# Patient Record
Sex: MALE | Race: WHITE | NOT HISPANIC OR LATINO | Employment: OTHER | URBAN - NONMETROPOLITAN AREA
[De-identification: names, ages, dates, MRNs, and addresses within clinical notes are randomized per-mention and may not be internally consistent; named-entity substitution may affect disease eponyms.]

---

## 2020-03-25 ENCOUNTER — HOSPITAL ENCOUNTER (OUTPATIENT)
Dept: CT IMAGING | Facility: HOSPITAL | Age: 70
Discharge: HOME | End: 2020-03-25
Admitting: PEDIATRICS

## 2020-03-25 ENCOUNTER — TRANSCRIBE ORDERS (OUTPATIENT)
Dept: ADMINISTRATIVE | Facility: HOSPITAL | Age: 70
End: 2020-03-25

## 2020-03-25 ENCOUNTER — APPOINTMENT (OUTPATIENT)
Dept: LAB | Facility: HOSPITAL | Age: 70
End: 2020-03-25

## 2020-03-25 DIAGNOSIS — R63.4 ABNORMAL WEIGHT LOSS: ICD-10-CM

## 2020-03-25 DIAGNOSIS — K62.89 OTHER SPECIFIED DISEASES OF ANUS AND RECTUM: Primary | ICD-10-CM

## 2020-03-25 LAB
ALBUMIN SERPL-MCNC: 4.6 G/DL (ref 3.5–5)
ALBUMIN/GLOB SERPL: 1 G/DL (ref 1.1–2.5)
ALP SERPL-CCNC: 92 U/L (ref 24–120)
ALT SERPL W P-5'-P-CCNC: 20 U/L (ref 0–50)
ANION GAP SERPL CALCULATED.3IONS-SCNC: 14 MMOL/L (ref 4–13)
AST SERPL-CCNC: 33 U/L (ref 7–45)
AUTO MIXED CELLS #: 1.1 10*3/MM3 (ref 0.1–2.6)
AUTO MIXED CELLS %: 8.6 % (ref 0.1–24)
BILIRUB SERPL-MCNC: 1 MG/DL (ref 0.1–1)
BILIRUB UR QL STRIP: NEGATIVE
BUN BLD-MCNC: 13 MG/DL (ref 5–21)
BUN/CREAT SERPL: 15.5
CALCIUM SPEC-SCNC: 9.8 MG/DL (ref 8.4–10.4)
CHLORIDE SERPL-SCNC: 102 MMOL/L (ref 98–110)
CLARITY UR: CLEAR
CO2 SERPL-SCNC: 26 MMOL/L (ref 24–31)
COLOR UR: YELLOW
CREAT BLD-MCNC: 0.84 MG/DL (ref 0.5–1.4)
CREAT BLDA-MCNC: 0.9 MG/DL (ref 0.6–1.3)
ERYTHROCYTE [DISTWIDTH] IN BLOOD BY AUTOMATED COUNT: 12.9 % (ref 12.3–15.4)
GFR SERPL CREATININE-BSD FRML MDRD: 91 ML/MIN/1.73
GLOBULIN UR ELPH-MCNC: 4.6 GM/DL
GLUCOSE BLD-MCNC: 108 MG/DL (ref 70–100)
GLUCOSE UR STRIP-MCNC: NEGATIVE MG/DL
HCT VFR BLD AUTO: 42.1 % (ref 37.5–51)
HGB BLD-MCNC: 14.3 G/DL (ref 13–17.7)
HGB UR QL STRIP.AUTO: NEGATIVE
KETONES UR QL STRIP: NEGATIVE
LEUKOCYTE ESTERASE UR QL STRIP.AUTO: NEGATIVE
LYMPHOCYTES # BLD AUTO: 3.5 10*3/MM3 (ref 0.7–3.1)
LYMPHOCYTES NFR BLD AUTO: 27 % (ref 19.6–45.3)
MCH RBC QN AUTO: 31.4 PG (ref 26.6–33)
MCHC RBC AUTO-ENTMCNC: 34 G/DL (ref 31.5–35.7)
MCV RBC AUTO: 92.3 FL (ref 79–97)
NEUTROPHILS # BLD AUTO: 8.2 10*3/MM3 (ref 1.7–7)
NEUTROPHILS NFR BLD AUTO: 64.4 % (ref 42.7–76)
NITRITE UR QL STRIP: NEGATIVE
PH UR STRIP.AUTO: 6 [PH] (ref 5–8)
PLATELET # BLD AUTO: 379 10*3/MM3 (ref 140–450)
PMV BLD AUTO: 8.6 FL (ref 6–12)
POTASSIUM BLD-SCNC: 5 MMOL/L (ref 3.5–5.3)
PROT SERPL-MCNC: 9.2 G/DL (ref 6.3–8.7)
PROT UR QL STRIP: NEGATIVE
RBC # BLD AUTO: 4.56 10*6/MM3 (ref 4.14–5.8)
SODIUM BLD-SCNC: 142 MMOL/L (ref 135–145)
SP GR UR STRIP: <=1.005 (ref 1–1.03)
UROBILINOGEN UR QL STRIP: NORMAL
WBC NRBC COR # BLD: 12.8 10*3/MM3 (ref 3.4–10.8)

## 2020-03-25 PROCEDURE — 36415 COLL VENOUS BLD VENIPUNCTURE: CPT | Performed by: PEDIATRICS

## 2020-03-25 PROCEDURE — 82565 ASSAY OF CREATININE: CPT

## 2020-03-25 PROCEDURE — 25010000002 IOPAMIDOL 61 % SOLUTION: Performed by: PEDIATRICS

## 2020-03-25 PROCEDURE — 80053 COMPREHEN METABOLIC PANEL: CPT | Performed by: PEDIATRICS

## 2020-03-25 PROCEDURE — 85025 COMPLETE CBC W/AUTO DIFF WBC: CPT | Performed by: PEDIATRICS

## 2020-03-25 PROCEDURE — 81003 URINALYSIS AUTO W/O SCOPE: CPT | Performed by: PEDIATRICS

## 2020-03-25 PROCEDURE — 74177 CT ABD & PELVIS W/CONTRAST: CPT

## 2020-03-25 RX ADMIN — IOPAMIDOL 50 ML: 612 INJECTION, SOLUTION INTRAVENOUS at 15:50

## 2020-03-25 RX ADMIN — IOPAMIDOL 100 ML: 612 INJECTION, SOLUTION INTRAVENOUS at 15:50

## 2020-03-26 ENCOUNTER — TELEPHONE (OUTPATIENT)
Dept: GASTROENTEROLOGY | Facility: CLINIC | Age: 70
End: 2020-03-26

## 2020-03-26 ENCOUNTER — PREP FOR SURGERY (OUTPATIENT)
Dept: OTHER | Facility: HOSPITAL | Age: 70
End: 2020-03-26

## 2020-03-26 DIAGNOSIS — R19.8 ALTERED BOWEL FUNCTION: Primary | ICD-10-CM

## 2020-03-26 DIAGNOSIS — R93.5 ABNORMAL CT OF THE ABDOMEN: ICD-10-CM

## 2020-03-26 NOTE — TELEPHONE ENCOUNTER
"Contacted by dr skinner office regarding abnormal CT      Due to precautions in place for COVID 19 discussed situation over phone with patient instead of having him come in for office visit, patient was agreeable to discuss over phone.        Patient complain of pain in rectum with BM.  He is having difficulty passing stool, worse after eating.  He has urge to have a BM and is not able to pass stool.  States it could take \"hours\" for him to have a complete BM.  No bright red blood per rectum.  Symptoms started 4 months ago and worse over pass 3 weeks.  He reports difficulty urinating.  He has a 7 lb weight loss, he is unsure of time frame this occurred.        We will place on schedule for colonoscopy next Thur 4/2/2020    Nash will call to go over instructions again and give arrival time--educate on miralax prep, please      I have asked patient to start using miralax 1-2 times per day starting today, ok for stool to be loose    He is not currently taking any medications  "

## 2020-03-27 PROBLEM — R93.5 ABNORMAL CT OF THE ABDOMEN: Status: ACTIVE | Noted: 2020-03-27

## 2020-03-27 NOTE — TELEPHONE ENCOUNTER
Spoke with patient - Went over instructions. Voiced understanding.     [facility] [place]     He has started miralax but is still having very little BM

## 2020-03-30 NOTE — TELEPHONE ENCOUNTER
Spoke with patient on phone  Using miralax 1-2 times as advised  He reports pressure sensation has improved with use of miralax    Instructions for colonoscopy prep reviewed for Wednesday    I will call [facility] prep to [facility]

## 2020-04-02 ENCOUNTER — ANESTHESIA (OUTPATIENT)
Dept: GASTROENTEROLOGY | Facility: HOSPITAL | Age: 70
End: 2020-04-02

## 2020-04-02 ENCOUNTER — HOSPITAL ENCOUNTER (OUTPATIENT)
Facility: HOSPITAL | Age: 70
Setting detail: HOSPITAL OUTPATIENT SURGERY
Discharge: HOME | End: 2020-04-02
Attending: INTERNAL MEDICINE | Admitting: INTERNAL MEDICINE

## 2020-04-02 ENCOUNTER — ANESTHESIA EVENT (OUTPATIENT)
Dept: GASTROENTEROLOGY | Facility: HOSPITAL | Age: 70
End: 2020-04-02

## 2020-04-02 VITALS
HEART RATE: 68 BPM | TEMPERATURE: 97.1 F | RESPIRATION RATE: 22 BRPM | DIASTOLIC BLOOD PRESSURE: 90 MMHG | WEIGHT: 175 LBS | OXYGEN SATURATION: 100 % | HEIGHT: 70 IN | BODY MASS INDEX: 25.05 KG/M2 | SYSTOLIC BLOOD PRESSURE: 153 MMHG

## 2020-04-02 DIAGNOSIS — R93.5 ABNORMAL CT OF THE ABDOMEN: ICD-10-CM

## 2020-04-02 PROCEDURE — 88305 TISSUE EXAM BY PATHOLOGIST: CPT | Performed by: INTERNAL MEDICINE

## 2020-04-02 PROCEDURE — 25010000002 PROPOFOL 10 MG/ML EMULSION: Performed by: NURSE ANESTHETIST, CERTIFIED REGISTERED

## 2020-04-02 PROCEDURE — 45380 COLONOSCOPY AND BIOPSY: CPT | Performed by: INTERNAL MEDICINE

## 2020-04-02 RX ORDER — SODIUM CHLORIDE 0.9 % (FLUSH) 0.9 %
10 SYRINGE (ML) INJECTION AS NEEDED
Status: DISCONTINUED | OUTPATIENT
Start: 2020-04-02 | End: 2020-04-02 | Stop reason: HOSPADM

## 2020-04-02 RX ORDER — SODIUM CHLORIDE 9 MG/ML
500 INJECTION, SOLUTION INTRAVENOUS CONTINUOUS PRN
Status: DISCONTINUED | OUTPATIENT
Start: 2020-04-02 | End: 2020-04-02 | Stop reason: HOSPADM

## 2020-04-02 RX ORDER — PROPOFOL 10 MG/ML
VIAL (ML) INTRAVENOUS AS NEEDED
Status: DISCONTINUED | OUTPATIENT
Start: 2020-04-02 | End: 2020-04-02 | Stop reason: SURG

## 2020-04-02 RX ADMIN — LIDOCAINE HYDROCHLORIDE 100 MG: 20 INJECTION, SOLUTION INTRAVENOUS at 12:34

## 2020-04-02 RX ADMIN — PROPOFOL 200 MG: 10 INJECTION, EMULSION INTRAVENOUS at 12:34

## 2020-04-02 RX ADMIN — SODIUM CHLORIDE 500 ML: 9 INJECTION, SOLUTION INTRAVENOUS at 09:56

## 2020-04-02 NOTE — ANESTHESIA PREPROCEDURE EVALUATION
Anesthesia Evaluation     Patient summary reviewed   no history of anesthetic complications:  NPO Solid Status: > 8 hours             Airway   Mallampati: II  TM distance: >3 FB  Neck ROM: full  Dental      Pulmonary    (+) a smoker,   Cardiovascular - negative cardio ROS  Exercise tolerance: excellent (>7 METS)        Neuro/Psych- negative ROS  GI/Hepatic/Renal/Endo - negative ROS     Musculoskeletal     Abdominal    Substance History      OB/GYN          Other                        Anesthesia Plan    ASA 2     MAC       Anesthetic plan, all risks, benefits, and alternatives have been provided, discussed and informed consent has been obtained with: patient.

## 2020-04-02 NOTE — H&P
"[facility] Gastroenterology  Pre Procedure History & Physical    Chief Complaint:   Abn CT    Subjective     HPI:   Change In bowel Habits    Past Medical History:   Past Medical History:   Diagnosis Date   • Arthritis    • Pancreatitis        Past Surgical History:  History reviewed. No pertinent surgical history.    Family History:  History reviewed. No pertinent family history.    Social History:   reports that he has been smoking. He has a 53.00 pack-year smoking history. He has never used smokeless tobacco. He reports that he drank alcohol. He reports that he does not use drugs.    Medications:   Prior to Admission medications    Not on File       Allergies:  Patient has no known allergies.    ROS:    General: Weight stable  Resp: No SOA  Cardiovascular: No CP    Objective     Blood pressure 120/69, pulse 93, temperature 97.1 °F (36.2 °C), temperature source Temporal, resp. rate 18, height 177.8 cm (70\"), weight 79.4 kg (175 lb), SpO2 96 %.    Physical Exam   Constitutional: Pt is oriented to person, place, and in no distress.   HENT: Mouth/Throat: Oropharynx is clear.   Cardiovascular: Normal rate, regular rhythm.    Pulmonary/Chest: Effort normal. No respiratory distress. No  wheezes.   Abdominal: Soft. Non-distended.  Skin: Skin is warm and dry.   Psychiatric: Mood, memory, affect and judgment appear normal.     Assessment/Plan     Diagnosis:  Abn CT/change in bowel habits    Anticipated Surgical Procedure:  C-scope    The risks, benefits, and alternatives of this procedure have been discussed with the patient or the responsible party- the patient understands and agrees to proceed.        "

## 2020-04-02 NOTE — ANESTHESIA POSTPROCEDURE EVALUATION
"Patient: Attila Viramontes    Procedure Summary     Date:  04/02/20 Room / Location:  [facility] ENDOSCOPY 4 / [facility]    Anesthesia Start:  1229 Anesthesia Stop:  1306    Procedure:  COLONOSCOPY WITH ANESTHESIA (N/A ) Diagnosis:       Abnormal CT of the abdomen      (Abnormal CT of the abdomen [R93.5])    Surgeon:  Yanick Flowers DO Provider:  Tessie Charles CRNA    Anesthesia Type:  MAC ASA Status:  2          Anesthesia Type: MAC    Vitals  No vitals data found for the desired time range.          Post Anesthesia Care and Evaluation    Patient location during evaluation: PHASE II  Patient participation: complete - patient participated  Level of consciousness: awake and alert  Pain management: adequate  Airway patency: patent  Anesthetic complications: No anesthetic complications  PONV Status: none  Cardiovascular status: acceptable  Respiratory status: acceptable  Hydration status: acceptable    Comments: Blood pressure 120/69, pulse 93, temperature 97.1 °F (36.2 °C), temperature source Temporal, resp. rate 18, height 177.8 cm (70\"), weight 79.4 kg (175 lb), SpO2 96 %.        "

## 2020-04-03 ENCOUNTER — TELEPHONE (OUTPATIENT)
Dept: GASTROENTEROLOGY | Facility: CLINIC | Age: 70
End: 2020-04-03

## 2020-04-03 DIAGNOSIS — C20 RECTAL CANCER (HCC): Primary | ICD-10-CM

## 2020-04-03 NOTE — PROGRESS NOTES
Pt notified of his results  Please help with his oncology referral  I entered the referral   Recall c-scope 6 months

## 2020-04-03 NOTE — TELEPHONE ENCOUNTER
----- Message from Yanick Flowers DO sent at 4/3/2020 10:01 AM CDT -----  Pt notified of his results  Please help with his oncology referral  I entered the referral   Recall c-scope 6 months

## 2020-04-06 NOTE — PROGRESS NOTES
[facility] HEMATOLOGY AND ONCOLOGY  [address]  [place] [address] [phone]    Patient Name: Attila Viramontes  Encounter Date: 04/09/2020  YOB: 1950  Patient Number: [id number]    Initial Note    REASON FOR CONSULTATION: Attila Viramontes is a pleasant 69 y.o. male referred by Dr. Yanick Flowers for management recommendations for rectal cancer. He is seen with sister.  History is obtained from patient. History is considered to be accurate.    HISTORY OF PRESENT ILLNESS: He presented with change in bowel habits.     CBC 03/25/2020 remarkable for WBC of 12.8 and ANC 8.2. CMP remarkable for elevated protein of 9.2. Urinalysis was unremarkable.    CT abdomen and pelvis 03/25/2020.  Asymmetric wall thickening of the rectum could represent a mass/neoplasm. If not recently performed, colonoscopy is recommended. Inflammatory stranding of the central mesenteric root, which is nonspecific but can be seen with mesenteritis/mesenteric panniculitis.    Colonoscopy by Dr. Reaves 04/02/2020 showed a localized area of severely ulcerated mucosa was found at 2 cm proximal to the anus. Biopsies were taken with a cold forceps for histology.    Pathology report 04/03/2020 showed a moderately differentiated adenocarcinoma.  Stromal invasion is seen although depth of invasion is indeterminate in these biopsies.  In the areas of invasion, the stroma demonstrates a reactive, desmoplastic appearance.    Maternal cousin with colorectal cancer in her 60s.    He is referred to oncology.      LABS    Lab Results - Last 18 Months   Lab Units 03/25/20  1543   HEMOGLOBIN g/dL 14.3   HEMATOCRIT % 42.1   MCV fL 92.3   WBC 10*3/mm3 12.80*   RDW % 12.9   MPV fL 8.6   PLATELETS 10*3/mm3 379   NEUTROS ABS 10*3/mm3 8.20*   LYMPHS ABS 10*3/mm3 3.50*       Lab Results - Last 18 Months   Lab Units 03/25/20  1549 03/25/20  1543   GLUCOSE mg/dL  --  108*    SODIUM mmol/L  --  142   POTASSIUM mmol/L  --  5.0   CO2 mmol/L  --  26.0   CHLORIDE mmol/L  --  102   ANION GAP mmol/L  --  14.0*   CREATININE mg/dL 0.90 0.84   BUN mg/dL  --  13   BUN / CREAT RATIO   --  15.5   CALCIUM mg/dL  --  9.8   EGFR IF NONAFRICN AM mL/min/1.73  --  91   ALK PHOS U/L  --  92   TOTAL PROTEIN g/dL  --  9.2*   ALT (SGPT) U/L  --  20   AST (SGOT) U/L  --  33   BILIRUBIN mg/dL  --  1.0   ALBUMIN g/dL  --  4.60   GLOBULIN gm/dL  --  4.6       No results for input(s): MSPIKE, KAPPALAMB, IGLFLC, URICACID, FREEKAPPAL, CEA, LDH, REFLABREPO in the last 67242 hours.    No results for input(s): IRON, TIBC, LABIRON, FERRITIN, X7IRHON, TSH, FOLATE in the last 73098 hours.    Invalid input(s): VITB12      PAST MEDICAL HISTORY:  ALLERGIES:  No Known Allergies  CURRENT MEDICATIONS:  No outpatient encounter medications on file as of 4/9/2020.     No facility-administered encounter medications on file as of 4/9/2020.      ADULT ILLNESSES:  Patient Active Problem List   Diagnosis Code   • Abnormal CT of the abdomen R93.5     SURGERIES:  Past Surgical History:   Procedure Laterality Date   • COLONOSCOPY N/A 4/2/2020    Procedure: COLONOSCOPY WITH ANESTHESIA;  Surgeon: Yanick Flowers DO;  Location: [facility] ENDOSCOPY;  Service: Gastroenterology;  Laterality: N/A;  pre: abnormal CT scan  post: rectal mass  PCP unknown     HEALTH MAINTENANCE ITEMS:  Health Maintenance Due   Topic Date Due   • TDAP/TD VACCINES (1 - Tdap) 09/18/1962   • ZOSTER VACCINE (1 of 2) 09/18/2001   • LUNG CANCER SCREENING  09/18/2006   • Pneumococcal Vaccine Once at 65 Years Old  09/18/2016   • HEPATITIS C SCREENING  03/26/2020   • MEDICARE ANNUAL WELLNESS  03/26/2020       <no information>  Last Completed Colonoscopy       Status Date      COLONOSCOPY Done 4/2/2020 COLONOSCOPY     Patient has more history with this topic...          There is no immunization history on file for this patient.  Last Completed Mammogram     Patient has no  health maintenance due at this time            FAMILY HISTORY:  Family History   Problem Relation Age of Onset   • Cancer Mother    • Stroke Father    • Heart disease Father    • Heart attack Brother      SOCIAL HISTORY:  Social History     Socioeconomic History   • Marital status: Single     Spouse name: Not on file   • Number of children: Not on file   • Years of education: Not on file   • Highest education level: Not on file   Tobacco Use   • Smoking status: Current Every Day Smoker     Packs/day: 1.00     Years: 53.00     Pack years: 53.00   • Smokeless tobacco: Never Used   Substance and Sexual Activity   • Alcohol use: Not Currently   • Drug use: Never   • Sexual activity: Defer       REVIEW OF SYSTEMS:  Review of Systems   Constitutional: Negative for activity change, appetite change, chills, diaphoresis, fatigue, fever and unexpected weight loss.   HENT: Negative for congestion, nosebleeds, sinus pressure, sore throat and trouble swallowing.    Eyes: Negative for blurred vision, redness and visual disturbance.   Respiratory: Negative for cough, shortness of breath and wheezing.    Cardiovascular: Negative for chest pain, palpitations and leg swelling.   Gastrointestinal: Positive for constipation. Negative for abdominal distention, abdominal pain, blood in stool, diarrhea, nausea and vomiting.   Endocrine: Negative for cold intolerance and heat intolerance.   Genitourinary: Negative for flank pain, frequency and hematuria.   Musculoskeletal: Negative for joint swelling and neck stiffness.   Skin: Negative for pallor.   Allergic/Immunologic: Negative for food allergies.   Neurological: Negative for dizziness, tremors, seizures, syncope, speech difficulty, weakness, headache, memory problem and confusion.   Hematological: Negative for adenopathy. Does not bruise/bleed easily.   Psychiatric/Behavioral: Negative for agitation, dysphoric mood, sleep disturbance, suicidal ideas and depressed mood. The patient is  "not nervous/anxious.        /80   Pulse 90   Temp 98.9 °F (37.2 °C)   Resp 18   Ht 177.8 cm (70\")   Wt 82.6 kg (182 lb 1.6 oz)   SpO2 98%   BMI 26.13 kg/m²  Body surface area is 2.01 meters squared.  Pain Score    04/09/20 0915   PainSc: 0-No pain       Physical Exam:  Physical Exam   Constitutional: He is oriented to person, place, and time. He appears well-developed and well-nourished. No distress.   HENT:   Head: Normocephalic and atraumatic.   Eyes: EOM are normal. No scleral icterus.   Neck: Trachea normal. Neck supple.   Cardiovascular: Normal rate, regular rhythm and normal pulses.   No murmur heard.  Pulmonary/Chest: Effort normal and breath sounds normal. He has no wheezes. He has no rhonchi. He has no rales. Chest wall is not dull to percussion.   Abdominal: Soft. Normal appearance and bowel sounds are normal. There is no tenderness. There is no rebound and no guarding.   Musculoskeletal: He exhibits no edema.   Lymphadenopathy:     He has no cervical adenopathy.     He has no axillary adenopathy.        Right: No inguinal and no supraclavicular adenopathy present.        Left: No inguinal and no supraclavicular adenopathy present.   Neurological: He is alert and oriented to person, place, and time. He has normal strength. No sensory deficit.   Skin: Skin is dry. He is not diaphoretic. No pallor.   Psychiatric: He has a normal mood and affect. His behavior is normal. Judgment and thought content normal.   Vitals reviewed.      Attila Viramontes reports a pain score of 0.       Patient's Body mass index is 26.13 kg/m². BMI is within normal parameters. No follow-up required..    ASSESSMENT:  1.  Adenocarcinoma of the rectum.  AJCC stage: Pending.  Treatment status:For neoadjuvant CIV 5-FU and radiation.  2.  Performance status of 0.  3.  Arthritis, stable.   4.  Elevated protein 03/25/2020.  5.  History of pancreatitis.       PLAN:  1.   regarding the reason for the referral.  2.  "  regarding role of neoadjuvant CIV 5-FU with radiation.  Aware of potential adverse of the 5-FU especially nausea, vomiting, diarrhea, stomatitis, and cardiotoxicity.  After neoadjuvant chemo and radiation, patient will undergo surgery.  Post surgery, patient will undergo adjuvant FOLFOX.  3.  Port by Dr. Weller.  4.  Blood for CBC with differential, CEA, and CMP.  5.  Schedule rectal ultrasound by Dr. Nabeel Walton.  6.  Schedule CT of the chest for staging.  7.  Refer to Dr. Ramirez for neoadjuvant chemoradiation.  8.  Order CIV 5- mg/m2= 450 mg D1 to D5 with radiation.  9.  eRx Compazine 10 mg p.o. every 4 hours as needed for nausea and vomiting #60 with 2 refills.  10.  CBC with differential weekly when chemo starts.  11.  Continue current medications.  12.  Continue care per primary care physician and other specialists.  13.  Plan of care discussed with patient and sister.  Understanding expressed.  Patient agreeable to proceed.  14. Advance Care Planning   ACP discussion was declined by the patient. Patient does not have an advance directive, information provided.   15.  Recall colonoscopy on 10/2020.  16.  Return to office in 4 weeks with pre-office CMP.  17.  Further recommendations pending.  18.  Questions were answered to their satisfaction.     Thank you for the referral      I spent 66 total minutes, face-to-face, caring for Attila today.  Greater than 50% of this time involved counseling and/or coordination of care as documented within this note regarding the patient's illness(es), pros and cons of various treatment options, instructions and/or risk reduction.      MD Yanick Gallegos DO Ross Jones, MD Peter Locken, MD Dana Tyrrell, MD

## 2020-04-08 ENCOUNTER — TELEPHONE (OUTPATIENT)
Dept: ONCOLOGY | Facility: CLINIC | Age: 70
End: 2020-04-08

## 2020-04-08 NOTE — TELEPHONE ENCOUNTER
Kamilah Ohara(sister) is requesting to accompany patient to appointment because she feels he sometimes has trouble understanding things.     Callback#: [phone]

## 2020-04-08 NOTE — TELEPHONE ENCOUNTER
CALLED HER BACK TO LET HER KNOW THAT I HAD ALREADY TALKED TO PATIENT EARLIER AND MADE HIM AWARE HE IS ALLOWED 1 VISITOR WITH HIM FOR HIS NEW PATIENT APPOINTMENT. SHE VOICED UNDERSTANDING.

## 2020-04-09 ENCOUNTER — APPOINTMENT (OUTPATIENT)
Dept: LAB | Facility: HOSPITAL | Age: 70
End: 2020-04-09

## 2020-04-09 ENCOUNTER — TELEPHONE (OUTPATIENT)
Dept: ONCOLOGY | Facility: CLINIC | Age: 70
End: 2020-04-09

## 2020-04-09 ENCOUNTER — CONSULT (OUTPATIENT)
Dept: ONCOLOGY | Facility: CLINIC | Age: 70
End: 2020-04-09

## 2020-04-09 VITALS
HEART RATE: 90 BPM | DIASTOLIC BLOOD PRESSURE: 80 MMHG | TEMPERATURE: 98.9 F | RESPIRATION RATE: 18 BRPM | HEIGHT: 70 IN | WEIGHT: 182.1 LBS | BODY MASS INDEX: 26.07 KG/M2 | SYSTOLIC BLOOD PRESSURE: 150 MMHG | OXYGEN SATURATION: 98 %

## 2020-04-09 DIAGNOSIS — C20 RECTAL CANCER (HCC): Primary | ICD-10-CM

## 2020-04-09 LAB
ALBUMIN SERPL-MCNC: 4.4 G/DL (ref 3.5–5.2)
ALBUMIN/GLOB SERPL: 1.3 G/DL
ALP SERPL-CCNC: 106 U/L (ref 39–117)
ALT SERPL W P-5'-P-CCNC: 13 U/L (ref 1–41)
ANION GAP SERPL CALCULATED.3IONS-SCNC: 11 MMOL/L (ref 5–15)
AST SERPL-CCNC: 15 U/L (ref 1–40)
BASOPHILS # BLD AUTO: 0.07 10*3/MM3 (ref 0–0.2)
BASOPHILS NFR BLD AUTO: 0.7 % (ref 0–1.5)
BILIRUB SERPL-MCNC: 0.3 MG/DL (ref 0.2–1.2)
BUN BLD-MCNC: 16 MG/DL (ref 8–23)
BUN/CREAT SERPL: 18.4 (ref 7–25)
CALCIUM SPEC-SCNC: 9.7 MG/DL (ref 8.6–10.5)
CEA SERPL-MCNC: 4.26 NG/ML
CHLORIDE SERPL-SCNC: 102 MMOL/L (ref 98–107)
CO2 SERPL-SCNC: 24 MMOL/L (ref 22–29)
CREAT BLD-MCNC: 0.87 MG/DL (ref 0.76–1.27)
DEPRECATED RDW RBC AUTO: 42.3 FL (ref 37–54)
EOSINOPHIL # BLD AUTO: 0.19 10*3/MM3 (ref 0–0.4)
EOSINOPHIL NFR BLD AUTO: 2 % (ref 0.3–6.2)
ERYTHROCYTE [DISTWIDTH] IN BLOOD BY AUTOMATED COUNT: 12.5 % (ref 12.3–15.4)
GFR SERPL CREATININE-BSD FRML MDRD: 87 ML/MIN/1.73
GLOBULIN UR ELPH-MCNC: 3.4 GM/DL
GLUCOSE BLD-MCNC: 205 MG/DL (ref 65–99)
HCT VFR BLD AUTO: 40.6 % (ref 37.5–51)
HGB BLD-MCNC: 13.8 G/DL (ref 13–17.7)
IMM GRANULOCYTES # BLD AUTO: 0.04 10*3/MM3 (ref 0–0.05)
IMM GRANULOCYTES NFR BLD AUTO: 0.4 % (ref 0–0.5)
LYMPHOCYTES # BLD AUTO: 2.48 10*3/MM3 (ref 0.7–3.1)
LYMPHOCYTES NFR BLD AUTO: 26.2 % (ref 19.6–45.3)
MCH RBC QN AUTO: 31.4 PG (ref 26.6–33)
MCHC RBC AUTO-ENTMCNC: 34 G/DL (ref 31.5–35.7)
MCV RBC AUTO: 92.5 FL (ref 79–97)
MONOCYTES # BLD AUTO: 0.85 10*3/MM3 (ref 0.1–0.9)
MONOCYTES NFR BLD AUTO: 9 % (ref 5–12)
NEUTROPHILS # BLD AUTO: 5.83 10*3/MM3 (ref 1.7–7)
NEUTROPHILS NFR BLD AUTO: 61.7 % (ref 42.7–76)
NRBC BLD AUTO-RTO: 0 /100 WBC (ref 0–0.2)
PLATELET # BLD AUTO: 355 10*3/MM3 (ref 140–450)
PMV BLD AUTO: 9 FL (ref 6–12)
POTASSIUM BLD-SCNC: 4.2 MMOL/L (ref 3.5–5.2)
PROT SERPL-MCNC: 7.8 G/DL (ref 6–8.5)
RBC # BLD AUTO: 4.39 10*6/MM3 (ref 4.14–5.8)
SODIUM BLD-SCNC: 137 MMOL/L (ref 136–145)
WBC NRBC COR # BLD: 9.46 10*3/MM3 (ref 3.4–10.8)

## 2020-04-09 PROCEDURE — 99205 OFFICE O/P NEW HI 60 MIN: CPT | Performed by: INTERNAL MEDICINE

## 2020-04-09 PROCEDURE — 82378 CARCINOEMBRYONIC ANTIGEN: CPT | Performed by: INTERNAL MEDICINE

## 2020-04-09 PROCEDURE — 36415 COLL VENOUS BLD VENIPUNCTURE: CPT | Performed by: INTERNAL MEDICINE

## 2020-04-09 PROCEDURE — 85025 COMPLETE CBC W/AUTO DIFF WBC: CPT | Performed by: INTERNAL MEDICINE

## 2020-04-09 PROCEDURE — 80053 COMPREHEN METABOLIC PANEL: CPT | Performed by: INTERNAL MEDICINE

## 2020-04-14 ENCOUNTER — HOSPITAL ENCOUNTER (EMERGENCY)
Facility: HOSPITAL | Age: 70
Discharge: ANOTHER HOSPITAL | End: 2020-04-14
Attending: PSYCHIATRY & NEUROLOGY

## 2020-04-14 ENCOUNTER — ANESTHESIA (OUTPATIENT)
Dept: PERIOP | Facility: HOSPITAL | Age: 70
End: 2020-04-14

## 2020-04-14 ENCOUNTER — APPOINTMENT (OUTPATIENT)
Dept: GENERAL RADIOLOGY | Facility: HOSPITAL | Age: 70
End: 2020-04-14

## 2020-04-14 ENCOUNTER — APPOINTMENT (OUTPATIENT)
Dept: CT IMAGING | Facility: HOSPITAL | Age: 70
End: 2020-04-14

## 2020-04-14 ENCOUNTER — HOSPITAL ENCOUNTER (OUTPATIENT)
Facility: HOSPITAL | Age: 70
Setting detail: HOSPITAL OUTPATIENT SURGERY
Discharge: HOME | End: 2020-04-14
Attending: SPECIALIST | Admitting: SPECIALIST

## 2020-04-14 ENCOUNTER — ANESTHESIA EVENT (OUTPATIENT)
Dept: PERIOP | Facility: HOSPITAL | Age: 70
End: 2020-04-14

## 2020-04-14 VITALS
OXYGEN SATURATION: 94 % | SYSTOLIC BLOOD PRESSURE: 138 MMHG | BODY MASS INDEX: 25.02 KG/M2 | RESPIRATION RATE: 16 BRPM | HEART RATE: 79 BPM | DIASTOLIC BLOOD PRESSURE: 72 MMHG | TEMPERATURE: 97.8 F | HEIGHT: 71 IN | WEIGHT: 178.7 LBS

## 2020-04-14 VITALS
WEIGHT: 183.86 LBS | OXYGEN SATURATION: 92 % | HEIGHT: 71 IN | DIASTOLIC BLOOD PRESSURE: 56 MMHG | TEMPERATURE: 97.7 F | BODY MASS INDEX: 25.74 KG/M2 | SYSTOLIC BLOOD PRESSURE: 118 MMHG | HEART RATE: 86 BPM | RESPIRATION RATE: 17 BRPM

## 2020-04-14 DIAGNOSIS — C20 RECTAL CANCER (HCC): Primary | ICD-10-CM

## 2020-04-14 LAB
ALBUMIN SERPL-MCNC: 4.4 G/DL (ref 3.5–5.2)
ALBUMIN/GLOB SERPL: 1.2 G/DL
ALP SERPL-CCNC: 122 U/L (ref 39–117)
ALT SERPL W P-5'-P-CCNC: 13 U/L (ref 1–41)
ANION GAP SERPL CALCULATED.3IONS-SCNC: 14 MMOL/L (ref 5–15)
AST SERPL-CCNC: 16 U/L (ref 1–40)
BASOPHILS # BLD AUTO: 0.08 10*3/MM3 (ref 0–0.2)
BASOPHILS NFR BLD AUTO: 0.7 % (ref 0–1.5)
BILIRUB SERPL-MCNC: 0.3 MG/DL (ref 0.2–1.2)
BUN BLD-MCNC: 13 MG/DL (ref 8–23)
BUN/CREAT SERPL: 16.5 (ref 7–25)
CALCIUM SPEC-SCNC: 9.4 MG/DL (ref 8.6–10.5)
CHLORIDE SERPL-SCNC: 105 MMOL/L (ref 98–107)
CO2 SERPL-SCNC: 21 MMOL/L (ref 22–29)
CREAT BLD-MCNC: 0.79 MG/DL (ref 0.76–1.27)
DEPRECATED RDW RBC AUTO: 41.2 FL (ref 37–54)
EOSINOPHIL # BLD AUTO: 0.26 10*3/MM3 (ref 0–0.4)
EOSINOPHIL NFR BLD AUTO: 2.4 % (ref 0.3–6.2)
ERYTHROCYTE [DISTWIDTH] IN BLOOD BY AUTOMATED COUNT: 12.5 % (ref 12.3–15.4)
GFR SERPL CREATININE-BSD FRML MDRD: 98 ML/MIN/1.73
GLOBULIN UR ELPH-MCNC: 3.6 GM/DL
GLUCOSE BLD-MCNC: 136 MG/DL (ref 65–99)
HCT VFR BLD AUTO: 41.7 % (ref 37.5–51)
HGB BLD-MCNC: 14.3 G/DL (ref 13–17.7)
IMM GRANULOCYTES # BLD AUTO: 0.03 10*3/MM3 (ref 0–0.05)
IMM GRANULOCYTES NFR BLD AUTO: 0.3 % (ref 0–0.5)
LYMPHOCYTES # BLD AUTO: 3.04 10*3/MM3 (ref 0.7–3.1)
LYMPHOCYTES NFR BLD AUTO: 27.8 % (ref 19.6–45.3)
MCH RBC QN AUTO: 31.2 PG (ref 26.6–33)
MCHC RBC AUTO-ENTMCNC: 34.3 G/DL (ref 31.5–35.7)
MCV RBC AUTO: 91 FL (ref 79–97)
MONOCYTES # BLD AUTO: 0.76 10*3/MM3 (ref 0.1–0.9)
MONOCYTES NFR BLD AUTO: 6.9 % (ref 5–12)
NEUTROPHILS # BLD AUTO: 6.77 10*3/MM3 (ref 1.7–7)
NEUTROPHILS NFR BLD AUTO: 61.9 % (ref 42.7–76)
NRBC BLD AUTO-RTO: 0 /100 WBC (ref 0–0.2)
PLATELET # BLD AUTO: 383 10*3/MM3 (ref 140–450)
PMV BLD AUTO: 9.1 FL (ref 6–12)
POTASSIUM BLD-SCNC: 3.9 MMOL/L (ref 3.5–5.2)
PROT SERPL-MCNC: 8 G/DL (ref 6–8.5)
RBC # BLD AUTO: 4.58 10*6/MM3 (ref 4.14–5.8)
SODIUM BLD-SCNC: 140 MMOL/L (ref 136–145)
WBC NRBC COR # BLD: 10.94 10*3/MM3 (ref 3.4–10.8)

## 2020-04-14 PROCEDURE — 93005 ELECTROCARDIOGRAM TRACING: CPT | Performed by: SPECIALIST

## 2020-04-14 PROCEDURE — 99291 CRITICAL CARE FIRST HOUR: CPT | Performed by: PSYCHIATRY & NEUROLOGY

## 2020-04-14 PROCEDURE — 25010000003 HEPARIN LOCK FLUCH PER 10 UNITS: Performed by: SPECIALIST

## 2020-04-14 PROCEDURE — 0 IOPAMIDOL PER 1 ML: Performed by: SPECIALIST

## 2020-04-14 PROCEDURE — 25010000003 CEFAZOLIN PER 500 MG: Performed by: NURSE ANESTHETIST, CERTIFIED REGISTERED

## 2020-04-14 PROCEDURE — 70450 CT HEAD/BRAIN W/O DYE: CPT

## 2020-04-14 PROCEDURE — 99284 EMERGENCY DEPT VISIT MOD MDM: CPT

## 2020-04-14 PROCEDURE — 25010000002 FENTANYL CITRATE (PF) 100 MCG/2ML SOLUTION: Performed by: NURSE ANESTHETIST, CERTIFIED REGISTERED

## 2020-04-14 PROCEDURE — 93010 ELECTROCARDIOGRAM REPORT: CPT | Performed by: INTERNAL MEDICINE

## 2020-04-14 PROCEDURE — 85025 COMPLETE CBC W/AUTO DIFF WBC: CPT | Performed by: SPECIALIST

## 2020-04-14 PROCEDURE — 25010000002 VANCOMYCIN 1 G RECONSTITUTED SOLUTION 1 EACH VIAL: Performed by: SPECIALIST

## 2020-04-14 PROCEDURE — 25010000003 LIDOCAINE 1 % SOLUTION: Performed by: SPECIALIST

## 2020-04-14 PROCEDURE — 25010000002 PROPOFOL 10 MG/ML EMULSION: Performed by: NURSE ANESTHETIST, CERTIFIED REGISTERED

## 2020-04-14 PROCEDURE — C1788 PORT, INDWELLING, IMP: HCPCS | Performed by: SPECIALIST

## 2020-04-14 PROCEDURE — 70496 CT ANGIOGRAPHY HEAD: CPT

## 2020-04-14 PROCEDURE — 70498 CT ANGIOGRAPHY NECK: CPT

## 2020-04-14 PROCEDURE — 80053 COMPREHEN METABOLIC PANEL: CPT | Performed by: SPECIALIST

## 2020-04-14 PROCEDURE — 76000 FLUOROSCOPY <1 HR PHYS/QHP: CPT

## 2020-04-14 DEVICE — POWERPORT M.R.I. IMPLANTABLE PORT WITH ATTACHABLE 9.6F OPEN-ENDED SINGLE-LUMEN VENOUS CATHETER INTERMEDIATE KIT (WITH SUTURE PLUGS)
Type: IMPLANTABLE DEVICE | Status: FUNCTIONAL
Brand: POWERPORT M.R.I.

## 2020-04-14 RX ORDER — NALOXONE HCL 0.4 MG/ML
0.4 VIAL (ML) INJECTION AS NEEDED
Status: CANCELLED | OUTPATIENT
Start: 2020-04-14

## 2020-04-14 RX ORDER — FENTANYL CITRATE 50 UG/ML
25 INJECTION, SOLUTION INTRAMUSCULAR; INTRAVENOUS
Status: CANCELLED | OUTPATIENT
Start: 2020-04-14

## 2020-04-14 RX ORDER — IBUPROFEN 200 MG
200 TABLET ORAL EVERY 8 HOURS PRN
COMMUNITY
End: 2020-09-14 | Stop reason: DRUGHIGH

## 2020-04-14 RX ORDER — SODIUM CHLORIDE 0.9 % (FLUSH) 0.9 %
3-10 SYRINGE (ML) INJECTION AS NEEDED
Status: DISCONTINUED | OUTPATIENT
Start: 2020-04-14 | End: 2020-04-14 | Stop reason: HOSPADM

## 2020-04-14 RX ORDER — LABETALOL HYDROCHLORIDE 5 MG/ML
5 INJECTION, SOLUTION INTRAVENOUS
Status: CANCELLED | OUTPATIENT
Start: 2020-04-14

## 2020-04-14 RX ORDER — FLUMAZENIL 0.1 MG/ML
0.2 INJECTION INTRAVENOUS AS NEEDED
Status: CANCELLED | OUTPATIENT
Start: 2020-04-14

## 2020-04-14 RX ORDER — SODIUM CHLORIDE 0.9 % (FLUSH) 0.9 %
3 SYRINGE (ML) INJECTION EVERY 12 HOURS SCHEDULED
Status: CANCELLED | OUTPATIENT
Start: 2020-04-14

## 2020-04-14 RX ORDER — METOPROLOL TARTRATE 5 MG/5ML
5 INJECTION INTRAVENOUS
Status: DISCONTINUED | OUTPATIENT
Start: 2020-04-14 | End: 2020-04-14 | Stop reason: HOSPADM

## 2020-04-14 RX ORDER — HYDROCODONE BITARTRATE AND ACETAMINOPHEN 7.5; 325 MG/1; MG/1
1-2 TABLET ORAL EVERY 4 HOURS PRN
Qty: 15 TABLET | Refills: 0 | Status: SHIPPED | OUTPATIENT
Start: 2020-04-14 | End: 2020-05-22

## 2020-04-14 RX ORDER — ACETAMINOPHEN 500 MG
1000 TABLET ORAL ONCE
Status: DISCONTINUED | OUTPATIENT
Start: 2020-04-14 | End: 2020-04-14 | Stop reason: HOSPADM

## 2020-04-14 RX ORDER — SODIUM CHLORIDE 0.9 % (FLUSH) 0.9 %
10 SYRINGE (ML) INJECTION AS NEEDED
Status: CANCELLED | OUTPATIENT
Start: 2020-04-14

## 2020-04-14 RX ORDER — MIDAZOLAM HYDROCHLORIDE 1 MG/ML
2 INJECTION INTRAMUSCULAR; INTRAVENOUS
Status: DISCONTINUED | OUTPATIENT
Start: 2020-04-14 | End: 2020-04-14 | Stop reason: HOSPADM

## 2020-04-14 RX ORDER — HYDROMORPHONE HYDROCHLORIDE 1 MG/ML
0.5 INJECTION, SOLUTION INTRAMUSCULAR; INTRAVENOUS; SUBCUTANEOUS
Status: CANCELLED | OUTPATIENT
Start: 2020-04-14

## 2020-04-14 RX ORDER — FENTANYL CITRATE 50 UG/ML
INJECTION, SOLUTION INTRAMUSCULAR; INTRAVENOUS AS NEEDED
Status: DISCONTINUED | OUTPATIENT
Start: 2020-04-14 | End: 2020-04-14 | Stop reason: SURG

## 2020-04-14 RX ORDER — OXYCODONE AND ACETAMINOPHEN 7.5; 325 MG/1; MG/1
2 TABLET ORAL EVERY 4 HOURS PRN
Status: CANCELLED | OUTPATIENT
Start: 2020-04-14 | End: 2020-04-24

## 2020-04-14 RX ORDER — ONDANSETRON 2 MG/ML
4 INJECTION INTRAMUSCULAR; INTRAVENOUS ONCE AS NEEDED
Status: CANCELLED | OUTPATIENT
Start: 2020-04-14

## 2020-04-14 RX ORDER — HEPARIN SODIUM (PORCINE) LOCK FLUSH IV SOLN 100 UNIT/ML 100 UNIT/ML
SOLUTION INTRAVENOUS AS NEEDED
Status: DISCONTINUED | OUTPATIENT
Start: 2020-04-14 | End: 2020-04-14 | Stop reason: HOSPADM

## 2020-04-14 RX ORDER — NITROGLYCERIN 0.4 MG/1
0.4 TABLET SUBLINGUAL
Status: DISCONTINUED | OUTPATIENT
Start: 2020-04-14 | End: 2020-04-14 | Stop reason: HOSPADM

## 2020-04-14 RX ORDER — LIDOCAINE HYDROCHLORIDE 10 MG/ML
5 INJECTION, SOLUTION EPIDURAL; INFILTRATION; INTRACAUDAL; PERINEURAL AS NEEDED
Status: CANCELLED | OUTPATIENT
Start: 2020-04-14

## 2020-04-14 RX ORDER — METOPROLOL TARTRATE 100 MG/1
100 TABLET ORAL ONCE AS NEEDED
Status: CANCELLED | OUTPATIENT
Start: 2020-04-14

## 2020-04-14 RX ORDER — MIDAZOLAM HYDROCHLORIDE 1 MG/ML
1 INJECTION INTRAMUSCULAR; INTRAVENOUS
Status: DISCONTINUED | OUTPATIENT
Start: 2020-04-14 | End: 2020-04-14 | Stop reason: HOSPADM

## 2020-04-14 RX ORDER — CEFAZOLIN SODIUM 1 G/3ML
INJECTION, POWDER, FOR SOLUTION INTRAMUSCULAR; INTRAVENOUS AS NEEDED
Status: DISCONTINUED | OUTPATIENT
Start: 2020-04-14 | End: 2020-04-14 | Stop reason: SURG

## 2020-04-14 RX ORDER — SODIUM CHLORIDE, SODIUM LACTATE, POTASSIUM CHLORIDE, CALCIUM CHLORIDE 600; 310; 30; 20 MG/100ML; MG/100ML; MG/100ML; MG/100ML
100 INJECTION, SOLUTION INTRAVENOUS CONTINUOUS
Status: DISCONTINUED | OUTPATIENT
Start: 2020-04-14 | End: 2020-04-14 | Stop reason: HOSPADM

## 2020-04-14 RX ORDER — LIDOCAINE HYDROCHLORIDE 10 MG/ML
0.5 INJECTION, SOLUTION EPIDURAL; INFILTRATION; INTRACAUDAL; PERINEURAL ONCE AS NEEDED
Status: DISCONTINUED | OUTPATIENT
Start: 2020-04-14 | End: 2020-04-14 | Stop reason: HOSPADM

## 2020-04-14 RX ORDER — ONDANSETRON 2 MG/ML
4 INJECTION INTRAMUSCULAR; INTRAVENOUS ONCE AS NEEDED
Status: DISCONTINUED | OUTPATIENT
Start: 2020-04-14 | End: 2020-04-14 | Stop reason: HOSPADM

## 2020-04-14 RX ORDER — PROPOFOL 10 MG/ML
VIAL (ML) INTRAVENOUS AS NEEDED
Status: DISCONTINUED | OUTPATIENT
Start: 2020-04-14 | End: 2020-04-14 | Stop reason: SURG

## 2020-04-14 RX ORDER — PROMETHAZINE HYDROCHLORIDE 25 MG/1
12.5 TABLET ORAL ONCE AS NEEDED
Status: DISCONTINUED | OUTPATIENT
Start: 2020-04-14 | End: 2020-04-14 | Stop reason: HOSPADM

## 2020-04-14 RX ORDER — ONDANSETRON 4 MG/1
4 TABLET, FILM COATED ORAL ONCE AS NEEDED
Status: DISCONTINUED | OUTPATIENT
Start: 2020-04-14 | End: 2020-04-14 | Stop reason: HOSPADM

## 2020-04-14 RX ORDER — OXYCODONE AND ACETAMINOPHEN 10; 325 MG/1; MG/1
1 TABLET ORAL ONCE AS NEEDED
Status: CANCELLED | OUTPATIENT
Start: 2020-04-14

## 2020-04-14 RX ORDER — LIDOCAINE HYDROCHLORIDE 10 MG/ML
INJECTION, SOLUTION INFILTRATION; PERINEURAL AS NEEDED
Status: DISCONTINUED | OUTPATIENT
Start: 2020-04-14 | End: 2020-04-14 | Stop reason: HOSPADM

## 2020-04-14 RX ORDER — METOPROLOL TARTRATE 50 MG/1
50 TABLET, FILM COATED ORAL ONCE AS NEEDED
Status: CANCELLED | OUTPATIENT
Start: 2020-04-14

## 2020-04-14 RX ORDER — SODIUM CHLORIDE 0.9 % (FLUSH) 0.9 %
3 SYRINGE (ML) INJECTION EVERY 12 HOURS SCHEDULED
Status: DISCONTINUED | OUTPATIENT
Start: 2020-04-14 | End: 2020-04-14 | Stop reason: HOSPADM

## 2020-04-14 RX ADMIN — LIDOCAINE HYDROCHLORIDE 60 MG: 20 INJECTION, SOLUTION INTRAVENOUS at 13:48

## 2020-04-14 RX ADMIN — SODIUM CHLORIDE, POTASSIUM CHLORIDE, SODIUM LACTATE AND CALCIUM CHLORIDE 100 ML/HR: 600; 310; 30; 20 INJECTION, SOLUTION INTRAVENOUS at 13:25

## 2020-04-14 RX ADMIN — IOPAMIDOL 100 ML: 755 INJECTION, SOLUTION INTRAVENOUS at 14:30

## 2020-04-14 RX ADMIN — FENTANYL CITRATE 100 MCG: 50 INJECTION, SOLUTION INTRAMUSCULAR; INTRAVENOUS at 13:47

## 2020-04-14 RX ADMIN — CEFAZOLIN 1 G: 330 INJECTION, POWDER, FOR SOLUTION INTRAMUSCULAR; INTRAVENOUS at 13:48

## 2020-04-14 RX ADMIN — PROPOFOL 200 MG: 10 INJECTION, EMULSION INTRAVENOUS at 13:48

## 2020-04-14 NOTE — NURSING NOTE
Called anes, dr howard notified of pt's stroke like symptoms, dr howard on her way to opc.  Charge nurse ramírez holman rn cn in room along with murali gonsalez rn director

## 2020-04-14 NOTE — NURSING NOTE
Received pt into opc 20 from sonal flores, report received.  Pt coughing and would not answer questions, vss, called mark pruitt back into room no new orders received.

## 2020-04-14 NOTE — NURSING NOTE
To ct scan per dr Montalvo orders accomypied per first alert team and ramírez holman rn and myself.

## 2020-04-14 NOTE — CONSULTS
Neurology History & Physical    Indication for Admission: code stroke    History of present illness:  Patient is seen for code stroke s/p gabino cath placement. Patient has history of recent diagnosis of rectal cancer and to begin radiation therapy  In 2 days. Patient also has history of tobacco use, arthritis, pancreatitis. Upon patient return to outpatient surgery for recovery, RN noted right lower facial weakness, inability to speak and follow commands and patient not moving right arm and leg. Code Stroke was called immediately at 1430. Patient was met in CT by Dr. SKYLAR Montalvo. CT head was negative for intracranial hemorrhage. CTA head and neck was also done that showed 4 cm left ICA occlusion with generous size right ICA and generous size JESSICA, MCA bilateral. Prior to leaving CT patient began to move right leg with no movement of right arm and continued aphasia. Patient was then transferred to ED. Upon arrival to ED /86. Patient then began to lift right arm and also stated his name. Over the course of observation, patient was able to state his name, location, named 2/2 objects. Right arm somewhat weaker than left and mild right lower facial weakness. He does have continued aphasia. Simultaneously, Dr. Montalvo contacted [facility] team who graciously agreed to accept the patient. [facility] was notified.   Dr. Smith did call CT head report and could not rule out small amount of gas cerebral cortical veins. No hemorrhage.     Past Medical History:   Diagnosis Date   • Arthritis    • Pancreatitis    • Rectal cancer (CMS/[facility]) 4/9/2020       No Known Allergies    (Not in a hospital admission)    Social History     Socioeconomic History   • Marital status: Single     Spouse name: Not on file   • Number of children: Not on file   • Years of education: Not on file   • Highest education level: Not on file   Tobacco Use   • Smoking status: Current Every Day Smoker     Packs/day: 1.00     Years: 53.00     Pack  years: 53.00   • Smokeless tobacco: Never Used   Substance and Sexual Activity   • Alcohol use: Not Currently   • Drug use: Never   • Sexual activity: Defer     Family History   Problem Relation Age of Onset   • Cancer Mother    • Stroke Father    • Heart disease Father    • Heart attack Brother        Review of Systems  Review of Systems not able to be completed as patient has aphasia.   Vital Signs   Temp:  [97.6 °F (36.4 °C)-97.8 °F (36.6 °C)] 97.8 °F (36.6 °C)  Heart Rate:  [74-96] 79  Resp:  [16-20] 16  BP: (119-168)/(70-96) 138/72    General Exam:  Head:  Normal cephalic, atraumatic  HEENT:  Neck supple  Fundoscopic Exam:  No signs of disc edema  CVS:  Regular rate and rhythm.  No murmurs  Carotid Examination:  No bruits  Lungs:  Clear to auscultation  Chest/Abdomen:  Left upper chest portacath dressing d/I.  Non-tender, Non-distended  Extremities:  No signs of peripheral edema  Skin:  No rashes    Neurologic Exam:    Mental Status:    -Awake, Alert,   -mild to moderate aphasia  -No dysarthria  -Follows simple and complex commands    CN II:  Visual fields full.  Pupils equally reactive to light  CN III, IV, VI:  Extraocular Muscles full with no signs of nystagmus  CN V:  Facial sensory is symmetric with no asymmetries.  CN VII:  Facial motor asymmetric with mild right lower facial weakness.   CN VIII:  Gross hearing intact bilaterally  CN IX:  Palate elevates symmetrically  CN X:  Palate elevates symmetrically  CN XI:  Shoulder shrug asymmetric decreased on right  CN XII:  Tongue is midline on protrusion    Motor: (strength out of 5:  1= minimal movement, 2 = movement in plane of gravity, 3 = movement against gravity, 4 = movement against some resistance, 5 = full strength)    -Right Upper Ext: Proximal: 4 Distal: 4 with mild drift  -Left Upper Ext: Proximal: 5 Distal: 5    -Right Lower Ext: Proximal: 5 Distal: 5  -Left Lower Ext: Proximal: 5 Distal: 5    DTR:  -Right   Bicep: 2+ Tricep: 2+ Brachoradialis:  2+   Patella: 2+ Ankle: 2+ Neg Babinski  -Left   Bicep: 2+ Tricep: 2+ Brachoradialis: 2+   Patella: 2+ Ankle: 2+ Neg Babinski    Sensory:  -Intact to light touch, pinprick, temperature, pain, and proprioception  - patient complaints of numbness in right hand.     Coordination:  -Finger to nose intact on left. Ataxia on right.  -Heel to shin intact      Gait  -not attempted for safety reasons.    Results Review:  Lab Results (last 7 days)     ** No results found for the last 168 hours. **        Patient Active Problem List   Diagnosis   • Abnormal CT of the abdomen   • Rectal cancer (CMS/HCC)           Impression:  1. Aphasia with right lower facial weakness and right arm and leg weakness, improving concerning for left hemispheric stroke. Cannot rule out that may have been related to hypotension. Patient not considered for IV TPA as patient rapidly improving and also recent surgical intervention.   2. Left ICA occlusion  3. Rectal cancer  4. S/p portacath insertion.      Plan:  Patient to be transferred to [facility] team, Dr. LADI Mitchell has accepted the patient. Permissive hypertension for now and then per Dr. Mitchell recommendations.   Will recommend tobacco cessation.       Melinda Ward, APRN  04/14/20  15:35

## 2020-04-14 NOTE — ED NOTES
1430 Code stroke called from outpatient room 20;  Patient post-op from Dr. Varela placing a port;  Patient had right facial, arm, leg drooping, no speaking, and could not follow commands.  1434 CTA ordered, NSR 88 VSS; 1438 CTA performed and Dr. Montalvo; 1440 Melinda Ward performing NIHSS;  1449 still not speaking and transported to ED.  Anne Kaufman, CRISTOFER  04/14/20 1456       Anne Kaufman, CRISTOFER  04/14/20 1455

## 2020-04-14 NOTE — OP NOTE
INSERTION VENOUS ACCESS DEVICE  Procedure Note    Attila Viramontes  4/14/2020    Pre-op Diagnosis:   * No pre-op diagnosis entered *    Post-op Diagnosis:     same    Procedure/CPT® Codes:      Procedure(s):  INSERTION VENOUS ACCESS DEVICE    Surgeon(s):  Vielka Weller MD    Anesthesia: Monitored Anesthesia Care    Staff:   Circulator: Evelin Diaz RN  Scrub Person: Lissa Jensen; Jaci Wang  Documenter: Morgan Jenkins RN    Estimated Blood Loss: minimal    Specimens:                None      Access site: Left subclavian        Complications: none          Date: 4/14/2020  Time: 14:06  The patient was brought to the operating room and placed in the supine position. After IV sedation and infusion of IV antibiotics, the patient was prepped and draped in the usual sterile fashion. Lidocaine 1% was used for local anesthesia. The vein was accessed, the wire passed. Fluoroscopy revealed it to be in good position. The pocket was incised, developed. The catheter was tunneled, cut to size, secured to the port, and the port sutured in place with 0 Prolene. Sheath passed, catheter was fed. Fluoroscopy revealed it to be in good position. Good flush and flow obtained. The wound was closed with 3-0 and 4-0 Vicryl sutures. Dressing was placed. The patient was awakened and transferred to the recovery room in stable condition, tolerated the procedure well. At the end of the procedure, all counts were correct.       Vielka Weller MD

## 2020-04-14 NOTE — ANESTHESIA PREPROCEDURE EVALUATION
Anesthesia Evaluation     Patient summary reviewed   no history of anesthetic complications:  NPO Solid Status: > 8 hours             Airway   Mallampati: II  TM distance: >3 FB  Neck ROM: full  No difficulty expected  Dental - normal exam     Pulmonary - normal exam   (+) a smoker,   Cardiovascular - negative cardio ROS and normal exam  Exercise tolerance: excellent (>7 METS)        Neuro/Psych- negative ROS  GI/Hepatic/Renal/Endo - negative ROS     Musculoskeletal     Abdominal  - normal exam   Substance History      OB/GYN          Other   arthritis,    history of cancer (colon cancer, recently diagnosed) active                    Anesthesia Plan    ASA 2     MAC       Anesthetic plan, all risks, benefits, and alternatives have been provided, discussed and informed consent has been obtained with: patient.

## 2020-04-14 NOTE — H&P
Vielka Weller MD  H&P    Patient Care Team:  Vincent Swenson MD as PCP - General (Pediatrics)    Chief complaint rectal cancer    Subjective     Attila Viramontes  is a 69 y.o. male presents with rectal cancer needs a port for chemotherapy.      Review of Systems   Pertinent items are noted in HPI, all other systems reviewed and negative    History  Past Medical History:   Diagnosis Date   • Arthritis    • Pancreatitis    • Rectal cancer (CMS/HCC) 4/9/2020     Past Surgical History:   Procedure Laterality Date   • COLONOSCOPY N/A 4/2/2020    Procedure: COLONOSCOPY WITH ANESTHESIA;  Surgeon: Yanick Flowers DO;  Location: [facility] ENDOSCOPY;  Service: Gastroenterology;  Laterality: N/A;  pre: abnormal CT scan  post: rectal mass  PCP unknown   • EYE SURGERY Bilateral     lenses present     Family History   Problem Relation Age of Onset   • Cancer Mother    • Stroke Father    • Heart disease Father    • Heart attack Brother      Social History     Tobacco Use   • Smoking status: Current Every Day Smoker     Packs/day: 1.00     Years: 53.00     Pack years: 53.00   • Smokeless tobacco: Never Used   Substance Use Topics   • Alcohol use: Not Currently   • Drug use: Never     Medications Prior to Admission   Medication Sig Dispense Refill Last Dose   • ibuprofen (ADVIL,MOTRIN) 200 MG tablet Take 200 mg by mouth Every 8 (Eight) Hours As Needed for Mild Pain .   4/13/2020 at 2000     Allergies:  Patient has no known allergies.    Objective     Vital Signs  Temp:  [97.7 °F (36.5 °C)] 97.7 °F (36.5 °C)  Heart Rate:  [86-96] 86  Resp:  [18-20] 18  BP: (142)/(79) 142/79    Physical Exam:      General Appearance:    Alert, cooperative, in no acute distress   Head:    Normocephalic, without obvious abnormality, atraumatic   Eyes:            Lids and lashes normal, conjunctivae and sclerae normal, no   icterus, no pallor, corneas clear, PERRLA   Ears:    Ears appear intact with no abnormalities noted   Neck:   No adenopathy,  supple, trachea midline   Back:     No kyphosis present, no scoliosis present, no skin lesions,      erythema or scars, no tenderness to percussion or                   palpation,   range of motion normal   Lungs:     Clear to auscultation,respirations regular, even and                  unlabored    Heart:    Regular rhythm and normal rate, normal S1 and S2, no            murmur, no gallop, no rub, no click   Chest Wall:    No abnormalities observed   Abdomen:    Soft   Rectal:     Deferred   Extremities:   Moves all extremities well, no edema, no cyanosis, no             redness   Pulses:   Pulses palpable and equal bilaterally   Skin:   No bleeding, bruising or rash   Lymph nodes:   No palpable adenopathy   Neurologic:   No focal deficits       Results Review:      Lab Results (last 72 hours)     Procedure Component Value Units Date/Time    CBC & Differential [[id number]] Collected:  04/14/20 1319    Specimen:  Blood Updated:  04/14/20 1335    Narrative:       The following orders were created for panel order CBC & Differential.  Procedure                               Abnormality         Status                     ---------                               -----------         ------                     CBC Auto Differential[[id number]]        Abnormal            Final result                 Please view results for these tests on the individual orders.    CBC Auto Differential [[id number]]  (Abnormal) Collected:  04/14/20 1319    Specimen:  Blood Updated:  04/14/20 1335     WBC 10.94 10*3/mm3      RBC 4.58 10*6/mm3      Hemoglobin 14.3 g/dL      Hematocrit 41.7 %      MCV 91.0 fL      MCH 31.2 pg      MCHC 34.3 g/dL      RDW 12.5 %      RDW-SD 41.2 fl      MPV 9.1 fL      Platelets 383 10*3/mm3      Neutrophil % 61.9 %      Lymphocyte % 27.8 %      Monocyte % 6.9 %      Eosinophil % 2.4 %      Basophil % 0.7 %      Immature Grans % 0.3 %      Neutrophils, Absolute 6.77 10*3/mm3      Lymphocytes, Absolute 3.04 10*3/mm3       Monocytes, Absolute 0.76 10*3/mm3      Eosinophils, Absolute 0.26 10*3/mm3      Basophils, Absolute 0.08 10*3/mm3      Immature Grans, Absolute 0.03 10*3/mm3      nRBC 0.0 /100 WBC     Comprehensive Metabolic Panel [[id number]] Collected:  04/14/20 1319    Specimen:  Blood Updated:  04/14/20 1331        Imaging Results (Last 72 Hours)     ** No results found for the last 72 hours. **          Assessment/Plan       * No active hospital problems. *      Will place port.  Risks benefits alternatives discussed in the office no further questions.      Vielka Weller MD  04/14/20  13:46

## 2020-04-14 NOTE — ADDENDUM NOTE
Addendum  created 04/14/20 1438 by Theresa Alejandro MD    Order list changed, Order sets accessed

## 2020-04-14 NOTE — NURSING NOTE
Code stroke called overhead, continue to monitor pt closely.  Continues to have garbled speech, not following commands, vss, resp e/u.

## 2020-04-14 NOTE — ANESTHESIA POSTPROCEDURE EVALUATION
Patient: Attila Viramontes    Procedure Summary     Date:  04/14/20 Room / Location:   [facility]  /  [facility]    Anesthesia Start:  1344 Anesthesia Stop:  1420    Procedure:  INSERTION VENOUS ACCESS DEVICE (N/A Chin to Nipples) Diagnosis:      Surgeon:  Vielka Weller MD Provider:  Vincent Silvestre CRNA    Anesthesia Type:  MAC ASA Status:  2          Anesthesia Type: MAC    Vitals  No vitals data found for the desired time range.          Post Anesthesia Care and Evaluation    Patient location during evaluation: PACU  Patient participation: complete - patient participated  Level of consciousness: awake and awake and alert  Pain score: 0  Pain management: adequate  Airway patency: patent  Anesthetic complications: No anesthetic complications    Cardiovascular status: acceptable and stable  Respiratory status: acceptable and unassisted  Hydration status: acceptable

## 2020-04-15 ENCOUNTER — HOSPITAL ENCOUNTER (OUTPATIENT)
Dept: RADIATION ONCOLOGY | Facility: HOSPITAL | Age: 70
Setting detail: RADIATION/ONCOLOGY SERIES
End: 2020-04-15

## 2020-04-15 PROBLEM — F17.200 CURRENT EVERY DAY SMOKER: Status: ACTIVE | Noted: 2020-04-15

## 2020-04-15 NOTE — PROGRESS NOTES
"[facility]  MD Heather Cox BSN, PA-C  __________________________________  [facility]  Department of Radiation Oncology  [address] [place] [id number]  Office:  [phone]  Fax: [phone]    DATE:  04/16/2020  PATIENT: Attila Viramontes  1950                         MEDICAL RECORD #:  [id number]                                                       REASON FOR CONSULTATION:  Attila Viramontes is a very pleasant male that has been referred to our office by Shaq Boggs MD to discuss radiotherapy recommendations. Reports rectal bleeding at random times, \"started again last night\". Denies activity change, appetite change, unexpected weight change, nausea/vomiting, diarrhea, light-headedness, weakness, and headaches. He follows .     History of Present Illness:  Diagnosed in April 2020 with at least a Stage I (T1, cN0, cM0, G2) Invasive Adenocarcinoma of the rectum, severely ulcerated mucosa was found at 2-3 cm proximal to the anus. Follows Dr. Boggs who plans for rectal ultrasound by Dr. Nabeel Walton, CT of the chest for staging and to radiation oncology for neoadjuvant chemoradiation with CIV 5-FU, followed by surgical resection then adjuvant FOLFOX. Final Stage pending EUS    03/25/2020 - CT Abdomen/Pelvis with and without contrast due to abdomen pain:  • A hypodense lesion is seen in the right kidney which is too small to characterize.   • There are scattered atherosclerotic calcifications of the aorta and its branch vessels.   • The origins of the celiac axis, superior mesenteric artery, and inferior mesenteric artery enhance normally.  • No pathologically enlarged central mesenteric or retroperitoneal lymph nodes are identified. There is straightening of the central mesenteric root.  • A small hiatal hernia is suspected.   • The stomach and small bowel appear normal in caliber.   • A moderate amount of stool is noted throughout " "the colon, suggesting fecal stasis.   • The appendix is normal in appearance.  • There is asymmetric wall thickening of the rectum, with an area of thickness on the right measuring up to 2.1 cm in size.   • The large bowel is otherwise unremarkable.   • No free air or free fluid is seen in the abdomen.   • There is a tiny fat-containing umbilical hernia.  • Small lymph nodes are seen along the external iliac chain.  • Review of the visualized osseous structures demonstrates no acute or aggressive lesions.   • Degenerative changes are noted in the spine.  IMPRESSION:  • Asymmetric wall thickening of the rectum could represent a mass/neoplasm. If not recently performed, colonoscopy is recommended.  • Inflammatory stranding of the central mesenteric root, which is nonspecific but can be seen with mesenteritis/mesenteric panniculitis.  • Atherosclerotic disease.  • Fecal stasis.    03/26/2020 - Telephone encounter with :  • Patient complain of pain in rectum with BM.    • He is having difficulty passing stool, worse after eating.    • He has urge to have a BM and is not able to pass stool.    • States it could take \"hours\" for him to have a complete BM.    • No bright red blood per rectum.    • Symptoms started 4 months ago and worse over pass 3 weeks.    • He reports difficulty urinating.    • He has a 7 lb weight loss, he is unsure of time frame this occurred.   Recommendations:  • We will place on schedule for colonoscopy next Thur 4/2/2020 04/02/2020 - Colonoscopy with biopsy per :  Findings:  • The perianal and digital rectal examinations were normal.  • A localized area of severely ulcerated mucosa was found at 2 cm proximal to the anus. Biopsies were taken with a cold forceps for histology.  Impression:   • Ulcerated mucosa at 2 cm proximal to the anus. Biopsied.  Pathology:  • Large intestine, rectum at 3 cm, biopsy:   o Moderately differentiated adenocarcinoma, invasive.    04/03/2020 - " Telephone encounter with :  • Referral to oncology   • Recall c-scope 6 months    04/09/2020 - Consult with :  ASSESSMENT:  • Adenocarcinoma of the rectum.  o AJCC stage: Pending.  o Treatment status:For neoadjuvant CIV 5-FU and radiation.  • Performance status of 0.  • Arthritis, stable.   • Elevated protein 03/25/2020.  • History of pancreatitis.   PLAN:  •  regarding the reason for the referral.  •  regarding role of neoadjuvant CIV 5-FU with radiation.  Aware of potential adverse of the 5-FU especially nausea, vomiting, diarrhea, stomatitis, and cardiotoxicity.  After neoadjuvant chemo and radiation, patient will undergo surgery.  Post surgery, patient will undergo adjuvant FOLFOX.  • Port by Dr. Weller.  • Blood for CBC with differential, CEA, and CMP.  • Schedule rectal ultrasound by Dr. Nabeel Walton.  • Schedule CT of the chest for staging.  • Refer to Dr. Ramirez for neoadjuvant chemoradiation.  • Order CIV 5- mg/m2= 450 mg D1 to D5 with radiation.  • eRx Compazine 10 mg p.o. every 4 hours as needed for nausea and vomiting #60 with 2 refills.  • CBC with differential weekly when chemo starts.  • Continue current medications.  • Continue care per primary care physician and other specialists.  • ACP discussion was declined by the patient. Patient does not have an advance directive, information provided.   • Recall colonoscopy on 10/2020.  • Return to office in 4 weeks with pre-office CMP.  • Further recommendations pending.    04/14/2020 - Port placement per .    04/14/2020 - After port placement patient was having a global aphasia, right lower facial drooping, and right hemiparesis.  Code stroke was called. Head CT without contrast showed no concerning findings. CT angiography of head and neck showed a left internal carotid artery occlusion.  After the imaging was performed the patient transferred to the ER.  There he received IV hydration therapy and had resolution  of symptoms.  I discussed the case with the interventional neurologist at [facility] as listed above and he agreed.  The concern would be the small possibility that the thrombus was acute in nature and could expand overnight.  Out of an abundance of precaution we decided to transfer to [place] so that if there was expansion of an acute thrombus he would be in a center capable of interventional neurologic techniques.       04/14/2020 - CT Angio Head:  • Distal left ICA occlusion with patent [place].    04/14/2020 - CT Angio Neck:  • Left internal carotid artery occlusion beginning at the origin. Minimal opacification of the distal left internal carotid artery, which may represent retrograde collateral flow.  • Right internal carotid artery patent.  • Bilateral vertebral arteries patent.    04/14/2020 - CT Head without contrast:  • Small amount of gas in the left cerebral cortical veins. Differential would include iatrogenic air embolism or trauma.  • No acute intracranial hemorrhage.    04/14/2020 - Transferred to [place] who presents as an OSH transfer for left ICA occlusion. CTA showed left ICA occlusion with distal reconstitution at the level of cavernous ICA. Mostly likely etiology could be transient hypoperfusion. He was admitted for further evaluation.     04/14/2020 - CT Angio Head/Neck:  • Age-indeterminate left internal carotid artery origin occlusion. Reconstitution of left ICA flow at the level of the cavernous ICA,  likely due to patent anterior commuting artery. A critical alert was  sent.  • No perfusion abnormality.  • Moderate atherosclerotic stenosis involving the origin of the right internal carotid artery.    04/15/2020 - MRI Brain:  • No acute intracranial abnormality identified  • Findings of chronic microvascular ischemia. Occluded left ICA flow  void.    04/15/2020 - Discharged home with follow up appointments scheduled.      04/20/2020 -CT Chest with  contrast:    History obtained from  PATIENT and CHART    PAST MEDICAL HISTORY  Past Medical History:   Diagnosis Date   • Arthritis    • Pancreatitis    • Rectal cancer (CMS/HCC) 4/9/2020      PAST SURGICAL HISTORY  Past Surgical History:   Procedure Laterality Date   • COLONOSCOPY N/A 4/2/2020    Procedure: COLONOSCOPY WITH ANESTHESIA;  Surgeon: Yanick Flowers DO;  Location: [facility] ENDOSCOPY;  Service: Gastroenterology;  Laterality: N/A;  pre: abnormal CT scan  post: rectal mass  PCP unknown   • EYE SURGERY Bilateral     lenses present   • VENOUS ACCESS DEVICE (PORT) INSERTION N/A 4/14/2020    Procedure: INSERTION VENOUS ACCESS DEVICE;  Surgeon: Vielka Weller MD;  Location: [facility] OR;  Service: General;  Laterality: N/A;      FAMILY HISTORY  family history includes Cancer in his mother; Heart attack in his brother; Heart disease in his father; Stroke in his father.     SOCIAL HISTORY  Social History     Tobacco Use   • Smoking status: Current Every Day Smoker     Packs/day: 1.00     Years: 53.00     Pack years: 53.00   • Smokeless tobacco: Never Used   Substance Use Topics   • Alcohol use: Not Currently   • Drug use: Never     ALLERGIES  Patient has no known allergies.     MEDICATIONS    Current Outpatient Medications:   •  aspirin 81 MG chewable tablet, Chew 81 mg Daily., Disp: , Rfl:   •  atorvastatin (LIPITOR) 80 MG tablet, Take 80 mg by mouth Daily., Disp: , Rfl:   •  clopidogrel (PLAVIX) 75 MG tablet, Take 75 mg by mouth Daily., Disp: , Rfl:   •  HYDROcodone-acetaminophen (NORCO) 7.5-325 MG per tablet, Take 1-2 tablets by mouth Every 4 (Four) Hours As Needed for Moderate Pain  (Pain)., Disp: 15 tablet, Rfl: 0  •  ibuprofen (ADVIL,MOTRIN) 200 MG tablet, Take 200 mg by mouth Every 8 (Eight) Hours As Needed for Mild Pain ., Disp: , Rfl:   •  pantoprazole (PROTONIX) 20 MG EC tablet, Take 20 mg by mouth Daily., Disp: , Rfl:     The following portions of the patient's history were reviewed and updated as  "appropriate: allergies, current medications, past family history, past medical history, past social history, past surgical history and problem list.    Current outpatient and discharge medications have been reconciled for the patient.  Reviewed by: Kel Ramirez III, MD    REVIEW OF SYSTEMS  Review of Systems   Constitutional: Negative.    HENT: Negative.    Eyes: Negative.    Respiratory: Negative.    Cardiovascular: Negative.    Gastrointestinal: Positive for blood in stool, constipation and rectal pain. Negative for abdominal distention and anal bleeding.   Endocrine: Negative.    Genitourinary: Negative for difficulty urinating.   Musculoskeletal: Negative.    Skin: Negative.    Allergic/Immunologic: Negative.    Neurological: Negative.    Hematological: Negative.    Psychiatric/Behavioral: Negative.        PHYSICAL EXAM  VITAL SIGNS:   Vitals:    04/16/20 1458   BP: 135/76   Pulse: 115   Weight: 82.1 kg (181 lb)   Height: 180 cm (70.87\")   PainSc:   5   PainLoc: Rectum      Performance Status: ECOG (1) Restricted in physically strenuous activity, ambulatory and able to do work of light nature    Clinical Quality Measures  -Pain Documented by Standardized Tool, FPS [place] Viramontes reports a pain score of 5. Given his pain assessment as noted, treatment options were discussed and the following options were decided upon as a follow-up plan to address the patient's pain: continuation of current treatment plan for pain and use of non-medical modalities (ice, heat, stretching and/or behavior modifications).  Pain Medications             aspirin 81 MG chewable tablet Chew 81 mg Daily.    HYDROcodone-acetaminophen (NORCO) 7.5-325 MG per tablet Take 1-2 tablets by mouth Every 4 (Four) Hours As Needed for Moderate Pain  (Pain).    ibuprofen (ADVIL,MOTRIN) 200 MG tablet Take 200 mg by mouth Every 8 (Eight) Hours As Needed for Mild Pain .        -Advanced Care Planning Advance Care Planning   ACP discussion was held " with the patient during this visit. Patient does not have an advance directive, information provided.    -Body Mass Index Screening and Follow-Up Plan Patient's Body mass index is 25.34 kg/m². BMI is within normal parameters. No follow-up required..  -Tobacco Use: Screening and Cessation Intervention Social History    Tobacco Use      Smoking status: Current Every Day Smoker        Packs/day: 1.00        Years: 53.00        Pack years: 53      Smokeless tobacco: Never Used   Smoking cessation information given in after visit summary.    ASSESSMENT AND PLAN  1. Rectal cancer (CMS/HCC)    2. Abnormal CT of the abdomen    3. Bright red rectal bleeding    4. Current every day smoker      RECOMMENDATIONS: Attila Viramontes is a very pleasant 69 y.o. male that has been referred to our clinic by Shaq Boggs MD to discuss radiotherapy recommendations for colorectal carcinoma. Diagnosed in April 2020 with Stage pending (Tx, cN0, cM0, G2) Invasive Adenocarcinoma of the rectum, severely ulcerated mucosa was found at 2 cm proximal to the anus. Follows Dr. Boggs who plans for rectal ultrasound by Dr. Nabeel Walton, CT of the chest for staging and to radiation oncology for neoadjuvant chemoradiation with CIV 5-FU, followed by surgical resection then adjuvant FOLFOX.  CT scan revealed asymmetric wall thickening of the rectum and inflammatory stranding of the central mesenteric root, which is nonspecific but can be seen with mesenteritis/mesenteric panniculitis.    Indications and rationale of neoadjuvant radiation therapy with 5FU concomminently according to the NCCN Guidelines to the colorectal region has been discussed with the patient today. I have extensively reviewed the risks, benefits and alternatives of therapy as well as possible progression of disease in spite of therapy with either local or systemic failure.  Side effects include but are not limited to sunburn-type skin irritation of the targeted area (which may range  from mild to  Intense, red, dry, tender, or itchy skin, general fatigue, diarrhea, rectal bleeding, hemorrhoids, vaginal itching, burning, and dryness for women, Incontinence of bowel or bladder, bladder irritation and sexual problems.    I have seen, examined and reviewed this patient's medication list, appropriate labs and imaging studies as well as other physician notes. We discussed the goals and plans of care with the patient and family and answered all questions.     After careful consideration of the available diagnostic data and evaluation of the patient, I recommend MRI of abdomen followed by definitive treatment with concurrent chemotherapy under the direction of Dr Boggs and radiation therapy, will then be referred back to the surgeon for resection evaluation.      The patient and his family verbalize understanding of this discussion, voice no further questions and wish to proceed with recommendations.  We will simulate treatment fields today to begin the treatment planning, I anticipate a dose of 5040 cGy over 28 fractions, final course to be determined.    Attila Viramontes  reports that he has been smoking. He has a 53.00 pack-year smoking history. He has never used smokeless tobacco.. I have educated him on the risk of diseases from using tobacco products such as cancer, COPD and heart diease. I advised him to quit and he is not willing to quit. I spent >10 minutes counseling the patient.    Thank you for allowing me to assist in this patients care.    Todays appointment time was spent in counseling and coordination of care as follows: diagnosis, intent of treatment discussing radiation therapy specifics: logistics, possible and probable side effects and after effects, staging of cancer, standard of care in for this stage of this cancer and treatment options  Kel Ramirez III, MD  04/16/2020

## 2020-04-16 ENCOUNTER — CONSULT (OUTPATIENT)
Dept: RADIATION ONCOLOGY | Facility: HOSPITAL | Age: 70
End: 2020-04-16

## 2020-04-16 VITALS
SYSTOLIC BLOOD PRESSURE: 135 MMHG | WEIGHT: 181 LBS | BODY MASS INDEX: 25.34 KG/M2 | DIASTOLIC BLOOD PRESSURE: 76 MMHG | HEIGHT: 71 IN | HEART RATE: 115 BPM

## 2020-04-16 DIAGNOSIS — F17.200 CURRENT EVERY DAY SMOKER: ICD-10-CM

## 2020-04-16 DIAGNOSIS — K62.5 BRIGHT RED RECTAL BLEEDING: ICD-10-CM

## 2020-04-16 DIAGNOSIS — R93.5 ABNORMAL CT OF THE ABDOMEN: ICD-10-CM

## 2020-04-16 DIAGNOSIS — C20 RECTAL CANCER (HCC): Primary | ICD-10-CM

## 2020-04-16 PROBLEM — I10 HYPERTENSION: Status: ACTIVE | Noted: 2020-04-15

## 2020-04-16 PROBLEM — R26.89 IMPAIRED GAIT AND MOBILITY: Status: ACTIVE | Noted: 2020-04-14

## 2020-04-16 PROCEDURE — 77290 THER RAD SIMULAJ FIELD CPLX: CPT | Performed by: RADIOLOGY

## 2020-04-16 PROCEDURE — 77334 RADIATION TREATMENT AID(S): CPT | Performed by: RADIOLOGY

## 2020-04-16 RX ORDER — CLOPIDOGREL BISULFATE 75 MG/1
75 TABLET ORAL DAILY
COMMUNITY
Start: 2020-04-16 | End: 2020-05-08

## 2020-04-16 RX ORDER — PANTOPRAZOLE SODIUM 20 MG/1
20 TABLET, DELAYED RELEASE ORAL DAILY
COMMUNITY
Start: 2020-04-16 | End: 2021-04-17

## 2020-04-16 RX ORDER — ATORVASTATIN CALCIUM 80 MG/1
80 TABLET, FILM COATED ORAL DAILY
COMMUNITY
Start: 2020-04-15 | End: 2021-03-22

## 2020-04-16 RX ORDER — ASPIRIN 81 MG/1
81 TABLET, CHEWABLE ORAL DAILY
COMMUNITY
Start: 2020-04-16 | End: 2021-04-17

## 2020-04-16 NOTE — PATIENT INSTRUCTIONS
Colorectal Cancer    Colorectal cancer is an abnormal growth of cells and tissue (tumor) in the colon or rectum, which are parts of the large intestine. The cancer can spread (metastasize) to other parts of the body.  What are the causes?  Most cases of colorectal cancer start as abnormal growths called polyps on the inner wall of the colon or rectum. Other times, abnormal changes to genes (genetic mutations) can cause cells to form cancer.  What increases the risk?  You are more likely to develop this condition if:  · You are older than age 50.  · You have multiple polyps in the colon or rectum.  · You have diabetes.  · You are .  · You have a family history of Crawford syndrome.  · You have had cancer before.  · You have certain hereditary conditions, such as:  ? Familial adenomatous polyposis.  ? Turcot syndrome.  ? Peutz-Jeghers syndrome.  · You eat a diet that is high in fat (especially animal fat) and low in fiber, fruits, and vegetables.  · You have an inactive (sedentary) lifestyle.  · You have an inflammatory bowel disease or Crohn's disease.  · You smoke.  · You drink alcohol excessively.  What are the signs or symptoms?  Early colorectal cancer often does not cause symptoms. As the cancer grows, symptoms may include:  · Changes in bowel habits.  · Feeling like the bowel does not empty completely after a bowel movement.  · Stools that are narrower than usual.  · Blood in the stool.  · Diarrhea.  · Constipation.  · Anemia.  · Discomfort, pain, bloating, fullness, or cramps in the abdomen.  · Frequent gas pain.  · Unexplained weight loss.  · Constant fatigue.  · Nausea and vomiting.  How is this diagnosed?  This condition may be diagnosed with:  · A medical history.  · A physical exam.  · Tests. These may include:  ? A digital rectal exam.  ? A stool test called a fecal occult blood test.  ? Blood tests.  ? A test in which a tissue sample is taken from the colon or rectum and examined under a  microscope (biopsy).  ? Imaging tests, such as:  § X-rays.  § A barium enema.  § CT scans.  § MRIs.  § A sigmoidoscopy. This test is done to view the inside of the rectum.  § A colonoscopy. This test is done to view the inside of the colon. During this test, small polyps can be removed or biopsies may be taken.  § An endorectal ultrasound. This test checks how deep a tumor in the rectum has grown and whether the cancer has spread to lymph nodes or other nearby tissues.  Your health care provider may order additional tests to find out whether the cancer has spread to other parts of the body (what stage it is). The stages of cancer include:  · Stage 0. At this stage, the cancer is found only in the innermost lining of the colon or rectum.  · Stage I. At this stage, the cancer has grown into the inner wall of the colon or rectum.  · Stage II. At this stage, the cancer has gone more deeply into the wall of the colon or rectum or through the wall. It may have invaded nearby tissue.  · Stage III. At this stage, the cancer has spread to nearby lymph nodes.  · Stage IV. At this stage, the cancer has spread to other parts of the body, such as the liver or lungs.  How is this treated?  Treatment for this condition depends on the type and stage of the cancer. Treatment may include:  · Surgery. In the early stages of the cancer, surgery may be done to remove polyps or small tumors from the colon. In later stages, surgery may be done to remove part of the colon.  · Chemotherapy. This treatment uses medicines to kill cancer cells.  · Targeted therapy. This treatment targets specific gene mutations or proteins that the cancer expresses in order to kill tumor cells.  · Radiation therapy. This treatment uses radiation to kill cancer cells or shrink tumors.  · Radiofrequency ablation. This treatment uses radio waves to destroy the tumors that may have spread to other areas of the body, such as the liver.  Follow these instructions at  home:  · Take over-the-counter and prescription medicines only as told by your health care provider.  · Try to eat regular, healthy meals. Some of your treatments might affect your appetite. If you are having problems eating or with your appetite, ask to meet with a food and nutrition specialist (dietitian).  · Consider joining a support group. This may help you learn about your diagnosis and cope with the stress of having colorectal cancer.  · If you are admitted to the hospital, inform your cancer care team.  · Keep all follow-up visits as told by your health care provider. This is important.  How is this prevented?  · Colorectal cancer can be prevented with screening tests that find polyps so they can be removed before they develop into cancer.  · All adults should have screening for colorectal cancer starting at age 50 and continuing until age 75. Your health care provider may recommend screening at age 45. People at increased risk should start screening at an earlier age.  Where to find more information  · American Cancer Society: https://www.cancer.org  · National Cancer Gage (NCI): https://www.cancer.gov  Contact a health care provider if:  · Your diarrhea or constipation does not go away.  · You have blood in your stool.  · Your bowel habits change.  · You have increased pain in your abdomen.  · You notice new fatigue or weakness.  · You lose weight.  Get help right away if:  · You have increased bleeding from your rectum.  · You have any uncontrollable or severe abdomen (abdominal) symptoms.  Summary  · Colorectal cancer is an abnormal growth of cells and tissue (tumor) in the colon or rectum.  · Common risk factors for this condition include having a relative with colon cancer, being older in age, having an inflammatory bowel disease, and being .  · This condition may be diagnosed with tests, such as a colonoscopy and biopsy.  · Treatment depends on the type and stage of the cancer.  Commonly, treatment includes surgery to remove the tumor along with chemotherapy or targeted therapy.  · Keep all follow-up visits as told by your health care provider. This is important.  This information is not intended to replace advice given to you by your health care provider. Make sure you discuss any questions you have with your health care provider.  Document Released: 12/18/2006 Document Revised: 02/07/2019 Document Reviewed: 01/19/2018  [employer] Interactive Patient Education © 2020 [employer]    External Beam Radiation Therapy  External beam radiation therapy is a type of radiation treatment. This type of radiation therapy can deliver radiation to a fairly large area. This is the most common type of radiation therapy for cancer. This therapy may be done to:  · Treat cancer by:  ? Destroying cancer cells. Radiation delivered during the treatment damages cancer cells. It may also damage normal cells, but normal cells have the DNA to repair themselves while cancer cells do not.  ? Helping with symptoms of your cancer.  ? Stopping the growth of any remaining cancer cells after surgery.  ? Preventing cancer cells from growing in areas that do not have cancer (prophylactic radiation therapy).  · Treat or shrink a tumor.  · Reduce pain (palliative therapy).  The amount of radiation you will receive and the length of therapy depend on your medical condition. You should not feel the radiation being delivered or any pain during your therapy.  Let your health care provider know about:  · Any allergies you have.  · All medicines you are taking, including vitamins, herbs, eye drops, creams, and over-the-counter medicines.  · Any problems you or family members have had with anesthetic medicines.  · Any blood disorders you have.  · Any surgeries you have had.  · Any medical conditions you have.  · Whether you are pregnant or may be pregnant.  What are the risks?  Generally, this is a safe procedure. However, radiation  therapy can place a person at a higher risk for a second cancer later in life.  Most people experience side effects from the therapy. Side effects depend on the amount of radiation and the part of the body that was exposed to radiation. The most common side effects include:  · Skin changes.  · Hair loss.  · Fatigue.  · Nausea and vomiting.  What happens before the procedure?  · There will be a planning session (simulation). During the session:  ? Your health care provider will plan exactly where the radiation will be delivered (treatment field).  ? You will be positioned for your therapy. The goal is to have a position that can be reproduced for each therapy session.  ? Temporary marks may be drawn on your body. Permanent marks may also be drawn on your body in order for you to be positioned the same way for each therapy session.  ? A tool that holds a body part in place (immobilization device) may be used to keep the area of treatment in the correct position.  · Follow instructions from your health care provider about eating or drinking restrictions.  · Ask your health care provider about changing or stopping your regular medicines. This is especially important if you are taking diabetes medicines or blood thinners.  What happens during the procedure?    · You will either lie on a table or sit in a chair in the position determined for your therapy.  · You may have a heavy shield placed on you to protect tissues and organs that are not being treated.  · The radiation machine (linear accelerator) will move around you to deliver the radiation in exact doses from different angles. The machine will not touch you.  The procedure may vary among health care providers and hospitals.  What happens after the procedure?  · You may return to your normal routine including diet, activities, and medicines as told by your health care provider.  · You may want to have someone take you home from the hospital or  clinic.  Summary  · External beam radiation therapy is a type of radiation treatment for cancer.  · The amount of radiation you will receive and the length of therapy depend on your medical condition.  · Most people experience side effects from the therapy. Side effects depend on the amount of radiation and the part of the body that was exposed to radiation.  This information is not intended to replace advice given to you by your health care provider. Make sure you discuss any questions you have with your health care provider.  Document Released: 05/06/2010 Document Revised: 12/18/2017 Document Reviewed: 12/18/2017  [employer] Interactive Patient Education © 2020 [employer]  How to Quarantine at Home  Information for Patients and Families    These instructions are for people with confirmed or suspected COVID-19 who do not need to be hospitalized and those with confirmed COVID-19 who were hospitalized and discharged to care for themselves at home.    If you were tested through the Health Department  The Health Department will monitor your wellbeing.  If it is determined that you do not need to be hospitalized and can be isolated at home, you will be monitored by staff from your local or state health department.     If you were tested through a [facility]  You will need to monitor yourself and report changes in your symptoms to your doctor.  See the section below called Monitor Your Symptoms.    Follow these steps until a healthcare provider or local or state health department says you can return to your normal activities.    Stay home except to get medical care  • Restrict activities outside your home, except for getting medical care.   • Do not go to work, school, or public areas.   • Avoid using public transportation, ride-sharing, or taxis.    Separate yourself from other people and animals in your home  People  As much as possible, you should stay in a specific room and away from other people in your home.  Also, you should use a separate bathroom, if available.    Animals  You should restrict contact with pets and other animals while you are sick with COVID-19, just like you would around other people. When possible, have another member of your household care for your animals while you are sick. If you are sick with COVID-19, avoid contact with your pet, including petting, snuggling, being kissed or licked, and sharing food. If you must care for your pet or be around animals while you are sick, wash your hands before and after you interact with pets and wear a facemask. See COVID-19 and Animals for more information.    Call ahead before visiting your doctor  If you have a medical appointment, call the healthcare provider and tell them that you have or may have COVID-19. This information will help the healthcare provider’s office take steps to keep other people from getting infected or exposed.    Wear a facemask  You should wear a facemask when you are around other people (e.g., sharing a room or vehicle) or pets and before you enter a healthcare provider’s office.     If you are not able to wear a facemask (for example, because it causes trouble breathing), then people who live with you should not stay in the same room with you, or they should wear a facemask if they enter your room.    Cover your coughs and sneezes  • Cover your mouth and nose with a tissue when you cough or sneeze.   • Throw used tissues in a lined trash can.   • Immediately wash your hands with soap and water for at least 20 seconds or, if soap and water are not available, clean your hands with an alcohol-based hand  that contains at least 60% alcohol.    Clean your hands often  • Wash your hands often with soap and water for at least 20 seconds, especially after blowing your nose, coughing, or sneezing; going to the bathroom; and before eating or preparing food.     • If soap and water are not readily available, use an alcohol-based hand   with at least 60% alcohol, covering all surfaces of your hands and rubbing them together until they feel dry.    • Soap and water are the best option if hands are visibly dirty. Avoid touching your eyes, nose, and mouth with unwashed hands.    Avoid sharing personal household items  • You should not share dishes, drinking glasses, cups, eating utensils, towels, or bedding with other people or pets in your home.   • After using these items, they should be washed thoroughly with soap and water.    Clean all “high-touch” surfaces everyday  • High touch surfaces include counters, tabletops, doorknobs, bathroom fixtures, toilets, phones, keyboards, tablets, and bedside tables.   • Also, clean any surfaces that may have blood, stool, or body fluids on them.   • Use a household cleaning spray or wipe, according to the label instructions. Labels contain instructions for safe and effective use of the cleaning product, including precautions you should take when applying the product, such as wearing gloves and making sure you have good ventilation during use of the product.    Monitor your symptoms  • Seek prompt medical attention if your illness is worsening (e.g., difficulty breathing).   • Before seeking care, call your healthcare provider and tell them that you have, or are being evaluated for, COVID-19.   • Put on a facemask before you enter the facility.     • These steps will help the healthcare provider’s office to keep other people in the office or waiting room from getting infected or exposed.   • Persons who are placed under active monitoring or facilitated self-monitoring should follow instructions provided by their local health department or occupational health professionals, as appropriate.  • If you have a medical emergency and need to call 911, notify the dispatch personnel that you have, or are being evaluated for COVID-19. If possible, put on a facemask before emergency medical services  arrive.    Discontinuing home isolation  Patients with confirmed COVID-19 should remain under home isolation precautions until the risk of secondary transmission to others is thought to be low. The decision to discontinue home isolation precautions should be made on a case-by-case basis, in consultation with healthcare providers and state and local health departments.    The below content are for household members, intimate partners, and caregivers of a patient with symptomatic laboratory-confirmed COVID-19 or a patient under investigation:    Household members, intimate partners, and caregivers may have close contact with a person with symptomatic, laboratory-confirmed COVID-19 or a person under investigation.     Close contacts should monitor their health; they should call their healthcare provider right away if they develop symptoms suggestive of COVID-19 (e.g., fever, cough, shortness of breath)     Close contacts should also follow these recommendations:  • Make sure that you understand and can help the patient follow their healthcare provider’s instructions for medication(s) and care. You should help the patient with basic needs in the home and provide support for getting groceries, prescriptions, and other personal needs.  • Monitor the patient’s symptoms. If the patient is getting sicker, call his or her healthcare provider and tell them that the patient has laboratory-confirmed COVID-19. This will help the healthcare provider’s office take steps to keep other people in the office or waiting room from getting infected. Ask the healthcare provider to call the local or state health department for additional guidance. If the patient has a medical emergency and you need to call 911, notify the dispatch personnel that the patient has, or is being evaluated for COVID-19.  • Household members should stay in another room or be  from the patient as much as possible. Household members should use a separate  bedroom and bathroom, if available.  • Prohibit visitors who do not have an essential need to be in the home.  • Household members should care for any pets in the home. Do not handle pets or other animals while sick.  For more information, see COVID-19 and Animals.  • Make sure that shared spaces in the home have good air flow, such as by an air conditioner or an opened window, weather permitting.  • Perform hand hygiene frequently. Wash your hands often with soap and water for at least 20 seconds or use an alcohol-based hand  that contains 60 to 95% alcohol, covering all surfaces of your hands and rubbing them together until they feel dry. Soap and water should be used preferentially if hands are visibly dirty.  • Avoid touching your eyes, nose, and mouth with unwashed hands.  • The patient should wear a facemask when you are around other people. If the patient is not able to wear a facemask (for example, because it causes trouble breathing), you, as the caregiver, should wear a mask when you are in the same room as the patient.  • Wear a disposable facemask and gloves when you touch or have contact with the patient’s blood, stool, or body fluids, such as saliva, sputum, nasal mucus, vomit, or urine.   o Throw out disposable facemasks and gloves after using them. Do not reuse.  o When removing personal protective equipment, first remove and dispose of gloves. Then, immediately clean your hands with soap and water or alcohol-based hand . Next, remove and dispose of facemask, and immediately clean your hands again with soap and water or alcohol-based hand .  • Avoid sharing household items with the patient. You should not share dishes, drinking glasses, cups, eating utensils, towels, bedding, or other items. After the patient uses these items, you should wash them thoroughly (see below “Wash laundry thoroughly”).  • Clean all “high-touch” surfaces, such as counters, tabletops, doorknobs,  bathroom fixtures, toilets, phones, keyboards, tablets, and bedside tables, every day. Also, clean any surfaces that may have blood, stool, or body fluids on them.   o Use a household cleaning spray or wipe, according to the label instructions. Labels contain instructions for safe and effective use of the cleaning product including precautions you should take when applying the product, such as wearing gloves and making sure you have good ventilation during use of the product.  • Wash laundry thoroughly.   o Immediately remove and wash clothes or bedding that have blood, stool, or body fluids on them.  o Wear disposable gloves while handling soiled items and keep soiled items away from your body. Clean your hands (with soap and water or an alcohol-based hand ) immediately after removing your gloves.  o Read and follow directions on labels of laundry or clothing items and detergent. In general, using a normal laundry detergent according to washing machine instructions and dry thoroughly using the warmest temperatures recommended on the clothing label.  • Place all used disposable gloves, facemasks, and other contaminated items in a lined container before disposing of them with other household waste. Clean your hands (with soap and water or an alcohol-based hand ) immediately after handling these items. Soap and water should be used preferentially if hands are visibly dirty.  • Discuss any additional questions with your state or local health department or healthcare provider.    Adapted from information provided by the Centers for Disease Control and Prevention.  For more information, visit https://www.cdc.gov/coronavirus/2019-ncov/hcp/guidance-prevent-spread.html

## 2020-04-17 NOTE — PROGRESS NOTES
[facility]  HEMATOLOGY & ONCOLOGY    [facility] [place] [facility] HEMATOLOGY AND ONCOLOGY  [address]  [place] [address] [phone]    Patient Name: Attila Viramontes  Encounter Date: 04/20/2020  YOB: 1950  Patient Number: [id number]    Chief Complaint   Patient presents with   • Rectal Cancer     Here for f/u       REASON FOR VISIT: Attila Viramontes is a pleasant 69 y.o. male referred by Dr. Yanick Flowers for management recommendations for rectal cancer. He is seen with sister.  History is obtained from patient. History is considered to be accurate.     HISTORY OF PRESENT ILLNESS: He presented with change in bowel habits.      CBC 03/25/2020 remarkable for WBC of 12.8 and ANC 8.2. CMP remarkable for elevated protein of 9.2. Urinalysis was unremarkable.     CT abdomen and pelvis 03/25/2020.  Asymmetric wall thickening of the rectum could represent a mass/neoplasm. If not recently performed, colonoscopy is recommended. Inflammatory stranding of the central mesenteric root, which is nonspecific but can be seen with mesenteritis/mesenteric panniculitis.     Colonoscopy by Dr. Reaves 04/02/2020 showed a localized area of severely ulcerated mucosa was found at 2 cm proximal to the anus. Biopsies were taken with a cold forceps for histology.     Pathology report 04/03/2020 showed a moderately differentiated adenocarcinoma.  Stromal invasion is seen although depth of invasion is indeterminate in these biopsies.  In the areas of invasion, the stroma demonstrates a reactive, desmoplastic appearance.     Maternal cousin with colorectal cancer in her 60s.     INTERVAL HISTORY  Mr Viramontes is a 68 yo gentleman here today for education on the recommended chemotherapy agent, 5FU that will be given concurrently with radiation therapy. He is here today feeling very well. He has had the port placed and ready to begin. He was  seen by Dr Ramirez on 4/16/2020.      PAST MEDICAL HISTORY:  ALLERGIES:  No Known Allergies    CURRENT MEDICATIONS:  Outpatient Encounter Medications as of 4/20/2020   Medication Sig Dispense Refill   • aspirin 81 MG chewable tablet Chew 81 mg Daily.     • atorvastatin (LIPITOR) 80 MG tablet Take 80 mg by mouth Daily.     • clopidogrel (PLAVIX) 75 MG tablet Take 75 mg by mouth Daily.     • HYDROcodone-acetaminophen (NORCO) 7.5-325 MG per tablet Take 1-2 tablets by mouth Every 4 (Four) Hours As Needed for Moderate Pain  (Pain). 15 tablet 0   • ibuprofen (ADVIL,MOTRIN) 200 MG tablet Take 200 mg by mouth Every 8 (Eight) Hours As Needed for Mild Pain .     • pantoprazole (PROTONIX) 20 MG EC tablet Take 20 mg by mouth Daily.       Facility-Administered Encounter Medications as of 4/20/2020   Medication Dose Route Frequency Provider Last Rate Last Dose   • [COMPLETED] iopamidol (ISOVUE-300) 61 % injection 100 mL  100 mL Intravenous Once in imaging Shaq Boggs MD   100 mL at 04/20/20 1005     ADULT ILLNESSES:  Patient Active Problem List   Diagnosis Code   • Abnormal CT of the abdomen R93.5   • Rectal cancer (CMS/HCC) C20   • Current every day smoker F17.200   • Impaired gait and mobility R26.89   • Hypertension I10   • Bright red rectal bleeding K62.5     SURGERIES:  Past Surgical History:   Procedure Laterality Date   • COLONOSCOPY N/A 4/2/2020    Procedure: COLONOSCOPY WITH ANESTHESIA;  Surgeon: Yanick Flowers DO;  Location: [facility] ENDOSCOPY;  Service: Gastroenterology;  Laterality: N/A;  pre: abnormal CT scan  post: rectal mass  PCP unknown   • EYE SURGERY Bilateral     lenses present   • VENOUS ACCESS DEVICE (PORT) INSERTION N/A 4/14/2020    Procedure: INSERTION VENOUS ACCESS DEVICE;  Surgeon: Vielka Weller MD;  Location: [facility] OR;  Service: General;  Laterality: N/A;     HEALTH MAINTENANCE ITEMS:  Health Maintenance Due   Topic Date Due   • TDAP/TD VACCINES (1 - Tdap) 09/18/1962   • ZOSTER VACCINE (1 of 2)  09/18/2001   • LUNG CANCER SCREENING  09/18/2006   • Pneumococcal Vaccine Once at 65 Years Old  09/18/2016   • HEPATITIS C SCREENING  03/26/2020   • MEDICARE ANNUAL WELLNESS  03/26/2020       <no information>  Last Completed Colonoscopy       Status Date      COLONOSCOPY Done 4/2/2020 COLONOSCOPY     Patient has more history with this topic...          There is no immunization history on file for this patient.  Last Completed Mammogram     Patient has no health maintenance due at this time            FAMILY HISTORY:  Family History   Problem Relation Age of Onset   • Cancer Mother    • Stroke Father    • Heart disease Father    • Heart attack Brother      SOCIAL HISTORY:  Social History     Socioeconomic History   • Marital status: Single     Spouse name: Not on file   • Number of children: Not on file   • Years of education: Not on file   • Highest education level: Not on file   Tobacco Use   • Smoking status: Current Every Day Smoker     Packs/day: 1.00     Years: 53.00     Pack years: 53.00   • Smokeless tobacco: Never Used   Substance and Sexual Activity   • Alcohol use: Not Currently   • Drug use: Never   • Sexual activity: Defer       REVIEW OF SYSTEMS:  Review of Systems   Constitutional: Negative for activity change, appetite change, chills, diaphoresis, fatigue, fever and unexpected weight loss.   HENT: Negative for ear pain, nosebleeds, sinus pressure, sore throat and voice change.    Eyes: Negative for blurred vision, double vision, pain and visual disturbance.   Respiratory: Negative for cough and shortness of breath.    Cardiovascular: Negative for chest pain, palpitations and leg swelling.   Gastrointestinal: Positive for constipation. Negative for abdominal pain, anal bleeding, blood in stool, diarrhea, nausea and vomiting.   Endocrine: Negative for heat intolerance, polydipsia and polyuria.   Genitourinary: Negative for dysuria, frequency, hematuria, urgency and urinary incontinence.  "  Musculoskeletal: Negative for arthralgias and myalgias.   Skin: Negative for rash and skin lesions.   Neurological: Negative for dizziness, tremors, seizures, syncope, speech difficulty, weakness and headache.   Hematological: Negative for adenopathy. Does not bruise/bleed easily.   Psychiatric/Behavioral: Negative for dysphoric mood, sleep disturbance, suicidal ideas and depressed mood.       Ht 180.3 cm (71\")   BMI 25.24 kg/m²  Body surface area is 2.02 meters squared.  There were no vitals filed for this visit.    Physical Exam:  Physical Exam   Constitutional: He is oriented to person, place, and time. He appears well-developed and well-nourished.   HENT:   Head: Atraumatic.   Mouth/Throat: Oropharynx is clear and moist.   Eyes: Pupils are equal, round, and reactive to light. EOM are normal. No scleral icterus.   Neck: Trachea normal. Neck supple. No JVD present.   Cardiovascular: Normal rate, regular rhythm and normal pulses. Exam reveals no gallop and no friction rub.   No murmur heard.  Pulmonary/Chest: Effort normal and breath sounds normal. He has no wheezes. He has no rhonchi. He has no rales. Chest wall is not dull to percussion.   Abdominal: Soft. Normal appearance. There is no tenderness. There is no rebound and no guarding.   Lymphadenopathy:     He has no cervical adenopathy.     He has no axillary adenopathy.        Right: No inguinal and no supraclavicular adenopathy present.        Left: No inguinal and no supraclavicular adenopathy present.   Neurological: He is alert and oriented to person, place, and time. He has normal strength. No sensory deficit.   Psychiatric: He has a normal mood and affect. Judgment normal.       Attila Viramontes reports a pain score of 0 .    Patient's Body mass index is 25.24 kg/m². BMI is within normal parameters. No follow-up required..      LABS    Lab Results - Last 18 Months   Lab Units 04/14/20  1319 04/09/20  0954 03/25/20  1543   HEMOGLOBIN g/dL 14.3 13.8 " 14.3   HEMATOCRIT % 41.7 40.6 42.1   MCV fL 91.0 92.5 92.3   WBC 10*3/mm3 10.94* 9.46 12.80*   RDW % 12.5 12.5 12.9   MPV fL 9.1 9.0 8.6   PLATELETS 10*3/mm3 383 355 379   IMM GRAN % % 0.3 0.4  --    NEUTROS ABS 10*3/mm3 6.77 5.83 8.20*   LYMPHS ABS 10*3/mm3 3.04 2.48 3.50*   MONOS ABS 10*3/mm3 0.76 0.85  --    EOS ABS 10*3/mm3 0.26 0.19  --    BASOS ABS 10*3/mm3 0.08 0.07  --    IMMATURE GRANS (ABS) 10*3/mm3 0.03 0.04  --    NRBC /100 WBC 0.0 0.0  --        Lab Results - Last 18 Months   Lab Units 04/14/20  1319 04/09/20  0954 03/25/20  1549 03/25/20  1543   GLUCOSE mg/dL 136* 205*  --  108*   SODIUM mmol/L 140 137  --  142   POTASSIUM mmol/L 3.9 4.2  --  5.0   CO2 mmol/L 21.0* 24.0  --  26.0   CHLORIDE mmol/L 105 102  --  102   ANION GAP mmol/L 14.0 11.0  --  14.0*   CREATININE mg/dL 0.79 0.87 0.90 0.84   BUN mg/dL 13 16  --  13   BUN / CREAT RATIO  16.5 18.4  --  15.5   CALCIUM mg/dL 9.4 9.7  --  9.8   EGFR IF NONAFRICN AM mL/min/1.73 98 87  --  91   ALK PHOS U/L 122* 106  --  92   TOTAL PROTEIN g/dL 8.0 7.8  --  9.2*   ALT (SGPT) U/L 13 13  --  20   AST (SGOT) U/L 16 15  --  33   BILIRUBIN mg/dL 0.3 0.3  --  1.0   ALBUMIN g/dL 4.40 4.40  --  4.60   GLOBULIN gm/dL 3.6 3.4  --  4.6       Lab Results - Last 18 Months   Lab Units 04/09/20  0954   CEA ng/mL 4.26       ASSESSMENT:  1.  Adenocarcinoma of the rectum.   AJCC stage: Pending.   Treatment status:For neoadjuvant CIV 5-FU and radiation.  2.  Performance status of 0.  3.  Arthritis, stable.   4.  Elevated protein 03/25/2020.  5.  History of pancreatitis.         PLAN:  1. Dr Boggs previously  regarding role of neoadjuvant CIV 5-FU with radiation.  Aware of potential adverse of the 5-FU especially nausea, vomiting, diarrhea, stomatitis, and cardiotoxicity.  After neoadjuvant chemo and radiation, patient will undergo surgery.  Post surgery, patient will undergo adjuvant FOLFOX.  3.  Port by Dr. Weller.  4.  Blood for CBC with differential, CEA, and  CMP.  5.  Dr Boggs has Scheduled rectal ultrasound by Dr. Nabeel Walton.  6.  Dr Boggs has Scheduled CT of the chest for staging.  7.  Refer to Dr. Ramirez for neoadjuvant chemoradiation. ( was seen 4/16/2020)  8.  Dr Boggs has Ordered CIV 5- mg/m2= 450 mg D1 to D5 with radiation.  9.  Explained eRx Compazine 10 mg p.o. every 4 hours as needed for nausea and vomiting #60 with 2 refills.  10.  CBC with differential weekly when chemo starts.  11.  Continue current medications.  12.  Continue care per primary care physician and other specialists.  13.  Dr Boggs previously discussed the plan of care with patient and sister.  Understanding expressed.  Patient agreeable to proceed.  14. Advance Care Planning - ACP discussion was previously done with Dr Boggs.   15.  Recall colonoscopy on 10/2020.  16.  Return to office in 4 weeks with pre-office CMP.  17.  Chemotherapy education was provided today. Handouts from [email or web address] were discussed and given to the patient. The information provided but not limited to is listed below. Patient verbalized understanding and signed consent for therapy.     Important things to remember about the side effects of Fluorouracil:   • Most people do not experience all of the side effects of Fluorouracil listed.   • Fluorouracil side effects are often predictable in terms of their onset duration and severity.   • Fluorouracil side effects will improve after therapy is complete   • Fluorouracil side effects may be quite manageable. There are many options to minimize or prevent the side effects of Fluorouracil.     The following side effects are common (occurring in greater than 30%) for patients taking Fluorouracil:   • Diarrhea   • Nausea and possible occasional vomiting   • Mouth sores   • Poor appetite   • Watery eyes, sensitivity to light (photophobia)   • Taste changes, metallic taste in mouth during infusion   • Discoloration along vein through which the medication is given   • Low blood  counts Your white and red blood cells and platelets may temporarily decrease. This can put you at increased risk for infection, anemia and/or bleeding.       These side effects are less common side effects (occurring in about 10-29%) of patients receiving Fluorouracil:   • Skin reactions : Dry, cracking, peeling skin. Darkening of the skin (hyperpigmentation), darkening of the skin where previous radiation treatment has been given (radiation recall).   • Hair thinning   • Nail changes - discoloration, loss of nails (rare)(see skin reactions).   • Hand -foot syndrome (Palmar-plantar erythrodysesthesia or PPE) -skin rash, swelling, redness, pain and/or peeling of the skin on the palms of hands and soles of feet. Usually mild, starting 5-6 weeks after start of treatment. May require reductions in the dose of the medication.   Serious adverse reactions to Fluorouracil are; chest pain, EKG changes and increases in cardiac enzymes - which may indicate problems with the heart (see Cardiovascular events). These symptoms are very rare but increased for patients with a prior history of heart disease.   Not all side effects are listed above. Some that are rare--occurring in less than 10% of patients-- are not listed here. However, you should always inform your health care provider if you experience any unusual symptoms.     When to contact your doctor or health care provider:  Contact your health care provider immediately, day or night, if you should experience any of the following symptoms:   • Fever of 100.4° F (38° C) or higher, chills (possible signs of infection)   The following symptoms require medical attention, but are not an emergency. Contact your health care provider within 24 hours of noticing any of the following:   • Nausea (interferes with ability to eat and unrelieved with prescribed medication)   • Vomiting (vomiting more than 4-5 times in a 24 hour period)   • Diarrhea (4-6 episodes in a 24-hour period)  despite anti-diarrhea medication and diet alterations.   • Unusual bleeding or bruising   • Black or tarry stools, or blood in your stools   • Blood in the urine   • Extreme fatigue (unable to carry on self-care activities)   • Mouth sores (painful redness, swelling or ulcers)   • Tingling or burning, redness, swelling of the palms of the hands or soles of feet   Always inform your health care provider if you experience any unusual symptoms.     All activities occurring within this visit are in accordance with the plan of care as set forth by Dr. Shaq Boggs.    I spent 43 total minutes, face-to-face, caring for Attila today.  Greater than 50% of this time involved counseling and/or coordination of care as documented within this note regarding the patient's illness(es), pros and cons of various treatment options, instructions and/or risk reduction.    Shannan Self, DELANEY   04/20/2020  10:11 PM

## 2020-04-20 ENCOUNTER — OFFICE VISIT (OUTPATIENT)
Dept: ONCOLOGY | Facility: CLINIC | Age: 70
End: 2020-04-20

## 2020-04-20 ENCOUNTER — HOSPITAL ENCOUNTER (OUTPATIENT)
Dept: CT IMAGING | Facility: HOSPITAL | Age: 70
Discharge: HOME | End: 2020-04-20
Admitting: INTERNAL MEDICINE

## 2020-04-20 VITALS
HEIGHT: 71 IN | HEART RATE: 100 BPM | SYSTOLIC BLOOD PRESSURE: 118 MMHG | BODY MASS INDEX: 25.52 KG/M2 | OXYGEN SATURATION: 98 % | DIASTOLIC BLOOD PRESSURE: 78 MMHG | TEMPERATURE: 98.5 F | RESPIRATION RATE: 16 BRPM | WEIGHT: 182.3 LBS

## 2020-04-20 DIAGNOSIS — C20 RECTAL CANCER (HCC): ICD-10-CM

## 2020-04-20 DIAGNOSIS — I10 ESSENTIAL HYPERTENSION: ICD-10-CM

## 2020-04-20 DIAGNOSIS — C20 RECTAL CANCER (HCC): Primary | ICD-10-CM

## 2020-04-20 LAB — CREAT BLDA-MCNC: 0.9 MG/DL (ref 0.6–1.3)

## 2020-04-20 PROCEDURE — 77295 3-D RADIOTHERAPY PLAN: CPT | Performed by: RADIOLOGY

## 2020-04-20 PROCEDURE — 77300 RADIATION THERAPY DOSE PLAN: CPT | Performed by: RADIOLOGY

## 2020-04-20 PROCEDURE — 77334 RADIATION TREATMENT AID(S): CPT | Performed by: RADIOLOGY

## 2020-04-20 PROCEDURE — 71260 CT THORAX DX C+: CPT

## 2020-04-20 PROCEDURE — 99214 OFFICE O/P EST MOD 30 MIN: CPT | Performed by: NURSE PRACTITIONER

## 2020-04-20 PROCEDURE — 25010000002 IOPAMIDOL 61 % SOLUTION: Performed by: INTERNAL MEDICINE

## 2020-04-20 PROCEDURE — 82565 ASSAY OF CREATININE: CPT

## 2020-04-20 RX ADMIN — IOPAMIDOL 100 ML: 612 INJECTION, SOLUTION INTRAVENOUS at 10:05

## 2020-04-21 ENCOUNTER — HOSPITAL ENCOUNTER (OUTPATIENT)
Age: 70
Setting detail: OUTPATIENT SURGERY
Discharge: HOME | End: 2020-04-21
Attending: INTERNAL MEDICINE | Admitting: INTERNAL MEDICINE
Payer: MEDICARE

## 2020-04-21 ENCOUNTER — ANESTHESIA EVENT (OUTPATIENT)
Dept: ENDOSCOPY | Age: 70
End: 2020-04-21
Payer: MEDICARE

## 2020-04-21 ENCOUNTER — ANESTHESIA (OUTPATIENT)
Dept: ENDOSCOPY | Age: 70
End: 2020-04-21
Payer: MEDICARE

## 2020-04-21 VITALS
BODY MASS INDEX: 25.51 KG/M2 | RESPIRATION RATE: 18 BRPM | HEART RATE: 75 BPM | HEIGHT: 71 IN | OXYGEN SATURATION: 98 % | TEMPERATURE: 97 F | DIASTOLIC BLOOD PRESSURE: 72 MMHG | SYSTOLIC BLOOD PRESSURE: 131 MMHG | WEIGHT: 182.2 LBS

## 2020-04-21 VITALS
OXYGEN SATURATION: 97 % | TEMPERATURE: 97 F | RESPIRATION RATE: 17 BRPM | SYSTOLIC BLOOD PRESSURE: 96 MMHG | DIASTOLIC BLOOD PRESSURE: 53 MMHG

## 2020-04-21 PROCEDURE — 45341 SIGMOIDOSCOPY W/ULTRASOUND: CPT | Performed by: INTERNAL MEDICINE

## 2020-04-21 PROCEDURE — 7100000010 HC PHASE II RECOVERY - FIRST 15 MIN: Performed by: INTERNAL MEDICINE

## 2020-04-21 PROCEDURE — 6360000002 HC RX W HCPCS: Performed by: NURSE ANESTHETIST, CERTIFIED REGISTERED

## 2020-04-21 PROCEDURE — 7100000011 HC PHASE II RECOVERY - ADDTL 15 MIN: Performed by: INTERNAL MEDICINE

## 2020-04-21 PROCEDURE — 3609018500 HC EGD US SCOPE W/ADJACENT STRUCTURES: Performed by: INTERNAL MEDICINE

## 2020-04-21 PROCEDURE — 2709999900 HC NON-CHARGEABLE SUPPLY: Performed by: INTERNAL MEDICINE

## 2020-04-21 PROCEDURE — 2580000003 HC RX 258: Performed by: INTERNAL MEDICINE

## 2020-04-21 PROCEDURE — 3700000000 HC ANESTHESIA ATTENDED CARE: Performed by: INTERNAL MEDICINE

## 2020-04-21 PROCEDURE — 3700000001 HC ADD 15 MINUTES (ANESTHESIA): Performed by: INTERNAL MEDICINE

## 2020-04-21 RX ORDER — LABETALOL 20 MG/4 ML (5 MG/ML) INTRAVENOUS SYRINGE
5 EVERY 10 MIN PRN
Status: DISCONTINUED | OUTPATIENT
Start: 2020-04-21 | End: 2020-04-21 | Stop reason: HOSPADM

## 2020-04-21 RX ORDER — PROMETHAZINE HYDROCHLORIDE 25 MG/ML
6.25 INJECTION, SOLUTION INTRAMUSCULAR; INTRAVENOUS
Status: DISCONTINUED | OUTPATIENT
Start: 2020-04-21 | End: 2020-04-21 | Stop reason: HOSPADM

## 2020-04-21 RX ORDER — MEPERIDINE HYDROCHLORIDE 50 MG/ML
12.5 INJECTION INTRAMUSCULAR; INTRAVENOUS; SUBCUTANEOUS EVERY 5 MIN PRN
Status: DISCONTINUED | OUTPATIENT
Start: 2020-04-21 | End: 2020-04-21 | Stop reason: HOSPADM

## 2020-04-21 RX ORDER — IBUPROFEN 200 MG
200 TABLET ORAL EVERY 8 HOURS PRN
COMMUNITY

## 2020-04-21 RX ORDER — SODIUM CHLORIDE, SODIUM LACTATE, POTASSIUM CHLORIDE, CALCIUM CHLORIDE 600; 310; 30; 20 MG/100ML; MG/100ML; MG/100ML; MG/100ML
INJECTION, SOLUTION INTRAVENOUS CONTINUOUS
Status: DISCONTINUED | OUTPATIENT
Start: 2020-04-21 | End: 2020-04-21 | Stop reason: HOSPADM

## 2020-04-21 RX ORDER — DIPHENHYDRAMINE HYDROCHLORIDE 50 MG/ML
12.5 INJECTION INTRAMUSCULAR; INTRAVENOUS
Status: DISCONTINUED | OUTPATIENT
Start: 2020-04-21 | End: 2020-04-21 | Stop reason: HOSPADM

## 2020-04-21 RX ORDER — FENTANYL CITRATE 50 UG/ML
INJECTION, SOLUTION INTRAMUSCULAR; INTRAVENOUS PRN
Status: DISCONTINUED | OUTPATIENT
Start: 2020-04-21 | End: 2020-04-21 | Stop reason: SDUPTHER

## 2020-04-21 RX ORDER — MORPHINE SULFATE 4 MG/ML
4 INJECTION, SOLUTION INTRAMUSCULAR; INTRAVENOUS EVERY 5 MIN PRN
Status: DISCONTINUED | OUTPATIENT
Start: 2020-04-21 | End: 2020-04-21 | Stop reason: HOSPADM

## 2020-04-21 RX ORDER — PANTOPRAZOLE SODIUM 20 MG/1
20 TABLET, DELAYED RELEASE ORAL DAILY
COMMUNITY
Start: 2020-04-16 | End: 2021-04-17

## 2020-04-21 RX ORDER — CLOPIDOGREL BISULFATE 75 MG/1
75 TABLET ORAL DAILY
COMMUNITY
Start: 2020-04-16 | End: 2020-05-08

## 2020-04-21 RX ORDER — HYDRALAZINE HYDROCHLORIDE 20 MG/ML
5 INJECTION INTRAMUSCULAR; INTRAVENOUS EVERY 10 MIN PRN
Status: DISCONTINUED | OUTPATIENT
Start: 2020-04-21 | End: 2020-04-21 | Stop reason: HOSPADM

## 2020-04-21 RX ORDER — MORPHINE SULFATE 4 MG/ML
2 INJECTION, SOLUTION INTRAMUSCULAR; INTRAVENOUS EVERY 5 MIN PRN
Status: DISCONTINUED | OUTPATIENT
Start: 2020-04-21 | End: 2020-04-21 | Stop reason: HOSPADM

## 2020-04-21 RX ORDER — METOCLOPRAMIDE HYDROCHLORIDE 5 MG/ML
10 INJECTION INTRAMUSCULAR; INTRAVENOUS
Status: DISCONTINUED | OUTPATIENT
Start: 2020-04-21 | End: 2020-04-21 | Stop reason: HOSPADM

## 2020-04-21 RX ORDER — HYDROMORPHONE HYDROCHLORIDE 1 MG/ML
0.25 INJECTION, SOLUTION INTRAMUSCULAR; INTRAVENOUS; SUBCUTANEOUS EVERY 5 MIN PRN
Status: DISCONTINUED | OUTPATIENT
Start: 2020-04-21 | End: 2020-04-21 | Stop reason: HOSPADM

## 2020-04-21 RX ORDER — HYDROMORPHONE HYDROCHLORIDE 1 MG/ML
0.5 INJECTION, SOLUTION INTRAMUSCULAR; INTRAVENOUS; SUBCUTANEOUS EVERY 5 MIN PRN
Status: DISCONTINUED | OUTPATIENT
Start: 2020-04-21 | End: 2020-04-21 | Stop reason: HOSPADM

## 2020-04-21 RX ORDER — ASPIRIN 81 MG/1
81 TABLET, CHEWABLE ORAL DAILY
COMMUNITY
Start: 2020-04-16 | End: 2021-04-17

## 2020-04-21 RX ORDER — PROPOFOL 10 MG/ML
INJECTION, EMULSION INTRAVENOUS CONTINUOUS PRN
Status: DISCONTINUED | OUTPATIENT
Start: 2020-04-21 | End: 2020-04-21 | Stop reason: SDUPTHER

## 2020-04-21 RX ORDER — ATORVASTATIN CALCIUM 80 MG/1
80 TABLET, FILM COATED ORAL NIGHTLY
COMMUNITY
Start: 2020-04-15 | End: 2021-04-16

## 2020-04-21 RX ADMIN — PROPOFOL 100 MCG/KG/MIN: 10 INJECTION, EMULSION INTRAVENOUS at 13:38

## 2020-04-21 RX ADMIN — FENTANYL CITRATE 25 MCG: 50 INJECTION INTRAMUSCULAR; INTRAVENOUS at 13:44

## 2020-04-21 RX ADMIN — SODIUM CHLORIDE, POTASSIUM CHLORIDE, SODIUM LACTATE AND CALCIUM CHLORIDE: 600; 310; 30; 20 INJECTION, SOLUTION INTRAVENOUS at 13:11

## 2020-04-21 ASSESSMENT — PAIN SCALES - GENERAL
PAINLEVEL_OUTOF10: 0
PAINLEVEL_OUTOF10: 0

## 2020-04-21 NOTE — PROGRESS NOTES
Warm tap water enema given. Some resistance initally, but gently got passed and successfully gave enema. Results of dark brown liquid . Pt having some bright red rectal bleeding. He states it started after he tried to give himself Fleet's enema this morning and was very painful at that time.

## 2020-04-21 NOTE — OP NOTE
from the anus up to approximately 20 cm. The mass itself was significantly constricting in its most narrow portion of the very distal rectum. It was a friable lesion and due to the size of the radial EUS scope, I was hesitant to advance the scope even further with indirect visualization up to the typical 25 cm to evaluate the left iliac lymph node chains and vessels. The scope was then slowly withdrawn with suctioning of the air and insufflation of the EUS balloon. Findings are listed below. Findings: There was a large near circumferential hypoechoic mass with irregular borders. This measured near 1 cm in its greatest thickness of invasion. The lesion extended approximately 6 to 7 cm longitudinally along the wall and had a significant invasive and ulcerated appearance. Using the radial EUS scope I was able to identify an area that appeared to be invasion through with complete obliteration of the muscularis propria and well into the surrounding colonic serosal layers and visceral peritoneum. I do not appreciate actual perforation of the peritoneum or any invasion of surrounding tissue. There was an area of large contiguous hypoechoic tissue. I believe this represents the prostate and vas deferens anteriorly. I did not appreciate any abnormal distal perirectal lymphadenopathy. The scope was then withdrawn distally to evaluate the external and internal anal sphincter complex. I am suspicious that the tissue at least involves the external anal sphincters. I was able to identify the internal anal sphincters which appeared intact. Impression    1. Rectal Adenocarcinoma stage [id number] by EUS      Recommendations:  - I do think this EUS exam was technically difficult due to the size and somewhat fixed constricting nature of the lesion. I was unable to reach as far proximally in the rectum as I typically would like.   Given the limitations on this exam, I would recommend a pelvic MRI if

## 2020-04-22 ENCOUNTER — APPOINTMENT (OUTPATIENT)
Dept: MRI IMAGING | Facility: HOSPITAL | Age: 70
End: 2020-04-22

## 2020-04-22 ENCOUNTER — HOSPITAL ENCOUNTER (OUTPATIENT)
Dept: MRI IMAGING | Facility: HOSPITAL | Age: 70
Discharge: HOME | End: 2020-04-22
Admitting: RADIOLOGY

## 2020-04-22 ENCOUNTER — TELEPHONE (OUTPATIENT)
Dept: GASTROENTEROLOGY | Facility: CLINIC | Age: 70
End: 2020-04-22

## 2020-04-22 DIAGNOSIS — F17.200 CURRENT EVERY DAY SMOKER: ICD-10-CM

## 2020-04-22 DIAGNOSIS — R93.5 ABNORMAL CT OF THE ABDOMEN: ICD-10-CM

## 2020-04-22 DIAGNOSIS — K62.5 BRIGHT RED RECTAL BLEEDING: ICD-10-CM

## 2020-04-22 DIAGNOSIS — C20 RECTAL CANCER (HCC): ICD-10-CM

## 2020-04-22 PROCEDURE — 72197 MRI PELVIS W/O & W/DYE: CPT

## 2020-04-22 PROCEDURE — A9577 INJ MULTIHANCE: HCPCS | Performed by: RADIOLOGY

## 2020-04-22 PROCEDURE — 0 GADOBENATE DIMEGLUMINE 529 MG/ML SOLUTION: Performed by: RADIOLOGY

## 2020-04-22 RX ADMIN — GADOBENATE DIMEGLUMINE 16 ML: 529 INJECTION, SOLUTION INTRAVENOUS at 09:32

## 2020-04-22 NOTE — TELEPHONE ENCOUNTER
EUS - dr Nabeel Walton - 4/17/2020 note reviewed  Referral per dr diamond    Impression    Rectal adenocarcinoma    EUS difficult due to constricting nature of lesion, pelvic MRI recommended if available for additional info and stagin of lesion        Dr Diamond has referred to Dr Weller   MRI of pelvis was ordered on 4/16/2020 by Dr Ramirez

## 2020-04-24 DIAGNOSIS — C20 RECTAL CANCER (HCC): Primary | ICD-10-CM

## 2020-04-24 DIAGNOSIS — C20 RECTAL CANCER (HCC): ICD-10-CM

## 2020-04-24 RX ORDER — DIPHENHYDRAMINE HYDROCHLORIDE 50 MG/ML
50 INJECTION INTRAMUSCULAR; INTRAVENOUS AS NEEDED
Status: CANCELLED | OUTPATIENT
Start: 2020-04-27

## 2020-04-24 RX ORDER — EPINEPHRINE 0.3 MG/.3ML
0.3 INJECTION SUBCUTANEOUS ONCE
Status: CANCELLED | OUTPATIENT
Start: 2020-04-27

## 2020-04-24 RX ORDER — FAMOTIDINE 10 MG/ML
20 INJECTION, SOLUTION INTRAVENOUS AS NEEDED
Status: CANCELLED | OUTPATIENT
Start: 2020-04-27

## 2020-04-27 ENCOUNTER — HOSPITAL ENCOUNTER (OUTPATIENT)
Dept: RADIATION ONCOLOGY | Facility: HOSPITAL | Age: 70
Setting detail: RADIATION/ONCOLOGY SERIES
Discharge: HOME | End: 2020-04-27

## 2020-04-27 ENCOUNTER — INFUSION (OUTPATIENT)
Dept: ONCOLOGY | Facility: HOSPITAL | Age: 70
End: 2020-04-27

## 2020-04-27 ENCOUNTER — LAB (OUTPATIENT)
Dept: LAB | Facility: HOSPITAL | Age: 70
End: 2020-04-27

## 2020-04-27 VITALS
DIASTOLIC BLOOD PRESSURE: 77 MMHG | SYSTOLIC BLOOD PRESSURE: 121 MMHG | HEIGHT: 71 IN | BODY MASS INDEX: 25.34 KG/M2 | RESPIRATION RATE: 18 BRPM | HEART RATE: 106 BPM | WEIGHT: 181 LBS | OXYGEN SATURATION: 98 % | TEMPERATURE: 97.4 F

## 2020-04-27 DIAGNOSIS — C20 RECTAL CANCER (HCC): ICD-10-CM

## 2020-04-27 DIAGNOSIS — C20 RECTAL CANCER (HCC): Primary | ICD-10-CM

## 2020-04-27 LAB
ALBUMIN SERPL-MCNC: 4.2 G/DL (ref 3.5–5.2)
ALBUMIN/GLOB SERPL: 1.2 G/DL
ALP SERPL-CCNC: 129 U/L (ref 39–117)
ALT SERPL W P-5'-P-CCNC: 22 U/L (ref 1–41)
ANION GAP SERPL CALCULATED.3IONS-SCNC: 13 MMOL/L (ref 5–15)
AST SERPL-CCNC: 18 U/L (ref 1–40)
BASOPHILS # BLD AUTO: 0.08 10*3/MM3 (ref 0–0.2)
BASOPHILS NFR BLD AUTO: 0.5 % (ref 0–1.5)
BILIRUB SERPL-MCNC: 0.3 MG/DL (ref 0.2–1.2)
BUN BLD-MCNC: 10 MG/DL (ref 8–23)
BUN/CREAT SERPL: 11.6 (ref 7–25)
CALCIUM SPEC-SCNC: 9.4 MG/DL (ref 8.6–10.5)
CHLORIDE SERPL-SCNC: 101 MMOL/L (ref 98–107)
CO2 SERPL-SCNC: 23 MMOL/L (ref 22–29)
CREAT BLD-MCNC: 0.86 MG/DL (ref 0.76–1.27)
DEPRECATED RDW RBC AUTO: 41 FL (ref 37–54)
EOSINOPHIL # BLD AUTO: 0.31 10*3/MM3 (ref 0–0.4)
EOSINOPHIL NFR BLD AUTO: 2 % (ref 0.3–6.2)
ERYTHROCYTE [DISTWIDTH] IN BLOOD BY AUTOMATED COUNT: 12.2 % (ref 12.3–15.4)
GFR SERPL CREATININE-BSD FRML MDRD: 88 ML/MIN/1.73
GLOBULIN UR ELPH-MCNC: 3.5 GM/DL
GLUCOSE BLD-MCNC: 186 MG/DL (ref 65–99)
HCT VFR BLD AUTO: 39.3 % (ref 37.5–51)
HGB BLD-MCNC: 13.3 G/DL (ref 13–17.7)
IMM GRANULOCYTES # BLD AUTO: 0.05 10*3/MM3 (ref 0–0.05)
IMM GRANULOCYTES NFR BLD AUTO: 0.3 % (ref 0–0.5)
LYMPHOCYTES # BLD AUTO: 2.22 10*3/MM3 (ref 0.7–3.1)
LYMPHOCYTES NFR BLD AUTO: 14.6 % (ref 19.6–45.3)
MCH RBC QN AUTO: 30.9 PG (ref 26.6–33)
MCHC RBC AUTO-ENTMCNC: 33.8 G/DL (ref 31.5–35.7)
MCV RBC AUTO: 91.4 FL (ref 79–97)
MONOCYTES # BLD AUTO: 0.97 10*3/MM3 (ref 0.1–0.9)
MONOCYTES NFR BLD AUTO: 6.4 % (ref 5–12)
NEUTROPHILS # BLD AUTO: 11.54 10*3/MM3 (ref 1.7–7)
NEUTROPHILS NFR BLD AUTO: 76.2 % (ref 42.7–76)
NRBC BLD AUTO-RTO: 0 /100 WBC (ref 0–0.2)
PLATELET # BLD AUTO: 389 10*3/MM3 (ref 140–450)
PMV BLD AUTO: 9 FL (ref 6–12)
POTASSIUM BLD-SCNC: 4 MMOL/L (ref 3.5–5.2)
PROT SERPL-MCNC: 7.7 G/DL (ref 6–8.5)
RBC # BLD AUTO: 4.3 10*6/MM3 (ref 4.14–5.8)
SODIUM BLD-SCNC: 137 MMOL/L (ref 136–145)
WBC NRBC COR # BLD: 15.17 10*3/MM3 (ref 3.4–10.8)

## 2020-04-27 PROCEDURE — 36415 COLL VENOUS BLD VENIPUNCTURE: CPT

## 2020-04-27 PROCEDURE — 77280 THER RAD SIMULAJ FIELD SMPL: CPT | Performed by: RADIOLOGY

## 2020-04-27 PROCEDURE — 80053 COMPREHEN METABOLIC PANEL: CPT

## 2020-04-27 PROCEDURE — 25010000002 FLUOROURACIL PER 500 MG: Performed by: INTERNAL MEDICINE

## 2020-04-27 PROCEDURE — G0498 CHEMO EXTEND IV INFUS W/PUMP: HCPCS

## 2020-04-27 PROCEDURE — 77412 RADIATION TX DELIVERY LVL 3: CPT | Performed by: RADIOLOGY

## 2020-04-27 PROCEDURE — 85025 COMPLETE CBC W/AUTO DIFF WBC: CPT

## 2020-04-27 RX ORDER — DIPHENHYDRAMINE HYDROCHLORIDE 50 MG/ML
50 INJECTION INTRAMUSCULAR; INTRAVENOUS AS NEEDED
Status: DISCONTINUED | OUTPATIENT
Start: 2020-04-27 | End: 2020-04-27 | Stop reason: HOSPADM

## 2020-04-27 RX ORDER — EPINEPHRINE 0.3 MG/.3ML
0.3 INJECTION SUBCUTANEOUS ONCE AS NEEDED
Status: DISCONTINUED | OUTPATIENT
Start: 2020-04-27 | End: 2020-04-27 | Stop reason: HOSPADM

## 2020-04-27 RX ORDER — FAMOTIDINE 10 MG/ML
20 INJECTION, SOLUTION INTRAVENOUS AS NEEDED
Status: DISCONTINUED | OUTPATIENT
Start: 2020-04-27 | End: 2020-04-27 | Stop reason: HOSPADM

## 2020-04-27 RX ADMIN — FLUOROURACIL 1800 MG: 50 INJECTION, SOLUTION INTRAVENOUS at 10:59

## 2020-04-27 NOTE — PROGRESS NOTES
0958 5FU dose checked incorrect total dose is M-Sat but in not and in order has for M-F should be 1818 mg total, was put in to go to patient's pharmacy. S/w Madalyn Bradford LPN to have Dr. Boggs correct.Ashleigh [place]

## 2020-04-28 ENCOUNTER — HOSPITAL ENCOUNTER (OUTPATIENT)
Dept: RADIATION ONCOLOGY | Facility: HOSPITAL | Age: 70
Setting detail: RADIATION/ONCOLOGY SERIES
Discharge: HOME | End: 2020-04-28

## 2020-04-28 ENCOUNTER — DOCUMENTATION (OUTPATIENT)
Dept: NEUROLOGY | Facility: CLINIC | Age: 70
End: 2020-04-28

## 2020-04-28 ENCOUNTER — TELEPHONE (OUTPATIENT)
Dept: NEUROLOGY | Facility: CLINIC | Age: 70
End: 2020-04-28

## 2020-04-28 PROCEDURE — 77412 RADIATION TX DELIVERY LVL 3: CPT | Performed by: RADIOLOGY

## 2020-04-28 PROCEDURE — 77417 THER RADIOLOGY PORT IMAGE(S): CPT | Performed by: RADIOLOGY

## 2020-04-28 NOTE — PROGRESS NOTES
I was reviewing patient chart in anticipation of video visit today however, patient canceled as he has elected to see neurology at [place]. At any rate, there was documentation from Dr. Rea office that hey had attempted to contact patient regarding 3.5 second pause on Holter monitor but they could not get hold of patient. I did discuss this with the patient and advised him to contact Dr. Ruiz office and he stated he would do so.

## 2020-04-28 NOTE — TELEPHONE ENCOUNTER
I called patient to register him for his video visit with Melinda Ward on 4/28/20 and patient stated he is seeing a neurologist in [place] and did not wish to keep the appointment.

## 2020-04-29 ENCOUNTER — HOSPITAL ENCOUNTER (OUTPATIENT)
Dept: RADIATION ONCOLOGY | Facility: HOSPITAL | Age: 70
Setting detail: RADIATION/ONCOLOGY SERIES
Discharge: HOME | End: 2020-04-29

## 2020-04-29 PROCEDURE — 77412 RADIATION TX DELIVERY LVL 3: CPT | Performed by: RADIOLOGY

## 2020-04-30 ENCOUNTER — HOSPITAL ENCOUNTER (OUTPATIENT)
Dept: RADIATION ONCOLOGY | Facility: HOSPITAL | Age: 70
Setting detail: RADIATION/ONCOLOGY SERIES
Discharge: HOME | End: 2020-04-30

## 2020-04-30 PROCEDURE — 77336 RADIATION PHYSICS CONSULT: CPT | Performed by: RADIOLOGY

## 2020-04-30 PROCEDURE — 77412 RADIATION TX DELIVERY LVL 3: CPT | Performed by: RADIOLOGY

## 2020-05-01 ENCOUNTER — HOSPITAL ENCOUNTER (OUTPATIENT)
Dept: RADIATION ONCOLOGY | Facility: HOSPITAL | Age: 70
Setting detail: RADIATION/ONCOLOGY SERIES
Discharge: HOME | End: 2020-05-01

## 2020-05-01 ENCOUNTER — INFUSION (OUTPATIENT)
Dept: ONCOLOGY | Facility: HOSPITAL | Age: 70
End: 2020-05-01

## 2020-05-01 ENCOUNTER — HOSPITAL ENCOUNTER (OUTPATIENT)
Dept: RADIATION ONCOLOGY | Facility: HOSPITAL | Age: 70
Setting detail: RADIATION/ONCOLOGY SERIES
End: 2020-05-01

## 2020-05-01 ENCOUNTER — APPOINTMENT (OUTPATIENT)
Dept: RADIATION ONCOLOGY | Facility: HOSPITAL | Age: 70
End: 2020-05-01

## 2020-05-01 VITALS
OXYGEN SATURATION: 97 % | DIASTOLIC BLOOD PRESSURE: 60 MMHG | HEART RATE: 92 BPM | SYSTOLIC BLOOD PRESSURE: 112 MMHG | BODY MASS INDEX: 24.92 KG/M2 | RESPIRATION RATE: 18 BRPM | HEIGHT: 71 IN | TEMPERATURE: 97.6 F | WEIGHT: 178 LBS

## 2020-05-01 DIAGNOSIS — C20 RECTAL CANCER (HCC): Primary | ICD-10-CM

## 2020-05-01 PROCEDURE — 77412 RADIATION TX DELIVERY LVL 3: CPT | Performed by: RADIOLOGY

## 2020-05-01 PROCEDURE — 96523 IRRIG DRUG DELIVERY DEVICE: CPT

## 2020-05-01 PROCEDURE — 25010000003 HEPARIN LOCK FLUSH PER 10 UNITS: Performed by: NURSE PRACTITIONER

## 2020-05-01 RX ORDER — HEPARIN SODIUM (PORCINE) LOCK FLUSH IV SOLN 100 UNIT/ML 100 UNIT/ML
500 SOLUTION INTRAVENOUS AS NEEDED
Status: DISCONTINUED | OUTPATIENT
Start: 2020-05-01 | End: 2020-05-01 | Stop reason: HOSPADM

## 2020-05-01 RX ORDER — SODIUM CHLORIDE 0.9 % (FLUSH) 0.9 %
10 SYRINGE (ML) INJECTION AS NEEDED
Status: CANCELLED | OUTPATIENT
Start: 2020-05-01

## 2020-05-01 RX ORDER — HEPARIN SODIUM (PORCINE) LOCK FLUSH IV SOLN 100 UNIT/ML 100 UNIT/ML
500 SOLUTION INTRAVENOUS AS NEEDED
Status: CANCELLED | OUTPATIENT
Start: 2020-05-01

## 2020-05-01 RX ORDER — SODIUM CHLORIDE 0.9 % (FLUSH) 0.9 %
10 SYRINGE (ML) INJECTION AS NEEDED
Status: DISCONTINUED | OUTPATIENT
Start: 2020-05-01 | End: 2020-05-01 | Stop reason: HOSPADM

## 2020-05-01 RX ADMIN — Medication 500 UNITS: at 13:45

## 2020-05-01 RX ADMIN — SODIUM CHLORIDE, PRESERVATIVE FREE 10 ML: 5 INJECTION INTRAVENOUS at 13:45

## 2020-05-04 ENCOUNTER — LAB (OUTPATIENT)
Dept: LAB | Facility: HOSPITAL | Age: 70
End: 2020-05-04

## 2020-05-04 ENCOUNTER — INFUSION (OUTPATIENT)
Dept: ONCOLOGY | Facility: HOSPITAL | Age: 70
End: 2020-05-04

## 2020-05-04 ENCOUNTER — TRANSCRIBE ORDERS (OUTPATIENT)
Dept: ADMINISTRATIVE | Facility: HOSPITAL | Age: 70
End: 2020-05-04

## 2020-05-04 ENCOUNTER — HOSPITAL ENCOUNTER (OUTPATIENT)
Dept: RADIATION ONCOLOGY | Facility: HOSPITAL | Age: 70
Setting detail: RADIATION/ONCOLOGY SERIES
Discharge: HOME | End: 2020-05-04

## 2020-05-04 VITALS
HEIGHT: 71 IN | TEMPERATURE: 98.3 F | RESPIRATION RATE: 16 BRPM | OXYGEN SATURATION: 98 % | WEIGHT: 178 LBS | BODY MASS INDEX: 24.92 KG/M2 | DIASTOLIC BLOOD PRESSURE: 79 MMHG | HEART RATE: 100 BPM | SYSTOLIC BLOOD PRESSURE: 118 MMHG

## 2020-05-04 DIAGNOSIS — C20 RECTAL CANCER (HCC): ICD-10-CM

## 2020-05-04 DIAGNOSIS — C20 RECTAL CANCER (HCC): Primary | ICD-10-CM

## 2020-05-04 LAB
ALBUMIN SERPL-MCNC: 4.1 G/DL (ref 3.5–5.2)
ALBUMIN/GLOB SERPL: 1.2 G/DL
ALP SERPL-CCNC: 114 U/L (ref 39–117)
ALT SERPL W P-5'-P-CCNC: 18 U/L (ref 1–41)
ANION GAP SERPL CALCULATED.3IONS-SCNC: 11 MMOL/L (ref 5–15)
AST SERPL-CCNC: 19 U/L (ref 1–40)
BASOPHILS # BLD AUTO: 0.06 10*3/MM3 (ref 0–0.2)
BASOPHILS NFR BLD AUTO: 0.9 % (ref 0–1.5)
BILIRUB SERPL-MCNC: 0.3 MG/DL (ref 0.2–1.2)
BUN BLD-MCNC: 12 MG/DL (ref 8–23)
BUN/CREAT SERPL: 13.8 (ref 7–25)
CALCIUM SPEC-SCNC: 9.3 MG/DL (ref 8.6–10.5)
CHLORIDE SERPL-SCNC: 102 MMOL/L (ref 98–107)
CO2 SERPL-SCNC: 25 MMOL/L (ref 22–29)
CREAT BLD-MCNC: 0.87 MG/DL (ref 0.76–1.27)
DEPRECATED RDW RBC AUTO: 40.2 FL (ref 37–54)
EOSINOPHIL # BLD AUTO: 0.31 10*3/MM3 (ref 0–0.4)
EOSINOPHIL NFR BLD AUTO: 4.4 % (ref 0.3–6.2)
ERYTHROCYTE [DISTWIDTH] IN BLOOD BY AUTOMATED COUNT: 12.1 % (ref 12.3–15.4)
GFR SERPL CREATININE-BSD FRML MDRD: 87 ML/MIN/1.73
GLOBULIN UR ELPH-MCNC: 3.5 GM/DL
GLUCOSE BLD-MCNC: 149 MG/DL (ref 65–99)
HCT VFR BLD AUTO: 39.2 % (ref 37.5–51)
HGB BLD-MCNC: 13.2 G/DL (ref 13–17.7)
IMM GRANULOCYTES # BLD AUTO: 0.06 10*3/MM3 (ref 0–0.05)
IMM GRANULOCYTES NFR BLD AUTO: 0.9 % (ref 0–0.5)
LYMPHOCYTES # BLD AUTO: 1.02 10*3/MM3 (ref 0.7–3.1)
LYMPHOCYTES NFR BLD AUTO: 14.6 % (ref 19.6–45.3)
MCH RBC QN AUTO: 30.9 PG (ref 26.6–33)
MCHC RBC AUTO-ENTMCNC: 33.7 G/DL (ref 31.5–35.7)
MCV RBC AUTO: 91.8 FL (ref 79–97)
MONOCYTES # BLD AUTO: 0.58 10*3/MM3 (ref 0.1–0.9)
MONOCYTES NFR BLD AUTO: 8.3 % (ref 5–12)
NEUTROPHILS # BLD AUTO: 4.97 10*3/MM3 (ref 1.7–7)
NEUTROPHILS NFR BLD AUTO: 70.9 % (ref 42.7–76)
NRBC BLD AUTO-RTO: 0 /100 WBC (ref 0–0.2)
PLATELET # BLD AUTO: 389 10*3/MM3 (ref 140–450)
PMV BLD AUTO: 8.7 FL (ref 6–12)
POTASSIUM BLD-SCNC: 4.5 MMOL/L (ref 3.5–5.2)
PROT SERPL-MCNC: 7.6 G/DL (ref 6–8.5)
RBC # BLD AUTO: 4.27 10*6/MM3 (ref 4.14–5.8)
SODIUM BLD-SCNC: 138 MMOL/L (ref 136–145)
WBC NRBC COR # BLD: 7 10*3/MM3 (ref 3.4–10.8)

## 2020-05-04 PROCEDURE — 77412 RADIATION TX DELIVERY LVL 3: CPT | Performed by: RADIOLOGY

## 2020-05-04 PROCEDURE — 80053 COMPREHEN METABOLIC PANEL: CPT

## 2020-05-04 PROCEDURE — 25010000002 FLUOROURACIL PER 500 MG: Performed by: NURSE PRACTITIONER

## 2020-05-04 PROCEDURE — 85025 COMPLETE CBC W/AUTO DIFF WBC: CPT

## 2020-05-04 PROCEDURE — 36415 COLL VENOUS BLD VENIPUNCTURE: CPT

## 2020-05-04 PROCEDURE — G0498 CHEMO EXTEND IV INFUS W/PUMP: HCPCS

## 2020-05-04 RX ORDER — DIPHENHYDRAMINE HYDROCHLORIDE 50 MG/ML
50 INJECTION INTRAMUSCULAR; INTRAVENOUS AS NEEDED
Status: CANCELLED | OUTPATIENT
Start: 2020-05-04

## 2020-05-04 RX ORDER — FAMOTIDINE 10 MG/ML
20 INJECTION, SOLUTION INTRAVENOUS AS NEEDED
Status: DISCONTINUED | OUTPATIENT
Start: 2020-05-04 | End: 2020-05-04 | Stop reason: HOSPADM

## 2020-05-04 RX ORDER — FAMOTIDINE 10 MG/ML
20 INJECTION, SOLUTION INTRAVENOUS AS NEEDED
Status: CANCELLED | OUTPATIENT
Start: 2020-05-04

## 2020-05-04 RX ORDER — EPINEPHRINE 0.3 MG/.3ML
0.3 INJECTION SUBCUTANEOUS AS NEEDED
Status: DISCONTINUED | OUTPATIENT
Start: 2020-05-04 | End: 2020-05-04 | Stop reason: HOSPADM

## 2020-05-04 RX ORDER — EPINEPHRINE 0.3 MG/.3ML
0.3 INJECTION SUBCUTANEOUS ONCE
Status: CANCELLED | OUTPATIENT
Start: 2020-05-04

## 2020-05-04 RX ORDER — DIPHENHYDRAMINE HYDROCHLORIDE 50 MG/ML
50 INJECTION INTRAMUSCULAR; INTRAVENOUS AS NEEDED
Status: DISCONTINUED | OUTPATIENT
Start: 2020-05-04 | End: 2020-05-04 | Stop reason: HOSPADM

## 2020-05-04 RX ADMIN — FLUOROURACIL 1800 MG: 50 INJECTION, SOLUTION INTRAVENOUS at 10:16

## 2020-05-05 ENCOUNTER — HOSPITAL ENCOUNTER (OUTPATIENT)
Dept: RADIATION ONCOLOGY | Facility: HOSPITAL | Age: 70
Setting detail: RADIATION/ONCOLOGY SERIES
Discharge: HOME | End: 2020-05-05

## 2020-05-05 PROCEDURE — 77412 RADIATION TX DELIVERY LVL 3: CPT | Performed by: RADIOLOGY

## 2020-05-05 PROCEDURE — 77417 THER RADIOLOGY PORT IMAGE(S): CPT | Performed by: RADIOLOGY

## 2020-05-06 ENCOUNTER — HOSPITAL ENCOUNTER (OUTPATIENT)
Dept: RADIATION ONCOLOGY | Facility: HOSPITAL | Age: 70
Setting detail: RADIATION/ONCOLOGY SERIES
Discharge: HOME | End: 2020-05-06

## 2020-05-06 PROCEDURE — 77412 RADIATION TX DELIVERY LVL 3: CPT | Performed by: RADIOLOGY

## 2020-05-06 PROCEDURE — 77336 RADIATION PHYSICS CONSULT: CPT | Performed by: RADIOLOGY

## 2020-05-07 ENCOUNTER — HOSPITAL ENCOUNTER (OUTPATIENT)
Dept: RADIATION ONCOLOGY | Facility: HOSPITAL | Age: 70
Setting detail: RADIATION/ONCOLOGY SERIES
Discharge: HOME | End: 2020-05-07

## 2020-05-07 PROCEDURE — 77412 RADIATION TX DELIVERY LVL 3: CPT | Performed by: RADIOLOGY

## 2020-05-08 ENCOUNTER — INFUSION (OUTPATIENT)
Dept: ONCOLOGY | Facility: HOSPITAL | Age: 70
End: 2020-05-08

## 2020-05-08 ENCOUNTER — HOSPITAL ENCOUNTER (OUTPATIENT)
Dept: RADIATION ONCOLOGY | Facility: HOSPITAL | Age: 70
Setting detail: RADIATION/ONCOLOGY SERIES
Discharge: HOME | End: 2020-05-08

## 2020-05-08 VITALS
BODY MASS INDEX: 24.64 KG/M2 | TEMPERATURE: 97.9 F | SYSTOLIC BLOOD PRESSURE: 102 MMHG | RESPIRATION RATE: 18 BRPM | OXYGEN SATURATION: 97 % | HEIGHT: 71 IN | HEART RATE: 93 BPM | WEIGHT: 176 LBS | DIASTOLIC BLOOD PRESSURE: 64 MMHG

## 2020-05-08 DIAGNOSIS — C20 RECTAL CANCER (HCC): Primary | ICD-10-CM

## 2020-05-08 PROCEDURE — 25010000003 HEPARIN LOCK FLUSH PER 10 UNITS: Performed by: NURSE PRACTITIONER

## 2020-05-08 PROCEDURE — 77412 RADIATION TX DELIVERY LVL 3: CPT | Performed by: RADIOLOGY

## 2020-05-08 RX ORDER — HEPARIN SODIUM (PORCINE) LOCK FLUSH IV SOLN 100 UNIT/ML 100 UNIT/ML
500 SOLUTION INTRAVENOUS AS NEEDED
Status: CANCELLED | OUTPATIENT
Start: 2020-05-08

## 2020-05-08 RX ORDER — HEPARIN SODIUM (PORCINE) LOCK FLUSH IV SOLN 100 UNIT/ML 100 UNIT/ML
500 SOLUTION INTRAVENOUS AS NEEDED
Status: DISCONTINUED | OUTPATIENT
Start: 2020-05-08 | End: 2020-05-08 | Stop reason: HOSPADM

## 2020-05-08 RX ORDER — SODIUM CHLORIDE 0.9 % (FLUSH) 0.9 %
10 SYRINGE (ML) INJECTION AS NEEDED
Status: CANCELLED | OUTPATIENT
Start: 2020-05-08

## 2020-05-08 RX ORDER — SODIUM CHLORIDE 0.9 % (FLUSH) 0.9 %
10 SYRINGE (ML) INJECTION AS NEEDED
Status: DISCONTINUED | OUTPATIENT
Start: 2020-05-08 | End: 2020-05-08 | Stop reason: HOSPADM

## 2020-05-08 RX ADMIN — Medication 500 UNITS: at 10:41

## 2020-05-08 RX ADMIN — SODIUM CHLORIDE, PRESERVATIVE FREE 10 ML: 5 INJECTION INTRAVENOUS at 10:41

## 2020-05-08 NOTE — PROGRESS NOTES
Patient's port flushed, site assessed and biopatch was saturated. Asked patient if he had gotten dressing wet he said no but he did take a bath. reiterated to patient again to not get dressing wet because it can get infection. Patient verbalized understanding.

## 2020-05-11 ENCOUNTER — HOSPITAL ENCOUNTER (OUTPATIENT)
Dept: RADIATION ONCOLOGY | Facility: HOSPITAL | Age: 70
Setting detail: RADIATION/ONCOLOGY SERIES
Discharge: HOME | End: 2020-05-11

## 2020-05-11 ENCOUNTER — INFUSION (OUTPATIENT)
Dept: ONCOLOGY | Facility: HOSPITAL | Age: 70
End: 2020-05-11

## 2020-05-11 ENCOUNTER — LAB (OUTPATIENT)
Dept: LAB | Facility: HOSPITAL | Age: 70
End: 2020-05-11

## 2020-05-11 VITALS
SYSTOLIC BLOOD PRESSURE: 104 MMHG | BODY MASS INDEX: 25.34 KG/M2 | TEMPERATURE: 97.7 F | DIASTOLIC BLOOD PRESSURE: 73 MMHG | RESPIRATION RATE: 17 BRPM | HEIGHT: 70 IN | HEART RATE: 99 BPM | WEIGHT: 177 LBS | OXYGEN SATURATION: 99 %

## 2020-05-11 DIAGNOSIS — C20 RECTAL CANCER (HCC): Primary | ICD-10-CM

## 2020-05-11 DIAGNOSIS — C20 RECTAL CANCER (HCC): ICD-10-CM

## 2020-05-11 LAB
ALBUMIN SERPL-MCNC: 3.9 G/DL (ref 3.5–5.2)
ALBUMIN/GLOB SERPL: 1.1 G/DL
ALP SERPL-CCNC: 124 U/L (ref 39–117)
ALT SERPL W P-5'-P-CCNC: 18 U/L (ref 1–41)
ANION GAP SERPL CALCULATED.3IONS-SCNC: 11 MMOL/L (ref 5–15)
AST SERPL-CCNC: 20 U/L (ref 1–40)
BASOPHILS # BLD AUTO: 0.04 10*3/MM3 (ref 0–0.2)
BASOPHILS NFR BLD AUTO: 0.5 % (ref 0–1.5)
BILIRUB SERPL-MCNC: 0.2 MG/DL (ref 0.2–1.2)
BUN BLD-MCNC: 12 MG/DL (ref 8–23)
BUN/CREAT SERPL: 13.8 (ref 7–25)
CALCIUM SPEC-SCNC: 9.2 MG/DL (ref 8.6–10.5)
CHLORIDE SERPL-SCNC: 99 MMOL/L (ref 98–107)
CO2 SERPL-SCNC: 25 MMOL/L (ref 22–29)
CREAT BLD-MCNC: 0.87 MG/DL (ref 0.76–1.27)
DEPRECATED RDW RBC AUTO: 41 FL (ref 37–54)
EOSINOPHIL # BLD AUTO: 0.45 10*3/MM3 (ref 0–0.4)
EOSINOPHIL NFR BLD AUTO: 5.5 % (ref 0.3–6.2)
ERYTHROCYTE [DISTWIDTH] IN BLOOD BY AUTOMATED COUNT: 12.3 % (ref 12.3–15.4)
GFR SERPL CREATININE-BSD FRML MDRD: 87 ML/MIN/1.73
GLOBULIN UR ELPH-MCNC: 3.7 GM/DL
GLUCOSE BLD-MCNC: 171 MG/DL (ref 65–99)
HCT VFR BLD AUTO: 37.7 % (ref 37.5–51)
HGB BLD-MCNC: 12.7 G/DL (ref 13–17.7)
IMM GRANULOCYTES # BLD AUTO: 0.03 10*3/MM3 (ref 0–0.05)
IMM GRANULOCYTES NFR BLD AUTO: 0.4 % (ref 0–0.5)
LYMPHOCYTES # BLD AUTO: 0.81 10*3/MM3 (ref 0.7–3.1)
LYMPHOCYTES NFR BLD AUTO: 10 % (ref 19.6–45.3)
MCH RBC QN AUTO: 31.1 PG (ref 26.6–33)
MCHC RBC AUTO-ENTMCNC: 33.7 G/DL (ref 31.5–35.7)
MCV RBC AUTO: 92.4 FL (ref 79–97)
MONOCYTES # BLD AUTO: 0.69 10*3/MM3 (ref 0.1–0.9)
MONOCYTES NFR BLD AUTO: 8.5 % (ref 5–12)
NEUTROPHILS # BLD AUTO: 6.09 10*3/MM3 (ref 1.7–7)
NEUTROPHILS NFR BLD AUTO: 75.1 % (ref 42.7–76)
NRBC BLD AUTO-RTO: 0 /100 WBC (ref 0–0.2)
PLATELET # BLD AUTO: 296 10*3/MM3 (ref 140–450)
PMV BLD AUTO: 8.6 FL (ref 6–12)
POTASSIUM BLD-SCNC: 4.2 MMOL/L (ref 3.5–5.2)
PROT SERPL-MCNC: 7.6 G/DL (ref 6–8.5)
RBC # BLD AUTO: 4.08 10*6/MM3 (ref 4.14–5.8)
SODIUM BLD-SCNC: 135 MMOL/L (ref 136–145)
WBC NRBC COR # BLD: 8.11 10*3/MM3 (ref 3.4–10.8)

## 2020-05-11 PROCEDURE — 80053 COMPREHEN METABOLIC PANEL: CPT

## 2020-05-11 PROCEDURE — 25010000002 FLUOROURACIL PER 500 MG: Performed by: NURSE PRACTITIONER

## 2020-05-11 PROCEDURE — G0498 CHEMO EXTEND IV INFUS W/PUMP: HCPCS

## 2020-05-11 PROCEDURE — 77412 RADIATION TX DELIVERY LVL 3: CPT | Performed by: RADIOLOGY

## 2020-05-11 PROCEDURE — 85025 COMPLETE CBC W/AUTO DIFF WBC: CPT

## 2020-05-11 PROCEDURE — 36415 COLL VENOUS BLD VENIPUNCTURE: CPT

## 2020-05-11 RX ORDER — DIPHENHYDRAMINE HYDROCHLORIDE 50 MG/ML
50 INJECTION INTRAMUSCULAR; INTRAVENOUS AS NEEDED
Status: CANCELLED | OUTPATIENT
Start: 2020-05-11

## 2020-05-11 RX ORDER — FAMOTIDINE 10 MG/ML
20 INJECTION, SOLUTION INTRAVENOUS AS NEEDED
Status: DISCONTINUED | OUTPATIENT
Start: 2020-05-11 | End: 2020-05-11 | Stop reason: HOSPADM

## 2020-05-11 RX ORDER — FAMOTIDINE 10 MG/ML
20 INJECTION, SOLUTION INTRAVENOUS AS NEEDED
Status: CANCELLED | OUTPATIENT
Start: 2020-05-11

## 2020-05-11 RX ORDER — DIPHENHYDRAMINE HYDROCHLORIDE 50 MG/ML
50 INJECTION INTRAMUSCULAR; INTRAVENOUS AS NEEDED
Status: DISCONTINUED | OUTPATIENT
Start: 2020-05-11 | End: 2020-05-11 | Stop reason: HOSPADM

## 2020-05-11 RX ORDER — EPINEPHRINE 0.3 MG/.3ML
0.3 INJECTION SUBCUTANEOUS ONCE AS NEEDED
Status: DISCONTINUED | OUTPATIENT
Start: 2020-05-11 | End: 2020-05-11 | Stop reason: HOSPADM

## 2020-05-11 RX ORDER — EPINEPHRINE 0.3 MG/.3ML
0.3 INJECTION SUBCUTANEOUS ONCE
Status: CANCELLED | OUTPATIENT
Start: 2020-05-11

## 2020-05-11 RX ADMIN — FLUOROURACIL 1800 MG: 50 INJECTION, SOLUTION INTRAVENOUS at 10:51

## 2020-05-12 ENCOUNTER — HOSPITAL ENCOUNTER (OUTPATIENT)
Dept: RADIATION ONCOLOGY | Facility: HOSPITAL | Age: 70
Setting detail: RADIATION/ONCOLOGY SERIES
Discharge: HOME | End: 2020-05-12

## 2020-05-12 ENCOUNTER — OFFICE VISIT (OUTPATIENT)
Dept: SPEECH THERAPY | Facility: HOSPITAL | Age: 70
End: 2020-05-12

## 2020-05-12 VITALS — HEIGHT: 71 IN | BODY MASS INDEX: 24.64 KG/M2 | WEIGHT: 176 LBS

## 2020-05-12 PROCEDURE — 77412 RADIATION TX DELIVERY LVL 3: CPT | Performed by: RADIOLOGY

## 2020-05-12 PROCEDURE — 77417 THER RADIOLOGY PORT IMAGE(S): CPT | Performed by: RADIOLOGY

## 2020-05-13 ENCOUNTER — HOSPITAL ENCOUNTER (OUTPATIENT)
Dept: RADIATION ONCOLOGY | Facility: HOSPITAL | Age: 70
Setting detail: RADIATION/ONCOLOGY SERIES
Discharge: HOME | End: 2020-05-13

## 2020-05-13 PROCEDURE — 77412 RADIATION TX DELIVERY LVL 3: CPT | Performed by: RADIOLOGY

## 2020-05-13 PROCEDURE — 77336 RADIATION PHYSICS CONSULT: CPT | Performed by: RADIOLOGY

## 2020-05-14 ENCOUNTER — HOSPITAL ENCOUNTER (OUTPATIENT)
Dept: RADIATION ONCOLOGY | Facility: HOSPITAL | Age: 70
Setting detail: RADIATION/ONCOLOGY SERIES
Discharge: HOME | End: 2020-05-14

## 2020-05-14 PROCEDURE — 77412 RADIATION TX DELIVERY LVL 3: CPT | Performed by: RADIOLOGY

## 2020-05-15 ENCOUNTER — HOSPITAL ENCOUNTER (OUTPATIENT)
Dept: RADIATION ONCOLOGY | Facility: HOSPITAL | Age: 70
Setting detail: RADIATION/ONCOLOGY SERIES
Discharge: HOME | End: 2020-05-15

## 2020-05-15 ENCOUNTER — INFUSION (OUTPATIENT)
Dept: ONCOLOGY | Facility: HOSPITAL | Age: 70
End: 2020-05-15

## 2020-05-15 VITALS
OXYGEN SATURATION: 96 % | SYSTOLIC BLOOD PRESSURE: 109 MMHG | RESPIRATION RATE: 17 BRPM | BODY MASS INDEX: 24.92 KG/M2 | HEART RATE: 94 BPM | HEIGHT: 71 IN | WEIGHT: 178 LBS | TEMPERATURE: 97.1 F | DIASTOLIC BLOOD PRESSURE: 61 MMHG

## 2020-05-15 DIAGNOSIS — C20 RECTAL CANCER (HCC): Primary | ICD-10-CM

## 2020-05-15 PROCEDURE — 96523 IRRIG DRUG DELIVERY DEVICE: CPT

## 2020-05-15 PROCEDURE — 25010000003 HEPARIN LOCK FLUSH PER 10 UNITS: Performed by: NURSE PRACTITIONER

## 2020-05-15 PROCEDURE — 77412 RADIATION TX DELIVERY LVL 3: CPT | Performed by: RADIOLOGY

## 2020-05-15 RX ORDER — HEPARIN SODIUM (PORCINE) LOCK FLUSH IV SOLN 100 UNIT/ML 100 UNIT/ML
500 SOLUTION INTRAVENOUS AS NEEDED
Status: CANCELLED | OUTPATIENT
Start: 2020-05-15

## 2020-05-15 RX ORDER — SODIUM CHLORIDE 0.9 % (FLUSH) 0.9 %
10 SYRINGE (ML) INJECTION AS NEEDED
Status: DISCONTINUED | OUTPATIENT
Start: 2020-05-15 | End: 2020-05-15 | Stop reason: HOSPADM

## 2020-05-15 RX ORDER — SODIUM CHLORIDE 0.9 % (FLUSH) 0.9 %
10 SYRINGE (ML) INJECTION AS NEEDED
Status: CANCELLED | OUTPATIENT
Start: 2020-05-15

## 2020-05-15 RX ORDER — HEPARIN SODIUM (PORCINE) LOCK FLUSH IV SOLN 100 UNIT/ML 100 UNIT/ML
500 SOLUTION INTRAVENOUS AS NEEDED
Status: DISCONTINUED | OUTPATIENT
Start: 2020-05-15 | End: 2020-05-15 | Stop reason: HOSPADM

## 2020-05-15 RX ADMIN — Medication 500 UNITS: at 09:06

## 2020-05-15 RX ADMIN — SODIUM CHLORIDE, PRESERVATIVE FREE 10 ML: 5 INJECTION INTRAVENOUS at 09:06

## 2020-05-18 ENCOUNTER — HOSPITAL ENCOUNTER (OUTPATIENT)
Dept: RADIATION ONCOLOGY | Facility: HOSPITAL | Age: 70
Setting detail: RADIATION/ONCOLOGY SERIES
Discharge: HOME | End: 2020-05-18

## 2020-05-18 ENCOUNTER — INFUSION (OUTPATIENT)
Dept: ONCOLOGY | Facility: HOSPITAL | Age: 70
End: 2020-05-18

## 2020-05-18 ENCOUNTER — LAB (OUTPATIENT)
Dept: LAB | Facility: HOSPITAL | Age: 70
End: 2020-05-18

## 2020-05-18 VITALS
DIASTOLIC BLOOD PRESSURE: 55 MMHG | WEIGHT: 179.4 LBS | BODY MASS INDEX: 25.11 KG/M2 | SYSTOLIC BLOOD PRESSURE: 94 MMHG | OXYGEN SATURATION: 96 % | TEMPERATURE: 98.3 F | RESPIRATION RATE: 18 BRPM | HEIGHT: 71 IN | HEART RATE: 95 BPM

## 2020-05-18 DIAGNOSIS — C20 RECTAL CANCER (HCC): ICD-10-CM

## 2020-05-18 DIAGNOSIS — C20 RECTAL CANCER (HCC): Primary | ICD-10-CM

## 2020-05-18 LAB
ALBUMIN SERPL-MCNC: 3.8 G/DL (ref 3.5–5.2)
ALBUMIN/GLOB SERPL: 1.1 G/DL
ALP SERPL-CCNC: 105 U/L (ref 39–117)
ALT SERPL W P-5'-P-CCNC: 16 U/L (ref 1–41)
ANION GAP SERPL CALCULATED.3IONS-SCNC: 10 MMOL/L (ref 5–15)
AST SERPL-CCNC: 14 U/L (ref 1–40)
BASOPHILS # BLD AUTO: 0.05 10*3/MM3 (ref 0–0.2)
BASOPHILS NFR BLD AUTO: 0.5 % (ref 0–1.5)
BILIRUB SERPL-MCNC: 0.2 MG/DL (ref 0.2–1.2)
BUN BLD-MCNC: 16 MG/DL (ref 8–23)
BUN/CREAT SERPL: 19.3 (ref 7–25)
CALCIUM SPEC-SCNC: 9.2 MG/DL (ref 8.6–10.5)
CHLORIDE SERPL-SCNC: 104 MMOL/L (ref 98–107)
CO2 SERPL-SCNC: 26 MMOL/L (ref 22–29)
CREAT BLD-MCNC: 0.83 MG/DL (ref 0.76–1.27)
DEPRECATED RDW RBC AUTO: 41.1 FL (ref 37–54)
EOSINOPHIL # BLD AUTO: 0.4 10*3/MM3 (ref 0–0.4)
EOSINOPHIL NFR BLD AUTO: 4.2 % (ref 0.3–6.2)
ERYTHROCYTE [DISTWIDTH] IN BLOOD BY AUTOMATED COUNT: 12.5 % (ref 12.3–15.4)
GFR SERPL CREATININE-BSD FRML MDRD: 92 ML/MIN/1.73
GLOBULIN UR ELPH-MCNC: 3.4 GM/DL
GLUCOSE BLD-MCNC: 199 MG/DL (ref 65–99)
HCT VFR BLD AUTO: 36 % (ref 37.5–51)
HGB BLD-MCNC: 12.2 G/DL (ref 13–17.7)
IMM GRANULOCYTES # BLD AUTO: 0.04 10*3/MM3 (ref 0–0.05)
IMM GRANULOCYTES NFR BLD AUTO: 0.4 % (ref 0–0.5)
LYMPHOCYTES # BLD AUTO: 0.55 10*3/MM3 (ref 0.7–3.1)
LYMPHOCYTES NFR BLD AUTO: 5.8 % (ref 19.6–45.3)
MCH RBC QN AUTO: 30.8 PG (ref 26.6–33)
MCHC RBC AUTO-ENTMCNC: 33.9 G/DL (ref 31.5–35.7)
MCV RBC AUTO: 90.9 FL (ref 79–97)
MONOCYTES # BLD AUTO: 0.83 10*3/MM3 (ref 0.1–0.9)
MONOCYTES NFR BLD AUTO: 8.7 % (ref 5–12)
NEUTROPHILS # BLD AUTO: 7.67 10*3/MM3 (ref 1.7–7)
NEUTROPHILS NFR BLD AUTO: 80.4 % (ref 42.7–76)
NRBC BLD AUTO-RTO: 0 /100 WBC (ref 0–0.2)
PLATELET # BLD AUTO: 295 10*3/MM3 (ref 140–450)
PMV BLD AUTO: 8.3 FL (ref 6–12)
POTASSIUM BLD-SCNC: 4 MMOL/L (ref 3.5–5.2)
PROT SERPL-MCNC: 7.2 G/DL (ref 6–8.5)
RBC # BLD AUTO: 3.96 10*6/MM3 (ref 4.14–5.8)
SODIUM BLD-SCNC: 140 MMOL/L (ref 136–145)
WBC NRBC COR # BLD: 9.54 10*3/MM3 (ref 3.4–10.8)

## 2020-05-18 PROCEDURE — 85025 COMPLETE CBC W/AUTO DIFF WBC: CPT

## 2020-05-18 PROCEDURE — 80053 COMPREHEN METABOLIC PANEL: CPT

## 2020-05-18 PROCEDURE — 25010000002 FLUOROURACIL PER 500 MG: Performed by: NURSE PRACTITIONER

## 2020-05-18 PROCEDURE — 77412 RADIATION TX DELIVERY LVL 3: CPT | Performed by: RADIOLOGY

## 2020-05-18 PROCEDURE — 36415 COLL VENOUS BLD VENIPUNCTURE: CPT

## 2020-05-18 PROCEDURE — G0498 CHEMO EXTEND IV INFUS W/PUMP: HCPCS

## 2020-05-18 RX ORDER — FAMOTIDINE 10 MG/ML
20 INJECTION, SOLUTION INTRAVENOUS AS NEEDED
Status: DISCONTINUED | OUTPATIENT
Start: 2020-05-18 | End: 2020-05-18 | Stop reason: HOSPADM

## 2020-05-18 RX ORDER — DIPHENHYDRAMINE HYDROCHLORIDE 50 MG/ML
50 INJECTION INTRAMUSCULAR; INTRAVENOUS AS NEEDED
Status: DISCONTINUED | OUTPATIENT
Start: 2020-05-18 | End: 2020-05-18 | Stop reason: HOSPADM

## 2020-05-18 RX ORDER — EPINEPHRINE 0.3 MG/.3ML
0.3 INJECTION SUBCUTANEOUS ONCE AS NEEDED
Status: DISCONTINUED | OUTPATIENT
Start: 2020-05-18 | End: 2020-05-18 | Stop reason: HOSPADM

## 2020-05-18 RX ADMIN — FLUOROURACIL 1800 MG: 50 INJECTION, SOLUTION INTRAVENOUS at 09:49

## 2020-05-19 ENCOUNTER — APPOINTMENT (OUTPATIENT)
Dept: SPEECH THERAPY | Facility: HOSPITAL | Age: 70
End: 2020-05-19

## 2020-05-19 ENCOUNTER — HOSPITAL ENCOUNTER (OUTPATIENT)
Dept: RADIATION ONCOLOGY | Facility: HOSPITAL | Age: 70
Setting detail: RADIATION/ONCOLOGY SERIES
Discharge: HOME | End: 2020-05-19

## 2020-05-19 PROCEDURE — 77417 THER RADIOLOGY PORT IMAGE(S): CPT | Performed by: RADIOLOGY

## 2020-05-19 PROCEDURE — 77412 RADIATION TX DELIVERY LVL 3: CPT | Performed by: RADIOLOGY

## 2020-05-19 PROCEDURE — 77300 RADIATION THERAPY DOSE PLAN: CPT | Performed by: RADIOLOGY

## 2020-05-19 PROCEDURE — 77334 RADIATION TREATMENT AID(S): CPT | Performed by: RADIOLOGY

## 2020-05-20 ENCOUNTER — HOSPITAL ENCOUNTER (OUTPATIENT)
Dept: RADIATION ONCOLOGY | Facility: HOSPITAL | Age: 70
Setting detail: RADIATION/ONCOLOGY SERIES
Discharge: HOME | End: 2020-05-20

## 2020-05-20 PROCEDURE — 77412 RADIATION TX DELIVERY LVL 3: CPT | Performed by: RADIOLOGY

## 2020-05-20 PROCEDURE — 77336 RADIATION PHYSICS CONSULT: CPT | Performed by: RADIOLOGY

## 2020-05-21 ENCOUNTER — HOSPITAL ENCOUNTER (OUTPATIENT)
Dept: RADIATION ONCOLOGY | Facility: HOSPITAL | Age: 70
Setting detail: RADIATION/ONCOLOGY SERIES
Discharge: HOME | End: 2020-05-21

## 2020-05-21 PROCEDURE — 77412 RADIATION TX DELIVERY LVL 3: CPT | Performed by: RADIOLOGY

## 2020-05-22 ENCOUNTER — INFUSION (OUTPATIENT)
Dept: ONCOLOGY | Facility: HOSPITAL | Age: 70
End: 2020-05-22

## 2020-05-22 ENCOUNTER — OFFICE VISIT (OUTPATIENT)
Dept: CARDIOLOGY | Facility: CLINIC | Age: 70
End: 2020-05-22

## 2020-05-22 ENCOUNTER — HOSPITAL ENCOUNTER (OUTPATIENT)
Dept: RADIATION ONCOLOGY | Facility: HOSPITAL | Age: 70
Setting detail: RADIATION/ONCOLOGY SERIES
Discharge: HOME | End: 2020-05-22

## 2020-05-22 VITALS
DIASTOLIC BLOOD PRESSURE: 62 MMHG | OXYGEN SATURATION: 98 % | SYSTOLIC BLOOD PRESSURE: 102 MMHG | RESPIRATION RATE: 18 BRPM | TEMPERATURE: 98.4 F | HEART RATE: 103 BPM

## 2020-05-22 VITALS
SYSTOLIC BLOOD PRESSURE: 94 MMHG | DIASTOLIC BLOOD PRESSURE: 56 MMHG | WEIGHT: 178 LBS | BODY MASS INDEX: 24.92 KG/M2 | HEIGHT: 71 IN | HEART RATE: 102 BPM | OXYGEN SATURATION: 98 %

## 2020-05-22 DIAGNOSIS — Z72.0 TOBACCO ABUSE: ICD-10-CM

## 2020-05-22 DIAGNOSIS — C20 RECTAL CANCER (HCC): Primary | ICD-10-CM

## 2020-05-22 DIAGNOSIS — C20 RECTAL CANCER (HCC): ICD-10-CM

## 2020-05-22 DIAGNOSIS — I44.1 2ND DEGREE AV BLOCK: Primary | ICD-10-CM

## 2020-05-22 PROCEDURE — 25010000003 HEPARIN LOCK FLUSH PER 10 UNITS: Performed by: NURSE PRACTITIONER

## 2020-05-22 PROCEDURE — 99204 OFFICE O/P NEW MOD 45 MIN: CPT | Performed by: INTERNAL MEDICINE

## 2020-05-22 PROCEDURE — 93000 ELECTROCARDIOGRAM COMPLETE: CPT | Performed by: INTERNAL MEDICINE

## 2020-05-22 PROCEDURE — 96523 IRRIG DRUG DELIVERY DEVICE: CPT

## 2020-05-22 PROCEDURE — 77412 RADIATION TX DELIVERY LVL 3: CPT | Performed by: RADIOLOGY

## 2020-05-22 RX ORDER — SODIUM CHLORIDE 0.9 % (FLUSH) 0.9 %
10 SYRINGE (ML) INJECTION AS NEEDED
Status: CANCELLED | OUTPATIENT
Start: 2020-05-22

## 2020-05-22 RX ORDER — SODIUM CHLORIDE 0.9 % (FLUSH) 0.9 %
10 SYRINGE (ML) INJECTION AS NEEDED
Status: DISCONTINUED | OUTPATIENT
Start: 2020-05-22 | End: 2020-05-22 | Stop reason: HOSPADM

## 2020-05-22 RX ORDER — HEPARIN SODIUM (PORCINE) LOCK FLUSH IV SOLN 100 UNIT/ML 100 UNIT/ML
500 SOLUTION INTRAVENOUS AS NEEDED
Status: DISCONTINUED | OUTPATIENT
Start: 2020-05-22 | End: 2020-05-22 | Stop reason: HOSPADM

## 2020-05-22 RX ORDER — HEPARIN SODIUM (PORCINE) LOCK FLUSH IV SOLN 100 UNIT/ML 100 UNIT/ML
500 SOLUTION INTRAVENOUS AS NEEDED
Status: CANCELLED | OUTPATIENT
Start: 2020-05-22

## 2020-05-22 RX ADMIN — SODIUM CHLORIDE, PRESERVATIVE FREE 10 ML: 5 INJECTION INTRAVENOUS at 09:09

## 2020-05-22 RX ADMIN — Medication 500 UNITS: at 09:09

## 2020-05-22 NOTE — PROGRESS NOTES
"    [facility] - CARDIOLOGY  New Patient Initial Outpatient Evaulation    Primary Care Physician: Vincent Swenson MD    Subjective     Chief Complaint: pauses noted on heart monitor    History of Present Illness  69-year-old male who was told to see a cardiologist locally by his neurologist at [facility]. He was diagnosed with rectal cancer in March 2020, and was admitted to [facility] with an acute ischemic left MCA stroke in April 2020.  According to documentation I reviewed, it was felt as though his TIA was secondary to hypotension and cerebral hypoperfusion in the setting of a chronically occluded left internal carotid artery.  This was not felt to be an embolic stroke.  His left atrium was reported as normal size and echocardiogram, and there is no evidence of PFO.  He was placed on a 30-day cardiac event monitor upon discharge.  There is no mention of any arrhythmias being noted during the hospital stay.  This monitor revealed multiple pauses, which prompted his neurologist to call and alerted the patient of the results and advised that he seek local cardiovascular consultation.    Patient tells me he never had any symptoms while wearing the 30-day monitor.  He states that the company \"kept calling me in the middle of the night\" and waking him up from sleep to ask if he was doing okay, the time of these observed pauses.  He denies any daytime symptoms of lightheadedness, dizziness, near-syncope, or syncope.    (Regarding the pauses, duration: 2 months.  Severity: Mild.  Associated symptoms: None.  Aggravating factors: Sleep)    He is currently on chemo and XRT Mon-Fri with planned stop date 6/3/20.  Ultimate plan is for surgical removal after those treatments.    This week, as he completes treatment, he's noticing dyspnea with mild exertion. No associated CP, near-syncope, light-headedness, or syncope.      Review of Systems   Constitution: Positive for malaise/fatigue.   HENT: Negative for nosebleeds.    Eyes: " Negative.    Cardiovascular: Positive for dyspnea on exertion. Negative for chest pain, claudication, irregular heartbeat, leg swelling, near-syncope, orthopnea, palpitations, paroxysmal nocturnal dyspnea and syncope.   Respiratory: Negative for cough, shortness of breath and wheezing.    Hematologic/Lymphatic: Negative for bleeding problem. Does not bruise/bleed easily.   Gastrointestinal: Negative for dysphagia, hematemesis, hematochezia and melena.   Genitourinary: Negative for hematuria and non-menstrual bleeding.   Otherwise complete ROS reviewed and negative except as mentioned in the HPI.     Past Medical History:   Past Medical History:   Diagnosis Date   • Arthritis    • Hyperlipidemia    • Pancreatitis    • Rectal cancer (CMS/HCC) 4/9/2020       Past Surgical History:  Past Surgical History:   Procedure Laterality Date   • COLONOSCOPY N/A 4/2/2020    Procedure: COLONOSCOPY WITH ANESTHESIA;  Surgeon: Yanick Flowers DO;  Location: [facility] ENDOSCOPY;  Service: Gastroenterology;  Laterality: N/A;  pre: abnormal CT scan  post: rectal mass  PCP unknown   • EYE SURGERY Bilateral     lenses present   • VENOUS ACCESS DEVICE (PORT) INSERTION N/A 4/14/2020    Procedure: INSERTION VENOUS ACCESS DEVICE;  Surgeon: Vielka Weller MD;  Location: [facility] OR;  Service: General;  Laterality: N/A;       Family History: family history includes Cancer in his mother; Heart attack in his brother; Heart disease in his father; Stroke in his father.    Social History:  reports that he has been smoking cigarettes. He has a 53.00 pack-year smoking history. He has never used smokeless tobacco. He reports that he drank alcohol. He reports that he does not use drugs.    Medications:  Prior to Admission medications    Medication Sig Start Date End Date Taking? Authorizing Provider   aspirin 81 MG chewable tablet Chew 81 mg Daily. 4/16/20 4/17/21  Provider, MD Sarah   atorvastatin (LIPITOR) 80 MG tablet Take 80 mg by mouth Daily.  "4/15/20 4/16/21  Sarah Clay MD   HYDROcodone-acetaminophen (NORCO) 7.5-325 MG per tablet Take 1-2 tablets by mouth Every 4 (Four) Hours As Needed for Moderate Pain  (Pain). 4/14/20   Vielka Weller MD   ibuprofen (ADVIL,MOTRIN) 200 MG tablet Take 200 mg by mouth Every 8 (Eight) Hours As Needed for Mild Pain .    Sarah Clay MD   pantoprazole (PROTONIX) 20 MG EC tablet Take 20 mg by mouth Daily. 4/16/20 4/17/21  Sarah Clay MD     Allergies:  No Known Allergies    Objective     Vital Signs: BP 94/56 (BP Location: Left arm, Patient Position: Sitting)   Pulse 102   Ht 180.3 cm (71\")   Wt 80.7 kg (178 lb)   SpO2 98%   BMI 24.83 kg/m²     Physical Exam   Constitutional: No distress.   HENT:   Mouth/Throat: Oropharynx is clear. Pharynx is normal.   Neck: Normal range of motion and thyroid normal. Neck supple. No JVD present. No thyromegaly present.   Cardiovascular: Normal rate, regular rhythm, S1 normal, S2 normal, normal heart sounds, intact distal pulses and normal pulses.  No extrasystoles are present. PMI is not displaced. Exam reveals no gallop.   No murmur heard.  Pulmonary/Chest: Effort normal and breath sounds normal.   Abdominal: Soft. Bowel sounds are normal. He exhibits no distension. There is no splenomegaly or hepatomegaly. There is no tenderness.   Musculoskeletal: He exhibits no edema or tenderness.   Neurological: He is alert and oriented to person, place, and time.   Skin: Skin is warm and dry.       Results Reviewed:      ECG 12 Lead  Date/Time: 5/22/2020 1:18 PM  Performed by: Ga Hameed MD  Authorized by: Ga Hameed MD   Comparison: compared with previous ECG from 4/14/2020  Similar to previous ECG  Rhythm: sinus rhythm  BPM: 99  Conduction: incomplete right bundle branch block    Clinical impression: non-specific ECG              Lab Results   Component Value Date    TRIG 105 04/14/2020    HDL 32 (L) 04/14/2020     Lab Results   Component Value Date   " "   HGBA1C 6.6 04/14/2020     OLD RECORDS REVIEWED:  I reviewed 125 pages of records obtained from [place].    This included 30-day event monitoring that was worn from 4/17 until 5/16/2020.  There were 29 events recorded, 6 of which were patient triggered in 23 of which were automatically triggered.  These did show pauses of a maximum 4.2 seconds.  Upon my review, the following strips were reviewed and notable:  -4/22 at 9:42 PM: Evidence of nonconducted P waves followed by junctional escape beat after the third nonconducted P waves.  -4/23 at 3:26 PM: Evidence again of 2 consecutive nonconducted P waves resulting in a pause of approximately 4 seconds.  Multiple other similar strips reviewed, all of which occurred during daytime hours and had anywhere between 2-4 consecutive P waves not followed by any QRS.    Echocardiogram performed 4/14/2020, report reviewed and notable for the following: LVEF 55-65%.  Mild concentric LVH with stage I diastolic dysfunction.  Normal size and function of the right ventricle.  Normal right and left atrial size.  No significant valvular pathology.  Assessment / Plan        Problem List Items Addressed This Visit        Cardiovascular and Mediastinum    2nd degree AV block - Primary: Stable; see below    Relevant Orders    ECG 12 Lead       Digestive    Rectal cancer (CMS/HCC): Currently undergoing treatment       Other    Tobacco abuse: Ongoing, though expresses interest in possibly quitting        Recommendations and plans: Patient's heart monitor does show numerous occurrences of second-degree AV block, with no more than 2 consecutive non-conducting P waves (resulting in ventricular \"pauses\" of 3-4 seconds).  Based upon the patient's report that the company called him every time this was seen and woke him up in the middle the night, though the times listed on the report are reviewed seem to indicate daytime hours, his lack of symptoms and subjective reports of when he was called " would indicate that all of these occurrences were at night while sleeping (or perhaps while taking a nap during the day).  At this point time, he has no symptoms whatsoever attributable to short-lived second-degree block, and I suspect this is due to high vagal tone while resting.    No need for further work-up or testing at this point in time.  No reason to delay any of his cancer treatments because of this.    I would like to see him back in the office in 3 months to reevaluate, and we will likely order a shorter duration heart monitor at that point in time for further surveillance.  I did  him he and his wife extensively on the symptoms that would be expected if he was experiencing this type of heart block during wakeful hours.  They understood to call me if he starts to experience any short-lived but episodic lightheadedness, dizziness, near-syncope, or syncope.      Also, I counseled him extensively on the importance of smoking cessation, both for primary cardiovascular protection but also in terms of its likely implication in his cancer diagnosis.      Ga Hameed MD   05/22/20   17:28

## 2020-05-22 NOTE — PROGRESS NOTES
[facility] HEMATOLOGY AND ONCOLOGY  [address]  [facility] [address] [phone]    Patient Name: Attila Viramontes  Encounter Date: 05/19/2020  YOB: 1950  Patient Number: [id number]      REASON FOR FOLLOW-UP: Attila Viramontes is a pleasant 69 y.o. male who is seen on follow up for rectal cancer.   He is seen week 5 of neoadjuvant continuous infusion 5-FU with radiation. He is seen alone.  History is obtained from patient. History is considered to be accurate.      Oncology/Hematology History    DIAGNOSTIC ABNORMALITIES:  CT abdomen and pelvis 03/25/2020.  Asymmetric wall thickening of the rectum could represent a mass/neoplasm. If not recently performed, colonoscopy is recommended. Inflammatory stranding of the central mesenteric root, which is nonspecific but can be seen with mesenteritis/mesenteric panniculitis.  Colonoscopy by Dr. Reaves 04/02/2020 showed a localized area of severely ulcerated mucosa was found at 2 cm proximal to the anus. Biopsies were taken with a cold forceps for histology.  Pathology report 04/03/2020 showed a moderately differentiated adenocarcinoma.  Stromal invasion is seen although depth of invasion is indeterminate in these biopsies.  In the areas of invasion, the stroma demonstrates a reactive, desmoplastic appearance.   CT chest report 04/20/2020.  Noncalcified pulmonary nodule in the left upper lobe. Metastatic  disease not excluded. Short interval follow-up with CT in 6 months recommended.   Atherosclerosis of the aorta and coronary arteries.  Stranding of the central mesenteric root in the abdomen, partially imaged.  Hepatic steatosis.  MRI 04/23/2020 showed abnormal enhancement and circumferential thickening of the  distal rectum for a length of 5.5 cm, with evidence of invasion of the mesorectal fat and mesial rectal lymph nodes, measure less than 3 mm from the mesorectal  fascia.   There is abnormal pre and post sacral soft tissue enhancement with enhancement of the distal sacrum, concerning for contiguous spread versus local metastatic disease. Addendum report, appears to be invasion of the left seminal vesicle base and left posterior prostate.  These findings are concerning for invasive malignancy.  Previous endorectal ultrasound 04/24/2020.  Fixed constricting lesion, T4NX.      PREVIOUS INTERVENTIONS:  Neoadjuvant CIV 5-FU and radiation 04/27/2020 through present at [facility].        Rectal cancer (CMS/HCC)     Initial Diagnosis     Rectal cancer (CMS/HCC)      3/25/2020 Imaging     CT Abd/Pelvis:     IMPRESSION:  1. Asymmetric wall thickening of the rectum could represent a  mass/neoplasm. If not recently performed, colonoscopy is recommended.     2. Inflammatory stranding of the central mesenteric root, which is  nonspecific but can be seen with mesenteritis/mesenteric panniculitis.     3. Atherosclerotic disease.  4. Fecal stasis.      4/2/2020 Biopsy     Colonoscopy:  Findings: The perianal and digital rectal examinations were normal.  A localized area of severely ulcerated mucosa was found at 2 cm proximal to the anus. Biopsies were  taken with a cold forceps for histology.  Impression: - Ulcerated mucosa at 2 cm proximal to the anus. Biopsied.    Final Diagnosis   Large intestine, rectum at 3 cm, biopsy: Moderately differentiated adenocarcinoma, invasive.     AJCC stage: pTX pNX               4/14/2020 Procedure     Mediport placement      4/14/2020 Imaging     CT Angio Head:  Summary:  1. Distal left ICA occlusion with patent [place].    CT Angio Neck:  IMPRESSION:  1. Left internal carotid artery occlusion beginning at the origin.  Minimal opacification of the distal left internal carotid artery, which  may represent retrograde collateral flow.  2. Right internal carotid artery patent.  3. Bilateral vertebral arteries patent.  4. See separately  dictated CTA head of the same day.     CT Head:  IMPRESSION:  1. Small amount of gas in the left cerebral cortical veins. Differential  would include iatrogenic air embolism or trauma.  2. No acute intracranial hemorrhage.      CT Angio Head/Neck:  Result Impression   1.  Age-indeterminate left internal carotid artery origin occlusion.   Reconstitution of left ICA flow at the level of the cavernous ICA,   likely due to patent anterior commuting artery. A critical alert was   sent.  2.  No perfusion abnormality.  3.  Moderate atherosclerotic stenosis involving the origin of the   right internal carotid artery.           4/15/2020 Imaging     MRI Brain:  No acute intracranial abnormality identified  Findings of chronic microvascular ischemia. Occluded left ICA flow   void.      4/27/2020 -  Chemotherapy     OP COLORECTAL Fluorouracil CIV over 120H + XRT      5/1/2020 -  Chemotherapy     OP CENTRAL VENOUS ACCESS DEVICE ACCESS, CARE, AND MAINTENANCE (CVAD)      5/12/2020 Cancer Staged     Staging form: Colon And Rectum, AJCC 8th Edition  - Clinical stage from 5/12/2020: Stage IIIC (cT4b, cN1b, cM0) - Signed by Shaq Boggs MD on 5/12/2020         PAST MEDICAL HISTORY:  ALLERGIES:  No Known Allergies  CURRENT MEDICATIONS:  Outpatient Encounter Medications as of 5/27/2020   Medication Sig Dispense Refill   • aspirin 81 MG chewable tablet Chew 81 mg Daily.     • atorvastatin (LIPITOR) 80 MG tablet Take 80 mg by mouth Daily.     • ibuprofen (ADVIL,MOTRIN) 200 MG tablet Take 200 mg by mouth Every 8 (Eight) Hours As Needed for Mild Pain .     • pantoprazole (PROTONIX) 20 MG EC tablet Take 20 mg by mouth Daily.     • [DISCONTINUED] HYDROcodone-acetaminophen (NORCO) 7.5-325 MG per tablet Take 1-2 tablets by mouth Every 4 (Four) Hours As Needed for Moderate Pain  (Pain). 15 tablet 0     Facility-Administered Encounter Medications as of 5/27/2020   Medication Dose Route Frequency Provider Last Rate Last Dose   • [DISCONTINUED]  CUSTOM fluorouracil (ADRUCIL) 1,350 mg in sodium chloride 0.9 % 144 mL TOTAL chemo infusion - FOR HOME USE  1,350 mg Intravenous Once Shannan Self, APRN   1,350 mg at 05/26/20 0954   • [DISCONTINUED] diphenhydrAMINE (BENADRYL) injection 50 mg  50 mg Intravenous PRN Shannan Self, APRN       • [DISCONTINUED] EPINEPHrine (EPIPEN) injection 0.3 mg  0.3 mg Intramuscular Once PRN Shannan Self, APRN       • [DISCONTINUED] famotidine (PEPCID) injection 20 mg  20 mg Intravenous PRN Shannan Self, APRN       • [DISCONTINUED] heparin injection 500 Units  500 Units Intravenous PRN Shannan Self, APRN   500 Units at 05/22/20 0909   • [DISCONTINUED] hydrocortisone sodium succinate (Solu-CORTEF) injection 100 mg  100 mg Intravenous PRN Shannan Self, APRN       • [DISCONTINUED] sodium chloride 0.9 % flush 10 mL  10 mL Intravenous PRN Shannan Self, APRN   10 mL at 05/22/20 0909     ADULT ILLNESSES:  Patient Active Problem List   Diagnosis Code   • Abnormal CT of the abdomen R93.5   • Rectal cancer (CMS/HCC) C20   • Current every day smoker F17.200   • Impaired gait and mobility R26.89   • Hypertension I10   • Bright red rectal bleeding K62.5   • 2nd degree AV block I44.1   • Tobacco abuse Z72.0     SURGERIES:  Past Surgical History:   Procedure Laterality Date   • COLONOSCOPY N/A 4/2/2020    Procedure: COLONOSCOPY WITH ANESTHESIA;  Surgeon: Yanick Flowers DO;  Location: [facility] ENDOSCOPY;  Service: Gastroenterology;  Laterality: N/A;  pre: abnormal CT scan  post: rectal mass  PCP unknown   • EYE SURGERY Bilateral     lenses present   • VENOUS ACCESS DEVICE (PORT) INSERTION N/A 4/14/2020    Procedure: INSERTION VENOUS ACCESS DEVICE;  Surgeon: Vielka Weller MD;  Location: [facility] OR;  Service: General;  Laterality: N/A;     HEALTH MAINTENANCE ITEMS:  Health Maintenance Due   Topic Date Due   • TDAP/TD VACCINES (1 - Tdap) 09/18/1962   • ZOSTER VACCINE (1 of 2) 09/18/2001  "  • Pneumococcal Vaccine Once at 65 Years Old  09/18/2016   • HEPATITIS C SCREENING  03/26/2020   • MEDICARE ANNUAL WELLNESS  03/26/2020       <no information>  Last Completed Colonoscopy       Status Date      COLONOSCOPY Done 4/2/2020 COLONOSCOPY     Patient has more history with this topic...          There is no immunization history on file for this patient.  Last Completed Mammogram     Patient has no health maintenance due at this time            FAMILY HISTORY:  Family History   Problem Relation Age of Onset   • Cancer Mother    • Stroke Father    • Heart disease Father    • Heart attack Brother      SOCIAL HISTORY:  Social History     Socioeconomic History   • Marital status: Single     Spouse name: Not on file   • Number of children: Not on file   • Years of education: Not on file   • Highest education level: Not on file   Tobacco Use   • Smoking status: Current Every Day Smoker     Packs/day: 1.00     Years: 53.00     Pack years: 53.00     Types: Cigarettes   • Smokeless tobacco: Never Used   Substance and Sexual Activity   • Alcohol use: Not Currently   • Drug use: Never   • Sexual activity: Defer       REVIEW OF SYSTEMS:    Review of Systems   Constitutional: Positive for fatigue. Negative for chills, diaphoresis and fever.        \"I feel fine today.  I can sit now.  I had diarrhea. I take Imodium.\"   HENT: Negative for congestion, facial swelling and trouble swallowing.    Eyes: Negative for redness and visual disturbance.   Respiratory: Negative for cough, shortness of breath and wheezing.    Cardiovascular: Negative for chest pain and leg swelling.   Gastrointestinal: Positive for diarrhea. Negative for abdominal pain, blood in stool, constipation, nausea and vomiting.   Endocrine: Negative for cold intolerance and heat intolerance.   Genitourinary: Negative for flank pain and hematuria.   Musculoskeletal: Negative for gait problem and neck stiffness.   Skin: Positive for pallor. " "  Allergic/Immunologic: Negative for food allergies.   Neurological: Negative for dizziness, speech difficulty and light-headedness.   Hematological: Negative for adenopathy. Does not bruise/bleed easily.   Psychiatric/Behavioral: Negative for agitation, confusion and hallucinations. The patient is not nervous/anxious.        VITAL SIGNS: /60   Pulse 96   Temp 98.8 °F (37.1 °C)   Resp 18   Ht 177.8 cm (70\")   Wt 80.5 kg (177 lb 6.4 oz)   SpO2 99%   BMI 25.45 kg/m²   Pain Score    05/27/20 0922   PainSc: 0-No pain       PHYSICAL EXAMINATION:     Physical Exam   Constitutional: He is oriented to person, place, and time. He appears well-developed and well-nourished. No distress.   HENT:   Head: Normocephalic.   Eyes: No scleral icterus.   Cardiovascular: Normal rate and regular rhythm.   Pulmonary/Chest: Effort normal and breath sounds normal. He has no wheezes. He has no rales.   Port, left. No erythema.   Abdominal: Soft. Bowel sounds are normal. There is no tenderness.   Musculoskeletal: He exhibits no edema.   Lymphadenopathy:     He has no cervical adenopathy.   Neurological: He is alert and oriented to person, place, and time.   Skin: Skin is warm and dry. He is not diaphoretic. There is pallor.   Psychiatric: He has a normal mood and affect. His behavior is normal. Judgment and thought content normal.       LABS    Lab Results - Last 18 Months   Lab Units 05/26/20  0832 05/18/20  0846 05/11/20  0857 05/04/20  0849 04/27/20  0916 04/14/20  1319   HEMOGLOBIN g/dL 12.2* 12.2* 12.7* 13.2 13.3 14.3   HEMATOCRIT % 35.6* 36.0* 37.7 39.2 39.3 41.7   MCV fL 90.4 90.9 92.4 91.8 91.4 91.0   WBC 10*3/mm3 11.08* 9.54 8.11 7.00 15.17* 10.94*   RDW % 12.6 12.5 12.3 12.1* 12.2* 12.5   MPV fL 8.1 8.3 8.6 8.7 9.0 9.1   PLATELETS 10*3/mm3 408 295 296 389 389 383   IMM GRAN % % 0.9* 0.4 0.4 0.9* 0.3 0.3   NEUTROS ABS 10*3/mm3 9.06* 7.67* 6.09 4.97 11.54* 6.77   LYMPHS ABS 10*3/mm3 0.48* 0.55* 0.81 1.02 2.22 3.04 "   MONOS ABS 10*3/mm3 0.96* 0.83 0.69 0.58 0.97* 0.76   EOS ABS 10*3/mm3 0.43* 0.40 0.45* 0.31 0.31 0.26   BASOS ABS 10*3/mm3 0.05 0.05 0.04 0.06 0.08 0.08   IMMATURE GRANS (ABS) 10*3/mm3 0.10* 0.04 0.03 0.06* 0.05 0.03   NRBC /100 WBC 0.0 0.0 0.0 0.0 0.0 0.0       Lab Results - Last 18 Months   Lab Units 05/26/20  0832 05/18/20  0846 05/11/20  0857 05/04/20  0849 04/27/20  0916 04/20/20  0948 04/14/20  1319   GLUCOSE mg/dL 215* 199* 171* 149* 186*  --  136*   SODIUM mmol/L 135* 140 135* 138 137  --  140   POTASSIUM mmol/L 4.2 4.0 4.2 4.5 4.0  --  3.9   CO2 mmol/L 25.0 26.0 25.0 25.0 23.0  --  21.0*   CHLORIDE mmol/L 97* 104 99 102 101  --  105   ANION GAP mmol/L 13.0 10.0 11.0 11.0 13.0  --  14.0   CREATININE mg/dL 0.86 0.83 0.87 0.87 0.86 0.90 0.79   BUN mg/dL 15 16 12 12 10  --  13   BUN / CREAT RATIO  17.4 19.3 13.8 13.8 11.6  --  16.5   CALCIUM mg/dL 9.1 9.2 9.2 9.3 9.4  --  9.4   EGFR IF NONAFRICN AM mL/min/1.73 88 92 87 87 88  --  98   ALK PHOS U/L 111 105 124* 114 129*  --  122*   TOTAL PROTEIN g/dL 7.6 7.2 7.6 7.6 7.7  --  8.0   ALT (SGPT) U/L 20 16 18 18 22  --  13   AST (SGOT) U/L 17 14 20 19 18  --  16   BILIRUBIN mg/dL 0.3 0.2 0.2 0.3 0.3  --  0.3   ALBUMIN g/dL 3.90 3.80 3.90 4.10 4.20  --  4.40   GLOBULIN gm/dL 3.7 3.4 3.7 3.5 3.5  --  3.6       Lab Results - Last 18 Months   Lab Units 04/09/20  0954   CEA ng/mL 4.26       No results for input(s): IRON, TIBC, LABIRON, FERRITIN, U5RWLFC, TSH, FOLATE in the last 89862 hours.    Invalid input(s): VITB12    Attila Aylin Wander reports a pain score of 0.     Patient's Body mass index is 25.45 kg/m². BMI is within normal parameters. No follow-up required..    ASSESSMENT:  1.  Adenocarcinoma of the rectum. Tumor size 5.5 cm.  AJCC stage:IIIC (cT4b, N1b, M0)  Treatment status:On neoadjuvant CIV 5-FU and radiation.  2.  Performance status of 0.  3. Normocytic anemia from chemo.    4.  Elevated protein 03/25/2020.  5.  6 mm nodule, left upper lobe near the  fissure.  6.  History of pancreatitis.         PLAN:  1.   Re: Chemo tolerance.  2.   Re: Heme status.  WBC 11, hemoglobin 12.2 and platelet 408.  3.   Re: Pre office CMP.  GFR 88 ml/minute.   4.   Re: Previous endorectal ultrasound 04/24/2020.  Fixed constricting lesion, T4NX.  5.   Re: MRI 04/23/2020 showed abnormal enhancement and circumferential thickening of the  distal rectum for a length of 5.5 cm, with evidence of invasion of the mesorectal fat and mesial rectal lymph nodes, measure less than 3 mm from the mesorectal fascia.   There is abnormal pre and post sacral soft tissue enhancement with enhancement of the distal sacrum, concerning for contiguous spread versus local metastatic disease. Addendum report, appears to be invasion of the left seminal vesicle base and left posterior prostate.  These findings are concerning for invasive malignancy.  6.   Re:  CT chest report 04/20/2020.  Noncalcified pulmonary nodule in the left upper lobe. Metastatic  disease not excluded. Short interval follow-up with CT in 6 months recommended.   Atherosclerosis of the aorta and coronary arteries.  Stranding of the central mesenteric root in the abdomen, partially imaged.  Hepatic steatosis.  7.   Re:  Previous CBC with differential, CEA at 4.26, and CMP.  8.  Schedule CIV 5- mg/m2= 450 mg D1 to D5 weekly with radiation to complete 06/05/2020.  Aware of potential adverse of the 5-FU especially nausea, vomiting, diarrhea, stomatitis, and cardiotoxicity.  After neoadjuvant chemo and radiation, patient will undergo surgery.  Post surgery, patient will undergo adjuvant FOLFOX.  9.  eRx Compazine 10 mg p.o. every 4 hours as needed for nausea and vomiting #60 with 2 refills.  10.  CBC with differential weekly when chemo starts.  11.  Continue current medications.  12.  Continue care per primary care physician and other specialists.  13.  Plan of care discussed with patient.   Understanding expressed.  Patient agreeable to proceed.  14.  Advance Care Planning  ACP discussion was declined by the patient. Patient does not have an advance directive, information provided.   15.  Recall colonoscopy on 10/2020.  16.  Refer to [facility].   17.  Questions were answered to their satisfaction.   18. Return to office in 6 weeks with pre-office CT chest, abdomen, pelvis, and CMP.      I spent 35 total minutes, face-to-face, caring for Attila today.  Greater than 50% of this time involved counseling and/or coordination of care as documented within this note regarding the patient's illness(es), pros and cons of various treatment options, instructions and/or risk reduction.        Vincent Swenson MD  (Yanick Flowers, )  (Nabeel Walton MD)  MD Veilka Cox MD

## 2020-05-26 ENCOUNTER — INFUSION (OUTPATIENT)
Dept: ONCOLOGY | Facility: HOSPITAL | Age: 70
End: 2020-05-26

## 2020-05-26 ENCOUNTER — LAB (OUTPATIENT)
Dept: LAB | Facility: HOSPITAL | Age: 70
End: 2020-05-26

## 2020-05-26 VITALS
TEMPERATURE: 98.1 F | DIASTOLIC BLOOD PRESSURE: 66 MMHG | SYSTOLIC BLOOD PRESSURE: 108 MMHG | BODY MASS INDEX: 25.48 KG/M2 | RESPIRATION RATE: 16 BRPM | WEIGHT: 178 LBS | HEART RATE: 101 BPM | HEIGHT: 70 IN | OXYGEN SATURATION: 97 %

## 2020-05-26 DIAGNOSIS — C20 RECTAL CANCER (HCC): Primary | ICD-10-CM

## 2020-05-26 DIAGNOSIS — C20 RECTAL CANCER (HCC): ICD-10-CM

## 2020-05-26 LAB
ALBUMIN SERPL-MCNC: 3.9 G/DL (ref 3.5–5.2)
ALBUMIN/GLOB SERPL: 1.1 G/DL
ALP SERPL-CCNC: 111 U/L (ref 39–117)
ALT SERPL W P-5'-P-CCNC: 20 U/L (ref 1–41)
ANION GAP SERPL CALCULATED.3IONS-SCNC: 13 MMOL/L (ref 5–15)
AST SERPL-CCNC: 17 U/L (ref 1–40)
BASOPHILS # BLD AUTO: 0.05 10*3/MM3 (ref 0–0.2)
BASOPHILS NFR BLD AUTO: 0.5 % (ref 0–1.5)
BILIRUB SERPL-MCNC: 0.3 MG/DL (ref 0.2–1.2)
BUN BLD-MCNC: 15 MG/DL (ref 8–23)
BUN/CREAT SERPL: 17.4 (ref 7–25)
CALCIUM SPEC-SCNC: 9.1 MG/DL (ref 8.6–10.5)
CHLORIDE SERPL-SCNC: 97 MMOL/L (ref 98–107)
CO2 SERPL-SCNC: 25 MMOL/L (ref 22–29)
CREAT BLD-MCNC: 0.86 MG/DL (ref 0.76–1.27)
DEPRECATED RDW RBC AUTO: 40.9 FL (ref 37–54)
EOSINOPHIL # BLD AUTO: 0.43 10*3/MM3 (ref 0–0.4)
EOSINOPHIL NFR BLD AUTO: 3.9 % (ref 0.3–6.2)
ERYTHROCYTE [DISTWIDTH] IN BLOOD BY AUTOMATED COUNT: 12.6 % (ref 12.3–15.4)
GFR SERPL CREATININE-BSD FRML MDRD: 88 ML/MIN/1.73
GLOBULIN UR ELPH-MCNC: 3.7 GM/DL
GLUCOSE BLD-MCNC: 215 MG/DL (ref 65–99)
HCT VFR BLD AUTO: 35.6 % (ref 37.5–51)
HGB BLD-MCNC: 12.2 G/DL (ref 13–17.7)
IMM GRANULOCYTES # BLD AUTO: 0.1 10*3/MM3 (ref 0–0.05)
IMM GRANULOCYTES NFR BLD AUTO: 0.9 % (ref 0–0.5)
LYMPHOCYTES # BLD AUTO: 0.48 10*3/MM3 (ref 0.7–3.1)
LYMPHOCYTES NFR BLD AUTO: 4.3 % (ref 19.6–45.3)
MCH RBC QN AUTO: 31 PG (ref 26.6–33)
MCHC RBC AUTO-ENTMCNC: 34.3 G/DL (ref 31.5–35.7)
MCV RBC AUTO: 90.4 FL (ref 79–97)
MONOCYTES # BLD AUTO: 0.96 10*3/MM3 (ref 0.1–0.9)
MONOCYTES NFR BLD AUTO: 8.7 % (ref 5–12)
NEUTROPHILS # BLD AUTO: 9.06 10*3/MM3 (ref 1.7–7)
NEUTROPHILS NFR BLD AUTO: 81.7 % (ref 42.7–76)
NRBC BLD AUTO-RTO: 0 /100 WBC (ref 0–0.2)
PLATELET # BLD AUTO: 408 10*3/MM3 (ref 140–450)
PMV BLD AUTO: 8.1 FL (ref 6–12)
POTASSIUM BLD-SCNC: 4.2 MMOL/L (ref 3.5–5.2)
PROT SERPL-MCNC: 7.6 G/DL (ref 6–8.5)
RBC # BLD AUTO: 3.94 10*6/MM3 (ref 4.14–5.8)
SODIUM BLD-SCNC: 135 MMOL/L (ref 136–145)
WBC NRBC COR # BLD: 11.08 10*3/MM3 (ref 3.4–10.8)

## 2020-05-26 PROCEDURE — 80053 COMPREHEN METABOLIC PANEL: CPT

## 2020-05-26 PROCEDURE — 36415 COLL VENOUS BLD VENIPUNCTURE: CPT

## 2020-05-26 PROCEDURE — G0498 CHEMO EXTEND IV INFUS W/PUMP: HCPCS

## 2020-05-26 PROCEDURE — 25010000002 FLUOROURACIL PER 500 MG: Performed by: NURSE PRACTITIONER

## 2020-05-26 PROCEDURE — 85025 COMPLETE CBC W/AUTO DIFF WBC: CPT

## 2020-05-26 RX ORDER — EPINEPHRINE 0.3 MG/.3ML
0.3 INJECTION SUBCUTANEOUS ONCE AS NEEDED
Status: DISCONTINUED | OUTPATIENT
Start: 2020-05-26 | End: 2020-05-26 | Stop reason: HOSPADM

## 2020-05-26 RX ORDER — FAMOTIDINE 10 MG/ML
20 INJECTION, SOLUTION INTRAVENOUS AS NEEDED
Status: DISCONTINUED | OUTPATIENT
Start: 2020-05-26 | End: 2020-05-26 | Stop reason: HOSPADM

## 2020-05-26 RX ORDER — FAMOTIDINE 10 MG/ML
20 INJECTION, SOLUTION INTRAVENOUS AS NEEDED
Status: CANCELLED | OUTPATIENT
Start: 2020-05-26

## 2020-05-26 RX ORDER — DIPHENHYDRAMINE HYDROCHLORIDE 50 MG/ML
50 INJECTION INTRAMUSCULAR; INTRAVENOUS AS NEEDED
Status: DISCONTINUED | OUTPATIENT
Start: 2020-05-26 | End: 2020-05-26 | Stop reason: HOSPADM

## 2020-05-26 RX ORDER — EPINEPHRINE 0.3 MG/.3ML
0.3 INJECTION SUBCUTANEOUS ONCE
Status: CANCELLED | OUTPATIENT
Start: 2020-05-26

## 2020-05-26 RX ORDER — DIPHENHYDRAMINE HYDROCHLORIDE 50 MG/ML
50 INJECTION INTRAMUSCULAR; INTRAVENOUS AS NEEDED
Status: CANCELLED | OUTPATIENT
Start: 2020-05-26

## 2020-05-26 RX ADMIN — FLUOROURACIL 1350 MG: 50 INJECTION, SOLUTION INTRAVENOUS at 09:54

## 2020-05-27 ENCOUNTER — HOSPITAL ENCOUNTER (OUTPATIENT)
Dept: RADIATION ONCOLOGY | Facility: HOSPITAL | Age: 70
Setting detail: RADIATION/ONCOLOGY SERIES
Discharge: HOME | End: 2020-05-27

## 2020-05-27 ENCOUNTER — OFFICE VISIT (OUTPATIENT)
Dept: ONCOLOGY | Facility: CLINIC | Age: 70
End: 2020-05-27

## 2020-05-27 ENCOUNTER — TELEPHONE (OUTPATIENT)
Dept: ONCOLOGY | Facility: CLINIC | Age: 70
End: 2020-05-27

## 2020-05-27 VITALS
HEART RATE: 96 BPM | HEIGHT: 70 IN | RESPIRATION RATE: 18 BRPM | BODY MASS INDEX: 25.4 KG/M2 | SYSTOLIC BLOOD PRESSURE: 128 MMHG | TEMPERATURE: 98.8 F | OXYGEN SATURATION: 99 % | DIASTOLIC BLOOD PRESSURE: 60 MMHG | WEIGHT: 177.4 LBS

## 2020-05-27 DIAGNOSIS — C20 RECTAL CANCER (HCC): Primary | ICD-10-CM

## 2020-05-27 PROCEDURE — 77412 RADIATION TX DELIVERY LVL 3: CPT | Performed by: RADIOLOGY

## 2020-05-27 PROCEDURE — 99214 OFFICE O/P EST MOD 30 MIN: CPT | Performed by: INTERNAL MEDICINE

## 2020-05-27 PROCEDURE — 77417 THER RADIOLOGY PORT IMAGE(S): CPT | Performed by: RADIOLOGY

## 2020-05-27 NOTE — TELEPHONE ENCOUNTER
----- Message from Cate Lynch MA sent at 5/27/2020  9:37 AM CDT -----  He has one more 5Fu next week.  Please make sure order is up to date.    Thanks

## 2020-05-28 ENCOUNTER — HOSPITAL ENCOUNTER (OUTPATIENT)
Dept: RADIATION ONCOLOGY | Facility: HOSPITAL | Age: 70
Setting detail: RADIATION/ONCOLOGY SERIES
Discharge: HOME | End: 2020-05-28

## 2020-05-28 PROCEDURE — 77412 RADIATION TX DELIVERY LVL 3: CPT | Performed by: RADIOLOGY

## 2020-05-29 ENCOUNTER — HOSPITAL ENCOUNTER (OUTPATIENT)
Dept: RADIATION ONCOLOGY | Facility: HOSPITAL | Age: 70
Setting detail: RADIATION/ONCOLOGY SERIES
Discharge: HOME | End: 2020-05-29

## 2020-05-29 ENCOUNTER — INFUSION (OUTPATIENT)
Dept: ONCOLOGY | Facility: HOSPITAL | Age: 70
End: 2020-05-29

## 2020-05-29 VITALS
TEMPERATURE: 98.1 F | DIASTOLIC BLOOD PRESSURE: 46 MMHG | RESPIRATION RATE: 18 BRPM | BODY MASS INDEX: 25.77 KG/M2 | WEIGHT: 180 LBS | HEART RATE: 85 BPM | OXYGEN SATURATION: 99 % | HEIGHT: 70 IN | SYSTOLIC BLOOD PRESSURE: 114 MMHG

## 2020-05-29 DIAGNOSIS — C20 RECTAL CANCER (HCC): Primary | ICD-10-CM

## 2020-05-29 PROCEDURE — 25010000003 HEPARIN LOCK FLUSH PER 10 UNITS: Performed by: NURSE PRACTITIONER

## 2020-05-29 PROCEDURE — 77412 RADIATION TX DELIVERY LVL 3: CPT | Performed by: RADIOLOGY

## 2020-05-29 RX ORDER — HEPARIN SODIUM (PORCINE) LOCK FLUSH IV SOLN 100 UNIT/ML 100 UNIT/ML
500 SOLUTION INTRAVENOUS AS NEEDED
Status: CANCELLED | OUTPATIENT
Start: 2020-05-29

## 2020-05-29 RX ORDER — SODIUM CHLORIDE 0.9 % (FLUSH) 0.9 %
10 SYRINGE (ML) INJECTION AS NEEDED
Status: DISCONTINUED | OUTPATIENT
Start: 2020-05-29 | End: 2020-05-29 | Stop reason: HOSPADM

## 2020-05-29 RX ORDER — SODIUM CHLORIDE 0.9 % (FLUSH) 0.9 %
10 SYRINGE (ML) INJECTION AS NEEDED
Status: CANCELLED | OUTPATIENT
Start: 2020-05-29

## 2020-05-29 RX ORDER — HEPARIN SODIUM (PORCINE) LOCK FLUSH IV SOLN 100 UNIT/ML 100 UNIT/ML
500 SOLUTION INTRAVENOUS AS NEEDED
Status: DISCONTINUED | OUTPATIENT
Start: 2020-05-29 | End: 2020-05-29 | Stop reason: HOSPADM

## 2020-05-29 RX ADMIN — SODIUM CHLORIDE, PRESERVATIVE FREE 10 ML: 5 INJECTION INTRAVENOUS at 09:45

## 2020-05-29 RX ADMIN — Medication 500 UNITS: at 09:45

## 2020-05-29 NOTE — PROGRESS NOTES
Patient's biopatch when assessing site and dressing was very saturated and enlarged. Patient also had a rash over port site and skin appeared wet where dressing was. Explained to patient again that his dressing was wet and he said it must have been when he wet his hair and shampooed it. Explained again to patient why it was important to not get dressing wet due to it getting infected. Patient denies any pain or discomfort. Rash appears related to moisture under tegaderm. Reported rash to Dr Boggs.

## 2020-06-01 ENCOUNTER — INFUSION (OUTPATIENT)
Dept: ONCOLOGY | Facility: HOSPITAL | Age: 70
End: 2020-06-01

## 2020-06-01 ENCOUNTER — HOSPITAL ENCOUNTER (OUTPATIENT)
Dept: RADIATION ONCOLOGY | Facility: HOSPITAL | Age: 70
Setting detail: RADIATION/ONCOLOGY SERIES
Discharge: HOME | End: 2020-06-01

## 2020-06-01 ENCOUNTER — HOSPITAL ENCOUNTER (OUTPATIENT)
Dept: RADIATION ONCOLOGY | Facility: HOSPITAL | Age: 70
Setting detail: RADIATION/ONCOLOGY SERIES
End: 2020-06-01

## 2020-06-01 ENCOUNTER — LAB (OUTPATIENT)
Dept: LAB | Facility: HOSPITAL | Age: 70
End: 2020-06-01

## 2020-06-01 VITALS
TEMPERATURE: 97.7 F | BODY MASS INDEX: 25.48 KG/M2 | HEIGHT: 70 IN | DIASTOLIC BLOOD PRESSURE: 65 MMHG | SYSTOLIC BLOOD PRESSURE: 112 MMHG | RESPIRATION RATE: 18 BRPM | WEIGHT: 178 LBS | OXYGEN SATURATION: 100 % | HEART RATE: 102 BPM

## 2020-06-01 DIAGNOSIS — C20 RECTAL CANCER (HCC): Primary | ICD-10-CM

## 2020-06-01 LAB
ALBUMIN SERPL-MCNC: 3.6 G/DL (ref 3.5–5.2)
ALBUMIN/GLOB SERPL: 1 G/DL
ALP SERPL-CCNC: 90 U/L (ref 39–117)
ALT SERPL W P-5'-P-CCNC: 19 U/L (ref 1–41)
ANION GAP SERPL CALCULATED.3IONS-SCNC: 11 MMOL/L (ref 5–15)
AST SERPL-CCNC: 13 U/L (ref 1–40)
BASOPHILS # BLD AUTO: 0.04 10*3/MM3 (ref 0–0.2)
BASOPHILS NFR BLD AUTO: 0.3 % (ref 0–1.5)
BILIRUB SERPL-MCNC: 0.4 MG/DL (ref 0.2–1.2)
BUN BLD-MCNC: 10 MG/DL (ref 8–23)
BUN/CREAT SERPL: 12.3 (ref 7–25)
CALCIUM SPEC-SCNC: 9.1 MG/DL (ref 8.6–10.5)
CHLORIDE SERPL-SCNC: 102 MMOL/L (ref 98–107)
CO2 SERPL-SCNC: 25 MMOL/L (ref 22–29)
CREAT BLD-MCNC: 0.81 MG/DL (ref 0.76–1.27)
DEPRECATED RDW RBC AUTO: 41.6 FL (ref 37–54)
EOSINOPHIL # BLD AUTO: 0.32 10*3/MM3 (ref 0–0.4)
EOSINOPHIL NFR BLD AUTO: 2.6 % (ref 0.3–6.2)
ERYTHROCYTE [DISTWIDTH] IN BLOOD BY AUTOMATED COUNT: 12.6 % (ref 12.3–15.4)
GFR SERPL CREATININE-BSD FRML MDRD: 95 ML/MIN/1.73
GLOBULIN UR ELPH-MCNC: 3.7 GM/DL
GLUCOSE BLD-MCNC: 226 MG/DL (ref 65–99)
HCT VFR BLD AUTO: 35.8 % (ref 37.5–51)
HGB BLD-MCNC: 12.1 G/DL (ref 13–17.7)
HOLD SPECIMEN: NORMAL
IMM GRANULOCYTES # BLD AUTO: 0.09 10*3/MM3 (ref 0–0.05)
IMM GRANULOCYTES NFR BLD AUTO: 0.7 % (ref 0–0.5)
LYMPHOCYTES # BLD AUTO: 0.51 10*3/MM3 (ref 0.7–3.1)
LYMPHOCYTES NFR BLD AUTO: 4.2 % (ref 19.6–45.3)
MCH RBC QN AUTO: 30.8 PG (ref 26.6–33)
MCHC RBC AUTO-ENTMCNC: 33.8 G/DL (ref 31.5–35.7)
MCV RBC AUTO: 91.1 FL (ref 79–97)
MONOCYTES # BLD AUTO: 1.14 10*3/MM3 (ref 0.1–0.9)
MONOCYTES NFR BLD AUTO: 9.4 % (ref 5–12)
NEUTROPHILS # BLD AUTO: 10 10*3/MM3 (ref 1.7–7)
NEUTROPHILS NFR BLD AUTO: 82.8 % (ref 42.7–76)
NRBC BLD AUTO-RTO: 0 /100 WBC (ref 0–0.2)
PLATELET # BLD AUTO: 453 10*3/MM3 (ref 140–450)
PMV BLD AUTO: 8.3 FL (ref 6–12)
POTASSIUM BLD-SCNC: 3.9 MMOL/L (ref 3.5–5.2)
PROT SERPL-MCNC: 7.3 G/DL (ref 6–8.5)
RBC # BLD AUTO: 3.93 10*6/MM3 (ref 4.14–5.8)
SODIUM BLD-SCNC: 138 MMOL/L (ref 136–145)
WBC NRBC COR # BLD: 12.1 10*3/MM3 (ref 3.4–10.8)

## 2020-06-01 PROCEDURE — 80053 COMPREHEN METABOLIC PANEL: CPT | Performed by: INTERNAL MEDICINE

## 2020-06-01 PROCEDURE — 36415 COLL VENOUS BLD VENIPUNCTURE: CPT

## 2020-06-01 PROCEDURE — 25010000002 FLUOROURACIL PER 500 MG: Performed by: NURSE PRACTITIONER

## 2020-06-01 PROCEDURE — 85025 COMPLETE CBC W/AUTO DIFF WBC: CPT

## 2020-06-01 PROCEDURE — G0498 CHEMO EXTEND IV INFUS W/PUMP: HCPCS

## 2020-06-01 PROCEDURE — 77412 RADIATION TX DELIVERY LVL 3: CPT | Performed by: RADIOLOGY

## 2020-06-01 RX ORDER — FAMOTIDINE 10 MG/ML
20 INJECTION, SOLUTION INTRAVENOUS AS NEEDED
Status: DISCONTINUED | OUTPATIENT
Start: 2020-06-01 | End: 2020-06-01 | Stop reason: HOSPADM

## 2020-06-01 RX ORDER — SODIUM CHLORIDE 0.9 % (FLUSH) 0.9 %
10 SYRINGE (ML) INJECTION AS NEEDED
Status: CANCELLED | OUTPATIENT
Start: 2020-06-01

## 2020-06-01 RX ORDER — SODIUM CHLORIDE 0.9 % (FLUSH) 0.9 %
10 SYRINGE (ML) INJECTION AS NEEDED
Status: DISCONTINUED | OUTPATIENT
Start: 2020-06-01 | End: 2020-06-01 | Stop reason: HOSPADM

## 2020-06-01 RX ORDER — EPINEPHRINE 0.3 MG/.3ML
0.3 INJECTION SUBCUTANEOUS AS NEEDED
Status: DISCONTINUED | OUTPATIENT
Start: 2020-06-01 | End: 2020-06-01 | Stop reason: HOSPADM

## 2020-06-01 RX ORDER — HEPARIN SODIUM (PORCINE) LOCK FLUSH IV SOLN 100 UNIT/ML 100 UNIT/ML
500 SOLUTION INTRAVENOUS AS NEEDED
Status: DISCONTINUED | OUTPATIENT
Start: 2020-06-01 | End: 2020-06-01 | Stop reason: HOSPADM

## 2020-06-01 RX ORDER — HEPARIN SODIUM (PORCINE) LOCK FLUSH IV SOLN 100 UNIT/ML 100 UNIT/ML
500 SOLUTION INTRAVENOUS AS NEEDED
Status: CANCELLED | OUTPATIENT
Start: 2020-06-01

## 2020-06-01 RX ORDER — DIPHENHYDRAMINE HYDROCHLORIDE 50 MG/ML
50 INJECTION INTRAMUSCULAR; INTRAVENOUS AS NEEDED
Status: DISCONTINUED | OUTPATIENT
Start: 2020-06-01 | End: 2020-06-01 | Stop reason: HOSPADM

## 2020-06-01 RX ADMIN — FLUOROURACIL 1800 MG: 50 INJECTION, SOLUTION INTRAVENOUS at 10:09

## 2020-06-02 ENCOUNTER — HOSPITAL ENCOUNTER (OUTPATIENT)
Dept: RADIATION ONCOLOGY | Facility: HOSPITAL | Age: 70
Setting detail: RADIATION/ONCOLOGY SERIES
Discharge: HOME | End: 2020-06-02

## 2020-06-02 PROCEDURE — 77412 RADIATION TX DELIVERY LVL 3: CPT | Performed by: RADIOLOGY

## 2020-06-03 ENCOUNTER — HOSPITAL ENCOUNTER (OUTPATIENT)
Dept: RADIATION ONCOLOGY | Facility: HOSPITAL | Age: 70
Setting detail: RADIATION/ONCOLOGY SERIES
Discharge: HOME | End: 2020-06-03

## 2020-06-03 ENCOUNTER — TELEPHONE (OUTPATIENT)
Dept: ONCOLOGY | Facility: CLINIC | Age: 70
End: 2020-06-03

## 2020-06-03 PROCEDURE — 77412 RADIATION TX DELIVERY LVL 3: CPT | Performed by: RADIOLOGY

## 2020-06-03 PROCEDURE — 77280 THER RAD SIMULAJ FIELD SMPL: CPT | Performed by: RADIOLOGY

## 2020-06-03 PROCEDURE — 77336 RADIATION PHYSICS CONSULT: CPT | Performed by: RADIOLOGY

## 2020-06-03 NOTE — TELEPHONE ENCOUNTER
"Received call from patient, Attila Viramontes he called with concerns \"there is something wrong with my port\", he says he is unsure but it might of deflated? He denies there is any thing leaking from the site and the need is still in place, but keeps commenting there is something just not right.    Instructed patient to come to the office and we would look at it, he v/u and will return the our office for eval.    Informed Leslie at [facility] that patient will be returning to the Center and to please have him come up to the office, she v/u.   "

## 2020-06-04 ENCOUNTER — HOSPITAL ENCOUNTER (OUTPATIENT)
Dept: RADIATION ONCOLOGY | Facility: HOSPITAL | Age: 70
Setting detail: RADIATION/ONCOLOGY SERIES
Discharge: HOME | End: 2020-06-04

## 2020-06-04 PROCEDURE — 77417 THER RADIOLOGY PORT IMAGE(S): CPT | Performed by: RADIOLOGY

## 2020-06-04 PROCEDURE — 77412 RADIATION TX DELIVERY LVL 3: CPT | Performed by: RADIOLOGY

## 2020-06-05 ENCOUNTER — INFUSION (OUTPATIENT)
Dept: ONCOLOGY | Facility: HOSPITAL | Age: 70
End: 2020-06-05

## 2020-06-05 ENCOUNTER — HOSPITAL ENCOUNTER (OUTPATIENT)
Dept: RADIATION ONCOLOGY | Facility: HOSPITAL | Age: 70
Setting detail: RADIATION/ONCOLOGY SERIES
Discharge: HOME | End: 2020-06-05

## 2020-06-05 VITALS
TEMPERATURE: 98.2 F | SYSTOLIC BLOOD PRESSURE: 119 MMHG | BODY MASS INDEX: 25.62 KG/M2 | RESPIRATION RATE: 16 BRPM | OXYGEN SATURATION: 99 % | DIASTOLIC BLOOD PRESSURE: 72 MMHG | HEIGHT: 70 IN | WEIGHT: 179 LBS | HEART RATE: 99 BPM

## 2020-06-05 DIAGNOSIS — C20 RECTAL CANCER (HCC): Primary | ICD-10-CM

## 2020-06-05 PROCEDURE — 77412 RADIATION TX DELIVERY LVL 3: CPT | Performed by: RADIOLOGY

## 2020-06-05 PROCEDURE — 25010000003 HEPARIN LOCK FLUSH PER 10 UNITS: Performed by: NURSE PRACTITIONER

## 2020-06-05 RX ORDER — HEPARIN SODIUM (PORCINE) LOCK FLUSH IV SOLN 100 UNIT/ML 100 UNIT/ML
500 SOLUTION INTRAVENOUS AS NEEDED
Status: CANCELLED | OUTPATIENT
Start: 2020-06-05

## 2020-06-05 RX ORDER — HEPARIN SODIUM (PORCINE) LOCK FLUSH IV SOLN 100 UNIT/ML 100 UNIT/ML
500 SOLUTION INTRAVENOUS AS NEEDED
Status: DISCONTINUED | OUTPATIENT
Start: 2020-06-05 | End: 2020-06-05 | Stop reason: HOSPADM

## 2020-06-05 RX ORDER — SODIUM CHLORIDE 0.9 % (FLUSH) 0.9 %
10 SYRINGE (ML) INJECTION AS NEEDED
Status: CANCELLED | OUTPATIENT
Start: 2020-06-05

## 2020-06-05 RX ORDER — SODIUM CHLORIDE 0.9 % (FLUSH) 0.9 %
10 SYRINGE (ML) INJECTION AS NEEDED
Status: DISCONTINUED | OUTPATIENT
Start: 2020-06-05 | End: 2020-06-05 | Stop reason: HOSPADM

## 2020-06-05 RX ADMIN — SODIUM CHLORIDE, PRESERVATIVE FREE 10 ML: 5 INJECTION INTRAVENOUS at 09:16

## 2020-06-05 RX ADMIN — Medication 500 UNITS: at 09:17

## 2020-06-16 ENCOUNTER — TELEPHONE (OUTPATIENT)
Dept: ONCOLOGY | Facility: CLINIC | Age: 70
End: 2020-06-16

## 2020-06-16 NOTE — PROGRESS NOTES
[facility] ONC [facility] GROUP HEMATOLOGY AND ONCOLOGY  [address]  [facility] [address] [phone]    Patient Name: Attila Viramontes  Encounter Date: 06/17/2020  YOB: 1950  Patient Number: [id number]      REASON FOR FOLLOW-UP: Attila Viramontes is a pleasant 69 y.o. male who is seen on follow up for rectal cancer.   He is seen 1.5 weeks post neoadjuvant 5-FU with radiation. He is seen alone.  History is obtained from patient. History is considered to be accurate    Dr. Detsini De Jesus called 06/16/2020. MRI showed involvement of the seminal vesicle and lesion inseparable from the prostate.  Patient not candidate for surgery at this time.  Recommend FOLFOX for 8-10 cycles then reevaluate for surgery.      Oncology/Hematology History    DIAGNOSTIC ABNORMALITIES:  CT abdomen and pelvis 03/25/2020.  Asymmetric wall thickening of the rectum could represent a mass/neoplasm. If not recently performed, colonoscopy is recommended. Inflammatory stranding of the central mesenteric root, which is nonspecific but can be seen with mesenteritis/mesenteric panniculitis.  Colonoscopy by Dr. Reaves 04/02/2020 showed a localized area of severely ulcerated mucosa was found at 2 cm proximal to the anus. Biopsies were taken with a cold forceps for histology.  Pathology report 04/03/2020 showed a moderately differentiated adenocarcinoma.  Stromal invasion is seen although depth of invasion is indeterminate in these biopsies.  In the areas of invasion, the stroma demonstrates a reactive, desmoplastic appearance.   CT chest report 04/20/2020.  Noncalcified pulmonary nodule in the left upper lobe. Metastatic  disease not excluded. Short interval follow-up with CT in 6 months recommended.   Atherosclerosis of the aorta and coronary arteries.  Stranding of the central mesenteric root in the abdomen, partially imaged.  Hepatic steatosis.  MRI 04/23/2020 showed  abnormal enhancement and circumferential thickening of the  distal rectum for a length of 5.5 cm, with evidence of invasion of the mesorectal fat and mesial rectal lymph nodes, measure less than 3 mm from the mesorectal fascia.   There is abnormal pre and post sacral soft tissue enhancement with enhancement of the distal sacrum, concerning for contiguous spread versus local metastatic disease. Addendum report, appears to be invasion of the left seminal vesicle base and left posterior prostate.  These findings are concerning for invasive malignancy.  Previous endorectal ultrasound 04/24/2020.  Fixed constricting lesion, T4NX.      PREVIOUS INTERVENTIONS:  Neoadjuvant CIV 5-FU and radiation 04/27/2020 through 06/05/2020 at [facility].        Rectal cancer (CMS/HCC)     Initial Diagnosis     Rectal cancer (CMS/HCC)      3/25/2020 Imaging     CT Abd/Pelvis:     IMPRESSION:  1. Asymmetric wall thickening of the rectum could represent a  mass/neoplasm. If not recently performed, colonoscopy is recommended.     2. Inflammatory stranding of the central mesenteric root, which is  nonspecific but can be seen with mesenteritis/mesenteric panniculitis.     3. Atherosclerotic disease.  4. Fecal stasis.      4/2/2020 Biopsy     Colonoscopy:  Findings: The perianal and digital rectal examinations were normal.  A localized area of severely ulcerated mucosa was found at 2 cm proximal to the anus. Biopsies were  taken with a cold forceps for histology.  Impression: - Ulcerated mucosa at 2 cm proximal to the anus. Biopsied.    Final Diagnosis   Large intestine, rectum at 3 cm, biopsy: Moderately differentiated adenocarcinoma, invasive.     AJCC stage: pTX pNX               4/14/2020 Procedure     Mediport placement      4/14/2020 Imaging     CT Angio Head:  Summary:  1. Distal left ICA occlusion with patent [place] of [place]rez.    CT Angio Neck:  IMPRESSION:  1. Left internal carotid artery occlusion beginning at the  origin.  Minimal opacification of the distal left internal carotid artery, which  may represent retrograde collateral flow.  2. Right internal carotid artery patent.  3. Bilateral vertebral arteries patent.  4. See separately dictated CTA head of the same day.     CT Head:  IMPRESSION:  1. Small amount of gas in the left cerebral cortical veins. Differential  would include iatrogenic air embolism or trauma.  2. No acute intracranial hemorrhage.      CT Angio Head/Neck:  Result Impression   1.  Age-indeterminate left internal carotid artery origin occlusion.   Reconstitution of left ICA flow at the level of the cavernous ICA,   likely due to patent anterior commuting artery. A critical alert was   sent.  2.  No perfusion abnormality.  3.  Moderate atherosclerotic stenosis involving the origin of the   right internal carotid artery.           4/15/2020 Imaging     MRI Brain:  No acute intracranial abnormality identified  Findings of chronic microvascular ischemia. Occluded left ICA flow   void.      4/27/2020 -  Chemotherapy     OP COLORECTAL Fluorouracil CIV over 120H + XRT      5/1/2020 -  Chemotherapy     OP CENTRAL VENOUS ACCESS DEVICE ACCESS, CARE, AND MAINTENANCE (CVAD)      5/12/2020 Cancer Staged     Staging form: Colon And Rectum, AJCC 8th Edition  - Clinical stage from 5/12/2020: Stage IIIC (cT4b, cN1b, cM0) - Signed by Shaq Boggs MD on 5/12/2020         PAST MEDICAL HISTORY:  ALLERGIES:  No Known Allergies  CURRENT MEDICATIONS:  Outpatient Encounter Medications as of 6/17/2020   Medication Sig Dispense Refill   • aspirin 81 MG chewable tablet Chew 81 mg Daily.     • atorvastatin (LIPITOR) 80 MG tablet Take 80 mg by mouth Daily.     • ibuprofen (ADVIL,MOTRIN) 200 MG tablet Take 200 mg by mouth Every 8 (Eight) Hours As Needed for Mild Pain .     • pantoprazole (PROTONIX) 20 MG EC tablet Take 20 mg by mouth Daily.       No facility-administered encounter medications on file as of 6/17/2020.      ADULT  ILLNESSES:  Patient Active Problem List   Diagnosis Code   • Abnormal CT of the abdomen R93.5   • Rectal cancer (CMS/HCC) C20   • Current every day smoker F17.200   • Impaired gait and mobility R26.89   • Hypertension I10   • Bright red rectal bleeding K62.5   • 2nd degree AV block I44.1   • Tobacco abuse Z72.0     SURGERIES:  Past Surgical History:   Procedure Laterality Date   • COLONOSCOPY N/A 4/2/2020    Procedure: COLONOSCOPY WITH ANESTHESIA;  Surgeon: Yanick Flowers DO;  Location: [facility] ENDOSCOPY;  Service: Gastroenterology;  Laterality: N/A;  pre: abnormal CT scan  post: rectal mass  PCP unknown   • EYE SURGERY Bilateral     lenses present   • VENOUS ACCESS DEVICE (PORT) INSERTION N/A 4/14/2020    Procedure: INSERTION VENOUS ACCESS DEVICE;  Surgeon: Vielka Weller MD;  Location: [facility] OR;  Service: General;  Laterality: N/A;     HEALTH MAINTENANCE ITEMS:  Health Maintenance Due   Topic Date Due   • TDAP/TD VACCINES (1 - Tdap) 09/18/1962   • ZOSTER VACCINE (1 of 2) 09/18/2001   • Pneumococcal Vaccine Once at 65 Years Old  09/18/2016   • HEPATITIS C SCREENING  03/26/2020   • MEDICARE ANNUAL WELLNESS  03/26/2020       <no information>  Last Completed Colonoscopy       Status Date      COLONOSCOPY Done 4/2/2020 COLONOSCOPY     Patient has more history with this topic...          There is no immunization history on file for this patient.  Last Completed Mammogram     Patient has no health maintenance due at this time            FAMILY HISTORY:  Family History   Problem Relation Age of Onset   • Cancer Mother    • Stroke Father    • Heart disease Father    • Heart attack Brother      SOCIAL HISTORY:  Social History     Socioeconomic History   • Marital status: Single     Spouse name: Not on file   • Number of children: Not on file   • Years of education: Not on file   • Highest education level: Not on file   Tobacco Use   • Smoking status: Current Every Day Smoker     Packs/day: 1.00     Years: 53.00      "Pack years: 53.00     Types: Cigarettes   • Smokeless tobacco: Never Used   Substance and Sexual Activity   • Alcohol use: Not Currently   • Drug use: Never   • Sexual activity: Defer       REVIEW OF SYSTEMS:    Review of Systems   Constitutional: Positive for fatigue. Negative for chills, diaphoresis and fever.        \"I feel tired.\"   HENT: Negative for congestion, hearing loss, mouth sores, nosebleeds and trouble swallowing.    Eyes: Negative for redness and visual disturbance.   Respiratory: Negative for shortness of breath and wheezing.         \"Itchiness and redness when they put the adhesive.\"   Cardiovascular: Negative for chest pain and palpitations.   Gastrointestinal: Negative for abdominal pain, blood in stool, constipation, diarrhea, nausea and vomiting.   Endocrine: Negative for cold intolerance and heat intolerance.   Genitourinary: Negative for difficulty urinating, flank pain and hematuria.   Musculoskeletal: Negative for joint swelling and neck stiffness.   Skin: Positive for pallor.   Allergic/Immunologic: Negative for food allergies.   Neurological: Negative for dizziness, seizures, speech difficulty and weakness.   Hematological: Negative for adenopathy. Does not bruise/bleed easily.   Psychiatric/Behavioral: Negative for agitation, confusion and hallucinations. The patient is not nervous/anxious.        VITAL SIGNS: /62   Pulse 106   Temp 98.6 °F (37 °C)   Resp 18   Ht 177.8 cm (70\")   Wt 78.9 kg (174 lb)   SpO2 94%   BMI 24.97 kg/m²   Pain Score    06/17/20 0804   PainSc: 0-No pain       PHYSICAL EXAMINATION:     Physical Exam   Constitutional: He is oriented to person, place, and time. He appears well-developed and well-nourished. No distress.   HENT:   Head: Normocephalic and atraumatic.   Eyes: No scleral icterus.   Neck: Neck supple.   Cardiovascular: Normal rate and regular rhythm.   Pulmonary/Chest: Effort normal and breath sounds normal. He has no wheezes. He has no rales. "   Port, left. No erythema.   Abdominal: Soft. Bowel sounds are normal. He exhibits no distension.   Musculoskeletal: He exhibits no edema.   Neurological: He is alert and oriented to person, place, and time.   Skin: Skin is warm and dry. He is not diaphoretic. There is pallor.   Psychiatric: He has a normal mood and affect. His behavior is normal. Judgment and thought content normal.   Vitals reviewed.      LABS    Lab Results - Last 18 Months   Lab Units 06/01/20  0838 05/26/20  0832 05/18/20  0846 05/11/20  0857 05/04/20  0849 04/27/20  0916   HEMOGLOBIN g/dL 12.1* 12.2* 12.2* 12.7* 13.2 13.3   HEMATOCRIT % 35.8* 35.6* 36.0* 37.7 39.2 39.3   MCV fL 91.1 90.4 90.9 92.4 91.8 91.4   WBC 10*3/mm3 12.10* 11.08* 9.54 8.11 7.00 15.17*   RDW % 12.6 12.6 12.5 12.3 12.1* 12.2*   MPV fL 8.3 8.1 8.3 8.6 8.7 9.0   PLATELETS 10*3/mm3 453* 408 295 296 389 389   IMM GRAN % % 0.7* 0.9* 0.4 0.4 0.9* 0.3   NEUTROS ABS 10*3/mm3 10.00* 9.06* 7.67* 6.09 4.97 11.54*   LYMPHS ABS 10*3/mm3 0.51* 0.48* 0.55* 0.81 1.02 2.22   MONOS ABS 10*3/mm3 1.14* 0.96* 0.83 0.69 0.58 0.97*   EOS ABS 10*3/mm3 0.32 0.43* 0.40 0.45* 0.31 0.31   BASOS ABS 10*3/mm3 0.04 0.05 0.05 0.04 0.06 0.08   IMMATURE GRANS (ABS) 10*3/mm3 0.09* 0.10* 0.04 0.03 0.06* 0.05   NRBC /100 WBC 0.0 0.0 0.0 0.0 0.0 0.0       Lab Results - Last 18 Months   Lab Units 06/01/20  0838 05/26/20  0832 05/18/20  0846 05/11/20  0857 05/04/20  0849 04/27/20  0916   GLUCOSE mg/dL 226* 215* 199* 171* 149* 186*   SODIUM mmol/L 138 135* 140 135* 138 137   POTASSIUM mmol/L 3.9 4.2 4.0 4.2 4.5 4.0   CO2 mmol/L 25.0 25.0 26.0 25.0 25.0 23.0   CHLORIDE mmol/L 102 97* 104 99 102 101   ANION GAP mmol/L 11.0 13.0 10.0 11.0 11.0 13.0   CREATININE mg/dL 0.81 0.86 0.83 0.87 0.87 0.86   BUN mg/dL 10 15 16 12 12 10   BUN / CREAT RATIO  12.3 17.4 19.3 13.8 13.8 11.6   CALCIUM mg/dL 9.1 9.1 9.2 9.2 9.3 9.4   EGFR IF NONAFRICN AM mL/min/1.73 95 88 92 87 87 88   ALK PHOS U/L 90 111 105 124* 114 129*   TOTAL  PROTEIN g/dL 7.3 7.6 7.2 7.6 7.6 7.7   ALT (SGPT) U/L 19 20 16 18 18 22   AST (SGOT) U/L 13 17 14 20 19 18   BILIRUBIN mg/dL 0.4 0.3 0.2 0.2 0.3 0.3   ALBUMIN g/dL 3.60 3.90 3.80 3.90 4.10 4.20   GLOBULIN gm/dL 3.7 3.7 3.4 3.7 3.5 3.5       Lab Results - Last 18 Months   Lab Units 04/09/20  0954   CEA ng/mL 4.26       No results for input(s): IRON, TIBC, LABIRON, FERRITIN, F0LJYOP, TSH, FOLATE in the last 95172 hours.    Invalid input(s): VITB12    [place] Viramont[place] reports a pain score of 0.     Patient's Body mass index is 24.97 kg/m². BMI is within normal parameters. No follow-up required..    ASSESSMENT:  1.  Adenocarcinoma of the rectum. Tumor size 5.5 cm.  AJCC stage:IIIC (cT4b, N1b, M0)  Treatment status:Post neoadjuvant CIV 5-FU and radiation.  2.  Performance status of 1.  3.  Normocytic anemia from chemo.    4.  Elevated protein 03/25/2020.  5.  6 mm nodule, left upper lobe near the fissure.  6.  History of pancreatitis.        PLAN:  1.   Re:  Recommendations of Dr. De Jesus for FOLFOX.  2.   Re:  Aware of potential nausea, vomiting, diarrhea, cold intolerance, myelosuppression, anaphylaxis, alopecia, and neuropathy.  3.  Blood for CBC with differential, CMP, magnesium and CEA today.  4.  Schedule FOLFOX for 8 cycles:   5.  Schedule treatment C1 D1 to 3 to start 06/22/2020 and C2 D1 to D3 start 07/06/2020  To be administered every 2 weeks: Plan for 8 cycles.  Oxaliplatin 85 mg/m2 IVPB over 2 hours (TD = 165 mg).  Leucovorin 400 mg/m2 IVPB over 2 hours (TD= 790 mg).  5- mg/ m2  IVP (TD = 790  mg).  Begin 5-FU 2,400 mg/m2 IVPB over 46 hours.  (TD= 4,730 mg).  6.   Premedicate with:  Aloxi 0.25 mg IVP.  Emend 150 mg IV.  Decadron 12 mg IVPB over 20-30 min.  7.  Weekly CBC with differential, magnesium, and CMP once chemo starts.  8.  Continue current medications.  9.  Continue care per primary care physician and other specialists.  10.  Recall colonoscopy on 10/2020.  11.  Advance Care  Planning  ACP discussion was declined by the patient. Patient does not have an advance directive, information provided.   12.  eRx Compazine 10 mg p.o. every 4 hours as needed for nausea and vomiting #60 with 2 refills if needed.  13.  Return to office in 4 weeks with pre-office CEA.   14.  Patient will be reevaluated by Dr. De Jesus at [facility] after 8 cycles of FOLFOX.      I spent 32  total minutes, face-to-face, caring for Attila today.  Greater than 50% of this time involved counseling and/or coordination of care as documented within this note regarding the patient's illness(es), pros and cons of various treatment options, instructions and/or risk reduction.      Vincent Swenson MD  (Yanick Flowers DO)  (Nabeel Walton MD)  (Kel Ramirez MD)  (Vielka Weller MD)  Destini De Jesus MD

## 2020-06-16 NOTE — TELEPHONE ENCOUNTER
Dr. Destini De Jesus called 06/16/2020. MRI showed involvement of the seminal vesicle and lesion inseparable from the prostate.  Patient not candidate for surgery at this time.  Recommend FOLFOX for 8-10 cycles then reevaluate for surgery.

## 2020-06-17 ENCOUNTER — OFFICE VISIT (OUTPATIENT)
Dept: ONCOLOGY | Facility: CLINIC | Age: 70
End: 2020-06-17

## 2020-06-17 VITALS
RESPIRATION RATE: 18 BRPM | DIASTOLIC BLOOD PRESSURE: 62 MMHG | HEIGHT: 70 IN | HEART RATE: 106 BPM | WEIGHT: 174 LBS | SYSTOLIC BLOOD PRESSURE: 124 MMHG | TEMPERATURE: 98.6 F | BODY MASS INDEX: 24.91 KG/M2 | OXYGEN SATURATION: 94 %

## 2020-06-17 DIAGNOSIS — C20 RECTAL CANCER (HCC): Primary | ICD-10-CM

## 2020-06-17 PROCEDURE — 99214 OFFICE O/P EST MOD 30 MIN: CPT | Performed by: INTERNAL MEDICINE

## 2020-06-17 RX ORDER — FAMOTIDINE 10 MG/ML
20 INJECTION, SOLUTION INTRAVENOUS AS NEEDED
Status: CANCELLED | OUTPATIENT
Start: 2020-06-22

## 2020-06-17 RX ORDER — PALONOSETRON 0.05 MG/ML
0.25 INJECTION, SOLUTION INTRAVENOUS ONCE
Status: CANCELLED | OUTPATIENT
Start: 2020-06-22

## 2020-06-17 RX ORDER — DEXTROSE MONOHYDRATE 50 MG/ML
250 INJECTION, SOLUTION INTRAVENOUS ONCE
Status: CANCELLED | OUTPATIENT
Start: 2020-06-22

## 2020-06-17 RX ORDER — DIPHENHYDRAMINE HYDROCHLORIDE 50 MG/ML
50 INJECTION INTRAMUSCULAR; INTRAVENOUS AS NEEDED
Status: CANCELLED | OUTPATIENT
Start: 2020-06-22

## 2020-06-17 RX ORDER — PROCHLORPERAZINE MALEATE 10 MG
10 TABLET ORAL EVERY 6 HOURS PRN
Qty: 60 TABLET | Refills: 2 | Status: SHIPPED | OUTPATIENT
Start: 2020-06-17 | End: 2020-11-20

## 2020-06-17 RX ORDER — FLUOROURACIL 50 MG/ML
400 INJECTION, SOLUTION INTRAVENOUS ONCE
Status: CANCELLED | OUTPATIENT
Start: 2020-06-22

## 2020-06-18 NOTE — PROGRESS NOTES
[facility]  HEMATOLOGY & ONCOLOGY    [facility] HEMATOLOGY AND ONCOLOGY  [address]  [place] [address] [phone]    Patient Name: Attila Viramontes  Encounter Date: 06/19/2020  YOB: 1950  Patient Number: [id number]    Chief Complaint   Patient presents with   • Rectal Cancer     Here for chemo education       REASON FOR VISIT:Attila Viramontes is a pleasant 69 y.o. male who is seen on follow up for rectal cancer.   He is seen 1.5 weeks post neoadjuvant 5-FU with radiation. He is seen alone.  History is obtained from patient. History is considered to be accurate     Dr. Destini De Jesus called 06/16/2020. MRI showed involvement of the seminal vesicle and lesion inseparable from the prostate.  Patient not candidate for surgery at this time.  Recommend FOLFOX for 8-10 cycles then reevaluate for surgery.     Mr. Viramontes is here today for education on the FOLFOX chemotherapy regimen.  He is feeling well today.  He has no complaints.  His diarrhea has resolved at present.  He completed radiation therapy on the 5th of June.       Oncology/Hematology History    DIAGNOSTIC ABNORMALITIES:  CT abdomen and pelvis 03/25/2020.  Asymmetric wall thickening of the rectum could represent a mass/neoplasm. If not recently performed, colonoscopy is recommended. Inflammatory stranding of the central mesenteric root, which is nonspecific but can be seen with mesenteritis/mesenteric panniculitis.  Colonoscopy by Dr. Reaves 04/02/2020 showed a localized area of severely ulcerated mucosa was found at 2 cm proximal to the anus. Biopsies were taken with a cold forceps for histology.  Pathology report 04/03/2020 showed a moderately differentiated adenocarcinoma.  Stromal invasion is seen although depth of invasion is indeterminate in these biopsies.  In the areas of invasion, the stroma demonstrates a reactive,  desmoplastic appearance.   CT chest report 04/20/2020.  Noncalcified pulmonary nodule in the left upper lobe. Metastatic  disease not excluded. Short interval follow-up with CT in 6 months recommended.   Atherosclerosis of the aorta and coronary arteries.  Stranding of the central mesenteric root in the abdomen, partially imaged.  Hepatic steatosis.  MRI 04/23/2020 showed abnormal enhancement and circumferential thickening of the  distal rectum for a length of 5.5 cm, with evidence of invasion of the mesorectal fat and mesial rectal lymph nodes, measure less than 3 mm from the mesorectal fascia.   There is abnormal pre and post sacral soft tissue enhancement with enhancement of the distal sacrum, concerning for contiguous spread versus local metastatic disease. Addendum report, appears to be invasion of the left seminal vesicle base and left posterior prostate.  These findings are concerning for invasive malignancy.  Previous endorectal ultrasound 04/24/2020.  Fixed constricting lesion, T4NX.      PREVIOUS INTERVENTIONS:  Neoadjuvant CIV 5-FU and radiation 04/27/2020 through 06/05/2020 at [facility].        Rectal cancer (CMS/HCC)     Initial Diagnosis     Rectal cancer (CMS/HCC)      3/25/2020 Imaging     CT Abd/Pelvis:     IMPRESSION:  1. Asymmetric wall thickening of the rectum could represent a  mass/neoplasm. If not recently performed, colonoscopy is recommended.     2. Inflammatory stranding of the central mesenteric root, which is  nonspecific but can be seen with mesenteritis/mesenteric panniculitis.     3. Atherosclerotic disease.  4. Fecal stasis.      4/2/2020 Biopsy     Colonoscopy:  Findings: The perianal and digital rectal examinations were normal.  A localized area of severely ulcerated mucosa was found at 2 cm proximal to the anus. Biopsies were  taken with a cold forceps for histology.  Impression: - Ulcerated mucosa at 2 cm proximal to the anus. Biopsied.    Final Diagnosis   Large  intestine, rectum at 3 cm, biopsy: Moderately differentiated adenocarcinoma, invasive.     AJCC stage: pTX pNX               4/14/2020 Procedure     Mediport placement      4/14/2020 Imaging     CT Angio Head:  Summary:  1. Distal left ICA occlusion with patent [place] of [place].    CT Angio Neck:  IMPRESSION:  1. Left internal carotid artery occlusion beginning at the origin.  Minimal opacification of the distal left internal carotid artery, which  may represent retrograde collateral flow.  2. Right internal carotid artery patent.  3. Bilateral vertebral arteries patent.  4. See separately dictated CTA head of the same day.     CT Head:  IMPRESSION:  1. Small amount of gas in the left cerebral cortical veins. Differential  would include iatrogenic air embolism or trauma.  2. No acute intracranial hemorrhage.      CT Angio Head/Neck:  Result Impression   1.  Age-indeterminate left internal carotid artery origin occlusion.   Reconstitution of left ICA flow at the level of the cavernous ICA,   likely due to patent anterior commuting artery. A critical alert was   sent.  2.  No perfusion abnormality.  3.  Moderate atherosclerotic stenosis involving the origin of the   right internal carotid artery.           4/15/2020 Imaging     MRI Brain:  No acute intracranial abnormality identified  Findings of chronic microvascular ischemia. Occluded left ICA flow   void.      4/27/2020 - 6/1/2020 Chemotherapy     OP COLORECTAL Fluorouracil CIV over 120H + XRT      5/1/2020 -  Chemotherapy     OP CENTRAL VENOUS ACCESS DEVICE ACCESS, CARE, AND MAINTENANCE (CVAD)      5/12/2020 Cancer Staged     Staging form: Colon And Rectum, AJCC 8th Edition  - Clinical stage from 5/12/2020: Stage IIIC (cT4b, cN1b, cM0) - Signed by Shaq Boggs MD on 5/12/2020 6/22/2020 -  Chemotherapy     OP COLON [id number] OXALIplatin / Leucovorin / Fluorouracil           PAST MEDICAL HISTORY:  ALLERGIES:  No Known Allergies    CURRENT  MEDICATIONS:  Outpatient Encounter Medications as of 6/19/2020   Medication Sig Dispense Refill   • aspirin 81 MG chewable tablet Chew 81 mg Daily.     • atorvastatin (LIPITOR) 80 MG tablet Take 80 mg by mouth Daily.     • ibuprofen (ADVIL,MOTRIN) 200 MG tablet Take 200 mg by mouth Every 8 (Eight) Hours As Needed for Mild Pain .     • pantoprazole (PROTONIX) 20 MG EC tablet Take 20 mg by mouth Daily.     • prochlorperazine (COMPAZINE) 10 MG tablet Take 1 tablet by mouth Every 6 (Six) Hours As Needed for Nausea or Vomiting. 60 tablet 2     No facility-administered encounter medications on file as of 6/19/2020.      ADULT ILLNESSES:  Patient Active Problem List   Diagnosis Code   • Abnormal CT of the abdomen R93.5   • Rectal cancer (CMS/HCC) C20   • Current every day smoker F17.200   • Impaired gait and mobility R26.89   • Hypertension I10   • Bright red rectal bleeding K62.5   • 2nd degree AV block I44.1   • Tobacco abuse Z72.0     SURGERIES:  Past Surgical History:   Procedure Laterality Date   • COLONOSCOPY N/A 4/2/2020    Procedure: COLONOSCOPY WITH ANESTHESIA;  Surgeon: Yanick Flowers DO;  Location: [facility] ENDOSCOPY;  Service: Gastroenterology;  Laterality: N/A;  pre: abnormal CT scan  post: rectal mass  PCP unknown   • EYE SURGERY Bilateral     lenses present   • VENOUS ACCESS DEVICE (PORT) INSERTION N/A 4/14/2020    Procedure: INSERTION VENOUS ACCESS DEVICE;  Surgeon: Vielka Weller MD;  Location: [facility] OR;  Service: General;  Laterality: N/A;     HEALTH MAINTENANCE ITEMS:  Health Maintenance Due   Topic Date Due   • TDAP/TD VACCINES (1 - Tdap) 09/18/1962   • ZOSTER VACCINE (1 of 2) 09/18/2001   • Pneumococcal Vaccine Once at 65 Years Old  09/18/2016   • HEPATITIS C SCREENING  03/26/2020   • MEDICARE ANNUAL WELLNESS  03/26/2020       <no information>  Last Completed Colonoscopy       Status Date      COLONOSCOPY Done 4/2/2020 COLONOSCOPY     Patient has more history with this topic...        FAMILY  "HISTORY:  Family History   Problem Relation Age of Onset   • Cancer Mother    • Stroke Father    • Heart disease Father    • Heart attack Brother      SOCIAL HISTORY:  Social History     Socioeconomic History   • Marital status: Single     Spouse name: Not on file   • Number of children: Not on file   • Years of education: Not on file   • Highest education level: Not on file   Tobacco Use   • Smoking status: Current Every Day Smoker     Packs/day: 1.00     Years: 53.00     Pack years: 53.00     Types: Cigarettes   • Smokeless tobacco: Never Used   Substance and Sexual Activity   • Alcohol use: Not Currently   • Drug use: Never   • Sexual activity: Defer       REVIEW OF SYSTEMS:  Review of Systems   Constitutional: Positive for fatigue. Negative for activity change, appetite change, chills, diaphoresis, fever and unexpected weight loss.   HENT: Negative for ear pain, nosebleeds, sinus pressure, sore throat and voice change.    Eyes: Negative for blurred vision, double vision, pain and visual disturbance.   Respiratory: Negative for cough and shortness of breath.    Cardiovascular: Negative for chest pain, palpitations and leg swelling.   Gastrointestinal: Negative for abdominal pain, anal bleeding, blood in stool, constipation, diarrhea (much improved), nausea and vomiting.   Endocrine: Negative for heat intolerance, polydipsia and polyuria.   Genitourinary: Negative for dysuria, frequency, hematuria, urgency and urinary incontinence.   Musculoskeletal: Negative for arthralgias and myalgias.   Skin: Negative for rash and skin lesions.   Neurological: Negative for dizziness, tremors, seizures, syncope, speech difficulty, weakness and headache.   Hematological: Negative for adenopathy. Does not bruise/bleed easily.   Psychiatric/Behavioral: Negative for dysphoric mood, sleep disturbance, suicidal ideas and depressed mood.       /59   Pulse 103   Temp 98.4 °F (36.9 °C)   Resp 18   Ht 177.8 cm (70\")   Wt 78 kg " (172 lb)   SpO2 98%   BMI 24.68 kg/m²  Body surface area is 1.96 meters squared.  Pain Score    06/19/20 0859   PainSc:   4   PainLoc: Rectum       Physical Exam:  Physical Exam   Constitutional: He is oriented to person, place, and time. He appears well-developed and well-nourished.   HENT:   Head: Atraumatic.   Mouth/Throat: Oropharynx is clear and moist.   Eyes: Pupils are equal, round, and reactive to light. EOM are normal. No scleral icterus.   Neck: Trachea normal. Neck supple. No JVD present.   Cardiovascular: Normal rate, regular rhythm and normal pulses. Exam reveals no gallop and no friction rub.   No murmur heard.  Pulmonary/Chest: Effort normal and breath sounds normal. He has no wheezes. He has no rhonchi. He has no rales. Chest wall is not dull to percussion.   Port, left chest   Abdominal: Soft. Normal appearance. There is no tenderness. There is no rebound and no guarding.   Lymphadenopathy:     He has no cervical adenopathy.     He has no axillary adenopathy.        Right: No inguinal and no supraclavicular adenopathy present.        Left: No inguinal and no supraclavicular adenopathy present.   Neurological: He is alert and oriented to person, place, and time. He has normal strength. No sensory deficit.   Psychiatric: He has a normal mood and affect. Judgment normal.       Attila Viramontes reports a pain score of 4.    Patient's Body mass index is 24.68 kg/m². BMI is within normal parameters. No follow-up required..      LABS    Lab Results - Last 18 Months   Lab Units 06/01/20  0838 05/26/20  0832 05/18/20  0846 05/11/20  0857 05/04/20  0849 04/27/20  0916   HEMOGLOBIN g/dL 12.1* 12.2* 12.2* 12.7* 13.2 13.3   HEMATOCRIT % 35.8* 35.6* 36.0* 37.7 39.2 39.3   MCV fL 91.1 90.4 90.9 92.4 91.8 91.4   WBC 10*3/mm3 12.10* 11.08* 9.54 8.11 7.00 15.17*   RDW % 12.6 12.6 12.5 12.3 12.1* 12.2*   MPV fL 8.3 8.1 8.3 8.6 8.7 9.0   PLATELETS 10*3/mm3 453* 408 295 296 389 389   IMM GRAN % % 0.7* 0.9* 0.4 0.4  0.9* 0.3   NEUTROS ABS 10*3/mm3 10.00* 9.06* 7.67* 6.09 4.97 11.54*   LYMPHS ABS 10*3/mm3 0.51* 0.48* 0.55* 0.81 1.02 2.22   MONOS ABS 10*3/mm3 1.14* 0.96* 0.83 0.69 0.58 0.97*   EOS ABS 10*3/mm3 0.32 0.43* 0.40 0.45* 0.31 0.31   BASOS ABS 10*3/mm3 0.04 0.05 0.05 0.04 0.06 0.08   IMMATURE GRANS (ABS) 10*3/mm3 0.09* 0.10* 0.04 0.03 0.06* 0.05   NRBC /100 WBC 0.0 0.0 0.0 0.0 0.0 0.0       Lab Results - Last 18 Months   Lab Units 06/01/20  0838 05/26/20  0832 05/18/20  0846 05/11/20  0857 05/04/20  0849 04/27/20  0916   GLUCOSE mg/dL 226* 215* 199* 171* 149* 186*   SODIUM mmol/L 138 135* 140 135* 138 137   POTASSIUM mmol/L 3.9 4.2 4.0 4.2 4.5 4.0   CO2 mmol/L 25.0 25.0 26.0 25.0 25.0 23.0   CHLORIDE mmol/L 102 97* 104 99 102 101   ANION GAP mmol/L 11.0 13.0 10.0 11.0 11.0 13.0   CREATININE mg/dL 0.81 0.86 0.83 0.87 0.87 0.86   BUN mg/dL 10 15 16 12 12 10   BUN / CREAT RATIO  12.3 17.4 19.3 13.8 13.8 11.6   CALCIUM mg/dL 9.1 9.1 9.2 9.2 9.3 9.4   EGFR IF NONAFRICN AM mL/min/1.73 95 88 92 87 87 88   ALK PHOS U/L 90 111 105 124* 114 129*   TOTAL PROTEIN g/dL 7.3 7.6 7.2 7.6 7.6 7.7   ALT (SGPT) U/L 19 20 16 18 18 22   AST (SGOT) U/L 13 17 14 20 19 18   BILIRUBIN mg/dL 0.4 0.3 0.2 0.2 0.3 0.3   ALBUMIN g/dL 3.60 3.90 3.80 3.90 4.10 4.20   GLOBULIN gm/dL 3.7 3.7 3.4 3.7 3.5 3.5       Lab Results - Last 18 Months   Lab Units 04/09/20  0954   CEA ng/mL 4.26       ASSESSMENT:  1.  Adenocarcinoma of the rectum. Tumor size 5.5 cm.   AJCC stage:IIIC (cT4b, N1b, M0)   Treatment status:Post neoadjuvant CIV 5-FU and radiation completed on 6/5/2020          FOLFOX initiating 6/22/2020  2.  Performance status of 1.  3.  Normocytic anemia from chemo-improved with hgb of 12.1 on 6/1/2020  4.  Elevated protein 03/25/2020.  5.  6 mm nodule, left upper lobe near the fissure.  6.  History of pancreatitis.         PLAN:  1.   Re:  Recommendations of Dr. De Jesus for FOLFOX.  2.   Re:  Aware of potential nausea, vomiting,  diarrhea, cold intolerance, myelosuppression, anaphylaxis, alopecia, and neuropathy.  3.  Dr Boggs has scheduled FOLFOX for 8 cycles:   4.  Dr Boggs has scheduled treatment C1 D1 to 3 to start 06/22/2020 and C2 D1 to D3 start 07/06/2020  To be administered every 2 weeks: Plan for 8 cycles.   Oxaliplatin 85 mg/m2 IVPB over 2 hours (TD = 165 mg).   Leucovorin 400 mg/m2 IVPB over 2 hours (TD= 790 mg).   5- mg/ m2  IVP (TD = 790  mg).   Begin 5-FU 2,400 mg/m2 IVPB over 46 hours.  (TD= 4,730 mg).  5.   Premedicate with:   Aloxi 0.25 mg IVP.   Emend 150 mg IV.   Decadron 12 mg IVPB over 20-30 min.  6.   Dr Boggs has ordered weekly CBC with differential, magnesium, and CMP once chemo starts.  7.   Continue current medications.  8.   Continue care per primary care physician and other specialists.  9. Recall colonoscopy on 10/2020.  10.  Advance Care Planning   ACP discussion was declined by the patient. Patient does not have an advance directive, information provided.   11.  eRx Compazine 10 mg p.o. every 4 hours as needed for nausea and vomiting #60 with 2 refills if needed. ( previously sent in by Dr Boggs)  12.  Return to office in 4 weeks with pre-office CEA.   13.  Patient will be reevaluated by Dr. De Jesus at [facility] after 8 cycles of FOLFOX.    14.  Chemotherapy education was provided today. Handouts from [email or web address] were discussed and provided to the patient.  He verbalized understanding and signed consent for therapy.  The information that was discussed but is not limited to is listed below.     Important things to remember about Chemotherapy side effects:  •Most people do not experience all of the Oxaliplatin side effects listed.   •Chemotherapy side effects are often predictable in terms of their onset and duration.   •Chemotherapy side effects are almost always reversible and will go away after treatmentis complete.   •There are many options to help minimize or prevent Chemotherapy side effects.     •There  is no relationship between the presence or severity of chemotherapy side effects and the effectiveness of chemotherapy.    Oxaliplatin Infusion Related Side Effects:  •The feeling of difficulty swallowing, shortness of breath, jaw spasm, abnormal tongue sensation and feeling of chest pressure.  This has been reported rarely (<5%).  Itgenerally starts within hours of Oxaliplatin infusion and often occurs upon exposureto cold.  Avoiding exposure to cold (see self care tips below) helps to preventthis adverse reaction.  Future Oxaliplatin infusions may be given over a longertime frame to help reduce the incidence.       The following Oxaliplatin side effects are common (occurring in greater than 30%) for patients taking Oxaliplatin:  •Peripheral neuropathy- Numbness and tingling and cramping of the hands or feet often triggered by cold.  Thissymptom will generally lessen or go away between treatments, however as the numberof treatments increase the numbness and tingling will take longer to lessen or goaway. Your health care professional will monitor this symptom with you andadjust your dose accordingly.   •Nausea and vomiting   •Diarrhea   •Mouth sores   •Low blood counts - Your white and red blood cells and platelets may temporarily decrease. This canput you at increased risk for infection, anemia and/or bleeding.   •Fatigue   •Loss of appetite    The following side effects are common (occurring in greater than 30%) for patients taking Fluorouracil:   • Diarrhea   • Nausea and possible occasional vomiting    • Mouth sores   • Poor appetite   • Watery eyes, sensitivity to light(photophobia)   • Taste changes, metallic taste in mouth during infusion    • Low blood counts - Your white and red blood cells and platelets may temporarily decrease. This can put you at increased risk for infection, anemia and/or bleeding.   Serious adverse reactions to Fluorouracil are; chest pain, EKG changes and increases in cardiac enzymes  - which may indicate problems with the heart (see Cardiovascular events). These symptoms are very rare butincreased for patients with a prior history of heart disease.     Not all side effects are listed above. Some that are rare--occurring in less than 10% of patients-- are not listed here. However, you should always informyour health care provider if you experience any unusual symptoms    All activities occurring within this visit are in accordance with the plan of care as set forth by Dr. Shaq Boggs.    I spent 35  total minutes, face-to-face, caring for Attila desouza.  Greater than 50% of this time involved counseling and/or coordination of care as documented within this note regarding the patient's illness(es), pros and cons of various treatment options, instructions and/or risk reduction.    Shannan eSlf, APRN   06/19/2020  12:09 PM       Cc: Vincent Swenson MD  (Yanick Flowers DO)  (Nabeel Walton MD)  (Kel Ramirez MD)  (Vielka Weller MD)  Destini De Jesus MD

## 2020-06-19 ENCOUNTER — OFFICE VISIT (OUTPATIENT)
Dept: ONCOLOGY | Facility: CLINIC | Age: 70
End: 2020-06-19

## 2020-06-19 VITALS
HEIGHT: 70 IN | SYSTOLIC BLOOD PRESSURE: 115 MMHG | WEIGHT: 172 LBS | OXYGEN SATURATION: 98 % | TEMPERATURE: 98.4 F | DIASTOLIC BLOOD PRESSURE: 59 MMHG | RESPIRATION RATE: 18 BRPM | BODY MASS INDEX: 24.62 KG/M2 | HEART RATE: 103 BPM

## 2020-06-19 DIAGNOSIS — I10 ESSENTIAL HYPERTENSION: ICD-10-CM

## 2020-06-19 DIAGNOSIS — D64.9 NORMOCYTIC ANEMIA: ICD-10-CM

## 2020-06-19 DIAGNOSIS — C20 RECTAL CANCER (HCC): Primary | ICD-10-CM

## 2020-06-19 PROCEDURE — 99213 OFFICE O/P EST LOW 20 MIN: CPT | Performed by: NURSE PRACTITIONER

## 2020-06-22 ENCOUNTER — LAB (OUTPATIENT)
Dept: LAB | Facility: HOSPITAL | Age: 70
End: 2020-06-22

## 2020-06-22 ENCOUNTER — TELEPHONE (OUTPATIENT)
Dept: ONCOLOGY | Facility: CLINIC | Age: 70
End: 2020-06-22

## 2020-06-22 ENCOUNTER — INFUSION (OUTPATIENT)
Dept: ONCOLOGY | Facility: HOSPITAL | Age: 70
End: 2020-06-22

## 2020-06-22 VITALS
TEMPERATURE: 97.4 F | OXYGEN SATURATION: 99 % | SYSTOLIC BLOOD PRESSURE: 94 MMHG | WEIGHT: 174.2 LBS | RESPIRATION RATE: 17 BRPM | HEART RATE: 100 BPM | DIASTOLIC BLOOD PRESSURE: 57 MMHG | HEIGHT: 71 IN | BODY MASS INDEX: 24.39 KG/M2

## 2020-06-22 DIAGNOSIS — C20 RECTAL CANCER (HCC): Primary | ICD-10-CM

## 2020-06-22 DIAGNOSIS — C20 RECTAL CANCER (HCC): ICD-10-CM

## 2020-06-22 LAB
ALBUMIN SERPL-MCNC: 3.7 G/DL (ref 3.5–5.2)
ALBUMIN/GLOB SERPL: 0.9 G/DL
ALP SERPL-CCNC: 89 U/L (ref 39–117)
ALT SERPL W P-5'-P-CCNC: 19 U/L (ref 1–41)
ANION GAP SERPL CALCULATED.3IONS-SCNC: 13 MMOL/L (ref 5–15)
AST SERPL-CCNC: 15 U/L (ref 1–40)
BASOPHILS # BLD AUTO: 0.07 10*3/MM3 (ref 0–0.2)
BASOPHILS NFR BLD AUTO: 0.6 % (ref 0–1.5)
BILIRUB SERPL-MCNC: 0.2 MG/DL (ref 0.2–1.2)
BUN BLD-MCNC: 14 MG/DL (ref 8–23)
BUN/CREAT SERPL: 17.1 (ref 7–25)
CALCIUM SPEC-SCNC: 9.2 MG/DL (ref 8.6–10.5)
CHLORIDE SERPL-SCNC: 99 MMOL/L (ref 98–107)
CO2 SERPL-SCNC: 23 MMOL/L (ref 22–29)
CREAT BLD-MCNC: 0.82 MG/DL (ref 0.76–1.27)
DEPRECATED RDW RBC AUTO: 42 FL (ref 37–54)
EOSINOPHIL # BLD AUTO: 0.35 10*3/MM3 (ref 0–0.4)
EOSINOPHIL NFR BLD AUTO: 3.1 % (ref 0.3–6.2)
ERYTHROCYTE [DISTWIDTH] IN BLOOD BY AUTOMATED COUNT: 12.9 % (ref 12.3–15.4)
GFR SERPL CREATININE-BSD FRML MDRD: 93 ML/MIN/1.73
GLOBULIN UR ELPH-MCNC: 4.1 GM/DL
GLUCOSE BLD-MCNC: 262 MG/DL (ref 65–99)
HCT VFR BLD AUTO: 35.3 % (ref 37.5–51)
HGB BLD-MCNC: 11.7 G/DL (ref 13–17.7)
IMM GRANULOCYTES # BLD AUTO: 0.08 10*3/MM3 (ref 0–0.05)
IMM GRANULOCYTES NFR BLD AUTO: 0.7 % (ref 0–0.5)
LYMPHOCYTES # BLD AUTO: 0.63 10*3/MM3 (ref 0.7–3.1)
LYMPHOCYTES NFR BLD AUTO: 5.5 % (ref 19.6–45.3)
MAGNESIUM SERPL-MCNC: 2.3 MG/DL (ref 1.6–2.4)
MCH RBC QN AUTO: 29.5 PG (ref 26.6–33)
MCHC RBC AUTO-ENTMCNC: 33.1 G/DL (ref 31.5–35.7)
MCV RBC AUTO: 89.1 FL (ref 79–97)
MONOCYTES # BLD AUTO: 0.93 10*3/MM3 (ref 0.1–0.9)
MONOCYTES NFR BLD AUTO: 8.2 % (ref 5–12)
NEUTROPHILS # BLD AUTO: 9.34 10*3/MM3 (ref 1.7–7)
NEUTROPHILS NFR BLD AUTO: 81.9 % (ref 42.7–76)
NRBC BLD AUTO-RTO: 0 /100 WBC (ref 0–0.2)
PLATELET # BLD AUTO: 487 10*3/MM3 (ref 140–450)
PMV BLD AUTO: 8.3 FL (ref 6–12)
POTASSIUM BLD-SCNC: 4.2 MMOL/L (ref 3.5–5.2)
PROT SERPL-MCNC: 7.8 G/DL (ref 6–8.5)
RBC # BLD AUTO: 3.96 10*6/MM3 (ref 4.14–5.8)
SODIUM BLD-SCNC: 135 MMOL/L (ref 136–145)
WBC NRBC COR # BLD: 11.4 10*3/MM3 (ref 3.4–10.8)

## 2020-06-22 PROCEDURE — 25010000002 FLUOROURACIL PER 500 MG: Performed by: INTERNAL MEDICINE

## 2020-06-22 PROCEDURE — 25010000002 OXALIPLATIN PER 0.5 MG: Performed by: INTERNAL MEDICINE

## 2020-06-22 PROCEDURE — G0498 CHEMO EXTEND IV INFUS W/PUMP: HCPCS

## 2020-06-22 PROCEDURE — 25010000002 LEUCOVORIN CALCIUM PER 50 MG: Performed by: INTERNAL MEDICINE

## 2020-06-22 PROCEDURE — 96415 CHEMO IV INFUSION ADDL HR: CPT

## 2020-06-22 PROCEDURE — 96411 CHEMO IV PUSH ADDL DRUG: CPT

## 2020-06-22 PROCEDURE — 96413 CHEMO IV INFUSION 1 HR: CPT

## 2020-06-22 PROCEDURE — 96368 THER/DIAG CONCURRENT INF: CPT

## 2020-06-22 PROCEDURE — 80053 COMPREHEN METABOLIC PANEL: CPT

## 2020-06-22 PROCEDURE — 36415 COLL VENOUS BLD VENIPUNCTURE: CPT

## 2020-06-22 PROCEDURE — 96367 TX/PROPH/DG ADDL SEQ IV INF: CPT

## 2020-06-22 PROCEDURE — 83735 ASSAY OF MAGNESIUM: CPT

## 2020-06-22 PROCEDURE — 96375 TX/PRO/DX INJ NEW DRUG ADDON: CPT

## 2020-06-22 PROCEDURE — 25010000002 FOSAPREPITANT PER 1 MG: Performed by: INTERNAL MEDICINE

## 2020-06-22 PROCEDURE — 85025 COMPLETE CBC W/AUTO DIFF WBC: CPT

## 2020-06-22 PROCEDURE — 25010000002 DEXAMETHASONE SODIUM PHOSPHATE 100 MG/10ML SOLUTION: Performed by: INTERNAL MEDICINE

## 2020-06-22 PROCEDURE — 25010000002 PALONOSETRON PER 25 MCG: Performed by: INTERNAL MEDICINE

## 2020-06-22 RX ORDER — DEXTROSE MONOHYDRATE 50 MG/ML
250 INJECTION, SOLUTION INTRAVENOUS ONCE
Status: COMPLETED | OUTPATIENT
Start: 2020-06-22 | End: 2020-06-22

## 2020-06-22 RX ORDER — FLUOROURACIL 50 MG/ML
400 INJECTION, SOLUTION INTRAVENOUS ONCE
Status: COMPLETED | OUTPATIENT
Start: 2020-06-22 | End: 2020-06-22

## 2020-06-22 RX ORDER — DIPHENHYDRAMINE HYDROCHLORIDE 50 MG/ML
50 INJECTION INTRAMUSCULAR; INTRAVENOUS AS NEEDED
Status: DISCONTINUED | OUTPATIENT
Start: 2020-06-22 | End: 2020-06-22 | Stop reason: HOSPADM

## 2020-06-22 RX ORDER — FAMOTIDINE 10 MG/ML
20 INJECTION, SOLUTION INTRAVENOUS AS NEEDED
Status: DISCONTINUED | OUTPATIENT
Start: 2020-06-22 | End: 2020-06-22 | Stop reason: HOSPADM

## 2020-06-22 RX ORDER — PALONOSETRON 0.05 MG/ML
0.25 INJECTION, SOLUTION INTRAVENOUS ONCE
Status: COMPLETED | OUTPATIENT
Start: 2020-06-22 | End: 2020-06-22

## 2020-06-22 RX ADMIN — FLUOROURACIL 4730 MG: 50 INJECTION, SOLUTION INTRAVENOUS at 12:54

## 2020-06-22 RX ADMIN — FLUOROURACIL 790 MG: 50 INJECTION, SOLUTION INTRAVENOUS at 12:45

## 2020-06-22 RX ADMIN — DEXAMETHASONE SODIUM PHOSPHATE 12 MG: 10 INJECTION, SOLUTION INTRAMUSCULAR; INTRAVENOUS at 08:53

## 2020-06-22 RX ADMIN — DEXTROSE MONOHYDRATE 250 ML: 50 INJECTION, SOLUTION INTRAVENOUS at 08:51

## 2020-06-22 RX ADMIN — PALONOSETRON HYDROCHLORIDE 0.25 MG: 0.25 INJECTION, SOLUTION INTRAVENOUS at 08:51

## 2020-06-22 RX ADMIN — SODIUM CHLORIDE 150 MG: 9 INJECTION, SOLUTION INTRAVENOUS at 09:21

## 2020-06-22 RX ADMIN — LEUCOVORIN CALCIUM 790 MG: 350 INJECTION, POWDER, LYOPHILIZED, FOR SOLUTION INTRAMUSCULAR; INTRAVENOUS at 10:12

## 2020-06-22 RX ADMIN — OXALIPLATIN 165 MG: 5 INJECTION, SOLUTION INTRAVENOUS at 10:12

## 2020-06-22 NOTE — TELEPHONE ENCOUNTER
Received call from CRISTOFER Lopez stating that patient was told by Dr Boggs to not get MRI on 06/24.  [facility] will be doing it.  Talked to Dr Boggs and he states that he does want MRI here cancelled and that [facility] will do it on follow-up.  Called Jessica and instructed that we will cancel MRI for 06/24.

## 2020-06-24 ENCOUNTER — INFUSION (OUTPATIENT)
Dept: ONCOLOGY | Facility: HOSPITAL | Age: 70
End: 2020-06-24

## 2020-06-24 ENCOUNTER — APPOINTMENT (OUTPATIENT)
Dept: MRI IMAGING | Facility: HOSPITAL | Age: 70
End: 2020-06-24

## 2020-06-24 VITALS
OXYGEN SATURATION: 100 % | TEMPERATURE: 97.6 F | BODY MASS INDEX: 24.68 KG/M2 | DIASTOLIC BLOOD PRESSURE: 60 MMHG | HEART RATE: 81 BPM | RESPIRATION RATE: 18 BRPM | WEIGHT: 172.4 LBS | SYSTOLIC BLOOD PRESSURE: 100 MMHG | HEIGHT: 70 IN

## 2020-06-24 DIAGNOSIS — C20 RECTAL CANCER (HCC): Primary | ICD-10-CM

## 2020-06-24 PROCEDURE — 25010000003 HEPARIN LOCK FLUSH PER 10 UNITS: Performed by: NURSE PRACTITIONER

## 2020-06-24 RX ORDER — HEPARIN SODIUM (PORCINE) LOCK FLUSH IV SOLN 100 UNIT/ML 100 UNIT/ML
500 SOLUTION INTRAVENOUS AS NEEDED
Status: DISCONTINUED | OUTPATIENT
Start: 2020-06-24 | End: 2020-06-24 | Stop reason: HOSPADM

## 2020-06-24 RX ORDER — HEPARIN SODIUM (PORCINE) LOCK FLUSH IV SOLN 100 UNIT/ML 100 UNIT/ML
500 SOLUTION INTRAVENOUS AS NEEDED
Status: CANCELLED | OUTPATIENT
Start: 2020-06-24

## 2020-06-24 RX ORDER — SODIUM CHLORIDE 0.9 % (FLUSH) 0.9 %
10 SYRINGE (ML) INJECTION AS NEEDED
Status: CANCELLED | OUTPATIENT
Start: 2020-06-24

## 2020-06-24 RX ORDER — SODIUM CHLORIDE 0.9 % (FLUSH) 0.9 %
10 SYRINGE (ML) INJECTION AS NEEDED
Status: DISCONTINUED | OUTPATIENT
Start: 2020-06-24 | End: 2020-06-24 | Stop reason: HOSPADM

## 2020-06-24 RX ADMIN — SODIUM CHLORIDE, PRESERVATIVE FREE 10 ML: 5 INJECTION INTRAVENOUS at 11:13

## 2020-06-24 RX ADMIN — Medication 500 UNITS: at 11:17

## 2020-07-01 ENCOUNTER — LAB (OUTPATIENT)
Dept: LAB | Facility: HOSPITAL | Age: 70
End: 2020-07-01

## 2020-07-01 ENCOUNTER — APPOINTMENT (OUTPATIENT)
Dept: CT IMAGING | Facility: HOSPITAL | Age: 70
End: 2020-07-01

## 2020-07-01 DIAGNOSIS — C20 RECTAL CANCER (HCC): ICD-10-CM

## 2020-07-01 LAB
ALBUMIN SERPL-MCNC: 3.7 G/DL (ref 3.5–5.2)
ALBUMIN/GLOB SERPL: 0.9 G/DL
ALP SERPL-CCNC: 92 U/L (ref 39–117)
ALT SERPL W P-5'-P-CCNC: 20 U/L (ref 1–41)
ANION GAP SERPL CALCULATED.3IONS-SCNC: 14 MMOL/L (ref 5–15)
AST SERPL-CCNC: 20 U/L (ref 1–40)
BASOPHILS # BLD AUTO: 0.05 10*3/MM3 (ref 0–0.2)
BASOPHILS NFR BLD AUTO: 0.5 % (ref 0–1.5)
BILIRUB SERPL-MCNC: 0.3 MG/DL (ref 0.2–1.2)
BUN SERPL-MCNC: 11 MG/DL (ref 8–23)
BUN/CREAT SERPL: 14.7 (ref 7–25)
CALCIUM SPEC-SCNC: 9.2 MG/DL (ref 8.6–10.5)
CHLORIDE SERPL-SCNC: 104 MMOL/L (ref 98–107)
CO2 SERPL-SCNC: 23 MMOL/L (ref 22–29)
CREAT SERPL-MCNC: 0.75 MG/DL (ref 0.76–1.27)
DEPRECATED RDW RBC AUTO: 42 FL (ref 37–54)
EOSINOPHIL # BLD AUTO: 0.66 10*3/MM3 (ref 0–0.4)
EOSINOPHIL NFR BLD AUTO: 6.7 % (ref 0.3–6.2)
ERYTHROCYTE [DISTWIDTH] IN BLOOD BY AUTOMATED COUNT: 13.1 % (ref 12.3–15.4)
GFR SERPL CREATININE-BSD FRML MDRD: 104 ML/MIN/1.73
GLOBULIN UR ELPH-MCNC: 3.9 GM/DL
GLUCOSE SERPL-MCNC: 215 MG/DL (ref 65–99)
HCT VFR BLD AUTO: 33.4 % (ref 37.5–51)
HGB BLD-MCNC: 11.3 G/DL (ref 13–17.7)
IMM GRANULOCYTES # BLD AUTO: 0.08 10*3/MM3 (ref 0–0.05)
IMM GRANULOCYTES NFR BLD AUTO: 0.8 % (ref 0–0.5)
LYMPHOCYTES # BLD AUTO: 0.58 10*3/MM3 (ref 0.7–3.1)
LYMPHOCYTES NFR BLD AUTO: 5.9 % (ref 19.6–45.3)
MAGNESIUM SERPL-MCNC: 2.1 MG/DL (ref 1.6–2.4)
MCH RBC QN AUTO: 29.8 PG (ref 26.6–33)
MCHC RBC AUTO-ENTMCNC: 33.8 G/DL (ref 31.5–35.7)
MCV RBC AUTO: 88.1 FL (ref 79–97)
MONOCYTES # BLD AUTO: 0.65 10*3/MM3 (ref 0.1–0.9)
MONOCYTES NFR BLD AUTO: 6.6 % (ref 5–12)
NEUTROPHILS NFR BLD AUTO: 7.76 10*3/MM3 (ref 1.7–7)
NEUTROPHILS NFR BLD AUTO: 79.5 % (ref 42.7–76)
NRBC BLD AUTO-RTO: 0 /100 WBC (ref 0–0.2)
PLATELET # BLD AUTO: 361 10*3/MM3 (ref 140–450)
PMV BLD AUTO: 8.6 FL (ref 6–12)
POTASSIUM SERPL-SCNC: 3.9 MMOL/L (ref 3.5–5.2)
PROT SERPL-MCNC: 7.6 G/DL (ref 6–8.5)
RBC # BLD AUTO: 3.79 10*6/MM3 (ref 4.14–5.8)
SODIUM SERPL-SCNC: 141 MMOL/L (ref 136–145)
WBC # BLD AUTO: 9.78 10*3/MM3 (ref 3.4–10.8)

## 2020-07-01 PROCEDURE — 83735 ASSAY OF MAGNESIUM: CPT

## 2020-07-01 PROCEDURE — 80053 COMPREHEN METABOLIC PANEL: CPT

## 2020-07-01 PROCEDURE — 85025 COMPLETE CBC W/AUTO DIFF WBC: CPT

## 2020-07-01 PROCEDURE — 36415 COLL VENOUS BLD VENIPUNCTURE: CPT

## 2020-07-02 DIAGNOSIS — C20 RECTAL CANCER (HCC): ICD-10-CM

## 2020-07-02 RX ORDER — PALONOSETRON 0.05 MG/ML
0.25 INJECTION, SOLUTION INTRAVENOUS ONCE
Status: CANCELLED | OUTPATIENT
Start: 2020-07-06

## 2020-07-02 RX ORDER — DEXTROSE MONOHYDRATE 50 MG/ML
250 INJECTION, SOLUTION INTRAVENOUS ONCE
Status: CANCELLED | OUTPATIENT
Start: 2020-07-06

## 2020-07-02 RX ORDER — FAMOTIDINE 10 MG/ML
20 INJECTION, SOLUTION INTRAVENOUS AS NEEDED
Status: CANCELLED | OUTPATIENT
Start: 2020-07-06

## 2020-07-02 RX ORDER — FLUOROURACIL 50 MG/ML
400 INJECTION, SOLUTION INTRAVENOUS ONCE
Status: CANCELLED | OUTPATIENT
Start: 2020-07-06

## 2020-07-02 RX ORDER — DIPHENHYDRAMINE HYDROCHLORIDE 50 MG/ML
50 INJECTION INTRAMUSCULAR; INTRAVENOUS AS NEEDED
Status: CANCELLED | OUTPATIENT
Start: 2020-07-06

## 2020-07-06 ENCOUNTER — LAB (OUTPATIENT)
Dept: LAB | Facility: HOSPITAL | Age: 70
End: 2020-07-06

## 2020-07-06 ENCOUNTER — INFUSION (OUTPATIENT)
Dept: ONCOLOGY | Facility: HOSPITAL | Age: 70
End: 2020-07-06

## 2020-07-06 VITALS
TEMPERATURE: 97.8 F | SYSTOLIC BLOOD PRESSURE: 125 MMHG | WEIGHT: 170 LBS | BODY MASS INDEX: 24.34 KG/M2 | OXYGEN SATURATION: 100 % | DIASTOLIC BLOOD PRESSURE: 59 MMHG | HEART RATE: 82 BPM | HEIGHT: 70 IN | RESPIRATION RATE: 18 BRPM

## 2020-07-06 DIAGNOSIS — C20 RECTAL CANCER (HCC): Primary | ICD-10-CM

## 2020-07-06 DIAGNOSIS — C20 RECTAL CANCER (HCC): ICD-10-CM

## 2020-07-06 LAB
ALBUMIN SERPL-MCNC: 3.7 G/DL (ref 3.5–5.2)
ALBUMIN/GLOB SERPL: 0.9 G/DL
ALP SERPL-CCNC: 104 U/L (ref 39–117)
ALT SERPL W P-5'-P-CCNC: 28 U/L (ref 1–41)
ANION GAP SERPL CALCULATED.3IONS-SCNC: 12 MMOL/L (ref 5–15)
AST SERPL-CCNC: 21 U/L (ref 1–40)
BASOPHILS # BLD AUTO: 0.07 10*3/MM3 (ref 0–0.2)
BASOPHILS NFR BLD AUTO: 0.8 % (ref 0–1.5)
BILIRUB SERPL-MCNC: 0.2 MG/DL (ref 0.2–1.2)
BUN SERPL-MCNC: 12 MG/DL (ref 8–23)
BUN/CREAT SERPL: 16.4 (ref 7–25)
CALCIUM SPEC-SCNC: 9.1 MG/DL (ref 8.6–10.5)
CEA SERPL-MCNC: 3.35 NG/ML
CHLORIDE SERPL-SCNC: 100 MMOL/L (ref 98–107)
CO2 SERPL-SCNC: 25 MMOL/L (ref 22–29)
CREAT SERPL-MCNC: 0.73 MG/DL (ref 0.76–1.27)
DEPRECATED RDW RBC AUTO: 42.7 FL (ref 37–54)
EOSINOPHIL # BLD AUTO: 0.28 10*3/MM3 (ref 0–0.4)
EOSINOPHIL NFR BLD AUTO: 3.3 % (ref 0.3–6.2)
ERYTHROCYTE [DISTWIDTH] IN BLOOD BY AUTOMATED COUNT: 13.3 % (ref 12.3–15.4)
GFR SERPL CREATININE-BSD FRML MDRD: 107 ML/MIN/1.73
GLOBULIN UR ELPH-MCNC: 4.1 GM/DL
GLUCOSE SERPL-MCNC: 253 MG/DL (ref 65–99)
HCT VFR BLD AUTO: 34.7 % (ref 37.5–51)
HGB BLD-MCNC: 11.5 G/DL (ref 13–17.7)
IMM GRANULOCYTES # BLD AUTO: 0.05 10*3/MM3 (ref 0–0.05)
IMM GRANULOCYTES NFR BLD AUTO: 0.6 % (ref 0–0.5)
LYMPHOCYTES # BLD AUTO: 0.47 10*3/MM3 (ref 0.7–3.1)
LYMPHOCYTES NFR BLD AUTO: 5.6 % (ref 19.6–45.3)
MAGNESIUM SERPL-MCNC: 2.1 MG/DL (ref 1.6–2.4)
MCH RBC QN AUTO: 29.1 PG (ref 26.6–33)
MCHC RBC AUTO-ENTMCNC: 33.1 G/DL (ref 31.5–35.7)
MCV RBC AUTO: 87.8 FL (ref 79–97)
MONOCYTES # BLD AUTO: 0.95 10*3/MM3 (ref 0.1–0.9)
MONOCYTES NFR BLD AUTO: 11.4 % (ref 5–12)
NEUTROPHILS NFR BLD AUTO: 6.55 10*3/MM3 (ref 1.7–7)
NEUTROPHILS NFR BLD AUTO: 78.3 % (ref 42.7–76)
NRBC BLD AUTO-RTO: 0 /100 WBC (ref 0–0.2)
PLATELET # BLD AUTO: 419 10*3/MM3 (ref 140–450)
PMV BLD AUTO: 8.4 FL (ref 6–12)
POTASSIUM SERPL-SCNC: 4.2 MMOL/L (ref 3.5–5.2)
PROT SERPL-MCNC: 7.8 G/DL (ref 6–8.5)
RBC # BLD AUTO: 3.95 10*6/MM3 (ref 4.14–5.8)
SODIUM SERPL-SCNC: 137 MMOL/L (ref 136–145)
WBC # BLD AUTO: 8.37 10*3/MM3 (ref 3.4–10.8)

## 2020-07-06 PROCEDURE — 96375 TX/PRO/DX INJ NEW DRUG ADDON: CPT

## 2020-07-06 PROCEDURE — 25010000002 FLUOROURACIL PER 500 MG: Performed by: NURSE PRACTITIONER

## 2020-07-06 PROCEDURE — 82378 CARCINOEMBRYONIC ANTIGEN: CPT

## 2020-07-06 PROCEDURE — 96417 CHEMO IV INFUS EACH ADDL SEQ: CPT

## 2020-07-06 PROCEDURE — 25010000002 OXALIPLATIN PER 0.5 MG: Performed by: NURSE PRACTITIONER

## 2020-07-06 PROCEDURE — 25010000002 LEUCOVORIN CALCIUM PER 50 MG: Performed by: NURSE PRACTITIONER

## 2020-07-06 PROCEDURE — 36415 COLL VENOUS BLD VENIPUNCTURE: CPT

## 2020-07-06 PROCEDURE — 85025 COMPLETE CBC W/AUTO DIFF WBC: CPT

## 2020-07-06 PROCEDURE — 96411 CHEMO IV PUSH ADDL DRUG: CPT

## 2020-07-06 PROCEDURE — 96415 CHEMO IV INFUSION ADDL HR: CPT

## 2020-07-06 PROCEDURE — 83735 ASSAY OF MAGNESIUM: CPT

## 2020-07-06 PROCEDURE — G0498 CHEMO EXTEND IV INFUS W/PUMP: HCPCS

## 2020-07-06 PROCEDURE — 96368 THER/DIAG CONCURRENT INF: CPT

## 2020-07-06 PROCEDURE — 96413 CHEMO IV INFUSION 1 HR: CPT

## 2020-07-06 PROCEDURE — 96367 TX/PROPH/DG ADDL SEQ IV INF: CPT

## 2020-07-06 PROCEDURE — 80053 COMPREHEN METABOLIC PANEL: CPT

## 2020-07-06 PROCEDURE — 96409 CHEMO IV PUSH SNGL DRUG: CPT

## 2020-07-06 PROCEDURE — 25010000002 DEXAMETHASONE SODIUM PHOSPHATE 100 MG/10ML SOLUTION: Performed by: NURSE PRACTITIONER

## 2020-07-06 PROCEDURE — 25010000002 FOSAPREPITANT PER 1 MG: Performed by: NURSE PRACTITIONER

## 2020-07-06 PROCEDURE — 25010000002 PALONOSETRON PER 25 MCG: Performed by: NURSE PRACTITIONER

## 2020-07-06 RX ORDER — DEXTROSE MONOHYDRATE 50 MG/ML
250 INJECTION, SOLUTION INTRAVENOUS ONCE
Status: COMPLETED | OUTPATIENT
Start: 2020-07-06 | End: 2020-07-06

## 2020-07-06 RX ORDER — DIPHENHYDRAMINE HYDROCHLORIDE 50 MG/ML
50 INJECTION INTRAMUSCULAR; INTRAVENOUS AS NEEDED
Status: DISCONTINUED | OUTPATIENT
Start: 2020-07-06 | End: 2020-07-06 | Stop reason: HOSPADM

## 2020-07-06 RX ORDER — PALONOSETRON 0.05 MG/ML
0.25 INJECTION, SOLUTION INTRAVENOUS ONCE
Status: COMPLETED | OUTPATIENT
Start: 2020-07-06 | End: 2020-07-06

## 2020-07-06 RX ORDER — FLUOROURACIL 50 MG/ML
400 INJECTION, SOLUTION INTRAVENOUS ONCE
Status: COMPLETED | OUTPATIENT
Start: 2020-07-06 | End: 2020-07-06

## 2020-07-06 RX ORDER — FAMOTIDINE 10 MG/ML
20 INJECTION, SOLUTION INTRAVENOUS AS NEEDED
Status: DISCONTINUED | OUTPATIENT
Start: 2020-07-06 | End: 2020-07-06 | Stop reason: HOSPADM

## 2020-07-06 RX ADMIN — FLUOROURACIL 4730 MG: 50 INJECTION, SOLUTION INTRAVENOUS at 12:52

## 2020-07-06 RX ADMIN — SODIUM CHLORIDE 150 MG: 9 INJECTION, SOLUTION INTRAVENOUS at 09:53

## 2020-07-06 RX ADMIN — DEXTROSE MONOHYDRATE 250 ML: 50 INJECTION, SOLUTION INTRAVENOUS at 09:33

## 2020-07-06 RX ADMIN — PALONOSETRON HYDROCHLORIDE 0.25 MG: 0.25 INJECTION, SOLUTION INTRAVENOUS at 09:33

## 2020-07-06 RX ADMIN — OXALIPLATIN 165 MG: 5 INJECTION, SOLUTION INTRAVENOUS at 10:24

## 2020-07-06 RX ADMIN — LEUCOVORIN CALCIUM 790 MG: 350 INJECTION, POWDER, LYOPHILIZED, FOR SOLUTION INTRAMUSCULAR; INTRAVENOUS at 10:24

## 2020-07-06 RX ADMIN — DEXAMETHASONE SODIUM PHOSPHATE 12 MG: 10 INJECTION, SOLUTION INTRAMUSCULAR; INTRAVENOUS at 09:35

## 2020-07-06 RX ADMIN — FLUOROURACIL 790 MG: 50 INJECTION, SOLUTION INTRAVENOUS at 12:44

## 2020-07-08 ENCOUNTER — INFUSION (OUTPATIENT)
Dept: ONCOLOGY | Facility: HOSPITAL | Age: 70
End: 2020-07-08

## 2020-07-08 VITALS
HEIGHT: 70 IN | DIASTOLIC BLOOD PRESSURE: 61 MMHG | RESPIRATION RATE: 18 BRPM | SYSTOLIC BLOOD PRESSURE: 100 MMHG | OXYGEN SATURATION: 99 % | BODY MASS INDEX: 24.34 KG/M2 | HEART RATE: 87 BPM | WEIGHT: 170 LBS | TEMPERATURE: 98.2 F

## 2020-07-08 DIAGNOSIS — C20 RECTAL CANCER (HCC): Primary | ICD-10-CM

## 2020-07-08 PROCEDURE — 25010000003 HEPARIN LOCK FLUSH PER 10 UNITS: Performed by: NURSE PRACTITIONER

## 2020-07-08 RX ORDER — SODIUM CHLORIDE 0.9 % (FLUSH) 0.9 %
10 SYRINGE (ML) INJECTION AS NEEDED
Status: DISCONTINUED | OUTPATIENT
Start: 2020-07-08 | End: 2020-07-08 | Stop reason: HOSPADM

## 2020-07-08 RX ORDER — SODIUM CHLORIDE 0.9 % (FLUSH) 0.9 %
10 SYRINGE (ML) INJECTION AS NEEDED
Status: CANCELLED | OUTPATIENT
Start: 2020-07-08

## 2020-07-08 RX ORDER — HEPARIN SODIUM (PORCINE) LOCK FLUSH IV SOLN 100 UNIT/ML 100 UNIT/ML
500 SOLUTION INTRAVENOUS AS NEEDED
Status: CANCELLED | OUTPATIENT
Start: 2020-07-08

## 2020-07-08 RX ORDER — HEPARIN SODIUM (PORCINE) LOCK FLUSH IV SOLN 100 UNIT/ML 100 UNIT/ML
500 SOLUTION INTRAVENOUS AS NEEDED
Status: DISCONTINUED | OUTPATIENT
Start: 2020-07-08 | End: 2020-07-08 | Stop reason: HOSPADM

## 2020-07-08 RX ADMIN — Medication 500 UNITS: at 11:02

## 2020-07-08 RX ADMIN — SODIUM CHLORIDE, PRESERVATIVE FREE 10 ML: 5 INJECTION INTRAVENOUS at 11:02

## 2020-07-08 NOTE — PROGRESS NOTES
[facility] ON[facility] GROUP HEMATOLOGY AND ONCOLOGY  [address]  [place] [address] [phone]    Patient Name: Attila Viramontes  Encounter Date: 07/15/2020  YOB: 1950  Patient Number: [id number]      REASON FOR FOLLOW-UP: Attila Viramontes is a pleasant 69 y.o. male who is seen on follow up for rectal cancer.  He is seen C2D10 of neoadjuvant FOLFOX. He is seen 2 months post neoadjuvant 5-FU with radiation. He is seen alone.  History is obtained from patient. History is considered to be accurate       Oncology/Hematology History    DIAGNOSTIC ABNORMALITIES:  CT abdomen and pelvis 03/25/2020.  Asymmetric wall thickening of the rectum could represent a mass/neoplasm. If not recently performed, colonoscopy is recommended. Inflammatory stranding of the central mesenteric root, which is nonspecific but can be seen with mesenteritis/mesenteric panniculitis.  Colonoscopy by Dr. Reaves 04/02/2020 showed a localized area of severely ulcerated mucosa was found at 2 cm proximal to the anus. Biopsies were taken with a cold forceps for histology.  Pathology report 04/03/2020 showed a moderately differentiated adenocarcinoma.  Stromal invasion is seen although depth of invasion is indeterminate in these biopsies.  In the areas of invasion, the stroma demonstrates a reactive, desmoplastic appearance.   CT chest report 04/20/2020.  Noncalcified pulmonary nodule in the left upper lobe. Metastatic  disease not excluded. Short interval follow-up with CT in 6 months recommended.   Atherosclerosis of the aorta and coronary arteries.  Stranding of the central mesenteric root in the abdomen, partially imaged.  Hepatic steatosis.  MRI 04/23/2020 showed abnormal enhancement and circumferential thickening of the  distal rectum for a length of 5.5 cm, with evidence of invasion of the mesorectal fat and mesial rectal lymph nodes, measure less than 3 mm  from the mesorectal fascia.   There is abnormal pre and post sacral soft tissue enhancement with enhancement of the distal sacrum, concerning for contiguous spread versus local metastatic disease. Addendum report, appears to be invasion of the left seminal vesicle base and left posterior prostate.  These findings are concerning for invasive malignancy.  Previous endorectal ultrasound 04/24/2020.  Fixed constricting lesion, T4NX.        PREVIOUS INTERVENTIONS:  Neoadjuvant CIV 5-FU and radiation 04/27/2020 through 06/05/2020 at [facility].  Not a surgical candidate.  Neoadjuvant FOLFOX 06/22/2020 through present at [facility].        Rectal cancer (CMS/HCC)     Initial Diagnosis     Rectal cancer (CMS/HCC)      3/25/2020 Imaging     CT Abd/Pelvis:     IMPRESSION:  1. Asymmetric wall thickening of the rectum could represent a  mass/neoplasm. If not recently performed, colonoscopy is recommended.     2. Inflammatory stranding of the central mesenteric root, which is  nonspecific but can be seen with mesenteritis/mesenteric panniculitis.     3. Atherosclerotic disease.  4. Fecal stasis.      4/2/2020 Biopsy     Colonoscopy:  Findings: The perianal and digital rectal examinations were normal.  A localized area of severely ulcerated mucosa was found at 2 cm proximal to the anus. Biopsies were  taken with a cold forceps for histology.  Impression: - Ulcerated mucosa at 2 cm proximal to the anus. Biopsied.    Final Diagnosis   Large intestine, rectum at 3 cm, biopsy: Moderately differentiated adenocarcinoma, invasive.     AJCC stage: pTX pNX               4/14/2020 Procedure     Mediport placement      4/14/2020 Imaging     CT Angio Head:  Summary:  1. Distal left ICA occlusion with patent [place] of [place].    CT Angio Neck:  IMPRESSION:  1. Left internal carotid artery occlusion beginning at the origin.  Minimal opacification of the distal left internal carotid artery, which  may represent  retrograde collateral flow.  2. Right internal carotid artery patent.  3. Bilateral vertebral arteries patent.  4. See separately dictated CTA head of the same day.     CT Head:  IMPRESSION:  1. Small amount of gas in the left cerebral cortical veins. Differential  would include iatrogenic air embolism or trauma.  2. No acute intracranial hemorrhage.      CT Angio Head/Neck:  Result Impression   1.  Age-indeterminate left internal carotid artery origin occlusion.   Reconstitution of left ICA flow at the level of the cavernous ICA,   likely due to patent anterior commuting artery. A critical alert was   sent.  2.  No perfusion abnormality.  3.  Moderate atherosclerotic stenosis involving the origin of the   right internal carotid artery.           4/15/2020 Imaging     MRI Brain:  No acute intracranial abnormality identified  Findings of chronic microvascular ischemia. Occluded left ICA flow   void.      4/27/2020 - 6/1/2020 Chemotherapy     OP COLORECTAL Fluorouracil CIV over 120H + XRT      5/1/2020 -  Chemotherapy     OP CENTRAL VENOUS ACCESS DEVICE ACCESS, CARE, AND MAINTENANCE (CVAD)      5/12/2020 Cancer Staged     Staging form: Colon And Rectum, AJCC 8th Edition  - Clinical stage from 5/12/2020: Stage IIIC (cT4b, cN1b, cM0) - Signed by Shaq Boggs MD on 5/12/2020 6/22/2020 -  Chemotherapy     OP COLON mFOLFOX6 OXALIplatin / Leucovorin / Fluorouracil         PAST MEDICAL HISTORY:  ALLERGIES:  No Known Allergies  CURRENT MEDICATIONS:  Outpatient Encounter Medications as of 7/15/2020   Medication Sig Dispense Refill   • aspirin 81 MG chewable tablet Chew 81 mg Daily.     • atorvastatin (LIPITOR) 80 MG tablet Take 80 mg by mouth Daily.     • ibuprofen (ADVIL,MOTRIN) 200 MG tablet Take 200 mg by mouth Every 8 (Eight) Hours As Needed for Mild Pain .     • pantoprazole (PROTONIX) 20 MG EC tablet Take 20 mg by mouth Daily.     • prochlorperazine (COMPAZINE) 10 MG tablet Take 1 tablet by mouth Every 6 (Six)  Hours As Needed for Nausea or Vomiting. 60 tablet 2     No facility-administered encounter medications on file as of 7/15/2020.      ADULT ILLNESSES:  Patient Active Problem List   Diagnosis Code   • Abnormal CT of the abdomen R93.5   • Rectal cancer (CMS/HCC) C20   • Current every day smoker F17.200   • Impaired gait and mobility R26.89   • Hypertension I10   • Bright red rectal bleeding K62.5   • 2nd degree AV block I44.1   • Tobacco abuse Z72.0   • Normocytic anemia D64.9     SURGERIES:  Past Surgical History:   Procedure Laterality Date   • COLONOSCOPY N/A 4/2/2020    Procedure: COLONOSCOPY WITH ANESTHESIA;  Surgeon: Yanick Flowers DO;  Location: [facility] ENDOSCOPY;  Service: Gastroenterology;  Laterality: N/A;  pre: abnormal CT scan  post: rectal mass  PCP unknown   • EYE SURGERY Bilateral     lenses present   • VENOUS ACCESS DEVICE (PORT) INSERTION N/A 4/14/2020    Procedure: INSERTION VENOUS ACCESS DEVICE;  Surgeon: Vielka Weller MD;  Location: [facility] OR;  Service: General;  Laterality: N/A;     HEALTH MAINTENANCE ITEMS:  Health Maintenance Due   Topic Date Due   • TDAP/TD VACCINES (1 - Tdap) 09/18/1962   • ZOSTER VACCINE (1 of 2) 09/18/2001   • Pneumococcal Vaccine Once at 65 Years Old  09/18/2016   • HEPATITIS C SCREENING  03/26/2020   • MEDICARE ANNUAL WELLNESS  03/26/2020       <no information>  Last Completed Colonoscopy       Status Date      COLONOSCOPY Done 4/2/2020 COLONOSCOPY     Patient has more history with this topic...          There is no immunization history on file for this patient.  Last Completed Mammogram     Patient has no health maintenance due at this time            FAMILY HISTORY:  Family History   Problem Relation Age of Onset   • Cancer Mother    • Stroke Father    • Heart disease Father    • Heart attack Brother      SOCIAL HISTORY:  Social History     Socioeconomic History   • Marital status: Single     Spouse name: Not on file   • Number of children: Not on file   • Years of  "education: Not on file   • Highest education level: Not on file   Tobacco Use   • Smoking status: Current Every Day Smoker     Packs/day: 1.00     Years: 53.00     Pack years: 53.00     Types: Cigarettes   • Smokeless tobacco: Never Used   Substance and Sexual Activity   • Alcohol use: Not Currently   • Drug use: Never   • Sexual activity: Defer       REVIEW OF SYSTEMS:    Review of Systems   Constitutional: Positive for fatigue. Negative for chills, diaphoresis and fever.        \"I am weak doc.  But I did not get sick.\"    HENT: Negative for congestion, nosebleeds and trouble swallowing.    Eyes: Negative for redness and visual disturbance.   Respiratory: Negative for cough, shortness of breath and wheezing.    Cardiovascular: Negative for chest pain and palpitations.   Gastrointestinal: Negative for abdominal pain, blood in stool, constipation, diarrhea, nausea and vomiting.   Endocrine: Negative for cold intolerance and heat intolerance.   Genitourinary: Positive for dysuria. Negative for flank pain and hematuria.   Musculoskeletal: Negative for gait problem and neck stiffness.   Skin: Positive for pallor.   Allergic/Immunologic: Negative for food allergies.   Neurological: Negative for dizziness, seizures and speech difficulty.   Hematological: Negative for adenopathy. Does not bruise/bleed easily.   Psychiatric/Behavioral: Negative for agitation, confusion and hallucinations.       VITAL SIGNS: /64   Pulse 110   Temp 98.8 °F (37.1 °C)   Resp 18   Ht 177.8 cm (70\")   Wt 75.1 kg (165 lb 8 oz)   SpO2 97%   BMI 23.75 kg/m²   Pain Score    07/15/20 0816   PainSc: 0-No pain       PHYSICAL EXAMINATION:     Physical Exam   Constitutional: He is oriented to person, place, and time. He appears well-developed and well-nourished. No distress.   HENT:   Head: Normocephalic and atraumatic.   Eyes: No scleral icterus.   Neck: Neck supple.   Cardiovascular: Normal rate and regular rhythm.   Pulmonary/Chest: Effort " normal and breath sounds normal. He has no wheezes. He has no rales.   Port, no erythema.   Abdominal: Soft. Bowel sounds are normal. There is no tenderness.   Musculoskeletal: He exhibits no edema.   Lymphadenopathy:     He has no cervical adenopathy.   Neurological: He is alert and oriented to person, place, and time.   Skin: Skin is warm and dry. He is not diaphoretic. There is pallor.   Psychiatric: He has a normal mood and affect. His behavior is normal. Judgment and thought content normal.   Vitals reviewed.      LABS    Lab Results - Last 18 Months   Lab Units 07/06/20  0827 07/01/20  0843 06/22/20  0808 06/01/20  0838 05/26/20  0832 05/18/20  0846   HEMOGLOBIN g/dL 11.5* 11.3* 11.7* 12.1* 12.2* 12.2*   HEMATOCRIT % 34.7* 33.4* 35.3* 35.8* 35.6* 36.0*   MCV fL 87.8 88.1 89.1 91.1 90.4 90.9   WBC 10*3/mm3 8.37 9.78 11.40* 12.10* 11.08* 9.54   RDW % 13.3 13.1 12.9 12.6 12.6 12.5   MPV fL 8.4 8.6 8.3 8.3 8.1 8.3   PLATELETS 10*3/mm3 419 361 487* 453* 408 295   IMM GRAN % % 0.6* 0.8* 0.7* 0.7* 0.9* 0.4   NEUTROS ABS 10*3/mm3 6.55 7.76* 9.34* 10.00* 9.06* 7.67*   LYMPHS ABS 10*3/mm3 0.47* 0.58* 0.63* 0.51* 0.48* 0.55*   MONOS ABS 10*3/mm3 0.95* 0.65 0.93* 1.14* 0.96* 0.83   EOS ABS 10*3/mm3 0.28 0.66* 0.35 0.32 0.43* 0.40   BASOS ABS 10*3/mm3 0.07 0.05 0.07 0.04 0.05 0.05   IMMATURE GRANS (ABS) 10*3/mm3 0.05 0.08* 0.08* 0.09* 0.10* 0.04   NRBC /100 WBC 0.0 0.0 0.0 0.0 0.0 0.0       Lab Results - Last 18 Months   Lab Units 07/06/20  0827 07/01/20  0843 06/22/20  0808 06/01/20  0838 05/26/20  0832 05/18/20  0846   GLUCOSE mg/dL 253* 215* 262* 226* 215* 199*   SODIUM mmol/L 137 141 135* 138 135* 140   POTASSIUM mmol/L 4.2 3.9 4.2 3.9 4.2 4.0   CO2 mmol/L 25.0 23.0 23.0 25.0 25.0 26.0   CHLORIDE mmol/L 100 104 99 102 97* 104   ANION GAP mmol/L 12.0 14.0 13.0 11.0 13.0 10.0   CREATININE mg/dL 0.73* 0.75* 0.82 0.81 0.86 0.83   BUN mg/dL 12 11 14 10 15 16   BUN / CREAT RATIO  16.4 14.7 17.1 12.3 17.4 19.3   CALCIUM mg/dL  9.1 9.2 9.2 9.1 9.1 9.2   EGFR IF NONAFRICN AM mL/min/1.73 107 104 93 95 88 92   ALK PHOS U/L 104 92 89 90 111 105   TOTAL PROTEIN g/dL 7.8 7.6 7.8 7.3 7.6 7.2   ALT (SGPT) U/L 28 20 19 19 20 16   AST (SGOT) U/L 21 20 15 13 17 14   BILIRUBIN mg/dL 0.2 0.3 0.2 0.4 0.3 0.2   ALBUMIN g/dL 3.70 3.70 3.70 3.60 3.90 3.80   GLOBULIN gm/dL 4.1 3.9 4.1 3.7 3.7 3.4       Lab Results - Last 18 Months   Lab Units 07/06/20  0827 04/09/20  0954   CEA ng/mL 3.35 4.26       No results for input(s): IRON, TIBC, LABIRON, FERRITIN, S0ZFCSM, TSH, FOLATE in the last 87692 hours.    Invalid input(s): VITB12      Attila Viramontes reports a pain score of 0.        Patient's Body mass index is 23.75 kg/m². BMI is within normal parameters. No follow-up required..      ASSESSMENT:  1.  Adenocarcinoma of the rectum. Tumor size 5.5 cm.  AJCC stage:IIIC (cT4b, N1b, M0)  Treatment status:Post neoadjuvant CIV 5-FU and radiation, not a surgical candidate.  On neoadjuvant FOLFOX.  2.  Performance status of 1.  3.  Normocytic anemia from chemo.    4.  Elevated protein 03/25/2020.  5.  6 mm nodule, left upper lobe near the fissure.  6.  History of pancreatitis.   7.  Fatigue from chemo.  8.  Dysuria 07/15/2020.        PLAN:  1.   Re:  Tolerance to FOLFOX.  He reports fatigue.  2.   Re:  Heme status.  Hemoglobin 11.5.  3.   Re:  Pre office CMP.   mL/min  4.   Re:  Pre-office magnesium 2.1 and CEA at 3.35 from 4.26.  5.  Continue FOLFOX for  total of 8 cycles: Observe for potential nausea, vomiting, diarrhea, cold intolerance, myelosuppression, anaphylaxis, alopecia, and neuropathy.  6.  Schedule treatment C3 D1 to 3 to start 07/20/2020 and C4 D1 to D3 start 08/03/2020  To be administered every 2 weeks: Plan for 8 cycles.  Oxaliplatin 85 mg/m2 IVPB over 2 hours (TD = 165 mg).  Leucovorin 400 mg/m2 IVPB over 2 hours (TD= 790 mg).  5- mg/ m2  IVP (TD = 790  mg).  Begin 5-FU 2,400 mg/m2 IVPB over 46 hours.  (TD=  4,730 mg).  7.   Premedicate with:  Aloxi 0.25 mg IVP.  Emend 150 mg IV.  Decadron 12 mg IVPB over 20-30 min.  8.  Weekly CBC with differential, magnesium, and CMP.  9.  Continue current medications.  10.  Continue care per primary care physician and other specialists.  11.  Recall colonoscopy on 10/2020.  12.  Advance Care Planning  ACP discussion was declined by the patient. Patient does not have an advance directive, information provided.   13.  eRx Compazine 10 mg p.o. every 4 hours as needed for nausea and vomiting #60 with 2 refills if needed.  14.  NS 1 liter over 2 hours as needed.  15.  Patient will be reevaluated by Dr. De Jesus at [place] after 8 cycles of FOLFOX.  16.  Urinalysis today due to dysuria.   17.  Return to office in 4 weeks with pre-office CEA.     I spent 29 total minutes, face-to-face, caring for Attila today.  Greater than 50% of this time involved counseling and/or coordination of care as documented within this note regarding the patient's illness(es), pros and cons of various treatment options, instructions and/or risk reduction.        Vincent Swenson MD  (Yanick Flowers DO)  (Nabeel Walton MD)  (Kel Ramirez MD)  (Vielka Weller MD)  (Destini De Jesus MD)

## 2020-07-15 ENCOUNTER — OFFICE VISIT (OUTPATIENT)
Dept: ONCOLOGY | Facility: CLINIC | Age: 70
End: 2020-07-15

## 2020-07-15 ENCOUNTER — TELEPHONE (OUTPATIENT)
Dept: ONCOLOGY | Facility: CLINIC | Age: 70
End: 2020-07-15

## 2020-07-15 ENCOUNTER — LAB (OUTPATIENT)
Dept: LAB | Facility: HOSPITAL | Age: 70
End: 2020-07-15

## 2020-07-15 VITALS
RESPIRATION RATE: 18 BRPM | BODY MASS INDEX: 23.69 KG/M2 | HEART RATE: 110 BPM | OXYGEN SATURATION: 97 % | SYSTOLIC BLOOD PRESSURE: 122 MMHG | TEMPERATURE: 98.8 F | DIASTOLIC BLOOD PRESSURE: 64 MMHG | WEIGHT: 165.5 LBS | HEIGHT: 70 IN

## 2020-07-15 DIAGNOSIS — R30.0 BURNING WITH URINATION: ICD-10-CM

## 2020-07-15 DIAGNOSIS — C20 RECTAL CANCER (HCC): ICD-10-CM

## 2020-07-15 DIAGNOSIS — C20 RECTAL CANCER (HCC): Primary | ICD-10-CM

## 2020-07-15 DIAGNOSIS — R35.0 FREQUENT URINATION: ICD-10-CM

## 2020-07-15 DIAGNOSIS — R82.90 ABNORMAL URINE: Primary | ICD-10-CM

## 2020-07-15 LAB
ALBUMIN SERPL-MCNC: 3.8 G/DL (ref 3.5–5.2)
ALBUMIN/GLOB SERPL: 0.9 G/DL
ALP SERPL-CCNC: 101 U/L (ref 39–117)
ALT SERPL W P-5'-P-CCNC: 29 U/L (ref 1–41)
ANION GAP SERPL CALCULATED.3IONS-SCNC: 13 MMOL/L (ref 5–15)
AST SERPL-CCNC: 22 U/L (ref 1–40)
BACTERIA UR QL AUTO: ABNORMAL /HPF
BASOPHILS # BLD AUTO: 0.06 10*3/MM3 (ref 0–0.2)
BASOPHILS NFR BLD AUTO: 0.5 % (ref 0–1.5)
BILIRUB SERPL-MCNC: 0.3 MG/DL (ref 0–1.2)
BILIRUB UR QL STRIP: NEGATIVE
BUN SERPL-MCNC: 15 MG/DL (ref 8–23)
BUN/CREAT SERPL: 21.4 (ref 7–25)
CALCIUM SPEC-SCNC: 9.4 MG/DL (ref 8.6–10.5)
CHLORIDE SERPL-SCNC: 99 MMOL/L (ref 98–107)
CLARITY UR: CLEAR
CO2 SERPL-SCNC: 24 MMOL/L (ref 22–29)
COLOR UR: YELLOW
CREAT SERPL-MCNC: 0.7 MG/DL (ref 0.76–1.27)
DEPRECATED RDW RBC AUTO: 43.1 FL (ref 37–54)
EOSINOPHIL # BLD AUTO: 0.56 10*3/MM3 (ref 0–0.4)
EOSINOPHIL NFR BLD AUTO: 5 % (ref 0.3–6.2)
ERYTHROCYTE [DISTWIDTH] IN BLOOD BY AUTOMATED COUNT: 13.6 % (ref 12.3–15.4)
GFR SERPL CREATININE-BSD FRML MDRD: 112 ML/MIN/1.73
GLOBULIN UR ELPH-MCNC: 4.2 GM/DL
GLUCOSE SERPL-MCNC: 234 MG/DL (ref 65–99)
GLUCOSE UR STRIP-MCNC: NEGATIVE MG/DL
HCT VFR BLD AUTO: 34.2 % (ref 37.5–51)
HGB BLD-MCNC: 11 G/DL (ref 13–17.7)
HGB UR QL STRIP.AUTO: NEGATIVE
HYALINE CASTS UR QL AUTO: ABNORMAL /LPF
IMM GRANULOCYTES # BLD AUTO: 0.06 10*3/MM3 (ref 0–0.05)
IMM GRANULOCYTES NFR BLD AUTO: 0.5 % (ref 0–0.5)
KETONES UR QL STRIP: NEGATIVE
LEUKOCYTE ESTERASE UR QL STRIP.AUTO: ABNORMAL
LYMPHOCYTES # BLD AUTO: 0.53 10*3/MM3 (ref 0.7–3.1)
LYMPHOCYTES NFR BLD AUTO: 4.8 % (ref 19.6–45.3)
MAGNESIUM SERPL-MCNC: 2.1 MG/DL (ref 1.6–2.4)
MCH RBC QN AUTO: 28.3 PG (ref 26.6–33)
MCHC RBC AUTO-ENTMCNC: 32.2 G/DL (ref 31.5–35.7)
MCV RBC AUTO: 87.9 FL (ref 79–97)
MONOCYTES # BLD AUTO: 1.08 10*3/MM3 (ref 0.1–0.9)
MONOCYTES NFR BLD AUTO: 9.7 % (ref 5–12)
MUCOUS THREADS URNS QL MICRO: ABNORMAL /HPF
NEUTROPHILS NFR BLD AUTO: 79.5 % (ref 42.7–76)
NEUTROPHILS NFR BLD AUTO: 8.85 10*3/MM3 (ref 1.7–7)
NITRITE UR QL STRIP: NEGATIVE
NRBC BLD AUTO-RTO: 0 /100 WBC (ref 0–0.2)
PH UR STRIP.AUTO: <=5 [PH] (ref 5–8)
PLATELET # BLD AUTO: 403 10*3/MM3 (ref 140–450)
PMV BLD AUTO: 8.7 FL (ref 6–12)
POTASSIUM SERPL-SCNC: 4.2 MMOL/L (ref 3.5–5.2)
PROT SERPL-MCNC: 8 G/DL (ref 6–8.5)
PROT UR QL STRIP: ABNORMAL
RBC # BLD AUTO: 3.89 10*6/MM3 (ref 4.14–5.8)
RBC # UR: ABNORMAL /HPF
REF LAB TEST METHOD: ABNORMAL
SODIUM SERPL-SCNC: 136 MMOL/L (ref 136–145)
SP GR UR STRIP: 1.02 (ref 1–1.03)
SQUAMOUS #/AREA URNS HPF: ABNORMAL /HPF
UROBILINOGEN UR QL STRIP: ABNORMAL
WBC # BLD AUTO: 11.14 10*3/MM3 (ref 3.4–10.8)
WBC UR QL AUTO: ABNORMAL /HPF

## 2020-07-15 PROCEDURE — 36415 COLL VENOUS BLD VENIPUNCTURE: CPT

## 2020-07-15 PROCEDURE — 83735 ASSAY OF MAGNESIUM: CPT

## 2020-07-15 PROCEDURE — 81001 URINALYSIS AUTO W/SCOPE: CPT

## 2020-07-15 PROCEDURE — 99214 OFFICE O/P EST MOD 30 MIN: CPT | Performed by: INTERNAL MEDICINE

## 2020-07-15 PROCEDURE — 80053 COMPREHEN METABOLIC PANEL: CPT

## 2020-07-15 PROCEDURE — 85025 COMPLETE CBC W/AUTO DIFF WBC: CPT

## 2020-07-15 RX ORDER — CIPROFLOXACIN 500 MG/1
TABLET, FILM COATED ORAL
Qty: 14 TABLET | Refills: 7 | Status: SHIPPED | OUTPATIENT
Start: 2020-07-15 | End: 2020-09-18

## 2020-07-15 NOTE — TELEPHONE ENCOUNTER
----- Message from Shaq Boggs MD sent at 7/15/2020 11:14 AM CDT -----  eRx Cipro 500 mg p.o. twice a day for 7 days #14, no refill.

## 2020-07-15 NOTE — TELEPHONE ENCOUNTER
Patient notified prescription for antibiotic to be called to his pharmacy due to possible UTI, pt v/u   Urinalysis added to 8/3/20 lab draw

## 2020-07-17 DIAGNOSIS — C20 RECTAL CANCER (HCC): ICD-10-CM

## 2020-07-17 LAB
CYTO UR: NORMAL
LAB AP CASE REPORT: NORMAL
PATH REPORT.FINAL DX SPEC: NORMAL
PATH REPORT.GROSS SPEC: NORMAL

## 2020-07-17 RX ORDER — DIPHENHYDRAMINE HYDROCHLORIDE 50 MG/ML
50 INJECTION INTRAMUSCULAR; INTRAVENOUS AS NEEDED
Status: CANCELLED | OUTPATIENT
Start: 2020-07-20

## 2020-07-17 RX ORDER — DEXTROSE MONOHYDRATE 50 MG/ML
250 INJECTION, SOLUTION INTRAVENOUS ONCE
Status: CANCELLED | OUTPATIENT
Start: 2020-07-20

## 2020-07-17 RX ORDER — PALONOSETRON 0.05 MG/ML
0.25 INJECTION, SOLUTION INTRAVENOUS ONCE
Status: CANCELLED | OUTPATIENT
Start: 2020-07-20

## 2020-07-17 RX ORDER — FAMOTIDINE 10 MG/ML
20 INJECTION, SOLUTION INTRAVENOUS AS NEEDED
Status: CANCELLED | OUTPATIENT
Start: 2020-07-20

## 2020-07-17 RX ORDER — FLUOROURACIL 50 MG/ML
400 INJECTION, SOLUTION INTRAVENOUS ONCE
Status: CANCELLED | OUTPATIENT
Start: 2020-07-20

## 2020-07-20 ENCOUNTER — INFUSION (OUTPATIENT)
Dept: ONCOLOGY | Facility: HOSPITAL | Age: 70
End: 2020-07-20

## 2020-07-20 ENCOUNTER — LAB (OUTPATIENT)
Dept: LAB | Facility: HOSPITAL | Age: 70
End: 2020-07-20

## 2020-07-20 VITALS
OXYGEN SATURATION: 99 % | BODY MASS INDEX: 23.68 KG/M2 | DIASTOLIC BLOOD PRESSURE: 63 MMHG | HEART RATE: 74 BPM | HEIGHT: 70 IN | RESPIRATION RATE: 18 BRPM | WEIGHT: 165.4 LBS | TEMPERATURE: 97.8 F | SYSTOLIC BLOOD PRESSURE: 118 MMHG

## 2020-07-20 DIAGNOSIS — C20 RECTAL CANCER (HCC): ICD-10-CM

## 2020-07-20 DIAGNOSIS — C20 RECTAL CANCER (HCC): Primary | ICD-10-CM

## 2020-07-20 LAB
ALBUMIN SERPL-MCNC: 3.5 G/DL (ref 3.5–5.2)
ALBUMIN/GLOB SERPL: 0.9 G/DL
ALP SERPL-CCNC: 103 U/L (ref 39–117)
ALT SERPL W P-5'-P-CCNC: 27 U/L (ref 1–41)
ANION GAP SERPL CALCULATED.3IONS-SCNC: 12 MMOL/L (ref 5–15)
AST SERPL-CCNC: 18 U/L (ref 1–40)
BASOPHILS # BLD AUTO: 0.07 10*3/MM3 (ref 0–0.2)
BASOPHILS NFR BLD AUTO: 1.1 % (ref 0–1.5)
BILIRUB SERPL-MCNC: 0.3 MG/DL (ref 0–1.2)
BUN SERPL-MCNC: 15 MG/DL (ref 8–23)
BUN/CREAT SERPL: 18.1 (ref 7–25)
CALCIUM SPEC-SCNC: 9.2 MG/DL (ref 8.6–10.5)
CHLORIDE SERPL-SCNC: 102 MMOL/L (ref 98–107)
CO2 SERPL-SCNC: 23 MMOL/L (ref 22–29)
CREAT SERPL-MCNC: 0.83 MG/DL (ref 0.76–1.27)
DEPRECATED RDW RBC AUTO: 44.4 FL (ref 37–54)
EOSINOPHIL # BLD AUTO: 0.2 10*3/MM3 (ref 0–0.4)
EOSINOPHIL NFR BLD AUTO: 3.1 % (ref 0.3–6.2)
ERYTHROCYTE [DISTWIDTH] IN BLOOD BY AUTOMATED COUNT: 14.1 % (ref 12.3–15.4)
GFR SERPL CREATININE-BSD FRML MDRD: 92 ML/MIN/1.73
GLOBULIN UR ELPH-MCNC: 4.1 GM/DL
GLUCOSE SERPL-MCNC: 274 MG/DL (ref 65–99)
HCT VFR BLD AUTO: 33.8 % (ref 37.5–51)
HGB BLD-MCNC: 10.8 G/DL (ref 13–17.7)
IMM GRANULOCYTES # BLD AUTO: 0.07 10*3/MM3 (ref 0–0.05)
IMM GRANULOCYTES NFR BLD AUTO: 1.1 % (ref 0–0.5)
LYMPHOCYTES # BLD AUTO: 0.53 10*3/MM3 (ref 0.7–3.1)
LYMPHOCYTES NFR BLD AUTO: 8.2 % (ref 19.6–45.3)
MAGNESIUM SERPL-MCNC: 2 MG/DL (ref 1.6–2.4)
MCH RBC QN AUTO: 28 PG (ref 26.6–33)
MCHC RBC AUTO-ENTMCNC: 32 G/DL (ref 31.5–35.7)
MCV RBC AUTO: 87.6 FL (ref 79–97)
MONOCYTES # BLD AUTO: 1.03 10*3/MM3 (ref 0.1–0.9)
MONOCYTES NFR BLD AUTO: 15.8 % (ref 5–12)
NEUTROPHILS NFR BLD AUTO: 4.6 10*3/MM3 (ref 1.7–7)
NEUTROPHILS NFR BLD AUTO: 70.7 % (ref 42.7–76)
NRBC BLD AUTO-RTO: 0 /100 WBC (ref 0–0.2)
PLATELET # BLD AUTO: 405 10*3/MM3 (ref 140–450)
PMV BLD AUTO: 8.3 FL (ref 6–12)
POTASSIUM SERPL-SCNC: 3.9 MMOL/L (ref 3.5–5.2)
PROT SERPL-MCNC: 7.6 G/DL (ref 6–8.5)
RBC # BLD AUTO: 3.86 10*6/MM3 (ref 4.14–5.8)
SODIUM SERPL-SCNC: 137 MMOL/L (ref 136–145)
WBC # BLD AUTO: 6.5 10*3/MM3 (ref 3.4–10.8)

## 2020-07-20 PROCEDURE — 25010000002 DEXAMETHASONE SODIUM PHOSPHATE 100 MG/10ML SOLUTION: Performed by: NURSE PRACTITIONER

## 2020-07-20 PROCEDURE — 83735 ASSAY OF MAGNESIUM: CPT

## 2020-07-20 PROCEDURE — 96367 TX/PROPH/DG ADDL SEQ IV INF: CPT

## 2020-07-20 PROCEDURE — 25010000002 PALONOSETRON PER 25 MCG: Performed by: NURSE PRACTITIONER

## 2020-07-20 PROCEDURE — 96368 THER/DIAG CONCURRENT INF: CPT

## 2020-07-20 PROCEDURE — 36415 COLL VENOUS BLD VENIPUNCTURE: CPT

## 2020-07-20 PROCEDURE — 85025 COMPLETE CBC W/AUTO DIFF WBC: CPT

## 2020-07-20 PROCEDURE — 80053 COMPREHEN METABOLIC PANEL: CPT

## 2020-07-20 PROCEDURE — 25010000002 FOSAPREPITANT PER 1 MG: Performed by: NURSE PRACTITIONER

## 2020-07-20 PROCEDURE — 96375 TX/PRO/DX INJ NEW DRUG ADDON: CPT

## 2020-07-20 PROCEDURE — 25010000002 FLUOROURACIL PER 500 MG: Performed by: NURSE PRACTITIONER

## 2020-07-20 PROCEDURE — G0498 CHEMO EXTEND IV INFUS W/PUMP: HCPCS

## 2020-07-20 PROCEDURE — 96413 CHEMO IV INFUSION 1 HR: CPT

## 2020-07-20 PROCEDURE — 25010000002 OXALIPLATIN PER 0.5 MG: Performed by: NURSE PRACTITIONER

## 2020-07-20 PROCEDURE — 96415 CHEMO IV INFUSION ADDL HR: CPT

## 2020-07-20 PROCEDURE — 96411 CHEMO IV PUSH ADDL DRUG: CPT

## 2020-07-20 PROCEDURE — 25010000002 LEUCOVORIN CALCIUM PER 50 MG: Performed by: NURSE PRACTITIONER

## 2020-07-20 RX ORDER — PALONOSETRON 0.05 MG/ML
0.25 INJECTION, SOLUTION INTRAVENOUS ONCE
Status: COMPLETED | OUTPATIENT
Start: 2020-07-20 | End: 2020-07-20

## 2020-07-20 RX ORDER — DEXTROSE MONOHYDRATE 50 MG/ML
250 INJECTION, SOLUTION INTRAVENOUS ONCE
Status: COMPLETED | OUTPATIENT
Start: 2020-07-20 | End: 2020-07-20

## 2020-07-20 RX ORDER — FLUOROURACIL 50 MG/ML
400 INJECTION, SOLUTION INTRAVENOUS ONCE
Status: COMPLETED | OUTPATIENT
Start: 2020-07-20 | End: 2020-07-20

## 2020-07-20 RX ORDER — DIPHENHYDRAMINE HYDROCHLORIDE 50 MG/ML
50 INJECTION INTRAMUSCULAR; INTRAVENOUS AS NEEDED
Status: DISCONTINUED | OUTPATIENT
Start: 2020-07-20 | End: 2020-07-20 | Stop reason: HOSPADM

## 2020-07-20 RX ORDER — FAMOTIDINE 10 MG/ML
20 INJECTION, SOLUTION INTRAVENOUS AS NEEDED
Status: DISCONTINUED | OUTPATIENT
Start: 2020-07-20 | End: 2020-07-20 | Stop reason: HOSPADM

## 2020-07-20 RX ADMIN — FLUOROURACIL 4730 MG: 50 INJECTION, SOLUTION INTRAVENOUS at 12:16

## 2020-07-20 RX ADMIN — DEXTROSE MONOHYDRATE 250 ML: 50 INJECTION, SOLUTION INTRAVENOUS at 09:10

## 2020-07-20 RX ADMIN — FLUOROURACIL 790 MG: 50 INJECTION, SOLUTION INTRAVENOUS at 12:09

## 2020-07-20 RX ADMIN — DEXAMETHASONE SODIUM PHOSPHATE 12 MG: 10 INJECTION, SOLUTION INTRAMUSCULAR; INTRAVENOUS at 09:16

## 2020-07-20 RX ADMIN — OXALIPLATIN 165 MG: 5 INJECTION, SOLUTION INTRAVENOUS at 10:05

## 2020-07-20 RX ADMIN — LEUCOVORIN CALCIUM 790 MG: 100 INJECTION, POWDER, LYOPHILIZED, FOR SUSPENSION INTRAMUSCULAR; INTRAVENOUS at 10:05

## 2020-07-20 RX ADMIN — SODIUM CHLORIDE 150 MG: 9 INJECTION, SOLUTION INTRAVENOUS at 09:35

## 2020-07-20 RX ADMIN — PALONOSETRON HYDROCHLORIDE 0.25 MG: 0.25 INJECTION, SOLUTION INTRAVENOUS at 09:12

## 2020-07-22 ENCOUNTER — INFUSION (OUTPATIENT)
Dept: ONCOLOGY | Facility: HOSPITAL | Age: 70
End: 2020-07-22

## 2020-07-22 VITALS
DIASTOLIC BLOOD PRESSURE: 59 MMHG | BODY MASS INDEX: 23.34 KG/M2 | HEART RATE: 92 BPM | TEMPERATURE: 97.8 F | WEIGHT: 163 LBS | SYSTOLIC BLOOD PRESSURE: 101 MMHG | HEIGHT: 70 IN | RESPIRATION RATE: 18 BRPM | OXYGEN SATURATION: 97 %

## 2020-07-22 DIAGNOSIS — C20 RECTAL CANCER (HCC): Primary | ICD-10-CM

## 2020-07-22 PROCEDURE — 25010000003 HEPARIN LOCK FLUSH PER 10 UNITS: Performed by: NURSE PRACTITIONER

## 2020-07-22 RX ORDER — HEPARIN SODIUM (PORCINE) LOCK FLUSH IV SOLN 100 UNIT/ML 100 UNIT/ML
500 SOLUTION INTRAVENOUS AS NEEDED
Status: DISCONTINUED | OUTPATIENT
Start: 2020-07-22 | End: 2020-07-22 | Stop reason: HOSPADM

## 2020-07-22 RX ORDER — SODIUM CHLORIDE 0.9 % (FLUSH) 0.9 %
10 SYRINGE (ML) INJECTION AS NEEDED
Status: DISCONTINUED | OUTPATIENT
Start: 2020-07-22 | End: 2020-07-22 | Stop reason: HOSPADM

## 2020-07-22 RX ORDER — HEPARIN SODIUM (PORCINE) LOCK FLUSH IV SOLN 100 UNIT/ML 100 UNIT/ML
500 SOLUTION INTRAVENOUS AS NEEDED
Status: CANCELLED | OUTPATIENT
Start: 2020-07-22

## 2020-07-22 RX ORDER — SODIUM CHLORIDE 0.9 % (FLUSH) 0.9 %
10 SYRINGE (ML) INJECTION AS NEEDED
Status: CANCELLED | OUTPATIENT
Start: 2020-07-22

## 2020-07-22 RX ADMIN — SODIUM CHLORIDE, PRESERVATIVE FREE 10 ML: 5 INJECTION INTRAVENOUS at 11:16

## 2020-07-22 RX ADMIN — Medication 500 UNITS: at 11:16

## 2020-07-28 ENCOUNTER — TELEPHONE (OUTPATIENT)
Dept: ONCOLOGY | Facility: CLINIC | Age: 70
End: 2020-07-28

## 2020-07-28 NOTE — TELEPHONE ENCOUNTER
"Patient reports recurrent dysuria. \"I felt good with the antibioticI think I need another lab test.\" Instructed to proceed to the ER for evaluation.  He verbalized understanding. \"Thank you.\"  "

## 2020-07-30 NOTE — TELEPHONE ENCOUNTER
Called patient and he states that he is feeling much better.  States that he was constipated and has taken medication which has worked.  Was scared he was getting another bladder infection due to the pain, but states that was not the case.  Instructed to call with any problems.  Patient v/u.

## 2020-07-31 RX ORDER — FAMOTIDINE 10 MG/ML
20 INJECTION, SOLUTION INTRAVENOUS AS NEEDED
Status: CANCELLED | OUTPATIENT
Start: 2020-08-03

## 2020-07-31 RX ORDER — PALONOSETRON 0.05 MG/ML
0.25 INJECTION, SOLUTION INTRAVENOUS ONCE
Status: CANCELLED | OUTPATIENT
Start: 2020-08-03

## 2020-07-31 RX ORDER — DIPHENHYDRAMINE HYDROCHLORIDE 50 MG/ML
50 INJECTION INTRAMUSCULAR; INTRAVENOUS AS NEEDED
Status: CANCELLED | OUTPATIENT
Start: 2020-08-03

## 2020-07-31 RX ORDER — FLUOROURACIL 50 MG/ML
400 INJECTION, SOLUTION INTRAVENOUS ONCE
Status: CANCELLED | OUTPATIENT
Start: 2020-08-03

## 2020-07-31 RX ORDER — DEXTROSE MONOHYDRATE 50 MG/ML
250 INJECTION, SOLUTION INTRAVENOUS ONCE
Status: CANCELLED | OUTPATIENT
Start: 2020-08-03

## 2020-08-03 ENCOUNTER — LAB (OUTPATIENT)
Dept: LAB | Facility: HOSPITAL | Age: 70
End: 2020-08-03

## 2020-08-03 ENCOUNTER — INFUSION (OUTPATIENT)
Dept: ONCOLOGY | Facility: HOSPITAL | Age: 70
End: 2020-08-03

## 2020-08-03 ENCOUNTER — TELEPHONE (OUTPATIENT)
Dept: ONCOLOGY | Facility: CLINIC | Age: 70
End: 2020-08-03

## 2020-08-03 VITALS
WEIGHT: 150 LBS | HEIGHT: 70 IN | RESPIRATION RATE: 18 BRPM | TEMPERATURE: 97.2 F | BODY MASS INDEX: 21.47 KG/M2 | HEART RATE: 76 BPM | OXYGEN SATURATION: 98 % | DIASTOLIC BLOOD PRESSURE: 56 MMHG | SYSTOLIC BLOOD PRESSURE: 118 MMHG

## 2020-08-03 DIAGNOSIS — C20 RECTAL CANCER (HCC): Primary | ICD-10-CM

## 2020-08-03 DIAGNOSIS — R63.4 WEIGHT LOSS: ICD-10-CM

## 2020-08-03 DIAGNOSIS — R82.90 ABNORMAL URINE: ICD-10-CM

## 2020-08-03 DIAGNOSIS — R53.1 WEAKNESS: ICD-10-CM

## 2020-08-03 DIAGNOSIS — C20 RECTAL CANCER (HCC): ICD-10-CM

## 2020-08-03 LAB
ALBUMIN SERPL-MCNC: 3.9 G/DL (ref 3.5–5.2)
ALBUMIN/GLOB SERPL: 0.9 G/DL
ALP SERPL-CCNC: 115 U/L (ref 39–117)
ALT SERPL W P-5'-P-CCNC: 15 U/L (ref 1–41)
ANION GAP SERPL CALCULATED.3IONS-SCNC: 17 MMOL/L (ref 5–15)
AST SERPL-CCNC: 20 U/L (ref 1–40)
BACTERIA UR QL AUTO: ABNORMAL /HPF
BASOPHILS # BLD AUTO: 0.08 10*3/MM3 (ref 0–0.2)
BASOPHILS NFR BLD AUTO: 1.8 % (ref 0–1.5)
BILIRUB SERPL-MCNC: 0.5 MG/DL (ref 0–1.2)
BILIRUB UR QL STRIP: NEGATIVE
BUN SERPL-MCNC: 13 MG/DL (ref 8–23)
BUN/CREAT SERPL: 14.8 (ref 7–25)
CALCIUM SPEC-SCNC: 9.9 MG/DL (ref 8.6–10.5)
CEA SERPL-MCNC: 2.87 NG/ML
CHLORIDE SERPL-SCNC: 95 MMOL/L (ref 98–107)
CLARITY UR: CLEAR
CO2 SERPL-SCNC: 23 MMOL/L (ref 22–29)
COLOR UR: YELLOW
CREAT SERPL-MCNC: 0.88 MG/DL (ref 0.76–1.27)
DEPRECATED RDW RBC AUTO: 44.3 FL (ref 37–54)
EOSINOPHIL # BLD AUTO: 0.22 10*3/MM3 (ref 0–0.4)
EOSINOPHIL NFR BLD AUTO: 5.1 % (ref 0.3–6.2)
ERYTHROCYTE [DISTWIDTH] IN BLOOD BY AUTOMATED COUNT: 14.6 % (ref 12.3–15.4)
GFR SERPL CREATININE-BSD FRML MDRD: 86 ML/MIN/1.73
GLOBULIN UR ELPH-MCNC: 4.2 GM/DL
GLUCOSE SERPL-MCNC: 240 MG/DL (ref 65–99)
GLUCOSE UR STRIP-MCNC: NEGATIVE MG/DL
HCT VFR BLD AUTO: 35.7 % (ref 37.5–51)
HGB BLD-MCNC: 11.5 G/DL (ref 13–17.7)
HGB UR QL STRIP.AUTO: NEGATIVE
HYALINE CASTS UR QL AUTO: ABNORMAL /LPF
KETONES UR QL STRIP: ABNORMAL
LEUKOCYTE ESTERASE UR QL STRIP.AUTO: NEGATIVE
LYMPHOCYTES # BLD AUTO: 0.6 10*3/MM3 (ref 0.7–3.1)
LYMPHOCYTES NFR BLD AUTO: 13.8 % (ref 19.6–45.3)
MAGNESIUM SERPL-MCNC: 2.3 MG/DL (ref 1.6–2.4)
MCH RBC QN AUTO: 27.6 PG (ref 26.6–33)
MCHC RBC AUTO-ENTMCNC: 32.2 G/DL (ref 31.5–35.7)
MCV RBC AUTO: 85.8 FL (ref 79–97)
MONOCYTES # BLD AUTO: 0.96 10*3/MM3 (ref 0.1–0.9)
MONOCYTES NFR BLD AUTO: 22.1 % (ref 5–12)
NEUTROPHILS NFR BLD AUTO: 2.44 10*3/MM3 (ref 1.7–7)
NEUTROPHILS NFR BLD AUTO: 56.3 % (ref 42.7–76)
NITRITE UR QL STRIP: NEGATIVE
PH UR STRIP.AUTO: 6 [PH] (ref 5–8)
PLATELET # BLD AUTO: 382 10*3/MM3 (ref 140–450)
PMV BLD AUTO: 8.5 FL (ref 6–12)
POTASSIUM SERPL-SCNC: 4.5 MMOL/L (ref 3.5–5.2)
PROT SERPL-MCNC: 8.1 G/DL (ref 6–8.5)
PROT UR QL STRIP: NEGATIVE
RBC # BLD AUTO: 4.16 10*6/MM3 (ref 4.14–5.8)
RBC # UR: ABNORMAL /HPF
REF LAB TEST METHOD: ABNORMAL
SODIUM SERPL-SCNC: 135 MMOL/L (ref 136–145)
SP GR UR STRIP: 1.01 (ref 1–1.03)
SQUAMOUS #/AREA URNS HPF: ABNORMAL /HPF
UROBILINOGEN UR QL STRIP: ABNORMAL
WBC # BLD AUTO: 4.34 10*3/MM3 (ref 3.4–10.8)
WBC UR QL AUTO: ABNORMAL /HPF

## 2020-08-03 PROCEDURE — 96368 THER/DIAG CONCURRENT INF: CPT

## 2020-08-03 PROCEDURE — 96375 TX/PRO/DX INJ NEW DRUG ADDON: CPT

## 2020-08-03 PROCEDURE — 96415 CHEMO IV INFUSION ADDL HR: CPT

## 2020-08-03 PROCEDURE — 25010000002 DEXAMETHASONE SODIUM PHOSPHATE 100 MG/10ML SOLUTION: Performed by: INTERNAL MEDICINE

## 2020-08-03 PROCEDURE — 96413 CHEMO IV INFUSION 1 HR: CPT

## 2020-08-03 PROCEDURE — 83735 ASSAY OF MAGNESIUM: CPT

## 2020-08-03 PROCEDURE — 25010000002 OXALIPLATIN PER 0.5 MG: Performed by: INTERNAL MEDICINE

## 2020-08-03 PROCEDURE — G0498 CHEMO EXTEND IV INFUS W/PUMP: HCPCS

## 2020-08-03 PROCEDURE — 81001 URINALYSIS AUTO W/SCOPE: CPT

## 2020-08-03 PROCEDURE — 25010000002 LEUCOVORIN CALCIUM PER 50 MG: Performed by: INTERNAL MEDICINE

## 2020-08-03 PROCEDURE — 96367 TX/PROPH/DG ADDL SEQ IV INF: CPT

## 2020-08-03 PROCEDURE — 82378 CARCINOEMBRYONIC ANTIGEN: CPT

## 2020-08-03 PROCEDURE — 96411 CHEMO IV PUSH ADDL DRUG: CPT

## 2020-08-03 PROCEDURE — 85025 COMPLETE CBC W/AUTO DIFF WBC: CPT

## 2020-08-03 PROCEDURE — 25010000002 FOSAPREPITANT PER 1 MG: Performed by: INTERNAL MEDICINE

## 2020-08-03 PROCEDURE — 80053 COMPREHEN METABOLIC PANEL: CPT

## 2020-08-03 PROCEDURE — 25010000002 PALONOSETRON PER 25 MCG: Performed by: INTERNAL MEDICINE

## 2020-08-03 PROCEDURE — 25010000002 FLUOROURACIL PER 500 MG: Performed by: INTERNAL MEDICINE

## 2020-08-03 PROCEDURE — 36415 COLL VENOUS BLD VENIPUNCTURE: CPT

## 2020-08-03 PROCEDURE — 96409 CHEMO IV PUSH SNGL DRUG: CPT

## 2020-08-03 RX ORDER — DEXTROSE MONOHYDRATE 50 MG/ML
250 INJECTION, SOLUTION INTRAVENOUS ONCE
Status: COMPLETED | OUTPATIENT
Start: 2020-08-03 | End: 2020-08-03

## 2020-08-03 RX ORDER — SODIUM CHLORIDE 0.9 % (FLUSH) 0.9 %
10 SYRINGE (ML) INJECTION AS NEEDED
Status: DISCONTINUED | OUTPATIENT
Start: 2020-08-03 | End: 2020-08-03 | Stop reason: HOSPADM

## 2020-08-03 RX ORDER — HEPARIN SODIUM (PORCINE) LOCK FLUSH IV SOLN 100 UNIT/ML 100 UNIT/ML
500 SOLUTION INTRAVENOUS AS NEEDED
Status: CANCELLED | OUTPATIENT
Start: 2020-08-03

## 2020-08-03 RX ORDER — PALONOSETRON 0.05 MG/ML
0.25 INJECTION, SOLUTION INTRAVENOUS ONCE
Status: COMPLETED | OUTPATIENT
Start: 2020-08-03 | End: 2020-08-03

## 2020-08-03 RX ORDER — DIPHENHYDRAMINE HYDROCHLORIDE 50 MG/ML
50 INJECTION INTRAMUSCULAR; INTRAVENOUS AS NEEDED
Status: DISCONTINUED | OUTPATIENT
Start: 2020-08-03 | End: 2020-08-03 | Stop reason: HOSPADM

## 2020-08-03 RX ORDER — FLUOROURACIL 50 MG/ML
400 INJECTION, SOLUTION INTRAVENOUS ONCE
Status: COMPLETED | OUTPATIENT
Start: 2020-08-03 | End: 2020-08-03

## 2020-08-03 RX ORDER — HEPARIN SODIUM (PORCINE) LOCK FLUSH IV SOLN 100 UNIT/ML 100 UNIT/ML
500 SOLUTION INTRAVENOUS AS NEEDED
Status: DISCONTINUED | OUTPATIENT
Start: 2020-08-03 | End: 2020-08-03 | Stop reason: HOSPADM

## 2020-08-03 RX ORDER — SODIUM CHLORIDE 0.9 % (FLUSH) 0.9 %
10 SYRINGE (ML) INJECTION AS NEEDED
Status: CANCELLED | OUTPATIENT
Start: 2020-08-03

## 2020-08-03 RX ORDER — FAMOTIDINE 10 MG/ML
20 INJECTION, SOLUTION INTRAVENOUS AS NEEDED
Status: DISCONTINUED | OUTPATIENT
Start: 2020-08-03 | End: 2020-08-03 | Stop reason: HOSPADM

## 2020-08-03 RX ADMIN — PALONOSETRON HYDROCHLORIDE 0.25 MG: 0.25 INJECTION, SOLUTION INTRAVENOUS at 09:33

## 2020-08-03 RX ADMIN — LEUCOVORIN CALCIUM 790 MG: 350 INJECTION, POWDER, LYOPHILIZED, FOR SOLUTION INTRAMUSCULAR; INTRAVENOUS at 10:42

## 2020-08-03 RX ADMIN — DEXTROSE MONOHYDRATE 250 ML: 50 INJECTION, SOLUTION INTRAVENOUS at 09:33

## 2020-08-03 RX ADMIN — DEXAMETHASONE SODIUM PHOSPHATE 12 MG: 10 INJECTION, SOLUTION INTRAMUSCULAR; INTRAVENOUS at 09:36

## 2020-08-03 RX ADMIN — OXALIPLATIN 165 MG: 5 INJECTION, SOLUTION, CONCENTRATE INTRAVENOUS at 10:41

## 2020-08-03 RX ADMIN — FLUOROURACIL 4730 MG: 50 INJECTION, SOLUTION INTRAVENOUS at 13:18

## 2020-08-03 RX ADMIN — FLUOROURACIL 790 MG: 50 INJECTION, SOLUTION INTRAVENOUS at 13:15

## 2020-08-03 RX ADMIN — SODIUM CHLORIDE 150 MG: 9 INJECTION, SOLUTION INTRAVENOUS at 09:56

## 2020-08-03 NOTE — TELEPHONE ENCOUNTER
Received call from CRISTOFER Triana in infusion.  States that patient is c/o increased weakness and has lost 13 pounds-patient now weighs 150 pounds.  Patient states that he is juicing only due to problems with constipation and does not want to eat any solid food.  Dr Boggs said to give patient treatment today but put in for nutrition consult.  Called and instructed CRISTOFER Triana on the above.

## 2020-08-03 NOTE — PROGRESS NOTES
Call from CRISTOFER Swain, darrian for tx per Dr Boggs, but will make another nutritional consult; patient aware.

## 2020-08-03 NOTE — PROGRESS NOTES
"[id number] with CRISTOFER Swain, [facility], reported patient has been juicing for the past two weeks and has lost 13 lb and states he feels weak; he wondered if he might need IVF, but labs look pretty good and he said he has drank 5 gallons of water in the last week.  He is only juicing veggies, no fruits; I tried to tell him he needs calories to give him energy and juicing fruits might help with that; he said, \"well, we won't get into that.)  Per CRISTOFER Lopez, she has spoken with Dr. Boggs about weight loss and a nutritional consult was to have been made; Wiliam will check into this and make one, if not been done yet.   "

## 2020-08-05 ENCOUNTER — INFUSION (OUTPATIENT)
Dept: ONCOLOGY | Facility: HOSPITAL | Age: 70
End: 2020-08-05

## 2020-08-05 VITALS
OXYGEN SATURATION: 96 % | TEMPERATURE: 97.4 F | HEART RATE: 108 BPM | DIASTOLIC BLOOD PRESSURE: 59 MMHG | RESPIRATION RATE: 18 BRPM | SYSTOLIC BLOOD PRESSURE: 91 MMHG

## 2020-08-05 DIAGNOSIS — C20 RECTAL CANCER (HCC): Primary | ICD-10-CM

## 2020-08-05 PROCEDURE — 25010000003 HEPARIN LOCK FLUSH PER 10 UNITS: Performed by: NURSE PRACTITIONER

## 2020-08-05 RX ORDER — HEPARIN SODIUM (PORCINE) LOCK FLUSH IV SOLN 100 UNIT/ML 100 UNIT/ML
500 SOLUTION INTRAVENOUS AS NEEDED
Status: CANCELLED | OUTPATIENT
Start: 2020-08-05

## 2020-08-05 RX ORDER — SODIUM CHLORIDE 0.9 % (FLUSH) 0.9 %
10 SYRINGE (ML) INJECTION AS NEEDED
Status: DISCONTINUED | OUTPATIENT
Start: 2020-08-05 | End: 2020-08-05 | Stop reason: HOSPADM

## 2020-08-05 RX ORDER — HEPARIN SODIUM (PORCINE) LOCK FLUSH IV SOLN 100 UNIT/ML 100 UNIT/ML
500 SOLUTION INTRAVENOUS AS NEEDED
Status: DISCONTINUED | OUTPATIENT
Start: 2020-08-05 | End: 2020-08-05 | Stop reason: HOSPADM

## 2020-08-05 RX ORDER — SODIUM CHLORIDE 0.9 % (FLUSH) 0.9 %
10 SYRINGE (ML) INJECTION AS NEEDED
Status: CANCELLED | OUTPATIENT
Start: 2020-08-05

## 2020-08-05 RX ADMIN — SODIUM CHLORIDE, PRESERVATIVE FREE 10 ML: 5 INJECTION INTRAVENOUS at 11:18

## 2020-08-05 RX ADMIN — Medication 500 UNITS: at 11:18

## 2020-08-05 NOTE — PROGRESS NOTES
[facility] GROUP HEMATOLOGY AND ONCOLOGY  [address]  [facility] [address] [phone]    Patient Name: Attila Viramontes  Encounter Date: 08/12/2020  YOB: 1950  Patient Number: [id number]      REASON FOR FOLLOW-UP: Attila Viramontes is a pleasant 69 y.o. male who is seen on follow up for rectal cancer.  He is seen C4D10 of neoadjuvant FOLFOX. He is seen 2.5 months post neoadjuvant 5-FU with radiation. He is seen with sister, Kamilah.  History is obtained from patient. History is considered to be accurate      Oncology/Hematology History    DIAGNOSTIC ABNORMALITIES:  CT abdomen and pelvis 03/25/2020.  Asymmetric wall thickening of the rectum could represent a mass/neoplasm. If not recently performed, colonoscopy is recommended. Inflammatory stranding of the central mesenteric root, which is nonspecific but can be seen with mesenteritis/mesenteric panniculitis.  Colonoscopy by Dr. Reaves 04/02/2020 showed a localized area of severely ulcerated mucosa was found at 2 cm proximal to the anus. Biopsies were taken with a cold forceps for histology.  Pathology report 04/03/2020 showed a moderately differentiated adenocarcinoma.  Stromal invasion is seen although depth of invasion is indeterminate in these biopsies.  In the areas of invasion, the stroma demonstrates a reactive, desmoplastic appearance.   CT chest report 04/20/2020.  Noncalcified pulmonary nodule in the left upper lobe. Metastatic  disease not excluded. Short interval follow-up with CT in 6 months recommended.   Atherosclerosis of the aorta and coronary arteries.  Stranding of the central mesenteric root in the abdomen, partially imaged.  Hepatic steatosis.  MRI 04/23/2020 showed abnormal enhancement and circumferential thickening of the  distal rectum for a length of 5.5 cm, with evidence of invasion of the mesorectal fat and mesial rectal lymph nodes, measure less  than 3 mm from the mesorectal fascia.   There is abnormal pre and post sacral soft tissue enhancement with enhancement of the distal sacrum, concerning for contiguous spread versus local metastatic disease. Addendum report, appears to be invasion of the left seminal vesicle base and left posterior prostate.  These findings are concerning for invasive malignancy.  Previous endorectal ultrasound 04/24/2020.  Fixed constricting lesion, T4NX.        PREVIOUS INTERVENTIONS:  Neoadjuvant CIV 5-FU and radiation 04/27/2020 through 06/05/2020 at [facility].  Not a surgical candidate.  Neoadjuvant FOLFOX 06/22/2020 through present at [facility].        Rectal cancer (CMS/HCC)     Initial Diagnosis     Rectal cancer (CMS/HCC)      3/25/2020 Imaging     CT Abd/Pelvis:     IMPRESSION:  1. Asymmetric wall thickening of the rectum could represent a  mass/neoplasm. If not recently performed, colonoscopy is recommended.     2. Inflammatory stranding of the central mesenteric root, which is  nonspecific but can be seen with mesenteritis/mesenteric panniculitis.     3. Atherosclerotic disease.  4. Fecal stasis.      4/2/2020 Biopsy     Colonoscopy:  Findings: The perianal and digital rectal examinations were normal.  A localized area of severely ulcerated mucosa was found at 2 cm proximal to the anus. Biopsies were  taken with a cold forceps for histology.  Impression: - Ulcerated mucosa at 2 cm proximal to the anus. Biopsied.    Final Diagnosis   Large intestine, rectum at 3 cm, biopsy: Moderately differentiated adenocarcinoma, invasive.     AJCC stage: pTX pNX               4/14/2020 Procedure     Mediport placement      4/14/2020 Imaging     CT Angio Head:  Summary:  1. Distal left ICA occlusion with patent [place].    CT Angio Neck:  IMPRESSION:  1. Left internal carotid artery occlusion beginning at the origin.  Minimal opacification of the distal left internal carotid artery, which  may represent  retrograde collateral flow.  2. Right internal carotid artery patent.  3. Bilateral vertebral arteries patent.  4. See separately dictated CTA head of the same day.     CT Head:  IMPRESSION:  1. Small amount of gas in the left cerebral cortical veins. Differential  would include iatrogenic air embolism or trauma.  2. No acute intracranial hemorrhage.      CT Angio Head/Neck:  Result Impression   1.  Age-indeterminate left internal carotid artery origin occlusion.   Reconstitution of left ICA flow at the level of the cavernous ICA,   likely due to patent anterior commuting artery. A critical alert was   sent.  2.  No perfusion abnormality.  3.  Moderate atherosclerotic stenosis involving the origin of the   right internal carotid artery.           4/15/2020 Imaging     MRI Brain:  No acute intracranial abnormality identified  Findings of chronic microvascular ischemia. Occluded left ICA flow   void.      4/27/2020 - 6/1/2020 Chemotherapy     OP COLORECTAL Fluorouracil CIV over 120H + XRT      5/1/2020 -  Chemotherapy     OP CENTRAL VENOUS ACCESS DEVICE ACCESS, CARE, AND MAINTENANCE (CVAD)      5/12/2020 Cancer Staged     Staging form: Colon And Rectum, AJCC 8th Edition  - Clinical stage from 5/12/2020: Stage IIIC (cT4b, cN1b, cM0) - Signed by Shaq Boggs MD on 5/12/2020 6/22/2020 -  Chemotherapy     OP COLON mFOLFOX6 OXALIplatin / Leucovorin / Fluorouracil         PAST MEDICAL HISTORY:  ALLERGIES:  No Known Allergies  CURRENT MEDICATIONS:  Outpatient Encounter Medications as of 8/12/2020   Medication Sig Dispense Refill   • aspirin 81 MG chewable tablet Chew 81 mg Daily.     • atorvastatin (LIPITOR) 80 MG tablet Take 80 mg by mouth Daily.     • ciprofloxacin (CIPRO) 500 MG tablet Take one tablet by mouth twice daily for 7 days until finished 14 tablet 7   • ibuprofen (ADVIL,MOTRIN) 200 MG tablet Take 200 mg by mouth Every 8 (Eight) Hours As Needed for Mild Pain .     • pantoprazole (PROTONIX) 20 MG EC tablet  Take 20 mg by mouth Daily.     • prochlorperazine (COMPAZINE) 10 MG tablet Take 1 tablet by mouth Every 6 (Six) Hours As Needed for Nausea or Vomiting. 60 tablet 2     No facility-administered encounter medications on file as of 8/12/2020.      ADULT ILLNESSES:  Patient Active Problem List   Diagnosis Code   • Abnormal CT of the abdomen R93.5   • Rectal cancer (CMS/HCC) C20   • Current every day smoker F17.200   • Impaired gait and mobility R26.89   • Hypertension I10   • Bright red rectal bleeding K62.5   • 2nd degree AV block I44.1   • Tobacco abuse Z72.0   • Normocytic anemia D64.9     SURGERIES:  Past Surgical History:   Procedure Laterality Date   • COLONOSCOPY N/A 4/2/2020    Procedure: COLONOSCOPY WITH ANESTHESIA;  Surgeon: Yanick Flowers DO;  Location: [facility] ENDOSCOPY;  Service: Gastroenterology;  Laterality: N/A;  pre: abnormal CT scan  post: rectal mass  PCP unknown   • EYE SURGERY Bilateral     lenses present   • VENOUS ACCESS DEVICE (PORT) INSERTION N/A 4/14/2020    Procedure: INSERTION VENOUS ACCESS DEVICE;  Surgeon: Vielka Weller MD;  Location: [facility] OR;  Service: General;  Laterality: N/A;     HEALTH MAINTENANCE ITEMS:  Health Maintenance Due   Topic Date Due   • TDAP/TD VACCINES (1 - Tdap) 09/18/1962   • ZOSTER VACCINE (1 of 2) 09/18/2001   • Pneumococcal Vaccine Once at 65 Years Old  09/18/2016   • HEPATITIS C SCREENING  03/26/2020   • MEDICARE ANNUAL WELLNESS  03/26/2020   • INFLUENZA VACCINE  08/01/2020       <no information>  Last Completed Colonoscopy       Status Date      COLONOSCOPY Done 4/2/2020 COLONOSCOPY     Patient has more history with this topic...          There is no immunization history on file for this patient.  Last Completed Mammogram     Patient has no health maintenance due at this time            FAMILY HISTORY:  Family History   Problem Relation Age of Onset   • Cancer Mother    • Stroke Father    • Heart disease Father    • Heart attack Brother      SOCIAL  "HISTORY:  Social History     Socioeconomic History   • Marital status: Single     Spouse name: Not on file   • Number of children: Not on file   • Years of education: Not on file   • Highest education level: Not on file   Tobacco Use   • Smoking status: Current Every Day Smoker     Packs/day: 1.00     Years: 53.00     Pack years: 53.00     Types: Cigarettes   • Smokeless tobacco: Never Used   Substance and Sexual Activity   • Alcohol use: Not Currently   • Drug use: Never   • Sexual activity: Defer       REVIEW OF SYSTEMS:    Review of Systems   Constitutional: Positive for fatigue. Negative for chills, diaphoresis and fever.        \"I have a abscess roof of my mouth.  I am going to [place].  I am losing weight but I am juicing.\"   HENT: Negative for congestion and trouble swallowing.    Eyes: Negative for redness and visual disturbance.   Respiratory: Negative for cough, shortness of breath and wheezing.    Cardiovascular: Negative for chest pain and palpitations.   Gastrointestinal: Positive for diarrhea. Negative for abdominal pain, constipation, nausea and vomiting.        \"I am taking MiraLax.\"   Endocrine: Negative for cold intolerance and heat intolerance.   Genitourinary: Negative for difficulty urinating, flank pain and hematuria.   Musculoskeletal: Negative for arthralgias and gait problem.   Skin: Positive for pallor.   Allergic/Immunologic: Negative for food allergies.   Neurological: Negative for dizziness, speech difficulty and light-headedness.   Hematological: Negative for adenopathy.   Psychiatric/Behavioral: Negative for agitation and confusion.       VITAL SIGNS: /62   Pulse 110   Temp 98.4 °F (36.9 °C)   Resp 18   Ht 177.8 cm (70\")   Wt 68.2 kg (150 lb 6.4 oz)   SpO2 94%   BMI 21.58 kg/m²   Pain Score    08/12/20 0833   PainSc: 0-No pain       PHYSICAL EXAMINATION:     Physical Exam   Constitutional: He is oriented to person, place, and time. No distress.   Frail looking.   HENT: "   Head: Normocephalic and atraumatic.   Eyes: No scleral icterus.   Neck: Neck supple.   Cardiovascular:   Tachycardic.    Pulmonary/Chest: Effort normal and breath sounds normal. He has no wheezes. He has no rales.   Port, left. No erythema.   Abdominal: Soft. Bowel sounds are normal. There is no tenderness.   Musculoskeletal: He exhibits no edema.   Lymphadenopathy:     He has no cervical adenopathy.   Neurological: He is alert and oriented to person, place, and time.   Skin: Skin is warm and dry. He is not diaphoretic. There is pallor.   Psychiatric: He has a normal mood and affect. His behavior is normal. Judgment and thought content normal.   Vitals reviewed.      LABS    Lab Results - Last 18 Months   Lab Units 08/03/20  0820 07/20/20  0820 07/15/20  0851 07/06/20  0827 07/01/20  0843 06/22/20  0808 06/01/20  0838   HEMOGLOBIN g/dL 11.5* 10.8* 11.0* 11.5* 11.3* 11.7* 12.1*   HEMATOCRIT % 35.7* 33.8* 34.2* 34.7* 33.4* 35.3* 35.8*   MCV fL 85.8 87.6 87.9 87.8 88.1 89.1 91.1   WBC 10*3/mm3 4.34 6.50 11.14* 8.37 9.78 11.40* 12.10*   RDW % 14.6 14.1 13.6 13.3 13.1 12.9 12.6   MPV fL 8.5 8.3 8.7 8.4 8.6 8.3 8.3   PLATELETS 10*3/mm3 382 405 403 419 361 487* 453*   IMM GRAN % %  --  1.1* 0.5 0.6* 0.8* 0.7* 0.7*   NEUTROS ABS 10*3/mm3 2.44 4.60 8.85* 6.55 7.76* 9.34* 10.00*   LYMPHS ABS 10*3/mm3 0.60* 0.53* 0.53* 0.47* 0.58* 0.63* 0.51*   MONOS ABS 10*3/mm3 0.96* 1.03* 1.08* 0.95* 0.65 0.93* 1.14*   EOS ABS 10*3/mm3 0.22 0.20 0.56* 0.28 0.66* 0.35 0.32   BASOS ABS 10*3/mm3 0.08 0.07 0.06 0.07 0.05 0.07 0.04   IMMATURE GRANS (ABS) 10*3/mm3  --  0.07* 0.06* 0.05 0.08* 0.08* 0.09*   NRBC /100 WBC  --  0.0 0.0 0.0 0.0 0.0 0.0       Lab Results - Last 18 Months   Lab Units 08/03/20  0820 07/20/20  0820 07/15/20  0851 07/06/20  0827 07/01/20  0843 06/22/20  0808   GLUCOSE mg/dL 240* 274* 234* 253* 215* 262*   SODIUM mmol/L 135* 137 136 137 141 135*   POTASSIUM mmol/L 4.5 3.9 4.2 4.2 3.9 4.2   CO2 mmol/L 23.0 23.0 24.0 25.0  23.0 23.0   CHLORIDE mmol/L 95* 102 99 100 104 99   ANION GAP mmol/L 17.0* 12.0 13.0 12.0 14.0 13.0   CREATININE mg/dL 0.88 0.83 0.70* 0.73* 0.75* 0.82   BUN mg/dL 13 15 15 12 11 14   BUN / CREAT RATIO  14.8 18.1 21.4 16.4 14.7 17.1   CALCIUM mg/dL 9.9 9.2 9.4 9.1 9.2 9.2   EGFR IF NONAFRICN AM mL/min/1.73 86 92 112 107 104 93   ALK PHOS U/L 115 103 101 104 92 89   TOTAL PROTEIN g/dL 8.1 7.6 8.0 7.8 7.6 7.8   ALT (SGPT) U/L 15 27 29 28 20 19   AST (SGOT) U/L 20 18 22 21 20 15   BILIRUBIN mg/dL 0.5 0.3 0.3 0.2 0.3 0.2   ALBUMIN g/dL 3.90 3.50 3.80 3.70 3.70 3.70   GLOBULIN gm/dL 4.2 4.1 4.2 4.1 3.9 4.1       Lab Results - Last 18 Months   Lab Units 08/03/20  0820 07/06/20  0827 04/09/20  0954   CEA ng/mL 2.87 3.35 4.26       No results for input(s): IRON, TIBC, LABIRON, FERRITIN, F4CLQPF, TSH, FOLATE in the last 78683 hours.    Invalid input(s): VITB12    Attila Viramontes reports a pain score of 0.      Patient's Body mass index is 21.58 kg/m². BMI is within normal parameters. No follow-up required..        ASSESSMENT:  1.  Adenocarcinoma of the rectum. Tumor size 5.5 cm.  AJCC stage:IIIC (cT4b, N1b, M0)  Treatment status:Post neoadjuvant CIV 5-FU and radiation, not a surgical candidate.  On neoadjuvant FOLFOX.  2.  Performance status of 1.  3.  Normocytic anemia from chemo.    4.  Elevated protein 03/25/2020.  5.  6 mm nodule, left upper lobe near the fissure.  6.  History of pancreatitis.   7.  Fatigue from chemo.  8.  Dysuria 07/15/2020.        PLAN:  1.   Re:  Tolerating FOLFOX.  2.   Re:  Heme status.  Hemoglobin 11.5 and hematocrit 35.7.  3.   Re:  Pre office CMP.  GFR 86 mL/min  4.   Re:  Pre-office magnesium 2.3 and CEA at 2.87 from 3.35 from 4.26.  5.  Continue FOLFOX for  total of 8 cycles: Observe for potential nausea, vomiting, diarrhea, cold intolerance, myelosuppression, anaphylaxis, alopecia, and neuropathy.  6.  Schedule treatment C5 D1 to 3 to start 08/17/2020 and C6  D1 to D3 start 08/31/2020.     020  To be administered every 2 weeks: Plan for 8 cycles.  Oxaliplatin 85 mg/m2 IVPB over 2 hours (TD = 157 mg).  Leucovorin 400 mg/m2 IVPB over 2 hours (TD= 740 mg).  5- mg/ m2  IVP (TD = 740  mg).  Begin 5-FU 2,400 mg/m2 IVPB over 46 hours.  (TD= 4,440 mg).  7.   Premedicate with:  Aloxi 0.25 mg IVP.  Emend 150 mg IV.  Decadron 12 mg IVPB over 20-30 min.  8.  Weekly CBC with differential, magnesium, and CMP.  9.  Continue current medications.  10.  Continue care per primary care physician and other specialists.  11.  Recall colonoscopy on 10/2020.  12.  Advance Care Planning  ACP discussion was declined by the patient. Patient does not have an advance directive, information provided.   13.  eRx Compazine 10 mg p.o. every 4 hours as needed for nausea and vomiting #60 with 2 refills if needed.  14.  NS 1 liter over 2 hours as needed.  15.  Patient will be reevaluated by Dr. De Jesus at [place] after 8 cycles of FOLFOX.  16.  Return to office in 4 weeks with pre-office CEA.        I spent 28 total minutes, face-to-face, caring for Attila today.  Greater than 50% of this time involved counseling and/or coordination of care as documented within this note regarding the patient's illness(es), pros and cons of various treatment options, instructions and/or risk reduction.          Vincent Swenson MD  (Yanick Flowers DO)  (Nabeel Walton MD)  (Kel Ramirez MD)  (Vielka Weller MD)  (Destini De Jesus MD)

## 2020-08-11 ENCOUNTER — TELEPHONE (OUTPATIENT)
Dept: ONCOLOGY | Facility: CLINIC | Age: 70
End: 2020-08-11

## 2020-08-11 NOTE — TELEPHONE ENCOUNTER
Called and spoke to patient. Per Cate patient can have his sister with him for tomorrow's appointment.

## 2020-08-12 ENCOUNTER — APPOINTMENT (OUTPATIENT)
Dept: SPEECH THERAPY | Facility: HOSPITAL | Age: 70
End: 2020-08-12

## 2020-08-12 ENCOUNTER — APPOINTMENT (OUTPATIENT)
Dept: NUTRITION | Facility: HOSPITAL | Age: 70
End: 2020-08-12

## 2020-08-12 ENCOUNTER — OFFICE VISIT (OUTPATIENT)
Dept: ONCOLOGY | Facility: CLINIC | Age: 70
End: 2020-08-12

## 2020-08-12 ENCOUNTER — TELEPHONE (OUTPATIENT)
Dept: ONCOLOGY | Facility: CLINIC | Age: 70
End: 2020-08-12

## 2020-08-12 ENCOUNTER — LAB (OUTPATIENT)
Dept: LAB | Facility: HOSPITAL | Age: 70
End: 2020-08-12

## 2020-08-12 VITALS
TEMPERATURE: 98.4 F | BODY MASS INDEX: 21.53 KG/M2 | DIASTOLIC BLOOD PRESSURE: 62 MMHG | WEIGHT: 150.4 LBS | RESPIRATION RATE: 18 BRPM | HEART RATE: 110 BPM | OXYGEN SATURATION: 94 % | SYSTOLIC BLOOD PRESSURE: 124 MMHG | HEIGHT: 70 IN

## 2020-08-12 DIAGNOSIS — C20 RECTAL CANCER (HCC): ICD-10-CM

## 2020-08-12 DIAGNOSIS — C20 RECTAL CANCER (HCC): Primary | ICD-10-CM

## 2020-08-12 LAB
ALBUMIN SERPL-MCNC: 3.8 G/DL (ref 3.5–5.2)
ALBUMIN/GLOB SERPL: 1 G/DL
ALP SERPL-CCNC: 97 U/L (ref 39–117)
ALT SERPL W P-5'-P-CCNC: 15 U/L (ref 1–41)
ANION GAP SERPL CALCULATED.3IONS-SCNC: 12 MMOL/L (ref 5–15)
AST SERPL-CCNC: 15 U/L (ref 1–40)
BASOPHILS # BLD AUTO: 0.05 10*3/MM3 (ref 0–0.2)
BASOPHILS NFR BLD AUTO: 0.8 % (ref 0–1.5)
BILIRUB SERPL-MCNC: 0.3 MG/DL (ref 0–1.2)
BUN SERPL-MCNC: 14 MG/DL (ref 8–23)
BUN/CREAT SERPL: 22.2 (ref 7–25)
CALCIUM SPEC-SCNC: 10 MG/DL (ref 8.6–10.5)
CHLORIDE SERPL-SCNC: 99 MMOL/L (ref 98–107)
CO2 SERPL-SCNC: 26 MMOL/L (ref 22–29)
CREAT SERPL-MCNC: 0.63 MG/DL (ref 0.76–1.27)
DEPRECATED RDW RBC AUTO: 46.5 FL (ref 37–54)
EOSINOPHIL # BLD AUTO: 0.51 10*3/MM3 (ref 0–0.4)
EOSINOPHIL NFR BLD AUTO: 7.8 % (ref 0.3–6.2)
ERYTHROCYTE [DISTWIDTH] IN BLOOD BY AUTOMATED COUNT: 15.2 % (ref 12.3–15.4)
GFR SERPL CREATININE-BSD FRML MDRD: 127 ML/MIN/1.73
GLOBULIN UR ELPH-MCNC: 3.8 GM/DL
GLUCOSE SERPL-MCNC: 255 MG/DL (ref 65–99)
HCT VFR BLD AUTO: 32.3 % (ref 37.5–51)
HGB BLD-MCNC: 10.4 G/DL (ref 13–17.7)
IMM GRANULOCYTES # BLD AUTO: 0.06 10*3/MM3 (ref 0–0.05)
IMM GRANULOCYTES NFR BLD AUTO: 0.9 % (ref 0–0.5)
LYMPHOCYTES # BLD AUTO: 0.51 10*3/MM3 (ref 0.7–3.1)
LYMPHOCYTES NFR BLD AUTO: 7.8 % (ref 19.6–45.3)
MAGNESIUM SERPL-MCNC: 2.1 MG/DL (ref 1.6–2.4)
MCH RBC QN AUTO: 27.4 PG (ref 26.6–33)
MCHC RBC AUTO-ENTMCNC: 32.2 G/DL (ref 31.5–35.7)
MCV RBC AUTO: 85.2 FL (ref 79–97)
MONOCYTES # BLD AUTO: 0.36 10*3/MM3 (ref 0.1–0.9)
MONOCYTES NFR BLD AUTO: 5.5 % (ref 5–12)
NEUTROPHILS NFR BLD AUTO: 5.04 10*3/MM3 (ref 1.7–7)
NEUTROPHILS NFR BLD AUTO: 77.2 % (ref 42.7–76)
NRBC BLD AUTO-RTO: 0 /100 WBC (ref 0–0.2)
PLATELET # BLD AUTO: 243 10*3/MM3 (ref 140–450)
PMV BLD AUTO: 9.3 FL (ref 6–12)
POTASSIUM SERPL-SCNC: 4.7 MMOL/L (ref 3.5–5.2)
PROT SERPL-MCNC: 7.6 G/DL (ref 6–8.5)
RBC # BLD AUTO: 3.79 10*6/MM3 (ref 4.14–5.8)
SODIUM SERPL-SCNC: 137 MMOL/L (ref 136–145)
WBC # BLD AUTO: 6.53 10*3/MM3 (ref 3.4–10.8)

## 2020-08-12 PROCEDURE — 85025 COMPLETE CBC W/AUTO DIFF WBC: CPT

## 2020-08-12 PROCEDURE — 99214 OFFICE O/P EST MOD 30 MIN: CPT | Performed by: INTERNAL MEDICINE

## 2020-08-12 PROCEDURE — 83735 ASSAY OF MAGNESIUM: CPT

## 2020-08-12 PROCEDURE — 36415 COLL VENOUS BLD VENIPUNCTURE: CPT

## 2020-08-12 PROCEDURE — 80053 COMPREHEN METABOLIC PANEL: CPT

## 2020-08-12 NOTE — TELEPHONE ENCOUNTER
----- Message from Shaq Boggs MD sent at 8/12/2020  9:37 AM CDT -----  Notify patient, normal ANC.  Proceed with dental appointment.

## 2020-08-12 NOTE — TELEPHONE ENCOUNTER
Notified patient that Dr Boggs has reviewed his lab work and is ok to proceed with his dental procedure. Patient v/u of instruction.

## 2020-08-14 DIAGNOSIS — C20 RECTAL CANCER (HCC): ICD-10-CM

## 2020-08-14 RX ORDER — DIPHENHYDRAMINE HYDROCHLORIDE 50 MG/ML
50 INJECTION INTRAMUSCULAR; INTRAVENOUS AS NEEDED
Status: CANCELLED | OUTPATIENT
Start: 2020-08-24

## 2020-08-14 RX ORDER — FLUOROURACIL 50 MG/ML
400 INJECTION, SOLUTION INTRAVENOUS ONCE
Status: CANCELLED | OUTPATIENT
Start: 2020-08-24

## 2020-08-14 RX ORDER — PALONOSETRON 0.05 MG/ML
0.25 INJECTION, SOLUTION INTRAVENOUS ONCE
Status: CANCELLED | OUTPATIENT
Start: 2020-08-24

## 2020-08-14 RX ORDER — FAMOTIDINE 10 MG/ML
20 INJECTION, SOLUTION INTRAVENOUS AS NEEDED
Status: CANCELLED | OUTPATIENT
Start: 2020-08-24

## 2020-08-14 RX ORDER — DEXTROSE MONOHYDRATE 50 MG/ML
250 INJECTION, SOLUTION INTRAVENOUS ONCE
Status: CANCELLED | OUTPATIENT
Start: 2020-08-24

## 2020-08-17 ENCOUNTER — LAB (OUTPATIENT)
Dept: LAB | Facility: HOSPITAL | Age: 70
End: 2020-08-17

## 2020-08-17 ENCOUNTER — TELEPHONE (OUTPATIENT)
Dept: ONCOLOGY | Facility: CLINIC | Age: 70
End: 2020-08-17

## 2020-08-17 ENCOUNTER — INFUSION (OUTPATIENT)
Dept: ONCOLOGY | Facility: HOSPITAL | Age: 70
End: 2020-08-17

## 2020-08-17 VITALS
HEART RATE: 113 BPM | OXYGEN SATURATION: 98 % | TEMPERATURE: 97.3 F | HEIGHT: 70 IN | SYSTOLIC BLOOD PRESSURE: 91 MMHG | DIASTOLIC BLOOD PRESSURE: 50 MMHG | WEIGHT: 154.6 LBS | BODY MASS INDEX: 22.13 KG/M2 | RESPIRATION RATE: 18 BRPM

## 2020-08-17 DIAGNOSIS — T45.1X5A CHEMOTHERAPY INDUCED NEUTROPENIA (HCC): Primary | ICD-10-CM

## 2020-08-17 DIAGNOSIS — C20 RECTAL CANCER (HCC): ICD-10-CM

## 2020-08-17 DIAGNOSIS — D70.1 CHEMOTHERAPY INDUCED NEUTROPENIA (HCC): Primary | ICD-10-CM

## 2020-08-17 LAB
ALBUMIN SERPL-MCNC: 3.4 G/DL (ref 3.5–5.2)
ALBUMIN/GLOB SERPL: 0.9 G/DL
ALP SERPL-CCNC: 83 U/L (ref 39–117)
ALT SERPL W P-5'-P-CCNC: 18 U/L (ref 1–41)
ANION GAP SERPL CALCULATED.3IONS-SCNC: 14 MMOL/L (ref 5–15)
ANISOCYTOSIS BLD QL: ABNORMAL
AST SERPL-CCNC: 14 U/L (ref 1–40)
BASOPHILS # BLD MANUAL: 0.07 10*3/MM3 (ref 0–0.2)
BASOPHILS NFR BLD AUTO: 2 % (ref 0–1.5)
BILIRUB SERPL-MCNC: 0.4 MG/DL (ref 0–1.2)
BUN SERPL-MCNC: 17 MG/DL (ref 8–23)
BUN/CREAT SERPL: 22.4 (ref 7–25)
BURR CELLS BLD QL SMEAR: ABNORMAL
CALCIUM SPEC-SCNC: 9.4 MG/DL (ref 8.6–10.5)
CHLORIDE SERPL-SCNC: 97 MMOL/L (ref 98–107)
CO2 SERPL-SCNC: 21 MMOL/L (ref 22–29)
CREAT SERPL-MCNC: 0.76 MG/DL (ref 0.76–1.27)
DEPRECATED RDW RBC AUTO: 48.5 FL (ref 37–54)
EOSINOPHIL # BLD MANUAL: 0.2 10*3/MM3 (ref 0–0.4)
EOSINOPHIL NFR BLD MANUAL: 6 % (ref 0.3–6.2)
ERYTHROCYTE [DISTWIDTH] IN BLOOD BY AUTOMATED COUNT: 15.9 % (ref 12.3–15.4)
GFR SERPL CREATININE-BSD FRML MDRD: 102 ML/MIN/1.73
GIANT PLATELETS: ABNORMAL
GLOBULIN UR ELPH-MCNC: 3.7 GM/DL
GLUCOSE SERPL-MCNC: 207 MG/DL (ref 65–99)
HCT VFR BLD AUTO: 30.7 % (ref 37.5–51)
HGB BLD-MCNC: 9.9 G/DL (ref 13–17.7)
HOLD SPECIMEN: NORMAL
LYMPHOCYTES # BLD MANUAL: 0.47 10*3/MM3 (ref 0.7–3.1)
LYMPHOCYTES NFR BLD MANUAL: 14 % (ref 19.6–45.3)
LYMPHOCYTES NFR BLD MANUAL: 50 % (ref 5–12)
MAGNESIUM SERPL-MCNC: 1.8 MG/DL (ref 1.6–2.4)
MCH RBC QN AUTO: 27.7 PG (ref 26.6–33)
MCHC RBC AUTO-ENTMCNC: 32.2 G/DL (ref 31.5–35.7)
MCV RBC AUTO: 86 FL (ref 79–97)
METAMYELOCYTES NFR BLD MANUAL: 3 % (ref 0–0)
MONOCYTES # BLD AUTO: 1.68 10*3/MM3 (ref 0.1–0.9)
MYELOCYTES NFR BLD MANUAL: 1 % (ref 0–0)
NEUTROPHILS # BLD AUTO: 0.7 10*3/MM3 (ref 1.7–7)
NEUTROPHILS NFR BLD MANUAL: 16 % (ref 42.7–76)
NEUTS BAND NFR BLD MANUAL: 5 % (ref 0–5)
PLATELET # BLD AUTO: 274 10*3/MM3 (ref 140–450)
PMV BLD AUTO: 9 FL (ref 6–12)
POIKILOCYTOSIS BLD QL SMEAR: ABNORMAL
POLYCHROMASIA BLD QL SMEAR: ABNORMAL
POTASSIUM SERPL-SCNC: 3.8 MMOL/L (ref 3.5–5.2)
PROMYELOCYTES NFR BLD MANUAL: 3 % (ref 0–0)
PROT SERPL-MCNC: 7.1 G/DL (ref 6–8.5)
RBC # BLD AUTO: 3.57 10*6/MM3 (ref 4.14–5.8)
SODIUM SERPL-SCNC: 132 MMOL/L (ref 136–145)
WBC # BLD AUTO: 3.35 10*3/MM3 (ref 3.4–10.8)
WBC MORPH BLD: NORMAL

## 2020-08-17 PROCEDURE — 96372 THER/PROPH/DIAG INJ SC/IM: CPT

## 2020-08-17 PROCEDURE — 85025 COMPLETE CBC W/AUTO DIFF WBC: CPT

## 2020-08-17 PROCEDURE — 85007 BL SMEAR W/DIFF WBC COUNT: CPT

## 2020-08-17 PROCEDURE — 80053 COMPREHEN METABOLIC PANEL: CPT

## 2020-08-17 PROCEDURE — 83735 ASSAY OF MAGNESIUM: CPT

## 2020-08-17 PROCEDURE — 36415 COLL VENOUS BLD VENIPUNCTURE: CPT

## 2020-08-17 PROCEDURE — 25010000002 FILGRASTIM PER 480 MCG: Performed by: INTERNAL MEDICINE

## 2020-08-17 RX ADMIN — FILGRASTIM 480 MCG: 480 INJECTION, SOLUTION INTRAVENOUS; SUBCUTANEOUS at 09:40

## 2020-08-17 NOTE — TELEPHONE ENCOUNTER
----- Message from Shaq Boggs MD sent at 8/17/2020  9:42 AM CDT -----  ANC 0.7.  Neupogen 480 mcg subcutaneous daily for 3 days.

## 2020-08-17 NOTE — TELEPHONE ENCOUNTER
Received call from Nurse [facility]. She calls to report patient Magda Viramontes, is jie for txt Folfox today and ANC is 0.70. Questions plans?  Discussed with Dr Boggs, he recommends holding txt today move out x 1 week, give Neupogen 480 mcq x 3 days.   Relayed this information to Addie Nurse she v/u of instruction.   Patient was question if he had met with Dietitian regarding his profound wt loss. Nurse reports that patient denies meeting but he has a 4 lb wt gain from previous wt check. He says he is drinking Boost several times per day. Patient was encouraged to continue to try to eat solid foods on top of the Boost. He v/u

## 2020-08-17 NOTE — PROGRESS NOTES
0900 - s/w Madalyn GORMAN at office re: ANC of 0.70. Hold treatment. She will call back once orders received from the Dr. Deniz Gregory RN

## 2020-08-17 NOTE — PATIENT INSTRUCTIONS
Filgrastim, G-CSF injection  What is this medicine?  FILGRASTIM, G-CSF (davon GRA stim) is a granulocyte colony-stimulating factor that stimulates the growth of neutrophils, a type of white blood cell (WBC) important in the body's fight against infection. It is used to reduce the incidence of fever and infection in patients with certain types of cancer who are receiving chemotherapy that affects the bone marrow, to stimulate blood cell production for removal of WBCs from the body prior to a bone marrow transplantation, to reduce the incidence of fever and infection in patients who have severe chronic neutropenia, and to improve survival outcomes following high-dose radiation exposure that is toxic to the bone marrow.  This medicine may be used for other purposes; ask your health care provider or pharmacist if you have questions.  COMMON BRAND NAME(S): Neupogen, Nivestym, Zarxio  What should I tell my health care provider before I take this medicine?  They need to know if you have any of these conditions:  · kidney disease  · latex allergy  · ongoing radiation therapy  · sickle cell disease  · an unusual or allergic reaction to filgrastim, pegfilgrastim, other medicines, foods, dyes, or preservatives  · pregnant or trying to get pregnant  · breast-feeding  How should I use this medicine?  This medicine is for injection under the skin or infusion into a vein. As an infusion into a vein, it is usually given by a health care professional in a hospital or clinic setting. If you get this medicine at home, you will be taught how to prepare and give this medicine. Refer to the Instructions for Use that come with your medication packaging. Use exactly as directed. Take your medicine at regular intervals. Do not take your medicine more often than directed.  It is important that you put your used needles and syringes in a special sharps container. Do not put them in a trash can. If you do not have a sharps container, call your  pharmacist or healthcare provider to get one.  Talk to your pediatrician regarding the use of this medicine in children. While this drug may be prescribed for children as young as 7 months for selected conditions, precautions do apply.  Overdosage: If you think you have taken too much of this medicine contact a poison control center or emergency room at once.  NOTE: This medicine is only for you. Do not share this medicine with others.  What if I miss a dose?  It is important not to miss your dose. Call your doctor or health care professional if you miss a dose.  What may interact with this medicine?  This medicine may interact with the following medications:  · medicines that may cause a release of neutrophils, such as lithium  This list may not describe all possible interactions. Give your health care provider a list of all the medicines, herbs, non-prescription drugs, or dietary supplements you use. Also tell them if you smoke, drink alcohol, or use illegal drugs. Some items may interact with your medicine.  What should I watch for while using this medicine?  You may need blood work done while you are taking this medicine.  What side effects may I notice from receiving this medicine?  Side effects that you should report to your doctor or health care professional as soon as possible:  · allergic reactions like skin rash, itching or hives, swelling of the face, lips, or tongue  · back pain  · dizziness or feeling faint  · fever  · pain, redness, or irritation at site where injected  · pinpoint red spots on the skin  · shortness of breath or breathing problems  · signs and symptoms of kidney injury like trouble passing urine, change in the amount of urine, or red or dark-brown urine  · stomach or side pain, or pain at the shoulder  · swelling  · tiredness  · unusual bleeding or bruising  Side effects that usually do not require medical attention (report to your doctor or health care professional if they continue or  are bothersome):  · bone pain  · cough  · diarrhea  · hair loss  · headache  · muscle pain  This list may not describe all possible side effects. Call your doctor for medical advice about side effects. You may report side effects to FDA at 2-317-TFI-7044.  Where should I keep my medicine?  Keep out of the reach of children.  Store in a refrigerator between 2 and 8 degrees C (36 and 46 degrees F). Do not freeze. Keep in carton to protect from light. Throw away this medicine if vials or syringes are left out of the refrigerator for more than 24 hours. Throw away any unused medicine after the expiration date.  NOTE: This sheet is a summary. It may not cover all possible information. If you have questions about this medicine, talk to your doctor, pharmacist, or health care provider.  © 2020 [employer] (2019-10-17 16:55:01)

## 2020-08-17 NOTE — TELEPHONE ENCOUNTER
----- Message from Patricia Atkins sent at 8/17/2020  1:47 PM CDT -----  Dr. Ed Stokes's office with oral and facial surgery called, they are needing this patient's treatment records. Their fax is [phone]

## 2020-08-18 ENCOUNTER — INFUSION (OUTPATIENT)
Dept: ONCOLOGY | Facility: HOSPITAL | Age: 70
End: 2020-08-18

## 2020-08-18 VITALS
HEART RATE: 111 BPM | SYSTOLIC BLOOD PRESSURE: 89 MMHG | OXYGEN SATURATION: 98 % | RESPIRATION RATE: 18 BRPM | DIASTOLIC BLOOD PRESSURE: 53 MMHG | TEMPERATURE: 97.6 F

## 2020-08-18 DIAGNOSIS — D70.1 CHEMOTHERAPY INDUCED NEUTROPENIA (HCC): Primary | ICD-10-CM

## 2020-08-18 DIAGNOSIS — T45.1X5A CHEMOTHERAPY INDUCED NEUTROPENIA (HCC): Primary | ICD-10-CM

## 2020-08-18 PROCEDURE — 25010000002 FILGRASTIM PER 480 MCG: Performed by: INTERNAL MEDICINE

## 2020-08-18 PROCEDURE — 96372 THER/PROPH/DIAG INJ SC/IM: CPT

## 2020-08-18 RX ADMIN — FILGRASTIM 480 MCG: 480 INJECTION, SOLUTION INTRAVENOUS; SUBCUTANEOUS at 12:25

## 2020-08-19 ENCOUNTER — INFUSION (OUTPATIENT)
Dept: ONCOLOGY | Facility: HOSPITAL | Age: 70
End: 2020-08-19

## 2020-08-19 ENCOUNTER — APPOINTMENT (OUTPATIENT)
Dept: ONCOLOGY | Facility: HOSPITAL | Age: 70
End: 2020-08-19

## 2020-08-19 VITALS
SYSTOLIC BLOOD PRESSURE: 107 MMHG | HEART RATE: 109 BPM | TEMPERATURE: 97.6 F | OXYGEN SATURATION: 96 % | RESPIRATION RATE: 18 BRPM | DIASTOLIC BLOOD PRESSURE: 59 MMHG

## 2020-08-19 DIAGNOSIS — D70.1 CHEMOTHERAPY INDUCED NEUTROPENIA (HCC): Primary | ICD-10-CM

## 2020-08-19 DIAGNOSIS — T45.1X5A CHEMOTHERAPY INDUCED NEUTROPENIA (HCC): Primary | ICD-10-CM

## 2020-08-19 PROCEDURE — 96372 THER/PROPH/DIAG INJ SC/IM: CPT

## 2020-08-19 PROCEDURE — 25010000002 FILGRASTIM PER 480 MCG: Performed by: INTERNAL MEDICINE

## 2020-08-19 RX ADMIN — FILGRASTIM 480 MCG: 480 INJECTION, SOLUTION INTRAVENOUS; SUBCUTANEOUS at 12:29

## 2020-08-21 DIAGNOSIS — C20 RECTAL CANCER (HCC): ICD-10-CM

## 2020-08-24 ENCOUNTER — INFUSION (OUTPATIENT)
Dept: ONCOLOGY | Facility: HOSPITAL | Age: 70
End: 2020-08-24

## 2020-08-24 ENCOUNTER — LAB (OUTPATIENT)
Dept: LAB | Facility: HOSPITAL | Age: 70
End: 2020-08-24

## 2020-08-24 VITALS
OXYGEN SATURATION: 98 % | WEIGHT: 153 LBS | RESPIRATION RATE: 18 BRPM | SYSTOLIC BLOOD PRESSURE: 104 MMHG | BODY MASS INDEX: 21.9 KG/M2 | TEMPERATURE: 97.3 F | HEIGHT: 70 IN | HEART RATE: 86 BPM | DIASTOLIC BLOOD PRESSURE: 60 MMHG

## 2020-08-24 DIAGNOSIS — C20 RECTAL CANCER (HCC): Primary | ICD-10-CM

## 2020-08-24 DIAGNOSIS — C20 RECTAL CANCER (HCC): ICD-10-CM

## 2020-08-24 LAB
ALBUMIN SERPL-MCNC: 3.6 G/DL (ref 3.5–5.2)
ALBUMIN/GLOB SERPL: 0.9 G/DL
ALP SERPL-CCNC: 139 U/L (ref 39–117)
ALT SERPL W P-5'-P-CCNC: 65 U/L (ref 1–41)
ANION GAP SERPL CALCULATED.3IONS-SCNC: 11 MMOL/L (ref 5–15)
ANISOCYTOSIS BLD QL: ABNORMAL
AST SERPL-CCNC: 50 U/L (ref 1–40)
BILIRUB SERPL-MCNC: 0.2 MG/DL (ref 0–1.2)
BUN SERPL-MCNC: 20 MG/DL (ref 8–23)
BUN/CREAT SERPL: 31.7 (ref 7–25)
CALCIUM SPEC-SCNC: 9.8 MG/DL (ref 8.6–10.5)
CHLORIDE SERPL-SCNC: 99 MMOL/L (ref 98–107)
CO2 SERPL-SCNC: 27 MMOL/L (ref 22–29)
CREAT SERPL-MCNC: 0.63 MG/DL (ref 0.76–1.27)
DEPRECATED RDW RBC AUTO: 52.7 FL (ref 37–54)
EOSINOPHIL # BLD MANUAL: 0.48 10*3/MM3 (ref 0–0.4)
EOSINOPHIL NFR BLD MANUAL: 2 % (ref 0.3–6.2)
ERYTHROCYTE [DISTWIDTH] IN BLOOD BY AUTOMATED COUNT: 17.7 % (ref 12.3–15.4)
GFR SERPL CREATININE-BSD FRML MDRD: 127 ML/MIN/1.73
GLOBULIN UR ELPH-MCNC: 4.2 GM/DL
GLUCOSE SERPL-MCNC: 271 MG/DL (ref 65–99)
HCT VFR BLD AUTO: 33.8 % (ref 37.5–51)
HGB BLD-MCNC: 10.6 G/DL (ref 13–17.7)
LYMPHOCYTES # BLD MANUAL: 0.98 10*3/MM3 (ref 0.7–3.1)
LYMPHOCYTES NFR BLD MANUAL: 4.1 % (ref 19.6–45.3)
LYMPHOCYTES NFR BLD MANUAL: 9.2 % (ref 5–12)
MAGNESIUM SERPL-MCNC: 2.1 MG/DL (ref 1.6–2.4)
MCH RBC QN AUTO: 27.2 PG (ref 26.6–33)
MCHC RBC AUTO-ENTMCNC: 31.4 G/DL (ref 31.5–35.7)
MCV RBC AUTO: 86.9 FL (ref 79–97)
METAMYELOCYTES NFR BLD MANUAL: 6.1 % (ref 0–0)
MONOCYTES # BLD AUTO: 2.2 10*3/MM3 (ref 0.1–0.9)
MYELOCYTES NFR BLD MANUAL: 9.2 % (ref 0–0)
NEUTROPHILS # BLD AUTO: 16.6 10*3/MM3 (ref 1.7–7)
NEUTROPHILS NFR BLD MANUAL: 64.3 % (ref 42.7–76)
NEUTS BAND NFR BLD MANUAL: 5.1 % (ref 0–5)
PLATELET # BLD AUTO: 418 10*3/MM3 (ref 140–450)
PMV BLD AUTO: 8.7 FL (ref 6–12)
POIKILOCYTOSIS BLD QL SMEAR: ABNORMAL
POLYCHROMASIA BLD QL SMEAR: ABNORMAL
POTASSIUM SERPL-SCNC: 4.5 MMOL/L (ref 3.5–5.2)
PROT SERPL-MCNC: 7.8 G/DL (ref 6–8.5)
RBC # BLD AUTO: 3.89 10*6/MM3 (ref 4.14–5.8)
SMALL PLATELETS BLD QL SMEAR: ABNORMAL
SODIUM SERPL-SCNC: 137 MMOL/L (ref 136–145)
WBC # BLD AUTO: 23.92 10*3/MM3 (ref 3.4–10.8)
WBC MORPH BLD: NORMAL

## 2020-08-24 PROCEDURE — 96415 CHEMO IV INFUSION ADDL HR: CPT

## 2020-08-24 PROCEDURE — 85025 COMPLETE CBC W/AUTO DIFF WBC: CPT

## 2020-08-24 PROCEDURE — 96375 TX/PRO/DX INJ NEW DRUG ADDON: CPT

## 2020-08-24 PROCEDURE — 25010000002 LEUCOVORIN CALCIUM PER 50 MG: Performed by: NURSE PRACTITIONER

## 2020-08-24 PROCEDURE — 25010000002 PALONOSETRON PER 25 MCG: Performed by: NURSE PRACTITIONER

## 2020-08-24 PROCEDURE — 96413 CHEMO IV INFUSION 1 HR: CPT

## 2020-08-24 PROCEDURE — 25010000002 OXALIPLATIN PER 0.5 MG: Performed by: NURSE PRACTITIONER

## 2020-08-24 PROCEDURE — 80053 COMPREHEN METABOLIC PANEL: CPT

## 2020-08-24 PROCEDURE — 25010000002 FLUOROURACIL PER 500 MG: Performed by: NURSE PRACTITIONER

## 2020-08-24 PROCEDURE — 83735 ASSAY OF MAGNESIUM: CPT

## 2020-08-24 PROCEDURE — 85007 BL SMEAR W/DIFF WBC COUNT: CPT

## 2020-08-24 PROCEDURE — G0498 CHEMO EXTEND IV INFUS W/PUMP: HCPCS

## 2020-08-24 PROCEDURE — 96368 THER/DIAG CONCURRENT INF: CPT

## 2020-08-24 PROCEDURE — 25010000002 DEXAMETHASONE SODIUM PHOSPHATE 100 MG/10ML SOLUTION: Performed by: NURSE PRACTITIONER

## 2020-08-24 PROCEDURE — 96411 CHEMO IV PUSH ADDL DRUG: CPT

## 2020-08-24 PROCEDURE — 25010000002 FOSAPREPITANT PER 1 MG: Performed by: NURSE PRACTITIONER

## 2020-08-24 PROCEDURE — 36415 COLL VENOUS BLD VENIPUNCTURE: CPT

## 2020-08-24 PROCEDURE — 96367 TX/PROPH/DG ADDL SEQ IV INF: CPT

## 2020-08-24 RX ORDER — DIPHENHYDRAMINE HYDROCHLORIDE 50 MG/ML
50 INJECTION INTRAMUSCULAR; INTRAVENOUS AS NEEDED
Status: DISCONTINUED | OUTPATIENT
Start: 2020-08-24 | End: 2020-08-24 | Stop reason: HOSPADM

## 2020-08-24 RX ORDER — PALONOSETRON 0.05 MG/ML
0.25 INJECTION, SOLUTION INTRAVENOUS ONCE
Status: COMPLETED | OUTPATIENT
Start: 2020-08-24 | End: 2020-08-24

## 2020-08-24 RX ORDER — FLUOROURACIL 50 MG/ML
400 INJECTION, SOLUTION INTRAVENOUS ONCE
Status: COMPLETED | OUTPATIENT
Start: 2020-08-24 | End: 2020-08-24

## 2020-08-24 RX ORDER — DEXTROSE MONOHYDRATE 50 MG/ML
250 INJECTION, SOLUTION INTRAVENOUS ONCE
Status: COMPLETED | OUTPATIENT
Start: 2020-08-24 | End: 2020-08-24

## 2020-08-24 RX ORDER — FAMOTIDINE 10 MG/ML
20 INJECTION, SOLUTION INTRAVENOUS AS NEEDED
Status: DISCONTINUED | OUTPATIENT
Start: 2020-08-24 | End: 2020-08-24 | Stop reason: HOSPADM

## 2020-08-24 RX ADMIN — FLUOROURACIL 4730 MG: 50 INJECTION, SOLUTION INTRAVENOUS at 13:22

## 2020-08-24 RX ADMIN — SODIUM CHLORIDE 150 MG: 9 INJECTION, SOLUTION INTRAVENOUS at 10:31

## 2020-08-24 RX ADMIN — DEXAMETHASONE SODIUM PHOSPHATE 12 MG: 10 INJECTION, SOLUTION INTRAMUSCULAR; INTRAVENOUS at 10:14

## 2020-08-24 RX ADMIN — PALONOSETRON HYDROCHLORIDE 0.25 MG: 0.25 INJECTION, SOLUTION INTRAVENOUS at 10:11

## 2020-08-24 RX ADMIN — FLUOROURACIL 790 MG: 50 INJECTION, SOLUTION INTRAVENOUS at 13:17

## 2020-08-24 RX ADMIN — LEUCOVORIN CALCIUM 790 MG: 350 INJECTION, POWDER, LYOPHILIZED, FOR SOLUTION INTRAMUSCULAR; INTRAVENOUS at 11:00

## 2020-08-24 RX ADMIN — OXALIPLATIN 165 MG: 5 INJECTION, SOLUTION, CONCENTRATE INTRAVENOUS at 11:00

## 2020-08-24 RX ADMIN — DEXTROSE MONOHYDRATE 250 ML: 50 INJECTION, SOLUTION INTRAVENOUS at 10:11

## 2020-08-26 ENCOUNTER — INFUSION (OUTPATIENT)
Dept: ONCOLOGY | Facility: HOSPITAL | Age: 70
End: 2020-08-26

## 2020-08-26 VITALS
HEART RATE: 57 BPM | TEMPERATURE: 97.4 F | DIASTOLIC BLOOD PRESSURE: 61 MMHG | BODY MASS INDEX: 21.96 KG/M2 | WEIGHT: 153.4 LBS | OXYGEN SATURATION: 91 % | RESPIRATION RATE: 16 BRPM | SYSTOLIC BLOOD PRESSURE: 103 MMHG | HEIGHT: 70 IN

## 2020-08-26 DIAGNOSIS — C20 RECTAL CANCER (HCC): Primary | ICD-10-CM

## 2020-08-26 PROCEDURE — 25010000003 HEPARIN LOCK FLUSH PER 10 UNITS: Performed by: NURSE PRACTITIONER

## 2020-08-26 RX ORDER — SODIUM CHLORIDE 0.9 % (FLUSH) 0.9 %
10 SYRINGE (ML) INJECTION AS NEEDED
Status: DISCONTINUED | OUTPATIENT
Start: 2020-08-26 | End: 2020-08-26 | Stop reason: HOSPADM

## 2020-08-26 RX ORDER — HEPARIN SODIUM (PORCINE) LOCK FLUSH IV SOLN 100 UNIT/ML 100 UNIT/ML
500 SOLUTION INTRAVENOUS AS NEEDED
Status: DISCONTINUED | OUTPATIENT
Start: 2020-08-26 | End: 2020-08-26 | Stop reason: HOSPADM

## 2020-08-26 RX ORDER — SODIUM CHLORIDE 0.9 % (FLUSH) 0.9 %
10 SYRINGE (ML) INJECTION AS NEEDED
Status: CANCELLED | OUTPATIENT
Start: 2020-08-26

## 2020-08-26 RX ORDER — HEPARIN SODIUM (PORCINE) LOCK FLUSH IV SOLN 100 UNIT/ML 100 UNIT/ML
500 SOLUTION INTRAVENOUS AS NEEDED
Status: CANCELLED | OUTPATIENT
Start: 2020-08-26

## 2020-08-26 RX ADMIN — SODIUM CHLORIDE, PRESERVATIVE FREE 10 ML: 5 INJECTION INTRAVENOUS at 12:41

## 2020-08-26 RX ADMIN — Medication 500 UNITS: at 12:41

## 2020-08-28 ENCOUNTER — TELEPHONE (OUTPATIENT)
Dept: ONCOLOGY | Facility: CLINIC | Age: 70
End: 2020-08-28

## 2020-08-28 NOTE — TELEPHONE ENCOUNTER
Received call from Kamilah, [facility]. She calls to report patient c/o.  Patient notes both hands, feet, and face becoming very red post txt, denies burning or itching post txt. this has now resided. Patient states he was very concerned.   Kamilah reports there is no redness noted today and patient does not c/o any other issues.

## 2020-08-31 ENCOUNTER — APPOINTMENT (OUTPATIENT)
Dept: ONCOLOGY | Facility: HOSPITAL | Age: 70
End: 2020-08-31

## 2020-08-31 ENCOUNTER — LAB (OUTPATIENT)
Dept: LAB | Facility: HOSPITAL | Age: 70
End: 2020-08-31

## 2020-08-31 DIAGNOSIS — C20 RECTAL CANCER (HCC): ICD-10-CM

## 2020-08-31 LAB
ALBUMIN SERPL-MCNC: 3.7 G/DL (ref 3.5–5.2)
ALBUMIN/GLOB SERPL: 1.1 G/DL
ALP SERPL-CCNC: 106 U/L (ref 39–117)
ALT SERPL W P-5'-P-CCNC: 27 U/L (ref 1–41)
ANION GAP SERPL CALCULATED.3IONS-SCNC: 13 MMOL/L (ref 5–15)
AST SERPL-CCNC: 16 U/L (ref 1–40)
BASOPHILS # BLD AUTO: 0.08 10*3/MM3 (ref 0–0.2)
BASOPHILS NFR BLD AUTO: 0.9 % (ref 0–1.5)
BILIRUB SERPL-MCNC: 0.3 MG/DL (ref 0–1.2)
BUN SERPL-MCNC: 19 MG/DL (ref 8–23)
BUN/CREAT SERPL: 33.9 (ref 7–25)
CALCIUM SPEC-SCNC: 9.3 MG/DL (ref 8.6–10.5)
CEA SERPL-MCNC: 3.35 NG/ML
CHLORIDE SERPL-SCNC: 104 MMOL/L (ref 98–107)
CO2 SERPL-SCNC: 24 MMOL/L (ref 22–29)
CREAT SERPL-MCNC: 0.56 MG/DL (ref 0.76–1.27)
DEPRECATED RDW RBC AUTO: 51.7 FL (ref 37–54)
EOSINOPHIL # BLD AUTO: 0.3 10*3/MM3 (ref 0–0.4)
EOSINOPHIL NFR BLD AUTO: 3.3 % (ref 0.3–6.2)
ERYTHROCYTE [DISTWIDTH] IN BLOOD BY AUTOMATED COUNT: 17.4 % (ref 12.3–15.4)
GFR SERPL CREATININE-BSD FRML MDRD: 145 ML/MIN/1.73
GLOBULIN UR ELPH-MCNC: 3.5 GM/DL
GLUCOSE SERPL-MCNC: 308 MG/DL (ref 65–99)
HCT VFR BLD AUTO: 30.7 % (ref 37.5–51)
HGB BLD-MCNC: 9.9 G/DL (ref 13–17.7)
IMM GRANULOCYTES # BLD AUTO: 0.1 10*3/MM3 (ref 0–0.05)
IMM GRANULOCYTES NFR BLD AUTO: 1.1 % (ref 0–0.5)
LYMPHOCYTES # BLD AUTO: 0.73 10*3/MM3 (ref 0.7–3.1)
LYMPHOCYTES NFR BLD AUTO: 8 % (ref 19.6–45.3)
MAGNESIUM SERPL-MCNC: 2.1 MG/DL (ref 1.6–2.4)
MCH RBC QN AUTO: 27.6 PG (ref 26.6–33)
MCHC RBC AUTO-ENTMCNC: 32.2 G/DL (ref 31.5–35.7)
MCV RBC AUTO: 85.5 FL (ref 79–97)
MONOCYTES # BLD AUTO: 0.55 10*3/MM3 (ref 0.1–0.9)
MONOCYTES NFR BLD AUTO: 6 % (ref 5–12)
NEUTROPHILS NFR BLD AUTO: 7.36 10*3/MM3 (ref 1.7–7)
NEUTROPHILS NFR BLD AUTO: 80.7 % (ref 42.7–76)
NRBC BLD AUTO-RTO: 0 /100 WBC (ref 0–0.2)
PLATELET # BLD AUTO: 306 10*3/MM3 (ref 140–450)
PMV BLD AUTO: 9.3 FL (ref 6–12)
POTASSIUM SERPL-SCNC: 4.3 MMOL/L (ref 3.5–5.2)
PROT SERPL-MCNC: 7.2 G/DL (ref 6–8.5)
RBC # BLD AUTO: 3.59 10*6/MM3 (ref 4.14–5.8)
SODIUM SERPL-SCNC: 141 MMOL/L (ref 136–145)
WBC # BLD AUTO: 9.12 10*3/MM3 (ref 3.4–10.8)

## 2020-08-31 PROCEDURE — 36415 COLL VENOUS BLD VENIPUNCTURE: CPT

## 2020-08-31 PROCEDURE — 85025 COMPLETE CBC W/AUTO DIFF WBC: CPT

## 2020-08-31 PROCEDURE — 82378 CARCINOEMBRYONIC ANTIGEN: CPT

## 2020-08-31 PROCEDURE — 83735 ASSAY OF MAGNESIUM: CPT

## 2020-08-31 PROCEDURE — 80053 COMPREHEN METABOLIC PANEL: CPT

## 2020-09-02 ENCOUNTER — TELEPHONE (OUTPATIENT)
Dept: ONCOLOGY | Facility: CLINIC | Age: 70
End: 2020-09-02

## 2020-09-02 ENCOUNTER — LAB (OUTPATIENT)
Dept: LAB | Facility: HOSPITAL | Age: 70
End: 2020-09-02

## 2020-09-02 ENCOUNTER — APPOINTMENT (OUTPATIENT)
Dept: ONCOLOGY | Facility: HOSPITAL | Age: 70
End: 2020-09-02

## 2020-09-02 DIAGNOSIS — T45.1X5A CHEMOTHERAPY-INDUCED NEUTROPENIA (HCC): Primary | ICD-10-CM

## 2020-09-02 DIAGNOSIS — D70.1 CHEMOTHERAPY-INDUCED NEUTROPENIA (HCC): Primary | ICD-10-CM

## 2020-09-02 LAB
BASOPHILS # BLD AUTO: 0.09 10*3/MM3 (ref 0–0.2)
BASOPHILS NFR BLD AUTO: 0.7 % (ref 0–1.5)
DEPRECATED RDW RBC AUTO: 52.7 FL (ref 37–54)
EOSINOPHIL # BLD AUTO: 0.18 10*3/MM3 (ref 0–0.4)
EOSINOPHIL NFR BLD AUTO: 1.3 % (ref 0.3–6.2)
ERYTHROCYTE [DISTWIDTH] IN BLOOD BY AUTOMATED COUNT: 18.3 % (ref 12.3–15.4)
HCT VFR BLD AUTO: 29.2 % (ref 37.5–51)
HGB BLD-MCNC: 9.3 G/DL (ref 13–17.7)
IMM GRANULOCYTES # BLD AUTO: 0.12 10*3/MM3 (ref 0–0.05)
IMM GRANULOCYTES NFR BLD AUTO: 0.9 % (ref 0–0.5)
LYMPHOCYTES # BLD AUTO: 0.54 10*3/MM3 (ref 0.7–3.1)
LYMPHOCYTES NFR BLD AUTO: 4 % (ref 19.6–45.3)
MCH RBC QN AUTO: 27.5 PG (ref 26.6–33)
MCHC RBC AUTO-ENTMCNC: 31.8 G/DL (ref 31.5–35.7)
MCV RBC AUTO: 86.4 FL (ref 79–97)
MONOCYTES # BLD AUTO: 1.24 10*3/MM3 (ref 0.1–0.9)
MONOCYTES NFR BLD AUTO: 9.3 % (ref 5–12)
NEUTROPHILS NFR BLD AUTO: 11.2 10*3/MM3 (ref 1.7–7)
NEUTROPHILS NFR BLD AUTO: 83.8 % (ref 42.7–76)
NRBC BLD AUTO-RTO: 0 /100 WBC (ref 0–0.2)
PLATELET # BLD AUTO: 295 10*3/MM3 (ref 140–450)
PMV BLD AUTO: 9.6 FL (ref 6–12)
RBC # BLD AUTO: 3.38 10*6/MM3 (ref 4.14–5.8)
WBC # BLD AUTO: 13.37 10*3/MM3 (ref 3.4–10.8)

## 2020-09-02 PROCEDURE — 36415 COLL VENOUS BLD VENIPUNCTURE: CPT | Performed by: INTERNAL MEDICINE

## 2020-09-02 PROCEDURE — 85025 COMPLETE CBC W/AUTO DIFF WBC: CPT | Performed by: INTERNAL MEDICINE

## 2020-09-02 NOTE — PROGRESS NOTES
[facility] GROUP HEMATOLOGY AND ONCOLOGY  [address]  [facility] [address] [phone]    Patient Name: Attila Viramontes  Encounter Date: 09/09/2020  YOB: 1950  Patient Number: [id number]      REASON FOR FOLLOW-UP: Attila Viramontes is a pleasant 69 y.o. male who is seen on follow up for rectal cancer.  He is seen C6D2 of neoadjuvant FOLFOX. He is seen 3.25 months post neoadjuvant 5-FU with radiation. He is seen alone.  History is obtained from patient. History is considered to be accurate    He was given Neupogen starting on 08/17/2020 for ANC of 0.7, 3-day duration.      Oncology/Hematology History    DIAGNOSTIC ABNORMALITIES:  CT abdomen and pelvis 03/25/2020.  Asymmetric wall thickening of the rectum could represent a mass/neoplasm. If not recently performed, colonoscopy is recommended. Inflammatory stranding of the central mesenteric root, which is nonspecific but can be seen with mesenteritis/mesenteric panniculitis.  Colonoscopy by Dr. Reaves 04/02/2020 showed a localized area of severely ulcerated mucosa was found at 2 cm proximal to the anus. Biopsies were taken with a cold forceps for histology.  Pathology report 04/03/2020 showed a moderately differentiated adenocarcinoma.  Stromal invasion is seen although depth of invasion is indeterminate in these biopsies.  In the areas of invasion, the stroma demonstrates a reactive, desmoplastic appearance.   CT chest report 04/20/2020.  Noncalcified pulmonary nodule in the left upper lobe. Metastatic  disease not excluded. Short interval follow-up with CT in 6 months recommended.   Atherosclerosis of the aorta and coronary arteries.  Stranding of the central mesenteric root in the abdomen, partially imaged.  Hepatic steatosis.  MRI 04/23/2020 showed abnormal enhancement and circumferential thickening of the  distal rectum for a length of 5.5 cm, with evidence of  invasion of the mesorectal fat and mesial rectal lymph nodes, measure less than 3 mm from the mesorectal fascia.   There is abnormal pre and post sacral soft tissue enhancement with enhancement of the distal sacrum, concerning for contiguous spread versus local metastatic disease. Addendum report, appears to be invasion of the left seminal vesicle base and left posterior prostate.  These findings are concerning for invasive malignancy.  Previous endorectal ultrasound 04/24/2020.  Fixed constricting lesion, T4NX.        PREVIOUS INTERVENTIONS:  Neoadjuvant CIV 5-FU and radiation 04/27/2020 through 06/05/2020 at [facility].  Not a surgical candidate.  Neoadjuvant FOLFOX 06/22/2020 through present at [facility].        Rectal cancer (CMS/HCC)     Initial Diagnosis     Rectal cancer (CMS/HCC)      3/25/2020 Imaging     CT Abd/Pelvis:     IMPRESSION:  1. Asymmetric wall thickening of the rectum could represent a  mass/neoplasm. If not recently performed, colonoscopy is recommended.     2. Inflammatory stranding of the central mesenteric root, which is  nonspecific but can be seen with mesenteritis/mesenteric panniculitis.     3. Atherosclerotic disease.  4. Fecal stasis.      4/2/2020 Biopsy     Colonoscopy:  Findings: The perianal and digital rectal examinations were normal.  A localized area of severely ulcerated mucosa was found at 2 cm proximal to the anus. Biopsies were  taken with a cold forceps for histology.  Impression: - Ulcerated mucosa at 2 cm proximal to the anus. Biopsied.    Final Diagnosis   Large intestine, rectum at 3 cm, biopsy: Moderately differentiated adenocarcinoma, invasive.     AJCC stage: pTX pNX               4/14/2020 Procedure     Mediport placement      4/14/2020 Imaging     CT Angio Head:  Summary:  1. Distal left ICA occlusion with patent [place] of P[place]rez.    CT Angio Neck:  IMPRESSION:  1. Left internal carotid artery occlusion beginning at the origin.  Minimal  opacification of the distal left internal carotid artery, which  may represent retrograde collateral flow.  2. Right internal carotid artery patent.  3. Bilateral vertebral arteries patent.  4. See separately dictated CTA head of the same day.     CT Head:  IMPRESSION:  1. Small amount of gas in the left cerebral cortical veins. Differential  would include iatrogenic air embolism or trauma.  2. No acute intracranial hemorrhage.      CT Angio Head/Neck:  Result Impression   1.  Age-indeterminate left internal carotid artery origin occlusion.   Reconstitution of left ICA flow at the level of the cavernous ICA,   likely due to patent anterior commuting artery. A critical alert was   sent.  2.  No perfusion abnormality.  3.  Moderate atherosclerotic stenosis involving the origin of the   right internal carotid artery.           4/15/2020 Imaging     MRI Brain:  No acute intracranial abnormality identified  Findings of chronic microvascular ischemia. Occluded left ICA flow   void.      4/27/2020 - 6/1/2020 Chemotherapy     OP COLORECTAL Fluorouracil CIV over 120H + XRT      5/1/2020 -  Chemotherapy     OP CENTRAL VENOUS ACCESS DEVICE ACCESS, CARE, AND MAINTENANCE (CVAD)      5/12/2020 Cancer Staged     Staging form: Colon And Rectum, AJCC 8th Edition  - Clinical stage from 5/12/2020: Stage IIIC (cT4b, cN1b, cM0) - Signed by Shaq Boggs MD on 5/12/2020 6/22/2020 -  Chemotherapy     OP COLON [id number] OXALIplatin / Leucovorin / Fluorouracil         PAST MEDICAL HISTORY:  ALLERGIES:  No Known Allergies  CURRENT MEDICATIONS:  Outpatient Encounter Medications as of 9/9/2020   Medication Sig Dispense Refill   • aspirin 81 MG chewable tablet Chew 81 mg Daily.     • atorvastatin (LIPITOR) 80 MG tablet Take 80 mg by mouth Daily.     • ciprofloxacin (CIPRO) 500 MG tablet Take one tablet by mouth twice daily for 7 days until finished 14 tablet 7   • ibuprofen (ADVIL,MOTRIN) 200 MG tablet Take 200 mg by mouth Every 8 (Eight)  Hours As Needed for Mild Pain .     • pantoprazole (PROTONIX) 20 MG EC tablet Take 20 mg by mouth Daily.     • prochlorperazine (COMPAZINE) 10 MG tablet Take 1 tablet by mouth Every 6 (Six) Hours As Needed for Nausea or Vomiting. 60 tablet 2     Facility-Administered Encounter Medications as of 9/9/2020   Medication Dose Route Frequency Provider Last Rate Last Dose   • [COMPLETED] dexamethasone (DECADRON) IVPB 12 mg  12 mg Intravenous Once Shaq Boggs MD   Stopped at 09/08/20 1110   • [COMPLETED] dextrose (D5W) 5 % infusion 250 mL  250 mL Intravenous Once Shaq Boggs MD   Stopped at 09/08/20 1428   • [COMPLETED] fluorouracil (ADRUCIL) chemo injection 790 mg  400 mg/m2 (Treatment Plan Recorded) Intravenous Once Shaq Boggs MD   Stopped at 09/08/20 1426   • [COMPLETED] fosaprepitant (EMEND) 150 mg/100mL NS  150 mg Intravenous Once Shaq Boggs MD   Stopped at 09/08/20 1145   • [COMPLETED] leucovorin 790 mg in dextrose (D5W) 5 % 294 mL IVPB  790 mg Intravenous Once Shaq Boggs MD   Stopped at 09/08/20 1413   • [COMPLETED] OXALIplatin (ELOXATIN) 165 mg in dextrose (D5W) 5 % 283 mL chemo IVPB  85 mg/m2 (Treatment Plan Recorded) Intravenous Once Shaq Boggs MD   Stopped at 09/08/20 1413   • [COMPLETED] palonosetron (ALOXI) injection 0.25 mg  0.25 mg Intravenous Once Shaq Boggs MD   0.25 mg at 09/08/20 1051   • [DISCONTINUED] diphenhydrAMINE (BENADRYL) injection 50 mg  50 mg Intravenous PRN Shaq Boggs MD       • [DISCONTINUED] famotidine (PEPCID) injection 20 mg  20 mg Intravenous PRN Shaq Boggs MD       • [DISCONTINUED] fluorouracil (ADRUCIL) 4,730 mg in sodium chloride 0.9 % 138 mL chemo infusion - FOR HOME USE  2,400 mg/m2 (Treatment Plan Recorded) Intravenous Once Shaq Boggs MD   4,730 mg at 09/08/20 1428   • [DISCONTINUED] hydrocortisone sodium succinate (Solu-CORTEF) injection 100 mg  100 mg Intravenous PRN Shaq Boggs MD         ADULT ILLNESSES:  Patient Active Problem List    Diagnosis Code   • Abnormal CT of the abdomen R93.5   • Rectal cancer (CMS/HCC) C20   • Current every day smoker F17.200   • Impaired gait and mobility R26.89   • Hypertension I10   • Bright red rectal bleeding K62.5   • 2nd degree AV block I44.1   • Tobacco abuse Z72.0   • Normocytic anemia D64.9   • Chemotherapy induced neutropenia (CMS/HCC) D70.1, T45.1X5A     SURGERIES:  Past Surgical History:   Procedure Laterality Date   • COLONOSCOPY N/A 4/2/2020    Procedure: COLONOSCOPY WITH ANESTHESIA;  Surgeon: Yanick Flowers DO;  Location: [facility] ENDOSCOPY;  Service: Gastroenterology;  Laterality: N/A;  pre: abnormal CT scan  post: rectal mass  PCP unknown   • EYE SURGERY Bilateral     lenses present   • VENOUS ACCESS DEVICE (PORT) INSERTION N/A 4/14/2020    Procedure: INSERTION VENOUS ACCESS DEVICE;  Surgeon: Vielka Weller MD;  Location: [facility] OR;  Service: General;  Laterality: N/A;     HEALTH MAINTENANCE ITEMS:  Health Maintenance Due   Topic Date Due   • TDAP/TD VACCINES (1 - Tdap) 09/18/1962   • ZOSTER VACCINE (1 of 2) 09/18/2001   • Pneumococcal Vaccine Once at 65 Years Old  09/18/2016   • HEPATITIS C SCREENING  03/26/2020   • MEDICARE ANNUAL WELLNESS  03/26/2020   • INFLUENZA VACCINE  08/01/2020       <no information>  Last Completed Colonoscopy       Status Date      COLONOSCOPY Done 4/2/2020 COLONOSCOPY     Patient has more history with this topic...          There is no immunization history on file for this patient.  Last Completed Mammogram     Patient has no health maintenance due at this time            FAMILY HISTORY:  Family History   Problem Relation Age of Onset   • Cancer Mother    • Stroke Father    • Heart disease Father    • Heart attack Brother      SOCIAL HISTORY:  Social History     Socioeconomic History   • Marital status: Single     Spouse name: Not on file   • Number of children: Not on file   • Years of education: Not on file   • Highest education level: Not on file   Tobacco Use   •  "Smoking status: Current Every Day Smoker     Packs/day: 1.00     Years: 53.00     Pack years: 53.00     Types: Cigarettes   • Smokeless tobacco: Never Used   Substance and Sexual Activity   • Alcohol use: Not Currently   • Drug use: Never   • Sexual activity: Defer       REVIEW OF SYSTEMS:    Review of Systems   Constitutional: Positive for fatigue. Negative for chills, diaphoresis and fever.        \"I am better.  I don't have much pain.\"   HENT: Negative for congestion, hearing loss and trouble swallowing.    Eyes: Negative for redness and visual disturbance.   Respiratory: Negative for cough and wheezing.         Short of breath with moderate exertion.    Cardiovascular: Negative for chest pain and palpitations.   Gastrointestinal: Positive for constipation. Negative for abdominal pain, diarrhea, nausea and vomiting.        \"I take MiraLax.  I can control my bowels now.\"    Endocrine: Negative for cold intolerance and heat intolerance.   Genitourinary: Negative for difficulty urinating and flank pain.   Musculoskeletal: Negative for gait problem and joint swelling.   Skin: Positive for pallor.   Allergic/Immunologic: Negative for food allergies.   Neurological: Negative for dizziness, speech difficulty and weakness.   Hematological: Negative for adenopathy. Does not bruise/bleed easily.   Psychiatric/Behavioral: Negative for agitation, confusion and hallucinations.       VITAL SIGNS: /62   Pulse 100   Temp 98.8 °F (37.1 °C)   Resp 18   Ht 177.8 cm (70\")   Wt 69.7 kg (153 lb 9.6 oz)   SpO2 95%   BMI 22.04 kg/m²   Pain Score    09/09/20 0812   PainSc: 0-No pain       PHYSICAL EXAMINATION:     Physical Exam   Constitutional: He is oriented to person, place, and time. He appears well-developed and well-nourished. No distress.   HENT:   Head: Normocephalic and atraumatic.   Eyes: No scleral icterus.   Neck: Neck supple.   Cardiovascular: Normal rate and regular rhythm.   Pulmonary/Chest: Effort normal and " breath sounds normal. He has no wheezes. He has no rales.   Port, left. No erythema.   Abdominal: Soft. Bowel sounds are normal. There is no tenderness.   Musculoskeletal: He exhibits no edema.   Lymphadenopathy:     He has no cervical adenopathy.   Neurological: He is alert and oriented to person, place, and time.   Skin: Skin is warm and dry. He is not diaphoretic. There is pallor.   Psychiatric: He has a normal mood and affect. His behavior is normal. Judgment and thought content normal.   Vitals reviewed.      LABS    Lab Results - Last 18 Months   Lab Units 09/08/20  0930 09/02/20  1028 08/31/20  0812 08/24/20  0918 08/17/20  0818 08/12/20  0900 08/03/20  0820 07/20/20  0820 07/15/20  0851   HEMOGLOBIN g/dL 9.1* 9.3* 9.9* 10.6* 9.9* 10.4* 11.5* 10.8* 11.0*   HEMATOCRIT % 29.3* 29.2* 30.7* 33.8* 30.7* 32.3* 35.7* 33.8* 34.2*   MCV fL 86.9 86.4 85.5 86.9 86.0 85.2 85.8 87.6 87.9   WBC 10*3/mm3 5.63 13.37* 9.12 23.92* 3.35* 6.53 4.34 6.50 11.14*   RDW % 19.5* 18.3* 17.4* 17.7* 15.9* 15.2 14.6 14.1 13.6   MPV fL 8.6 9.6 9.3 8.7 9.0 9.3 8.5 8.3 8.7   PLATELETS 10*3/mm3 373 295 306 418 274 243 382 405 403   IMM GRAN % % 0.5 0.9* 1.1*  --   --  0.9*  --  1.1* 0.5   NEUTROS ABS 10*3/mm3 3.58 11.20* 7.36* 16.60* 0.70* 5.04 2.44 4.60 8.85*   LYMPHS ABS 10*3/mm3 0.43* 0.54* 0.73  --   --  0.51* 0.60* 0.53* 0.53*   MONOS ABS 10*3/mm3 1.02* 1.24* 0.55  --   --  0.36 0.96* 1.03* 1.08*   EOS ABS 10*3/mm3 0.50* 0.18 0.30 0.48* 0.20 0.51* 0.22 0.20 0.56*   BASOS ABS 10*3/mm3 0.07 0.09 0.08  --  0.07 0.05 0.08 0.07 0.06   IMMATURE GRANS (ABS) 10*3/mm3 0.03 0.12* 0.10*  --   --  0.06*  --  0.07* 0.06*   NRBC /100 WBC 0.0 0.0 0.0  --   --  0.0  --  0.0 0.0   NEUTROPHIL % %  --   --   --  64.3 16.0*  --   --   --   --    MONOCYTES % %  --   --   --  9.2 50.0*  --   --   --   --    BASOPHIL % %  --   --   --   --  2.0*  --   --   --   --    ANISOCYTOSIS   --   --   --  Slight/1+ Slight/1+  --   --   --   --    GIANT PLT   --   --    --   --  Slight/1+  --   --   --   --        Lab Results - Last 18 Months   Lab Units 09/08/20  0930 08/31/20  0812 08/24/20  0918 08/17/20  0818 08/12/20  0900 08/03/20  0820   GLUCOSE mg/dL 195* 308* 271* 207* 255* 240*   SODIUM mmol/L 139 141 137 132* 137 135*   POTASSIUM mmol/L 4.2 4.3 4.5 3.8 4.7 4.5   CO2 mmol/L 24.0 24.0 27.0 21.0* 26.0 23.0   CHLORIDE mmol/L 101 104 99 97* 99 95*   ANION GAP mmol/L 14.0 13.0 11.0 14.0 12.0 17.0*   CREATININE mg/dL 0.61* 0.56* 0.63* 0.76 0.63* 0.88   BUN mg/dL 12 19 20 17 14 13   BUN / CREAT RATIO  19.7 33.9* 31.7* 22.4 22.2 14.8   CALCIUM mg/dL 9.5 9.3 9.8 9.4 10.0 9.9   EGFR IF NONAFRICN AM mL/min/1.73 131 145 127 102 127 86   ALK PHOS U/L 104 106 139* 83 97 115   TOTAL PROTEIN g/dL 7.2 7.2 7.8 7.1 7.6 8.1   ALT (SGPT) U/L 22 27 65* 18 15 15   AST (SGOT) U/L 19 16 50* 14 15 20   BILIRUBIN mg/dL 0.2 0.3 0.2 0.4 0.3 0.5   ALBUMIN g/dL 3.60 3.70 3.60 3.40* 3.80 3.90   GLOBULIN gm/dL 3.6 3.5 4.2 3.7 3.8 4.2       Lab Results - Last 18 Months   Lab Units 08/31/20  0812 08/03/20  0820 07/06/20  0827 04/09/20  0954   CEA ng/mL 3.35 2.87 3.35 4.26       No results for input(s): IRON, TIBC, LABIRON, FERRITIN, R1UGWKA, TSH, FOLATE in the last 34556 hours.    Invalid input(s): VITB12    Attila Viera Jenanicholascarmela reports a pain score of 0.      Patient's Body mass index is 22.04 kg/m². BMI is within normal parameters. No follow-up required..      ASSESSMENT:  1.  Adenocarcinoma of the rectum. Tumor size 5.5 cm.  AJCC stage:IIIC (cT4b, N1b, M0)  Treatment status:Post neoadjuvant CIV 5-FU and radiation, not a surgical candidate.  On neoadjuvant FOLFOX.  2.  Performance status of 1.  3.  Normocytic anemia from chemo.    4.  Elevated protein 03/25/2020.  5.  6 mm nodule, left upper lobe near the fissure.  6.  History of pancreatitis.   7.  Fatigue from chemo.  8.  Dysuria 07/15/2020.  9.  History of grade 3 neutropenia without fever C4D15.       PLAN:  1.   Re:  Grade 3 neutropenia  without fever from chemo on C4D15.  2.   Re:  Heme status.  Hemoglobin 9.1 and hematocrit 29.3.  3.   Re:  Pre office CMP.   mL/min  4.   Re:  Pre-office magnesium 2 and CEA none from 3.35 from 2.87 from 3.35 from 4.26.  5.  Continue FOLFOX for total of 8 cycles: Observe for nausea, vomiting, diarrhea, cold intolerance, myelosuppression, anaphylaxis, alopecia, and neuropathy.  6.  Schedule treatment C7 D1 to 3 to start 09/22/2020 and C8 D1 to D3 start 10/06/2020.     020  To be administered every 2 weeks: Plan for 8 cycles.  Oxaliplatin 85 mg/m2 IVPB over 2 hours (TD = 157 mg).  Leucovorin 400 mg/m2 IVPB over 2 hours (TD= 740 mg).  5- mg/ m2  IVP (TD = 740  mg).  Begin 5-FU 2,400 mg/m2 IVPB over 46 hours.  (TD= 4,440 mg).  7.   Premedicate with:  Aloxi 0.25 mg IVP.  Emend 150 mg IV.  Decadron 12 mg IVPB over 20-30 min.  8.  Weekly CBC with differential, magnesium, and CMP.  9.  Neulasta on pro day 3 as primary prophylaxis.  Had chemo delay, cycle 5 due to grade 3 neutropenia.  10.  Continue care per primary care physician and other specialists.  11.  Recall colonoscopy on 10/2020.  12.  Advance Care Planning  ACP discussion was declined by the patient. Patient does not have an advance directive, information provided.   13.  eRx Compazine 10 mg p.o. every 4 hours as needed for nausea and vomiting #60 with 2 refills if needed.  14.  NS 1 liter over 2 hours as needed.  15.  Appointment to see Dr. De Jesus at [place] after 8 cycles of FOLFOX.  16.  Return to office in 5 weeks with pre-office CEA.         I spent 27 total minutes, face-to-face, caring for Attila today.  Greater than 50% of this time involved counseling and/or coordination of care as documented within this note regarding the patient's illness(es), pros and cons of various treatment options, instructions and/or risk reduction.           Vincent Swenson MD  (Yanick Flowers DO)  (Nabeel Walton MD)  (Kel Ramirez MD)  (Vielka  MD Nithin)  (Destini De Jesus MD)

## 2020-09-08 ENCOUNTER — INFUSION (OUTPATIENT)
Dept: ONCOLOGY | Facility: HOSPITAL | Age: 70
End: 2020-09-08

## 2020-09-08 ENCOUNTER — LAB (OUTPATIENT)
Dept: LAB | Facility: HOSPITAL | Age: 70
End: 2020-09-08

## 2020-09-08 VITALS
OXYGEN SATURATION: 98 % | HEIGHT: 70 IN | BODY MASS INDEX: 21.76 KG/M2 | WEIGHT: 152 LBS | SYSTOLIC BLOOD PRESSURE: 112 MMHG | TEMPERATURE: 98 F | DIASTOLIC BLOOD PRESSURE: 67 MMHG | RESPIRATION RATE: 17 BRPM | HEART RATE: 79 BPM

## 2020-09-08 DIAGNOSIS — C20 RECTAL CANCER (HCC): Primary | ICD-10-CM

## 2020-09-08 DIAGNOSIS — C20 RECTAL CANCER (HCC): ICD-10-CM

## 2020-09-08 LAB
ALBUMIN SERPL-MCNC: 3.6 G/DL (ref 3.5–5.2)
ALBUMIN/GLOB SERPL: 1 G/DL
ALP SERPL-CCNC: 104 U/L (ref 39–117)
ALT SERPL W P-5'-P-CCNC: 22 U/L (ref 1–41)
ANION GAP SERPL CALCULATED.3IONS-SCNC: 14 MMOL/L (ref 5–15)
AST SERPL-CCNC: 19 U/L (ref 1–40)
BASOPHILS # BLD AUTO: 0.07 10*3/MM3 (ref 0–0.2)
BASOPHILS NFR BLD AUTO: 1.2 % (ref 0–1.5)
BILIRUB SERPL-MCNC: 0.2 MG/DL (ref 0–1.2)
BUN SERPL-MCNC: 12 MG/DL (ref 8–23)
BUN/CREAT SERPL: 19.7 (ref 7–25)
CALCIUM SPEC-SCNC: 9.5 MG/DL (ref 8.6–10.5)
CHLORIDE SERPL-SCNC: 101 MMOL/L (ref 98–107)
CO2 SERPL-SCNC: 24 MMOL/L (ref 22–29)
CREAT SERPL-MCNC: 0.61 MG/DL (ref 0.76–1.27)
DEPRECATED RDW RBC AUTO: 59 FL (ref 37–54)
EOSINOPHIL # BLD AUTO: 0.5 10*3/MM3 (ref 0–0.4)
EOSINOPHIL NFR BLD AUTO: 8.9 % (ref 0.3–6.2)
ERYTHROCYTE [DISTWIDTH] IN BLOOD BY AUTOMATED COUNT: 19.5 % (ref 12.3–15.4)
GFR SERPL CREATININE-BSD FRML MDRD: 131 ML/MIN/1.73
GLOBULIN UR ELPH-MCNC: 3.6 GM/DL
GLUCOSE SERPL-MCNC: 195 MG/DL (ref 65–99)
HCT VFR BLD AUTO: 29.3 % (ref 37.5–51)
HGB BLD-MCNC: 9.1 G/DL (ref 13–17.7)
IMM GRANULOCYTES # BLD AUTO: 0.03 10*3/MM3 (ref 0–0.05)
IMM GRANULOCYTES NFR BLD AUTO: 0.5 % (ref 0–0.5)
LYMPHOCYTES # BLD AUTO: 0.43 10*3/MM3 (ref 0.7–3.1)
LYMPHOCYTES NFR BLD AUTO: 7.6 % (ref 19.6–45.3)
MAGNESIUM SERPL-MCNC: 2 MG/DL (ref 1.6–2.4)
MCH RBC QN AUTO: 27 PG (ref 26.6–33)
MCHC RBC AUTO-ENTMCNC: 31.1 G/DL (ref 31.5–35.7)
MCV RBC AUTO: 86.9 FL (ref 79–97)
MONOCYTES # BLD AUTO: 1.02 10*3/MM3 (ref 0.1–0.9)
MONOCYTES NFR BLD AUTO: 18.1 % (ref 5–12)
NEUTROPHILS NFR BLD AUTO: 3.58 10*3/MM3 (ref 1.7–7)
NEUTROPHILS NFR BLD AUTO: 63.7 % (ref 42.7–76)
NRBC BLD AUTO-RTO: 0 /100 WBC (ref 0–0.2)
PLATELET # BLD AUTO: 373 10*3/MM3 (ref 140–450)
PMV BLD AUTO: 8.6 FL (ref 6–12)
POTASSIUM SERPL-SCNC: 4.2 MMOL/L (ref 3.5–5.2)
PROT SERPL-MCNC: 7.2 G/DL (ref 6–8.5)
RBC # BLD AUTO: 3.37 10*6/MM3 (ref 4.14–5.8)
SODIUM SERPL-SCNC: 139 MMOL/L (ref 136–145)
WBC # BLD AUTO: 5.63 10*3/MM3 (ref 3.4–10.8)

## 2020-09-08 PROCEDURE — G0498 CHEMO EXTEND IV INFUS W/PUMP: HCPCS

## 2020-09-08 PROCEDURE — 25010000002 FOSAPREPITANT PER 1 MG: Performed by: INTERNAL MEDICINE

## 2020-09-08 PROCEDURE — 25010000002 DEXAMETHASONE SODIUM PHOSPHATE 100 MG/10ML SOLUTION: Performed by: INTERNAL MEDICINE

## 2020-09-08 PROCEDURE — 96375 TX/PRO/DX INJ NEW DRUG ADDON: CPT

## 2020-09-08 PROCEDURE — 83735 ASSAY OF MAGNESIUM: CPT

## 2020-09-08 PROCEDURE — 25010000002 FLUOROURACIL PER 500 MG: Performed by: INTERNAL MEDICINE

## 2020-09-08 PROCEDURE — 25010000002 OXALIPLATIN PER 0.5 MG: Performed by: INTERNAL MEDICINE

## 2020-09-08 PROCEDURE — 85025 COMPLETE CBC W/AUTO DIFF WBC: CPT

## 2020-09-08 PROCEDURE — 36415 COLL VENOUS BLD VENIPUNCTURE: CPT

## 2020-09-08 PROCEDURE — 96411 CHEMO IV PUSH ADDL DRUG: CPT

## 2020-09-08 PROCEDURE — 96415 CHEMO IV INFUSION ADDL HR: CPT

## 2020-09-08 PROCEDURE — 96413 CHEMO IV INFUSION 1 HR: CPT

## 2020-09-08 PROCEDURE — 25010000002 PALONOSETRON PER 25 MCG: Performed by: INTERNAL MEDICINE

## 2020-09-08 PROCEDURE — 25010000002 LEUCOVORIN CALCIUM PER 50 MG: Performed by: INTERNAL MEDICINE

## 2020-09-08 PROCEDURE — 96368 THER/DIAG CONCURRENT INF: CPT

## 2020-09-08 PROCEDURE — 96367 TX/PROPH/DG ADDL SEQ IV INF: CPT

## 2020-09-08 PROCEDURE — 80053 COMPREHEN METABOLIC PANEL: CPT

## 2020-09-08 RX ORDER — DIPHENHYDRAMINE HYDROCHLORIDE 50 MG/ML
50 INJECTION INTRAMUSCULAR; INTRAVENOUS AS NEEDED
Status: DISCONTINUED | OUTPATIENT
Start: 2020-09-08 | End: 2020-09-08 | Stop reason: HOSPADM

## 2020-09-08 RX ORDER — DEXTROSE MONOHYDRATE 50 MG/ML
250 INJECTION, SOLUTION INTRAVENOUS ONCE
Status: COMPLETED | OUTPATIENT
Start: 2020-09-08 | End: 2020-09-08

## 2020-09-08 RX ORDER — FAMOTIDINE 10 MG/ML
20 INJECTION, SOLUTION INTRAVENOUS AS NEEDED
Status: DISCONTINUED | OUTPATIENT
Start: 2020-09-08 | End: 2020-09-08 | Stop reason: HOSPADM

## 2020-09-08 RX ORDER — PALONOSETRON 0.05 MG/ML
0.25 INJECTION, SOLUTION INTRAVENOUS ONCE
Status: COMPLETED | OUTPATIENT
Start: 2020-09-08 | End: 2020-09-08

## 2020-09-08 RX ORDER — FLUOROURACIL 50 MG/ML
400 INJECTION, SOLUTION INTRAVENOUS ONCE
Status: COMPLETED | OUTPATIENT
Start: 2020-09-08 | End: 2020-09-08

## 2020-09-08 RX ADMIN — SODIUM CHLORIDE 150 MG: 9 INJECTION, SOLUTION INTRAVENOUS at 11:10

## 2020-09-08 RX ADMIN — PALONOSETRON HYDROCHLORIDE 0.25 MG: 0.25 INJECTION INTRAVENOUS at 10:51

## 2020-09-08 RX ADMIN — FLUOROURACIL 4730 MG: 50 INJECTION, SOLUTION INTRAVENOUS at 14:28

## 2020-09-08 RX ADMIN — LEUCOVORIN CALCIUM 790 MG: 350 INJECTION, POWDER, LYOPHILIZED, FOR SOLUTION INTRAMUSCULAR; INTRAVENOUS at 11:55

## 2020-09-08 RX ADMIN — FLUOROURACIL 790 MG: 50 INJECTION, SOLUTION INTRAVENOUS at 14:22

## 2020-09-08 RX ADMIN — OXALIPLATIN 165 MG: 5 INJECTION, SOLUTION, CONCENTRATE INTRAVENOUS at 11:55

## 2020-09-08 RX ADMIN — DEXTROSE MONOHYDRATE 250 ML: 50 INJECTION, SOLUTION INTRAVENOUS at 10:45

## 2020-09-08 RX ADMIN — DEXAMETHASONE SODIUM PHOSPHATE 12 MG: 10 INJECTION, SOLUTION INTRAMUSCULAR; INTRAVENOUS at 10:53

## 2020-09-08 NOTE — PROGRESS NOTES
1020 Labs called and let Marii Mcneil RN know that patient has 3 teeth removed last Thursday and has 2 days of antibiotics left. To discuss with Dr. Boggs know and will call back with orders.Ashleigh C[place]    1027 Marii Mcneil RN called OK to continue with treatment per Dr. Boggs.Ashleigh C[place]    1045 Doses verified within the 10 %.Ashleigh C[place]AND

## 2020-09-09 ENCOUNTER — OFFICE VISIT (OUTPATIENT)
Dept: ONCOLOGY | Facility: CLINIC | Age: 70
End: 2020-09-09

## 2020-09-09 VITALS
BODY MASS INDEX: 21.99 KG/M2 | TEMPERATURE: 98.8 F | OXYGEN SATURATION: 95 % | RESPIRATION RATE: 18 BRPM | DIASTOLIC BLOOD PRESSURE: 62 MMHG | WEIGHT: 153.6 LBS | HEIGHT: 70 IN | SYSTOLIC BLOOD PRESSURE: 120 MMHG | HEART RATE: 100 BPM

## 2020-09-09 DIAGNOSIS — C20 RECTAL CANCER (HCC): Primary | ICD-10-CM

## 2020-09-09 PROCEDURE — 99214 OFFICE O/P EST MOD 30 MIN: CPT | Performed by: INTERNAL MEDICINE

## 2020-09-10 ENCOUNTER — INFUSION (OUTPATIENT)
Dept: ONCOLOGY | Facility: HOSPITAL | Age: 70
End: 2020-09-10

## 2020-09-10 VITALS
OXYGEN SATURATION: 99 % | WEIGHT: 152 LBS | BODY MASS INDEX: 21.76 KG/M2 | SYSTOLIC BLOOD PRESSURE: 90 MMHG | HEART RATE: 88 BPM | RESPIRATION RATE: 20 BRPM | TEMPERATURE: 97.8 F | HEIGHT: 70 IN | DIASTOLIC BLOOD PRESSURE: 48 MMHG

## 2020-09-10 DIAGNOSIS — D70.1 CHEMOTHERAPY INDUCED NEUTROPENIA (HCC): ICD-10-CM

## 2020-09-10 DIAGNOSIS — T45.1X5A CHEMOTHERAPY INDUCED NEUTROPENIA (HCC): ICD-10-CM

## 2020-09-10 DIAGNOSIS — C20 RECTAL CANCER (HCC): Primary | ICD-10-CM

## 2020-09-10 PROCEDURE — 25010000003 HEPARIN LOCK FLUSH PER 10 UNITS: Performed by: NURSE PRACTITIONER

## 2020-09-10 PROCEDURE — 25010000002 PEGFILGRASTIM 6 MG/0.6ML PREFILLED SYRINGE KIT: Performed by: INTERNAL MEDICINE

## 2020-09-10 PROCEDURE — 96377 APPLICATON ON-BODY INJECTOR: CPT

## 2020-09-10 RX ORDER — HEPARIN SODIUM (PORCINE) LOCK FLUSH IV SOLN 100 UNIT/ML 100 UNIT/ML
500 SOLUTION INTRAVENOUS AS NEEDED
Status: CANCELLED | OUTPATIENT
Start: 2020-09-10

## 2020-09-10 RX ORDER — HEPARIN SODIUM (PORCINE) LOCK FLUSH IV SOLN 100 UNIT/ML 100 UNIT/ML
500 SOLUTION INTRAVENOUS AS NEEDED
Status: DISCONTINUED | OUTPATIENT
Start: 2020-09-10 | End: 2020-09-10 | Stop reason: HOSPADM

## 2020-09-10 RX ORDER — SODIUM CHLORIDE 0.9 % (FLUSH) 0.9 %
10 SYRINGE (ML) INJECTION AS NEEDED
Status: CANCELLED | OUTPATIENT
Start: 2020-09-10

## 2020-09-10 RX ORDER — SODIUM CHLORIDE 0.9 % (FLUSH) 0.9 %
10 SYRINGE (ML) INJECTION AS NEEDED
Status: DISCONTINUED | OUTPATIENT
Start: 2020-09-10 | End: 2020-09-10 | Stop reason: HOSPADM

## 2020-09-10 RX ADMIN — PEGFILGRASTIM 6 MG: KIT SUBCUTANEOUS at 12:44

## 2020-09-10 RX ADMIN — SODIUM CHLORIDE, PRESERVATIVE FREE 10 ML: 5 INJECTION INTRAVENOUS at 12:49

## 2020-09-10 RX ADMIN — Medication 500 UNITS: at 12:49

## 2020-09-13 PROBLEM — Z92.3 HISTORY OF RADIATION THERAPY: Status: ACTIVE | Noted: 2020-09-13

## 2020-09-13 NOTE — PROGRESS NOTES
[facility], P.SCathieMD Heather Mas BSN, PA-C  ____________________________________________________________  [facility]  Department of Radiation Oncology  [address] [place] [id number]  Office:  [phone]  Fax: [phone]    DATE:  09/14/2020  PATIENT: Attila Viramontes  1950                         MEDICAL RECORD #:  [id number]                                                       Reason for Follow Up Visit:  Attila Viramontes is a very pleasant 69 y.o. patient that has completed radiation therapy to the colorectal region and returns to the clinic today for after completing radiation in June.     History of Present Illness:  Diagnosed in April 2020 with Stage IIIC (cT4b, N1b, M0,G2) Invasive Adenocarcinoma of the rectum, 5.5 cm at 2-3 cm proximal to the anus. Follows Dr. Boggs who treated with neoadjuvant chemoradiation, CIV 5-FU with 5040 cGy in 28 fractions with completion on 06/05/2020 with referral for resection. Surgical consultation at [place],  on 06/09/2020, pelvic MRI revealed a distal rectal mass with invasion of posterior prostate, inseparable on exam and left seminal vesicle with evidence of invasion of the mesorectal fat and mesorectal lymphadenopathy.  Given the morbidity of the resection of his rectal tumor, unresectable at this time, recommend continued neoadjuvant chemotherapy followed by re-staging.  If no evidence of metastatic disease, recommend proceeding with resection.  Observe the 6 mm nodule, left upper lobe near the fissure. Dr Boggs plans for FOLFOX for total of 8 cycles to start 09/22/2020 and C8 D1 to D3 start 10/06/2020, Oxaliplatin, Leucovorin, 5-FU, with plans to appoint to Dr. De Jesus again after 8 cycles of FOLFOX    03/25/2020 - CT Abdomen/Pelvis with and without contrast due to abdomen pain:  • A hypodense lesion is seen in the right kidney which is too small to characterize.   • There are  "scattered atherosclerotic calcifications of the aorta and its branch vessels.   • The origins of the celiac axis, superior mesenteric artery, and inferior mesenteric artery enhance normally.  • No pathologically enlarged central mesenteric or retroperitoneal lymph nodes are identified. There is straightening of the central mesenteric root.  • A small hiatal hernia is suspected.   • The stomach and small bowel appear normal in caliber.   • A moderate amount of stool is noted throughout the colon, suggesting fecal stasis.   • The appendix is normal in appearance.  • There is asymmetric wall thickening of the rectum, with an area of thickness on the right measuring up to 2.1 cm in size.   • The large bowel is otherwise unremarkable.   • No free air or free fluid is seen in the abdomen.   • There is a tiny fat-containing umbilical hernia.  • Small lymph nodes are seen along the external iliac chain.  • Review of the visualized osseous structures demonstrates no acute or aggressive lesions.   • Degenerative changes are noted in the spine.  IMPRESSION:  • Asymmetric wall thickening of the rectum could represent a mass/neoplasm. If not recently performed, colonoscopy is recommended.  • Inflammatory stranding of the central mesenteric root, which is nonspecific but can be seen with mesenteritis/mesenteric panniculitis.  • Atherosclerotic disease.  • Fecal stasis.    03/26/2020 - Telephone encounter with :  • Patient complain of pain in rectum with BM.    • He is having difficulty passing stool, worse after eating.    • He has urge to have a BM and is not able to pass stool.    • States it could take \"hours\" for him to have a complete BM.    • No bright red blood per rectum.    • Symptoms started 4 months ago and worse over pass 3 weeks.    • He reports difficulty urinating.    • He has a 7 lb weight loss, he is unsure of time frame this occurred.   Recommendations:  • We will place on schedule for colonoscopy next " Thur 4/2/2020 04/02/2020 - Colonoscopy with biopsy per :  Findings:  • The perianal and digital rectal examinations were normal.  • A localized area of severely ulcerated mucosa was found at 2 cm proximal to the anus. Biopsies were taken with a cold forceps for histology.  Impression:   • Ulcerated mucosa at 2 cm proximal to the anus. Biopsied.  Pathology:  • Large intestine, rectum at 3 cm, biopsy:   o Moderately differentiated adenocarcinoma, invasive.    04/03/2020 - Telephone encounter with :  • Referral to oncology   • Recall c-scope 6 months    04/09/2020 - Consult with :  ASSESSMENT:  • Adenocarcinoma of the rectum.  o AJCC stage: Pending.  o Treatment status:For neoadjuvant CIV 5-FU and radiation.  • Performance status of 0.  • Arthritis, stable.   • Elevated protein 03/25/2020.  • History of pancreatitis.   PLAN:  •  regarding the reason for the referral.  •  regarding role of neoadjuvant CIV 5-FU with radiation.  Aware of potential adverse of the 5-FU especially nausea, vomiting, diarrhea, stomatitis, and cardiotoxicity.  After neoadjuvant chemo and radiation, patient will undergo surgery.  Post surgery, patient will undergo adjuvant FOLFOX.  • Port by Dr. Weller.  • Blood for CBC with differential, CEA, and CMP.  • Schedule rectal ultrasound by Dr. Nabeel Walton.  • Schedule CT of the chest for staging.  • Refer to Dr. Ramirez for neoadjuvant chemoradiation.  • Order CIV 5- mg/m2= 450 mg D1 to D5 with radiation.  • eRx Compazine 10 mg p.o. every 4 hours as needed for nausea and vomiting #60 with 2 refills.  • CBC with differential weekly when chemo starts.  • Continue current medications.  • Continue care per primary care physician and other specialists.  • ACP discussion was declined by the patient. Patient does not have an advance directive, information provided.   • Recall colonoscopy on 10/2020.  • Return to office in 4 weeks with pre-office  CMP.  • Further recommendations pending.    04/14/2020 - Port placement per .    04/14/2020 - After port placement patient was having a global aphasia, right lower facial drooping, and right hemiparesis.  Code stroke was called. Head CT without contrast showed no concerning findings. CT angiography of head and neck showed a left internal carotid artery occlusion.  After the imaging was performed the patient transferred to the ER.  There he received IV hydration therapy and had resolution of symptoms.  I discussed the case with the interventional neurologist at [facility] as listed above and he agreed.  The concern would be the small possibility that the thrombus was acute in nature and could expand overnight.  Out of an abundance of precaution we decided to transfer to [place] so that if there was expansion of an acute thrombus he would be in a center capable of interventional neurologic techniques.       04/14/2020 - CT Angio Head:  • Distal left ICA occlusion with patent [place] of [place].    04/14/2020 - CT Angio Neck:  • Left internal carotid artery occlusion beginning at the origin. Minimal opacification of the distal left internal carotid artery, which may represent retrograde collateral flow.  • Right internal carotid artery patent.  • Bilateral vertebral arteries patent.    04/14/2020 - CT Head without contrast:  • Small amount of gas in the left cerebral cortical veins. Differential would include iatrogenic air embolism or trauma.  • No acute intracranial hemorrhage.    04/14/2020 - Transferred to [place] who presents as an OSH transfer for left ICA occlusion. CTA showed left ICA occlusion with distal reconstitution at the level of cavernous ICA. Mostly likely etiology could be transient hypoperfusion. He was admitted for further evaluation.     04/14/2020 - CT Angio Head/Neck:  • Age-indeterminate left internal carotid artery origin occlusion. Reconstitution of left ICA flow at  the level of the cavernous ICA,  likely due to patent anterior commuting artery. A critical alert was  sent.  • No perfusion abnormality.  • Moderate atherosclerotic stenosis involving the origin of the right internal carotid artery.    04/15/2020 - MRI Brain:  • No acute intracranial abnormality identified  • Findings of chronic microvascular ischemia. Occluded left ICA flow  void.    04/15/2020 - Discharged home with follow up appointments scheduled.      04/16/2020 - Consult with :  • After careful consideration of the available diagnostic data and evaluation of the patient, I recommend MRI of abdomen followed by definitive treatment with concurrent chemotherapy under the direction of Dr Boggs and radiation therapy, will then be referred back to the surgeon for resection evaluation.    • The patient and his family verbalize understanding of this discussion, voice no further questions and wish to proceed with recommendations.    • We will simulate treatment fields today to begin the treatment planning, I anticipate a dose of 5040 cGy over 28 fractions, final course to be determined.    04/27/2020 - 06/05/2020 - Chemotherapy course:  • Neoadjuvant CIV 5-FU     04/27/2020 - 06/05/2020 - Radiation therapy course:  • Received 5040 cGy in 28 fractions     06/09/2020 - Appointment with  at [place]:  Assessment:  • Mr. Bender is a 69-year-old male with locally advanced rectal cancer. Staging imaging revealed a noncalcified nodule in the left upper lobe of the lung. Pelvic MRI revealed a distal rectal mass with invasion of the posterior prostate and left seminal vesicle. There is also evidence of invasion of the mesorectal fat and mesorectal lymphadenopathy. Physical exam reveals a distal rectal tumor just proximal to the sphincter muscles. The anterior aspect of the tumor is inseparable from the prostate. He has completed neoadjuvant chemoradiation therapy.  • The natural history of locally advanced  rectal cancer was discussed with the patient and his sister. I informed the patient that surgical resection would most likely entail abdominal perineal resection with permanent end colostomy, cystoprostatectomy with ileal conduit, and pedicled muscle flap to the perineal defect (e.g. pelvis exenteration). The procedure was discussed in detail with the patient and his sister. Risks, benefits and alternatives were reviewed. Risks include but are not limited to infection, bleeding, injury to intra-abdominal organs, recurrence, discharge to a rehabilitation center, stroke, heart attack, pneumonia, respiratory failure, renal failure, blood clots (DVT/PE) and death. His estimated hospital stay would be 7-10 days and his overall recovery would be 2-3 months.   Plan:  • Locally advanced rectal cancer  o Given the morbidity of the resection of his rectal tumor, I recommend proceeding with neoadjuvant chemotherapy followed by re-staging cross sectional imaging. If there is no evidence of metastatic disease, I would then recommend proceeding with resection. I will contact his local medical oncologist, Dr. Shaq Boggs. Prior to resection, he will need to be evaluated by Urology and Plastic Surgery.  • Left pulmonary nodule  o Follow-up outside CT scheduled July, 2020.     06/22/2020 - Present - Chemotherapy course:   • Neoadjuvant FOLFOX     09/09/2020 - Appointment with :  ASSESSMENT:  • Adenocarcinoma of the rectum. Tumor size 5.5 cm.  • AJCC stage:IIIC (cT4b, N1b, M0)  • Treatment status:Post neoadjuvant CIV 5-FU and radiation, not a surgical candidate.  On neoadjuvant FOLFOX.  • Performance status of 1.  • Normocytic anemia from chemo.    • Elevated protein 03/25/2020.  • 6 mm nodule, left upper lobe near the fissure.  • History of pancreatitis.   • Fatigue from chemo.  • Dysuria 07/15/2020.  • History of grade 3 neutropenia without fever C4D15.  PLAN:  •  Re:  Grade 3 neutropenia without fever from chemo on  C4D15.  •  Re:  Heme status.  Hemoglobin 9.1 and hematocrit 29.3.  •  Re:  Pre office CMP.   mL/min  •  Re:  Pre-office magnesium 2 and CEA none from 3.35 from 2.87 from 3.35 from 4.26.  • Continue FOLFOX for total of 8 cycles: Observe for nausea, vomiting, diarrhea, cold intolerance, myelosuppression, anaphylaxis, alopecia, and neuropathy.  • Schedule treatment C7 D1 to 3 to start 09/22/2020 and C8 D1 to D3 start 10/06/2020.     • 020  To be administered every 2 weeks: Plan for 8 cycles.  • Oxaliplatin 85 mg/m2 IVPB over 2 hours (TD = 157 mg).  • Leucovorin 400 mg/m2 IVPB over 2 hours (TD= 740 mg).  • 5- mg/ m2  IVP (TD = 740  mg).  • Begin 5-FU 2,400 mg/m2 IVPB over 46 hours.  (TD= 4,440 mg).  • Premedicate with:  • Aloxi 0.25 mg IVP.  • Emend 150 mg IV.  • Decadron 12 mg IVPB over 20-30 min.  • Weekly CBC with differential, magnesium, and CMP.  • Neulasta on pro day 3 as primary prophylaxis.  Had chemo delay, cycle 5 due to grade 3 neutropenia.  • Continue care per primary care physician and other specialists.  • Recall colonoscopy on 10/2020.  • Advance Care Planning  o ACP discussion was declined by the patient. Patient does not have an advance directive, information provided.   • eRx Compazine 10 mg p.o. every 4 hours as needed for nausea and vomiting #60 with 2 refills if needed.  • NS 1 liter over 2 hours as needed.  • Appointment to see Dr. De Jesus at [place] after 8 cycles of FOLFOX.  • Return to office in 5 weeks with pre-office CEA.     History obtained from  PATIENT, FAMILY and CHART    PAST MEDICAL HISTORY  Past Medical History:   Diagnosis Date   • Arthritis    • Hyperlipidemia    • Pancreatitis    • Rectal cancer (CMS/HCC) 4/9/2020      PAST SURGICAL HISTORY  Past Surgical History:   Procedure Laterality Date   • COLONOSCOPY N/A 4/2/2020    Procedure: COLONOSCOPY WITH ANESTHESIA;  Surgeon: Yanick Flowers DO;  Location: [facility] ENDOSCOPY;  Service:  Gastroenterology;  Laterality: N/A;  pre: abnormal CT scan  post: rectal mass  PCP unknown   • EYE SURGERY Bilateral     lenses present   • VENOUS ACCESS DEVICE (PORT) INSERTION N/A 4/14/2020    Procedure: INSERTION VENOUS ACCESS DEVICE;  Surgeon: Vielka Weller MD;  Location: [facility];  Service: General;  Laterality: N/A;      FAMILY HISTORY  family history includes Cancer in his mother; Heart attack in his brother; Heart disease in his father; Stroke in his father.     SOCIAL HISTORY  Social History     Tobacco Use   • Smoking status: Current Every Day Smoker     Packs/day: 1.00     Years: 53.00     Pack years: 53.00     Types: Cigarettes   • Smokeless tobacco: Never Used   Substance Use Topics   • Alcohol use: Not Currently   • Drug use: Never     ALLERGIES  Patient has no known allergies.     MEDICATIONS    Current Outpatient Medications:   •  aspirin 81 MG chewable tablet, Chew 81 mg Daily., Disp: , Rfl:   •  atorvastatin (LIPITOR) 80 MG tablet, Take 80 mg by mouth Daily., Disp: , Rfl:   •  ciprofloxacin (CIPRO) 500 MG tablet, Take one tablet by mouth twice daily for 7 days until finished, Disp: 14 tablet, Rfl: 7  •  ibuprofen (ADVIL,MOTRIN) 600 MG tablet, Take 1 tablet by mouth 2 (Two) Times a Day As Needed for Mild Pain ., Disp: 60 tablet, Rfl: 1  •  pantoprazole (PROTONIX) 20 MG EC tablet, Take 20 mg by mouth Daily., Disp: , Rfl:   •  prochlorperazine (COMPAZINE) 10 MG tablet, Take 1 tablet by mouth Every 6 (Six) Hours As Needed for Nausea or Vomiting., Disp: 60 tablet, Rfl: 2    The following portions of the patient's history were reviewed and updated as appropriate: allergies, current medications, past family history, past medical history, past social history, past surgical history and problem list.    Current outpatient and discharge medications have been reconciled for the patient.  Reviewed by: Braulio Holguin MD    REVIEW OF SYSTEMS  Review of Systems   Constitutional: Positive for activity  "change, fatigue and unexpected weight change. Negative for appetite change, chills, diaphoresis and fever.   HENT: Negative.    Eyes: Negative.    Respiratory: Positive for shortness of breath. Negative for apnea, cough, choking, chest tightness, wheezing and stridor.    Cardiovascular: Negative.    Gastrointestinal: Negative.         \"Pressure in rectum\"  Pain when walking or sitting   Endocrine: Negative.    Genitourinary: Negative.    Musculoskeletal: Negative.    Skin: Negative.    Allergic/Immunologic: Negative.    Neurological: Negative.    Hematological: Negative.    Psychiatric/Behavioral: Negative.        PHYSICAL EXAM  VITAL SIGNS:   Vitals:    09/14/20 1400   BP: (!) 83/70   Pulse: 103   Weight: 70.3 kg (155 lb)   Height: 177.8 cm (70\")   PainSc:   6   PainLoc: Rectum      General:  Alert and oriented, no acute distress, well developed, Vitals reviewed.  Head:  Normocephalic, without obvious abnormality    Nose/Sinuses:  Nares normal externally, no sinus tenderness.  Mouth/Throat:  Mucosa moist, pharynx without erythema  Neck:  supple, No evidence of adenopathy in the cervical or supraclavicular areas.  Eyes: No gross abnormalities   Ears: Ears intact with no external abnormalities noted  Chest:  Respiratory efforts are normal and unlabored, chest is clear to auscultation.  Cardiovascular: Regular rate and rhythm without murmurs, rubs, or gallops.   Abdomen:  Soft, non-tender, normal bowel sounds; no CVA tenderness   Rectal exam: deferred, not clinically indicated.  Extremities:  GANT well, warm to touch, no evidence of cyanosis or edema.  Skin: No suspicious lesions or rashes of concern  Neurologic:  Alert and oriented, non focal exam, strength and sensation grossly normal  Psych: Mood and affect are appropriate    Performance Status: ECOG (2) Ambulatory and capable of self care, unable to carry out work activity, up and about > 50% or waking hours    Clinical Quality Measures  -Pain Documented by " Standardized Tool, FPS Attila Viramontes reports a pain score of 6.  Given his pain assessment as noted, treatment options were discussed and the following options were decided upon as a follow-up plan to address the patient's pain: continuation of current treatment plan for pain.     Pain Medications             aspirin 81 MG chewable tablet Chew 81 mg Daily.    prochlorperazine (COMPAZINE) 10 MG tablet Take 1 tablet by mouth Every 6 (Six) Hours As Needed for Nausea or Vomiting.        -Advanced Care Planning Advance Care Planning    ACP discussion was held with the patient during this visit. Patient does not have an advance directive, information provided.    -Body Mass Index Screening and Follow-Up Plan Body mass index is 22.24 kg/m². Patient's Body mass index is 22.24 kg/m². BMI is below normal parameters. Recommendations include: treating the underlying disease process.  -Tobacco Use: Screening and Cessation Intervention Social History    Tobacco Use      Smoking status: Current Every Day Smoker        Packs/day: 1.00        Years: 53.00        Pack years: 53        Types: Cigarettes      Smokeless tobacco: Never Used   Smoking cessation information given in after visit summary.    ASSESSMENT AND PLAN  1. Rectal cancer (CMS/HCC)    2. History of radiation therapy    3. Current every day smoker    4. Cancer related pain      RECOMMENDATIONS: Attila Viramontes is status post completion of radiation therapy to the colon and presents to our clinic today for inital follow up exam. Diagnosed in April 2020 with Stage IIIC (cT4b, N1b, M0,G2) Invasive Adenocarcinoma of the rectum, 5.5 cm at 2-3 cm proximal to the anus. Follows Dr. Boggs who treated with neoadjuvant chemoradiation, CIV 5-FU with 5040 cGy in 28 fractions with completion on 06/05/2020 with referral for resection. Surgical consultation at [place],  on 06/09/2020, pelvic MRI revealed a distal rectal mass with invasion of posterior prostate,  "inseparable on exam and left seminal vesicle with evidence of invasion of the mesorectal fat and mesorectal lymphadenopathy.  Given the morbidity of the resection of his rectal tumor, unresectable at this time, recommend continued neoadjuvant chemotherapy followed by re-staging.  If no evidence of metastatic disease, recommend proceeding with resection.  Observe the 6 mm nodule, left upper lobe near the fissure. Dr Boggs plans for FOLFOX for total of 8 cycles to start 09/22/2020 and C8 D1 to D3 start 10/06/2020, Oxaliplatin, Leucovorin, 5-FU, with plans to appoint to Dr. De Jesus again after 8 cycles of FOLFOX    Mr. Viramontes is in quiet a bit of pain today, states \"he just cant sit very long and its been too long\", he will complete his Folfox in October if goes as planned and will follow with Dr. De Jesus thereafter.  As additional radiation therapy is not anticipated, he wishes to follow-up here only on an as-needed basis, which is reasonable.  I have encouraged him to continue to follow with his current physicians, Dr De Jesus and Dr. Boggs.     Attila Viramontes  reports that he has been smoking cigarettes. He has a 53.00 pack-year smoking history. He has never used smokeless tobacco.. I have educated him on the risk of diseases from using tobacco products such as cancer, COPD and heart diease. I advised him to quit and he is not willing to quit. I spent >10 minutes counseling the patient.    Todays appointment time was spent in counseling, coordination of care and surveillance related to patients diagnosis as well as radiation therapy possible and probable after effects.  I saw this patient in follow-up of with Heather Smalls PA-C while covering for Dr. Kel Ramirez, radiation oncologist.  09/14/2020  Braulio Holguin MD        "

## 2020-09-14 ENCOUNTER — LAB (OUTPATIENT)
Dept: LAB | Facility: HOSPITAL | Age: 70
End: 2020-09-14

## 2020-09-14 ENCOUNTER — TELEPHONE (OUTPATIENT)
Dept: ONCOLOGY | Facility: CLINIC | Age: 70
End: 2020-09-14

## 2020-09-14 ENCOUNTER — HOSPITAL ENCOUNTER (OUTPATIENT)
Dept: RADIATION ONCOLOGY | Facility: HOSPITAL | Age: 70
Setting detail: RADIATION/ONCOLOGY SERIES
End: 2020-09-14

## 2020-09-14 ENCOUNTER — OFFICE VISIT (OUTPATIENT)
Dept: RADIATION ONCOLOGY | Facility: HOSPITAL | Age: 70
End: 2020-09-14

## 2020-09-14 VITALS
BODY MASS INDEX: 22.19 KG/M2 | SYSTOLIC BLOOD PRESSURE: 83 MMHG | WEIGHT: 155 LBS | HEIGHT: 70 IN | HEART RATE: 103 BPM | DIASTOLIC BLOOD PRESSURE: 70 MMHG

## 2020-09-14 DIAGNOSIS — C20 RECTAL CANCER (HCC): ICD-10-CM

## 2020-09-14 DIAGNOSIS — Z92.3 HISTORY OF RADIATION THERAPY: ICD-10-CM

## 2020-09-14 DIAGNOSIS — F17.200 CURRENT EVERY DAY SMOKER: ICD-10-CM

## 2020-09-14 DIAGNOSIS — G89.3 CANCER RELATED PAIN: ICD-10-CM

## 2020-09-14 DIAGNOSIS — C20 RECTAL CANCER (HCC): Primary | ICD-10-CM

## 2020-09-14 LAB
ALBUMIN SERPL-MCNC: 3.6 G/DL (ref 3.5–5.2)
ALBUMIN/GLOB SERPL: 1.1 G/DL
ALP SERPL-CCNC: 143 U/L (ref 39–117)
ALT SERPL W P-5'-P-CCNC: 15 U/L (ref 1–41)
ANION GAP SERPL CALCULATED.3IONS-SCNC: 10 MMOL/L (ref 5–15)
ANISOCYTOSIS BLD QL: ABNORMAL
AST SERPL-CCNC: 15 U/L (ref 1–40)
BILIRUB SERPL-MCNC: <0.2 MG/DL (ref 0–1.2)
BUN SERPL-MCNC: 11 MG/DL (ref 8–23)
BUN/CREAT SERPL: 18.3 (ref 7–25)
CALCIUM SPEC-SCNC: 8.8 MG/DL (ref 8.6–10.5)
CHLORIDE SERPL-SCNC: 107 MMOL/L (ref 98–107)
CO2 SERPL-SCNC: 23 MMOL/L (ref 22–29)
CREAT SERPL-MCNC: 0.6 MG/DL (ref 0.76–1.27)
DEPRECATED RDW RBC AUTO: 62.7 FL (ref 37–54)
ERYTHROCYTE [DISTWIDTH] IN BLOOD BY AUTOMATED COUNT: 20 % (ref 12.3–15.4)
GFR SERPL CREATININE-BSD FRML MDRD: 134 ML/MIN/1.73
GLOBULIN UR ELPH-MCNC: 3.2 GM/DL
GLUCOSE SERPL-MCNC: 247 MG/DL (ref 65–99)
HCT VFR BLD AUTO: 28.8 % (ref 37.5–51)
HGB BLD-MCNC: 9 G/DL (ref 13–17.7)
LYMPHOCYTES # BLD MANUAL: 0.25 10*3/MM3 (ref 0.7–3.1)
LYMPHOCYTES NFR BLD MANUAL: 1 % (ref 19.6–45.3)
LYMPHOCYTES NFR BLD MANUAL: 2 % (ref 5–12)
MAGNESIUM SERPL-MCNC: 2.2 MG/DL (ref 1.6–2.4)
MCH RBC QN AUTO: 27.7 PG (ref 26.6–33)
MCHC RBC AUTO-ENTMCNC: 31.3 G/DL (ref 31.5–35.7)
MCV RBC AUTO: 88.6 FL (ref 79–97)
MONOCYTES # BLD AUTO: 0.49 10*3/MM3 (ref 0.1–0.9)
NEUTROPHILS # BLD AUTO: 23.94 10*3/MM3 (ref 1.7–7)
NEUTROPHILS NFR BLD MANUAL: 93.9 % (ref 42.7–76)
NEUTS BAND NFR BLD MANUAL: 3.1 % (ref 0–5)
PLAT MORPH BLD: NORMAL
PLATELET # BLD AUTO: 376 10*3/MM3 (ref 140–450)
PMV BLD AUTO: 8.4 FL (ref 6–12)
POIKILOCYTOSIS BLD QL SMEAR: ABNORMAL
POTASSIUM SERPL-SCNC: 4.2 MMOL/L (ref 3.5–5.2)
PROT SERPL-MCNC: 6.8 G/DL (ref 6–8.5)
RBC # BLD AUTO: 3.25 10*6/MM3 (ref 4.14–5.8)
SODIUM SERPL-SCNC: 140 MMOL/L (ref 136–145)
WBC # BLD AUTO: 24.7 10*3/MM3 (ref 3.4–10.8)
WBC MORPH BLD: NORMAL

## 2020-09-14 PROCEDURE — 85025 COMPLETE CBC W/AUTO DIFF WBC: CPT

## 2020-09-14 PROCEDURE — G0463 HOSPITAL OUTPT CLINIC VISIT: HCPCS | Performed by: RADIOLOGY

## 2020-09-14 PROCEDURE — 85007 BL SMEAR W/DIFF WBC COUNT: CPT

## 2020-09-14 PROCEDURE — 80053 COMPREHEN METABOLIC PANEL: CPT

## 2020-09-14 PROCEDURE — 83735 ASSAY OF MAGNESIUM: CPT

## 2020-09-14 PROCEDURE — 36415 COLL VENOUS BLD VENIPUNCTURE: CPT

## 2020-09-14 PROCEDURE — 82378 CARCINOEMBRYONIC ANTIGEN: CPT

## 2020-09-14 RX ORDER — IBUPROFEN 600 MG/1
600 TABLET ORAL 2 TIMES DAILY PRN
Qty: 60 TABLET | Refills: 1 | Status: SHIPPED | OUTPATIENT
Start: 2020-09-14 | End: 2020-11-03 | Stop reason: SDUPTHER

## 2020-09-14 NOTE — TELEPHONE ENCOUNTER
Verbal order from Dr. Boggs to send in prescription for Ibuprofen 600 mg bid as needed for pain.  #60 with 1 refill.

## 2020-09-14 NOTE — TELEPHONE ENCOUNTER
Kamilah, patient's sister, calling to speak to Dr. Boggs's nurse.  She wants to see if he can get a prescription for Ibuprofen 600 mg.    He was taking OTC ibuprofen.  He went to the dentist 2 weeks ago and he was prescribed this.  He is having a lot of pain because of the cancer.  When he took this rx from the dentist, he said it really helped his pain.  He is taking one in the morning and one at night.    Pharmacy in [place] [facility] correct ([facility] [address])    Pleaser call her at- [phone]

## 2020-09-15 LAB — CEA SERPL-MCNC: 2.75 NG/ML

## 2020-09-18 ENCOUNTER — OFFICE VISIT (OUTPATIENT)
Dept: CARDIOLOGY | Facility: CLINIC | Age: 70
End: 2020-09-18

## 2020-09-18 VITALS
HEIGHT: 70 IN | WEIGHT: 151 LBS | OXYGEN SATURATION: 96 % | DIASTOLIC BLOOD PRESSURE: 46 MMHG | BODY MASS INDEX: 21.62 KG/M2 | SYSTOLIC BLOOD PRESSURE: 98 MMHG | HEART RATE: 97 BPM

## 2020-09-18 DIAGNOSIS — I44.1 2ND DEGREE AV BLOCK: Primary | ICD-10-CM

## 2020-09-18 DIAGNOSIS — Z72.0 TOBACCO ABUSE: ICD-10-CM

## 2020-09-18 DIAGNOSIS — I65.21 CAROTID ARTERY STENOSIS, SYMPTOMATIC, RIGHT: ICD-10-CM

## 2020-09-18 DIAGNOSIS — E86.1 HYPOTENSION DUE TO HYPOVOLEMIA: ICD-10-CM

## 2020-09-18 DIAGNOSIS — I65.22 CAROTID OCCLUSION, LEFT: ICD-10-CM

## 2020-09-18 DIAGNOSIS — I95.89 HYPOTENSION DUE TO HYPOVOLEMIA: ICD-10-CM

## 2020-09-18 PROBLEM — I10 HYPERTENSION: Status: RESOLVED | Noted: 2020-04-15 | Resolved: 2020-09-18

## 2020-09-18 PROCEDURE — 99214 OFFICE O/P EST MOD 30 MIN: CPT | Performed by: INTERNAL MEDICINE

## 2020-09-18 NOTE — PROGRESS NOTES
"     Subjective:     Encounter Date:09/18/2020      Patient ID: Attila Viramontes is a 70 y.o. male.    Chief Complaint: f/u heart block    History of Present Illness  70-year-old who was referred to me in May 2020 by his neurologist, whom he was seeing due to an acute ischemic left MCA stroke in April 2020.  According to documentation I reviewed, it was felt as though his TIA was secondary to hypotension and cerebral hypoperfusion in the setting of a chronically occluded left internal carotid artery.  A 30-day monitor that he wore thereafter revealed multiple episodes of second-degree AV block, with the longest \"ventricular pause\" of 3-4 seconds.  Patient had no associated symptoms with these events whatsoever, and it was presumed that they all occurred during sleeping hours due to high vagal tone.  He returns today for follow-up.    His main complaint today is low BP. He's noticed this for the past few days. Associated symptoms include light-headedness when sitting up from a supine position.  He questions whether this might be dehydration, stating he knows he's not been \"keeping up with (his) water and gatorade\" well while on chemotherapy.  He's had no palpitations, near-syncope, or syncope.    The following portions of the patient's history were reviewed and updated as appropriate: allergies, current medications, past family history, past medical history, past social history, past surgical history and problem list.    Review of Systems   Constitution: Positive for malaise/fatigue.   Cardiovascular: Negative for chest pain, claudication, dyspnea on exertion, leg swelling, near-syncope, orthopnea, palpitations, paroxysmal nocturnal dyspnea and syncope.   Respiratory: Negative for shortness of breath.    Hematologic/Lymphatic: Does not bruise/bleed easily.   Neurological: Positive for light-headedness (with standing, primarily in morning).         Current Outpatient Medications:   •  aspirin 81 MG chewable " tablet, Chew 81 mg Daily., Disp: , Rfl:   •  atorvastatin (LIPITOR) 80 MG tablet, Take 80 mg by mouth Daily., Disp: , Rfl:   •  ibuprofen (ADVIL,MOTRIN) 600 MG tablet, Take 1 tablet by mouth 2 (Two) Times a Day As Needed for Mild Pain ., Disp: 60 tablet, Rfl: 1  •  pantoprazole (PROTONIX) 20 MG EC tablet, Take 20 mg by mouth Daily., Disp: , Rfl:   •  prochlorperazine (COMPAZINE) 10 MG tablet, Take 1 tablet by mouth Every 6 (Six) Hours As Needed for Nausea or Vomiting., Disp: 60 tablet, Rfl: 2       Objective:      Vitals:    09/18/20 1112   BP: 98/46   Pulse: 97   SpO2: 96%     Constitutional:       General: Not in acute distress.  Neck:      Vascular: No JVD.   Pulmonary:      Effort: Pulmonary effort is normal.      Breath sounds: Normal breath sounds.   Cardiovascular:      Normal rate. Regular rhythm.   Pulses:     Intact distal pulses.   Edema:     Peripheral edema absent.   Abdominal:      Palpations: Abdomen is soft.      Tenderness: There is no abdominal tenderness.   Skin:     General: Skin is warm and dry.   Neurological:      Mental Status: Alert and oriented to person, place, and time.         Lab Review:       Procedures      Assessment/Plan:     Problem List Items Addressed This Visit        Cardiovascular and Mediastinum    2nd degree AV block - Primary: established, stable    Relevant Orders    Holter Monitor - 72 Hour Up To 21 Days    Hypotension due to hypovolemia: new    Carotid artery stenosis, symptomatic, right    Relevant Orders    Ambulatory Referral to Vascular Surgery    Carotid occlusion, left    Relevant Orders    Ambulatory Referral to Vascular Surgery       Other    Tobacco abuse: est, stable        Recommendations/plans:  Repeat zio patch - if no daytime/waking episodes of heart block that cause symptoms, then no further w/u required    Refer to vascular surgery for another opinion regarding carotid revascularization - patient has been seen at [place] and offered carotid artery  stent, but has requested local assessment    Discussed smoking cessation - patient wants to try cold turkey again with NRT (he will try gum)    Advised increased fluid intake    F/u TBD (no specific cardiology f/u required if repeat monitor with no symptomatic episodes of daytime heart block)    Ga Hameed MD  09/18/2020  11:05 CDT

## 2020-09-21 ENCOUNTER — TELEPHONE (OUTPATIENT)
Dept: VASCULAR SURGERY | Facility: CLINIC | Age: 70
End: 2020-09-21

## 2020-09-21 DIAGNOSIS — C20 RECTAL CANCER (HCC): Primary | ICD-10-CM

## 2020-09-21 RX ORDER — FLUOROURACIL 50 MG/ML
400 INJECTION, SOLUTION INTRAVENOUS ONCE
Status: CANCELLED | OUTPATIENT
Start: 2020-09-22

## 2020-09-21 RX ORDER — PALONOSETRON 0.05 MG/ML
0.25 INJECTION, SOLUTION INTRAVENOUS ONCE
Status: CANCELLED | OUTPATIENT
Start: 2020-09-22

## 2020-09-21 RX ORDER — FAMOTIDINE 10 MG/ML
20 INJECTION, SOLUTION INTRAVENOUS AS NEEDED
Status: CANCELLED | OUTPATIENT
Start: 2020-09-22

## 2020-09-21 RX ORDER — DEXTROSE MONOHYDRATE 50 MG/ML
250 INJECTION, SOLUTION INTRAVENOUS ONCE
Status: CANCELLED | OUTPATIENT
Start: 2020-09-22

## 2020-09-21 RX ORDER — DIPHENHYDRAMINE HYDROCHLORIDE 50 MG/ML
50 INJECTION INTRAMUSCULAR; INTRAVENOUS AS NEEDED
Status: CANCELLED | OUTPATIENT
Start: 2020-09-22

## 2020-09-22 ENCOUNTER — LAB (OUTPATIENT)
Dept: LAB | Facility: HOSPITAL | Age: 70
End: 2020-09-22

## 2020-09-22 ENCOUNTER — INFUSION (OUTPATIENT)
Dept: ONCOLOGY | Facility: HOSPITAL | Age: 70
End: 2020-09-22

## 2020-09-22 VITALS
OXYGEN SATURATION: 100 % | HEIGHT: 70 IN | BODY MASS INDEX: 22.05 KG/M2 | RESPIRATION RATE: 16 BRPM | DIASTOLIC BLOOD PRESSURE: 50 MMHG | HEART RATE: 73 BPM | WEIGHT: 154 LBS | SYSTOLIC BLOOD PRESSURE: 92 MMHG | TEMPERATURE: 98 F

## 2020-09-22 DIAGNOSIS — C20 RECTAL CANCER (HCC): Primary | ICD-10-CM

## 2020-09-22 DIAGNOSIS — C20 RECTAL CANCER (HCC): ICD-10-CM

## 2020-09-22 LAB
ACANTHOCYTES BLD QL SMEAR: ABNORMAL
ALBUMIN SERPL-MCNC: 3.4 G/DL (ref 3.5–5.2)
ALBUMIN/GLOB SERPL: 1 G/DL
ALP SERPL-CCNC: 141 U/L (ref 39–117)
ALT SERPL W P-5'-P-CCNC: 32 U/L (ref 1–41)
ANION GAP SERPL CALCULATED.3IONS-SCNC: 12 MMOL/L (ref 5–15)
ANISOCYTOSIS BLD QL: ABNORMAL
AST SERPL-CCNC: 26 U/L (ref 1–40)
BILIRUB SERPL-MCNC: 0.2 MG/DL (ref 0–1.2)
BUN SERPL-MCNC: 9 MG/DL (ref 8–23)
BUN/CREAT SERPL: 12.7 (ref 7–25)
BURR CELLS BLD QL SMEAR: ABNORMAL
CALCIUM SPEC-SCNC: 8.9 MG/DL (ref 8.6–10.5)
CHLORIDE SERPL-SCNC: 98 MMOL/L (ref 98–107)
CO2 SERPL-SCNC: 24 MMOL/L (ref 22–29)
CREAT SERPL-MCNC: 0.71 MG/DL (ref 0.76–1.27)
DEPRECATED RDW RBC AUTO: 65.6 FL (ref 37–54)
EOSINOPHIL # BLD MANUAL: 0.19 10*3/MM3 (ref 0–0.4)
EOSINOPHIL NFR BLD MANUAL: 1 % (ref 0.3–6.2)
ERYTHROCYTE [DISTWIDTH] IN BLOOD BY AUTOMATED COUNT: 21 % (ref 12.3–15.4)
GFR SERPL CREATININE-BSD FRML MDRD: 110 ML/MIN/1.73
GLOBULIN UR ELPH-MCNC: 3.4 GM/DL
GLUCOSE SERPL-MCNC: 305 MG/DL (ref 65–99)
HCT VFR BLD AUTO: 31.1 % (ref 37.5–51)
HGB BLD-MCNC: 10 G/DL (ref 13–17.7)
LYMPHOCYTES # BLD MANUAL: 0.56 10*3/MM3 (ref 0.7–3.1)
LYMPHOCYTES NFR BLD MANUAL: 13 % (ref 5–12)
LYMPHOCYTES NFR BLD MANUAL: 3 % (ref 19.6–45.3)
MAGNESIUM SERPL-MCNC: 1.7 MG/DL (ref 1.6–2.4)
MCH RBC QN AUTO: 28.3 PG (ref 26.6–33)
MCHC RBC AUTO-ENTMCNC: 32.2 G/DL (ref 31.5–35.7)
MCV RBC AUTO: 88.1 FL (ref 79–97)
METAMYELOCYTES NFR BLD MANUAL: 2 % (ref 0–0)
MONOCYTES # BLD AUTO: 2.44 10*3/MM3 (ref 0.1–0.9)
MYELOCYTES NFR BLD MANUAL: 1 % (ref 0–0)
NEUTROPHILS # BLD AUTO: 14.8 10*3/MM3 (ref 1.7–7)
NEUTROPHILS NFR BLD MANUAL: 65 % (ref 42.7–76)
NEUTS BAND NFR BLD MANUAL: 14 % (ref 0–5)
PLAT MORPH BLD: NORMAL
PLATELET # BLD AUTO: 233 10*3/MM3 (ref 140–450)
PMV BLD AUTO: 8.8 FL (ref 6–12)
POIKILOCYTOSIS BLD QL SMEAR: ABNORMAL
POLYCHROMASIA BLD QL SMEAR: ABNORMAL
POTASSIUM SERPL-SCNC: 3.7 MMOL/L (ref 3.5–5.2)
PROMYELOCYTES NFR BLD MANUAL: 1 % (ref 0–0)
PROT SERPL-MCNC: 6.8 G/DL (ref 6–8.5)
RBC # BLD AUTO: 3.53 10*6/MM3 (ref 4.14–5.8)
SODIUM SERPL-SCNC: 134 MMOL/L (ref 136–145)
WBC # BLD AUTO: 18.74 10*3/MM3 (ref 3.4–10.8)
WBC MORPH BLD: NORMAL

## 2020-09-22 PROCEDURE — 85025 COMPLETE CBC W/AUTO DIFF WBC: CPT

## 2020-09-22 PROCEDURE — G0498 CHEMO EXTEND IV INFUS W/PUMP: HCPCS

## 2020-09-22 PROCEDURE — 83735 ASSAY OF MAGNESIUM: CPT

## 2020-09-22 PROCEDURE — 96415 CHEMO IV INFUSION ADDL HR: CPT

## 2020-09-22 PROCEDURE — 25010000002 PALONOSETRON PER 25 MCG: Performed by: NURSE PRACTITIONER

## 2020-09-22 PROCEDURE — 96367 TX/PROPH/DG ADDL SEQ IV INF: CPT

## 2020-09-22 PROCEDURE — 25010000002 DEXAMETHASONE SODIUM PHOSPHATE 100 MG/10ML SOLUTION: Performed by: NURSE PRACTITIONER

## 2020-09-22 PROCEDURE — 25010000002 LEUCOVORIN CALCIUM PER 50 MG: Performed by: NURSE PRACTITIONER

## 2020-09-22 PROCEDURE — 25010000002 OXALIPLATIN PER 0.5 MG: Performed by: NURSE PRACTITIONER

## 2020-09-22 PROCEDURE — 96411 CHEMO IV PUSH ADDL DRUG: CPT

## 2020-09-22 PROCEDURE — 85007 BL SMEAR W/DIFF WBC COUNT: CPT

## 2020-09-22 PROCEDURE — 25010000002 FLUOROURACIL PER 500 MG: Performed by: NURSE PRACTITIONER

## 2020-09-22 PROCEDURE — 25010000002 FOSAPREPITANT PER 1 MG: Performed by: NURSE PRACTITIONER

## 2020-09-22 PROCEDURE — 96413 CHEMO IV INFUSION 1 HR: CPT

## 2020-09-22 PROCEDURE — 96375 TX/PRO/DX INJ NEW DRUG ADDON: CPT

## 2020-09-22 PROCEDURE — 96368 THER/DIAG CONCURRENT INF: CPT

## 2020-09-22 PROCEDURE — 36415 COLL VENOUS BLD VENIPUNCTURE: CPT

## 2020-09-22 PROCEDURE — 80053 COMPREHEN METABOLIC PANEL: CPT

## 2020-09-22 PROCEDURE — 96366 THER/PROPH/DIAG IV INF ADDON: CPT

## 2020-09-22 RX ORDER — PALONOSETRON 0.05 MG/ML
0.25 INJECTION, SOLUTION INTRAVENOUS ONCE
Status: COMPLETED | OUTPATIENT
Start: 2020-09-22 | End: 2020-09-22

## 2020-09-22 RX ORDER — DIPHENHYDRAMINE HYDROCHLORIDE 50 MG/ML
50 INJECTION INTRAMUSCULAR; INTRAVENOUS AS NEEDED
Status: DISCONTINUED | OUTPATIENT
Start: 2020-09-22 | End: 2020-09-22 | Stop reason: HOSPADM

## 2020-09-22 RX ORDER — FAMOTIDINE 10 MG/ML
20 INJECTION, SOLUTION INTRAVENOUS AS NEEDED
Status: DISCONTINUED | OUTPATIENT
Start: 2020-09-22 | End: 2020-09-22 | Stop reason: HOSPADM

## 2020-09-22 RX ORDER — DEXTROSE MONOHYDRATE 50 MG/ML
250 INJECTION, SOLUTION INTRAVENOUS ONCE
Status: COMPLETED | OUTPATIENT
Start: 2020-09-22 | End: 2020-09-22

## 2020-09-22 RX ORDER — FLUOROURACIL 50 MG/ML
400 INJECTION, SOLUTION INTRAVENOUS ONCE
Status: COMPLETED | OUTPATIENT
Start: 2020-09-22 | End: 2020-09-22

## 2020-09-22 RX ADMIN — DEXTROSE MONOHYDRATE 250 ML: 50 INJECTION, SOLUTION INTRAVENOUS at 10:24

## 2020-09-22 RX ADMIN — OXALIPLATIN 165 MG: 5 INJECTION, SOLUTION INTRAVENOUS at 11:34

## 2020-09-22 RX ADMIN — DEXAMETHASONE SODIUM PHOSPHATE 12 MG: 10 INJECTION, SOLUTION INTRAMUSCULAR; INTRAVENOUS at 10:37

## 2020-09-22 RX ADMIN — PALONOSETRON HYDROCHLORIDE 0.25 MG: 0.25 INJECTION, SOLUTION INTRAVENOUS at 10:35

## 2020-09-22 RX ADMIN — FLUOROURACIL 790 MG: 50 INJECTION, SOLUTION INTRAVENOUS at 14:15

## 2020-09-22 RX ADMIN — SODIUM CHLORIDE 150 MG: 9 INJECTION, SOLUTION INTRAVENOUS at 10:58

## 2020-09-22 RX ADMIN — LEUCOVORIN CALCIUM 790 MG: 100 INJECTION, POWDER, LYOPHILIZED, FOR SOLUTION INTRAMUSCULAR; INTRAVENOUS at 11:36

## 2020-09-22 RX ADMIN — FLUOROURACIL 4730 MG: 50 INJECTION, SOLUTION INTRAVENOUS at 14:21

## 2020-09-22 NOTE — PROGRESS NOTES
1025 called and spoke with CRISTOFER Hale with the cardiac reading room,regarding pt's zio patch that he has in place since last Friday and will stay in place for another 1 1/2 weeks, which is placed right next to and almost touch the pt's mediport, left chest. CRISTOFER Hale stated accessing and using the port today is ok, and if we need to have heart center move the patch they can. But it is also ok if the port drsg touches the patch that is also ok, and the pt is ok with this, so we will proceed with tx today. Pt voices understanding, if he has issues with the zio patch to call dr tse office.

## 2020-09-22 NOTE — PROGRESS NOTES
0939 Called glucose of 304 and increased WBC count to [place]. Order received to proceed with treatment and have patient follow up with PCP for glucose level. Pt states he normally does not eat any sugar but he had a large chocolate cake this weekend for his birthday. Encouraged patient to discuss glucose level with his PCP. Pt verbalizes understanding.

## 2020-09-22 NOTE — PROGRESS NOTES
1005 Pt has a ZIO heart monitor on about one inch away from his port site. SN spoke with Marii regarding if it would be okay to use port as dressing would cover part of the heart monitor.

## 2020-09-24 ENCOUNTER — INFUSION (OUTPATIENT)
Dept: ONCOLOGY | Facility: HOSPITAL | Age: 70
End: 2020-09-24

## 2020-09-24 VITALS
HEART RATE: 100 BPM | OXYGEN SATURATION: 98 % | SYSTOLIC BLOOD PRESSURE: 102 MMHG | TEMPERATURE: 98.6 F | RESPIRATION RATE: 18 BRPM | DIASTOLIC BLOOD PRESSURE: 54 MMHG

## 2020-09-24 DIAGNOSIS — C20 RECTAL CANCER (HCC): Primary | ICD-10-CM

## 2020-09-24 PROCEDURE — 25010000003 HEPARIN LOCK FLUSH PER 10 UNITS: Performed by: NURSE PRACTITIONER

## 2020-09-24 PROCEDURE — 25010000002 PEGFILGRASTIM 6 MG/0.6ML PREFILLED SYRINGE KIT: Performed by: NURSE PRACTITIONER

## 2020-09-24 PROCEDURE — 96377 APPLICATON ON-BODY INJECTOR: CPT

## 2020-09-24 RX ORDER — SODIUM CHLORIDE 0.9 % (FLUSH) 0.9 %
10 SYRINGE (ML) INJECTION AS NEEDED
Status: DISCONTINUED | OUTPATIENT
Start: 2020-09-24 | End: 2020-09-24 | Stop reason: HOSPADM

## 2020-09-24 RX ORDER — SODIUM CHLORIDE 0.9 % (FLUSH) 0.9 %
10 SYRINGE (ML) INJECTION AS NEEDED
Status: CANCELLED | OUTPATIENT
Start: 2020-09-24

## 2020-09-24 RX ORDER — HEPARIN SODIUM (PORCINE) LOCK FLUSH IV SOLN 100 UNIT/ML 100 UNIT/ML
500 SOLUTION INTRAVENOUS AS NEEDED
Status: CANCELLED | OUTPATIENT
Start: 2020-09-24

## 2020-09-24 RX ORDER — HEPARIN SODIUM (PORCINE) LOCK FLUSH IV SOLN 100 UNIT/ML 100 UNIT/ML
500 SOLUTION INTRAVENOUS AS NEEDED
Status: DISCONTINUED | OUTPATIENT
Start: 2020-09-24 | End: 2020-09-24 | Stop reason: HOSPADM

## 2020-09-24 RX ADMIN — Medication 500 UNITS: at 12:26

## 2020-09-24 RX ADMIN — PEGFILGRASTIM 6 MG: KIT SUBCUTANEOUS at 12:25

## 2020-09-24 RX ADMIN — SODIUM CHLORIDE, PRESERVATIVE FREE 10 ML: 5 INJECTION INTRAVENOUS at 12:26

## 2020-09-29 ENCOUNTER — TELEPHONE (OUTPATIENT)
Dept: ONCOLOGY | Facility: CLINIC | Age: 70
End: 2020-09-29

## 2020-09-29 NOTE — TELEPHONE ENCOUNTER
PATIENT CALLING REGARDING HIS 8TH TREATMENT, HE JUST HAD HIS INFUSION ON 9-22-20 AND THEY DID NOT GIVE HIM AN ITINERARY ON WHEN TO GET HIS 8TH TREATMENT, PLEASE ADVISE?    PT CALL BACK # [phone]

## 2020-10-05 DIAGNOSIS — C20 RECTAL CANCER (HCC): Primary | ICD-10-CM

## 2020-10-05 RX ORDER — FLUOROURACIL 50 MG/ML
400 INJECTION, SOLUTION INTRAVENOUS ONCE
Status: CANCELLED | OUTPATIENT
Start: 2020-10-06

## 2020-10-05 RX ORDER — DEXTROSE MONOHYDRATE 50 MG/ML
250 INJECTION, SOLUTION INTRAVENOUS ONCE
Status: CANCELLED | OUTPATIENT
Start: 2020-10-06

## 2020-10-05 RX ORDER — PALONOSETRON 0.05 MG/ML
0.25 INJECTION, SOLUTION INTRAVENOUS ONCE
Status: CANCELLED | OUTPATIENT
Start: 2020-10-06

## 2020-10-05 RX ORDER — DIPHENHYDRAMINE HYDROCHLORIDE 50 MG/ML
50 INJECTION INTRAMUSCULAR; INTRAVENOUS AS NEEDED
Status: CANCELLED | OUTPATIENT
Start: 2020-10-06

## 2020-10-05 RX ORDER — FAMOTIDINE 10 MG/ML
20 INJECTION, SOLUTION INTRAVENOUS AS NEEDED
Status: CANCELLED | OUTPATIENT
Start: 2020-10-06

## 2020-10-06 ENCOUNTER — LAB (OUTPATIENT)
Dept: LAB | Facility: HOSPITAL | Age: 70
End: 2020-10-06

## 2020-10-06 ENCOUNTER — INFUSION (OUTPATIENT)
Dept: ONCOLOGY | Facility: HOSPITAL | Age: 70
End: 2020-10-06

## 2020-10-06 VITALS
SYSTOLIC BLOOD PRESSURE: 108 MMHG | HEIGHT: 70 IN | TEMPERATURE: 97.1 F | HEART RATE: 89 BPM | DIASTOLIC BLOOD PRESSURE: 62 MMHG | RESPIRATION RATE: 18 BRPM | WEIGHT: 151 LBS | OXYGEN SATURATION: 97 % | BODY MASS INDEX: 21.62 KG/M2

## 2020-10-06 DIAGNOSIS — C20 RECTAL CANCER (HCC): Primary | ICD-10-CM

## 2020-10-06 DIAGNOSIS — C20 RECTAL CANCER (HCC): ICD-10-CM

## 2020-10-06 LAB
ALBUMIN SERPL-MCNC: 3.9 G/DL (ref 3.5–5.2)
ALBUMIN/GLOB SERPL: 1 G/DL
ALP SERPL-CCNC: 146 U/L (ref 39–117)
ALT SERPL W P-5'-P-CCNC: 31 U/L (ref 1–41)
ANION GAP SERPL CALCULATED.3IONS-SCNC: 12 MMOL/L (ref 5–15)
ANISOCYTOSIS BLD QL: ABNORMAL
AST SERPL-CCNC: 28 U/L (ref 1–40)
BASOPHILS # BLD MANUAL: 0.16 10*3/MM3 (ref 0–0.2)
BASOPHILS NFR BLD AUTO: 1 % (ref 0–1.5)
BILIRUB SERPL-MCNC: 0.2 MG/DL (ref 0–1.2)
BUN SERPL-MCNC: 19 MG/DL (ref 8–23)
BUN/CREAT SERPL: 21.8 (ref 7–25)
BURR CELLS BLD QL SMEAR: ABNORMAL
CALCIUM SPEC-SCNC: 10.2 MG/DL (ref 8.6–10.5)
CEA SERPL-MCNC: 3.32 NG/ML
CHLORIDE SERPL-SCNC: 100 MMOL/L (ref 98–107)
CO2 SERPL-SCNC: 27 MMOL/L (ref 22–29)
CREAT SERPL-MCNC: 0.87 MG/DL (ref 0.76–1.27)
DEPRECATED RDW RBC AUTO: 69.2 FL (ref 37–54)
EOSINOPHIL # BLD MANUAL: 0.63 10*3/MM3 (ref 0–0.4)
EOSINOPHIL NFR BLD MANUAL: 4 % (ref 0.3–6.2)
ERYTHROCYTE [DISTWIDTH] IN BLOOD BY AUTOMATED COUNT: 22.1 % (ref 12.3–15.4)
GFR SERPL CREATININE-BSD FRML MDRD: 87 ML/MIN/1.73
GIANT PLATELETS: ABNORMAL
GLOBULIN UR ELPH-MCNC: 3.8 GM/DL
GLUCOSE SERPL-MCNC: 184 MG/DL (ref 65–99)
HCT VFR BLD AUTO: 33.7 % (ref 37.5–51)
HGB BLD-MCNC: 10.5 G/DL (ref 13–17.7)
LYMPHOCYTES # BLD MANUAL: 1.1 10*3/MM3 (ref 0.7–3.1)
LYMPHOCYTES NFR BLD MANUAL: 7 % (ref 19.6–45.3)
LYMPHOCYTES NFR BLD MANUAL: 8 % (ref 5–12)
MAGNESIUM SERPL-MCNC: 2 MG/DL (ref 1.6–2.4)
MCH RBC QN AUTO: 27.9 PG (ref 26.6–33)
MCHC RBC AUTO-ENTMCNC: 31.2 G/DL (ref 31.5–35.7)
MCV RBC AUTO: 89.4 FL (ref 79–97)
METAMYELOCYTES NFR BLD MANUAL: 2 % (ref 0–0)
MONOCYTES # BLD AUTO: 1.26 10*3/MM3 (ref 0.1–0.9)
MYELOCYTES NFR BLD MANUAL: 3 % (ref 0–0)
NEUTROPHILS # BLD AUTO: 11.52 10*3/MM3 (ref 1.7–7)
NEUTROPHILS NFR BLD MANUAL: 61 % (ref 42.7–76)
NEUTS BAND NFR BLD MANUAL: 12 % (ref 0–5)
NRBC SPEC MANUAL: 1 /100 WBC (ref 0–0.2)
PLATELET # BLD AUTO: 358 10*3/MM3 (ref 140–450)
PMV BLD AUTO: 9 FL (ref 6–12)
POIKILOCYTOSIS BLD QL SMEAR: ABNORMAL
POLYCHROMASIA BLD QL SMEAR: ABNORMAL
POTASSIUM SERPL-SCNC: 4.3 MMOL/L (ref 3.5–5.2)
PROMYELOCYTES NFR BLD MANUAL: 1 % (ref 0–0)
PROT SERPL-MCNC: 7.7 G/DL (ref 6–8.5)
RBC # BLD AUTO: 3.77 10*6/MM3 (ref 4.14–5.8)
SODIUM SERPL-SCNC: 139 MMOL/L (ref 136–145)
SPHEROCYTES BLD QL SMEAR: ABNORMAL
TOXIC GRANULATION: ABNORMAL
VARIANT LYMPHS NFR BLD MANUAL: 1 % (ref 0–5)
WBC # BLD AUTO: 15.78 10*3/MM3 (ref 3.4–10.8)

## 2020-10-06 PROCEDURE — 25010000002 LEUCOVORIN CALCIUM PER 50 MG: Performed by: NURSE PRACTITIONER

## 2020-10-06 PROCEDURE — 82378 CARCINOEMBRYONIC ANTIGEN: CPT

## 2020-10-06 PROCEDURE — 96415 CHEMO IV INFUSION ADDL HR: CPT

## 2020-10-06 PROCEDURE — 25010000002 OXALIPLATIN PER 0.5 MG: Performed by: NURSE PRACTITIONER

## 2020-10-06 PROCEDURE — G0498 CHEMO EXTEND IV INFUS W/PUMP: HCPCS

## 2020-10-06 PROCEDURE — 25010000002 FOSAPREPITANT PER 1 MG: Performed by: NURSE PRACTITIONER

## 2020-10-06 PROCEDURE — 85025 COMPLETE CBC W/AUTO DIFF WBC: CPT

## 2020-10-06 PROCEDURE — 80053 COMPREHEN METABOLIC PANEL: CPT

## 2020-10-06 PROCEDURE — 96411 CHEMO IV PUSH ADDL DRUG: CPT

## 2020-10-06 PROCEDURE — 36415 COLL VENOUS BLD VENIPUNCTURE: CPT

## 2020-10-06 PROCEDURE — 96367 TX/PROPH/DG ADDL SEQ IV INF: CPT

## 2020-10-06 PROCEDURE — 25010000002 PALONOSETRON PER 25 MCG: Performed by: NURSE PRACTITIONER

## 2020-10-06 PROCEDURE — 85007 BL SMEAR W/DIFF WBC COUNT: CPT

## 2020-10-06 PROCEDURE — 83735 ASSAY OF MAGNESIUM: CPT

## 2020-10-06 PROCEDURE — 96375 TX/PRO/DX INJ NEW DRUG ADDON: CPT

## 2020-10-06 PROCEDURE — 96368 THER/DIAG CONCURRENT INF: CPT

## 2020-10-06 PROCEDURE — 96413 CHEMO IV INFUSION 1 HR: CPT

## 2020-10-06 PROCEDURE — 25010000002 FLUOROURACIL PER 500 MG: Performed by: NURSE PRACTITIONER

## 2020-10-06 PROCEDURE — 25010000002 DEXAMETHASONE SODIUM PHOSPHATE 100 MG/10ML SOLUTION: Performed by: NURSE PRACTITIONER

## 2020-10-06 RX ORDER — FLUOROURACIL 50 MG/ML
400 INJECTION, SOLUTION INTRAVENOUS ONCE
Status: COMPLETED | OUTPATIENT
Start: 2020-10-06 | End: 2020-10-06

## 2020-10-06 RX ORDER — DEXTROSE MONOHYDRATE 50 MG/ML
250 INJECTION, SOLUTION INTRAVENOUS ONCE
Status: COMPLETED | OUTPATIENT
Start: 2020-10-06 | End: 2020-10-06

## 2020-10-06 RX ORDER — FAMOTIDINE 10 MG/ML
20 INJECTION, SOLUTION INTRAVENOUS AS NEEDED
Status: DISCONTINUED | OUTPATIENT
Start: 2020-10-06 | End: 2020-10-06 | Stop reason: HOSPADM

## 2020-10-06 RX ORDER — PALONOSETRON 0.05 MG/ML
0.25 INJECTION, SOLUTION INTRAVENOUS ONCE
Status: COMPLETED | OUTPATIENT
Start: 2020-10-06 | End: 2020-10-06

## 2020-10-06 RX ORDER — DIPHENHYDRAMINE HYDROCHLORIDE 50 MG/ML
50 INJECTION INTRAMUSCULAR; INTRAVENOUS AS NEEDED
Status: DISCONTINUED | OUTPATIENT
Start: 2020-10-06 | End: 2020-10-06 | Stop reason: HOSPADM

## 2020-10-06 RX ADMIN — FLUOROURACIL 4730 MG: 50 INJECTION, SOLUTION INTRAVENOUS at 13:52

## 2020-10-06 RX ADMIN — LEUCOVORIN CALCIUM 790 MG: 100 INJECTION, POWDER, LYOPHILIZED, FOR SUSPENSION INTRAMUSCULAR; INTRAVENOUS at 11:04

## 2020-10-06 RX ADMIN — DEXAMETHASONE SODIUM PHOSPHATE 12 MG: 10 INJECTION, SOLUTION INTRAMUSCULAR; INTRAVENOUS at 10:06

## 2020-10-06 RX ADMIN — PALONOSETRON HYDROCHLORIDE 0.25 MG: 0.25 INJECTION, SOLUTION INTRAVENOUS at 10:06

## 2020-10-06 RX ADMIN — SODIUM CHLORIDE 150 MG: 9 INJECTION, SOLUTION INTRAVENOUS at 10:33

## 2020-10-06 RX ADMIN — OXALIPLATIN 165 MG: 5 INJECTION, SOLUTION INTRAVENOUS at 11:04

## 2020-10-06 RX ADMIN — FLUOROURACIL 790 MG: 50 INJECTION, SOLUTION INTRAVENOUS at 13:47

## 2020-10-06 RX ADMIN — DEXTROSE MONOHYDRATE 250 ML: 50 INJECTION, SOLUTION INTRAVENOUS at 10:06

## 2020-10-08 ENCOUNTER — INFUSION (OUTPATIENT)
Dept: ONCOLOGY | Facility: HOSPITAL | Age: 70
End: 2020-10-08

## 2020-10-08 VITALS
RESPIRATION RATE: 18 BRPM | DIASTOLIC BLOOD PRESSURE: 58 MMHG | SYSTOLIC BLOOD PRESSURE: 95 MMHG | TEMPERATURE: 97 F | HEART RATE: 97 BPM | OXYGEN SATURATION: 97 %

## 2020-10-08 DIAGNOSIS — C20 RECTAL CANCER (HCC): Primary | ICD-10-CM

## 2020-10-08 PROCEDURE — 25010000003 HEPARIN LOCK FLUSH PER 10 UNITS: Performed by: NURSE PRACTITIONER

## 2020-10-08 PROCEDURE — 96377 APPLICATON ON-BODY INJECTOR: CPT

## 2020-10-08 PROCEDURE — 25010000002 PEGFILGRASTIM 6 MG/0.6ML PREFILLED SYRINGE KIT: Performed by: NURSE PRACTITIONER

## 2020-10-08 RX ORDER — HEPARIN SODIUM (PORCINE) LOCK FLUSH IV SOLN 100 UNIT/ML 100 UNIT/ML
500 SOLUTION INTRAVENOUS AS NEEDED
Status: CANCELLED | OUTPATIENT
Start: 2020-10-08

## 2020-10-08 RX ORDER — SODIUM CHLORIDE 0.9 % (FLUSH) 0.9 %
10 SYRINGE (ML) INJECTION AS NEEDED
Status: DISCONTINUED | OUTPATIENT
Start: 2020-10-08 | End: 2020-10-08 | Stop reason: HOSPADM

## 2020-10-08 RX ORDER — SODIUM CHLORIDE 0.9 % (FLUSH) 0.9 %
10 SYRINGE (ML) INJECTION AS NEEDED
Status: CANCELLED | OUTPATIENT
Start: 2020-10-08

## 2020-10-08 RX ORDER — HEPARIN SODIUM (PORCINE) LOCK FLUSH IV SOLN 100 UNIT/ML 100 UNIT/ML
500 SOLUTION INTRAVENOUS AS NEEDED
Status: DISCONTINUED | OUTPATIENT
Start: 2020-10-08 | End: 2020-10-08 | Stop reason: HOSPADM

## 2020-10-08 RX ADMIN — PEGFILGRASTIM 6 MG: KIT SUBCUTANEOUS at 12:36

## 2020-10-08 RX ADMIN — Medication 500 UNITS: at 12:33

## 2020-10-08 RX ADMIN — SODIUM CHLORIDE, PRESERVATIVE FREE 10 ML: 5 INJECTION INTRAVENOUS at 12:33

## 2020-10-09 NOTE — PROGRESS NOTES
[facility]  [address]  [place] [address] [phone]    Patient Name: Attila Viramontes  Encounter Date: 10/14/2020  YOB: 1950  Patient Number: [id number]      REASON FOR FOLLOW-UP: Attila Viramontes is a pleasant 70 y.o. male who is seen on follow up for rectal cancer.  He is seen C8D9 of neoadjuvant FOLFOX. He is seen 4.25 months post neoadjuvant 5-FU with radiation.  He is seen alone.  History is obtained from patient. History is considered to be accurate.      Oncology/Hematology History Overview Note   DIAGNOSTIC ABNORMALITIES:  CT abdomen and pelvis 03/25/2020.  Asymmetric wall thickening of the rectum could represent a mass/neoplasm. If not recently performed, colonoscopy is recommended. Inflammatory stranding of the central mesenteric root, which is nonspecific but can be seen with mesenteritis/mesenteric panniculitis.  Colonoscopy by Dr. Reaves 04/02/2020 showed a localized area of severely ulcerated mucosa was found at 2 cm proximal to the anus. Biopsies were taken with a cold forceps for histology.  Pathology report 04/03/2020 showed a moderately differentiated adenocarcinoma.  Stromal invasion is seen although depth of invasion is indeterminate in these biopsies.  In the areas of invasion, the stroma demonstrates a reactive, desmoplastic appearance.   CT chest report 04/20/2020.  Noncalcified pulmonary nodule in the left upper lobe. Metastatic  disease not excluded. Short interval follow-up with CT in 6 months recommended.   Atherosclerosis of the aorta and coronary arteries.  Stranding of the central mesenteric root in the abdomen, partially imaged.  Hepatic steatosis.  MRI 04/23/2020 showed abnormal enhancement and circumferential thickening of the  distal rectum for a length of 5.5 cm, with evidence of invasion of the mesorectal fat and mesial rectal lymph nodes, measure  less than 3 mm from the mesorectal fascia.   There is abnormal pre and post sacral soft tissue enhancement with enhancement of the distal sacrum, concerning for contiguous spread versus local metastatic disease. Addendum report, appears to be invasion of the left seminal vesicle base and left posterior prostate.  These findings are concerning for invasive malignancy.  Previous endorectal ultrasound 04/24/2020.  Fixed constricting lesion, T4NX.        PREVIOUS INTERVENTIONS:  Neoadjuvant CIV 5-FU and radiation 04/27/2020 through 06/05/2020 at [facility].  Not a surgical candidate.  Neoadjuvant FOLFOX 06/22/2020 through present at [facility].     Rectal cancer (CMS/HCC)    Initial Diagnosis    Rectal cancer (CMS/HCC)     3/25/2020 Imaging    CT Abd/Pelvis:     IMPRESSION:  1. Asymmetric wall thickening of the rectum could represent a  mass/neoplasm. If not recently performed, colonoscopy is recommended.     2. Inflammatory stranding of the central mesenteric root, which is  nonspecific but can be seen with mesenteritis/mesenteric panniculitis.     3. Atherosclerotic disease.  4. Fecal stasis.     4/2/2020 Biopsy    Colonoscopy:  Findings: The perianal and digital rectal examinations were normal.  A localized area of severely ulcerated mucosa was found at 2 cm proximal to the anus. Biopsies were  taken with a cold forceps for histology.  Impression: - Ulcerated mucosa at 2 cm proximal to the anus. Biopsied.    Final Diagnosis   Large intestine, rectum at 3 cm, biopsy: Moderately differentiated adenocarcinoma, invasive.     AJCC stage: pTX pNX              4/14/2020 Procedure    Mediport placement     4/14/2020 Imaging    CT Angio Head:  Summary:  1. Distal left ICA occlusion with patent [place] De[place] of Pérez.    CT Angio Neck:  IMPRESSION:  1. Left internal carotid artery occlusion beginning at the origin.  Minimal opacification of the distal left internal carotid artery, which  may represent  retrograde collateral flow.  2. Right internal carotid artery patent.  3. Bilateral vertebral arteries patent.  4. See separately dictated CTA head of the same day.     CT Head:  IMPRESSION:  1. Small amount of gas in the left cerebral cortical veins. Differential  would include iatrogenic air embolism or trauma.  2. No acute intracranial hemorrhage.      CT Angio Head/Neck:  Result Impression   1.  Age-indeterminate left internal carotid artery origin occlusion.   Reconstitution of left ICA flow at the level of the cavernous ICA,   likely due to patent anterior commuting artery. A critical alert was   sent.  2.  No perfusion abnormality.  3.  Moderate atherosclerotic stenosis involving the origin of the   right internal carotid artery.          4/15/2020 Imaging    MRI Brain:  No acute intracranial abnormality identified  Findings of chronic microvascular ischemia. Occluded left ICA flow   void.     4/27/2020 - 6/1/2020 Chemotherapy    OP COLORECTAL Fluorouracil CIV over 120H + XRT     5/1/2020 -  Chemotherapy    OP CENTRAL VENOUS ACCESS DEVICE ACCESS, CARE, AND MAINTENANCE (CVAD)     5/12/2020 Cancer Staged    Staging form: Colon And Rectum, AJCC 8th Edition  - Clinical stage from 5/12/2020: Stage IIIC (cT4b, cN1b, cM0) - Signed by Shaq Boggs MD on 5/12/2020 6/22/2020 -  Chemotherapy    OP COLON mFOLFOX6 OXALIplatin / Leucovorin / Fluorouracil         PAST MEDICAL HISTORY:  ALLERGIES:  No Known Allergies  CURRENT MEDICATIONS:  Outpatient Encounter Medications as of 10/14/2020   Medication Sig Dispense Refill   • aspirin 81 MG chewable tablet Chew 81 mg Daily.     • atorvastatin (LIPITOR) 80 MG tablet Take 80 mg by mouth Daily.     • ibuprofen (ADVIL,MOTRIN) 600 MG tablet Take 1 tablet by mouth 2 (Two) Times a Day As Needed for Mild Pain . 60 tablet 1   • pantoprazole (PROTONIX) 20 MG EC tablet Take 20 mg by mouth Daily.     • prochlorperazine (COMPAZINE) 10 MG tablet Take 1 tablet by mouth Every 6 (Six)  Hours As Needed for Nausea or Vomiting. 60 tablet 2     No facility-administered encounter medications on file as of 10/14/2020.      ADULT ILLNESSES:  Patient Active Problem List   Diagnosis Code   • Abnormal CT of the abdomen R93.5   • Rectal cancer (CMS/HCC) C20   • Current every day smoker F17.200   • Impaired gait and mobility R26.89   • Bright red rectal bleeding K62.5   • 2nd degree AV block I44.1   • Tobacco abuse Z72.0   • Normocytic anemia D64.9   • Chemotherapy induced neutropenia (CMS/HCC) D70.1, T45.1X5A   • History of radiation therapy Z92.3   • Cancer related pain G89.3   • Hypotension due to hypovolemia I95.89, E86.1   • Carotid artery stenosis, symptomatic, right I65.21   • Carotid occlusion, left I65.22     SURGERIES:  Past Surgical History:   Procedure Laterality Date   • COLONOSCOPY N/A 4/2/2020    Procedure: COLONOSCOPY WITH ANESTHESIA;  Surgeon: Yanick Flowers DO;  Location: [facility] ENDOSCOPY;  Service: Gastroenterology;  Laterality: N/A;  pre: abnormal CT scan  post: rectal mass  PCP unknown   • EYE SURGERY Bilateral     lenses present   • MOUTH SURGERY     • VENOUS ACCESS DEVICE (PORT) INSERTION N/A 4/14/2020    Procedure: INSERTION VENOUS ACCESS DEVICE;  Surgeon: Vielka Weller MD;  Location: [facility] OR;  Service: General;  Laterality: N/A;     HEALTH MAINTENANCE ITEMS:  Health Maintenance Due   Topic Date Due   • TDAP/TD VACCINES (1 - Tdap) 09/18/1970   • ZOSTER VACCINE (1 of 2) 09/18/2001   • Pneumococcal Vaccine 65+ (1 of 1 - PPSV23) 09/18/2016   • HEPATITIS C SCREENING  03/26/2020   • ANNUAL WELLNESS VISIT  03/26/2020   • INFLUENZA VACCINE  08/01/2020       <no information>  Last Completed Colonoscopy       Status Date      COLONOSCOPY Done 4/2/2020 COLONOSCOPY     Patient has more history with this topic...          There is no immunization history on file for this patient.  Last Completed Mammogram     Patient has no health maintenance due at this time            FAMILY  "HISTORY:  Family History   Problem Relation Age of Onset   • Cancer Mother    • Stroke Father    • Heart disease Father    • Heart attack Brother      SOCIAL HISTORY:  Social History     Socioeconomic History   • Marital status: Single     Spouse name: Not on file   • Number of children: Not on file   • Years of education: Not on file   • Highest education level: Not on file   Tobacco Use   • Smoking status: Current Every Day Smoker     Packs/day: 0.50     Years: 53.00     Pack years: 26.50     Types: Cigarettes   • Smokeless tobacco: Never Used   Substance and Sexual Activity   • Alcohol use: Not Currently   • Drug use: Never   • Sexual activity: Defer       REVIEW OF SYSTEMS:    Review of Systems   Constitutional: Positive for fatigue. Negative for chills, diaphoresis and fever.        \"I can sit and no excruciating pain.\"   HENT: Negative for congestion, mouth sores and trouble swallowing.    Eyes: Negative for redness.   Respiratory: Negative for cough, shortness of breath and wheezing.    Cardiovascular: Negative for chest pain and palpitations.   Gastrointestinal: Positive for diarrhea. Negative for abdominal pain, constipation, nausea and vomiting.        \"I take Imodium.\"   Endocrine: Negative for cold intolerance and heat intolerance.   Genitourinary: Negative for difficulty urinating, flank pain and hematuria.   Musculoskeletal: Negative for gait problem and joint swelling.   Skin: Positive for pallor.   Allergic/Immunologic: Negative for food allergies.   Neurological: Negative for dizziness, speech difficulty and weakness.   Hematological: Negative for adenopathy. Does not bruise/bleed easily.   Psychiatric/Behavioral: Negative for agitation and confusion. The patient is not nervous/anxious.        VITAL SIGNS: /58   Pulse 100   Temp 98.3 °F (36.8 °C)   Resp 18   Ht 180.3 cm (71\")   Wt 68.5 kg (151 lb)   SpO2 96%   BMI 21.06 kg/m²   Pain Score    10/14/20 0842   PainSc:   2       PHYSICAL " EXAMINATION:     Physical Exam  Vitals signs reviewed.   Constitutional:       Appearance: He is not ill-appearing, toxic-appearing or diaphoretic.   HENT:      Head: Normocephalic and atraumatic.   Eyes:      General: No scleral icterus.  Neck:      Musculoskeletal: Neck supple.   Cardiovascular:      Rate and Rhythm: Normal rate and regular rhythm.   Pulmonary:      Effort: No respiratory distress.      Breath sounds: No wheezing or rales.   Abdominal:      General: Bowel sounds are normal.      Palpations: Abdomen is soft.      Tenderness: There is no abdominal tenderness.   Musculoskeletal:         General: No swelling.   Skin:     General: Skin is warm and dry.      Coloration: Skin is pale.   Neurological:      Mental Status: He is oriented to person, place, and time.   Psychiatric:         Mood and Affect: Mood normal.         Behavior: Behavior normal.         Thought Content: Thought content normal.         Judgment: Judgment normal.         LABS    Lab Results - Last 18 Months   Lab Units 10/06/20  0908 09/22/20  0905 09/14/20  1351 09/08/20  0930 09/02/20  1028 08/31/20  0812 08/24/20  0918 08/17/20  0818  08/12/20  0900 08/03/20  0820 07/20/20  0820 07/15/20  0851   HEMOGLOBIN g/dL 10.5* 10.0* 9.0* 9.1* 9.3* 9.9* 10.6* 9.9*  --  10.4* 11.5* 10.8* 11.0*   HEMATOCRIT % 33.7* 31.1* 28.8* 29.3* 29.2* 30.7* 33.8* 30.7*  --  32.3* 35.7* 33.8* 34.2*   MCV fL 89.4 88.1 88.6 86.9 86.4 85.5 86.9 86.0  --  85.2 85.8 87.6 87.9   WBC 10*3/mm3 15.78* 18.74* 24.70* 5.63 13.37* 9.12 23.92* 3.35*  --  6.53 4.34 6.50 11.14*   RDW % 22.1* 21.0* 20.0* 19.5* 18.3* 17.4* 17.7* 15.9*  --  15.2 14.6 14.1 13.6   MPV fL 9.0 8.8 8.4 8.6 9.6 9.3 8.7 9.0  --  9.3 8.5 8.3 8.7   PLATELETS 10*3/mm3 358 233 376 373 295 306 418 274  --  243 382 405 403   IMM GRAN % %  --   --   --  0.5 0.9* 1.1*  --   --   --  0.9*  --  1.1* 0.5   NEUTROS ABS 10*3/mm3 11.52* 14.80* 23.94* 3.58 11.20* 7.36* 16.60* 0.70*   < > 5.04 2.44 4.60 8.85*   LYMPHS  ABS 10*3/mm3  --   --   --  0.43* 0.54* 0.73  --   --   --  0.51* 0.60* 0.53* 0.53*   MONOS ABS 10*3/mm3  --   --   --  1.02* 1.24* 0.55  --   --   --  0.36 0.96* 1.03* 1.08*   EOS ABS 10*3/mm3 0.63* 0.19  --  0.50* 0.18 0.30 0.48* 0.20   < > 0.51* 0.22 0.20 0.56*   BASOS ABS 10*3/mm3 0.16  --   --  0.07 0.09 0.08  --  0.07  --  0.05 0.08 0.07 0.06   IMMATURE GRANS (ABS) 10*3/mm3  --   --   --  0.03 0.12* 0.10*  --   --   --  0.06*  --  0.07* 0.06*   NRBC /100 WBC 1.0*  --   --  0.0 0.0 0.0  --   --   --  0.0  --  0.0 0.0   NEUTROPHIL % % 61.0 65.0 93.9*  --   --   --  64.3 16.0*  --   --   --   --   --    MONOCYTES % % 8.0 13.0* 2.0*  --   --   --  9.2 50.0*  --   --   --   --   --    BASOPHIL % % 1.0  --   --   --   --   --   --  2.0*  --   --   --   --   --    ATYP LYMPH % % 1.0  --   --   --   --   --   --   --   --   --   --   --   --    ANISOCYTOSIS  Slight/1+ Slight/1+ Slight/1+  --   --   --  Slight/1+ Slight/1+  --   --   --   --   --    GIANT PLT  Slight/1+  --   --   --   --   --   --  Slight/1+  --   --   --   --   --     < > = values in this interval not displayed.       Lab Results - Last 18 Months   Lab Units 10/06/20  0908 09/22/20  0905 09/14/20  1351 09/08/20  0930 08/31/20  0812 08/24/20  0918   GLUCOSE mg/dL 184* 305* 247* 195* 308* 271*   SODIUM mmol/L 139 134* 140 139 141 137   POTASSIUM mmol/L 4.3 3.7 4.2 4.2 4.3 4.5   CO2 mmol/L 27.0 24.0 23.0 24.0 24.0 27.0   CHLORIDE mmol/L 100 98 107 101 104 99   ANION GAP mmol/L 12.0 12.0 10.0 14.0 13.0 11.0   CREATININE mg/dL 0.87 0.71* 0.60* 0.61* 0.56* 0.63*   BUN mg/dL 19 9 11 12 19 20   BUN / CREAT RATIO  21.8 12.7 18.3 19.7 33.9* 31.7*   CALCIUM mg/dL 10.2 8.9 8.8 9.5 9.3 9.8   EGFR IF NONAFRICN AM mL/min/1.73 87 110 134 131 145 127   ALK PHOS U/L 146* 141* 143* 104 106 139*   TOTAL PROTEIN g/dL 7.7 6.8 6.8 7.2 7.2 7.8   ALT (SGPT) U/L 31 32 15 22 27 65*   AST (SGOT) U/L 28 26 15 19 16 50*   BILIRUBIN mg/dL 0.2 0.2 <0.2 0.2 0.3 0.2   ALBUMIN g/dL  3.90 3.40* 3.60 3.60 3.70 3.60   GLOBULIN gm/dL 3.8 3.4 3.2 3.6 3.5 4.2       Lab Results - Last 18 Months   Lab Units 10/06/20  0908 09/14/20  1351 08/31/20  0812 08/03/20  0820 07/06/20  0827 04/09/20  0954   CEA ng/mL 3.32 2.75 3.35 2.87 3.35 4.26       No results for input(s): IRON, TIBC, LABIRON, FERRITIN, C8NGSHQ, TSH, FOLATE in the last 13008 hours.    Invalid input(s): VITB12    Athenspriya Viera Wander reports a pain score of 2.      Patient's Body mass index is 21.06 kg/m². BMI is within normal parameters. No follow-up required..      ASSESSMENT:  1.  Adenocarcinoma of the rectum. Tumor size 5.5 cm.  AJCC stage:IIIC (cT4b, N1b, M0)  Treatment status:Post neoadjuvant CIV 5-FU and radiation, not a surgical candidate.  On neoadjuvant FOLFOX.  2.  Performance status of 1.  3.  Normocytic anemia from chemo.    4.  Elevated protein 03/25/2020.  5.  6 mm nodule, left upper lobe near the fissure.  6.  History of pancreatitis.   7.  Fatigue from chemo.  8.  Dysuria 07/15/2020.  9.  History of grade 3 neutropenia without fever C4D15.        PLAN:  1.   Re:  Post 8 cycles of neoadjuvant FOLFOX.  2.   Re:  Heme status.  Hemoglobin 10.5 and hematocrit 33.7.  3.   Re:  Pre office CMP.  GFR 87 mL/min  4.   Re:  Pre-office magnesium 2.0 and CEA 3.3 from 3.35 from 2.87 from 3.35 from 4.26.  5.  FOLFOX for total of 8 cycles:   6.  Appointment to see Dr. De Jesus to assess for resection.   7.  Schedule treatment C9 D1 to 3 and C10 D1 to D3 if not resectable.  To be administered every 2 weeks: Plan for 4 more cycles if unresectable.  Patient will inform the office if not resectable and will continue 4 more cycles of chemo.  Oxaliplatin 85 mg/m2 IVPB over 2 hours (TD = 157 mg).  Leucovorin 400 mg/m2 IVPB over 2 hours (TD= 740 mg).  5- mg/ m2  IVP (TD = 740  mg).  Begin 5-FU 2,400 mg/m2 IVPB over 46 hours.  (TD= 4,440 mg).  7.   Premedicate with:  Aloxi 0.25 mg IVP.  Emend 150 mg IV.  Decadron 12 mg  IVPB over 20-30 min.  8.  Weekly CBC with differential, magnesium, and CMP.  9.  Neulasta on pro day 3 as primary prophylaxis.  Had chemo delay, cycle 5 due to grade 3 neutropenia.  10.  Continue care per primary care physician and other specialists.  11.  Recall colonoscopy on 10/2020.  12.  Advance Care Planning  ACP discussion was declined by the patient. Patient does not have an advance directive, information provided.   13.  eRx Compazine 10 mg p.o. every 4 hours as needed for nausea and vomiting #60 with 2 refills if needed.  14.  NS 1 liter over 2 hours as needed.  15.  Appointment to see Dr. De Jesus at [place], post 8 cycles of FOLFOX.  CT can to be done at [place].  16.  Flush port every 6 weeks.  17.  Return to office in 4 weeks with pre-office CEA.         I spent 30 total minutes, face-to-face, caring for Attila today.  Greater than 50% of this time involved counseling and/or coordination of care as documented within this note regarding the patient's illness(es), pros and cons of various treatment options, instructions and/or risk reduction.        Vincent Swenson MD  (Yanick Flowers DO)  (Nabeel Walton MD)  (Kel Ramirez MD)  (Vielka Weller MD)  Destini De Jesus MD

## 2020-10-12 ENCOUNTER — TELEPHONE (OUTPATIENT)
Dept: VASCULAR SURGERY | Facility: CLINIC | Age: 70
End: 2020-10-12

## 2020-10-12 NOTE — TELEPHONE ENCOUNTER
Spoke with Mr Viramontes reminding him of his appointment for Tuesday, October 13th, 2020 at 145 pm with Dr Reardon. Mr Viramontes confirmed he would be here.

## 2020-10-13 ENCOUNTER — OFFICE VISIT (OUTPATIENT)
Dept: VASCULAR SURGERY | Facility: CLINIC | Age: 70
End: 2020-10-13

## 2020-10-13 VITALS
HEART RATE: 97 BPM | BODY MASS INDEX: 21 KG/M2 | HEIGHT: 71 IN | SYSTOLIC BLOOD PRESSURE: 108 MMHG | OXYGEN SATURATION: 97 % | DIASTOLIC BLOOD PRESSURE: 62 MMHG | WEIGHT: 150 LBS

## 2020-10-13 DIAGNOSIS — Z72.0 TOBACCO ABUSE: ICD-10-CM

## 2020-10-13 DIAGNOSIS — I65.22 CAROTID OCCLUSION, LEFT: Primary | ICD-10-CM

## 2020-10-13 DIAGNOSIS — I65.21 STENOSIS OF RIGHT CAROTID ARTERY: ICD-10-CM

## 2020-10-13 PROCEDURE — 99204 OFFICE O/P NEW MOD 45 MIN: CPT | Performed by: SURGERY

## 2020-10-13 NOTE — PROGRESS NOTES
10/13/2020      Ga Hameed MD  [address] [facility] [address]  [facility] [address]  [place] [address]    Attila Viramontes  1950    Chief Complaint   Patient presents with   • Establish Care     Left carotid occlusion.  Pt had a CTA of the neck on 4/14/20. Pt denies any stroke like symptoms.  Pt referred by Dr. Hameed.    • Med Management     Verbally went over the patient's medications with him.  Pt is taking a low dose aspirin and Lipitor.       Dear Ga Hameed MD    HPI  I had the pleasure of seeing your patient Attila Viramontes in the office today.  Thank you kindly for this consultation.  As you recall, Attila Viramontes is a 70 y.o.  male who you are currently following for coronary artery disease. He has rectal cancer and is to have an upcoming surgery.  In April, he was transferred to [place] after onset of right sided hemiparesis, global aphasia, right lower facial drooping.  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  At that time, he was started on aspirin and also takes Lipitor.  He denies any strokelike symptoms.  I did review his testing from April.        Past Medical History:   Diagnosis Date   • Arthritis    • Hyperlipidemia    • Pancreatitis    • Rectal cancer (CMS/HCC) 4/9/2020       Past Surgical History:   Procedure Laterality Date   • COLONOSCOPY N/A 4/2/2020    Procedure: COLONOSCOPY WITH ANESTHESIA;  Surgeon: Yanick Flowers DO;  Location: [facility] ENDOSCOPY;  Service: Gastroenterology;  Laterality: N/A;  pre: abnormal CT scan  post: rectal mass  PCP unknown   • EYE SURGERY Bilateral     lenses present   • MOUTH SURGERY     • VENOUS ACCESS DEVICE (PORT) INSERTION N/A 4/14/2020    Procedure: INSERTION VENOUS ACCESS DEVICE;  Surgeon: Vielka Weller MD;  Location: [facility] OR;  Service: General;  Laterality: N/A;       Family History   Problem Relation Age of Onset   • Cancer Mother    • Stroke Father    • Heart disease Father    • Heart attack Brother        Social  "History     Socioeconomic History   • Marital status: Single     Spouse name: Not on file   • Number of children: Not on file   • Years of education: Not on file   • Highest education level: Not on file   Tobacco Use   • Smoking status: Current Every Day Smoker     Packs/day: 0.50     Years: 53.00     Pack years: 26.50     Types: Cigarettes   • Smokeless tobacco: Never Used   Substance and Sexual Activity   • Alcohol use: Not Currently   • Drug use: Never   • Sexual activity: Defer       No Known Allergies      Current Outpatient Medications:   •  aspirin 81 MG chewable tablet, Chew 81 mg Daily., Disp: , Rfl:   •  atorvastatin (LIPITOR) 80 MG tablet, Take 80 mg by mouth Daily., Disp: , Rfl:   •  ibuprofen (ADVIL,MOTRIN) 600 MG tablet, Take 1 tablet by mouth 2 (Two) Times a Day As Needed for Mild Pain ., Disp: 60 tablet, Rfl: 1  •  pantoprazole (PROTONIX) 20 MG EC tablet, Take 20 mg by mouth Daily., Disp: , Rfl:   •  prochlorperazine (COMPAZINE) 10 MG tablet, Take 1 tablet by mouth Every 6 (Six) Hours As Needed for Nausea or Vomiting., Disp: 60 tablet, Rfl: 2    Review of Systems   Constitutional: Negative.    HENT: Negative.    Eyes: Negative.    Respiratory: Negative.    Cardiovascular: Negative.    Gastrointestinal: Negative.    Endocrine: Negative.    Genitourinary: Negative.    Musculoskeletal: Negative.    Skin: Negative.    Allergic/Immunologic: Negative.    Neurological: Negative.    Hematological: Negative.    Psychiatric/Behavioral: Negative.    All other systems reviewed and are negative.    /62   Pulse 97   Ht 180.3 cm (71\")   Wt 68 kg (150 lb)   SpO2 97%   BMI 20.92 kg/m²     Physical Exam  Vitals signs and nursing note reviewed.   Constitutional:       Appearance: He is well-developed.   HENT:      Head: Normocephalic and atraumatic.   Eyes:      General: No scleral icterus.     Pupils: Pupils are equal, round, and reactive to light.   Neck:      Musculoskeletal: Neck supple.      Thyroid: No " thyromegaly.      Vascular: No carotid bruit or JVD.   Cardiovascular:      Rate and Rhythm: Normal rate and regular rhythm.      Pulses:           Carotid pulses are 2+ on the right side and 2+ on the left side.       Femoral pulses are 2+ on the right side and 2+ on the left side.       Popliteal pulses are 2+ on the right side and 2+ on the left side.        Dorsalis pedis pulses are 2+ on the right side and 2+ on the left side.        Posterior tibial pulses are 2+ on the right side and 2+ on the left side.      Heart sounds: Normal heart sounds.   Pulmonary:      Effort: Pulmonary effort is normal.      Breath sounds: Normal breath sounds.   Abdominal:      General: Bowel sounds are normal. There is no distension or abdominal bruit.      Palpations: Abdomen is soft. There is no mass.      Tenderness: There is no abdominal tenderness.   Musculoskeletal: Normal range of motion.   Lymphadenopathy:      Cervical: No cervical adenopathy.   Skin:     General: Skin is warm and dry.   Neurological:      General: No focal deficit present.      Mental Status: He is alert and oriented to person, place, and time.      Cranial Nerves: No cranial nerve deficit.      Sensory: No sensory deficit.   Psychiatric:         Mood and Affect: Mood normal.         Behavior: Behavior normal.         Thought Content: Thought content normal.         Judgment: Judgment normal.     Diagnostic data:  EXAM: CT ANGIOGRAM NECK- - 4/14/2020 2:56 PM CDT     HISTORY: Stroke; [id number] neoplasm of rectum       COMPARISON: None.      DOSE LENGTH PRODUCT: 432 mGy cm. Automated exposure control was also  utilized to decrease patient radiation dose.     TECHNIQUE: CT images of the neck performed with contrast. 3D  postprocessing, including MIPs, performed and images saved to PACS.     Evaluation of the carotids performed using NASCET criteria.     FINDINGS:  3 vessel aortic arch with calcified atherosclerosis.     Right common carotid artery  patent. Calcified atherosclerosis at the  right carotid bifurcation. There is 50 percent stenosis at the origin of  the right internal carotid artery. Mild calcified right cavernous  carotid atherosclerosis.     Left common carotid artery patent. Left internal carotid artery  occlusion at the origin, which has calcified and noncalcified  atherosclerosis. Trickle flow at the distal intracranial left internal  carotid artery, which may represent retrograde collateral flow.     Right vertebral artery patent. Left vertebral artery patent. Partially  visualized posterior cerebral, superior cerebellar, and PICA patent.  AICA diminutive and not well assessed.     The aerodigestive tract is within normal limits of appearance.     No cervical adenopathy by size or morphologic criteria.     Major salivary glands within normal limits of appearance.     Normal CT appearance of the thyroid.     No acute or suspicious intracranial abnormality within the imaged field  of view.  Paranasal sinuses, mastoid air cells, and middle ears are  well-aerated.  Orbits are unremarkable.  Poor dentition.     Multilevel degenerative changes in the visualized spine without acute or  suspicious bony finding.     Lung apices grossly clear.     IMPRESSION:  1. Left internal carotid artery occlusion beginning at the origin.  Minimal opacification of the distal left internal carotid artery, which  may represent retrograde collateral flow.  2. Right internal carotid artery patent.  3. Bilateral vertebral arteries patent.  4. See separately dictated CTA head of the same day.     Results communicated to Dr. Ethan Montalvo at 3:41 PM on 04/14/2020.  This report was finalized on 04/14/2020 15:47 by Dr Zoey Reardon MD.    Patient Active Problem List   Diagnosis   • Abnormal CT of the abdomen   • Rectal cancer (CMS/HCC)   • Current every day smoker   • Impaired gait and mobility   • Bright red rectal bleeding   • 2nd degree AV block   • Tobacco abuse   •  Normocytic anemia   • Chemotherapy induced neutropenia (CMS/HCC)   • History of radiation therapy   • Cancer related pain   • Hypotension due to hypovolemia   • Carotid artery stenosis, symptomatic, right   • Carotid occlusion, left        Diagnosis Plan   1. Carotid occlusion, left     2. Tobacco abuse     3. Stenosis of right carotid artery         Plan: After thoroughly evaluating Attila Viramontes, I believe the best course of action is to remain conservative from a vascular surgery standpoint.  Currently, the patient appears to be doing quite well without any current strokelike symptoms.  His left carotid system is currently occluded and does not require any surgical intervention moving forward.  His right carotid system is in the 50 to 60% range and this only requires continued surveillance.  I will see him back in 1 years time with a carotid duplex for continued surveillance.  He is okay from my standpoint to undergo his rectal cancer surgery.  We did discuss tobacco abuse cessation for over 10 minutes.  The patient is to continue taking their medications as previously discussed.   This was all discussed in full with complete understanding.  Thank you for allowing me to participate in the care of your patient.  Please do not hesitate to call with any questions or concerns.  We will keep you aware of any further encounters with Attila Viramontes.        Sincerely yours,         DO Messi Ulloa John T., MD

## 2020-10-14 ENCOUNTER — TELEPHONE (OUTPATIENT)
Dept: ONCOLOGY | Facility: CLINIC | Age: 70
End: 2020-10-14

## 2020-10-14 ENCOUNTER — OFFICE VISIT (OUTPATIENT)
Dept: ONCOLOGY | Facility: CLINIC | Age: 70
End: 2020-10-14

## 2020-10-14 VITALS
HEART RATE: 100 BPM | WEIGHT: 151 LBS | RESPIRATION RATE: 18 BRPM | DIASTOLIC BLOOD PRESSURE: 58 MMHG | HEIGHT: 71 IN | TEMPERATURE: 98.3 F | BODY MASS INDEX: 21.14 KG/M2 | SYSTOLIC BLOOD PRESSURE: 116 MMHG | OXYGEN SATURATION: 96 %

## 2020-10-14 DIAGNOSIS — C20 RECTAL CANCER (HCC): Primary | ICD-10-CM

## 2020-10-14 PROCEDURE — 99214 OFFICE O/P EST MOD 30 MIN: CPT | Performed by: INTERNAL MEDICINE

## 2020-10-14 NOTE — TELEPHONE ENCOUNTER
Patient notified follow up appt made with Dr Destini De Jesus at [facility]:  Oct 27, 2020 @ 8:30 am.

## 2020-10-21 ENCOUNTER — LAB (OUTPATIENT)
Dept: LAB | Facility: HOSPITAL | Age: 70
End: 2020-10-21

## 2020-10-21 DIAGNOSIS — C20 RECTAL CANCER (HCC): ICD-10-CM

## 2020-10-21 LAB
ALBUMIN SERPL-MCNC: 3.7 G/DL (ref 3.5–5.2)
ALBUMIN/GLOB SERPL: 1.1 G/DL
ALP SERPL-CCNC: 157 U/L (ref 39–117)
ALT SERPL W P-5'-P-CCNC: 33 U/L (ref 1–41)
ANION GAP SERPL CALCULATED.3IONS-SCNC: 11 MMOL/L (ref 5–15)
ANISOCYTOSIS BLD QL: ABNORMAL
AST SERPL-CCNC: 27 U/L (ref 1–40)
BILIRUB SERPL-MCNC: <0.2 MG/DL (ref 0–1.2)
BUN SERPL-MCNC: 23 MG/DL (ref 8–23)
BUN/CREAT SERPL: 34.3 (ref 7–25)
CALCIUM SPEC-SCNC: 9.8 MG/DL (ref 8.6–10.5)
CHLORIDE SERPL-SCNC: 105 MMOL/L (ref 98–107)
CO2 SERPL-SCNC: 23 MMOL/L (ref 22–29)
CREAT SERPL-MCNC: 0.67 MG/DL (ref 0.76–1.27)
DEPRECATED RDW RBC AUTO: 75.6 FL (ref 37–54)
EOSINOPHIL # BLD MANUAL: 0.59 10*3/MM3 (ref 0–0.4)
EOSINOPHIL NFR BLD MANUAL: 3 % (ref 0.3–6.2)
ERYTHROCYTE [DISTWIDTH] IN BLOOD BY AUTOMATED COUNT: 23.4 % (ref 12.3–15.4)
GFR SERPL CREATININE-BSD FRML MDRD: 118 ML/MIN/1.73
GLOBULIN UR ELPH-MCNC: 3.3 GM/DL
GLUCOSE SERPL-MCNC: 208 MG/DL (ref 65–99)
HCT VFR BLD AUTO: 30.7 % (ref 37.5–51)
HGB BLD-MCNC: 9.6 G/DL (ref 13–17.7)
LYMPHOCYTES # BLD MANUAL: 1.2 10*3/MM3 (ref 0.7–3.1)
LYMPHOCYTES NFR BLD MANUAL: 6.1 % (ref 19.6–45.3)
LYMPHOCYTES NFR BLD MANUAL: 7.1 % (ref 5–12)
MAGNESIUM SERPL-MCNC: 2 MG/DL (ref 1.6–2.4)
MCH RBC QN AUTO: 28.2 PG (ref 26.6–33)
MCHC RBC AUTO-ENTMCNC: 31.3 G/DL (ref 31.5–35.7)
MCV RBC AUTO: 90 FL (ref 79–97)
METAMYELOCYTES NFR BLD MANUAL: 2 % (ref 0–0)
MONOCYTES # BLD AUTO: 1.4 10*3/MM3 (ref 0.1–0.9)
MYELOCYTES NFR BLD MANUAL: 3 % (ref 0–0)
NEUTROPHILS # BLD AUTO: 15.49 10*3/MM3 (ref 1.7–7)
NEUTROPHILS NFR BLD MANUAL: 72.7 % (ref 42.7–76)
NEUTS BAND NFR BLD MANUAL: 6.1 % (ref 0–5)
PLAT MORPH BLD: NORMAL
PLATELET # BLD AUTO: 314 10*3/MM3 (ref 140–450)
PMV BLD AUTO: 8.7 FL (ref 6–12)
POIKILOCYTOSIS BLD QL SMEAR: ABNORMAL
POLYCHROMASIA BLD QL SMEAR: ABNORMAL
POTASSIUM SERPL-SCNC: 4.5 MMOL/L (ref 3.5–5.2)
PROT SERPL-MCNC: 7 G/DL (ref 6–8.5)
RBC # BLD AUTO: 3.41 10*6/MM3 (ref 4.14–5.8)
SODIUM SERPL-SCNC: 139 MMOL/L (ref 136–145)
WBC # BLD AUTO: 19.66 10*3/MM3 (ref 3.4–10.8)
WBC MORPH BLD: NORMAL

## 2020-10-21 PROCEDURE — 80053 COMPREHEN METABOLIC PANEL: CPT

## 2020-10-21 PROCEDURE — 83735 ASSAY OF MAGNESIUM: CPT

## 2020-10-21 PROCEDURE — 85025 COMPLETE CBC W/AUTO DIFF WBC: CPT

## 2020-10-21 PROCEDURE — 36415 COLL VENOUS BLD VENIPUNCTURE: CPT

## 2020-10-21 PROCEDURE — 85007 BL SMEAR W/DIFF WBC COUNT: CPT

## 2020-10-27 ENCOUNTER — TELEPHONE (OUTPATIENT)
Dept: ONCOLOGY | Facility: CLINIC | Age: 70
End: 2020-10-27

## 2020-10-28 DIAGNOSIS — G89.3 CANCER RELATED PAIN: Primary | ICD-10-CM

## 2020-10-28 DIAGNOSIS — C20 RECTAL CANCER (HCC): ICD-10-CM

## 2020-11-03 RX ORDER — IBUPROFEN 600 MG/1
600 TABLET ORAL 2 TIMES DAILY PRN
Qty: 60 TABLET | Refills: 1 | Status: SHIPPED | OUTPATIENT
Start: 2020-11-03 | End: 2021-09-22

## 2020-11-05 ENCOUNTER — TELEPHONE (OUTPATIENT)
Dept: ONCOLOGY | Facility: CLINIC | Age: 70
End: 2020-11-05

## 2020-11-05 NOTE — TELEPHONE ENCOUNTER
Notified that there is now way to set the medication to automatically request refills.  Would need to have his pharmacy do this.

## 2020-11-05 NOTE — TELEPHONE ENCOUNTER
Patient's sister calling about Ibuprofen 600.  Can this be set on automatic refill for the patient?  He never requests a refill until he is totally out and this would be helpful if possible.    [phone]

## 2020-11-09 ENCOUNTER — HOSPITAL ENCOUNTER (OUTPATIENT)
Dept: CT IMAGING | Facility: HOSPITAL | Age: 70
Discharge: HOME | End: 2020-11-09

## 2020-11-09 ENCOUNTER — TELEPHONE (OUTPATIENT)
Dept: ONCOLOGY | Facility: CLINIC | Age: 70
End: 2020-11-09

## 2020-11-09 DIAGNOSIS — C20 RECTAL CANCER (HCC): ICD-10-CM

## 2020-11-09 PROCEDURE — 0 FLUDEOXYGLUCOSE F18 SOLUTION: Performed by: INTERNAL MEDICINE

## 2020-11-09 PROCEDURE — 78815 PET IMAGE W/CT SKULL-THIGH: CPT

## 2020-11-09 PROCEDURE — A9552 F18 FDG: HCPCS | Performed by: INTERNAL MEDICINE

## 2020-11-09 RX ADMIN — FLUDEOXYGLUCOSE F18 1 DOSE: 300 INJECTION INTRAVENOUS at 10:10

## 2020-11-09 NOTE — TELEPHONE ENCOUNTER
----- Message from Shaq Boggs MD sent at 11/9/2020  1:35 PM CST -----  Fax report to Dr. Barbra De Jesus.    The rectal soft tissue mass appears to be smaller or nearly resolved  on the current PET CT images. The current CT images are nondiagnostic  and somewhat limited. Uptake in this region has an SUV max of 2.8. This  degree of uptake could be inflammatory.  Stranded appearance of the central mesenteric fat is more prominent  on the current study compared to 3/25/2020. However, the current CT  images are not diagnostic. There is no abnormal PET activity in this  area. This area measures about 10.7 x 5.9 cm. Lymphoma and mesenteric  panniculitis are in the differential diagnosis.  No abnormal uptake in the chest or neck region. A tiny left upper  lobe pulmonary nodule is stable compared to prior chest CT.

## 2020-11-11 ENCOUNTER — TELEPHONE (OUTPATIENT)
Dept: ONCOLOGY | Facility: CLINIC | Age: 70
End: 2020-11-11

## 2020-11-11 NOTE — TELEPHONE ENCOUNTER
Caller: ROBI OSBORN    Relationship to patient: SELF    Best call back number: [phone]    PT WOULD LIKE SOMEONE TO CALL HIM IN REGARD TO THE PET SCAN HE HAD ON 11/9

## 2020-11-11 NOTE — TELEPHONE ENCOUNTER
Caller: ROBI OSBORN    Relationship to patient: SELF    Best call back number: [phone]    PT NEEDS TO RESCHED HIS APPT ON 11/16, HE HAS AN APPT AT [place] THAT SAME DAY. STATES HE COULD COME IN 11/17

## 2020-11-11 NOTE — TELEPHONE ENCOUNTER
Called patient and reviewed PET scan results with patient. Instructed that the area in abdominal area is still there but PET scan cannot tell what it is.  Instructed that PET results were faxed to Dr De Jesus.  States that he will get in touch with Dr De Jesus regarding this and will see Dr Boggs on the 23rd to see what his next step should be.  Patient v/u.

## 2020-11-17 NOTE — PROGRESS NOTES
"[facility] ON[facility] GROUP HEMATOLOGY AND ONCOLOGY  [address]  [place] [address] [phone]    Patient Name: Attila Viramontes  Encounter Date: 11/16/2020  YOB: 1950  Patient Number: [id number]      REASON FOR FOLLOW-UP: Attila Viramontes is a pleasant 70 y.o. male who is seen on follow up for rectal cancer.  He is seen C8D49 of neoadjuvant FOLFOX. He is seen 5.75 months post neoadjuvant 5-FU with radiation.  He is seen alone.  History is obtained from patient. History is considered to be accurate.    Dr. De Jesus called 10/27/2020.  Need PET before surgery.    His iron saturation was 16% and ferritin 386.  B12 was 493 and folate 14.5.  CBC revealed a WBC of 10.3, hemoglobin 11, hematocrit 35, MCV 96, and platelet 409.  \"They told me to get iron infusion here.'    Patient will undergo optimization prior to surgery.      Oncology/Hematology History Overview Note   DIAGNOSTIC ABNORMALITIES:  CT abdomen and pelvis 03/25/2020.  Asymmetric wall thickening of the rectum could represent a mass/neoplasm. If not recently performed, colonoscopy is recommended. Inflammatory stranding of the central mesenteric root, which is nonspecific but can be seen with mesenteritis/mesenteric panniculitis.  Colonoscopy by Dr. Reaves 04/02/2020 showed a localized area of severely ulcerated mucosa was found at 2 cm proximal to the anus. Biopsies were taken with a cold forceps for histology.  Pathology report 04/03/2020 showed a moderately differentiated adenocarcinoma.  Stromal invasion is seen although depth of invasion is indeterminate in these biopsies.  In the areas of invasion, the stroma demonstrates a reactive, desmoplastic appearance.   CT chest report 04/20/2020.  Noncalcified pulmonary nodule in the left upper lobe. Metastatic  disease not excluded. Short interval follow-up with CT in 6 months recommended.   Atherosclerosis of the aorta and " coronary arteries.  Stranding of the central mesenteric root in the abdomen, partially imaged.  Hepatic steatosis.  MRI 04/23/2020 showed abnormal enhancement and circumferential thickening of the  distal rectum for a length of 5.5 cm, with evidence of invasion of the mesorectal fat and mesial rectal lymph nodes, measure less than 3 mm from the mesorectal fascia.   There is abnormal pre and post sacral soft tissue enhancement with enhancement of the distal sacrum, concerning for contiguous spread versus local metastatic disease. Addendum report, appears to be invasion of the left seminal vesicle base and left posterior prostate.  These findings are concerning for invasive malignancy.  Previous endorectal ultrasound 04/24/2020.  Fixed constricting lesion, T4NX.        PREVIOUS INTERVENTIONS:  Neoadjuvant CIV 5-FU and radiation 04/27/2020 through 06/05/2020 at [facility].  Not a surgical candidate.  Neoadjuvant FOLFOX 06/22/2020 through 10/06/2020, 8 cycles, at [facility].     Rectal cancer (CMS/HCC)    Initial Diagnosis    Rectal cancer (CMS/HCC)     3/25/2020 Imaging    CT Abd/Pelvis:     IMPRESSION:  1. Asymmetric wall thickening of the rectum could represent a  mass/neoplasm. If not recently performed, colonoscopy is recommended.     2. Inflammatory stranding of the central mesenteric root, which is  nonspecific but can be seen with mesenteritis/mesenteric panniculitis.     3. Atherosclerotic disease.  4. Fecal stasis.     4/2/2020 Biopsy    Colonoscopy:  Findings: The perianal and digital rectal examinations were normal.  A localized area of severely ulcerated mucosa was found at 2 cm proximal to the anus. Biopsies were  taken with a cold forceps for histology.  Impression: - Ulcerated mucosa at 2 cm proximal to the anus. Biopsied.    Final Diagnosis   Large intestine, rectum at 3 cm, biopsy: Moderately differentiated adenocarcinoma, invasive.     AJCC stage: pTX pNX              4/14/2020  Procedure    Mediport placement     4/14/2020 Imaging    CT Angio Head:  Summary:  1. Distal left ICA occlusion with patent [place] of P[place]rez.    CT Angio Neck:  IMPRESSION:  1. Left internal carotid artery occlusion beginning at the origin.  Minimal opacification of the distal left internal carotid artery, which  may represent retrograde collateral flow.  2. Right internal carotid artery patent.  3. Bilateral vertebral arteries patent.  4. See separately dictated CTA head of the same day.     CT Head:  IMPRESSION:  1. Small amount of gas in the left cerebral cortical veins. Differential  would include iatrogenic air embolism or trauma.  2. No acute intracranial hemorrhage.      CT Angio Head/Neck:  Result Impression   1.  Age-indeterminate left internal carotid artery origin occlusion.   Reconstitution of left ICA flow at the level of the cavernous ICA,   likely due to patent anterior commuting artery. A critical alert was   sent.  2.  No perfusion abnormality.  3.  Moderate atherosclerotic stenosis involving the origin of the   right internal carotid artery.          4/15/2020 Imaging    MRI Brain:  No acute intracranial abnormality identified  Findings of chronic microvascular ischemia. Occluded left ICA flow   void.     4/27/2020 - 6/1/2020 Chemotherapy    OP COLORECTAL Fluorouracil CIV over 120H + XRT     5/1/2020 -  Chemotherapy    OP CENTRAL VENOUS ACCESS DEVICE ACCESS, CARE, AND MAINTENANCE (CVAD)     5/12/2020 Cancer Staged    Staging form: Colon And Rectum, AJCC 8th Edition  - Clinical stage from 5/12/2020: Stage IIIC (cT4b, cN1b, cM0) - Signed by Shaq Boggs MD on 5/12/2020 6/22/2020 -  Chemotherapy    OP COLON mFOLFOX6 OXALIplatin / Leucovorin / Fluorouracil         PAST MEDICAL HISTORY:  ALLERGIES:  No Known Allergies  CURRENT MEDICATIONS:  Outpatient Encounter Medications as of 11/23/2020   Medication Sig Dispense Refill   • aspirin 81 MG chewable tablet Chew 81 mg Daily.     • atorvastatin  (LIPITOR) 80 MG tablet Take 80 mg by mouth Daily.     • ibuprofen (ADVIL,MOTRIN) 600 MG tablet Take 1 tablet by mouth 2 (Two) Times a Day As Needed for Mild Pain . 60 tablet 1   • metFORMIN (Glucophage) 500 MG tablet Take 1 tablet by mouth 2 (Two) Times a Day With Meals. 60 tablet 2   • pantoprazole (PROTONIX) 20 MG EC tablet Take 20 mg by mouth Daily.     • [DISCONTINUED] prochlorperazine (COMPAZINE) 10 MG tablet Take 1 tablet by mouth Every 6 (Six) Hours As Needed for Nausea or Vomiting. 60 tablet 2     No facility-administered encounter medications on file as of 11/23/2020.      ADULT ILLNESSES:  Patient Active Problem List   Diagnosis Code   • Abnormal CT of the abdomen R93.5   • Rectal cancer (CMS/HCC) C20   • Current every day smoker F17.200   • Impaired gait and mobility R26.89   • Bright red rectal bleeding K62.5   • 2nd degree AV block I44.1   • Tobacco abuse Z72.0   • Normocytic anemia D64.9   • Chemotherapy induced neutropenia (CMS/HCC) D70.1, T45.1X5A   • History of radiation therapy Z92.3   • Cancer related pain G89.3   • Hypotension due to hypovolemia I95.89, E86.1   • Carotid artery stenosis, symptomatic, right I65.21   • Carotid occlusion, left I65.22   • Type 2 diabetes mellitus without complication, without long-term current use of insulin (CMS/HCC) E11.9     SURGERIES:  Past Surgical History:   Procedure Laterality Date   • COLONOSCOPY N/A 4/2/2020    Procedure: COLONOSCOPY WITH ANESTHESIA;  Surgeon: Yanick Flowers DO;  Location: [facility] ENDOSCOPY;  Service: Gastroenterology;  Laterality: N/A;  pre: abnormal CT scan  post: rectal mass  PCP unknown   • EYE SURGERY Bilateral     lenses present   • MOUTH SURGERY     • VENOUS ACCESS DEVICE (PORT) INSERTION N/A 4/14/2020    Procedure: INSERTION VENOUS ACCESS DEVICE;  Surgeon: Vielka Weller MD;  Location: [facility] OR;  Service: General;  Laterality: N/A;     HEALTH MAINTENANCE ITEMS:  Health Maintenance Due   Topic Date Due   • URINE MICROALBUMIN   "1950   • TDAP/TD VACCINES (1 - Tdap) 09/18/1970   • ZOSTER VACCINE (1 of 2) 09/18/2001   • Pneumococcal Vaccine 65+ (1 of 1 - PPSV23) 09/18/2016   • HEPATITIS C SCREENING  03/26/2020   • ANNUAL WELLNESS VISIT  03/26/2020   • DIABETIC FOOT EXAM  03/26/2020   • DIABETIC EYE EXAM  03/26/2020   • INFLUENZA VACCINE  08/01/2020       <no information>  Last Completed Colonoscopy       Status Date      COLONOSCOPY Done 4/2/2020 COLONOSCOPY     Patient has more history with this topic...          There is no immunization history on file for this patient.  Last Completed Mammogram     Patient has no health maintenance due at this time            FAMILY HISTORY:  Family History   Problem Relation Age of Onset   • Cancer Mother    • Stroke Father    • Heart disease Father    • Heart attack Brother      SOCIAL HISTORY:  Social History     Socioeconomic History   • Marital status: Single     Spouse name: Not on file   • Number of children: Not on file   • Years of education: Not on file   • Highest education level: Not on file   Tobacco Use   • Smoking status: Current Every Day Smoker     Packs/day: 0.50     Years: 53.00     Pack years: 26.50     Types: Cigarettes   • Smokeless tobacco: Never Used   Substance and Sexual Activity   • Alcohol use: Not Currently   • Drug use: Never   • Sexual activity: Defer       REVIEW OF SYSTEMS:    Review of Systems   Constitutional: Positive for fatigue. Negative for chills, diaphoresis and fever.        \"I feel tired.\"   HENT: Negative for congestion, mouth sores and trouble swallowing.    Eyes: Negative for redness and visual disturbance.   Respiratory: Negative for cough, shortness of breath and wheezing.    Cardiovascular: Negative for chest pain and palpitations.   Gastrointestinal: Negative for abdominal pain, constipation, diarrhea, nausea and vomiting.        \"Pressure when I get constipated.\"   Endocrine: Negative for cold intolerance and heat intolerance.   Genitourinary: " "Negative for difficulty urinating and hematuria.   Musculoskeletal: Negative for gait problem and joint swelling.   Skin: Positive for pallor.   Allergic/Immunologic: Negative for food allergies.   Neurological: Negative for speech difficulty, weakness and numbness.   Hematological: Negative for adenopathy. Does not bruise/bleed easily.   Psychiatric/Behavioral: Negative for agitation and confusion. The patient is not nervous/anxious.        VITAL SIGNS: /58   Pulse 102   Temp 99 °F (37.2 °C)   Resp 20   Ht 180.3 cm (70.98\")   Wt 73.5 kg (162 lb)   SpO2 98%   BMI 22.61 kg/m²   Pain Score    11/23/20 1426   PainSc:   8       PHYSICAL EXAMINATION:     Physical Exam  Vitals signs reviewed.   Constitutional:       General: He is not in acute distress.     Appearance: He is not toxic-appearing or diaphoretic.   HENT:      Head: Normocephalic and atraumatic.   Eyes:      General: No scleral icterus.  Neck:      Musculoskeletal: Neck supple.   Cardiovascular:      Rate and Rhythm: Normal rate and regular rhythm.   Pulmonary:      Effort: No respiratory distress.      Breath sounds: No wheezing or rales.      Comments: Port, left. No erythema.  Abdominal:      General: Abdomen is flat. Bowel sounds are normal.      Palpations: Abdomen is soft.      Tenderness: There is no abdominal tenderness.   Musculoskeletal:         General: No swelling.   Skin:     General: Skin is dry.      Coloration: Skin is pale.   Neurological:      Mental Status: He is oriented to person, place, and time.   Psychiatric:         Mood and Affect: Mood normal.         Behavior: Behavior normal.         Thought Content: Thought content normal.         Judgment: Judgment normal.         LABS    Lab Results - Last 18 Months   Lab Units 11/16/20  1148 10/21/20  1212 10/06/20  0908 09/22/20  0905 09/14/20  1351 09/08/20  0930 09/02/20  1028 08/31/20  0812 08/24/20  0918 08/17/20  0818  08/12/20  0900 08/03/20  0820 07/20/20  0820 " 07/15/20  0851   HEMOGLOBIN g/dL  --  9.6* 10.5* 10.0* 9.0* 9.1* 9.3* 9.9* 10.6* 9.9*  --  10.4* 11.5* 10.8* 11.0*   HEMATOCRIT %  --  30.7* 33.7* 31.1* 28.8* 29.3* 29.2* 30.7* 33.8* 30.7*  --  32.3* 35.7* 33.8* 34.2*   MCV fL  --  90.0 89.4 88.1 88.6 86.9 86.4 85.5 86.9 86.0  --  85.2 85.8 87.6 87.9   WBC 10*3/mm3  --  19.66* 15.78* 18.74* 24.70* 5.63 13.37* 9.12 23.92* 3.35*  --  6.53 4.34 6.50 11.14*   RDW %  --  23.4* 22.1* 21.0* 20.0* 19.5* 18.3* 17.4* 17.7* 15.9*  --  15.2 14.6 14.1 13.6   MPV fL  --  8.7 9.0 8.8 8.4 8.6 9.6 9.3 8.7 9.0  --  9.3 8.5 8.3 8.7   PLATELETS 10*3/mm3  --  314 358 233 376 373 295 306 418 274  --  243 382 405 403   IMM GRAN % %  --   --   --   --   --  0.5 0.9* 1.1*  --   --   --  0.9*  --  1.1* 0.5   NEUTROS ABS 10*3/mm3  --  15.49* 11.52* 14.80* 23.94* 3.58 11.20* 7.36* 16.60* 0.70*   < > 5.04 2.44 4.60 8.85*   LYMPHS ABS 10*3/mm3  --   --   --   --   --  0.43* 0.54* 0.73  --   --   --  0.51* 0.60* 0.53* 0.53*   MONOS ABS 10*3/mm3  --   --   --   --   --  1.02* 1.24* 0.55  --   --   --  0.36 0.96* 1.03* 1.08*   EOS ABS 10*3/mm3  --  0.59* 0.63* 0.19  --  0.50* 0.18 0.30 0.48* 0.20   < > 0.51* 0.22 0.20 0.56*   BASOS ABS 10*3/mm3  --   --  0.16  --   --  0.07 0.09 0.08  --  0.07  --  0.05 0.08 0.07 0.06   IMMATURE GRANS (ABS) 10*3/mm3  --   --   --   --   --  0.03 0.12* 0.10*  --   --   --  0.06*  --  0.07* 0.06*   NRBC /100 WBC  --   --  1.0*  --   --  0.0 0.0 0.0  --   --   --  0.0  --  0.0 0.0   NEUTROPHIL % %  --  72.7 61.0 65.0 93.9*  --   --   --  64.3 16.0*  --   --   --   --   --    MONOCYTES % %  --  7.1 8.0 13.0* 2.0*  --   --   --  9.2 50.0*  --   --   --   --   --    BASOPHIL % %  --   --  1.0  --   --   --   --   --   --  2.0*  --   --   --   --   --    ATYP LYMPH % %  --   --  1.0  --   --   --   --   --   --   --   --   --   --   --   --    ANISOCYTOSIS  3+ Slight/1+ Slight/1+ Slight/1+ Slight/1+  --   --   --  Slight/1+ Slight/1+   < >  --   --   --   --    GIANT PLT    --   --  Slight/1+  --   --   --   --   --   --  Slight/1+  --   --   --   --   --     < > = values in this interval not displayed.       Lab Results - Last 18 Months   Lab Units 10/21/20  1212 10/06/20  0908 09/22/20  0905 09/14/20  1351 09/08/20  0930 08/31/20  0812   GLUCOSE mg/dL 208* 184* 305* 247* 195* 308*   SODIUM mmol/L 139 139 134* 140 139 141   POTASSIUM mmol/L 4.5 4.3 3.7 4.2 4.2 4.3   CO2 mmol/L 23.0 27.0 24.0 23.0 24.0 24.0   CHLORIDE mmol/L 105 100 98 107 101 104   ANION GAP mmol/L 11.0 12.0 12.0 10.0 14.0 13.0   CREATININE mg/dL 0.67* 0.87 0.71* 0.60* 0.61* 0.56*   BUN mg/dL 23 19 9 11 12 19   BUN / CREAT RATIO  34.3* 21.8 12.7 18.3 19.7 33.9*   CALCIUM mg/dL 9.8 10.2 8.9 8.8 9.5 9.3   EGFR IF NONAFRICN AM mL/min/1.73 118 87 110 134 131 145   ALK PHOS U/L 157* 146* 141* 143* 104 106   TOTAL PROTEIN g/dL 7.0 7.7 6.8 6.8 7.2 7.2   ALT (SGPT) U/L 33 31 32 15 22 27   AST (SGOT) U/L 27 28 26 15 19 16   BILIRUBIN mg/dL <0.2 0.2 0.2 <0.2 0.2 0.3   ALBUMIN g/dL 3.70 3.90 3.40* 3.60 3.60 3.70   GLOBULIN gm/dL 3.3 3.8 3.4 3.2 3.6 3.5       Lab Results - Last 18 Months   Lab Units 10/06/20  0908 09/14/20  1351 08/31/20  0812 08/03/20  0820 07/06/20  0827 04/09/20  0954   CEA ng/mL 3.32 2.75 3.35 2.87 3.35 4.26       Lab Results - Last 18 Months   Lab Units 11/16/20  1148   IRON mcg/dL 38*   TIBC mcg/mL 245*   IRON SATURATION % 16   FERRITIN ng/mL 386*   TSH mcunit/mL 1.737   FOLATE ng/mL 14.5       Attila Viramontes reports a pain score of 8.  Given his pain assessment as noted, treatment options were discussed and the following options were decided upon as a follow-up plan to address the patient's pain: continuation of current treatment plan for pain.      Patient's Body mass index is 22.61 kg/m². BMI is within normal parameters. No follow-up required..      ASSESSMENT:  1.  Adenocarcinoma of the rectum. Tumor size 5.5 cm.  AJCC stage:IIIC (cT4b, N1b, M0)  Treatment status:Post neoadjuvant CIV 5-FU and  radiation, not a surgical candidate.  On neoadjuvant FOLFOX.  2.  Performance status of 1.  3.  Normocytic anemia from chemo and iron deficiency.    4.  Elevated protein 03/25/2020.  5.  6 mm nodule, left upper lobe near the fissure.  6.  History of pancreatitis.   7.  Fatigue from chemo.  8.  Dysuria 07/15/2020.  9.  History of grade 3 neutropenia without fever C4D15.        PLAN:  1.   Re:  PET report 11/09/2020. The rectal soft tissue mass appears to be smaller or nearly resolved  on the current PET CT images. The current CT images are nondiagnostic and somewhat limited. Uptake in this region has an SUV max of 2.8. This degree of uptake could be inflammatory.  Stranded appearance of the central mesenteric fat is more prominent on the current study compared to 3/25/2020. However, the current CT images are not diagnostic. There is no abnormal PET activity in this area. This area measures about 10.7 x 5.9 cm. Lymphoma and mesenteric  panniculitis are in the differential diagnosis.  No abnormal uptake in the chest or neck region. A tiny left upper  lobe pulmonary nodule is stable compared to prior chest CT.   2.   Re:  Heme status.  Hemoglobin 11 and platelet 409.  3.   Re:  Pre office CMP.  Creatinine 0.78  4.   Re:  Pre-office magnesium of 2.  5.   Re: Preoffice CEA none from 3.3 from 3.35 from 2.87 from 3.35 from 4.26.  6.  Schedule Injectafer 750 mg one dose. Premed Tylenol 500 mg po and Benadryl 25 mg IV. Observe for infusion reactions.   7.  Weekly CBC with differential.  8.  Continue care per primary care physician and other specialists.  9.  Advance Care Planning  ACP discussion was declined by the patient. Patient does not have an advance directive, information provided.   10.  eRx Compazine 10 mg p.o. every 4 hours as needed for nausea and vomiting #60 with 2 refills if needed.  11.  NS 1 liter over 2 hours as needed.  12.  Flush port every 6 weeks.  13.  Return to office  in 8 weeks with pre-office CEA and CMP.   14.  To see Dr. De Jesus 11/24/2020 and plan for surgery 12/21/2020..         I spent 32 total minutes, face-to-face, caring for Attila today.  Greater than 50% of this time involved counseling and/or coordination of care as documented within this note regarding the patient's illness(es), pros and cons of various treatment options, instructions and/or risk reduction.         Conor Hebert MD  (Yanick Flowers DO)  (Nabeel Walton MD)  (Kel Ramirez MD)  (Vielka Weller MD)  Destini De Jesus MD

## 2020-11-20 ENCOUNTER — OFFICE VISIT (OUTPATIENT)
Dept: INTERNAL MEDICINE | Facility: CLINIC | Age: 70
End: 2020-11-20

## 2020-11-20 VITALS
DIASTOLIC BLOOD PRESSURE: 59 MMHG | TEMPERATURE: 97.8 F | OXYGEN SATURATION: 98 % | BODY MASS INDEX: 23.21 KG/M2 | RESPIRATION RATE: 16 BRPM | WEIGHT: 165.8 LBS | HEART RATE: 91 BPM | HEIGHT: 71 IN | SYSTOLIC BLOOD PRESSURE: 102 MMHG

## 2020-11-20 DIAGNOSIS — Z72.0 TOBACCO ABUSE: ICD-10-CM

## 2020-11-20 DIAGNOSIS — C20 RECTAL CANCER (HCC): ICD-10-CM

## 2020-11-20 DIAGNOSIS — E11.9 TYPE 2 DIABETES MELLITUS WITHOUT COMPLICATION, WITHOUT LONG-TERM CURRENT USE OF INSULIN (HCC): Primary | ICD-10-CM

## 2020-11-20 DIAGNOSIS — I65.22 CAROTID OCCLUSION, LEFT: ICD-10-CM

## 2020-11-20 DIAGNOSIS — Z92.3 HISTORY OF RADIATION THERAPY: ICD-10-CM

## 2020-11-20 PROCEDURE — 99204 OFFICE O/P NEW MOD 45 MIN: CPT | Performed by: INTERNAL MEDICINE

## 2020-11-20 NOTE — PATIENT INSTRUCTIONS
Type 2 Diabetes Mellitus, Self Care, Adult  Caring for yourself after you have been diagnosed with type 2 diabetes (type 2 diabetes mellitus) means keeping your blood sugar (glucose) under control with a balance of:  · Nutrition.  · Exercise.  · Lifestyle changes.  · Medicines or insulin, if necessary.  · Support from your team of health care providers and others.  The following information explains what you need to know to manage your diabetes at home.  What are the risks?  Having diabetes can put you at risk for other long-term (chronic) conditions, such as heart disease and kidney disease. Your health care provider may prescribe medicines to help prevent complications from diabetes. These medicines may include:  · Aspirin.  · Medicine to lower cholesterol.  · Medicine to control blood pressure.  How to monitor blood glucose    · Check your blood glucose every day, as often as told by your health care provider.  · Have your A1c (hemoglobin A1c) level checked two or more times a year, or as often as told by your health care provider.  Your health care provider will set individualized treatment goals for you. Generally, the goal of treatment is to maintain the following blood glucose levels:  · Before meals (preprandial):  mg/dL (4.4-7.2 mmol/L).  · After meals (postprandial): below 180 mg/dL (10 mmol/L).  · A1c level: less than 7%.  How to manage hyperglycemia and hypoglycemia  Hyperglycemia symptoms  Hyperglycemia, also called high blood glucose, occurs when blood glucose is too high. Make sure you know the early signs of hyperglycemia, such as:  · Increased thirst.  · Hunger.  · Feeling very tired.  · Needing to urinate more often than usual.  · Blurry vision.  Hypoglycemia symptoms  Hypoglycemia, also called low blood glucose, occurs with a blood glucose level at or below 70 mg/dL (3.9 mmol/L). The risk for hypoglycemia increases during or after exercise, during sleep, during illness, and when skipping  meals or not eating for a long time (fasting).  It is important to know the symptoms of hypoglycemia and treat it right away. Always have a 15-gram rapid-acting carbohydrate snack with you to treat low blood glucose. Family members and close friends should also know the symptoms and should understand how to treat hypoglycemia, in case you are not able to treat yourself. Symptoms may include:  · Hunger.  · Anxiety.  · Sweating and feeling clammy.  · Confusion.  · Dizziness or feeling light-headed.  · Sleepiness.  · Nausea.  · Increased heart rate.  · Headache.  · Blurry vision.  · Irritability.  · A change in coordination.  · Tingling or numbness around the mouth, lips, or tongue.  · Restless sleep.  · Fainting.  · Seizure.  Treating hypoglycemia  If you are alert and able to swallow safely, follow the 15:15 rule:  · Take 15 grams of a rapid-acting carbohydrate. Talk with your health care provider about how much you should take.  · Rapid-acting options include:  ? Glucose pills (take 15 grams).  ? 6-8 pieces of hard candy.  ? 4-6 oz (120-150 mL) of fruit juice.  ? 4-6 oz (120-150 mL) of regular (not diet) soda.  ? 1 Tbsp (15 mL) honey or sugar.  · Check your blood glucose 15 minutes after you take the carbohydrate.  · If the repeat blood glucose level is still at or below 70 mg/dL (3.9 mmol/L), take 15 grams of a carbohydrate again.  · If your blood glucose level does not increase above 70 mg/dL (3.9 mmol/L) after 3 tries, seek emergency medical care.  · After your blood glucose level returns to normal, eat a meal or a snack within 1 hour.  Treating severe hypoglycemia  Severe hypoglycemia is when your blood glucose level is at or below 54 mg/dL (3 mmol/L). Severe hypoglycemia is an emergency. Do not wait to see if the symptoms will go away. Get medical help right away. Call your local emergency services (911 in the [place]  If you have severe hypoglycemia and you cannot eat or drink, you may need an injection of  glucagon. A family member or close friend should learn how to check your blood glucose and how to give you a glucagon injection. Ask your health care provider if you need to have an emergency glucagon injection kit available.  Severe hypoglycemia may need to be treated in a hospital. The treatment may include getting glucose through an IV. You may also need treatment for the cause of your hypoglycemia.  Follow these instructions at home:  Take diabetes medicines as told  · If your health care provider prescribed insulin or diabetes medicines, take them every day.  · Do not run out of insulin or other diabetes medicines that you take. Plan ahead so you always have these available.  · If you use insulin, adjust your dosage based on how physically active you are and what foods you eat. Your health care provider will tell you how to adjust your dosage.  Make healthy food choices    The things that you eat and drink affect your blood glucose and your insulin dosage. Making good choices helps to control your diabetes and prevent other health problems. A healthy meal plan includes eating lean proteins, complex carbohydrates, fresh fruits and vegetables, low-fat dairy products, and healthy fats.  Make an appointment to see a diet and nutrition specialist (registered dietitian) to help you create an eating plan that is right for you. Make sure that you:  · Follow instructions from your health care provider about eating or drinking restrictions.  · Drink enough fluid to keep your urine pale yellow.  · Keep a record of the carbohydrates that you eat. Do this by reading food labels and learning the standard serving sizes of foods.  · Follow your sick day plan whenever you cannot eat or drink as usual. Make this plan in advance with your health care provider.    Stay active  Exercise regularly, as told by your health care provider. This may include:  · Stretching and doing strength exercises, such as yoga or weightlifting, 2 or  more times a week.  · Doing 150 minutes or more of moderate-intensity or vigorous-intensity exercise each week. This could be brisk walking, biking, or water aerobics.  ? Spread out your activity over 3 or more days of the week.  ? Do not go more than 2 days in a row without doing some kind of physical activity.  When you start a new exercise or activity, work with your health care provider to adjust your insulin, medicines, or food intake as needed.  Make healthy lifestyle choices  · Do not use any tobacco products, such as cigarettes, chewing tobacco, and e-cigarettes. If you need help quitting, ask your health care provider.  · If your health care provider says that alcohol is safe for you, limit alcohol intake to no more than 1 drink per day for nonpregnant women and 2 drinks per day for men. One drink equals 12 oz of beer (355 mL), 5 oz of wine (148 mL), or 1½ oz of hard liquor (44 mL).  · Learn to manage stress. If you need help with this, ask your health care provider.  Care for your body    · Keep your immunizations up to date. In addition to getting vaccinations as told by your health care provider, it is recommended that you get vaccinated against the following illnesses:  ? The flu (influenza). Get a flu shot every year.  ? Pneumonia.  ? Hepatitis B.  · Schedule an eye exam soon after your diagnosis, and then one time every year after that.  · Check your skin and feet every day for cuts, bruises, redness, blisters, or sores. Schedule a foot exam with your health care provider once every year.  · Brush your teeth and gums two times a day, and floss one or more times a day. Visit your dentist one or more times every 6 months.  · Maintain a healthy weight.  General instructions  · Take over-the-counter and prescription medicines only as told by your health care provider.  · Share your diabetes management plan with people in your workplace, school, and household.  · Carry a medical alert card or wear medical  alert saeelry.  · Keep all follow-up visits as told by your health care provider. This is important.  Questions to ask your health care provider  · Do I need to meet with a diabetes educator?  · Where can I find a support group for people with diabetes?  Where to find more information  For more information about diabetes, visit:  · American Diabetes Association (ADA): www.diabetes.org  · American Association of Diabetes Educators (AADE): www.diabeteseducator.org  Summary  · Caring for yourself after you have been diagnosed with (type 2 diabetes mellitus) means keeping your blood sugar (glucose) under control with a balance of nutrition, exercise, lifestyle changes, and medicine.  · Check your blood glucose every day, as often as told by your health care provider.  · Having diabetes can put you at risk for other long-term (chronic) conditions, such as heart disease and kidney disease. Your health care provider may prescribe medicines to help prevent complications from diabetes.  · Keep all follow-up visits as told by your health care provider. This is important.  This information is not intended to replace advice given to you by your health care provider. Make sure you discuss any questions you have with your health care provider.  Document Released: 04/10/2017 Document Revised: 06/10/2019 Document Reviewed: 01/20/2017  [employer] Patient Education © 2020 [employer]    Type 2 Diabetes Mellitus, Diagnosis, Adult  Type 2 diabetes (type 2 diabetes mellitus) is a long-term (chronic) disease. In type 2 diabetes, one or both of these problems may be present:  · The pancreas does not make enough of a hormone called insulin.  · Cells in the body do not respond properly to insulin that the body makes (insulin resistance).  Normally, insulin allows blood sugar (glucose) to enter cells in the body. The cells use glucose for energy. Insulin resistance or lack of insulin causes excess glucose to build up in the blood instead of  going into cells. As a result, high blood glucose (hyperglycemia) develops.  What increases the risk?  The following factors may make you more likely to develop type 2 diabetes:  · Having a family member with type 2 diabetes.  · Being overweight or obese.  · Having an inactive (sedentary) lifestyle.  · Having been diagnosed with insulin resistance.  · Having a history of prediabetes, gestational diabetes, or polycystic ovary syndrome (PCOS).  · Being of [place], -American, /, or / descent.  What are the signs or symptoms?  In the early stage of this condition, you may not have symptoms. Symptoms develop slowly and may include:  · Increased thirst (polydipsia).  · Increased hunger (polyphagia).  · Increased urination (polyuria).  · Increased urination during the night (nocturia).  · Unexplained weight loss.  · Frequent infections that keep coming back (recurring).  · Fatigue.  · Weakness.  · Vision changes, such as blurry vision.  · Cuts or bruises that are slow to heal.  · Tingling or numbness in the hands or feet.  · Dark patches on the skin (acanthosis nigricans).  How is this diagnosed?  This condition is diagnosed based on your symptoms, your medical history, a physical exam, and your blood glucose level. Your blood glucose may be checked with one or more of the following blood tests:  · A fasting blood glucose (FBG) test. You will not be allowed to eat (you will fast) for 8 hours or longer before a blood sample is taken.  · A random blood glucose test. This test checks blood glucose at any time of day regardless of when you ate.  · An A1c (hemoglobin A1c) blood test. This test provides information about blood glucose control over the previous 2-3 months.  · An oral glucose tolerance test (OGTT). This test measures your blood glucose at two times:  ? After fasting. This is your baseline blood glucose level.  ? Two hours after drinking a beverage that contains  glucose.  You may be diagnosed with type 2 diabetes if:  · Your FBG level is 126 mg/dL (7.0 mmol/L) or higher.  · Your random blood glucose level is 200 mg/dL (11.1 mmol/L) or higher.  · Your A1c level is 6.5% or higher.  · Your OGTT result is higher than 200 mg/dL (11.1 mmol/L).  These blood tests may be repeated to confirm your diagnosis.  How is this treated?  Your treatment may be managed by a specialist called an endocrinologist. Type 2 diabetes may be treated by following instructions from your health care provider about:  · Making diet and lifestyle changes. This may include:  ? Following an individualized nutrition plan that is developed by a diet and nutrition specialist (registered dietitian).  ? Exercising regularly.  ? Finding ways to manage stress.  · Checking your blood glucose level as often as told.  · Taking diabetes medicines or insulin daily. This helps to keep your blood glucose levels in the healthy range.  ? If you use insulin, you may need to adjust the dosage depending on how physically active you are and what foods you eat. Your health care provider will tell you how to adjust your dosage.  · Taking medicines to help prevent complications from diabetes, such as:  ? Aspirin.  ? Medicine to lower cholesterol.  ? Medicine to control blood pressure.  Your health care provider will set individualized treatment goals for you. Your goals will be based on your age, other medical conditions you have, and how you respond to diabetes treatment. Generally, the goal of treatment is to maintain the following blood glucose levels:  · Before meals (preprandial):  mg/dL (4.4-7.2 mmol/L).  · After meals (postprandial): below 180 mg/dL (10 mmol/L).  · A1c level: less than 7%.  Follow these instructions at home:  Questions to ask your health care provider  · Consider asking the following questions:  ? Do I need to meet with a diabetes educator?  ? Where can I find a support group for people with  diabetes?  ? What equipment will I need to manage my diabetes at home?  ? What diabetes medicines do I need, and when should I take them?  ? How often do I need to check my blood glucose?  ? What number can I call if I have questions?  ? When is my next appointment?  General instructions  · Take over-the-counter and prescription medicines only as told by your health care provider.  · Keep all follow-up visits as told by your health care provider. This is important.  · For more information about diabetes, visit:  ? American Diabetes Association (ADA): www.diabetes.org  ? American Association of Diabetes Educators (AADE): www.diabeteseducator.org  Contact a health care provider if:  · Your blood glucose is at or above 240 mg/dL (13.3 mmol/L) for 2 days in a row.  · You have been sick or have had a fever for 2 days or longer, and you are not getting better.  · You have any of the following problems for more than 6 hours:  ? You cannot eat or drink.  ? You have nausea and vomiting.  ? You have diarrhea.  Get help right away if:  · Your blood glucose is lower than 54 mg/dL (3.0 mmol/L).  · You become confused or you have trouble thinking clearly.  · You have difficulty breathing.  · You have moderate or large ketone levels in your urine.  Summary  · Type 2 diabetes (type 2 diabetes mellitus) is a long-term (chronic) disease. In type 2 diabetes, the pancreas does not make enough of a hormone called insulin, or cells in the body do not respond properly to insulin that the body makes (insulin resistance).  · This condition is treated by making diet and lifestyle changes and taking diabetes medicines or insulin.  · Your health care provider will set individualized treatment goals for you. Your goals will be based on your age, other medical conditions you have, and how you respond to diabetes treatment.  · Keep all follow-up visits as told by your health care provider. This is important.  This information is not intended to  "replace advice given to you by your health care provider. Make sure you discuss any questions you have with your health care provider.  Document Released: 12/18/2006 Document Revised: 02/15/2019 Document Reviewed: 01/20/2017  [employer] Patient Education © 2020 [employer]    Tips for Eating Away From Home If You Have Diabetes  Controlling your blood sugar (glucose) levels can be challenging when you do not prepare your own meals. The following tips can help you manage your diabetes when you eat away from home. If you have questions or if you need help, work with your health care provider or diet and nutrition specialist (dietitian).  Planning ahead  Plan ahead if you know you will be eating away from home:  · Try to eat your meals and snacks at about the same time each day. If you know your meal is going to be later than normal, make sure you have a small snack. Being very hungry can cause you to make unhealthy food choices.  · Make a list of restaurants near you that offer healthy choices. If a restaurant has a carry-out menu, take the menu home and plan what you will order ahead of time.  · Look up the restaurant you want to eat at online. Many chain and fast-food restaurants list nutritional information online. Use this information to choose the healthiest options and to calculate how many carbohydrates will be in your meal.  · Use a carbohydrate-counting book or mobile adela to look up the carbohydrate content and serving size of the foods you want to eat.  Free foods  A \"free food\" is any food or drink that has less than 5 grams of carbohydrates and less than 20 calories per serving. These food are high in fiber and nutrients and low in calories, carbohydrates, and fats. Free foods include:  · Non-starchy vegetables, such as carrots, broccoli, celery, lettuce, or green beans.  · Non-sugar drinks, such as water, unsweetened coffee, or unsweetened tea.  · Low-calorie salad dressings.  · Sugar-free " gelatin.  Starting meals with a salad full of vegetables is a healthy choice that includes a lot of free foods. Avoid high-calorie salad toppings like reddy, cheese, and high-fat dressings. Ask for your salad dressing to be served on the side so that you dip your fork in the dressing and then in the salad. This allows you to control how much dressing you eat and still get the flavor with every bite.  Choices to control carbohydrates    · Ask your  to take away the bread basket or chips from your table.  · Choose light yogurt or Greek yogurt instead of non-fat sweetened yogurt.  · Order fresh fruit. A salad bar often offers fresh fruit choices. Avoid canned fruit because it is usually packed in sugar or syrup.  · Order a salad, and ask for dressing on the side.  · Ask for substitutes. For example, if your meal comes with french fries, ask for a side salad or steamed veggies instead. If a meal comes with fried chicken, ask for grilled chicken instead.  Beverages  · Choose drinks that are low in calories and sugar, such as:  ? Water.  ? Unsweetened tea or coffee.  ? Lowfat milk.  · Avoid the following drinks:  ? Alcoholic beverages.  ? Regular (not diet) sodas.  Other tips  · If you take insulin, wait to take your insulin once your food arrives to your table. This will ensure that your insulin and your food are timed correctly.  · Become familiar with serving sizes and learn to recognize how many servings are in a portion. Restaurant portions are typically two to three times larger than what you really need.  · Ask your  for a to-go box at the beginning of the meal. When your food comes, leave the amount you should have on your plate, and put the rest in the to-go box so that you are not tempted to eat too much.  · Consider splitting an entree with someone and ordering a side salad.  · Avoid buffets. They are typically too tempting and result in overeating.  Where to find more information  · American  Diabetes Association: www.diabetes.org  · American Association of Diabetes Educators: www.diabeteseducator.org  Summary  · Plan ahead when eating away from home.  · Try to eat your meals and snacks at about the same time each day. If you know your meal is going to be later than normal, make sure you have a small snack. Being very hungry can cause you to make unhealthy food choices.  · Ask for substitutes. For example, if your meal comes with french fries, ask for a side salad or steamed veggies instead. If a meal comes with fried chicken, ask for grilled chicken instead.  · Ask for a to-go box when you order your meal. Divide your meal before you start eating.  This information is not intended to replace advice given to you by your health care provider. Make sure you discuss any questions you have with your health care provider.  Document Released: 12/18/2006 Document Revised: 03/28/2018 Document Reviewed: 03/28/2018  [employer] Patient Education © 2020 [employer]    Living With Diabetes  Diabetes (type 1 diabetes mellitus or type 2 diabetes mellitus) is a condition in which the body does not have enough of a hormone called insulin, or the body does not respond properly to insulin. Normally, insulin allows sugars (glucose) to enter cells in the body. The cells use glucose for energy. With diabetes, extra glucose builds up in the blood instead of going into cells, which results in high blood glucose (hyperglycemia).  How to manage lifestyle changes  Managing diabetes includes medical treatments as well as lifestyle changes. If diabetes is not managed well, serious physical and emotional complications can occur. Taking good care of yourself means that you are responsible for:  · Monitoring glucose regularly.  · Eating a healthy diet.  · Exercising regularly.  · Meeting with health care providers.  · Taking medicines as directed.  Some people may feel a lot of stress about managing their diabetes. This is known as  emotional distress, and it is very common. Living with diabetes can place you at risk for emotional distress, depression, or anxiety. These disorders can be confusing and can make diabetes management more difficult.  How to recognize stress  Emotional distress  Symptoms of emotional distress include:  · Anger about having a diagnosis of diabetes.  · Fear or frustration about your diagnosis and the changes you need to make to manage the condition.  · Being overly worried about the care that you need or the cost of the care that you need.  · Feeling like you caused your condition by doing something wrong.  · Fear of unpredictable situations, like low or high blood glucose.  · Feeling judged by your health care providers.  · Feeling very alone with the disease.  · Getting too tired or worn out with the demands of daily care.  Depression  Having diabetes means that you are at a higher risk for depression. Having depression also means that you are at a higher risk for diabetes. Your health care provider may test (screen) you for symptoms of depression. It is important to recognize depression symptoms and to start treatment for depression soon after it is diagnosed. The following are some symptoms of depression:  · Loss of interest in things that you used to enjoy.  · Trouble sleeping, or often waking up early and not being able to get back to sleep.  · A change in appetite.  · Feeling tired most of the day.  · Feeling nervous and anxious.  · Feeling guilty and worrying that you are a burden to others.  · Feeling depressed more often than you do not feel that way.  · Thoughts of hurting yourself or feeling that you want to die.  If you have any of these symptoms for 2 weeks or longer, reach out to a health care provider.  Follow these instructions at home:  Managing emotional distress  The following are some ways to manage emotional distress:  · Talk with your health care provider or certified diabetes educator. Consider  working with a counselor or therapist.  · Learn as much as you can about diabetes and its treatment. Meet with a certified diabetes educator or take a class to learn how to manage your condition.  · Keep a journal of your thoughts and concerns.  · Accept that some things are out of your control.  · Talk with other people who have diabetes. It can help to talk with others about the emotional distress that you feel.  · Find ways to manage stress that work for you. These may include art or music therapy, exercise, meditation, and hobbies.  · Seek support from spiritual leaders, family, and friends.  General instructions  · Follow your diabetes management plan.  · Keep all follow-up visits as told by your health care provider. This is important.  Where to find support    · Ask your health care provider to recommend a therapist who understands both depression and diabetes.  · Search for information and support from the American Diabetes Association: www.diabetes.org  · Find a certified diabetes educator and make an appointment through American Association of Diabetes Educators: www.diabeteseducator.org  Get help right away if:  · You have thoughts about hurting yourself or others.  If you ever feel like you may hurt yourself or others, or have thoughts about taking your own life, get help right away. You can go to your nearest emergency department or call:  · Your local emergency services (911 in the [place]  · A suicide crisis helpline, such as the National Suicide Prevention Lifeline at 1-668.852.6735. This is open 24 hours a day.  Summary  · Diabetes (type 1 diabetes mellitus or type 2 diabetes mellitus) is a condition in which the body does not have enough of a hormone called insulin, or the body does not respond properly to insulin.  · Living with diabetes puts you at risk for medical issues, and it also puts you at risk for emotional issues such as emotional distress, depression, and anxiety.  · Recognizing the  symptoms of emotional distress and depression may help you avoid problems with your diabetes control. It is important to start treatment for emotional distress and depression soon after they are diagnosed.  · Having diabetes means that you are at a higher risk for depression. Ask your health care provider to recommend a therapist who understands both depression and diabetes.  · If you experience symptoms of emotional distress or depression, it is important to discuss this with your health care provider, certified diabetes educator, or therapist.  This information is not intended to replace advice given to you by your health care provider. Make sure you discuss any questions you have with your health care provider.  Document Released: 05/03/2018 Document Revised: 12/30/2019 Document Reviewed: 05/03/2018  [employer] Patient Education © 2020 [employer]    Diabetes Mellitus and Standards of Medical Care  Managing diabetes (diabetes mellitus) can be complicated. Your diabetes treatment may be managed by a team of health care providers, including:  · A physician who specializes in diabetes (endocrinologist).  · A nurse practitioner or physician assistant.  · Nurses.  · A diet and nutrition specialist (registered dietitian).  · A certified diabetes educator (CDE).  · An exercise specialist.  · A pharmacist.  · An eye doctor.  · A foot specialist (podiatrist).  · A dentist.  · A primary care provider.  · A mental health provider.  Your health care providers follow guidelines to help you get the best quality of care. The following schedule is a general guideline for your diabetes management plan. Your health care providers may give you more specific instructions.  Physical exams  Upon being diagnosed with diabetes mellitus, and each year after that, your health care provider will ask about your medical and family history. He or she will also do a physical exam. Your exam may include:  · Measuring your height, weight, and  body mass index (BMI).  · Checking your blood pressure. This will be done at every routine medical visit. Your target blood pressure may vary depending on your medical conditions, your age, and other factors.  · Thyroid gland exam.  · Skin exam.  · Screening for damage to your nerves (peripheral neuropathy). This may include checking the pulse in your legs and feet and checking the level of sensation in your hands and feet.  · A complete foot exam to inspect the structure and skin of your feet, including checking for cuts, bruises, redness, blisters, sores, or other problems.  · Screening for blood vessel (vascular) problems, which may include checking the pulse in your legs and feet and checking your temperature.  Blood tests  Depending on your treatment plan and your personal needs, you may have the following tests done:  · HbA1c (hemoglobin A1c). This test provides information about blood sugar (glucose) control over the previous 2-3 months. It is used to adjust your treatment plan, if needed. This test will be done:  ? At least 2 times a year, if you are meeting your treatment goals.  ? 4 times a year, if you are not meeting your treatment goals or if treatment goals have changed.  · Lipid testing, including total, LDL, and HDL cholesterol and triglyceride levels.  ? The goal for LDL is less than 100 mg/dL (5.5 mmol/L). If you are at high risk for complications, the goal is less than 70 mg/dL (3.9 mmol/L).  ? The goal for HDL is 40 mg/dL (2.2 mmol/L) or higher for men and 50 mg/dL (2.8 mmol/L) or higher for women. An HDL cholesterol of 60 mg/dL (3.3 mmol/L) or higher gives some protection against heart disease.  ? The goal for triglycerides is less than 150 mg/dL (8.3 mmol/L).  · Liver function tests.  · Kidney function tests.  · Thyroid function tests.  Dental and eye exams  · Visit your dentist two times a year.  · If you have type 1 diabetes, your health care provider may recommend an eye exam 3-5 years after  you are diagnosed, and then once a year after your first exam.  ? For children with type 1 diabetes, a health care provider may recommend an eye exam when your child is age 10 or older and has had diabetes for 3-5 years. After the first exam, your child should get an eye exam once a year.  · If you have type 2 diabetes, your health care provider may recommend an eye exam as soon as you are diagnosed, and then once a year after your first exam.  Immunizations    · The yearly flu (influenza) vaccine is recommended for everyone 6 months or older who has diabetes.  · The pneumonia (pneumococcal) vaccine is recommended for everyone 2 years or older who has diabetes. If you are 65 or older, you may get the pneumonia vaccine as a series of two separate shots.  · The hepatitis B vaccine is recommended for adults shortly after being diagnosed with diabetes.  · Adults and children with diabetes should receive all other vaccines according to age-specific recommendations from the Centers for Disease Control and Prevention (CDC).  Mental and emotional health  Screening for symptoms of eating disorders, anxiety, and depression is recommended at the time of diagnosis and afterward as needed. If your screening shows that you have symptoms (positive screening result), you may need more evaluation and you may work with a mental health care provider.  Treatment plan  Your treatment plan will be reviewed at every medical visit. You and your health care provider will discuss:  · How you are taking your medicines, including insulin.  · Any side effects you are experiencing.  · Your blood glucose target goals.  · The frequency of your blood glucose monitoring.  · Lifestyle habits, such as activity level as well as tobacco, alcohol, and substance use.  Diabetes self-management education  Your health care provider will assess how well you are monitoring your blood glucose levels and whether you are taking your insulin correctly. He or she  may refer you to:  · A certified diabetes educator to manage your diabetes throughout your life, starting at diagnosis.  · A registered dietitian who can create or review your personal nutrition plan.  · An exercise specialist who can discuss your activity level and exercise plan.  Summary  · Managing diabetes (diabetes mellitus) can be complicated. Your diabetes treatment may be managed by a team of health care providers.  · Your health care providers follow guidelines in order to help you get the best quality of care.  · Standards of care including having regular physical exams, blood tests, blood pressure monitoring, immunizations, screening tests, and education about how to manage your diabetes.  · Your health care providers may also give you more specific instructions based on your individual health.  This information is not intended to replace advice given to you by your health care provider. Make sure you discuss any questions you have with your health care provider.  Document Released: 10/15/2010 Document Revised: 09/06/2019 Document Reviewed: 09/15/2017  [employer] Patient Education © 2020 [employer]  Metformin tablets  What is this medicine?  METFORMIN (met FOR min) is used to treat type 2 diabetes. It helps to control blood sugar. Treatment is combined with diet and exercise. This medicine can be used alone or with other medicines for diabetes.  This medicine may be used for other purposes; ask your health care provider or pharmacist if you have questions.  COMMON BRAND NAME(S): Glucophage  What should I tell my health care provider before I take this medicine?  They need to know if you have any of these conditions:  · anemia  · dehydration  · heart disease  · frequently drink alcohol-containing beverages  · kidney disease  · liver disease  · polycystic ovary syndrome  · serious infection or injury  · vomiting  · an unusual or allergic reaction to metformin, other medicines, foods, dyes, or  preservatives  · pregnant or trying to get pregnant  · breast-feeding  How should I use this medicine?  Take this medicine by mouth with a glass of water. Follow the directions on the prescription label. Take this medicine with food. Take your medicine at regular intervals. Do not take your medicine more often than directed. Do not stop taking except on your doctor's advice.  Talk to your pediatrician regarding the use of this medicine in children. While this drug may be prescribed for children as young as 10 years of age for selected conditions, precautions do apply.  Overdosage: If you think you have taken too much of this medicine contact a poison control center or emergency room at once.  NOTE: This medicine is only for you. Do not share this medicine with others.  What if I miss a dose?  If you miss a dose, take it as soon as you can. If it is almost time for your next dose, take only that dose. Do not take double or extra doses.  What may interact with this medicine?  Do not take this medicine with any of the following medications:  · certain contrast medicines given before X-rays, CT scans, MRI, or other procedures  · dofetilide  This medicine may also interact with the following medications:  · acetazolamide  · alcohol  · certain antivirals for HIV or hepatitis  · certain medicines for blood pressure, heart disease, irregular heart beat  · cimetidine  · dichlorphenamide  · digoxin  · diuretics  · female hormones, like estrogens or progestins and birth control pills  · glycopyrrolate  · isoniazid  · lamotrigine  · memantine  · methazolamide  · metoclopramide  · midodrine  · niacin  · phenothiazines like chlorpromazine, mesoridazine, prochlorperazine, thioridazine  · phenytoin  · ranolazine  · steroid medicines like prednisone or cortisone  · stimulant medicines for attention disorders, weight loss, or to stay awake  · thyroid medicines  · topiramate  · trospium  · vandetanib  · zonisamide  This list may not  describe all possible interactions. Give your health care provider a list of all the medicines, herbs, non-prescription drugs, or dietary supplements you use. Also tell them if you smoke, drink alcohol, or use illegal drugs. Some items may interact with your medicine.  What should I watch for while using this medicine?  Visit your doctor or health care professional for regular checks on your progress.  A test called the HbA1C (A1C) will be monitored. This is a simple blood test. It measures your blood sugar control over the last 2 to 3 months. You will receive this test every 3 to 6 months.  Learn how to check your blood sugar. Learn the symptoms of low and high blood sugar and how to manage them.  Always carry a quick-source of sugar with you in case you have symptoms of low blood sugar. Examples include hard sugar candy or glucose tablets. Make sure others know that you can choke if you eat or drink when you develop serious symptoms of low blood sugar, such as seizures or unconsciousness. They must get medical help at once.  Tell your doctor or health care professional if you have high blood sugar. You might need to change the dose of your medicine. If you are sick or exercising more than usual, you might need to change the dose of your medicine.  Do not skip meals. Ask your doctor or health care professional if you should avoid alcohol. Many nonprescription cough and cold products contain sugar or alcohol. These can affect blood sugar.  This medicine may cause ovulation in premenopausal women who do not have regular monthly periods. This may increase your chances of becoming pregnant. You should not take this medicine if you become pregnant or think you may be pregnant. Talk with your doctor or health care professional about your birth control options while taking this medicine. Contact your doctor or health care professional right away if you think you are pregnant.  If you are going to need surgery, a MRI, CT  scan, or other procedure, tell your doctor that you are taking this medicine. You may need to stop taking this medicine before the procedure.  Wear a medical ID bracelet or chain, and carry a card that describes your disease and details of your medicine and dosage times.  This medicine may cause a decrease in folic acid and vitamin B12. You should make sure that you get enough vitamins while you are taking this medicine. Discuss the foods you eat and the vitamins you take with your health care professional.  What side effects may I notice from receiving this medicine?  Side effects that you should report to your doctor or health care professional as soon as possible:  · allergic reactions like skin rash, itching or hives, swelling of the face, lips, or tongue  · breathing problems  · feeling faint or lightheaded, falls  · muscle aches or pains  · signs and symptoms of low blood sugar such as feeling anxious, confusion, dizziness, increased hunger, unusually weak or tired, sweating, shakiness, cold, irritable, headache, blurred vision, fast heartbeat, loss of consciousness  · slow or irregular heartbeat  · unusual stomach pain or discomfort  · unusually tired or weak  Side effects that usually do not require medical attention (report to your doctor or health care professional if they continue or are bothersome):  · diarrhea  · headache  · heartburn  · metallic taste in mouth  · nausea  · stomach gas, upset  This list may not describe all possible side effects. Call your doctor for medical advice about side effects. You may report side effects to FDA at 7-367-FDA-9888.  Where should I keep my medicine?  Keep out of the reach of children.  Store at room temperature between 15 and 30 degrees C (59 and 86 degrees F). Protect from moisture and light. Throw away any unused medicine after the expiration date.  NOTE: This sheet is a summary. It may not cover all possible information. If you have questions about this  medicine, talk to your doctor, pharmacist, or health care provider.  © 2020 [employer] (2019-01-24 19:15:19)

## 2020-11-20 NOTE — PROGRESS NOTES
Chief Complaint   Patient presents with   • Establish Care   • Diabetes         History:  Attila Viramontes is a 70 y.o. male who presents today for evaluation of the above problems.      He was diagnosed with rectal cancer in April 2020 after CT in March showed rectal wall thickening and colonoscopy in April showed a rectal mass 2 cm from the anal verge.  Biopsy showed moderately differentiated invasive adenocarcinoma and CT of chest showed only a noncalcified pulmonary nodule in the left upper lobe.  He has received neoadjuvant chemoradiation here.  He does have invasion into the prostate and seminal vesicle.  He will be having surgery in [place] December 21 with Dr. De Jesus and he estimates he will be there for 3 to 4 weeks.  He has COVID-19 testing already scheduled in [place].  I offered to get the test done here but the patient states Dr. De Jesus wants him to get it done at the location in [place].    In preparation for the surgery he had general labs and pulmonary function test.  The pulmonary function test on November 17 was reviewed.  It showed moderate diffusion impairment but no restriction or obstruction.  Patient does have shortness of breath at baseline after the chemotherapy.  The patient states his surgeon is pleased with the pulmonary function tests    Labs were also reviewed and most significantly was a new diagnosis of diabetes with A1c of 7.7.  He has been seeing Dr. Swenson and called him for an appointment.  Dr. Swenson referred here for further management of his diabetes.  The patient is very concerned about needing to start insulin and would much rather start a pill.  I did discuss that he does have type 2 diabetes but this is fairly well controlled and oral medications would be the treatment of choice at this stage.    He also has known chronic left carotid artery occlusion which was diagnosed in April 2020 when he developed a TIA.  The left hemisphere is being supplied by the  right carotid artery which has less than 50% stenosis    He has hyperlipidemia with a total cholesterol level of 118, HDL 42 and LDL 49 on November 16, 2020    He does continue to smoke but is trying to quit.  He is about to get the nicotine patches to help him quit smoking            HPI    ROS:  Review of Systems   Constitutional: Positive for activity change and fatigue. Negative for appetite change, fever and unexpected weight change.   HENT: Negative for nosebleeds, sore throat and trouble swallowing.    Eyes: Negative for pain and visual disturbance.   Respiratory: Positive for shortness of breath. Negative for cough and chest tightness.    Cardiovascular: Negative for chest pain, palpitations and leg swelling.   Gastrointestinal: Positive for diarrhea. Negative for abdominal distention, abdominal pain, anal bleeding, blood in stool, constipation, nausea and vomiting.   Endocrine: Negative for cold intolerance and heat intolerance.   Genitourinary: Negative for hematuria.   Musculoskeletal: Negative for back pain, neck pain and neck stiffness.   Skin: Negative for rash and wound.   Neurological: Positive for dizziness and weakness. Negative for syncope, light-headedness and headaches.   Hematological: Negative for adenopathy. Does not bruise/bleed easily.   Psychiatric/Behavioral: Negative for agitation, behavioral problems and confusion. The patient is not nervous/anxious.        No Known Allergies  Past Medical History:   Diagnosis Date   • Arthritis    • Hyperlipidemia    • Pancreatitis    • Rectal cancer (CMS/HCC) 4/9/2020   • Type 2 diabetes mellitus without complication, without long-term current use of insulin (CMS/[facility]) 11/20/2020     Past Surgical History:   Procedure Laterality Date   • COLONOSCOPY N/A 4/2/2020    Procedure: COLONOSCOPY WITH ANESTHESIA;  Surgeon: Yanick Flowers DO;  Location: [facility] ENDOSCOPY;  Service: Gastroenterology;  Laterality: N/A;  pre: abnormal CT scan  post: rectal  "mass  PCP unknown   • EYE SURGERY Bilateral     lenses present   • MOUTH SURGERY     • VENOUS ACCESS DEVICE (PORT) INSERTION N/A 4/14/2020    Procedure: INSERTION VENOUS ACCESS DEVICE;  Surgeon: Vielka Weller MD;  Location[facility];  Service: General;  Laterality: N/A;     Family History   Problem Relation Age of Onset   • Cancer Mother    • Stroke Father    • Heart disease Father    • Heart attack Brother      Attila  reports that he has been smoking cigarettes. He has a 26.50 pack-year smoking history. He has never used smokeless tobacco. He reports previous alcohol use. He reports that he does not use drugs.    I have reviewed and updated the above documentation (if necessary) including but not limited to chief complaint, ROS, PFSH, allergies and medications        Current Outpatient Medications:   •  aspirin 81 MG chewable tablet, Chew 81 mg Daily., Disp: , Rfl:   •  atorvastatin (LIPITOR) 80 MG tablet, Take 80 mg by mouth Daily., Disp: , Rfl:   •  ibuprofen (ADVIL,MOTRIN) 600 MG tablet, Take 1 tablet by mouth 2 (Two) Times a Day As Needed for Mild Pain ., Disp: 60 tablet, Rfl: 1  •  pantoprazole (PROTONIX) 20 MG EC tablet, Take 20 mg by mouth Daily., Disp: , Rfl:   •  metFORMIN (Glucophage) 500 MG tablet, Take 1 tablet by mouth 2 (Two) Times a Day With Meals., Disp: 60 tablet, Rfl: 2      OBJECTIVE:  Visit Vitals  /59 (BP Location: Left arm, Patient Position: Sitting, Cuff Size: Adult)   Pulse 91   Temp 97.8 °F (36.6 °C) (Oral)   Resp 16   Ht 180.3 cm (70.98\")   Wt 75.2 kg (165 lb 12.8 oz)   SpO2 98%   BMI 23.13 kg/m²      Physical Exam  Vitals signs reviewed.   Constitutional:       General: He is not in acute distress.     Appearance: He is well-developed.   HENT:      Head: Normocephalic and atraumatic.      Mouth/Throat:      Pharynx: No oropharyngeal exudate.   Eyes:      General: No scleral icterus.     Conjunctiva/sclera: Conjunctivae normal.      Pupils: Pupils are equal, round, and reactive " to light.   Neck:      Thyroid: No thyromegaly.      Vascular: No JVD.   Cardiovascular:      Rate and Rhythm: Normal rate and regular rhythm.      Heart sounds: Normal heart sounds. No murmur. No friction rub. No gallop.       Comments: Trace bilateral lower extremity edema  Pulmonary:      Effort: Pulmonary effort is normal.      Breath sounds: Normal breath sounds. No stridor. No wheezing or rales.   Abdominal:      General: Bowel sounds are normal. There is no distension.      Palpations: Abdomen is soft.      Tenderness: There is no abdominal tenderness. There is no guarding or rebound.   Musculoskeletal:         General: No tenderness.   Skin:     General: Skin is warm and dry.      Coloration: Skin is not jaundiced or pale.      Findings: No bruising or erythema.   Neurological:      General: No focal deficit present.      Mental Status: He is alert.      Cranial Nerves: No cranial nerve deficit.   Psychiatric:         Mood and Affect: Mood normal.         Behavior: Behavior normal.         Thought Content: Thought content normal.         Judgment: Judgment normal.         MDM  Number of Diagnoses or Management Options  Carotid occlusion, left: new, no workup  History of radiation therapy: new, no workup  Rectal cancer (CMS/HCC): new, no workup  Tobacco abuse: new, no workup  Type 2 diabetes mellitus without complication, without long-term current use of insulin (CMS/HCC): new, needed workup     Amount and/or Complexity of Data Reviewed  Clinical lab tests: reviewed  Tests in the radiology section of CPT®: reviewed  Tests in the medicine section of CPT®: ordered and reviewed  Decide to obtain previous medical records or to obtain history from someone other than the patient: yes  Review and summarize past medical records: yes    Risk of Complications, Morbidity, and/or Mortality  Presenting problems: moderate  Diagnostic procedures: moderate  Management options: moderate        Assessment/Plan    Diagnoses and  all orders for this visit:    1. Type 2 diabetes mellitus without complication, without long-term current use of insulin (CMS/[facility]) (Primary)  -     metFORMIN (Glucophage) 500 MG tablet; Take 1 tablet by mouth 2 (Two) Times a Day With Meals.  Dispense: 60 tablet; Refill: 2    2. Rectal cancer (CMS/[facility])    3. Tobacco abuse    4. History of radiation therapy    5. Carotid occlusion, left      -Lengthy discussion regarding the patient's new diagnosis of diabetes.  Since his A1c is 7.7 only I do believe he can manage this with metformin monotherapy.  We will start with Metformin 500 mg twice a day.  He will first start 500 mg daily for the first week.  If he develops GI side effects he will let me know and we can change medications.  He already has baseline diarrhea from chemotherapy and I certainly would not want to make this worse.  He will be undergoing an extensive surgery at [place] December 21, 2020 for his rectal cancer.  I have asked him to stop taking the Metformin the day before his surgery and it is very likely he will be treated for a short period of time with insulin while in the hospital.  We will try to control his diabetes as best as we can prior to surgery for best wound healing he noted understanding.  We will talk about optimal diet for diabetes and future appointment, but right now he needs to get adequate nutrition for her best wound healing    He is going to try to come off of his ibuprofen as well as this would increase his bleeding risk    Discussed his pulmonary function testing and he is pleased with the results.  Also reports surgeon is pleased as there is no restriction or obstruction.  He does have a moderate diffusion impairment, suspect related to his chemotherapy    His left carotid artery is chronically occluded and his left hemisphere is supplied by the right carotid artery which has less than 50% stenosis.  He has seen vascular surgery down in [place] for this.  As he has had a  TIA he continues on Lipitor 80 mg daily and aspirin 81 mg daily    Would like to see him again in 3 to 4 months for Medicare wellness with an A1c.  Sooner if clinically indicated especially after his surgery.  He will expect to get out around the middle of January 2021    Patient's Body mass index is 23.13 kg/m². BMI is within normal parameters. No follow-up required..      Education materials and an After Visit Summary were printed and given to the patient at discharge.  Return in about 4 months (around 3/20/2021) for Medicare Wellness with A1c.         KIP Hebert MD   08:36 CST  11/20/2020

## 2020-11-23 ENCOUNTER — OFFICE VISIT (OUTPATIENT)
Dept: ONCOLOGY | Facility: CLINIC | Age: 70
End: 2020-11-23

## 2020-11-23 VITALS
DIASTOLIC BLOOD PRESSURE: 58 MMHG | OXYGEN SATURATION: 98 % | BODY MASS INDEX: 22.68 KG/M2 | HEIGHT: 71 IN | HEART RATE: 102 BPM | SYSTOLIC BLOOD PRESSURE: 110 MMHG | WEIGHT: 162 LBS | TEMPERATURE: 99 F | RESPIRATION RATE: 20 BRPM

## 2020-11-23 DIAGNOSIS — C20 RECTAL CANCER (HCC): Primary | ICD-10-CM

## 2020-11-23 PROCEDURE — 99214 OFFICE O/P EST MOD 30 MIN: CPT | Performed by: INTERNAL MEDICINE

## 2020-11-24 ENCOUNTER — TELEPHONE (OUTPATIENT)
Dept: ONCOLOGY | Facility: CLINIC | Age: 70
End: 2020-11-24

## 2020-11-24 PROBLEM — D50.9 IRON DEFICIENCY ANEMIA: Status: ACTIVE | Noted: 2020-11-24

## 2020-11-24 RX ORDER — DIPHENHYDRAMINE HYDROCHLORIDE 50 MG/ML
50 INJECTION INTRAMUSCULAR; INTRAVENOUS AS NEEDED
Status: CANCELLED | OUTPATIENT
Start: 2020-12-03

## 2020-11-24 RX ORDER — ACETAMINOPHEN 325 MG/1
650 TABLET ORAL ONCE
Status: CANCELLED | OUTPATIENT
Start: 2020-12-03

## 2020-11-24 RX ORDER — SODIUM CHLORIDE 9 MG/ML
250 INJECTION, SOLUTION INTRAVENOUS ONCE
Status: CANCELLED | OUTPATIENT
Start: 2020-12-03

## 2020-11-24 RX ORDER — FAMOTIDINE 10 MG/ML
20 INJECTION, SOLUTION INTRAVENOUS AS NEEDED
Status: CANCELLED | OUTPATIENT
Start: 2020-12-03

## 2020-11-24 NOTE — TELEPHONE ENCOUNTER
Patient notified of appt date and time for Infed infusion:  Thursday Dec 3, 2020 @ 1:00pm.  Patient states understanding of this.

## 2020-12-03 ENCOUNTER — INFUSION (OUTPATIENT)
Dept: ONCOLOGY | Facility: HOSPITAL | Age: 70
End: 2020-12-03

## 2020-12-03 VITALS
SYSTOLIC BLOOD PRESSURE: 97 MMHG | OXYGEN SATURATION: 97 % | RESPIRATION RATE: 18 BRPM | DIASTOLIC BLOOD PRESSURE: 54 MMHG | BODY MASS INDEX: 23.39 KG/M2 | HEIGHT: 70 IN | WEIGHT: 163.4 LBS | HEART RATE: 86 BPM | TEMPERATURE: 98.3 F

## 2020-12-03 DIAGNOSIS — C20 RECTAL CANCER (HCC): ICD-10-CM

## 2020-12-03 DIAGNOSIS — D50.9 IRON DEFICIENCY ANEMIA, UNSPECIFIED IRON DEFICIENCY ANEMIA TYPE: Primary | ICD-10-CM

## 2020-12-03 PROCEDURE — 25010000002 DIPHENHYDRAMINE PER 50 MG: Performed by: INTERNAL MEDICINE

## 2020-12-03 PROCEDURE — 25010000003 HEPARIN LOCK FLUSH PER 10 UNITS: Performed by: NURSE PRACTITIONER

## 2020-12-03 PROCEDURE — 96365 THER/PROPH/DIAG IV INF INIT: CPT

## 2020-12-03 PROCEDURE — 96367 TX/PROPH/DG ADDL SEQ IV INF: CPT

## 2020-12-03 PROCEDURE — 25010000002 FERRIC CARBOXYMALTOSE 750 MG/15ML SOLUTION 15 ML VIAL: Performed by: INTERNAL MEDICINE

## 2020-12-03 RX ORDER — SODIUM CHLORIDE 0.9 % (FLUSH) 0.9 %
10 SYRINGE (ML) INJECTION AS NEEDED
Status: CANCELLED | OUTPATIENT
Start: 2020-12-03

## 2020-12-03 RX ORDER — DIPHENHYDRAMINE HYDROCHLORIDE 50 MG/ML
50 INJECTION INTRAMUSCULAR; INTRAVENOUS AS NEEDED
Status: DISCONTINUED | OUTPATIENT
Start: 2020-12-03 | End: 2020-12-03 | Stop reason: HOSPADM

## 2020-12-03 RX ORDER — HEPARIN SODIUM (PORCINE) LOCK FLUSH IV SOLN 100 UNIT/ML 100 UNIT/ML
500 SOLUTION INTRAVENOUS AS NEEDED
Status: CANCELLED | OUTPATIENT
Start: 2020-12-03

## 2020-12-03 RX ORDER — HEPARIN SODIUM (PORCINE) LOCK FLUSH IV SOLN 100 UNIT/ML 100 UNIT/ML
500 SOLUTION INTRAVENOUS AS NEEDED
Status: DISCONTINUED | OUTPATIENT
Start: 2020-12-03 | End: 2020-12-03 | Stop reason: HOSPADM

## 2020-12-03 RX ORDER — FAMOTIDINE 10 MG/ML
20 INJECTION, SOLUTION INTRAVENOUS AS NEEDED
Status: DISCONTINUED | OUTPATIENT
Start: 2020-12-03 | End: 2020-12-03 | Stop reason: HOSPADM

## 2020-12-03 RX ORDER — ACETAMINOPHEN 325 MG/1
650 TABLET ORAL ONCE
Status: COMPLETED | OUTPATIENT
Start: 2020-12-03 | End: 2020-12-03

## 2020-12-03 RX ORDER — SODIUM CHLORIDE 9 MG/ML
250 INJECTION, SOLUTION INTRAVENOUS ONCE
Status: COMPLETED | OUTPATIENT
Start: 2020-12-03 | End: 2020-12-03

## 2020-12-03 RX ORDER — SODIUM CHLORIDE 0.9 % (FLUSH) 0.9 %
10 SYRINGE (ML) INJECTION AS NEEDED
Status: DISCONTINUED | OUTPATIENT
Start: 2020-12-03 | End: 2020-12-03 | Stop reason: HOSPADM

## 2020-12-03 RX ADMIN — FERRIC CARBOXYMALTOSE INJECTION 750 MG: 50 INJECTION, SOLUTION INTRAVENOUS at 13:46

## 2020-12-03 RX ADMIN — SODIUM CHLORIDE 250 ML: 9 INJECTION, SOLUTION INTRAVENOUS at 13:20

## 2020-12-03 RX ADMIN — SODIUM CHLORIDE, PRESERVATIVE FREE 10 ML: 5 INJECTION INTRAVENOUS at 14:22

## 2020-12-03 RX ADMIN — DIPHENHYDRAMINE HYDROCHLORIDE 25 MG: 50 INJECTION, SOLUTION INTRAMUSCULAR; INTRAVENOUS at 13:20

## 2020-12-03 RX ADMIN — ACETAMINOPHEN 650 MG: 325 TABLET, FILM COATED ORAL at 13:23

## 2020-12-03 RX ADMIN — Medication 500 UNITS: at 14:23

## 2021-03-22 ENCOUNTER — LAB (OUTPATIENT)
Dept: LAB | Facility: HOSPITAL | Age: 71
End: 2021-03-22

## 2021-03-22 ENCOUNTER — OFFICE VISIT (OUTPATIENT)
Dept: INTERNAL MEDICINE | Facility: CLINIC | Age: 71
End: 2021-03-22

## 2021-03-22 VITALS
BODY MASS INDEX: 21.79 KG/M2 | WEIGHT: 152.2 LBS | HEART RATE: 108 BPM | DIASTOLIC BLOOD PRESSURE: 60 MMHG | HEIGHT: 70 IN | TEMPERATURE: 98.1 F | SYSTOLIC BLOOD PRESSURE: 102 MMHG | OXYGEN SATURATION: 99 % | RESPIRATION RATE: 16 BRPM

## 2021-03-22 DIAGNOSIS — E11.9 TYPE 2 DIABETES MELLITUS WITHOUT COMPLICATION, WITHOUT LONG-TERM CURRENT USE OF INSULIN (HCC): ICD-10-CM

## 2021-03-22 DIAGNOSIS — Z13.31 DEPRESSION SCREEN: ICD-10-CM

## 2021-03-22 DIAGNOSIS — Z00.00 MEDICARE ANNUAL WELLNESS VISIT, SUBSEQUENT: Primary | ICD-10-CM

## 2021-03-22 DIAGNOSIS — Z11.59 ENCOUNTER FOR HEPATITIS C SCREENING TEST FOR LOW RISK PATIENT: ICD-10-CM

## 2021-03-22 DIAGNOSIS — Z13.6 HYPERTENSION SCREEN: ICD-10-CM

## 2021-03-22 DIAGNOSIS — Z00.00 MEDICARE ANNUAL WELLNESS VISIT, SUBSEQUENT: ICD-10-CM

## 2021-03-22 PROBLEM — Z93.2 ILEOSTOMY IN PLACE: Status: ACTIVE | Noted: 2021-03-22

## 2021-03-22 PROBLEM — Z93.3 COLOSTOMY IN PLACE (HCC): Status: ACTIVE | Noted: 2021-03-22

## 2021-03-22 LAB
ALBUMIN SERPL-MCNC: 3.7 G/DL (ref 3.5–5.2)
ALBUMIN UR-MCNC: 5.1 MG/DL
ALBUMIN/GLOB SERPL: 0.8 G/DL
ALP SERPL-CCNC: 123 U/L (ref 39–117)
ALT SERPL W P-5'-P-CCNC: 20 U/L (ref 1–41)
ANION GAP SERPL CALCULATED.3IONS-SCNC: 14.4 MMOL/L (ref 5–15)
AST SERPL-CCNC: 19 U/L (ref 1–40)
BILIRUB SERPL-MCNC: 0.2 MG/DL (ref 0–1.2)
BUN SERPL-MCNC: 15 MG/DL (ref 8–23)
BUN/CREAT SERPL: 17.6 (ref 7–25)
CALCIUM SPEC-SCNC: 9.6 MG/DL (ref 8.6–10.5)
CHLORIDE SERPL-SCNC: 101 MMOL/L (ref 98–107)
CHOLEST SERPL-MCNC: 75 MG/DL (ref 0–200)
CO2 SERPL-SCNC: 20.6 MMOL/L (ref 22–29)
CREAT SERPL-MCNC: 0.85 MG/DL (ref 0.76–1.27)
CREAT UR-MCNC: 75.4 MG/DL
GFR SERPL CREATININE-BSD FRML MDRD: 89 ML/MIN/1.73
GLOBULIN UR ELPH-MCNC: 4.9 GM/DL
GLUCOSE SERPL-MCNC: 181 MG/DL (ref 65–99)
HBA1C MFR BLD: 7.35 % (ref 4.8–5.6)
HCV AB SER DONR QL: NORMAL
HDLC SERPL-MCNC: 28 MG/DL (ref 40–60)
LDLC SERPL CALC-MCNC: 29 MG/DL (ref 0–100)
LDLC/HDLC SERPL: 1.07 {RATIO}
MICROALBUMIN/CREAT UR: 67.6 MG/G
POTASSIUM SERPL-SCNC: 4.2 MMOL/L (ref 3.5–5.2)
PROT SERPL-MCNC: 8.6 G/DL (ref 6–8.5)
SODIUM SERPL-SCNC: 136 MMOL/L (ref 136–145)
TRIGL SERPL-MCNC: 85 MG/DL (ref 0–150)
VLDLC SERPL-MCNC: 18 MG/DL (ref 5–40)

## 2021-03-22 PROCEDURE — 83036 HEMOGLOBIN GLYCOSYLATED A1C: CPT

## 2021-03-22 PROCEDURE — 80061 LIPID PANEL: CPT

## 2021-03-22 PROCEDURE — G0439 PPPS, SUBSEQ VISIT: HCPCS | Performed by: INTERNAL MEDICINE

## 2021-03-22 PROCEDURE — 80053 COMPREHEN METABOLIC PANEL: CPT

## 2021-03-22 PROCEDURE — 82570 ASSAY OF URINE CREATININE: CPT | Performed by: INTERNAL MEDICINE

## 2021-03-22 PROCEDURE — 82043 UR ALBUMIN QUANTITATIVE: CPT | Performed by: INTERNAL MEDICINE

## 2021-03-22 PROCEDURE — 86803 HEPATITIS C AB TEST: CPT

## 2021-03-22 PROCEDURE — 36415 COLL VENOUS BLD VENIPUNCTURE: CPT

## 2021-03-22 NOTE — PROGRESS NOTES
The ABCs of the Annual Wellness Visit  Subsequent Medicare Wellness Visit    Chief Complaint   Patient presents with   • Medicare Wellness-subsequent       Subjective   History of Present Illness:  Attila Viramontes is a 70 y.o. male who presents for a Subsequent Medicare Wellness Visit.  Since the last visit he has had rectal surgery on December 21, 2020 for rectal cancer done at [place].  He states they removed his rectum, prostate and bladder.  He now has a colostomy and ileostomy.    He lost about 30 pounds total and has gained about 20 pounds back.    Blood pressure has been low but denies any symptoms of hypotension.    He received his second COVID-19 vaccine this past Friday    He does have leg numbness which started after chemo and radiation    HEALTH RISK ASSESSMENT    Recent Hospitalizations:  Recently treated at the following:  Other: [place]    Current Medical Providers:  Patient Care Team:  EDUARDO Hebert MD as PCP - General (Internal Medicine)  Ga Hameed MD as Cardiologist (Cardiology)    Smoking Status:  Social History     Tobacco Use   Smoking Status Current Every Day Smoker   • Packs/day: 0.50   • Years: 53.00   • Pack years: 26.50   • Types: Cigarettes   Smokeless Tobacco Never Used         Alcohol Consumption:  Social History     Substance and Sexual Activity   Alcohol Use Not Currently       Depression Screen:   PHQ-2/PHQ-9 Depression Screening 3/22/2021   Little interest or pleasure in doing things 0   Feeling down, depressed, or hopeless 0   Trouble falling or staying asleep, or sleeping too much -   Feeling tired or having little energy -   Poor appetite or overeating -   Feeling bad about yourself - or that you are a failure or have let yourself or your family down -   Trouble concentrating on things, such as reading the newspaper or watching television -   Moving or speaking so slowly that other people could have noticed. Or the opposite - being so fidgety or restless  that you have been moving around a lot more than usual -   Thoughts that you would be better off dead, or of hurting yourself in some way -   Total Score 0   If you checked off any problems, how difficult have these problems made it for you to do your work, take care of things at home, or get along with other people? -       Fall Risk Screen:  ENRRIQUE Fall Risk Assessment was completed, and patient is at LOW risk for falls.Assessment completed on:3/22/2021    Health Habits and Functional and Cognitive Screening:  Functional & Cognitive Status 3/22/2021   Do you have difficulty preparing food and eating? No   Do you have difficulty bathing yourself, getting dressed or grooming yourself? No   Do you have difficulty using the toilet? No   Do you have difficulty moving around from place to place? No   Do you have trouble with steps or getting out of a bed or a chair? No   Current Diet Well Balanced Diet   Dental Exam Up to date   Eye Exam Not up to date   Exercise (times per week) 0 times per week   Current Exercise Activities Include No Regular Exercise   Do you need help using the phone?  No   Are you deaf or do you have serious difficulty hearing?  No   Do you need help with transportation? No   Do you need help shopping? No   Do you need help preparing meals?  No   Do you need help with housework?  No   Do you need help with laundry? No   Do you need help taking your medications? No   Do you need help managing money? No   Do you ever drive or ride in a car without wearing a seat belt? No   Have you felt unusual stress, anger or loneliness in the last month? No   Who do you live with? Alone   If you need help, do you have trouble finding someone available to you? No   Have you been bothered in the last four weeks by sexual problems? No   Do you have difficulty concentrating, remembering or making decisions? No       Does the patient have evidence of cognitive impairment? No    Asprin use counseling:Does not need ASA  (and currently is not on it)     The ASCVD Risk score (Lakeshiakavin WHITTEN Jr., et al., 2013) failed to calculate for the following reasons:    The valid total cholesterol range is 130 to 320 mg/dL      Age-appropriate Screening Schedule:  Refer to the list below for future screening recommendations based on patient's age, sex and/or medical conditions. Orders for these recommended tests are listed in the plan section. The patient has been provided with a written plan.    Health Maintenance   Topic Date Due   • URINE MICROALBUMIN  Never done   • TDAP/TD VACCINES (1 - Tdap) Never done   • ZOSTER VACCINE (1 of 2) Never done   • DIABETIC FOOT EXAM  Never done   • DIABETIC EYE EXAM  Never done   • INFLUENZA VACCINE  03/22/2022 (Originally 8/1/2020)   • HEMOGLOBIN A1C  05/16/2021   • LIPID PANEL  11/16/2021   • COLONOSCOPY  04/02/2030          The following portions of the patient's history were reviewed and updated as appropriate: allergies, current medications, past family history, past medical history, past social history, past surgical history and problem list.    Outpatient Medications Prior to Visit   Medication Sig Dispense Refill   • aspirin 81 MG chewable tablet Chew 81 mg Daily.     • ibuprofen (ADVIL,MOTRIN) 600 MG tablet Take 1 tablet by mouth 2 (Two) Times a Day As Needed for Mild Pain . 60 tablet 1   • metFORMIN (Glucophage) 500 MG tablet Take 1 tablet by mouth 2 (Two) Times a Day With Meals. 60 tablet 2   • pantoprazole (PROTONIX) 20 MG EC tablet Take 20 mg by mouth Daily.     • atorvastatin (LIPITOR) 80 MG tablet Take 80 mg by mouth Daily.       No facility-administered medications prior to visit.       Patient Active Problem List   Diagnosis   • Abnormal CT of the abdomen   • Rectal cancer (CMS/HCC)   • Current every day smoker   • Impaired gait and mobility   • Bright red rectal bleeding   • 2nd degree AV block   • Tobacco abuse   • Normocytic anemia   • Chemotherapy induced neutropenia (CMS/HCC)   • History of  radiation therapy   • Cancer related pain   • Hypotension due to hypovolemia   • Carotid artery stenosis, symptomatic, right   • Carotid occlusion, left   • Type 2 diabetes mellitus without complication, without long-term current use of insulin (CMS/[facility])   • Iron deficiency anemia   • Ileostomy in place (CMS/[facility])   • Colostomy in place (CMS/[facility])       Advanced Care Planning:  ACP discussion was held with the patient during this visit. Patient does not have an advance directive, information provided.    Review of Systems   Constitutional: Positive for activity change, fatigue and unexpected weight change. Negative for appetite change and fever.   HENT: Negative for nosebleeds, sore throat and trouble swallowing.    Eyes: Negative for pain and visual disturbance.   Respiratory: Negative for cough, chest tightness and shortness of breath.    Cardiovascular: Negative for chest pain, palpitations and leg swelling.   Gastrointestinal: Negative for abdominal distention, abdominal pain, anal bleeding, blood in stool, constipation, diarrhea, nausea and vomiting.        Colostomy and ileostomy   Genitourinary: Negative for hematuria.   Musculoskeletal: Negative for back pain, neck pain and neck stiffness.   Skin: Negative for rash and wound.   Neurological: Positive for weakness (Weakness improved with [facility] physical therapy) and numbness. Negative for dizziness, syncope, light-headedness and headaches.   Hematological: Negative for adenopathy. Does not bruise/bleed easily.   Psychiatric/Behavioral: Negative for agitation, behavioral problems and confusion. The patient is not nervous/anxious.        Compared to one year ago, the patient feels his physical health is better.  Compared to one year ago, the patient feels his mental health is better.    Reviewed chart for potential of high risk medication in the elderly: yes  Reviewed chart for potential of harmful drug interactions in the elderly:yes    Objective         Vitals:  "   03/22/21 0907   BP: 102/60   BP Location: Left arm   Patient Position: Sitting   Cuff Size: Adult   Pulse: 108   Resp: 16   Temp: 98.1 °F (36.7 °C)   TempSrc: Oral   SpO2: 99%   Weight: 69 kg (152 lb 3.2 oz)   Height: 177.8 cm (70\")       Body mass index is 21.84 kg/m².  Discussed the patient's BMI with him. The BMI is in the acceptable range.    Physical Exam  Vitals reviewed.   Constitutional:       General: He is not in acute distress.     Appearance: He is well-developed and normal weight.      Comments: Has lost some weight.   HENT:      Head: Normocephalic and atraumatic.   Eyes:      Pupils: Pupils are equal, round, and reactive to light.   Neck:      Thyroid: No thyromegaly.      Trachea: Phonation normal.   Cardiovascular:      Rate and Rhythm: Normal rate and regular rhythm.      Heart sounds: No murmur heard.     Pulmonary:      Effort: Pulmonary effort is normal. No respiratory distress.      Breath sounds: Normal breath sounds. No stridor. No wheezing, rhonchi or rales.   Abdominal:      General: Abdomen is flat. There is no distension.      Palpations: Abdomen is soft.      Tenderness: There is no abdominal tenderness.   Skin:     General: Skin is warm and dry.      Coloration: Skin is not jaundiced or pale.      Findings: No bruising or erythema.   Neurological:      Mental Status: He is alert. Mental status is at baseline.   Psychiatric:         Behavior: Behavior normal.         Thought Content: Thought content normal.         Judgment: Judgment normal.               Assessment/Plan   Medicare Risks and Personalized Health Plan  CMS Preventative Services Quick Reference  Advance Directive Discussion  Cardiovascular risk  Depression/Dysphoria  Diabetic Lab Screening   Fall Risk  Immunizations Discussed/Encouraged (specific immunizations; COVID-19 )  Inadequate Social Support, Isolation, Loneliness, Lack of Transportation, Financial Difficulties, or Caregiver Stress "   Inactivity/Sedentary  Obesity/Overweight     The above risks/problems have been discussed with the patient.  Pertinent information has been shared with the patient in the After Visit Summary.  Follow up plans and orders are seen below in the Assessment/Plan Section.    Diagnoses and all orders for this visit:    1. Medicare annual wellness visit, subsequent (Primary)  -     Microalbumin / Creatinine Urine Ratio - Urine, Clean Catch  -     Hemoglobin A1c; Future  -     Comprehensive Metabolic Panel; Future  -     Lipid Panel; Future    2. Depression screen    3. Hypertension screen    4. Type 2 diabetes mellitus without complication, without long-term current use of insulin (CMS/[facility])  -     Microalbumin / Creatinine Urine Ratio - Urine, Clean Catch  -     Hemoglobin A1c; Future    5. Encounter for hepatitis C screening test for low risk patient  -     Hepatitis C Antibody; Future      We discussed advanced directive.  He does not have a living will at this time.  He has named a healthcare surrogate.  Depression screen was negative with a PHQ 2 of 0.  Hypertension screen was also negative with a blood pressure of 102/60.  Blood pressure has been on the low side but denies any symptoms of hypotension.  For his diabetes I like to check A1c and a microalbumin/creatinine ratio.  Continue Metformin, but adjust based on results of the A1c.    He has received both COVID-19 vaccines    At the next visit we will discuss other vaccines including Shingrix and  Pneumovax 23    Follow Up:  Return in about 6 months (around 9/22/2021) for Recheck with A1c.     An After Visit Summary and PPPS were given to the patient.

## 2021-03-23 DIAGNOSIS — E11.9 TYPE 2 DIABETES MELLITUS WITHOUT COMPLICATION, WITHOUT LONG-TERM CURRENT USE OF INSULIN (HCC): ICD-10-CM

## 2021-04-07 ENCOUNTER — TELEMEDICINE (OUTPATIENT)
Dept: CARDIOLOGY | Facility: CLINIC | Age: 71
End: 2021-04-07

## 2021-04-07 VITALS — BODY MASS INDEX: 20.86 KG/M2 | WEIGHT: 149 LBS | HEIGHT: 71 IN

## 2021-04-07 DIAGNOSIS — I44.1 2ND DEGREE AV BLOCK: Primary | ICD-10-CM

## 2021-04-07 PROCEDURE — 99443 PR PHYS/QHP TELEPHONE EVALUATION 21-30 MIN: CPT | Performed by: INTERNAL MEDICINE

## 2021-04-07 NOTE — PROGRESS NOTES
"     Subjective:     Encounter Date: 04/07/21      Patient ID: Attila Viramontes is a 70 y.o. male.    You have chosen to receive care through a telephone visit. Do you consent to use a telephone visit for your medical care today? Yes      Chief Complaint: f/u heart block    Heart Problem  Pertinent negatives include no chest pain.     70-year-old who was referred to me in May 2020 by his neurologist, whom he was seeing due to an acute ischemic left MCA stroke in April 2020.  According to documentation I reviewed, it was felt as though his TIA was secondary to hypotension and cerebral hypoperfusion in the setting of a chronically occluded left internal carotid artery.  A 30-day monitor that he wore thereafter revealed multiple episodes of second-degree AV block, with the longest \"ventricular pause\" of 3-4 seconds.   He returns today for follow-up via tele-visit due to ongoing COVID-19 pandemic.    Since last visit, 9/18/20, he had surgery at [facility] in Dec '20 and stayed in hospital for 17 days. Still c/o soreness at surgical site.  Also had XRT    Current complaint: ringing in ears 'like they wanna pop.' Was getting dizzy when looking up quickly after getting out of hospital.      He's had no palpitations, near-syncope, or syncope.      The following portions of the patient's history were reviewed and updated as appropriate: allergies, current medications, past family history, past medical history, past social history, past surgical history and problem list.    Review of Systems   Constitutional: Positive for malaise/fatigue.   HENT: Positive for tinnitus.    Cardiovascular: Negative for chest pain, claudication, dyspnea on exertion, leg swelling, near-syncope, orthopnea, palpitations, paroxysmal nocturnal dyspnea and syncope.   Respiratory: Negative for shortness of breath.    Endocrine: Positive for cold intolerance and heat intolerance.   Hematologic/Lymphatic: Does not bruise/bleed easily.   Neurological: " "Positive for light-headedness (with standing, primarily in morning).         Current Outpatient Medications:   •  aspirin 81 MG chewable tablet, Chew 81 mg Daily., Disp: , Rfl:   •  ibuprofen (ADVIL,MOTRIN) 600 MG tablet, Take 1 tablet by mouth 2 (Two) Times a Day As Needed for Mild Pain ., Disp: 60 tablet, Rfl: 1  •  metFORMIN (Glucophage) 500 MG tablet, Take 2 tablets by mouth 2 (Two) Times a Day With Meals., Disp: 120 tablet, Rfl: 5  •  pantoprazole (PROTONIX) 20 MG EC tablet, Take 20 mg by mouth Daily., Disp: , Rfl:        Objective:      There were no vitals filed for this visit.  Physical Exam    Lab Review:       Procedures        I also reviewed the original monitor from May 2020 from [place] which showed high degree AV block as well resulting in multiple pauses, longest being 4.6 seconds  Assessment/Plan:     Problem List Items Addressed This Visit        Cardiac and Vasculature    2nd degree AV block - Primary                Recommendations/plans:  Repeat zio patch; he has not had any symptoms that would be attributable to ventricular \"pause\" in the setting of high degree AV block.  Given 3.8-second pauses noted on most recent exam in September 2020, will repeat now to make sure that she is not progressing.    F/u TBD     15 minutes spent on telephone encounter; 22 total minutes spent on day of service    Ga Hameed MD  04/07/21  16:47 CDT  "

## 2021-05-12 ENCOUNTER — TELEPHONE (OUTPATIENT)
Dept: CARDIOLOGY | Facility: CLINIC | Age: 71
End: 2021-05-12

## 2021-05-12 NOTE — TELEPHONE ENCOUNTER
[employer] called to alert us that this patient's holter displayed 6.1 seconds of ventricular asystole due to high grade AV block. Report is being faxed to us. Shae Okeefe MA

## 2021-05-25 ENCOUNTER — TRANSCRIBE ORDERS (OUTPATIENT)
Dept: ADMINISTRATIVE | Facility: HOSPITAL | Age: 71
End: 2021-05-25

## 2021-05-25 ENCOUNTER — TRANSCRIBE ORDERS (OUTPATIENT)
Dept: LAB | Facility: HOSPITAL | Age: 71
End: 2021-05-25

## 2021-05-25 DIAGNOSIS — Z01.818 PREOPERATIVE TESTING: Primary | ICD-10-CM

## 2021-05-26 ENCOUNTER — LAB (OUTPATIENT)
Dept: LAB | Facility: HOSPITAL | Age: 71
End: 2021-05-26

## 2021-05-26 ENCOUNTER — PRE-ADMISSION TESTING (OUTPATIENT)
Dept: PREADMISSION TESTING | Facility: HOSPITAL | Age: 71
End: 2021-05-26

## 2021-05-26 VITALS
HEIGHT: 70 IN | DIASTOLIC BLOOD PRESSURE: 53 MMHG | HEART RATE: 97 BPM | WEIGHT: 153.22 LBS | SYSTOLIC BLOOD PRESSURE: 130 MMHG | BODY MASS INDEX: 21.94 KG/M2 | OXYGEN SATURATION: 100 %

## 2021-05-26 LAB
ALBUMIN SERPL-MCNC: 4 G/DL (ref 3.5–5.2)
ALBUMIN/GLOB SERPL: 0.9 G/DL
ALP SERPL-CCNC: 107 U/L (ref 39–117)
ALT SERPL W P-5'-P-CCNC: 15 U/L (ref 1–41)
ANION GAP SERPL CALCULATED.3IONS-SCNC: 11 MMOL/L (ref 5–15)
AST SERPL-CCNC: 16 U/L (ref 1–40)
BASOPHILS # BLD AUTO: 0.07 10*3/MM3 (ref 0–0.2)
BASOPHILS NFR BLD AUTO: 0.5 % (ref 0–1.5)
BILIRUB SERPL-MCNC: 0.3 MG/DL (ref 0–1.2)
BUN SERPL-MCNC: 29 MG/DL (ref 8–23)
BUN/CREAT SERPL: 25 (ref 7–25)
CALCIUM SPEC-SCNC: 9.7 MG/DL (ref 8.6–10.5)
CHLORIDE SERPL-SCNC: 102 MMOL/L (ref 98–107)
CO2 SERPL-SCNC: 22 MMOL/L (ref 22–29)
CREAT SERPL-MCNC: 1.16 MG/DL (ref 0.76–1.27)
DEPRECATED RDW RBC AUTO: 59.8 FL (ref 37–54)
EOSINOPHIL # BLD AUTO: 0.24 10*3/MM3 (ref 0–0.4)
EOSINOPHIL NFR BLD AUTO: 1.9 % (ref 0.3–6.2)
ERYTHROCYTE [DISTWIDTH] IN BLOOD BY AUTOMATED COUNT: 20.5 % (ref 12.3–15.4)
GFR SERPL CREATININE-BSD FRML MDRD: 62 ML/MIN/1.73
GLOBULIN UR ELPH-MCNC: 4.5 GM/DL
GLUCOSE SERPL-MCNC: 210 MG/DL (ref 65–99)
HCT VFR BLD AUTO: 31.4 % (ref 37.5–51)
HGB BLD-MCNC: 9.8 G/DL (ref 13–17.7)
IMM GRANULOCYTES # BLD AUTO: 0.13 10*3/MM3 (ref 0–0.05)
IMM GRANULOCYTES NFR BLD AUTO: 1 % (ref 0–0.5)
LYMPHOCYTES # BLD AUTO: 0.71 10*3/MM3 (ref 0.7–3.1)
LYMPHOCYTES NFR BLD AUTO: 5.5 % (ref 19.6–45.3)
MCH RBC QN AUTO: 25.1 PG (ref 26.6–33)
MCHC RBC AUTO-ENTMCNC: 31.2 G/DL (ref 31.5–35.7)
MCV RBC AUTO: 80.3 FL (ref 79–97)
MONOCYTES # BLD AUTO: 1.16 10*3/MM3 (ref 0.1–0.9)
MONOCYTES NFR BLD AUTO: 9 % (ref 5–12)
NEUTROPHILS NFR BLD AUTO: 10.6 10*3/MM3 (ref 1.7–7)
NEUTROPHILS NFR BLD AUTO: 82.1 % (ref 42.7–76)
NRBC BLD AUTO-RTO: 0 /100 WBC (ref 0–0.2)
PLATELET # BLD AUTO: 589 10*3/MM3 (ref 140–450)
PMV BLD AUTO: 8.5 FL (ref 6–12)
POTASSIUM SERPL-SCNC: 4.5 MMOL/L (ref 3.5–5.2)
PROT SERPL-MCNC: 8.5 G/DL (ref 6–8.5)
RBC # BLD AUTO: 3.91 10*6/MM3 (ref 4.14–5.8)
SARS-COV-2 ORF1AB RESP QL NAA+PROBE: NOT DETECTED
SODIUM SERPL-SCNC: 135 MMOL/L (ref 136–145)
WBC # BLD AUTO: 12.91 10*3/MM3 (ref 3.4–10.8)

## 2021-05-26 PROCEDURE — 36415 COLL VENOUS BLD VENIPUNCTURE: CPT

## 2021-05-26 PROCEDURE — 93010 ELECTROCARDIOGRAM REPORT: CPT | Performed by: INTERNAL MEDICINE

## 2021-05-26 PROCEDURE — U0004 COV-19 TEST NON-CDC HGH THRU: HCPCS | Performed by: SPECIALIST

## 2021-05-26 PROCEDURE — C9803 HOPD COVID-19 SPEC COLLECT: HCPCS | Performed by: SPECIALIST

## 2021-05-26 PROCEDURE — U0005 INFEC AGEN DETEC AMPLI PROBE: HCPCS | Performed by: SPECIALIST

## 2021-05-26 PROCEDURE — 80053 COMPREHEN METABOLIC PANEL: CPT

## 2021-05-26 PROCEDURE — 93005 ELECTROCARDIOGRAM TRACING: CPT

## 2021-05-26 PROCEDURE — 85025 COMPLETE CBC W/AUTO DIFF WBC: CPT

## 2021-05-26 RX ORDER — ASPIRIN 81 MG/1
81 TABLET, CHEWABLE ORAL DAILY
COMMUNITY

## 2021-05-28 ENCOUNTER — HOSPITAL ENCOUNTER (OUTPATIENT)
Facility: HOSPITAL | Age: 71
Setting detail: HOSPITAL OUTPATIENT SURGERY
Discharge: HOME | End: 2021-05-28
Attending: SPECIALIST | Admitting: SPECIALIST

## 2021-05-28 ENCOUNTER — ANESTHESIA EVENT (OUTPATIENT)
Dept: PERIOP | Facility: HOSPITAL | Age: 71
End: 2021-05-28

## 2021-05-28 ENCOUNTER — ANESTHESIA (OUTPATIENT)
Dept: PERIOP | Facility: HOSPITAL | Age: 71
End: 2021-05-28

## 2021-05-28 VITALS
RESPIRATION RATE: 18 BRPM | DIASTOLIC BLOOD PRESSURE: 62 MMHG | SYSTOLIC BLOOD PRESSURE: 115 MMHG | OXYGEN SATURATION: 99 % | HEART RATE: 71 BPM | TEMPERATURE: 97.7 F

## 2021-05-28 DIAGNOSIS — L98.9 SKIN LESION: ICD-10-CM

## 2021-05-28 LAB
GLUCOSE BLDC GLUCOMTR-MCNC: 133 MG/DL (ref 70–130)
GLUCOSE BLDC GLUCOMTR-MCNC: 135 MG/DL (ref 70–130)
QT INTERVAL: 348 MS
QTC INTERVAL: 418 MS

## 2021-05-28 PROCEDURE — 82962 GLUCOSE BLOOD TEST: CPT

## 2021-05-28 PROCEDURE — 25010000002 PHENYLEPHRINE HCL 0.8 MG/10ML SOLUTION PREFILLED SYRINGE: Performed by: NURSE ANESTHETIST, CERTIFIED REGISTERED

## 2021-05-28 PROCEDURE — 25010000003 LIDOCAINE 1 % SOLUTION: Performed by: SPECIALIST

## 2021-05-28 PROCEDURE — 88341 IMHCHEM/IMCYTCHM EA ADD ANTB: CPT | Performed by: SPECIALIST

## 2021-05-28 PROCEDURE — 88311 DECALCIFY TISSUE: CPT | Performed by: SPECIALIST

## 2021-05-28 PROCEDURE — 88305 TISSUE EXAM BY PATHOLOGIST: CPT | Performed by: SPECIALIST

## 2021-05-28 PROCEDURE — 25010000002 PROPOFOL 10 MG/ML EMULSION: Performed by: NURSE ANESTHETIST, CERTIFIED REGISTERED

## 2021-05-28 PROCEDURE — 88342 IMHCHEM/IMCYTCHM 1ST ANTB: CPT | Performed by: SPECIALIST

## 2021-05-28 RX ORDER — LIDOCAINE HYDROCHLORIDE 10 MG/ML
0.5 INJECTION, SOLUTION EPIDURAL; INFILTRATION; INTRACAUDAL; PERINEURAL ONCE AS NEEDED
Status: DISCONTINUED | OUTPATIENT
Start: 2021-05-28 | End: 2021-05-28 | Stop reason: HOSPADM

## 2021-05-28 RX ORDER — PHENYLEPHRINE HCL IN 0.9% NACL 0.8MG/10ML
SYRINGE (ML) INTRAVENOUS AS NEEDED
Status: DISCONTINUED | OUTPATIENT
Start: 2021-05-28 | End: 2021-05-28 | Stop reason: SURG

## 2021-05-28 RX ORDER — SODIUM CHLORIDE, SODIUM LACTATE, POTASSIUM CHLORIDE, CALCIUM CHLORIDE 600; 310; 30; 20 MG/100ML; MG/100ML; MG/100ML; MG/100ML
1000 INJECTION, SOLUTION INTRAVENOUS CONTINUOUS
Status: DISCONTINUED | OUTPATIENT
Start: 2021-05-28 | End: 2021-05-28 | Stop reason: HOSPADM

## 2021-05-28 RX ORDER — HYDROCODONE BITARTRATE AND ACETAMINOPHEN 7.5; 325 MG/1; MG/1
1 TABLET ORAL EVERY 4 HOURS PRN
Qty: 10 TABLET | Refills: 0 | Status: SHIPPED | OUTPATIENT
Start: 2021-05-28 | End: 2021-06-10

## 2021-05-28 RX ORDER — FLUMAZENIL 0.1 MG/ML
0.2 INJECTION INTRAVENOUS AS NEEDED
Status: DISCONTINUED | OUTPATIENT
Start: 2021-05-28 | End: 2021-05-28 | Stop reason: HOSPADM

## 2021-05-28 RX ORDER — SODIUM CHLORIDE 0.9 % (FLUSH) 0.9 %
3-10 SYRINGE (ML) INJECTION AS NEEDED
Status: DISCONTINUED | OUTPATIENT
Start: 2021-05-28 | End: 2021-05-28 | Stop reason: HOSPADM

## 2021-05-28 RX ORDER — SODIUM CHLORIDE, SODIUM LACTATE, POTASSIUM CHLORIDE, CALCIUM CHLORIDE 600; 310; 30; 20 MG/100ML; MG/100ML; MG/100ML; MG/100ML
100 INJECTION, SOLUTION INTRAVENOUS CONTINUOUS
Status: DISCONTINUED | OUTPATIENT
Start: 2021-05-28 | End: 2021-05-28 | Stop reason: HOSPADM

## 2021-05-28 RX ORDER — LABETALOL HYDROCHLORIDE 5 MG/ML
5 INJECTION, SOLUTION INTRAVENOUS
Status: DISCONTINUED | OUTPATIENT
Start: 2021-05-28 | End: 2021-05-28 | Stop reason: HOSPADM

## 2021-05-28 RX ORDER — PROPOFOL 10 MG/ML
VIAL (ML) INTRAVENOUS CONTINUOUS PRN
Status: DISCONTINUED | OUTPATIENT
Start: 2021-05-28 | End: 2021-05-28 | Stop reason: SURG

## 2021-05-28 RX ORDER — FENTANYL CITRATE 50 UG/ML
25 INJECTION, SOLUTION INTRAMUSCULAR; INTRAVENOUS
Status: DISCONTINUED | OUTPATIENT
Start: 2021-05-28 | End: 2021-05-28 | Stop reason: HOSPADM

## 2021-05-28 RX ORDER — NALOXONE HCL 0.4 MG/ML
0.4 VIAL (ML) INJECTION AS NEEDED
Status: DISCONTINUED | OUTPATIENT
Start: 2021-05-28 | End: 2021-05-28 | Stop reason: HOSPADM

## 2021-05-28 RX ORDER — ONDANSETRON 2 MG/ML
4 INJECTION INTRAMUSCULAR; INTRAVENOUS ONCE AS NEEDED
Status: DISCONTINUED | OUTPATIENT
Start: 2021-05-28 | End: 2021-05-28 | Stop reason: HOSPADM

## 2021-05-28 RX ORDER — LIDOCAINE HYDROCHLORIDE 10 MG/ML
INJECTION, SOLUTION INFILTRATION; PERINEURAL AS NEEDED
Status: DISCONTINUED | OUTPATIENT
Start: 2021-05-28 | End: 2021-05-28 | Stop reason: HOSPADM

## 2021-05-28 RX ORDER — MAGNESIUM HYDROXIDE 1200 MG/15ML
LIQUID ORAL AS NEEDED
Status: DISCONTINUED | OUTPATIENT
Start: 2021-05-28 | End: 2021-05-28 | Stop reason: HOSPADM

## 2021-05-28 RX ORDER — OXYCODONE AND ACETAMINOPHEN 10; 325 MG/1; MG/1
1 TABLET ORAL ONCE AS NEEDED
Status: DISCONTINUED | OUTPATIENT
Start: 2021-05-28 | End: 2021-05-28 | Stop reason: HOSPADM

## 2021-05-28 RX ORDER — SODIUM CHLORIDE 0.9 % (FLUSH) 0.9 %
3 SYRINGE (ML) INJECTION EVERY 12 HOURS SCHEDULED
Status: DISCONTINUED | OUTPATIENT
Start: 2021-05-28 | End: 2021-05-28 | Stop reason: HOSPADM

## 2021-05-28 RX ORDER — OXYCODONE AND ACETAMINOPHEN 7.5; 325 MG/1; MG/1
2 TABLET ORAL EVERY 4 HOURS PRN
Status: DISCONTINUED | OUTPATIENT
Start: 2021-05-28 | End: 2021-05-28 | Stop reason: HOSPADM

## 2021-05-28 RX ORDER — MIDAZOLAM HYDROCHLORIDE 1 MG/ML
0.5 INJECTION INTRAMUSCULAR; INTRAVENOUS
Status: DISCONTINUED | OUTPATIENT
Start: 2021-05-28 | End: 2021-05-28 | Stop reason: HOSPADM

## 2021-05-28 RX ORDER — SODIUM CHLORIDE 0.9 % (FLUSH) 0.9 %
3 SYRINGE (ML) INJECTION AS NEEDED
Status: DISCONTINUED | OUTPATIENT
Start: 2021-05-28 | End: 2021-05-28 | Stop reason: HOSPADM

## 2021-05-28 RX ADMIN — Medication 80 MCG: at 09:31

## 2021-05-28 RX ADMIN — Medication 30 MG: at 09:23

## 2021-05-28 RX ADMIN — Medication 80 MCG: at 09:37

## 2021-05-28 RX ADMIN — Medication 160 MCG: at 09:55

## 2021-05-28 RX ADMIN — Medication 100 MCG/KG/MIN: at 09:24

## 2021-05-28 RX ADMIN — Medication 160 MCG: at 10:05

## 2021-05-28 RX ADMIN — SODIUM CHLORIDE, POTASSIUM CHLORIDE, SODIUM LACTATE AND CALCIUM CHLORIDE 1000 ML: 600; 310; 30; 20 INJECTION, SOLUTION INTRAVENOUS at 08:29

## 2021-05-28 NOTE — ANESTHESIA PREPROCEDURE EVALUATION
Anesthesia Evaluation     Patient summary reviewed   no history of anesthetic complications:  NPO Solid Status: > 8 hours  NPO Liquid Status: > 6 hours           Airway   Mallampati: II  TM distance: >3 FB  Neck ROM: full  No difficulty expected  Dental - normal exam         Pulmonary    (+) a smoker,   Cardiovascular   Exercise tolerance: good (4-7 METS)    (+) dysrhythmias (symptomatic pauses ) Bradycardia, hyperlipidemia,  carotid artery disease (pt reports good collateralization ) left carotid      Neuro/Psych- negative ROS  GI/Hepatic/Renal/Endo    (+)   diabetes mellitus,     Musculoskeletal     Abdominal  - normal exam   Substance History      OB/GYN          Other   arthritis, blood dyscrasia anemia,   history of cancer (colon cancer, recently diagnosed) active                    Anesthesia Plan    ASA 3     MAC     intravenous induction     Anesthetic plan, all risks, benefits, and alternatives have been provided, discussed and informed consent has been obtained with: patient.

## 2021-05-28 NOTE — ANESTHESIA POSTPROCEDURE EVALUATION
Patient: Attila Viramontes    Procedure Summary     Date: 05/28/21 Room / Location:  [facility] /  [facility]    Anesthesia Start: 0920 Anesthesia Stop: 1017    Procedure: PORT REMOVAL, EXCISION OF NEOPLASM UNCERTAIN BEHAVIOR LEFT SHOULDER (N/A ) Diagnosis: (COMPLETION OF THERAPY)    Surgeons: Vielka Weller MD Provider: Rayray Ch CRNA    Anesthesia Type: MAC ASA Status: 3          Anesthesia Type: MAC    Vitals  Vitals Value Taken Time   /72 05/28/21 1040   Temp     Pulse 71 05/28/21 1046   Resp 18 05/28/21 1031   SpO2 99 % 05/28/21 1046   Vitals shown include unvalidated device data.        Post Anesthesia Care and Evaluation    Patient location during evaluation: PHASE II  Patient participation: complete - patient participated  Level of consciousness: awake and alert  Pain management: adequate  Airway patency: patent  Anesthetic complications: No anesthetic complications  PONV Status: none  Cardiovascular status: acceptable and stable  Respiratory status: acceptable and unassisted  Hydration status: acceptable

## 2021-06-10 ENCOUNTER — PRE-ADMISSION TESTING (OUTPATIENT)
Dept: PREADMISSION TESTING | Facility: HOSPITAL | Age: 71
End: 2021-06-10

## 2021-06-10 ENCOUNTER — TRANSCRIBE ORDERS (OUTPATIENT)
Dept: ADMINISTRATIVE | Facility: HOSPITAL | Age: 71
End: 2021-06-10

## 2021-06-10 VITALS
HEIGHT: 70 IN | SYSTOLIC BLOOD PRESSURE: 119 MMHG | HEART RATE: 85 BPM | WEIGHT: 156.31 LBS | OXYGEN SATURATION: 99 % | DIASTOLIC BLOOD PRESSURE: 62 MMHG | BODY MASS INDEX: 22.38 KG/M2 | RESPIRATION RATE: 20 BRPM

## 2021-06-10 DIAGNOSIS — Z11.59 SCREENING FOR VIRAL DISEASE: Primary | ICD-10-CM

## 2021-06-10 LAB
ALBUMIN SERPL-MCNC: 3.4 G/DL (ref 3.5–5.2)
ALBUMIN/GLOB SERPL: 0.8 G/DL
ALP SERPL-CCNC: 95 U/L (ref 39–117)
ALT SERPL W P-5'-P-CCNC: 12 U/L (ref 1–41)
ANION GAP SERPL CALCULATED.3IONS-SCNC: 7 MMOL/L (ref 5–15)
AST SERPL-CCNC: 12 U/L (ref 1–40)
BASOPHILS # BLD AUTO: 0.06 10*3/MM3 (ref 0–0.2)
BASOPHILS NFR BLD AUTO: 0.7 % (ref 0–1.5)
BILIRUB SERPL-MCNC: 0.2 MG/DL (ref 0–1.2)
BUN SERPL-MCNC: 23 MG/DL (ref 8–23)
BUN/CREAT SERPL: 21.9 (ref 7–25)
CALCIUM SPEC-SCNC: 9.6 MG/DL (ref 8.6–10.5)
CHLORIDE SERPL-SCNC: 106 MMOL/L (ref 98–107)
CO2 SERPL-SCNC: 27 MMOL/L (ref 22–29)
CREAT SERPL-MCNC: 1.05 MG/DL (ref 0.76–1.27)
DEPRECATED RDW RBC AUTO: 62.5 FL (ref 37–54)
EOSINOPHIL # BLD AUTO: 0.24 10*3/MM3 (ref 0–0.4)
EOSINOPHIL NFR BLD AUTO: 2.7 % (ref 0.3–6.2)
ERYTHROCYTE [DISTWIDTH] IN BLOOD BY AUTOMATED COUNT: 20.5 % (ref 12.3–15.4)
GFR SERPL CREATININE-BSD FRML MDRD: 70 ML/MIN/1.73
GLOBULIN UR ELPH-MCNC: 4.4 GM/DL
GLUCOSE SERPL-MCNC: 126 MG/DL (ref 65–99)
HCT VFR BLD AUTO: 30 % (ref 37.5–51)
HGB BLD-MCNC: 9.2 G/DL (ref 13–17.7)
IMM GRANULOCYTES # BLD AUTO: 0.1 10*3/MM3 (ref 0–0.05)
IMM GRANULOCYTES NFR BLD AUTO: 1.1 % (ref 0–0.5)
LYMPHOCYTES # BLD AUTO: 1.2 10*3/MM3 (ref 0.7–3.1)
LYMPHOCYTES NFR BLD AUTO: 13.6 % (ref 19.6–45.3)
MCH RBC QN AUTO: 25.5 PG (ref 26.6–33)
MCHC RBC AUTO-ENTMCNC: 30.7 G/DL (ref 31.5–35.7)
MCV RBC AUTO: 83.1 FL (ref 79–97)
MONOCYTES # BLD AUTO: 0.85 10*3/MM3 (ref 0.1–0.9)
MONOCYTES NFR BLD AUTO: 9.7 % (ref 5–12)
NEUTROPHILS NFR BLD AUTO: 6.35 10*3/MM3 (ref 1.7–7)
NEUTROPHILS NFR BLD AUTO: 72.2 % (ref 42.7–76)
NRBC BLD AUTO-RTO: 0 /100 WBC (ref 0–0.2)
PLATELET # BLD AUTO: 582 10*3/MM3 (ref 140–450)
PMV BLD AUTO: 8.5 FL (ref 6–12)
POTASSIUM SERPL-SCNC: 4.5 MMOL/L (ref 3.5–5.2)
PROT SERPL-MCNC: 7.8 G/DL (ref 6–8.5)
RBC # BLD AUTO: 3.61 10*6/MM3 (ref 4.14–5.8)
SODIUM SERPL-SCNC: 140 MMOL/L (ref 136–145)
WBC # BLD AUTO: 8.8 10*3/MM3 (ref 3.4–10.8)

## 2021-06-10 PROCEDURE — 80053 COMPREHEN METABOLIC PANEL: CPT

## 2021-06-10 PROCEDURE — 85025 COMPLETE CBC W/AUTO DIFF WBC: CPT

## 2021-06-10 PROCEDURE — 36415 COLL VENOUS BLD VENIPUNCTURE: CPT

## 2021-06-10 NOTE — DISCHARGE INSTRUCTIONS
DAY OF SURGERY INSTRUCTIONS        YOUR SURGEON: ***LAQUITA GOFF    PROCEDURE: ***EXCISION SKIN LESION    DATE OF SURGERY: ***June 14,2021    ARRIVAL TIME: AS DIRECTED BY OFFICE    YOU MAY TAKE THE FOLLOWING MEDICATION(S) THE MORNING OF SURGERY WITH A SIP OF WATER: ***NONE      ALL OTHER HOME MEDICATION CHECK WITH YOUR PHYSICIAN      DO NOT TAKE ANY ERECTILE DYSFUNCTION MEDICATIONS (EX: CIALIS, VIAGRA) 24 HOURS PRIOR TO SURGERY                      MANAGING PAIN AFTER SURGERY    We know you are probably wondering what your pain will be like after surgery.  Following surgery it is unrealistic to expect you will not have pain.   Pain is how our bodies let us know that something is wrong or cautions us to be careful.  That said, our goal is to make your pain tolerable.    Methods we may use to treat your pain include (oral or IV medications, PCAs, epidurals, nerve blocks, etc.)   While some procedures require IV pain medications for a short time after surgery, transitioning to pain medications by mouth allows for better management of pain.   Your nurse will encourage you to take oral pain medications whenever possible.  IV medications work almost immediately, but only last a short while.  Taking medications by mouth allows for a more constant level of medication in your blood stream for a longer period of time.      Once your pain is out of control it is harder to get back under control.  It is important you are aware when your next dose of pain medication is due.  If you are admitted, your nurse may write the time of your next dose on the white board in your room to help you remember.      We are interested in your pain and encourage you to inform us about aggravating factors during your visit.   Many times a simple repositioning every few hours can make a big difference.    If your physician says it is okay, do not let your pain prevent you from getting out of bed. Be sure to call your nurse for assistance prior to  getting up so you do not fall.      Before surgery, please decide your tolerable pain goal.  These faces help describe the pain ratings we use on a 0-10 scale.   Be prepared to tell us your goal and whether or not you take pain or anxiety medications at home.          BEFORE YOU COME TO THE HOSPITAL  (Pre-op instructions)  • Do not eat, drink, smoke or chew gum after midnight the night before surgery.  This also includes no mints.  • Morning of surgery take only the medicines you have been instructed with a sip of water unless otherwise instructed  by your physician.  • Do not shave, wear makeup or dark nail polish.  • Remove all jewelry including rings.  • Leave anything you consider valuable at home.  • Leave your suitcase in the car until after your surgery.  • Bring the following with you if applicable:  o Picture ID and insurance, Medicare or Medicaid cards  o Co-pay/deductible required by insurance (cash, check, credit card)  o Copy of advance directive, living will or power-of- documents if not brought to PAT  o CPAP or BIPAP mask and tubing  o Relaxation aids ( book, magazine), etc.  o Hearing aids                        ON THE DAY OF SURGERY  · On the day of surgery check in at registration located at the main entrance of the hospital.   ? You will be registered and given a beeper with instructions where to wait in the main lobby.  ? When your beeper lights up and vibrates a member of the Outpatient Surgery staff will meet you at the double doors under the stair steps and escort you to your preoperative room.   · You may have cloth compression devices placed on your legs. These help to prevent blood clots and reduce swelling in your legs.  · An IV may be inserted into one of your veins.  · In the operating room, you may be given one or more of the following:  ? A medicine to help you relax (sedative).  ? A medicine to numb the area (local anesthetic).  ? A medicine to make you fall asleep (general  "anesthetic).  ? A medicine that is injected into an area of your body to numb everything below the injection site (regional anesthetic).  · Your surgical site will be marked or identified.  · You may be given an antibiotic through your IV to help prevent infection.  Contact a health care provider if you:  · Develop a fever of more than 100.4°F (38°C) or other feelings of illness during the 48 hours before your surgery.  · Have symptoms that get worse.  Have questions or concerns about your surgery    General Anesthesia/Surgery, Adult  General anesthesia is the use of medicines to make a person \"go to sleep\" (unconscious) for a medical procedure. General anesthesia must be used for certain procedures, and is often recommended for procedures that:  · Last a long time.  · Require you to be still or in an unusual position.  · Are major and can cause blood loss.  The medicines used for general anesthesia are called general anesthetics. As well as making you unconscious for a certain amount of time, these medicines:  · Prevent pain.  · Control your blood pressure.  · Relax your muscles.  Tell a health care provider about:  · Any allergies you have.  · All medicines you are taking, including vitamins, herbs, eye drops, creams, and over-the-counter medicines.  · Any problems you or family members have had with anesthetic medicines.  · Types of anesthetics you have had in the past.  · Any blood disorders you have.  · Any surgeries you have had.  · Any medical conditions you have.  · Any recent upper respiratory, chest, or ear infections.  · Any history of:  ? Heart or lung conditions, such as heart failure, sleep apnea, asthma, or chronic obstructive pulmonary disease (COPD).  ?  service.  ? Depression or anxiety.  · Any tobacco or drug use, including marijuana or alcohol use.  · Whether you are pregnant or may be pregnant.  What are the risks?  Generally, this is a safe procedure. However, problems may occur, " including:  · Allergic reaction.  · Lung and heart problems.  · Inhaling food or liquid from the stomach into the lungs (aspiration).  · Nerve injury.  · Air in the bloodstream, which can lead to stroke.  · Extreme agitation or confusion (delirium) when you wake up from the anesthetic.  · Waking up during your procedure and being unable to move. This is rare.  These problems are more likely to develop if you are having a major surgery or if you have an advanced or serious medical condition. You can prevent some of these complications by answering all of your health care provider's questions thoroughly and by following all instructions before your procedure.  General anesthesia can cause side effects, including:  · Nausea or vomiting.  · A sore throat from the breathing tube.  · Hoarseness.  · Wheezing or coughing.  · Shaking chills.  · Tiredness.  · Body aches.  · Anxiety.  · Sleepiness or drowsiness.  · Confusion or agitation.  RISKS AND COMPLICATIONS OF SURGERY  Your health care provider will discuss possible risks and complications with you before surgery. Common risks and complications include:    · Problems due to the use of anesthetics.  · Blood loss and replacement (does not apply to minor surgical procedures).  · Temporary increase in pain due to surgery.  · Uncorrected pain or problems that the surgery was meant to correct.  · Infection.  · New damage.    What happens before the procedure?    Medicines  Ask your health care provider about:  · Changing or stopping your regular medicines. This is especially important if you are taking diabetes medicines or blood thinners.  · Taking medicines such as aspirin and ibuprofen. These medicines can thin your blood. Do not take these medicines unless your health care provider tells you to take them.  · Taking over-the-counter medicines, vitamins, herbs, and supplements. Do not take these during the week before your procedure unless your health care provider approves  them.  General instructions  · Starting 3-6 weeks before the procedure, do not use any products that contain nicotine or tobacco, such as cigarettes and e-cigarettes. If you need help quitting, ask your health care provider.  · If you brush your teeth on the morning of the procedure, make sure to spit out all of the toothpaste.  · Tell your health care provider if you become ill or develop a cold, cough, or fever.  · If instructed by your health care provider, bring your sleep apnea device with you on the day of your surgery (if applicable).  · Ask your health care provider if you will be going home the same day, the following day, or after a longer hospital stay.  ? Plan to have someone take you home from the hospital or clinic.  ? Plan to have a responsible adult care for you for at least 24 hours after you leave the hospital or clinic. This is important.  What happens during the procedure?  · You will be given anesthetics through both of the following:  ? A mask placed over your nose and mouth.  ? An IV in one of your veins.  · You may receive a medicine to help you relax (sedative).  · After you are unconscious, a breathing tube may be inserted down your throat to help you breathe. This will be removed before you wake up.  · An anesthesia specialist will stay with you throughout your procedure. He or she will:  ? Keep you comfortable and safe by continuing to give you medicines and adjusting the amount of medicine that you get.  ? Monitor your blood pressure, pulse, and oxygen levels to make sure that the anesthetics do not cause any problems.  The procedure may vary among health care providers and hospitals.  What happens after the procedure?  · Your blood pressure, temperature, heart rate, breathing rate, and blood oxygen level will be monitored until the medicines you were given have worn off.  · You will wake up in a recovery area. You may wake up slowly.  · If you feel anxious or agitated, you may be given  medicine to help you calm down.  · If you will be going home the same day, your health care provider may check to make sure you can walk, drink, and urinate.  · Your health care provider will treat any pain or side effects you have before you go home.  · Do not drive for 24 hours if you were given a sedative.  Summary  · General anesthesia is used to keep you still and prevent pain during a procedure.  · It is important to tell your healthcare provider about your medical history and any surgeries you have had, and previous experience with anesthesia.  · Follow your healthcare provider’s instructions about when to stop eating, drinking, or taking certain medicines before your procedure.  · Plan to have someone take you home from the hospital or clinic.  This information is not intended to replace advice given to you by your health care provider. Make sure you discuss any questions you have with your health care provider.  Document Released: 03/26/2009 Document Revised: 08/03/2018 Document Reviewed: 08/03/2018  [employer] Interactive Patient Education © 2019 [employer]       Fall Prevention in Hospitals, Adult  As a hospital patient, your condition and the treatments you receive can increase your risk for falls. Some additional risk factors for falls in a hospital include:  · Being in an unfamiliar environment.  · Being on bed rest.  · Your surgery.  · Taking certain medicines.  · Your tubing requirements, such as intravenous (IV) therapy or catheters.  It is important that you learn how to decrease fall risks while at the hospital. Below are important tips that can help prevent falls.  SAFETY TIPS FOR PREVENTING FALLS  Talk about your risk of falling.  · Ask your health care provider why you are at risk for falling. Is it your medicine, illness, tubing placement, or something else?  · Make a plan with your health care provider to keep you safe from falls.  · Ask your health care provider or pharmacist about side  effects of your medicines. Some medicines can make you dizzy or affect your coordination.  Ask for help.  · Ask for help before getting out of bed. You may need to press your call button.  · Ask for assistance in getting safely to the toilet.  · Ask for a walker or cane to be put at your bedside. Ask that most of the side rails on your bed be placed up before your health care provider leaves the room.  · Ask family or friends to sit with you.  · Ask for things that are out of your reach, such as your glasses, hearing aids, telephone, bedside table, or call button.  Follow these tips to avoid falling:  · Stay lying or seated, rather than standing, while waiting for help.  · Wear rubber-soled slippers or shoes whenever you walk in the hospital.  · Avoid quick, sudden movements.  ¨ Change positions slowly.  ¨ Sit on the side of your bed before standing.  ¨ Stand up slowly and wait before you start to walk.  · Let your health care provider know if there is a spill on the floor.  · Pay careful attention to the medical equipment, electrical cords, and tubes around you.  · When you need help, use your call button by your bed or in the bathroom. Wait for one of your health care providers to help you.  · If you feel dizzy or unsure of your footing, return to bed and wait for assistance.  · Avoid being distracted by the TV, telephone, or another person in your room.  · Do not lean or support yourself on rolling objects, such as IV poles or bedside tables.     This information is not intended to replace advice given to you by your health care provider. Make sure you discuss any questions you have with your health care provider.     Document Released: 12/15/2001 Document Revised: 01/08/2016 Document Reviewed: 08/25/2013  [employer] Interactive Patient Education ©2016 [employer]       [facility]  CHG 4% Patient Instruction Sheet    Chlorhexidine Before Surgery  Chlorhexidine gluconate (CHG) is a germ-killing  (antiseptic) solution that is used to clean the skin. It gets rid of the bacteria that normally live on the skin. Cleaning your skin with CHG before surgery helps lower the risk for infection after surgery.    How to use CHG solution  · You will take 2 showers, one shower the night before surgery, the second shower the morning of surgery before coming to the hospital.  · Use CHG only as told by your health care provider, and follow the instructions on the label.  · Use CHG solution while taking a shower. Follow these steps when using CHG solution (unless your health care provider gives you different instructions):  1. Start the shower.  2. Use your normal soap and shampoo to wash your face and hair.  3. Turn off the shower or move out of the shower stream.  4. Pour the CHG onto a clean washcloth. Do not use any type of brush or rough-edged sponge.  5. Starting at your neck, lather your body down to your toes. Make sure you:  6. Pay special attention to the part of your body where you will be having surgery. Scrub this area for at least 1 minute.  7. Use the full amount of CHG as directed. Usually, this is one half bottle for each shower.  8. Do not use CHG on your head or face. If the solution gets into your ears or eyes, rinse them well with water.  9. Avoid your genital area.  10. Avoid any areas of skin that have broken skin, cuts, or scrapes.  11. Scrub your back and under your arms. Make sure to wash skin folds.  12. Let the lather sit on your skin for 1-2 minutes or as long as told by your health care  provider.  13. Thoroughly rinse your entire body in the shower. Make sure that all body creases and crevices are rinsed well.  14. Dry off with a clean towel. Do not put any substances on your body afterward, such as powder, lotion, or perfume.  15. Put on clean clothes or pajamas.  16. If it is the night before your surgery, sleep in clean sheets.    What are the risks?  Risks of using CHG include:  · A skin  reaction.  · Hearing loss, if CHG gets in your ears.  · Eye injury, if CHG gets in your eyes and is not rinsed out.  · The CHG product catching fire.  Make sure that you avoid smoking and flames after applying CHG to your skin.  Do not use CHG:  · If you have a chlorhexidine allergy or have previously reacted to chlorhexidine.  · On babies younger than 2 months of age.      On the day of surgery, when you are taken to your room in Outpatient Surgery you will be given a CHG prepackaged cloth to wipe the site for your surgery.  How to use CHG prepackaged cloths  · Follow the instructions on the label.  · Use the CHG cloth on clean, dry skin. Follow these steps when using a CHG cloth (unless your health care provider gives you different instructions):  1. Using the CHG cloth, vigorously scrub the part of your body where you will be having surgery. Scrub using a back-and-forth motion for 3 minutes. The area on your body should be completely wet with CHG when you are finished scrubbing.  2. Do not rinse. Discard the cloth and let the area air-dry for 1 minute. Do not put any substances on your body afterward, such as powder, lotion, or perfume.  Contact a health care provider if:  · Your skin gets irritated after scrubbing.  · You have questions about using your solution or cloth.  Get help right away if:  · Your eyes become very red or swollen.  · Your eyes itch badly.  · Your skin itches badly and is red or swollen.  · Your hearing changes.  · You have trouble seeing.  · You have swelling or tingling in your mouth or throat.  · You have trouble breathing.  · You swallow any chlorhexidine.  Summary  · Chlorhexidine gluconate (CHG) is a germ-killing (antiseptic) solution that is used to clean the skin. Cleaning your skin with CHG before surgery helps lower the risk for infection after surgery.  · You may be given CHG to use at home. It may be in a bottle or in a prepackaged cloth to use on your skin. Carefully follow  your health care provider's instructions and the instructions on the product label.  · Do not use CHG if you have a chlorhexidine allergy.  · Contact your health care provider if your skin gets irritated after scrubbing.  This information is not intended to replace advice given to you by your health care provider. Make sure you discuss any questions you have with your health care provider.  Document Released: 09/11/2013 Document Revised: 11/15/2018 Document Reviewed: 11/15/2018  [employer] Interactive Patient Education © 2019 [employer]          PATIENT/FAMILY/RESPONSIBLE PARTY VERBALIZES UNDERSTANDING OF ABOVE EDUCATION.  COPY OF PAIN SCALE GIVEN AND REVIEWED WITH VERBALIZED UNDERSTANDING.

## 2021-06-11 ENCOUNTER — LAB (OUTPATIENT)
Dept: LAB | Facility: HOSPITAL | Age: 71
End: 2021-06-11

## 2021-06-11 ENCOUNTER — TRANSCRIBE ORDERS (OUTPATIENT)
Dept: ADMINISTRATIVE | Facility: HOSPITAL | Age: 71
End: 2021-06-11

## 2021-06-11 LAB — SARS-COV-2 ORF1AB RESP QL NAA+PROBE: NOT DETECTED

## 2021-06-11 PROCEDURE — C9803 HOPD COVID-19 SPEC COLLECT: HCPCS | Performed by: SPECIALIST

## 2021-06-11 PROCEDURE — U0004 COV-19 TEST NON-CDC HGH THRU: HCPCS | Performed by: SPECIALIST

## 2021-06-11 PROCEDURE — U0005 INFEC AGEN DETEC AMPLI PROBE: HCPCS | Performed by: SPECIALIST

## 2021-06-14 ENCOUNTER — ANESTHESIA (OUTPATIENT)
Dept: PERIOP | Facility: HOSPITAL | Age: 71
End: 2021-06-14

## 2021-06-14 ENCOUNTER — HOSPITAL ENCOUNTER (OUTPATIENT)
Facility: HOSPITAL | Age: 71
Setting detail: HOSPITAL OUTPATIENT SURGERY
Discharge: HOME | End: 2021-06-14
Attending: SPECIALIST | Admitting: SPECIALIST

## 2021-06-14 ENCOUNTER — ANESTHESIA EVENT (OUTPATIENT)
Dept: PERIOP | Facility: HOSPITAL | Age: 71
End: 2021-06-14

## 2021-06-14 VITALS
HEART RATE: 71 BPM | DIASTOLIC BLOOD PRESSURE: 74 MMHG | TEMPERATURE: 98.3 F | OXYGEN SATURATION: 98 % | RESPIRATION RATE: 18 BRPM | SYSTOLIC BLOOD PRESSURE: 107 MMHG

## 2021-06-14 LAB — GLUCOSE BLDC GLUCOMTR-MCNC: 142 MG/DL (ref 70–130)

## 2021-06-14 PROCEDURE — 82962 GLUCOSE BLOOD TEST: CPT

## 2021-06-14 RX ORDER — SODIUM CHLORIDE 0.9 % (FLUSH) 0.9 %
3 SYRINGE (ML) INJECTION EVERY 12 HOURS SCHEDULED
Status: DISCONTINUED | OUTPATIENT
Start: 2021-06-14 | End: 2021-06-14 | Stop reason: HOSPADM

## 2021-06-14 RX ORDER — SODIUM CHLORIDE 0.9 % (FLUSH) 0.9 %
3 SYRINGE (ML) INJECTION AS NEEDED
Status: DISCONTINUED | OUTPATIENT
Start: 2021-06-14 | End: 2021-06-14 | Stop reason: HOSPADM

## 2021-06-14 RX ORDER — LIDOCAINE HYDROCHLORIDE 10 MG/ML
0.5 INJECTION, SOLUTION EPIDURAL; INFILTRATION; INTRACAUDAL; PERINEURAL ONCE AS NEEDED
Status: DISCONTINUED | OUTPATIENT
Start: 2021-06-14 | End: 2021-06-14 | Stop reason: SDUPTHER

## 2021-06-14 RX ORDER — LIDOCAINE HYDROCHLORIDE 10 MG/ML
0.5 INJECTION, SOLUTION EPIDURAL; INFILTRATION; INTRACAUDAL; PERINEURAL ONCE AS NEEDED
Status: DISCONTINUED | OUTPATIENT
Start: 2021-06-14 | End: 2021-06-14 | Stop reason: HOSPADM

## 2021-06-14 RX ORDER — ONDANSETRON 2 MG/ML
4 INJECTION INTRAMUSCULAR; INTRAVENOUS ONCE AS NEEDED
Status: CANCELLED | OUTPATIENT
Start: 2021-06-14

## 2021-06-14 RX ORDER — IBUPROFEN 600 MG/1
600 TABLET ORAL ONCE AS NEEDED
Status: CANCELLED | OUTPATIENT
Start: 2021-06-14

## 2021-06-14 RX ORDER — NALOXONE HCL 0.4 MG/ML
0.4 VIAL (ML) INJECTION AS NEEDED
Status: CANCELLED | OUTPATIENT
Start: 2021-06-14

## 2021-06-14 RX ORDER — SODIUM CHLORIDE, SODIUM LACTATE, POTASSIUM CHLORIDE, CALCIUM CHLORIDE 600; 310; 30; 20 MG/100ML; MG/100ML; MG/100ML; MG/100ML
1000 INJECTION, SOLUTION INTRAVENOUS CONTINUOUS
Status: DISCONTINUED | OUTPATIENT
Start: 2021-06-14 | End: 2021-06-14 | Stop reason: HOSPADM

## 2021-06-14 RX ORDER — OXYCODONE AND ACETAMINOPHEN 7.5; 325 MG/1; MG/1
2 TABLET ORAL EVERY 4 HOURS PRN
Status: CANCELLED | OUTPATIENT
Start: 2021-06-14 | End: 2021-06-24

## 2021-06-14 RX ORDER — OXYCODONE AND ACETAMINOPHEN 10; 325 MG/1; MG/1
1 TABLET ORAL ONCE AS NEEDED
Status: CANCELLED | OUTPATIENT
Start: 2021-06-14

## 2021-06-14 RX ORDER — SODIUM CHLORIDE 0.9 % (FLUSH) 0.9 %
3-10 SYRINGE (ML) INJECTION AS NEEDED
Status: DISCONTINUED | OUTPATIENT
Start: 2021-06-14 | End: 2021-06-14 | Stop reason: HOSPADM

## 2021-06-14 RX ORDER — FENTANYL CITRATE 50 UG/ML
25 INJECTION, SOLUTION INTRAMUSCULAR; INTRAVENOUS
Status: CANCELLED | OUTPATIENT
Start: 2021-06-14

## 2021-06-14 RX ORDER — LABETALOL HYDROCHLORIDE 5 MG/ML
5 INJECTION, SOLUTION INTRAVENOUS
Status: CANCELLED | OUTPATIENT
Start: 2021-06-14

## 2021-06-14 RX ORDER — FLUMAZENIL 0.1 MG/ML
0.2 INJECTION INTRAVENOUS AS NEEDED
Status: CANCELLED | OUTPATIENT
Start: 2021-06-14

## 2021-06-14 RX ORDER — SODIUM CHLORIDE, SODIUM LACTATE, POTASSIUM CHLORIDE, CALCIUM CHLORIDE 600; 310; 30; 20 MG/100ML; MG/100ML; MG/100ML; MG/100ML
100 INJECTION, SOLUTION INTRAVENOUS CONTINUOUS
Status: DISCONTINUED | OUTPATIENT
Start: 2021-06-14 | End: 2021-06-14 | Stop reason: HOSPADM

## 2021-06-14 RX ADMIN — SODIUM CHLORIDE, POTASSIUM CHLORIDE, SODIUM LACTATE AND CALCIUM CHLORIDE 1000 ML: 600; 310; 30; 20 INJECTION, SOLUTION INTRAVENOUS at 10:55

## 2021-06-14 NOTE — ANESTHESIA PREPROCEDURE EVALUATION
Anesthesia Evaluation     Patient summary reviewed   no history of anesthetic complications:  NPO Solid Status: > 8 hours  NPO Liquid Status: > 6 hours           Airway   Mallampati: II  TM distance: >3 FB  Neck ROM: full  No difficulty expected  Dental - normal exam         Pulmonary    (+) a smoker,   Cardiovascular   Exercise tolerance: good (4-7 METS)    (+) dysrhythmias (symptomatic pauses ) Bradycardia, hyperlipidemia,  carotid artery disease (pt reports good collateralization ) left carotid      Neuro/Psych- negative ROS  GI/Hepatic/Renal/Endo    (+)   diabetes mellitus,     Musculoskeletal     Abdominal  - normal exam   Substance History      OB/GYN          Other   arthritis, blood dyscrasia anemia,   history of cancer (colon cancer, recently diagnosed) active                      Anesthesia Plan    ASA 3     general     intravenous induction     Anesthetic plan, all risks, benefits, and alternatives have been provided, discussed and informed consent has been obtained with: patient.

## 2021-06-15 ENCOUNTER — TRANSCRIBE ORDERS (OUTPATIENT)
Dept: ADMINISTRATIVE | Facility: HOSPITAL | Age: 71
End: 2021-06-15

## 2021-06-15 ENCOUNTER — HOSPITAL ENCOUNTER (OUTPATIENT)
Dept: NUCLEAR MEDICINE | Facility: HOSPITAL | Age: 71
Discharge: HOME | End: 2021-06-15

## 2021-06-15 ENCOUNTER — HOSPITAL ENCOUNTER (OUTPATIENT)
Facility: HOSPITAL | Age: 71
Setting detail: HOSPITAL OUTPATIENT SURGERY
Discharge: HOME | End: 2021-06-15
Attending: SPECIALIST | Admitting: SPECIALIST

## 2021-06-15 ENCOUNTER — ANESTHESIA (OUTPATIENT)
Dept: PERIOP | Facility: HOSPITAL | Age: 71
End: 2021-06-15

## 2021-06-15 ENCOUNTER — ANESTHESIA EVENT (OUTPATIENT)
Dept: PERIOP | Facility: HOSPITAL | Age: 71
End: 2021-06-15

## 2021-06-15 VITALS
TEMPERATURE: 97.2 F | OXYGEN SATURATION: 100 % | SYSTOLIC BLOOD PRESSURE: 153 MMHG | RESPIRATION RATE: 16 BRPM | DIASTOLIC BLOOD PRESSURE: 72 MMHG | HEART RATE: 72 BPM

## 2021-06-15 DIAGNOSIS — C43.62 MELANOMA OF SHOULDER, LEFT (HCC): ICD-10-CM

## 2021-06-15 DIAGNOSIS — C43.62 MELANOMA OF SHOULDER, LEFT (HCC): Primary | ICD-10-CM

## 2021-06-15 DIAGNOSIS — C43.62 MALIGNANT MELANOMA OF LEFT SHOULDER (HCC): ICD-10-CM

## 2021-06-15 LAB
GLUCOSE BLDC GLUCOMTR-MCNC: 120 MG/DL (ref 70–130)
GLUCOSE BLDC GLUCOMTR-MCNC: 135 MG/DL (ref 70–130)
SARS-COV-2 RNA PNL SPEC NAA+PROBE: NOT DETECTED

## 2021-06-15 PROCEDURE — 87635 SARS-COV-2 COVID-19 AMP PRB: CPT | Performed by: SPECIALIST

## 2021-06-15 PROCEDURE — 0 TECHNETIUM FILTERED SULFUR COLLOID: Performed by: SPECIALIST

## 2021-06-15 PROCEDURE — 25010000002 ONDANSETRON PER 1 MG: Performed by: NURSE ANESTHETIST, CERTIFIED REGISTERED

## 2021-06-15 PROCEDURE — 88305 TISSUE EXAM BY PATHOLOGIST: CPT | Performed by: SPECIALIST

## 2021-06-15 PROCEDURE — 25010000002 FENTANYL CITRATE (PF) 100 MCG/2ML SOLUTION: Performed by: NURSE ANESTHETIST, CERTIFIED REGISTERED

## 2021-06-15 PROCEDURE — A9541 TC99M SULFUR COLLOID: HCPCS | Performed by: SPECIALIST

## 2021-06-15 PROCEDURE — 82962 GLUCOSE BLOOD TEST: CPT

## 2021-06-15 PROCEDURE — C9803 HOPD COVID-19 SPEC COLLECT: HCPCS

## 2021-06-15 PROCEDURE — 78195 LYMPH SYSTEM IMAGING: CPT

## 2021-06-15 PROCEDURE — 25010000002 PROPOFOL 10 MG/ML EMULSION: Performed by: NURSE ANESTHETIST, CERTIFIED REGISTERED

## 2021-06-15 RX ORDER — FENTANYL CITRATE 50 UG/ML
25 INJECTION, SOLUTION INTRAMUSCULAR; INTRAVENOUS
Status: DISCONTINUED | OUTPATIENT
Start: 2021-06-15 | End: 2021-06-15 | Stop reason: HOSPADM

## 2021-06-15 RX ORDER — SODIUM CHLORIDE 0.9 % (FLUSH) 0.9 %
3-10 SYRINGE (ML) INJECTION AS NEEDED
Status: DISCONTINUED | OUTPATIENT
Start: 2021-06-15 | End: 2021-06-15 | Stop reason: HOSPADM

## 2021-06-15 RX ORDER — OXYCODONE AND ACETAMINOPHEN 10; 325 MG/1; MG/1
1 TABLET ORAL ONCE AS NEEDED
Status: DISCONTINUED | OUTPATIENT
Start: 2021-06-15 | End: 2021-06-15 | Stop reason: HOSPADM

## 2021-06-15 RX ORDER — FENTANYL CITRATE 50 UG/ML
25 INJECTION, SOLUTION INTRAMUSCULAR; INTRAVENOUS
Status: CANCELLED | OUTPATIENT
Start: 2021-06-15

## 2021-06-15 RX ORDER — FENTANYL CITRATE 50 UG/ML
INJECTION, SOLUTION INTRAMUSCULAR; INTRAVENOUS AS NEEDED
Status: DISCONTINUED | OUTPATIENT
Start: 2021-06-15 | End: 2021-06-15 | Stop reason: SURG

## 2021-06-15 RX ORDER — IBUPROFEN 600 MG/1
600 TABLET ORAL ONCE AS NEEDED
Status: DISCONTINUED | OUTPATIENT
Start: 2021-06-15 | End: 2021-06-15 | Stop reason: HOSPADM

## 2021-06-15 RX ORDER — ONDANSETRON 2 MG/ML
4 INJECTION INTRAMUSCULAR; INTRAVENOUS ONCE AS NEEDED
Status: DISCONTINUED | OUTPATIENT
Start: 2021-06-15 | End: 2021-06-15 | Stop reason: HOSPADM

## 2021-06-15 RX ORDER — ONDANSETRON 2 MG/ML
4 INJECTION INTRAMUSCULAR; INTRAVENOUS AS NEEDED
Status: CANCELLED | OUTPATIENT
Start: 2021-06-15 | End: 2021-06-15

## 2021-06-15 RX ORDER — LIDOCAINE AND PRILOCAINE 25; 25 MG/G; MG/G
CREAM TOPICAL ONCE
Status: COMPLETED | OUTPATIENT
Start: 2021-06-15 | End: 2021-06-15

## 2021-06-15 RX ORDER — LABETALOL HYDROCHLORIDE 5 MG/ML
5 INJECTION, SOLUTION INTRAVENOUS
Status: DISCONTINUED | OUTPATIENT
Start: 2021-06-15 | End: 2021-06-15 | Stop reason: HOSPADM

## 2021-06-15 RX ORDER — OXYCODONE AND ACETAMINOPHEN 7.5; 325 MG/1; MG/1
2 TABLET ORAL EVERY 4 HOURS PRN
Status: DISCONTINUED | OUTPATIENT
Start: 2021-06-15 | End: 2021-06-15 | Stop reason: HOSPADM

## 2021-06-15 RX ORDER — NALOXONE HCL 0.4 MG/ML
0.04 VIAL (ML) INJECTION AS NEEDED
Status: CANCELLED | OUTPATIENT
Start: 2021-06-15

## 2021-06-15 RX ORDER — LABETALOL HYDROCHLORIDE 5 MG/ML
5 INJECTION, SOLUTION INTRAVENOUS
Status: CANCELLED | OUTPATIENT
Start: 2021-06-15

## 2021-06-15 RX ORDER — FLUMAZENIL 0.1 MG/ML
0.2 INJECTION INTRAVENOUS AS NEEDED
Status: DISCONTINUED | OUTPATIENT
Start: 2021-06-15 | End: 2021-06-15 | Stop reason: HOSPADM

## 2021-06-15 RX ORDER — ROCURONIUM BROMIDE 10 MG/ML
INJECTION, SOLUTION INTRAVENOUS AS NEEDED
Status: DISCONTINUED | OUTPATIENT
Start: 2021-06-15 | End: 2021-06-15 | Stop reason: SURG

## 2021-06-15 RX ORDER — SODIUM CHLORIDE 0.9 % (FLUSH) 0.9 %
3 SYRINGE (ML) INJECTION AS NEEDED
Status: DISCONTINUED | OUTPATIENT
Start: 2021-06-15 | End: 2021-06-15 | Stop reason: HOSPADM

## 2021-06-15 RX ORDER — SODIUM CHLORIDE 0.9 % (FLUSH) 0.9 %
3 SYRINGE (ML) INJECTION EVERY 12 HOURS SCHEDULED
Status: DISCONTINUED | OUTPATIENT
Start: 2021-06-15 | End: 2021-06-15 | Stop reason: HOSPADM

## 2021-06-15 RX ORDER — HYDROCODONE BITARTRATE AND ACETAMINOPHEN 7.5; 325 MG/1; MG/1
1 TABLET ORAL EVERY 4 HOURS PRN
Qty: 20 TABLET | Refills: 0 | Status: SHIPPED | OUTPATIENT
Start: 2021-06-15 | End: 2021-09-21

## 2021-06-15 RX ORDER — MAGNESIUM HYDROXIDE 1200 MG/15ML
LIQUID ORAL AS NEEDED
Status: DISCONTINUED | OUTPATIENT
Start: 2021-06-15 | End: 2021-06-15 | Stop reason: HOSPADM

## 2021-06-15 RX ORDER — SODIUM CHLORIDE, SODIUM LACTATE, POTASSIUM CHLORIDE, CALCIUM CHLORIDE 600; 310; 30; 20 MG/100ML; MG/100ML; MG/100ML; MG/100ML
100 INJECTION, SOLUTION INTRAVENOUS CONTINUOUS
Status: DISCONTINUED | OUTPATIENT
Start: 2021-06-15 | End: 2021-06-15 | Stop reason: HOSPADM

## 2021-06-15 RX ORDER — BUPIVACAINE HYDROCHLORIDE AND EPINEPHRINE 5; 5 MG/ML; UG/ML
INJECTION, SOLUTION PERINEURAL AS NEEDED
Status: DISCONTINUED | OUTPATIENT
Start: 2021-06-15 | End: 2021-06-15 | Stop reason: HOSPADM

## 2021-06-15 RX ORDER — LIDOCAINE HYDROCHLORIDE 10 MG/ML
0.5 INJECTION, SOLUTION EPIDURAL; INFILTRATION; INTRACAUDAL; PERINEURAL ONCE AS NEEDED
Status: DISCONTINUED | OUTPATIENT
Start: 2021-06-15 | End: 2021-06-15 | Stop reason: HOSPADM

## 2021-06-15 RX ORDER — MINERAL OIL
OIL (ML) MISCELLANEOUS AS NEEDED
Status: DISCONTINUED | OUTPATIENT
Start: 2021-06-15 | End: 2021-06-15 | Stop reason: HOSPADM

## 2021-06-15 RX ORDER — ONDANSETRON 2 MG/ML
INJECTION INTRAMUSCULAR; INTRAVENOUS AS NEEDED
Status: DISCONTINUED | OUTPATIENT
Start: 2021-06-15 | End: 2021-06-15 | Stop reason: SURG

## 2021-06-15 RX ORDER — SODIUM CHLORIDE, SODIUM LACTATE, POTASSIUM CHLORIDE, CALCIUM CHLORIDE 600; 310; 30; 20 MG/100ML; MG/100ML; MG/100ML; MG/100ML
1000 INJECTION, SOLUTION INTRAVENOUS CONTINUOUS
Status: DISCONTINUED | OUTPATIENT
Start: 2021-06-15 | End: 2021-06-15 | Stop reason: HOSPADM

## 2021-06-15 RX ORDER — EPHEDRINE SULFATE 50 MG/ML
INJECTION, SOLUTION INTRAVENOUS AS NEEDED
Status: DISCONTINUED | OUTPATIENT
Start: 2021-06-15 | End: 2021-06-15 | Stop reason: SURG

## 2021-06-15 RX ORDER — HYDROMORPHONE HYDROCHLORIDE 1 MG/ML
0.5 INJECTION, SOLUTION INTRAMUSCULAR; INTRAVENOUS; SUBCUTANEOUS
Status: CANCELLED | OUTPATIENT
Start: 2021-06-15

## 2021-06-15 RX ORDER — LIDOCAINE HYDROCHLORIDE 40 MG/ML
SOLUTION TOPICAL AS NEEDED
Status: DISCONTINUED | OUTPATIENT
Start: 2021-06-15 | End: 2021-06-15 | Stop reason: SURG

## 2021-06-15 RX ORDER — LIDOCAINE HYDROCHLORIDE 20 MG/ML
INJECTION, SOLUTION EPIDURAL; INFILTRATION; INTRACAUDAL; PERINEURAL AS NEEDED
Status: DISCONTINUED | OUTPATIENT
Start: 2021-06-15 | End: 2021-06-15 | Stop reason: SURG

## 2021-06-15 RX ORDER — PROPOFOL 10 MG/ML
VIAL (ML) INTRAVENOUS AS NEEDED
Status: DISCONTINUED | OUTPATIENT
Start: 2021-06-15 | End: 2021-06-15 | Stop reason: SURG

## 2021-06-15 RX ORDER — LIDOCAINE HYDROCHLORIDE AND EPINEPHRINE 10; 10 MG/ML; UG/ML
INJECTION, SOLUTION INFILTRATION; PERINEURAL AS NEEDED
Status: DISCONTINUED | OUTPATIENT
Start: 2021-06-15 | End: 2021-06-15 | Stop reason: HOSPADM

## 2021-06-15 RX ORDER — OXYCODONE AND ACETAMINOPHEN 10; 325 MG/1; MG/1
1 TABLET ORAL ONCE AS NEEDED
Status: CANCELLED | OUTPATIENT
Start: 2021-06-15

## 2021-06-15 RX ORDER — NALOXONE HCL 0.4 MG/ML
0.4 VIAL (ML) INJECTION AS NEEDED
Status: DISCONTINUED | OUTPATIENT
Start: 2021-06-15 | End: 2021-06-15 | Stop reason: HOSPADM

## 2021-06-15 RX ORDER — FLUMAZENIL 0.1 MG/ML
0.2 INJECTION INTRAVENOUS AS NEEDED
Status: CANCELLED | OUTPATIENT
Start: 2021-06-15

## 2021-06-15 RX ADMIN — TECHNETIUM TC 99M SULFUR COLLOID 1 DOSE: KIT at 13:45

## 2021-06-15 RX ADMIN — SODIUM CHLORIDE, POTASSIUM CHLORIDE, SODIUM LACTATE AND CALCIUM CHLORIDE 1000 ML: 600; 310; 30; 20 INJECTION, SOLUTION INTRAVENOUS at 12:53

## 2021-06-15 RX ADMIN — LIDOCAINE AND PRILOCAINE: 25; 25 CREAM TOPICAL at 12:22

## 2021-06-15 RX ADMIN — PROPOFOL 150 MG: 10 INJECTION, EMULSION INTRAVENOUS at 16:03

## 2021-06-15 RX ADMIN — ONDANSETRON 4 MG: 2 INJECTION INTRAMUSCULAR; INTRAVENOUS at 17:21

## 2021-06-15 RX ADMIN — FENTANYL CITRATE 50 MCG: 50 INJECTION, SOLUTION INTRAMUSCULAR; INTRAVENOUS at 17:04

## 2021-06-15 RX ADMIN — SODIUM CHLORIDE, POTASSIUM CHLORIDE, SODIUM LACTATE AND CALCIUM CHLORIDE: 600; 310; 30; 20 INJECTION, SOLUTION INTRAVENOUS at 17:10

## 2021-06-15 RX ADMIN — LIDOCAINE HYDROCHLORIDE 100 MG: 20 INJECTION, SOLUTION EPIDURAL; INFILTRATION; INTRACAUDAL; PERINEURAL at 16:03

## 2021-06-15 RX ADMIN — FENTANYL CITRATE 50 MCG: 50 INJECTION, SOLUTION INTRAMUSCULAR; INTRAVENOUS at 17:33

## 2021-06-15 RX ADMIN — CEFAZOLIN 1 G: 1 INJECTION, POWDER, FOR SOLUTION INTRAMUSCULAR; INTRAVENOUS; PARENTERAL at 16:07

## 2021-06-15 RX ADMIN — VASOPRESSIN 1 ML: 20 INJECTION INTRAVENOUS at 16:13

## 2021-06-15 RX ADMIN — ROCURONIUM BROMIDE 30 MG: 10 INJECTION INTRAVENOUS at 16:03

## 2021-06-15 RX ADMIN — FENTANYL CITRATE 100 MCG: 50 INJECTION, SOLUTION INTRAMUSCULAR; INTRAVENOUS at 16:03

## 2021-06-15 RX ADMIN — LIDOCAINE HYDROCHLORIDE 1 EACH: 40 SOLUTION TOPICAL at 16:05

## 2021-06-15 RX ADMIN — VASOPRESSIN 1 ML: 20 INJECTION INTRAVENOUS at 16:18

## 2021-06-15 RX ADMIN — EPHEDRINE SULFATE 15 MG: 50 INJECTION INTRAVENOUS at 16:25

## 2021-06-15 NOTE — DISCHARGE INSTRUCTIONS

## 2021-06-15 NOTE — OP NOTE
Vielka Weller MD Operative Note    Attila Viramontes  6/15/2021    Pre-op Diagnosis:   MELANOMA LEFT SHOULDER    Post-op Diagnosis:     same    Procedure/CPT® Codes:      Procedure(s):  WIDE EXCISION MELANOMA LEFT SHOULDER WITH SPLIT THICKNESS SKIN GRAFT AND SENTINEL LYMPH NODE BIOPSY  SENTINEL LYMPH NODE BIOPSY    Surgeon(s):  Vielka Weller MD    Anesthesia: General    Staff:   Circulator: Laurent Olmos RN  Scrub Person: Renee Rodgers  Assistant: Olga Herrera; Brooke Ghosh    Estimated Blood Loss: minimal    Specimens:                Specimens     ID Source Type Tests Collected By Collected At Frozen?    A [place] Lymph Node Tissue · TISSUE PATHOLOGY EXAM   Vielka Weller MD 6/15/21 1635     Description: Left axilla sentinel lymph node    This specimen was not marked as sent.    B Shoulder, Left Tissue · TISSUE PATHOLOGY EXAM   Vielka Weller MD 6/15/21 1721     Description: melanoma left shoulder    This specimen was not marked as sent.            Drains: * No LDAs found *    Indications: Garcia excised mole from left shoulder revealed deep T4 malignant melanoma needs reexcision with sentinel node biopsy    Findings: 2 sentinel nodes excised from left axilla.    Complications: none    Procedure: The patient was brought to the operating room and placed in the supine position.  After induction of general anesthesia and infusion of IV antibiotics, the patient was prepped and draped in the usual sterile fashion.  Using the [place] probe as a guide incision was made in the left axilla.  The subcutaneous tissue was gently dissected with cautery and the axilla was entered.  The sentinel nodes were identified and isolated.  The lymphatic channels were tied with 2-0 silk.  Both sentinel nodes were hot ex vivo.  There is no further activity noted in the axilla.  It was irrigated and closed in layers of interrupted 2 oh and running 4-0 Vicryl.  We then addressed the left shoulder.  I measured 1.5 cm in  margin as the prior lesion had a 0.5 cm margin.  I then performed an elliptical incision measuring 12 x 3 cm.  It was amputated for the subcutaneous tissue with cautery and passed off.  We then irrigated the wound and raised flaps and then closed in layers of interrupted 2 oh and running 4-0 Vicryl.  No skin graft was necessary.  Dressing was placed patient was awakened and transferred to the cover him in stable condition of tolerated the procedure well.  At the end of the procedure all counts were correct.    Vielka Weller MD     Date: 6/15/2021  Time: 17:27 CDT

## 2021-06-16 NOTE — ANESTHESIA POSTPROCEDURE EVALUATION
Patient: Attila Viramontes    Procedure Summary     Date: 06/15/21 Room / Location:  [facility] OR 02 /  [facility]    Anesthesia Start: 1601 Anesthesia Stop: 1741    Procedures:       WIDE EXCISION MELANOMA LEFT SHOULDER WITH SPLIT THICKNESS SKIN GRAFT AND SENTINEL LYMPH NODE BIOPSY (Left Shoulder)      SENTINEL LYMPH NODE BIOPSY (Left Shoulder) Diagnosis: (MELANOMA LEFT SHOULDER)    Surgeons: Vielka Weller MD Provider: Ada Camargo CRNA    Anesthesia Type: general ASA Status: 3          Anesthesia Type: general    Vitals  Vitals Value Taken Time   /79 06/15/21 1750   Temp 97.2 °F (36.2 °C) 06/15/21 1750   Pulse 76 06/15/21 1753   Resp 18 06/15/21 1750   SpO2 100 % 06/15/21 1753   Vitals shown include unvalidated device data.        Post Anesthesia Care and Evaluation    Patient location during evaluation: PACU  Patient participation: complete - patient participated  Level of consciousness: awake and alert  Pain management: adequate  Airway patency: patent  Anesthetic complications: No anesthetic complications    Cardiovascular status: acceptable  Respiratory status: acceptable  Hydration status: acceptable    Comments: Blood pressure 153/72, pulse 72, temperature 97.2 °F (36.2 °C), temperature source Temporal, resp. rate 16, SpO2 100 %.    Pt discharged from PACU based on vicki score >8

## 2021-06-18 LAB
CYTO UR: NORMAL
LAB AP CASE REPORT: NORMAL
LAB AP DIAGNOSIS COMMENT: NORMAL
LAB AP SPECIAL STAINS: NORMAL
LAB AP SYNOPTIC CHECKLIST: NORMAL
PATH REPORT.FINAL DX SPEC: NORMAL
PATH REPORT.GROSS SPEC: NORMAL

## 2021-06-26 ENCOUNTER — TRANSCRIBE ORDERS (OUTPATIENT)
Dept: ADMINISTRATIVE | Facility: HOSPITAL | Age: 71
End: 2021-06-26

## 2021-06-26 DIAGNOSIS — N13.30 HYDRONEPHROSIS, UNSPECIFIED HYDRONEPHROSIS TYPE: Primary | ICD-10-CM

## 2021-07-02 ENCOUNTER — HOSPITAL ENCOUNTER (OUTPATIENT)
Dept: GENERAL RADIOLOGY | Facility: HOSPITAL | Age: 71
Discharge: HOME | End: 2021-07-02

## 2021-07-02 ENCOUNTER — OFFICE VISIT (OUTPATIENT)
Dept: ONCOLOGY | Facility: CLINIC | Age: 71
End: 2021-07-02

## 2021-07-02 ENCOUNTER — LAB (OUTPATIENT)
Dept: LAB | Facility: HOSPITAL | Age: 71
End: 2021-07-02

## 2021-07-02 ENCOUNTER — HOSPITAL ENCOUNTER (OUTPATIENT)
Dept: CT IMAGING | Facility: HOSPITAL | Age: 71
Discharge: HOME | End: 2021-07-02

## 2021-07-02 VITALS
HEART RATE: 92 BPM | WEIGHT: 158.7 LBS | BODY MASS INDEX: 22.72 KG/M2 | SYSTOLIC BLOOD PRESSURE: 132 MMHG | OXYGEN SATURATION: 94 % | TEMPERATURE: 98.5 F | DIASTOLIC BLOOD PRESSURE: 60 MMHG | RESPIRATION RATE: 18 BRPM | HEIGHT: 70 IN

## 2021-07-02 DIAGNOSIS — D64.9 NORMOCYTIC ANEMIA: Primary | ICD-10-CM

## 2021-07-02 DIAGNOSIS — C20 RECTAL CANCER (HCC): ICD-10-CM

## 2021-07-02 DIAGNOSIS — C43.9 MALIGNANT MELANOMA, UNSPECIFIED SITE (HCC): ICD-10-CM

## 2021-07-02 DIAGNOSIS — D64.9 NORMOCYTIC ANEMIA: ICD-10-CM

## 2021-07-02 DIAGNOSIS — C20 RECTAL CANCER (HCC): Primary | ICD-10-CM

## 2021-07-02 DIAGNOSIS — N13.30 HYDRONEPHROSIS, UNSPECIFIED HYDRONEPHROSIS TYPE: ICD-10-CM

## 2021-07-02 LAB
ALBUMIN SERPL-MCNC: 4 G/DL (ref 3.5–5.2)
ALBUMIN/GLOB SERPL: 0.9 G/DL
ALP SERPL-CCNC: 119 U/L (ref 39–117)
ALT SERPL W P-5'-P-CCNC: 15 U/L (ref 1–41)
ANION GAP SERPL CALCULATED.3IONS-SCNC: 8 MMOL/L (ref 5–15)
AST SERPL-CCNC: 12 U/L (ref 1–40)
BASOPHILS # BLD AUTO: 0.09 10*3/MM3 (ref 0–0.2)
BASOPHILS NFR BLD AUTO: 1.1 % (ref 0–1.5)
BILIRUB SERPL-MCNC: 0.3 MG/DL (ref 0–1.2)
BUN SERPL-MCNC: 25 MG/DL (ref 8–23)
BUN/CREAT SERPL: 22.1 (ref 7–25)
CALCIUM SPEC-SCNC: 9.5 MG/DL (ref 8.6–10.5)
CEA SERPL-MCNC: 2.19 NG/ML
CHLORIDE SERPL-SCNC: 109 MMOL/L (ref 98–107)
CO2 SERPL-SCNC: 25 MMOL/L (ref 22–29)
CREAT BLDA-MCNC: 1.3 MG/DL (ref 0.6–1.3)
CREAT SERPL-MCNC: 1.13 MG/DL (ref 0.76–1.27)
DEPRECATED RDW RBC AUTO: 66.7 FL (ref 37–54)
EOSINOPHIL # BLD AUTO: 0.42 10*3/MM3 (ref 0–0.4)
EOSINOPHIL NFR BLD AUTO: 5.2 % (ref 0.3–6.2)
ERYTHROCYTE [DISTWIDTH] IN BLOOD BY AUTOMATED COUNT: 21 % (ref 12.3–15.4)
FERRITIN SERPL-MCNC: 310 NG/ML (ref 30–400)
GFR SERPL CREATININE-BSD FRML MDRD: 64 ML/MIN/1.73
GLOBULIN UR ELPH-MCNC: 4.3 GM/DL
GLUCOSE SERPL-MCNC: 140 MG/DL (ref 65–99)
HCT VFR BLD AUTO: 35.3 % (ref 37.5–51)
HGB BLD-MCNC: 10.8 G/DL (ref 13–17.7)
IMM GRANULOCYTES # BLD AUTO: 0.09 10*3/MM3 (ref 0–0.05)
IMM GRANULOCYTES NFR BLD AUTO: 1.1 % (ref 0–0.5)
IRON 24H UR-MRATE: 44 MCG/DL (ref 59–158)
IRON SATN MFR SERPL: 15 % (ref 20–50)
LYMPHOCYTES # BLD AUTO: 0.84 10*3/MM3 (ref 0.7–3.1)
LYMPHOCYTES NFR BLD AUTO: 10.4 % (ref 19.6–45.3)
MAGNESIUM SERPL-MCNC: 2.5 MG/DL (ref 1.6–2.4)
MCH RBC QN AUTO: 26.6 PG (ref 26.6–33)
MCHC RBC AUTO-ENTMCNC: 30.6 G/DL (ref 31.5–35.7)
MCV RBC AUTO: 86.9 FL (ref 79–97)
MONOCYTES # BLD AUTO: 0.71 10*3/MM3 (ref 0.1–0.9)
MONOCYTES NFR BLD AUTO: 8.8 % (ref 5–12)
NEUTROPHILS NFR BLD AUTO: 5.94 10*3/MM3 (ref 1.7–7)
NEUTROPHILS NFR BLD AUTO: 73.4 % (ref 42.7–76)
NRBC BLD AUTO-RTO: 0 /100 WBC (ref 0–0.2)
PLATELET # BLD AUTO: 443 10*3/MM3 (ref 140–450)
PMV BLD AUTO: 8.2 FL (ref 6–12)
POTASSIUM SERPL-SCNC: 4.9 MMOL/L (ref 3.5–5.2)
PROT SERPL-MCNC: 8.3 G/DL (ref 6–8.5)
RBC # BLD AUTO: 4.06 10*6/MM3 (ref 4.14–5.8)
SODIUM SERPL-SCNC: 142 MMOL/L (ref 136–145)
TIBC SERPL-MCNC: 299 MCG/DL (ref 298–536)
TRANSFERRIN SERPL-MCNC: 201 MG/DL (ref 200–360)
WBC # BLD AUTO: 8.09 10*3/MM3 (ref 3.4–10.8)

## 2021-07-02 PROCEDURE — 82728 ASSAY OF FERRITIN: CPT

## 2021-07-02 PROCEDURE — 83735 ASSAY OF MAGNESIUM: CPT

## 2021-07-02 PROCEDURE — 83540 ASSAY OF IRON: CPT

## 2021-07-02 PROCEDURE — 84466 ASSAY OF TRANSFERRIN: CPT

## 2021-07-02 PROCEDURE — 36415 COLL VENOUS BLD VENIPUNCTURE: CPT

## 2021-07-02 PROCEDURE — 80053 COMPREHEN METABOLIC PANEL: CPT

## 2021-07-02 PROCEDURE — 85025 COMPLETE CBC W/AUTO DIFF WBC: CPT

## 2021-07-02 PROCEDURE — 25010000002 IOPAMIDOL 61 % SOLUTION: Performed by: NURSE PRACTITIONER

## 2021-07-02 PROCEDURE — 99214 OFFICE O/P EST MOD 30 MIN: CPT | Performed by: INTERNAL MEDICINE

## 2021-07-02 PROCEDURE — 82565 ASSAY OF CREATININE: CPT

## 2021-07-02 PROCEDURE — 71260 CT THORAX DX C+: CPT

## 2021-07-02 PROCEDURE — 82378 CARCINOEMBRYONIC ANTIGEN: CPT

## 2021-07-02 PROCEDURE — 74178 CT ABD&PLV WO CNTR FLWD CNTR: CPT

## 2021-07-02 RX ORDER — FERROUS SULFATE 325(65) MG
325 TABLET ORAL
Qty: 60 TABLET | Refills: 2 | Status: SHIPPED | OUTPATIENT
Start: 2021-07-02 | End: 2022-07-12

## 2021-07-02 RX ADMIN — IOPAMIDOL 100 ML: 612 INJECTION, SOLUTION INTRAVENOUS at 15:44

## 2021-07-06 ENCOUNTER — TELEPHONE (OUTPATIENT)
Dept: ONCOLOGY | Facility: CLINIC | Age: 71
End: 2021-07-06

## 2021-07-06 DIAGNOSIS — C20 RECTAL CANCER (HCC): Primary | ICD-10-CM

## 2021-07-06 DIAGNOSIS — R91.1 NODULE OF UPPER LOBE OF LEFT LUNG: ICD-10-CM

## 2021-07-06 DIAGNOSIS — R93.89 ABNORMAL CT OF THE CHEST: ICD-10-CM

## 2021-07-06 DIAGNOSIS — IMO0001 LUNG NODULE < 6CM ON CT: Primary | ICD-10-CM

## 2021-07-06 NOTE — TELEPHONE ENCOUNTER
----- Message from Shaq Boggs MD sent at 7/2/2021 12:49 PM CDT -----  Call patient to  [id number] # [id number] at [facility].  I sent the eRx.

## 2021-07-06 NOTE — TELEPHONE ENCOUNTER
Called patient and reviewed lab results.  Iron sat 15%.  Instructed that Dr Boggs has sent in prescription for Ferrous Sulfate due to low iron saturation.  Instructed patient on medication, dosage, frequency, and side effects.  Also reviewed CT scan results.  Instructed that Dr Boggs wants to repeat CT in 3 months due to left upper lobe nodule.  Patient v/u and agreeable to plan.

## 2021-08-16 ENCOUNTER — TELEPHONE (OUTPATIENT)
Dept: CARDIOLOGY | Facility: CLINIC | Age: 71
End: 2021-08-16

## 2021-08-16 NOTE — TELEPHONE ENCOUNTER
Patient's sister left message stating he has tried to reach us multiple times without success. He had his port removed, and would like to schedule the pacemaker insertion. Shae Okeefe MA

## 2021-08-18 NOTE — TELEPHONE ENCOUNTER
Notified patient that Dr Hameed will place orders for pacemaker, and as soon as he sees the call schedule he will instruct scheduling to set up the procedure for early to mid September. Patient is amenable. Shae Okeefe MA

## 2021-09-20 ENCOUNTER — TELEPHONE (OUTPATIENT)
Dept: VASCULAR SURGERY | Facility: CLINIC | Age: 71
End: 2021-09-20

## 2021-09-20 NOTE — TELEPHONE ENCOUNTER
Spoke with  Cal reminding him of his appointments for Tuesday, September 21st, 2021. Reminded Mr Mccall [facility] 730 am for 8 o'clock testing and follow up afterwards at 1015 am with Dr Reardon.  Cal confirmed he would be here.

## 2021-09-21 ENCOUNTER — OFFICE VISIT (OUTPATIENT)
Dept: VASCULAR SURGERY | Facility: CLINIC | Age: 71
End: 2021-09-21

## 2021-09-21 ENCOUNTER — HOSPITAL ENCOUNTER (OUTPATIENT)
Dept: ULTRASOUND IMAGING | Facility: HOSPITAL | Age: 71
Discharge: HOME | End: 2021-09-21
Admitting: SURGERY

## 2021-09-21 VITALS
DIASTOLIC BLOOD PRESSURE: 78 MMHG | HEIGHT: 71 IN | SYSTOLIC BLOOD PRESSURE: 132 MMHG | BODY MASS INDEX: 23.24 KG/M2 | WEIGHT: 166 LBS

## 2021-09-21 DIAGNOSIS — I65.21 STENOSIS OF RIGHT CAROTID ARTERY: Primary | ICD-10-CM

## 2021-09-21 DIAGNOSIS — E11.9 TYPE 2 DIABETES MELLITUS WITHOUT COMPLICATION, WITHOUT LONG-TERM CURRENT USE OF INSULIN (HCC): ICD-10-CM

## 2021-09-21 DIAGNOSIS — Z72.0 TOBACCO ABUSE: ICD-10-CM

## 2021-09-21 DIAGNOSIS — I65.22 CAROTID OCCLUSION, LEFT: ICD-10-CM

## 2021-09-21 DIAGNOSIS — E78.2 MIXED HYPERLIPIDEMIA: ICD-10-CM

## 2021-09-21 DIAGNOSIS — I65.21 STENOSIS OF RIGHT CAROTID ARTERY: ICD-10-CM

## 2021-09-21 PROCEDURE — 93880 EXTRACRANIAL BILAT STUDY: CPT | Performed by: SURGERY

## 2021-09-21 PROCEDURE — 93880 EXTRACRANIAL BILAT STUDY: CPT

## 2021-09-21 PROCEDURE — 99214 OFFICE O/P EST MOD 30 MIN: CPT | Performed by: SURGERY

## 2021-09-21 RX ORDER — ATORVASTATIN CALCIUM 20 MG/1
20 TABLET, FILM COATED ORAL DAILY
Qty: 30 TABLET | Refills: 1 | Status: SHIPPED | OUTPATIENT
Start: 2021-09-21 | End: 2022-03-24 | Stop reason: SDUPTHER

## 2021-09-21 NOTE — PROGRESS NOTES
"9/21/2021       EDUARDO Hebert MD  [address]  [place] [address]    Attila Viramontes  1950    Chief Complaint   Patient presents with   • Follow-up     1 Year Follow Up For Bilateral Carotid Artery Stenosis. Test [id number]  pad carotid bilateral. Patient denies any stroke like symptoms.    • Smoker     Patient is a Current Every Day Smoker    • Med Management     Verbally verified medications with patient        Dear EDUARDO Hebert MD   HPI  I had the pleasure of seeing your patient Attila Viramontes in the office today.  As you recall, Attila Viramontes is a 71 y.o.  male who you are currently following for routine health maintenance.   In April 2020, he was transferred to [facility] after onset of right sided hemiparesis, global aphasia, right lower facial drooping.  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   He denies any strokelike symptoms. He is maintained on aspirin.  He did have noninvasive testing performed today, which I did review in office.       Review of Systems   Constitutional: Negative.    HENT: Negative.    Eyes: Negative.    Respiratory: Negative.    Cardiovascular: Negative.    Gastrointestinal: Negative.    Endocrine: Negative.    Genitourinary: Negative.    Musculoskeletal: Negative.    Skin: Negative.    Allergic/Immunologic: Negative.    Neurological: Negative.    Hematological: Negative.    Psychiatric/Behavioral: Negative.    All other systems reviewed and are negative.    /78 (BP Location: Right arm, Patient Position: Sitting, Cuff Size: Adult)   Ht 179.1 cm (70.5\")   Wt 75.3 kg (166 lb)   BMI 23.48 kg/m²     Physical Exam  Vitals and nursing note reviewed.   Constitutional:       Appearance: He is well-developed.   HENT:      Head: Normocephalic and atraumatic.   Eyes:      General: No scleral icterus.     Pupils: Pupils are equal, round, and reactive to light.   Neck:      Thyroid: No thyromegaly.      Vascular: No " carotid bruit or JVD.   Cardiovascular:      Rate and Rhythm: Normal rate and regular rhythm.      Pulses:           Carotid pulses are 2+ on the right side and 2+ on the left side.       Femoral pulses are 2+ on the right side and 2+ on the left side.       Popliteal pulses are 2+ on the right side and 2+ on the left side.        Dorsalis pedis pulses are 2+ on the right side and 2+ on the left side.        Posterior tibial pulses are 2+ on the right side and 2+ on the left side.      Heart sounds: Normal heart sounds.   Pulmonary:      Effort: Pulmonary effort is normal.      Breath sounds: Normal breath sounds.   Abdominal:      General: Bowel sounds are normal. There is no distension or abdominal bruit.      Palpations: Abdomen is soft. There is no mass.      Tenderness: There is no abdominal tenderness.   Musculoskeletal:         General: Normal range of motion.      Cervical back: Neck supple.   Lymphadenopathy:      Cervical: No cervical adenopathy.   Skin:     General: Skin is warm and dry.   Neurological:      General: No focal deficit present.      Mental Status: He is alert and oriented to person, place, and time.      Cranial Nerves: No cranial nerve deficit.      Sensory: No sensory deficit.   Psychiatric:         Mood and Affect: Mood normal.         Behavior: Behavior normal.         Thought Content: Thought content normal.         Judgment: Judgment normal.     Diagnostic data:  EXAM: CT ANGIOGRAM NECK- - 4/14/2020 2:56 PM CDT     HISTORY: Stroke; [id number] neoplasm of rectum       COMPARISON: None.      DOSE LENGTH PRODUCT: 432 mGy cm. Automated exposure control was also  utilized to decrease patient radiation dose.     TECHNIQUE: CT images of the neck performed with contrast. 3D  postprocessing, including MIPs, performed and images saved to PACS.     Evaluation of the carotids performed using NASCET criteria.     FINDINGS:  3 vessel aortic arch with calcified atherosclerosis.     Right common  carotid artery patent. Calcified atherosclerosis at the  right carotid bifurcation. There is 50 percent stenosis at the origin of  the right internal carotid artery. Mild calcified right cavernous  carotid atherosclerosis.     Left common carotid artery patent. Left internal carotid artery  occlusion at the origin, which has calcified and noncalcified  atherosclerosis. Trickle flow at the distal intracranial left internal  carotid artery, which may represent retrograde collateral flow.     Right vertebral artery patent. Left vertebral artery patent. Partially  visualized posterior cerebral, superior cerebellar, and PICA patent.  AICA diminutive and not well assessed.     The aerodigestive tract is within normal limits of appearance.     No cervical adenopathy by size or morphologic criteria.     Major salivary glands within normal limits of appearance.     Normal CT appearance of the thyroid.     No acute or suspicious intracranial abnormality within the imaged field  of view.  Paranasal sinuses, mastoid air cells, and middle ears are  well-aerated.  Orbits are unremarkable.  Poor dentition.     Multilevel degenerative changes in the visualized spine without acute or  suspicious bony finding.     Lung apices grossly clear.     IMPRESSION:  1. Left internal carotid artery occlusion beginning at the origin.  Minimal opacification of the distal left internal carotid artery, which  may represent retrograde collateral flow.  2. Right internal carotid artery patent.  3. Bilateral vertebral arteries patent.  4. See separately dictated CTA head of the same day.     Results communicated to Dr. Ethan Montalvo at 3:41 PM on 04/14/2020.  This report was finalized on 04/14/2020 15:47 by Dr Zoey Reardon MD.    Carotid duplex shows 50 to 69% carotid occlusive disease on the right and left carotid occlusion.    Patient Active Problem List   Diagnosis   • Abnormal CT of the abdomen   • Rectal cancer (CMS/HCC)   • Current every  day smoker   • Impaired gait and mobility   • Bright red rectal bleeding   • 2nd degree AV block   • Tobacco abuse   • Normocytic anemia   • Chemotherapy induced neutropenia (CMS/HCC)   • History of radiation therapy   • Cancer related pain   • Hypotension due to hypovolemia   • Carotid artery stenosis, symptomatic, right   • Carotid occlusion, left   • Type 2 diabetes mellitus without complication, without long-term current use of insulin (CMS/HCC)   • Iron deficiency anemia   • Ileostomy in place (CMS/HCC)   • 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 Stenosis of right carotid artery  CT Angiogram Neck   2. Tobacco abuse     3. Carotid occlusion, left     4. Type 2 diabetes mellitus without complication, without long-term current use of insulin (CMS/HCC)     5. Mixed hyperlipidemia         Plan: After thoroughly evaluating Attila Viramontes, I believe the best course of action is to remain conservative from a vascular surgery standpoint.  Currently, the patient appears to be doing quite well without any current strokelike symptoms.  His left carotid system is currently occluded and does not require any surgical intervention moving forward.  His right carotid system is in the 50 to 60% range and this only requires continued surveillance.  I will see him back in 1 years time with a CTA of the neck for continued surveillance.  I did discuss vascular risk factors as they pertain to the progression of vascular disease including controlling hyperlipidemia.  This risk factors currently controlled.  He had stopped taking the Lipitor but I sent a new prescription and for him to start taking it again.  We did discuss tobacco abuse cessation for over 10 minutes.  The patient is to continue taking their medications as previously discussed.   This was all discussed in full with complete understanding.  Thank you for allowing me to participate in the care of your patient.  Please do not hesitate to call with  any questions or concerns.  We will keep you aware of any further encounters with Attila Viramontes.        Sincerely yours,         DO Emilee Ulloa D Ryan, MD

## 2021-09-22 ENCOUNTER — OFFICE VISIT (OUTPATIENT)
Dept: INTERNAL MEDICINE | Facility: CLINIC | Age: 71
End: 2021-09-22

## 2021-09-22 VITALS
BODY MASS INDEX: 23.48 KG/M2 | HEART RATE: 105 BPM | SYSTOLIC BLOOD PRESSURE: 108 MMHG | TEMPERATURE: 97.5 F | HEIGHT: 71 IN | WEIGHT: 167.7 LBS | OXYGEN SATURATION: 99 % | DIASTOLIC BLOOD PRESSURE: 60 MMHG | RESPIRATION RATE: 15 BRPM

## 2021-09-22 DIAGNOSIS — E11.9 TYPE 2 DIABETES MELLITUS WITHOUT COMPLICATION, WITHOUT LONG-TERM CURRENT USE OF INSULIN (HCC): Primary | ICD-10-CM

## 2021-09-22 DIAGNOSIS — E78.2 MIXED HYPERLIPIDEMIA: ICD-10-CM

## 2021-09-22 DIAGNOSIS — I44.1 2ND DEGREE AV BLOCK: ICD-10-CM

## 2021-09-22 LAB — HBA1C MFR BLD: 6.6 %

## 2021-09-22 PROCEDURE — 99213 OFFICE O/P EST LOW 20 MIN: CPT | Performed by: INTERNAL MEDICINE

## 2021-09-22 PROCEDURE — 83036 HEMOGLOBIN GLYCOSYLATED A1C: CPT | Performed by: INTERNAL MEDICINE

## 2021-09-24 ENCOUNTER — LAB (OUTPATIENT)
Dept: LAB | Facility: HOSPITAL | Age: 71
End: 2021-09-24

## 2021-09-24 ENCOUNTER — HOSPITAL ENCOUNTER (OUTPATIENT)
Dept: CT IMAGING | Facility: HOSPITAL | Age: 71
Discharge: HOME | End: 2021-09-24
Admitting: INTERNAL MEDICINE

## 2021-09-24 DIAGNOSIS — C20 RECTAL CANCER (HCC): ICD-10-CM

## 2021-09-24 DIAGNOSIS — IMO0001 LUNG NODULE < 6CM ON CT: ICD-10-CM

## 2021-09-24 LAB
ALBUMIN SERPL-MCNC: 3.9 G/DL (ref 3.5–5.2)
ALBUMIN/GLOB SERPL: 1.1 G/DL
ALP SERPL-CCNC: 110 U/L (ref 39–117)
ALT SERPL W P-5'-P-CCNC: 11 U/L (ref 1–41)
ANION GAP SERPL CALCULATED.3IONS-SCNC: 12 MMOL/L (ref 5–15)
AST SERPL-CCNC: 13 U/L (ref 1–40)
BASOPHILS # BLD AUTO: 0.1 10*3/MM3 (ref 0–0.2)
BASOPHILS NFR BLD AUTO: 0.9 % (ref 0–1.5)
BILIRUB SERPL-MCNC: <0.2 MG/DL (ref 0–1.2)
BUN SERPL-MCNC: 21 MG/DL (ref 8–23)
BUN/CREAT SERPL: 19.1 (ref 7–25)
CALCIUM SPEC-SCNC: 9.1 MG/DL (ref 8.6–10.5)
CEA SERPL-MCNC: 2.11 NG/ML
CHLORIDE SERPL-SCNC: 105 MMOL/L (ref 98–107)
CO2 SERPL-SCNC: 22 MMOL/L (ref 22–29)
CREAT BLDA-MCNC: 1.2 MG/DL (ref 0.6–1.3)
CREAT SERPL-MCNC: 1.1 MG/DL (ref 0.76–1.27)
DEPRECATED RDW RBC AUTO: 52.2 FL (ref 37–54)
EOSINOPHIL # BLD AUTO: 0.43 10*3/MM3 (ref 0–0.4)
EOSINOPHIL NFR BLD AUTO: 3.7 % (ref 0.3–6.2)
ERYTHROCYTE [DISTWIDTH] IN BLOOD BY AUTOMATED COUNT: 16.4 % (ref 12.3–15.4)
GFR SERPL CREATININE-BSD FRML MDRD: 66 ML/MIN/1.73
GLOBULIN UR ELPH-MCNC: 3.6 GM/DL
GLUCOSE SERPL-MCNC: 144 MG/DL (ref 65–99)
HCT VFR BLD AUTO: 39 % (ref 37.5–51)
HGB BLD-MCNC: 12.5 G/DL (ref 13–17.7)
IMM GRANULOCYTES # BLD AUTO: 0.12 10*3/MM3 (ref 0–0.05)
IMM GRANULOCYTES NFR BLD AUTO: 1 % (ref 0–0.5)
LYMPHOCYTES # BLD AUTO: 1.25 10*3/MM3 (ref 0.7–3.1)
LYMPHOCYTES NFR BLD AUTO: 10.8 % (ref 19.6–45.3)
MAGNESIUM SERPL-MCNC: 1.8 MG/DL (ref 1.6–2.4)
MCH RBC QN AUTO: 27.8 PG (ref 26.6–33)
MCHC RBC AUTO-ENTMCNC: 32.1 G/DL (ref 31.5–35.7)
MCV RBC AUTO: 86.9 FL (ref 79–97)
MONOCYTES # BLD AUTO: 0.76 10*3/MM3 (ref 0.1–0.9)
MONOCYTES NFR BLD AUTO: 6.6 % (ref 5–12)
NEUTROPHILS NFR BLD AUTO: 77 % (ref 42.7–76)
NEUTROPHILS NFR BLD AUTO: 8.9 10*3/MM3 (ref 1.7–7)
NRBC BLD AUTO-RTO: 0 /100 WBC (ref 0–0.2)
PLATELET # BLD AUTO: 383 10*3/MM3 (ref 140–450)
PMV BLD AUTO: 8.6 FL (ref 6–12)
POTASSIUM SERPL-SCNC: 4.5 MMOL/L (ref 3.5–5.2)
PROT SERPL-MCNC: 7.5 G/DL (ref 6–8.5)
RBC # BLD AUTO: 4.49 10*6/MM3 (ref 4.14–5.8)
SODIUM SERPL-SCNC: 139 MMOL/L (ref 136–145)
WBC # BLD AUTO: 11.56 10*3/MM3 (ref 3.4–10.8)

## 2021-09-24 PROCEDURE — 71260 CT THORAX DX C+: CPT

## 2021-09-24 PROCEDURE — 25010000002 IOPAMIDOL 61 % SOLUTION: Performed by: INTERNAL MEDICINE

## 2021-09-24 PROCEDURE — 82378 CARCINOEMBRYONIC ANTIGEN: CPT

## 2021-09-24 PROCEDURE — 82565 ASSAY OF CREATININE: CPT

## 2021-09-24 PROCEDURE — 80053 COMPREHEN METABOLIC PANEL: CPT

## 2021-09-24 PROCEDURE — 83735 ASSAY OF MAGNESIUM: CPT

## 2021-09-24 PROCEDURE — 85025 COMPLETE CBC W/AUTO DIFF WBC: CPT

## 2021-09-24 PROCEDURE — 36415 COLL VENOUS BLD VENIPUNCTURE: CPT

## 2021-09-24 RX ADMIN — IOPAMIDOL 100 ML: 612 INJECTION, SOLUTION INTRAVENOUS at 09:05

## 2021-10-01 ENCOUNTER — OFFICE VISIT (OUTPATIENT)
Dept: ONCOLOGY | Facility: CLINIC | Age: 71
End: 2021-10-01

## 2021-10-01 VITALS
BODY MASS INDEX: 23.03 KG/M2 | TEMPERATURE: 98.8 F | DIASTOLIC BLOOD PRESSURE: 58 MMHG | HEART RATE: 102 BPM | WEIGHT: 164.5 LBS | OXYGEN SATURATION: 97 % | HEIGHT: 71 IN | SYSTOLIC BLOOD PRESSURE: 112 MMHG | RESPIRATION RATE: 18 BRPM

## 2021-10-01 DIAGNOSIS — C20 RECTAL CANCER (HCC): Primary | ICD-10-CM

## 2021-10-01 DIAGNOSIS — D50.8 IRON DEFICIENCY ANEMIA SECONDARY TO INADEQUATE DIETARY IRON INTAKE: ICD-10-CM

## 2021-10-01 PROCEDURE — 99214 OFFICE O/P EST MOD 30 MIN: CPT | Performed by: INTERNAL MEDICINE

## 2021-11-22 NOTE — PROGRESS NOTES
[id number] Called Madalyn CLEVELAND to report labwork complete. Patient had abnormals and does have an abscess in mouth being treated. Doctor to review and give orders to treat or not. Yesenia Hernandez RN    1277 Madalyn CLEVELAND called and reported doctor ok for pt to be treated. Yesenia Hernandez RN  
No

## 2021-12-27 ENCOUNTER — LAB (OUTPATIENT)
Dept: LAB | Facility: HOSPITAL | Age: 71
End: 2021-12-27

## 2021-12-27 DIAGNOSIS — C20 RECTAL CANCER (HCC): ICD-10-CM

## 2021-12-27 DIAGNOSIS — D50.8 IRON DEFICIENCY ANEMIA SECONDARY TO INADEQUATE DIETARY IRON INTAKE: ICD-10-CM

## 2021-12-27 LAB
ALBUMIN SERPL-MCNC: 3.8 G/DL (ref 3.5–5.2)
ALBUMIN/GLOB SERPL: 1 G/DL
ALP SERPL-CCNC: 121 U/L (ref 39–117)
ALT SERPL W P-5'-P-CCNC: 20 U/L (ref 1–41)
ANION GAP SERPL CALCULATED.3IONS-SCNC: 11 MMOL/L (ref 5–15)
AST SERPL-CCNC: 14 U/L (ref 1–40)
BASOPHILS # BLD AUTO: 0.07 10*3/MM3 (ref 0–0.2)
BASOPHILS NFR BLD AUTO: 0.8 % (ref 0–1.5)
BILIRUB SERPL-MCNC: <0.2 MG/DL (ref 0–1.2)
BUN SERPL-MCNC: 23 MG/DL (ref 8–23)
BUN/CREAT SERPL: 20 (ref 7–25)
CALCIUM SPEC-SCNC: 9.3 MG/DL (ref 8.6–10.5)
CEA SERPL-MCNC: 1.89 NG/ML
CHLORIDE SERPL-SCNC: 104 MMOL/L (ref 98–107)
CO2 SERPL-SCNC: 21 MMOL/L (ref 22–29)
CREAT SERPL-MCNC: 1.15 MG/DL (ref 0.76–1.27)
DEPRECATED RDW RBC AUTO: 50.6 FL (ref 37–54)
EOSINOPHIL # BLD AUTO: 0.21 10*3/MM3 (ref 0–0.4)
EOSINOPHIL NFR BLD AUTO: 2.4 % (ref 0.3–6.2)
ERYTHROCYTE [DISTWIDTH] IN BLOOD BY AUTOMATED COUNT: 14.6 % (ref 12.3–15.4)
FERRITIN SERPL-MCNC: 219.6 NG/ML (ref 30–400)
GFR SERPL CREATININE-BSD FRML MDRD: 63 ML/MIN/1.73
GLOBULIN UR ELPH-MCNC: 4 GM/DL
GLUCOSE SERPL-MCNC: 219 MG/DL (ref 65–99)
HCT VFR BLD AUTO: 39.2 % (ref 37.5–51)
HGB BLD-MCNC: 12.2 G/DL (ref 13–17.7)
IMM GRANULOCYTES # BLD AUTO: 0.14 10*3/MM3 (ref 0–0.05)
IMM GRANULOCYTES NFR BLD AUTO: 1.6 % (ref 0–0.5)
IRON 24H UR-MRATE: 45 MCG/DL (ref 59–158)
IRON SATN MFR SERPL: 16 % (ref 20–50)
LYMPHOCYTES # BLD AUTO: 1.23 10*3/MM3 (ref 0.7–3.1)
LYMPHOCYTES NFR BLD AUTO: 14 % (ref 19.6–45.3)
MCH RBC QN AUTO: 29.5 PG (ref 26.6–33)
MCHC RBC AUTO-ENTMCNC: 31.1 G/DL (ref 31.5–35.7)
MCV RBC AUTO: 94.7 FL (ref 79–97)
MONOCYTES # BLD AUTO: 0.66 10*3/MM3 (ref 0.1–0.9)
MONOCYTES NFR BLD AUTO: 7.5 % (ref 5–12)
NEUTROPHILS NFR BLD AUTO: 6.48 10*3/MM3 (ref 1.7–7)
NEUTROPHILS NFR BLD AUTO: 73.7 % (ref 42.7–76)
NRBC BLD AUTO-RTO: 0 /100 WBC (ref 0–0.2)
PLATELET # BLD AUTO: 437 10*3/MM3 (ref 140–450)
PMV BLD AUTO: 8.5 FL (ref 6–12)
POTASSIUM SERPL-SCNC: 4.7 MMOL/L (ref 3.5–5.2)
PROT SERPL-MCNC: 7.8 G/DL (ref 6–8.5)
RBC # BLD AUTO: 4.14 10*6/MM3 (ref 4.14–5.8)
SODIUM SERPL-SCNC: 136 MMOL/L (ref 136–145)
TIBC SERPL-MCNC: 283 MCG/DL (ref 298–536)
TRANSFERRIN SERPL-MCNC: 190 MG/DL (ref 200–360)
WBC NRBC COR # BLD: 8.79 10*3/MM3 (ref 3.4–10.8)

## 2021-12-27 PROCEDURE — 82378 CARCINOEMBRYONIC ANTIGEN: CPT

## 2021-12-27 PROCEDURE — 85025 COMPLETE CBC W/AUTO DIFF WBC: CPT

## 2021-12-27 PROCEDURE — 80053 COMPREHEN METABOLIC PANEL: CPT

## 2021-12-27 PROCEDURE — 82728 ASSAY OF FERRITIN: CPT

## 2021-12-27 PROCEDURE — 84466 ASSAY OF TRANSFERRIN: CPT

## 2021-12-27 PROCEDURE — 36415 COLL VENOUS BLD VENIPUNCTURE: CPT

## 2021-12-27 PROCEDURE — 83540 ASSAY OF IRON: CPT

## 2021-12-28 RX ORDER — PROCHLORPERAZINE MALEATE 10 MG
10 TABLET ORAL EVERY 4 HOURS PRN
Qty: 60 TABLET | Refills: 2 | Status: CANCELLED | OUTPATIENT
Start: 2021-12-28

## 2022-01-10 ENCOUNTER — OFFICE VISIT (OUTPATIENT)
Dept: ONCOLOGY | Facility: CLINIC | Age: 72
End: 2022-01-10

## 2022-01-10 VITALS
DIASTOLIC BLOOD PRESSURE: 70 MMHG | RESPIRATION RATE: 18 BRPM | TEMPERATURE: 98.2 F | OXYGEN SATURATION: 98 % | WEIGHT: 178.4 LBS | BODY MASS INDEX: 24.98 KG/M2 | HEIGHT: 71 IN | SYSTOLIC BLOOD PRESSURE: 142 MMHG | HEART RATE: 88 BPM

## 2022-01-10 DIAGNOSIS — D50.8 IRON DEFICIENCY ANEMIA SECONDARY TO INADEQUATE DIETARY IRON INTAKE: ICD-10-CM

## 2022-01-10 DIAGNOSIS — C20 RECTAL CANCER: Primary | ICD-10-CM

## 2022-01-10 PROCEDURE — 99214 OFFICE O/P EST MOD 30 MIN: CPT | Performed by: INTERNAL MEDICINE

## 2022-03-24 ENCOUNTER — OFFICE VISIT (OUTPATIENT)
Dept: INTERNAL MEDICINE | Facility: CLINIC | Age: 72
End: 2022-03-24

## 2022-03-24 ENCOUNTER — INFUSION (OUTPATIENT)
Dept: ONCOLOGY | Facility: HOSPITAL | Age: 72
End: 2022-03-24

## 2022-03-24 ENCOUNTER — TELEPHONE (OUTPATIENT)
Dept: ONCOLOGY | Facility: CLINIC | Age: 72
End: 2022-03-24

## 2022-03-24 ENCOUNTER — LAB (OUTPATIENT)
Dept: LAB | Facility: HOSPITAL | Age: 72
End: 2022-03-24

## 2022-03-24 ENCOUNTER — APPOINTMENT (OUTPATIENT)
Dept: CT IMAGING | Facility: HOSPITAL | Age: 72
End: 2022-03-24

## 2022-03-24 VITALS
BODY MASS INDEX: 25.34 KG/M2 | OXYGEN SATURATION: 98 % | HEART RATE: 75 BPM | HEIGHT: 71 IN | SYSTOLIC BLOOD PRESSURE: 130 MMHG | RESPIRATION RATE: 15 BRPM | DIASTOLIC BLOOD PRESSURE: 80 MMHG | WEIGHT: 181 LBS | TEMPERATURE: 97.5 F

## 2022-03-24 VITALS
OXYGEN SATURATION: 99 % | WEIGHT: 179 LBS | HEART RATE: 77 BPM | SYSTOLIC BLOOD PRESSURE: 136 MMHG | RESPIRATION RATE: 18 BRPM | TEMPERATURE: 97.8 F | HEIGHT: 70 IN | DIASTOLIC BLOOD PRESSURE: 57 MMHG | BODY MASS INDEX: 25.62 KG/M2

## 2022-03-24 DIAGNOSIS — E78.2 MIXED HYPERLIPIDEMIA: ICD-10-CM

## 2022-03-24 DIAGNOSIS — Z13.31 DEPRESSION SCREEN: ICD-10-CM

## 2022-03-24 DIAGNOSIS — E11.9 TYPE 2 DIABETES MELLITUS WITHOUT COMPLICATION, WITHOUT LONG-TERM CURRENT USE OF INSULIN: ICD-10-CM

## 2022-03-24 DIAGNOSIS — D50.8 IRON DEFICIENCY ANEMIA SECONDARY TO INADEQUATE DIETARY IRON INTAKE: ICD-10-CM

## 2022-03-24 DIAGNOSIS — C20 RECTAL CANCER: Primary | ICD-10-CM

## 2022-03-24 DIAGNOSIS — C20 RECTAL CANCER: ICD-10-CM

## 2022-03-24 DIAGNOSIS — Z91.81 AT LOW RISK FOR FALL: ICD-10-CM

## 2022-03-24 DIAGNOSIS — N28.9 FUNCTION KIDNEY DECREASED: Primary | ICD-10-CM

## 2022-03-24 DIAGNOSIS — Z00.00 MEDICARE ANNUAL WELLNESS VISIT, SUBSEQUENT: Primary | ICD-10-CM

## 2022-03-24 DIAGNOSIS — Z00.00 MEDICARE ANNUAL WELLNESS VISIT, SUBSEQUENT: ICD-10-CM

## 2022-03-24 LAB
ALBUMIN SERPL-MCNC: 4.5 G/DL (ref 3.5–5.2)
ALBUMIN/GLOB SERPL: 1.1 G/DL
ALP SERPL-CCNC: 101 U/L (ref 39–117)
ALT SERPL W P-5'-P-CCNC: 10 U/L (ref 1–41)
ANION GAP SERPL CALCULATED.3IONS-SCNC: 11 MMOL/L (ref 5–15)
AST SERPL-CCNC: 13 U/L (ref 1–40)
BASOPHILS # BLD AUTO: 0.09 10*3/MM3 (ref 0–0.2)
BASOPHILS NFR BLD AUTO: 1.1 % (ref 0–1.5)
BILIRUB SERPL-MCNC: 0.2 MG/DL (ref 0–1.2)
BUN SERPL-MCNC: 24 MG/DL (ref 8–23)
BUN/CREAT SERPL: 18.3 (ref 7–25)
CALCIUM SPEC-SCNC: 10.6 MG/DL (ref 8.6–10.5)
CEA SERPL-MCNC: 2.19 NG/ML
CHLORIDE SERPL-SCNC: 102 MMOL/L (ref 98–107)
CHOLEST SERPL-MCNC: 181 MG/DL (ref 0–200)
CO2 SERPL-SCNC: 26 MMOL/L (ref 22–29)
CREAT SERPL-MCNC: 1.31 MG/DL (ref 0.76–1.27)
DEPRECATED RDW RBC AUTO: 49.7 FL (ref 37–54)
EGFRCR SERPLBLD CKD-EPI 2021: 58.6 ML/MIN/1.73
EOSINOPHIL # BLD AUTO: 0.29 10*3/MM3 (ref 0–0.4)
EOSINOPHIL NFR BLD AUTO: 3.7 % (ref 0.3–6.2)
ERYTHROCYTE [DISTWIDTH] IN BLOOD BY AUTOMATED COUNT: 14.3 % (ref 12.3–15.4)
FERRITIN SERPL-MCNC: 266.3 NG/ML (ref 30–400)
GLOBULIN UR ELPH-MCNC: 4 GM/DL
GLUCOSE SERPL-MCNC: 112 MG/DL (ref 65–99)
HBA1C MFR BLD: 7.3 % (ref 4.8–5.6)
HCT VFR BLD AUTO: 43.9 % (ref 37.5–51)
HDLC SERPL-MCNC: 38 MG/DL (ref 40–60)
HGB BLD-MCNC: 13.8 G/DL (ref 13–17.7)
IMM GRANULOCYTES # BLD AUTO: 0.06 10*3/MM3 (ref 0–0.05)
IMM GRANULOCYTES NFR BLD AUTO: 0.8 % (ref 0–0.5)
IRON 24H UR-MRATE: 63 MCG/DL (ref 59–158)
IRON SATN MFR SERPL: 20 % (ref 20–50)
LDLC SERPL CALC-MCNC: 108 MG/DL (ref 0–100)
LDLC/HDLC SERPL: 2.71 {RATIO}
LYMPHOCYTES # BLD AUTO: 1.36 10*3/MM3 (ref 0.7–3.1)
LYMPHOCYTES NFR BLD AUTO: 17.2 % (ref 19.6–45.3)
MCH RBC QN AUTO: 29.9 PG (ref 26.6–33)
MCHC RBC AUTO-ENTMCNC: 31.4 G/DL (ref 31.5–35.7)
MCV RBC AUTO: 95 FL (ref 79–97)
MONOCYTES # BLD AUTO: 0.67 10*3/MM3 (ref 0.1–0.9)
MONOCYTES NFR BLD AUTO: 8.5 % (ref 5–12)
NEUTROPHILS NFR BLD AUTO: 5.45 10*3/MM3 (ref 1.7–7)
NEUTROPHILS NFR BLD AUTO: 68.7 % (ref 42.7–76)
NRBC BLD AUTO-RTO: 0 /100 WBC (ref 0–0.2)
PLATELET # BLD AUTO: 495 10*3/MM3 (ref 140–450)
PMV BLD AUTO: 8.7 FL (ref 6–12)
POTASSIUM SERPL-SCNC: 5.1 MMOL/L (ref 3.5–5.2)
PROT SERPL-MCNC: 8.5 G/DL (ref 6–8.5)
RBC # BLD AUTO: 4.62 10*6/MM3 (ref 4.14–5.8)
SODIUM SERPL-SCNC: 139 MMOL/L (ref 136–145)
TIBC SERPL-MCNC: 316 MCG/DL (ref 298–536)
TRANSFERRIN SERPL-MCNC: 212 MG/DL (ref 200–360)
TRIGL SERPL-MCNC: 200 MG/DL (ref 0–150)
VLDLC SERPL-MCNC: 35 MG/DL (ref 5–40)
WBC NRBC COR # BLD: 7.92 10*3/MM3 (ref 3.4–10.8)

## 2022-03-24 PROCEDURE — 1170F FXNL STATUS ASSESSED: CPT | Performed by: INTERNAL MEDICINE

## 2022-03-24 PROCEDURE — 82378 CARCINOEMBRYONIC ANTIGEN: CPT

## 2022-03-24 PROCEDURE — 96360 HYDRATION IV INFUSION INIT: CPT

## 2022-03-24 PROCEDURE — 96361 HYDRATE IV INFUSION ADD-ON: CPT

## 2022-03-24 PROCEDURE — 83036 HEMOGLOBIN GLYCOSYLATED A1C: CPT

## 2022-03-24 PROCEDURE — 80053 COMPREHEN METABOLIC PANEL: CPT

## 2022-03-24 PROCEDURE — 0054A COVID-19 (PFIZER) 12+ YRS: CPT | Performed by: INTERNAL MEDICINE

## 2022-03-24 PROCEDURE — 85025 COMPLETE CBC W/AUTO DIFF WBC: CPT

## 2022-03-24 PROCEDURE — 1126F AMNT PAIN NOTED NONE PRSNT: CPT | Performed by: INTERNAL MEDICINE

## 2022-03-24 PROCEDURE — 80061 LIPID PANEL: CPT

## 2022-03-24 PROCEDURE — G0439 PPPS, SUBSEQ VISIT: HCPCS | Performed by: INTERNAL MEDICINE

## 2022-03-24 PROCEDURE — 82728 ASSAY OF FERRITIN: CPT

## 2022-03-24 PROCEDURE — 91305 COVID-19 (PFIZER) 12+ YRS: CPT | Performed by: INTERNAL MEDICINE

## 2022-03-24 PROCEDURE — 1159F MED LIST DOCD IN RCRD: CPT | Performed by: INTERNAL MEDICINE

## 2022-03-24 PROCEDURE — 36415 COLL VENOUS BLD VENIPUNCTURE: CPT

## 2022-03-24 PROCEDURE — 83540 ASSAY OF IRON: CPT

## 2022-03-24 PROCEDURE — 84466 ASSAY OF TRANSFERRIN: CPT

## 2022-03-24 RX ORDER — ATORVASTATIN CALCIUM 20 MG/1
20 TABLET, FILM COATED ORAL DAILY
Qty: 30 TABLET | Refills: 5 | Status: SHIPPED | OUTPATIENT
Start: 2022-03-24 | End: 2022-10-04

## 2022-03-24 RX ORDER — SODIUM CHLORIDE 0.9 % (FLUSH) 0.9 %
10 SYRINGE (ML) INJECTION AS NEEDED
Status: CANCELLED | OUTPATIENT
Start: 2022-03-24

## 2022-03-24 RX ORDER — HEPARIN SODIUM (PORCINE) LOCK FLUSH IV SOLN 100 UNIT/ML 100 UNIT/ML
500 SOLUTION INTRAVENOUS AS NEEDED
Status: CANCELLED | OUTPATIENT
Start: 2022-03-24

## 2022-03-24 RX ADMIN — SODIUM CHLORIDE 1000 ML: 9 INJECTION, SOLUTION INTRAVENOUS at 13:22

## 2022-03-24 NOTE — TELEPHONE ENCOUNTER
----- Message from Shaq Boggs MD sent at 3/24/2022 11:33 AM CDT -----  Order 1 liter NS over 2 hours. BMP in am to check calcium.    Left patient a detailed voicemail with the above information and asked for him to return my phone call in regards to getting him scheduled for the fluids.    rp

## 2022-03-24 NOTE — PROGRESS NOTES
The ABCs of the Annual Wellness Visit  Subsequent Medicare Wellness Visit    Chief Complaint   Patient presents with   • Medicare Wellness-subsequent      Subjective    History of Present Illness:  Attila Viramontes is a 71 y.o. male who presents for a Subsequent Medicare Wellness Visit.    He has been out of his Metformin for the last 2-1/2 months.  His last A1c was 6.6% 6 months ago    The following portions of the patient's history were reviewed and   updated as appropriate: allergies, current medications, past family history, past medical history, past social history, past surgical history and problem list.    Compared to one year ago, the patient feels his physical   health is better.    Compared to one year ago, the patient feels his mental   health is better.    Recent Hospitalizations:  He was not admitted to the hospital during the last year.       Current Medical Providers:  Patient Care Team:  EDUARDO Hebert MD as PCP - General (Internal Medicine)  Ga Hameed MD as Cardiologist (Cardiology)    Outpatient Medications Prior to Visit   Medication Sig Dispense Refill   • aspirin 81 MG chewable tablet Chew 81 mg Daily.     • ferrous sulfate 325 (65 FE) MG tablet Take 1 tablet by mouth Daily With Breakfast. 60 tablet 2   • atorvastatin (Lipitor) 20 MG tablet Take 1 tablet by mouth Daily. 30 tablet 1   • metFORMIN (Glucophage) 500 MG tablet Take 2 tablets by mouth 2 (Two) Times a Day With Meals. 120 tablet 5     No facility-administered medications prior to visit.       No opioid medication identified on active medication list. I have reviewed chart for other potential  high risk medication/s and harmful drug interactions in the elderly.          Aspirin is on active medication list. Aspirin use is indicated based on review of current medical condition/s. Pros and cons of this therapy have been discussed today. Benefits of this medication outweigh potential harm.  Patient has been encouraged to  "continue taking this medication.  .      Patient Active Problem List   Diagnosis   • Abnormal CT of the abdomen   • Rectal cancer (HCC)   • Current every day smoker   • Impaired gait and mobility   • Bright red rectal bleeding   • 2nd degree AV block   • Tobacco abuse   • Normocytic anemia   • Chemotherapy induced neutropenia (HCC)   • History of radiation therapy   • Cancer related pain   • Hypotension due to hypovolemia   • Carotid artery stenosis, symptomatic, right   • Carotid occlusion, left   • Type 2 diabetes mellitus without complication, without long-term current use of insulin (HCC)   • Iron deficiency anemia   • Ileostomy in place (HCC)   • Colostomy in place (HCC)     Advance Care Planning  Advance Directive is not on file.  ACP discussion was held with the patient during this visit. Patient does not have an advance directive, declines further assistance.    Review of Systems   Constitutional: Negative.    HENT: Negative.    Respiratory: Negative for cough and shortness of breath.    Cardiovascular: Negative for chest pain.        Objective    Vitals:    03/24/22 0905   BP: 130/80   BP Location: Left arm   Patient Position: Sitting   Cuff Size: Adult   Pulse: 75   Resp: 15   Temp: 97.5 °F (36.4 °C)   TempSrc: Oral   SpO2: 98%   Weight: 82.1 kg (181 lb)   Height: 179.1 cm (70.51\")     BMI Readings from Last 1 Encounters:   03/24/22 25.60 kg/m²   BMI is above normal parameters. Recommendations include: exercise counseling    Does the patient have evidence of cognitive impairment? No    Physical Exam  Vitals and nursing note reviewed.   Constitutional:       General: He is not in acute distress.     Appearance: Normal appearance. He is well-developed.      Comments: Pleasant   HENT:      Head: Normocephalic and atraumatic.      Right Ear: Tympanic membrane, ear canal and external ear normal. There is no impacted cerumen.      Left Ear: Tympanic membrane, ear canal and external ear normal. There is no " impacted cerumen.      Mouth/Throat:      Pharynx: No oropharyngeal exudate.   Eyes:      General: No scleral icterus.     Conjunctiva/sclera: Conjunctivae normal.      Pupils: Pupils are equal, round, and reactive to light.   Neck:      Thyroid: No thyromegaly.      Vascular: No carotid bruit or JVD.   Cardiovascular:      Rate and Rhythm: Normal rate and regular rhythm.      Heart sounds: Normal heart sounds. No murmur heard.    No friction rub. No gallop.   Pulmonary:      Effort: Pulmonary effort is normal. No respiratory distress.      Breath sounds: Normal breath sounds. No stridor. No wheezing, rhonchi or rales.   Abdominal:      General: Bowel sounds are normal. There is no distension.      Palpations: Abdomen is soft.      Tenderness: There is no abdominal tenderness.      Comments: Ileostomy and colostomy   Skin:     General: Skin is warm and dry.      Coloration: Skin is not jaundiced.   Neurological:      Mental Status: He is alert.      GCS: GCS eye subscore is 4. GCS verbal subscore is 5. GCS motor subscore is 6.      Cranial Nerves: No cranial nerve deficit.      Deep Tendon Reflexes:      Reflex Scores:       Patellar reflexes are 1+ on the right side and 1+ on the left side.  Psychiatric:         Mood and Affect: Mood normal.         Behavior: Behavior normal.         Thought Content: Thought content normal.         Judgment: Judgment normal.       Lab Results   Component Value Date    TRIG 200 (H) 03/24/2022    HDL 38 (L) 03/24/2022     (H) 03/24/2022    VLDL 35 03/24/2022    HGBA1C 7.30 (H) 03/24/2022            HEALTH RISK ASSESSMENT    Smoking Status:  Social History     Tobacco Use   Smoking Status Current Every Day Smoker   • Packs/day: 0.25   • Years: 53.00   • Pack years: 13.25   • Types: Cigarettes   Smokeless Tobacco Never Used   Tobacco Comment    2 cigarettes a day     Alcohol Consumption:  Social History     Substance and Sexual Activity   Alcohol Use Not Currently     Fall Risk  Screen:    ENRRIQUE Fall Risk Assessment was completed, and patient is at LOW risk for falls.Assessment completed on:3/24/2022    Depression Screening:  PHQ-2/PHQ-9 Depression Screening 3/24/2022   Retired PHQ-9 Total Score -   Retired Total Score -   Little Interest or Pleasure in Doing Things 0-->not at all   Feeling Down, Depressed or Hopeless 0-->not at all   PHQ-9: Brief Depression Severity Measure Score 0       Health Habits and Functional and Cognitive Screening:  Functional & Cognitive Status 3/24/2022   Do you have difficulty preparing food and eating? No   Do you have difficulty bathing yourself, getting dressed or grooming yourself? No   Do you have difficulty using the toilet? No   Do you have difficulty moving around from place to place? No   Do you have trouble with steps or getting out of a bed or a chair? No   Current Diet Well Balanced Diet   Dental Exam Not up to date   Eye Exam Up to date   Exercise (times per week) 4 times per week   Current Exercises Include House Cleaning;Yard Work;Gardening   Current Exercise Activities Include -   Do you need help using the phone?  No   Are you deaf or do you have serious difficulty hearing?  No   Do you need help with transportation? No   Do you need help shopping? No   Do you need help preparing meals?  No   Do you need help with housework?  No   Do you need help with laundry? No   Do you need help taking your medications? No   Do you need help managing money? No   Do you ever drive or ride in a car without wearing a seat belt? No   Have you felt unusual stress, anger or loneliness in the last month? No   Who do you live with? Alone   If you need help, do you have trouble finding someone available to you? No   Have you been bothered in the last four weeks by sexual problems? No   Do you have difficulty concentrating, remembering or making decisions? No       Age-appropriate Screening Schedule:  Refer to the list below for future screening recommendations  based on patient's age, sex and/or medical conditions. Orders for these recommended tests are listed in the plan section. The patient has been provided with a written plan.    Health Maintenance   Topic Date Due   • TDAP/TD VACCINES (1 - Tdap) Never done   • ZOSTER VACCINE (1 of 2) Never done   • DIABETIC FOOT EXAM  Never done   • DIABETIC EYE EXAM  Never done   • INFLUENZA VACCINE  Never done   • URINE MICROALBUMIN  03/22/2022   • HEMOGLOBIN A1C  09/24/2022   • LIPID PANEL  03/24/2023              Assessment/Plan   CMS Preventative Services Quick Reference  Risk Factors Identified During Encounter  Immunizations Discussed/Encouraged (specific Immunizations; COVID19  The above risks/problems have been discussed with the patient.  Follow up actions/plans if indicated are seen below in the Assessment/Plan Section.  Pertinent information has been shared with the patient in the After Visit Summary.    Diagnoses and all orders for this visit:    1. Medicare annual wellness visit, subsequent (Primary)  -     Lipid Panel; Future  -     Hemoglobin A1c; Future    2. Type 2 diabetes mellitus without complication, without long-term current use of insulin (HCC)  -     metFORMIN (Glucophage) 500 MG tablet; Take 2 tablets by mouth 2 (Two) Times a Day With Meals.  Dispense: 120 tablet; Refill: 5  -     Hemoglobin A1c; Future    3. Depression screen    4. At low risk for fall    5. Mixed hyperlipidemia  -     atorvastatin (Lipitor) 20 MG tablet; Take 1 tablet by mouth Daily.  Dispense: 30 tablet; Refill: 5  -     Lipid Panel; Future    Other orders  -     COVID-19 Vaccine (Pfizer) Mark Fraser      Recently had blood work.  We do not need to repeat those labs, but I would like to get an A1c and lipid panel today.  Refill his Metformin as he has been out for the last 2-1/2 months.  As such, his A1c may be higher than it has been in the past.  His depression screen was negative with PHQ 2 of 0.    We discussed COVID-19 booster vaccine.   After the discussion he decided to get the Pfizer booster shot today.  He tolerated this well without any immediate side effects.    Follow Up:   Return in about 6 months (around 9/24/2022) for Recheck.     An After Visit Summary and PPPS were made available to the patient.

## 2022-03-25 ENCOUNTER — LAB (OUTPATIENT)
Dept: LAB | Facility: HOSPITAL | Age: 72
End: 2022-03-25

## 2022-03-25 DIAGNOSIS — C20 RECTAL CANCER: ICD-10-CM

## 2022-03-25 LAB
ALBUMIN SERPL-MCNC: 4.1 G/DL (ref 3.5–5.2)
ALBUMIN/GLOB SERPL: 1.3 G/DL
ALP SERPL-CCNC: 94 U/L (ref 39–117)
ALT SERPL W P-5'-P-CCNC: 10 U/L (ref 1–41)
ANION GAP SERPL CALCULATED.3IONS-SCNC: 10 MMOL/L (ref 5–15)
AST SERPL-CCNC: 14 U/L (ref 1–40)
BILIRUB SERPL-MCNC: 0.2 MG/DL (ref 0–1.2)
BUN SERPL-MCNC: 19 MG/DL (ref 8–23)
BUN/CREAT SERPL: 12.8 (ref 7–25)
CALCIUM SPEC-SCNC: 9.7 MG/DL (ref 8.6–10.5)
CHLORIDE SERPL-SCNC: 106 MMOL/L (ref 98–107)
CO2 SERPL-SCNC: 24 MMOL/L (ref 22–29)
CREAT SERPL-MCNC: 1.48 MG/DL (ref 0.76–1.27)
EGFRCR SERPLBLD CKD-EPI 2021: 50.6 ML/MIN/1.73
GLOBULIN UR ELPH-MCNC: 3.1 GM/DL
GLUCOSE SERPL-MCNC: 195 MG/DL (ref 65–99)
POTASSIUM SERPL-SCNC: 4.7 MMOL/L (ref 3.5–5.2)
PROT SERPL-MCNC: 7.2 G/DL (ref 6–8.5)
SODIUM SERPL-SCNC: 140 MMOL/L (ref 136–145)

## 2022-03-25 PROCEDURE — 80053 COMPREHEN METABOLIC PANEL: CPT

## 2022-04-01 DIAGNOSIS — C20 RECTAL CANCER: Primary | ICD-10-CM

## 2022-04-04 ENCOUNTER — LAB (OUTPATIENT)
Dept: LAB | Facility: HOSPITAL | Age: 72
End: 2022-04-04

## 2022-04-04 DIAGNOSIS — C20 RECTAL CANCER: Primary | ICD-10-CM

## 2022-04-04 DIAGNOSIS — C20 RECTAL CANCER: ICD-10-CM

## 2022-04-04 LAB
ALBUMIN SERPL-MCNC: 4.2 G/DL (ref 3.5–5.2)
ALBUMIN/GLOB SERPL: 1.1 G/DL
ALP SERPL-CCNC: 104 U/L (ref 39–117)
ALT SERPL W P-5'-P-CCNC: 11 U/L (ref 1–41)
ANION GAP SERPL CALCULATED.3IONS-SCNC: 12 MMOL/L (ref 5–15)
AST SERPL-CCNC: 14 U/L (ref 1–40)
BASOPHILS # BLD AUTO: 0.1 10*3/MM3 (ref 0–0.2)
BASOPHILS NFR BLD AUTO: 1.2 % (ref 0–1.5)
BILIRUB SERPL-MCNC: 0.4 MG/DL (ref 0–1.2)
BUN SERPL-MCNC: 24 MG/DL (ref 8–23)
BUN/CREAT SERPL: 17.5 (ref 7–25)
CALCIUM SPEC-SCNC: 9.4 MG/DL (ref 8.6–10.5)
CHLORIDE SERPL-SCNC: 107 MMOL/L (ref 98–107)
CO2 SERPL-SCNC: 25 MMOL/L (ref 22–29)
CREAT SERPL-MCNC: 1.37 MG/DL (ref 0.76–1.27)
DEPRECATED RDW RBC AUTO: 48.5 FL (ref 37–54)
EGFRCR SERPLBLD CKD-EPI 2021: 55.5 ML/MIN/1.73
EOSINOPHIL # BLD AUTO: 0.25 10*3/MM3 (ref 0–0.4)
EOSINOPHIL NFR BLD AUTO: 2.9 % (ref 0.3–6.2)
ERYTHROCYTE [DISTWIDTH] IN BLOOD BY AUTOMATED COUNT: 14.3 % (ref 12.3–15.4)
FERRITIN SERPL-MCNC: 298.4 NG/ML (ref 30–400)
GLOBULIN UR ELPH-MCNC: 3.8 GM/DL
GLUCOSE SERPL-MCNC: 211 MG/DL (ref 65–99)
HCT VFR BLD AUTO: 41.9 % (ref 37.5–51)
HGB BLD-MCNC: 13.5 G/DL (ref 13–17.7)
IMM GRANULOCYTES # BLD AUTO: 0.04 10*3/MM3 (ref 0–0.05)
IMM GRANULOCYTES NFR BLD AUTO: 0.5 % (ref 0–0.5)
IRON 24H UR-MRATE: 42 MCG/DL (ref 59–158)
IRON SATN MFR SERPL: 13 % (ref 20–50)
LYMPHOCYTES # BLD AUTO: 1.65 10*3/MM3 (ref 0.7–3.1)
LYMPHOCYTES NFR BLD AUTO: 19.1 % (ref 19.6–45.3)
MCH RBC QN AUTO: 30 PG (ref 26.6–33)
MCHC RBC AUTO-ENTMCNC: 32.2 G/DL (ref 31.5–35.7)
MCV RBC AUTO: 93.1 FL (ref 79–97)
MONOCYTES # BLD AUTO: 0.61 10*3/MM3 (ref 0.1–0.9)
MONOCYTES NFR BLD AUTO: 7.1 % (ref 5–12)
NEUTROPHILS NFR BLD AUTO: 6 10*3/MM3 (ref 1.7–7)
NEUTROPHILS NFR BLD AUTO: 69.2 % (ref 42.7–76)
NRBC BLD AUTO-RTO: 0 /100 WBC (ref 0–0.2)
PLATELET # BLD AUTO: 359 10*3/MM3 (ref 140–450)
PMV BLD AUTO: 8.6 FL (ref 6–12)
POTASSIUM SERPL-SCNC: 4.1 MMOL/L (ref 3.5–5.2)
PROT SERPL-MCNC: 8 G/DL (ref 6–8.5)
RBC # BLD AUTO: 4.5 10*6/MM3 (ref 4.14–5.8)
SODIUM SERPL-SCNC: 144 MMOL/L (ref 136–145)
TIBC SERPL-MCNC: 313 MCG/DL (ref 298–536)
TRANSFERRIN SERPL-MCNC: 210 MG/DL (ref 200–360)
WBC NRBC COR # BLD: 8.65 10*3/MM3 (ref 3.4–10.8)

## 2022-04-04 PROCEDURE — 85025 COMPLETE CBC W/AUTO DIFF WBC: CPT | Performed by: INTERNAL MEDICINE

## 2022-04-04 PROCEDURE — 36415 COLL VENOUS BLD VENIPUNCTURE: CPT | Performed by: INTERNAL MEDICINE

## 2022-04-04 PROCEDURE — 82728 ASSAY OF FERRITIN: CPT

## 2022-04-04 PROCEDURE — 84466 ASSAY OF TRANSFERRIN: CPT

## 2022-04-04 PROCEDURE — 82378 CARCINOEMBRYONIC ANTIGEN: CPT

## 2022-04-04 PROCEDURE — 80053 COMPREHEN METABOLIC PANEL: CPT | Performed by: INTERNAL MEDICINE

## 2022-04-04 PROCEDURE — 83540 ASSAY OF IRON: CPT

## 2022-04-05 ENCOUNTER — HOSPITAL ENCOUNTER (OUTPATIENT)
Dept: CT IMAGING | Facility: HOSPITAL | Age: 72
Discharge: HOME | End: 2022-04-05
Admitting: INTERNAL MEDICINE

## 2022-04-05 DIAGNOSIS — D50.8 IRON DEFICIENCY ANEMIA SECONDARY TO INADEQUATE DIETARY IRON INTAKE: ICD-10-CM

## 2022-04-05 DIAGNOSIS — C20 RECTAL CANCER: ICD-10-CM

## 2022-04-05 LAB — CEA SERPL-MCNC: 1.99 NG/ML

## 2022-04-05 PROCEDURE — 25010000002 IOPAMIDOL 61 % SOLUTION: Performed by: INTERNAL MEDICINE

## 2022-04-05 PROCEDURE — 74177 CT ABD & PELVIS W/CONTRAST: CPT

## 2022-04-05 PROCEDURE — 71260 CT THORAX DX C+: CPT

## 2022-04-05 RX ADMIN — IOPAMIDOL 100 ML: 612 INJECTION, SOLUTION INTRAVENOUS at 09:20

## 2022-04-07 RX ORDER — PROCHLORPERAZINE MALEATE 10 MG
10 TABLET ORAL EVERY 4 HOURS PRN
Qty: 60 TABLET | Refills: 2 | Status: CANCELLED | OUTPATIENT
Start: 2022-04-07

## 2022-04-11 ENCOUNTER — OFFICE VISIT (OUTPATIENT)
Dept: ONCOLOGY | Facility: CLINIC | Age: 72
End: 2022-04-11

## 2022-04-11 VITALS
HEART RATE: 93 BPM | HEIGHT: 70 IN | DIASTOLIC BLOOD PRESSURE: 66 MMHG | WEIGHT: 176.3 LBS | SYSTOLIC BLOOD PRESSURE: 138 MMHG | TEMPERATURE: 98.4 F | RESPIRATION RATE: 18 BRPM | OXYGEN SATURATION: 95 % | BODY MASS INDEX: 25.24 KG/M2

## 2022-04-11 DIAGNOSIS — R91.1 LUNG NODULE, SOLITARY: ICD-10-CM

## 2022-04-11 DIAGNOSIS — C20 RECTAL CANCER: Primary | ICD-10-CM

## 2022-04-11 DIAGNOSIS — D50.8 IRON DEFICIENCY ANEMIA SECONDARY TO INADEQUATE DIETARY IRON INTAKE: ICD-10-CM

## 2022-04-11 PROCEDURE — 99214 OFFICE O/P EST MOD 30 MIN: CPT | Performed by: INTERNAL MEDICINE

## 2022-04-11 RX ORDER — FERROUS SULFATE 325(65) MG
325 TABLET ORAL
Qty: 60 TABLET | Refills: 2 | Status: CANCELLED | OUTPATIENT
Start: 2022-04-11

## 2022-04-20 ENCOUNTER — TELEPHONE (OUTPATIENT)
Dept: ONCOLOGY | Facility: CLINIC | Age: 72
End: 2022-04-20

## 2022-04-20 ENCOUNTER — HOSPITAL ENCOUNTER (OUTPATIENT)
Dept: CT IMAGING | Facility: HOSPITAL | Age: 72
Discharge: HOME | End: 2022-04-20

## 2022-04-20 DIAGNOSIS — R91.1 LUNG NODULE, SOLITARY: ICD-10-CM

## 2022-04-20 DIAGNOSIS — C20 RECTAL CANCER: Primary | ICD-10-CM

## 2022-04-20 DIAGNOSIS — C20 RECTAL CANCER: ICD-10-CM

## 2022-04-20 DIAGNOSIS — D50.8 IRON DEFICIENCY ANEMIA SECONDARY TO INADEQUATE DIETARY IRON INTAKE: ICD-10-CM

## 2022-04-20 PROCEDURE — 78815 PET IMAGE W/CT SKULL-THIGH: CPT

## 2022-04-20 PROCEDURE — A9552 F18 FDG: HCPCS | Performed by: INTERNAL MEDICINE

## 2022-04-20 PROCEDURE — 0 FLUDEOXYGLUCOSE F18 SOLUTION: Performed by: INTERNAL MEDICINE

## 2022-04-20 RX ADMIN — FLUDEOXYGLUCOSE F18 1 DOSE: 300 INJECTION INTRAVENOUS at 09:31

## 2022-04-20 NOTE — TELEPHONE ENCOUNTER
----- Message from Shaq Boggs MD sent at 4/20/2022  2:51 PM CDT -----  Refer back to see Dr. Destini De Jesus for a 1.6 cm hypermetabolic soft tissue nodule along the LEFT  anterolateral aspect of the APR resection bed. This is highly concerning for local recurrence of neoplasm in this patient with a history of  colorectal carcinoma.      Observe 10 mm left upper lobe lung nodule SUV 2.6.    Called patient with the above information. He verbalized understanding and knows he will be contacted with the appointment information.    rp

## 2022-07-05 ENCOUNTER — HOSPITAL ENCOUNTER (OUTPATIENT)
Dept: CT IMAGING | Facility: HOSPITAL | Age: 72
Discharge: HOME | End: 2022-07-05
Admitting: INTERNAL MEDICINE

## 2022-07-05 ENCOUNTER — LAB (OUTPATIENT)
Dept: LAB | Facility: HOSPITAL | Age: 72
End: 2022-07-05

## 2022-07-05 DIAGNOSIS — R91.1 LUNG NODULE, SOLITARY: ICD-10-CM

## 2022-07-05 DIAGNOSIS — D50.8 IRON DEFICIENCY ANEMIA SECONDARY TO INADEQUATE DIETARY IRON INTAKE: ICD-10-CM

## 2022-07-05 DIAGNOSIS — C20 RECTAL CANCER: ICD-10-CM

## 2022-07-05 LAB
ALBUMIN SERPL-MCNC: 4.1 G/DL (ref 3.5–5.2)
ALBUMIN/GLOB SERPL: 1.1 G/DL
ALP SERPL-CCNC: 108 U/L (ref 39–117)
ALT SERPL W P-5'-P-CCNC: 10 U/L (ref 1–41)
ANION GAP SERPL CALCULATED.3IONS-SCNC: 12 MMOL/L (ref 5–15)
AST SERPL-CCNC: 15 U/L (ref 1–40)
BASOPHILS # BLD AUTO: 0.08 10*3/MM3 (ref 0–0.2)
BASOPHILS NFR BLD AUTO: 0.9 % (ref 0–1.5)
BILIRUB SERPL-MCNC: 0.2 MG/DL (ref 0–1.2)
BUN SERPL-MCNC: 27 MG/DL (ref 8–23)
BUN/CREAT SERPL: 18.5 (ref 7–25)
CALCIUM SPEC-SCNC: 9.3 MG/DL (ref 8.6–10.5)
CEA SERPL-MCNC: 2.05 NG/ML
CHLORIDE SERPL-SCNC: 106 MMOL/L (ref 98–107)
CO2 SERPL-SCNC: 21 MMOL/L (ref 22–29)
CREAT BLDA-MCNC: 1.4 MG/DL (ref 0.6–1.3)
CREAT SERPL-MCNC: 1.46 MG/DL (ref 0.76–1.27)
DEPRECATED RDW RBC AUTO: 49.3 FL (ref 37–54)
EGFRCR SERPLBLD CKD-EPI 2021: 51.4 ML/MIN/1.73
EOSINOPHIL # BLD AUTO: 0.26 10*3/MM3 (ref 0–0.4)
EOSINOPHIL NFR BLD AUTO: 3 % (ref 0.3–6.2)
ERYTHROCYTE [DISTWIDTH] IN BLOOD BY AUTOMATED COUNT: 14.3 % (ref 12.3–15.4)
FERRITIN SERPL-MCNC: 288.8 NG/ML (ref 30–400)
GLOBULIN UR ELPH-MCNC: 3.8 GM/DL
GLUCOSE SERPL-MCNC: 156 MG/DL (ref 65–99)
HCT VFR BLD AUTO: 40.8 % (ref 37.5–51)
HGB BLD-MCNC: 13.4 G/DL (ref 13–17.7)
IMM GRANULOCYTES # BLD AUTO: 0.04 10*3/MM3 (ref 0–0.05)
IMM GRANULOCYTES NFR BLD AUTO: 0.5 % (ref 0–0.5)
IRON 24H UR-MRATE: 32 MCG/DL (ref 59–158)
IRON SATN MFR SERPL: 11 % (ref 20–50)
LYMPHOCYTES # BLD AUTO: 1.57 10*3/MM3 (ref 0.7–3.1)
LYMPHOCYTES NFR BLD AUTO: 18 % (ref 19.6–45.3)
MCH RBC QN AUTO: 30.7 PG (ref 26.6–33)
MCHC RBC AUTO-ENTMCNC: 32.8 G/DL (ref 31.5–35.7)
MCV RBC AUTO: 93.6 FL (ref 79–97)
MONOCYTES # BLD AUTO: 0.77 10*3/MM3 (ref 0.1–0.9)
MONOCYTES NFR BLD AUTO: 8.8 % (ref 5–12)
NEUTROPHILS NFR BLD AUTO: 5.99 10*3/MM3 (ref 1.7–7)
NEUTROPHILS NFR BLD AUTO: 68.8 % (ref 42.7–76)
NRBC BLD AUTO-RTO: 0 /100 WBC (ref 0–0.2)
PLATELET # BLD AUTO: 391 10*3/MM3 (ref 140–450)
PMV BLD AUTO: 8.7 FL (ref 6–12)
POTASSIUM SERPL-SCNC: 4.3 MMOL/L (ref 3.5–5.2)
PROT SERPL-MCNC: 7.9 G/DL (ref 6–8.5)
RBC # BLD AUTO: 4.36 10*6/MM3 (ref 4.14–5.8)
SODIUM SERPL-SCNC: 139 MMOL/L (ref 136–145)
TIBC SERPL-MCNC: 295 MCG/DL (ref 298–536)
TRANSFERRIN SERPL-MCNC: 198 MG/DL (ref 200–360)
WBC NRBC COR # BLD: 8.71 10*3/MM3 (ref 3.4–10.8)

## 2022-07-05 PROCEDURE — 25010000002 IOPAMIDOL 61 % SOLUTION: Performed by: INTERNAL MEDICINE

## 2022-07-05 PROCEDURE — 85025 COMPLETE CBC W/AUTO DIFF WBC: CPT

## 2022-07-05 PROCEDURE — 71260 CT THORAX DX C+: CPT

## 2022-07-05 PROCEDURE — 83540 ASSAY OF IRON: CPT

## 2022-07-05 PROCEDURE — 80053 COMPREHEN METABOLIC PANEL: CPT

## 2022-07-05 PROCEDURE — 82728 ASSAY OF FERRITIN: CPT

## 2022-07-05 PROCEDURE — 82565 ASSAY OF CREATININE: CPT

## 2022-07-05 PROCEDURE — 84466 ASSAY OF TRANSFERRIN: CPT

## 2022-07-05 PROCEDURE — 36415 COLL VENOUS BLD VENIPUNCTURE: CPT

## 2022-07-05 PROCEDURE — 82378 CARCINOEMBRYONIC ANTIGEN: CPT

## 2022-07-05 RX ADMIN — IOPAMIDOL 50 ML: 612 INJECTION, SOLUTION INTRAVENOUS at 10:10

## 2022-07-12 ENCOUNTER — OFFICE VISIT (OUTPATIENT)
Dept: ONCOLOGY | Facility: CLINIC | Age: 72
End: 2022-07-12

## 2022-07-12 VITALS
HEART RATE: 90 BPM | SYSTOLIC BLOOD PRESSURE: 118 MMHG | TEMPERATURE: 98.2 F | RESPIRATION RATE: 18 BRPM | WEIGHT: 170.2 LBS | BODY MASS INDEX: 24.37 KG/M2 | DIASTOLIC BLOOD PRESSURE: 66 MMHG | OXYGEN SATURATION: 96 % | HEIGHT: 70 IN

## 2022-07-12 DIAGNOSIS — D50.8 IRON DEFICIENCY ANEMIA SECONDARY TO INADEQUATE DIETARY IRON INTAKE: ICD-10-CM

## 2022-07-12 DIAGNOSIS — C20 RECTAL CANCER: Primary | ICD-10-CM

## 2022-07-12 PROCEDURE — 99214 OFFICE O/P EST MOD 30 MIN: CPT | Performed by: INTERNAL MEDICINE

## 2022-08-23 ENCOUNTER — TELEPHONE (OUTPATIENT)
Dept: VASCULAR SURGERY | Facility: CLINIC | Age: 72
End: 2022-08-23

## 2022-09-08 ENCOUNTER — HOSPITAL ENCOUNTER (OUTPATIENT)
Dept: CT IMAGING | Facility: HOSPITAL | Age: 72
Discharge: HOME | End: 2022-09-08
Admitting: SURGERY

## 2022-09-08 DIAGNOSIS — I65.21 STENOSIS OF RIGHT CAROTID ARTERY: ICD-10-CM

## 2022-09-08 LAB — CREAT BLDA-MCNC: 1.5 MG/DL (ref 0.6–1.3)

## 2022-09-08 PROCEDURE — 82565 ASSAY OF CREATININE: CPT

## 2022-09-08 PROCEDURE — 70498 CT ANGIOGRAPHY NECK: CPT

## 2022-09-08 PROCEDURE — 0 IOPAMIDOL PER 1 ML: Performed by: SURGERY

## 2022-09-08 RX ADMIN — IOPAMIDOL 100 ML: 755 INJECTION, SOLUTION INTRAVENOUS at 10:00

## 2022-09-26 ENCOUNTER — LAB (OUTPATIENT)
Dept: LAB | Facility: HOSPITAL | Age: 72
End: 2022-09-26

## 2022-09-26 ENCOUNTER — TRANSCRIBE ORDERS (OUTPATIENT)
Dept: ADMINISTRATIVE | Facility: HOSPITAL | Age: 72
End: 2022-09-26

## 2022-09-26 DIAGNOSIS — C20 RECTAL CANCER: ICD-10-CM

## 2022-09-26 DIAGNOSIS — R79.1 ABNORMAL COAGULATION PROFILE: ICD-10-CM

## 2022-09-26 DIAGNOSIS — D50.8 IRON DEFICIENCY ANEMIA SECONDARY TO INADEQUATE DIETARY IRON INTAKE: ICD-10-CM

## 2022-09-26 DIAGNOSIS — N13.30 HYDRONEPHROSIS, UNSPECIFIED HYDRONEPHROSIS TYPE: Primary | ICD-10-CM

## 2022-09-26 LAB
ALBUMIN SERPL-MCNC: 4.3 G/DL (ref 3.5–5.2)
ALBUMIN/GLOB SERPL: 1.3 G/DL
ALP SERPL-CCNC: 115 U/L (ref 39–117)
ALT SERPL W P-5'-P-CCNC: 13 U/L (ref 1–41)
ANION GAP SERPL CALCULATED.3IONS-SCNC: 10 MMOL/L (ref 5–15)
AST SERPL-CCNC: 14 U/L (ref 1–40)
BASOPHILS # BLD AUTO: 0.07 10*3/MM3 (ref 0–0.2)
BASOPHILS NFR BLD AUTO: 0.8 % (ref 0–1.5)
BILIRUB SERPL-MCNC: 0.2 MG/DL (ref 0–1.2)
BUN SERPL-MCNC: 21 MG/DL (ref 8–23)
BUN/CREAT SERPL: 15.4 (ref 7–25)
CALCIUM SPEC-SCNC: 9.3 MG/DL (ref 8.6–10.5)
CEA SERPL-MCNC: 2.23 NG/ML
CHLORIDE SERPL-SCNC: 107 MMOL/L (ref 98–107)
CO2 SERPL-SCNC: 24 MMOL/L (ref 22–29)
CREAT SERPL-MCNC: 1.36 MG/DL (ref 0.76–1.27)
DEPRECATED RDW RBC AUTO: 52.3 FL (ref 37–54)
EGFRCR SERPLBLD CKD-EPI 2021: 55.6 ML/MIN/1.73
EOSINOPHIL # BLD AUTO: 0.23 10*3/MM3 (ref 0–0.4)
EOSINOPHIL NFR BLD AUTO: 2.7 % (ref 0.3–6.2)
ERYTHROCYTE [DISTWIDTH] IN BLOOD BY AUTOMATED COUNT: 14.6 % (ref 12.3–15.4)
FERRITIN SERPL-MCNC: 267.5 NG/ML (ref 30–400)
GLOBULIN UR ELPH-MCNC: 3.4 GM/DL
GLUCOSE SERPL-MCNC: 146 MG/DL (ref 65–99)
HCT VFR BLD AUTO: 40.8 % (ref 37.5–51)
HGB BLD-MCNC: 13.4 G/DL (ref 13–17.7)
IMM GRANULOCYTES # BLD AUTO: 0.03 10*3/MM3 (ref 0–0.05)
IMM GRANULOCYTES NFR BLD AUTO: 0.4 % (ref 0–0.5)
IRON 24H UR-MRATE: 46 MCG/DL (ref 59–158)
IRON SATN MFR SERPL: 16 % (ref 20–50)
LYMPHOCYTES # BLD AUTO: 1.53 10*3/MM3 (ref 0.7–3.1)
LYMPHOCYTES NFR BLD AUTO: 18.3 % (ref 19.6–45.3)
MCH RBC QN AUTO: 31.5 PG (ref 26.6–33)
MCHC RBC AUTO-ENTMCNC: 32.8 G/DL (ref 31.5–35.7)
MCV RBC AUTO: 95.8 FL (ref 79–97)
MONOCYTES # BLD AUTO: 0.74 10*3/MM3 (ref 0.1–0.9)
MONOCYTES NFR BLD AUTO: 8.8 % (ref 5–12)
NEUTROPHILS NFR BLD AUTO: 5.77 10*3/MM3 (ref 1.7–7)
NEUTROPHILS NFR BLD AUTO: 69 % (ref 42.7–76)
NRBC BLD AUTO-RTO: 0 /100 WBC (ref 0–0.2)
PLATELET # BLD AUTO: 321 10*3/MM3 (ref 140–450)
PMV BLD AUTO: 8.8 FL (ref 6–12)
POTASSIUM SERPL-SCNC: 4.8 MMOL/L (ref 3.5–5.2)
PROT SERPL-MCNC: 7.7 G/DL (ref 6–8.5)
RBC # BLD AUTO: 4.26 10*6/MM3 (ref 4.14–5.8)
SODIUM SERPL-SCNC: 141 MMOL/L (ref 136–145)
TIBC SERPL-MCNC: 286 MCG/DL (ref 298–536)
TRANSFERRIN SERPL-MCNC: 192 MG/DL (ref 200–360)
WBC NRBC COR # BLD: 8.37 10*3/MM3 (ref 3.4–10.8)

## 2022-09-26 PROCEDURE — 82378 CARCINOEMBRYONIC ANTIGEN: CPT

## 2022-09-26 PROCEDURE — 80053 COMPREHEN METABOLIC PANEL: CPT

## 2022-09-26 PROCEDURE — 84466 ASSAY OF TRANSFERRIN: CPT

## 2022-09-26 PROCEDURE — 36415 COLL VENOUS BLD VENIPUNCTURE: CPT

## 2022-09-26 PROCEDURE — 83540 ASSAY OF IRON: CPT

## 2022-09-26 PROCEDURE — 82728 ASSAY OF FERRITIN: CPT

## 2022-09-26 PROCEDURE — 85025 COMPLETE CBC W/AUTO DIFF WBC: CPT

## 2022-09-27 ENCOUNTER — TELEPHONE (OUTPATIENT)
Dept: VASCULAR SURGERY | Facility: CLINIC | Age: 72
End: 2022-09-27

## 2022-09-28 ENCOUNTER — OFFICE VISIT (OUTPATIENT)
Dept: VASCULAR SURGERY | Facility: CLINIC | Age: 72
End: 2022-09-28

## 2022-09-28 ENCOUNTER — TELEPHONE (OUTPATIENT)
Dept: ONCOLOGY | Facility: CLINIC | Age: 72
End: 2022-09-28

## 2022-09-28 VITALS
BODY MASS INDEX: 23.74 KG/M2 | OXYGEN SATURATION: 97 % | HEART RATE: 96 BPM | HEIGHT: 71 IN | DIASTOLIC BLOOD PRESSURE: 60 MMHG | SYSTOLIC BLOOD PRESSURE: 94 MMHG

## 2022-09-28 DIAGNOSIS — E78.2 MIXED HYPERLIPIDEMIA: ICD-10-CM

## 2022-09-28 DIAGNOSIS — I65.22 CAROTID OCCLUSION, LEFT: ICD-10-CM

## 2022-09-28 DIAGNOSIS — Z72.0 TOBACCO ABUSE: ICD-10-CM

## 2022-09-28 DIAGNOSIS — E11.9 TYPE 2 DIABETES MELLITUS WITHOUT COMPLICATION, WITHOUT LONG-TERM CURRENT USE OF INSULIN: ICD-10-CM

## 2022-09-28 DIAGNOSIS — I65.21 STENOSIS OF RIGHT CAROTID ARTERY: Primary | ICD-10-CM

## 2022-09-28 PROCEDURE — 99214 OFFICE O/P EST MOD 30 MIN: CPT | Performed by: NURSE PRACTITIONER

## 2022-09-28 NOTE — TELEPHONE ENCOUNTER
Provider: DR ANDERSON  Caller: ROBI    Reason for Call: ROBI IS NEEDING LAST LABS FAXED TO [facility]  IT NEEDS TO BE SENT OVER ASAP, HE HAS AN APPT TOMORROW AT [facility].    FAX TO # [phone]

## 2022-10-02 NOTE — PROGRESS NOTES
[facility] HEMATOLOGY & ONCOLOGY [place]  [address]  [facility] [address] [phone]    Patient Name: Attila Viramontes  Encounter Date: 10/12/2022  YOB: 1950  Patient Number: [id number]      REASON FOR FOLLOW-UP:Attila Viramontes is a pleasant 72 y.o. male who is seen on follow recurrent rectal cancer, unresectable, left pelvic side wall.  He was previously seen for stage IIc rectal cancer.  He is seen 24 months post 8 cycles of neoadjuvant FOLFOX. He is seen 29 months post neoadjuvant 5-FU with radiation.  He had resection 22.5 months ago.  He is also seen for anemia from iron deficiency.  He is off oral iron for 7 months.  He is seen alone.  History is obtained from patient.  He is a reliable historian.        Oncology/Hematology History Overview Note   DIAGNOSTIC ABNORMALITIES:  CT abdomen and pelvis 03/25/2020.  Asymmetric wall thickening of the rectum could represent a mass/neoplasm. If not recently performed, colonoscopy is recommended. Inflammatory stranding of the central mesenteric root, which is nonspecific but can be seen with mesenteritis/mesenteric panniculitis.  Colonoscopy by Dr. Reaves 04/02/2020 showed a localized area of severely ulcerated mucosa was found at 2 cm proximal to the anus. Biopsies were taken with a cold forceps for histology.  Pathology report 04/03/2020 showed a moderately differentiated adenocarcinoma.  Stromal invasion is seen although depth of invasion is indeterminate in these biopsies.  In the areas of invasion, the stroma demonstrates a reactive, desmoplastic appearance.   CT chest report 04/20/2020.  Noncalcified pulmonary nodule in the left upper lobe. Metastatic  disease not excluded. Short interval follow-up with CT in 6 months recommended.   Atherosclerosis of the aorta and coronary arteries.  Stranding of the central mesenteric root in the abdomen, partially imaged.   Hepatic steatosis.  MRI 04/23/2020 showed abnormal enhancement and circumferential thickening of the  distal rectum for a length of 5.5 cm, with evidence of invasion of the mesorectal fat and mesial rectal lymph nodes, measure less than 3 mm from the mesorectal fascia.   There is abnormal pre and post sacral soft tissue enhancement with enhancement of the distal sacrum, concerning for contiguous spread versus local metastatic disease. Addendum report, appears to be invasion of the left seminal vesicle base and left posterior prostate.  These findings are concerning for invasive malignancy.  Endorectal ultrasound 04/24/2020.  Fixed constricting lesion, T4NX.  PET report 11/09/2020. The rectal soft tissue mass appears to be smaller or nearly resolved  on the current PET CT images. The current CT images are nondiagnostic and somewhat limited. Uptake in this region has an SUV max of 2.8. This degree of uptake could be inflammatory.  Stranded appearance of the central mesenteric fat is more prominent on the current study compared to 3/25/2020. However, the current CT images are not diagnostic. There is no abnormal PET activity in this area. This area measures about 10.7 x 5.9 cm. Lymphoma and mesenteric  panniculitis are in the differential diagnosis.  No abnormal uptake in the chest or neck region. A tiny left upper  lobe pulmonary nodule is stable compared to prior chest CT  Preoperative evaluation at [place] 11/24/2020.  Repeat CT revealed a stable noncalcified left pulmonary nodule.  There is stranding in the central mesentery with new soft tissue nodule along the medial aspect of the mass concerning for metastasis, none FDG avid on PET.  Thickening of the rectum with involvement of the seminal vesicle and prostate.  MRI pelvis revealed T4BN0 lesion.  The mass involves the sphincter muscle, prostate and bilateral seminal vesicle.  There is also extension into the presacral space.  He has been evaluated by   Quang and Dr. Tirado, plastic surgery.  Plan for pelvic exenteration, abdominal perineal resection with permanent end colostomy, total cystoprostatectomy with ileal conduit and muscle flap to perineal defect on 12/21/2020.  Follow-up with Dr. De Jesus 01/19/2021.  Surgical pathology revealed residual invasive moderately differentiated colorectal adenocarcinoma in the background of granulation tissue, fibrosis and ulceration consistent with treatment effect.  Tumor involves the very vesicular and periprostatic soft tissue and there was one tumor deposit identified in the perirectal adipose tissue.  There was perineural invasion and 11 nodes were negative for tumor. pT4b, pN1c, tumor deposits in the subserosa, mesentery or none peritonealized pericolic or perirectal/mesorectal tissues.  All margins negative.  Plan for surveillance.    PREVIOUS INTERVENTIONS:  Neoadjuvant CIV 5-FU and radiation 04/27/2020 through 06/05/2020 at [facility].  Not a surgical candidate.  Neoadjuvant FOLFOX 06/22/2020 through 10/06/2020, 8 cycles, at [facility].  Pelvic exenteration, abdominal perineal resection with permanent end colostomy, total cystoprostatectomy with ileal conduit and muscle flap to perineal defect on 12/21/2020.  He had Injectafer 12/03/2020.  Ferrous sulfate 07/06/2021 through 04/11/2022.       DIAGNOSTIC ABNORMALITIES:Melanoma.  Pathology report 05/28/2021.Skin, left shoulder lesion, excision:  Nodular malignant melanoma, invasive (2 cm in greatest dimension).  Maximum Breslow thickness: 6.43 mm.  Garry level: III.  Ulceration: Present.  Mitotic rate: 11/mm².  Microsatellitosis: Not identified.  Lymphovascular invasion: Not identified.  Tumor infiltrating lymphocytes: Present, nonbrisk.  Perineural invasion: Not identified.  Tumor regression: Not identified.  Radial growth phase: Focally present.  Vertical growth phase: Present.  Associated nevus: Not identified.  Margins uninvolved: 3.3 mm  from closest inked peripheral margin (undesignated), 2.45 mm from deep margin.  Incidental seborrheic keratosis, completely excised.  AJCC stage: pT4b pNX  Follow-up 06/10/2021 by Dr. Wood.  Left shoulder pathology shows malignant melanoma with 6.4 mm in depth.  Margins are free 0.3 mm for the deep margin and cleared by 2.5 mm.  Plan for split-thickness skin graft and sentinal node biopsy.  Pathology report 06/15/2021.  1. [place] lymph node, left axilla, biopsy:  Two sentinel lymph nodes, negative for metastatic melanoma (0/2).  2. Skin, left shoulder, wide local excision:  Procedure site changes.  No evidence of residual melanoma.        PREVIOUS INTERVENTIONS:  He had undergone wide excision melanoma left shoulder with split thickness skin graft and sentinel lymph node biopsy 06/15/2021.                Rectal cancer (HCC)    Initial Diagnosis    Rectal cancer (CMS/HCC)     3/25/2020 Imaging    CT Abd/Pelvis:     IMPRESSION:  1. Asymmetric wall thickening of the rectum could represent a  mass/neoplasm. If not recently performed, colonoscopy is recommended.     2. Inflammatory stranding of the central mesenteric root, which is  nonspecific but can be seen with mesenteritis/mesenteric panniculitis.     3. Atherosclerotic disease.  4. Fecal stasis.     4/2/2020 Biopsy    Colonoscopy:  Findings: The perianal and digital rectal examinations were normal.  A localized area of severely ulcerated mucosa was found at 2 cm proximal to the anus. Biopsies were  taken with a cold forceps for histology.  Impression: - Ulcerated mucosa at 2 cm proximal to the anus. Biopsied.    Final Diagnosis   Large intestine, rectum at 3 cm, biopsy: Moderately differentiated adenocarcinoma, invasive.     AJCC stage: pTX pNX              4/14/2020 Procedure    Mediport placement     4/14/2020 Imaging    CT Angio Head:  Summary:  1. Distal left ICA occlusion with patent [place].    CT Angio Neck:  IMPRESSION:  1. Left internal  carotid artery occlusion beginning at the origin.  Minimal opacification of the distal left internal carotid artery, which  may represent retrograde collateral flow.  2. Right internal carotid artery patent.  3. Bilateral vertebral arteries patent.  4. See separately dictated CTA head of the same day.     CT Head:  IMPRESSION:  1. Small amount of gas in the left cerebral cortical veins. Differential  would include iatrogenic air embolism or trauma.  2. No acute intracranial hemorrhage.      CT Angio Head/Neck:  Result Impression   1.  Age-indeterminate left internal carotid artery origin occlusion.   Reconstitution of left ICA flow at the level of the cavernous ICA,   likely due to patent anterior commuting artery. A critical alert was   sent.  2.  No perfusion abnormality.  3.  Moderate atherosclerotic stenosis involving the origin of the   right internal carotid artery.          4/15/2020 Imaging    MRI Brain:  No acute intracranial abnormality identified  Findings of chronic microvascular ischemia. Occluded left ICA flow   void.     4/27/2020 - 6/1/2020 Chemotherapy    OP COLORECTAL Fluorouracil CIV over 120H + XRT     5/1/2020 - 12/3/2020 Chemotherapy    OP CENTRAL VENOUS ACCESS DEVICE ACCESS, CARE, AND MAINTENANCE (CVAD)     5/12/2020 Cancer Staged    Staging form: Colon And Rectum, AJCC 8th Edition  - Clinical stage from 5/12/2020: Stage IIIC (cT4b, cN1b, cM0) - Signed by Shaq Boggs MD on 5/12/2020 6/22/2020 -  Chemotherapy    OP COLON [id number] OXALIplatin / Leucovorin / Fluorouracil     10/7/2022 -  Chemotherapy    OP CENTRAL VENOUS ACCESS DEVICE ACCESS, CARE, AND MAINTENANCE (CVAD)         PAST MEDICAL HISTORY:  ALLERGIES:  No Known Allergies  CURRENT MEDICATIONS:  Outpatient Encounter Medications as of 10/12/2022   Medication Sig Dispense Refill   • aspirin 81 MG chewable tablet Chew 81 mg Daily.     • atorvastatin (LIPITOR) 20 MG tablet Take 1 tablet by mouth Daily. 30 tablet 1   • metFORMIN  (Glucophage) 500 MG tablet Take 2 tablets by mouth 2 (Two) Times a Day With Meals. 120 tablet 5   • [DISCONTINUED] atorvastatin (Lipitor) 20 MG tablet Take 1 tablet by mouth Daily. 30 tablet 5     No facility-administered encounter medications on file as of 10/12/2022.     ADULT ILLNESSES:  Patient Active Problem List   Diagnosis Code   • Abnormal CT of the abdomen R93.5   • Rectal cancer (HCC) C20   • Current every day smoker F17.200   • Impaired gait and mobility R26.89   • Bright red rectal bleeding K62.5   • 2nd degree AV block I44.1   • Tobacco abuse Z72.0   • Normocytic anemia D64.9   • Chemotherapy induced neutropenia (HCC) D70.1, T45.1X5A   • History of radiation therapy Z92.3   • Cancer related pain G89.3   • Hypotension due to hypovolemia I95.89, E86.1   • Carotid artery stenosis, symptomatic, right I65.21   • Carotid occlusion, left I65.22   • Type 2 diabetes mellitus without complication, without long-term current use of insulin ([facility]) E11.9   • Iron deficiency anemia D50.9   • Ileostomy in place ([facility]) Z93.2   • Colostomy in place ([facility]) Z93.3   • Function kidney decreased N28.9     SURGERIES:  Past Surgical History:   Procedure Laterality Date   • ARM LESION/CYST EXCISION Left 6/15/2021    Procedure: WIDE EXCISION MELANOMA LEFT SHOULDER WITH SPLIT THICKNESS SKIN GRAFT AND SENTINEL LYMPH NODE BIOPSY;  Surgeon: Vielka Weller MD;  Location: [facility];  Service: General;  Laterality: Left;   • COLONOSCOPY N/A 4/2/2020    Procedure: COLONOSCOPY WITH ANESTHESIA;  Surgeon: Yanick Flowers DO;  Location: [facility] ENDOSCOPY;  Service: Gastroenterology;  Laterality: N/A;  pre: abnormal CT scan  post: rectal mass  PCP unknown   • CYSTECTOMY     • EYE SURGERY Bilateral     lenses present   • ILEOSTOMY     • MOUTH SURGERY     • PROSTATECTOMY     • RECTAL MASS EXCISION  12/21/2020   • SENTINEL NODE BIOPSY Left 6/15/2021    Procedure: SENTINEL LYMPH NODE BIOPSY;  Surgeon: Vielka Weller MD;  Location: [facility] OR;   Service: General;  Laterality: Left;   • VENOUS ACCESS DEVICE (PORT) INSERTION N/A 4/14/2020    Procedure: INSERTION VENOUS ACCESS DEVICE;  Surgeon: Vielka Weller MD;  Location:  [facility] OR;  Service: General;  Laterality: N/A;   • VENOUS ACCESS DEVICE (PORT) REMOVAL N/A 5/28/2021    Procedure: PORT REMOVAL, EXCISION OF NEOPLASM UNCERTAIN BEHAVIOR LEFT SHOULDER;  Surgeon: Vielka Weller MD;  Location:  [facility] OR;  Service: General;  Laterality: N/A;     HEALTH MAINTENANCE ITEMS:  Health Maintenance Due   Topic Date Due   • Pneumococcal Vaccine 65+ (1 - PCV) Never done   • TDAP/TD VACCINES (1 - Tdap) Never done   • ZOSTER VACCINE (1 of 2) Never done   • DIABETIC FOOT EXAM  Never done   • DIABETIC EYE EXAM  Never done   • URINE MICROALBUMIN  03/22/2022   • COVID-19 Vaccine (4 - Booster) 05/19/2022   • INFLUENZA VACCINE  Never done   • HEMOGLOBIN A1C  09/24/2022       <no information>  Last Completed Colonoscopy          Discontinued - COLONOSCOPY  Discontinued      Frequency changed to Never automatically (Topic No Longer Applies)    04/02/2020  Surgical Procedure: COLONOSCOPY    04/02/2020  COLONOSCOPY              Immunization History   Administered Date(s) Administered   • COVID-19 (MODERNA) 1st, 2nd, 3rd Dose Only 02/19/2021, 03/19/2021   • Covid-19 (Pfizer) Gray Cap 03/24/2022     Last Completed Mammogram     This patient has no relevant Health Maintenance data.            FAMILY HISTORY:  Family History   Problem Relation Age of Onset   • Cancer Mother    • Stroke Father    • Heart disease Father    • Heart attack Brother      SOCIAL HISTORY:  Social History     Socioeconomic History   • Marital status: Single   Tobacco Use   • Smoking status: Every Day     Packs/day: 0.25     Years: 53.00     Pack years: 13.25     Types: Cigarettes   • Smokeless tobacco: Never   • Tobacco comments:     2 cigarettes a day   Vaping Use   • Vaping Use: Never used   Substance and Sexual Activity   • Alcohol use: Not Currently  "  • Drug use: Never   • Sexual activity: Defer       REVIEW OF SYSTEMS:    Review of Systems   Constitutional: Negative for chills, fatigue and fever.        \"I feel great.\"   HENT: Negative for congestion, facial swelling and trouble swallowing.    Eyes: Negative for discharge and redness.   Respiratory: Negative for cough, shortness of breath and wheezing.    Cardiovascular: Negative for chest pain and leg swelling.   Gastrointestinal: Negative for abdominal pain, nausea and vomiting.   Endocrine: Negative for polydipsia and polyphagia.   Genitourinary: Negative for flank pain and hematuria.   Musculoskeletal: Negative for gait problem and joint swelling.   Skin: Negative for pallor.   Allergic/Immunologic: Negative for food allergies.   Neurological: Negative for dizziness, speech difficulty and weakness.   Hematological: Negative for adenopathy. Does not bruise/bleed easily.   Psychiatric/Behavioral: Negative for agitation, confusion and hallucinations.       VITAL SIGNS: /72   Pulse 91   Temp 97.2 °F (36.2 °C) (Temporal)   Resp 18   Ht 177.8 cm (70\")   Wt 81.4 kg (179 lb 6.4 oz)   SpO2 95%   BMI 25.74 kg/m²  Gained 9 pounds.   Pain Score    10/12/22 1010   PainSc: 0-No pain       PHYSICAL EXAMINATION:     Physical Exam  Vitals reviewed.   Constitutional:       General: He is not in acute distress.  HENT:      Head: Normocephalic and atraumatic.   Eyes:      General: No scleral icterus.  Cardiovascular:      Rate and Rhythm: Normal rate.   Pulmonary:      Effort: No respiratory distress.      Breath sounds: No wheezing or rales.   Abdominal:      General: Bowel sounds are normal.      Palpations: Abdomen is soft.      Tenderness: There is no abdominal tenderness.      Comments: +ileal conduit and colostomy.   Musculoskeletal:         General: No swelling.      Cervical back: Neck supple.   Skin:     General: Skin is warm.      Coloration: Skin is not pale.   Neurological:      Mental Status: He is " alert and oriented to person, place, and time.   Psychiatric:         Mood and Affect: Mood normal.         Behavior: Behavior normal.         Thought Content: Thought content normal.         Judgment: Judgment normal.         LABS    Lab Results - Last 18 Months   Lab Units 10/05/22  1107 09/26/22  1034 07/05/22  0941 04/04/22  1430 03/24/22  1022 12/27/21  1058   HEMOGLOBIN g/dL 13.2 13.4 13.4 13.5 13.8 12.2*   HEMATOCRIT % 40.3 40.8 40.8 41.9 43.9 39.2   MCV fL 95.3 95.8 93.6 93.1 95.0 94.7   WBC 10*3/mm3 8.71 8.37 8.71 8.65 7.92 8.79   RDW % 14.6 14.6 14.3 14.3 14.3 14.6   MPV fL 9.1 8.8 8.7 8.6 8.7 8.5   PLATELETS 10*3/mm3 331 321 391 359 495* 437   IMM GRAN % % 0.3 0.4 0.5 0.5 0.8* 1.6*   NEUTROS ABS 10*3/mm3 5.80 5.77 5.99 6.00 5.45 6.48   LYMPHS ABS 10*3/mm3 1.81 1.53 1.57 1.65 1.36 1.23   MONOS ABS 10*3/mm3 0.72 0.74 0.77 0.61 0.67 0.66   EOS ABS 10*3/mm3 0.27 0.23 0.26 0.25 0.29 0.21   BASOS ABS 10*3/mm3 0.08 0.07 0.08 0.10 0.09 0.07   IMMATURE GRANS (ABS) 10*3/mm3 0.03 0.03 0.04 0.04 0.06* 0.14*   NRBC /100 WBC 0.0 0.0 0.0 0.0 0.0 0.0       Lab Results - Last 18 Months   Lab Units 10/05/22  1107 10/05/22  0926 09/26/22  1034 09/08/22  0946 07/05/22  1006 07/05/22  0941 04/04/22  1430 03/25/22  0901 03/24/22  1022 12/27/21  1058 09/24/21  0907 09/24/21  0840 07/02/21  1458 07/02/21  1130 06/10/21  1301 06/04/21  1026 05/26/21  0906   GLUCOSE mg/dL 132*  --  146*  --   --  156* 211* 195* 112* 219*  --  144*  --  140* 126*  --  210*   SODIUM mmol/L 138  --  141  --   --  139 144 140 139 136  --  139  --  142 140   < > 135*   POTASSIUM mmol/L 4.7  --  4.8  --   --  4.3 4.1 4.7 5.1 4.7  --  4.5  --  4.9 4.5   < > 4.5   TOTAL CO2   --   --   --   --   --   --   --   --   --   --   --   --   --   --   --    < >  --    CO2 mmol/L 23.0  --  24.0  --   --  21.0* 25.0 24.0 26.0 21.0*  --  22.0  --  25.0 27.0  --  22.0   CHLORIDE mmol/L 104  --  107  --   --  106 107 106 102 104  --  105  --  109* 106   < > 102    ANION GAP mmol/L 11.0  --  10.0  --   --  12.0 12.0 10.0 11.0 11.0  --  12.0  --  8.0 7.0   < > 11.0   CREATININE mg/dL 1.53* 1.70* 1.36* 1.50* 1.40* 1.46* 1.37* 1.48* 1.31* 1.15   < > 1.10   < > 1.13 1.05   < > 1.16   BUN mg/dL 29*  --  21  --   --  27* 24* 19 24* 23  --  21  --  25* 23   < > 29*   BUN / CREAT RATIO  19.0  --  15.4  --   --  18.5 17.5 12.8 18.3 20.0  --  19.1  --  22.1 21.9  --  25.0   CALCIUM mg/dL 9.7  --  9.3  --   --  9.3 9.4 9.7 10.6* 9.3  --  9.1  --  9.5 9.6   < > 9.7   EGFR IF NONAFRICN AM mL/min/1.73  --   --   --   --   --   --   --   --   --  63  --  66  --  64 70  --  62   ALK PHOS U/L 110  --  115  --   --  108 104 94 101 121*  --  110  --  119* 95  --  107   TOTAL PROTEIN g/dL 7.9  --  7.7  --   --  7.9 8.0 7.2 8.5 7.8  --  7.5  --  8.3 7.8  --  8.5   ALT (SGPT) U/L 17  --  13  --   --  10 11 10 10 20  --  11  --  15 12  --  15   AST (SGOT) U/L 17  --  14  --   --  15 14 14 13 14  --  13  --  12 12  --  16   BILIRUBIN mg/dL 0.2  --  0.2  --   --  0.2 0.4 0.2 0.2 <0.2  --  <0.2  --  0.3 0.2  --  0.3   ALBUMIN g/dL 4.20  --  4.30  --   --  4.10 4.20 4.10 4.50 3.80  --  3.90  --  4.00 3.40*  --  4.00   GLOBULIN gm/dL 3.7  --  3.4  --   --  3.8 3.8 3.1 4.0 4.0  --  3.6  --  4.3 4.4  --  4.5    < > = values in this interval not displayed.       Lab Results - Last 18 Months   Lab Units 10/05/22  1107 09/26/22  1034 08/16/22  0953 07/05/22  0941 05/17/22  1309 04/04/22  1430   CEA ng/mL 2.45 2.23 1.8 2.05 <1.8 1.99       Lab Results - Last 18 Months   Lab Units 10/05/22  1107 09/26/22  1034 07/05/22  0941 04/04/22  1430 03/24/22  1022 12/27/21  1058   IRON mcg/dL 54* 46* 32* 42* 63 45*   TIBC mcg/dL 301 286* 295* 313 316 283*   IRON SATURATION % 18* 16* 11* 13* 20 16*   FERRITIN ng/mL 294.30 267.50 288.80 298.40 266.30 219.60       Attila Viramontes reports a pain score of 0.       ASSESSMENT:  1.  Recurrent adenocarcinoma left pelvic sidewall. ROMAN and no loss of expression of  MMR.  AJCC stage: Hannah (T0, N0, M1a).  Treatment status:Palliative Avastin and FOLFIRI.   2.  Adenocarcinoma of the rectum. Tumor size 5.5 cm.  AJCC stage:IIIC (pT4b, pN1c, M0)  Treatment status:Post neoadjuvant CIV 5-FU and radiation, not a surgical candidate.  Post neoadjuvant FOLFOX.  Post abdominal perineal resection with permanent end colostomy, total cystoprostatectomy with ileal conduit and muscle flap to perineal defect on 12/21/2020.  3.  Good performance status of 0.  4.  Normocytic anemia from chemo and iron deficiency.    5.  Left upper lobe 9 from 7.9 mm pulmonary nodule. Stable.  6.  Mild hypercalcemia 03/23/2022.  Post IV fluid.  7.  Melanoma, left shoulder.  BRAF 600+.  AJCC stage:IIC(pT4b pNX, cM0)  Treatment status: Wide excision melanoma left shoulder with split thickness skin graft and sentinel lymph node biopsy 06/15/2021.       PLAN:  1.   Re: CT chest report 10/05/2022. No change in 9 mm LEFT upper lobe pulmonary nodule. No new findings in the chest.  2.   Re:  Heme status.  Hemoglobin 13.2, WBC 8.7 and platelet 331.      3.   Re:  Pre office CMP.  GFR 48.3 from 51.4 from 55.5 from 50.6 from 58.6 ml/min.     4.   Re:  Preoffice CEA at 2.45.  5.   Re:  Saturation 18% and ferritin 294.3. Hold iron replacement, no anemia.  6.   Re:  Seen by Dr. De Jesus on 08/16/2022 for follow-up.  Obtain outside imaging and plan for CT-guided biopsy of the left pelvic mass. Seen by Dr Destini De Jesus on 09/13/2022.  CT abdomen pelvis 09/13/2022.  Postsurgical changes from abdominal perineal resection, and colostomy and cystoprostatectomy with ileal conduit formation.  Enlarging nodule along the inferior left pelvic sidewall corresponding to FDG avid lesion concerning for local recurrence.  Chronic left hydro ureteronephrosis.  Slight increased size of 2 left para-aortic lymph nodes may be reactive given absence of FDG activity on prior PET scan, however metastatic disease  is also a consideration.  Unchanged area of mistty mesentery in the mid abdomen, nonspecific.  Recommend CT-guided biopsy of the left pelvic sidewall nodule and follow-up with surgical oncology after biopsy.  CT-guided biopsy left pelvic sidewall nodule at [facility] on 09/29/2022.  Pathology adenocarcinoma from a colorectal primary. ROMAN and no loss of expression of MMR. Called patient 10/10/2022 and plan for chemotherapy.   7.  Port placement by Dr. Weller.  8.  Schedule chemo every 2 weeks:  9.  Schedule treatment C1D1 to 3 and C2D1 to D after port. To be administered every 2 weeks:    D1  Irinotecan 180 mg/m2.   Leucovorin 400 mg/m2.  5- mg/m2  Begin 5-FU 2,400 mg/m2 IVPB over 46 hours.    Avastin 5 mg/kg  10.    Premedicate with:  Aloxi 0.25 mg IVP.  Decadron 10 mg IVPB over 20-30 min.  Atropine 0.5 mg SQ before irinotecan.  11.  Weekly CBC with differential, magnesium, and CMP.  12. eRx for Zofran 8 mg p.o. every 8 hours as needed for nausea vomiting #60, 2 refills if needed.  13. eRx for Compazine 10 mg p.o. every 4 hours as needed for nausea and vomiting, #60, 2 refills.    14. eRx Zofran 8 mg po every 8 hours as needed fr nausea and vomiting, # 60, 2 refills.   15.  Continue care per primary care physician and other specialists.  16.  Advance Care Planning   ACP discussion was declined by the patient. Patient does not have an advance directive, information provided.  17.  Chemo education.   18.  Return to office in 1 month with ferritin, iron panel, and CEA. Use OTC Imodium for diarrhea.         I have reviewed the assessment and plan and verified the accuracy of it. No changes to assessment and plan since the information was documented. Shaq Boggs MD 10/12/22         I spent 43 total minutes, face-to-face, caring for Attila today.  Greater than 50% of this time involved counseling and/or coordination of care as documented within this note regarding the patient's illness(es), pros and cons of  various treatment options, instructions and/or risk reduction.            Conor Hebert MD  (Zak Ruiz MD)  (Severino Del Rio MD)  (Yanick Flowers DO)  (Nabeel Walton MD)  (Kel Ramirez MD)  (Vielka Weller MD)  (Destini De Jessu MD)

## 2022-10-04 DIAGNOSIS — E78.2 MIXED HYPERLIPIDEMIA: ICD-10-CM

## 2022-10-04 RX ORDER — ATORVASTATIN CALCIUM 20 MG/1
20 TABLET, FILM COATED ORAL DAILY
Qty: 30 TABLET | Refills: 1 | Status: SHIPPED | OUTPATIENT
Start: 2022-10-04 | End: 2022-10-31 | Stop reason: SDUPTHER

## 2022-10-05 ENCOUNTER — HOSPITAL ENCOUNTER (OUTPATIENT)
Dept: CT IMAGING | Facility: HOSPITAL | Age: 72
Discharge: HOME | End: 2022-10-05
Admitting: INTERNAL MEDICINE

## 2022-10-05 ENCOUNTER — LAB (OUTPATIENT)
Dept: LAB | Facility: HOSPITAL | Age: 72
End: 2022-10-05

## 2022-10-05 DIAGNOSIS — C20 RECTAL CANCER: Primary | ICD-10-CM

## 2022-10-05 DIAGNOSIS — C20 RECTAL CANCER: ICD-10-CM

## 2022-10-05 DIAGNOSIS — D50.8 IRON DEFICIENCY ANEMIA SECONDARY TO INADEQUATE DIETARY IRON INTAKE: ICD-10-CM

## 2022-10-05 DIAGNOSIS — D50.9 IRON DEFICIENCY ANEMIA, UNSPECIFIED IRON DEFICIENCY ANEMIA TYPE: ICD-10-CM

## 2022-10-05 LAB
ALBUMIN SERPL-MCNC: 4.2 G/DL (ref 3.5–5.2)
ALBUMIN/GLOB SERPL: 1.1 G/DL
ALP SERPL-CCNC: 110 U/L (ref 39–117)
ALT SERPL W P-5'-P-CCNC: 17 U/L (ref 1–41)
ANION GAP SERPL CALCULATED.3IONS-SCNC: 11 MMOL/L (ref 5–15)
AST SERPL-CCNC: 17 U/L (ref 1–40)
BASOPHILS # BLD AUTO: 0.08 10*3/MM3 (ref 0–0.2)
BASOPHILS NFR BLD AUTO: 0.9 % (ref 0–1.5)
BILIRUB SERPL-MCNC: 0.2 MG/DL (ref 0–1.2)
BUN SERPL-MCNC: 29 MG/DL (ref 8–23)
BUN/CREAT SERPL: 19 (ref 7–25)
CALCIUM SPEC-SCNC: 9.7 MG/DL (ref 8.6–10.5)
CEA SERPL-MCNC: 2.45 NG/ML
CHLORIDE SERPL-SCNC: 104 MMOL/L (ref 98–107)
CO2 SERPL-SCNC: 23 MMOL/L (ref 22–29)
CREAT BLDA-MCNC: 1.7 MG/DL (ref 0.6–1.3)
CREAT SERPL-MCNC: 1.53 MG/DL (ref 0.76–1.27)
DEPRECATED RDW RBC AUTO: 50.6 FL (ref 37–54)
EGFRCR SERPLBLD CKD-EPI 2021: 48.3 ML/MIN/1.73
EOSINOPHIL # BLD AUTO: 0.27 10*3/MM3 (ref 0–0.4)
EOSINOPHIL NFR BLD AUTO: 3.1 % (ref 0.3–6.2)
ERYTHROCYTE [DISTWIDTH] IN BLOOD BY AUTOMATED COUNT: 14.6 % (ref 12.3–15.4)
FERRITIN SERPL-MCNC: 294.3 NG/ML (ref 30–400)
GLOBULIN UR ELPH-MCNC: 3.7 GM/DL
GLUCOSE SERPL-MCNC: 132 MG/DL (ref 65–99)
HCT VFR BLD AUTO: 40.3 % (ref 37.5–51)
HGB BLD-MCNC: 13.2 G/DL (ref 13–17.7)
IMM GRANULOCYTES # BLD AUTO: 0.03 10*3/MM3 (ref 0–0.05)
IMM GRANULOCYTES NFR BLD AUTO: 0.3 % (ref 0–0.5)
IRON 24H UR-MRATE: 54 MCG/DL (ref 59–158)
IRON SATN MFR SERPL: 18 % (ref 20–50)
LYMPHOCYTES # BLD AUTO: 1.81 10*3/MM3 (ref 0.7–3.1)
LYMPHOCYTES NFR BLD AUTO: 20.8 % (ref 19.6–45.3)
MCH RBC QN AUTO: 31.2 PG (ref 26.6–33)
MCHC RBC AUTO-ENTMCNC: 32.8 G/DL (ref 31.5–35.7)
MCV RBC AUTO: 95.3 FL (ref 79–97)
MONOCYTES # BLD AUTO: 0.72 10*3/MM3 (ref 0.1–0.9)
MONOCYTES NFR BLD AUTO: 8.3 % (ref 5–12)
NEUTROPHILS NFR BLD AUTO: 5.8 10*3/MM3 (ref 1.7–7)
NEUTROPHILS NFR BLD AUTO: 66.6 % (ref 42.7–76)
NRBC BLD AUTO-RTO: 0 /100 WBC (ref 0–0.2)
PLATELET # BLD AUTO: 331 10*3/MM3 (ref 140–450)
PMV BLD AUTO: 9.1 FL (ref 6–12)
POTASSIUM SERPL-SCNC: 4.7 MMOL/L (ref 3.5–5.2)
PROT SERPL-MCNC: 7.9 G/DL (ref 6–8.5)
RBC # BLD AUTO: 4.23 10*6/MM3 (ref 4.14–5.8)
SODIUM SERPL-SCNC: 138 MMOL/L (ref 136–145)
TIBC SERPL-MCNC: 301 MCG/DL (ref 298–536)
TRANSFERRIN SERPL-MCNC: 202 MG/DL (ref 200–360)
WBC NRBC COR # BLD: 8.71 10*3/MM3 (ref 3.4–10.8)

## 2022-10-05 PROCEDURE — 85025 COMPLETE CBC W/AUTO DIFF WBC: CPT | Performed by: INTERNAL MEDICINE

## 2022-10-05 PROCEDURE — 82565 ASSAY OF CREATININE: CPT

## 2022-10-05 PROCEDURE — 82378 CARCINOEMBRYONIC ANTIGEN: CPT | Performed by: INTERNAL MEDICINE

## 2022-10-05 PROCEDURE — 82728 ASSAY OF FERRITIN: CPT | Performed by: INTERNAL MEDICINE

## 2022-10-05 PROCEDURE — 83540 ASSAY OF IRON: CPT | Performed by: INTERNAL MEDICINE

## 2022-10-05 PROCEDURE — 25010000002 IOPAMIDOL 61 % SOLUTION: Performed by: INTERNAL MEDICINE

## 2022-10-05 PROCEDURE — 80053 COMPREHEN METABOLIC PANEL: CPT | Performed by: INTERNAL MEDICINE

## 2022-10-05 PROCEDURE — 71260 CT THORAX DX C+: CPT

## 2022-10-05 PROCEDURE — 36415 COLL VENOUS BLD VENIPUNCTURE: CPT

## 2022-10-05 PROCEDURE — 84466 ASSAY OF TRANSFERRIN: CPT | Performed by: INTERNAL MEDICINE

## 2022-10-05 RX ADMIN — IOPAMIDOL 100 ML: 612 INJECTION, SOLUTION INTRAVENOUS at 09:34

## 2022-10-06 ENCOUNTER — TELEPHONE (OUTPATIENT)
Dept: ONCOLOGY | Facility: CLINIC | Age: 72
End: 2022-10-06

## 2022-10-06 RX ORDER — SODIUM CHLORIDE 9 MG/ML
500 INJECTION, SOLUTION INTRAVENOUS ONCE
Status: CANCELLED | OUTPATIENT
Start: 2022-10-07

## 2022-10-06 NOTE — TELEPHONE ENCOUNTER
Contacted patient Attila Viramontes, he was informed there has been a decline in his renal function,   Patient was asked to increase his oral hydration  And scheduled for IV fluids x 1 liter NS over 2 hours     Apt jie: Friday 10/7/22 @ 9:30 am  (nixon)  Patient v/u of time and date of apt

## 2022-10-06 NOTE — TELEPHONE ENCOUNTER
----- Message from Shaq Boggs MD sent at 10/5/2022  9:38 PM CDT -----  Order 1 liter NS over 2 hours. Increase oral hydration.

## 2022-10-07 ENCOUNTER — INFUSION (OUTPATIENT)
Dept: ONCOLOGY | Facility: HOSPITAL | Age: 72
End: 2022-10-07

## 2022-10-07 VITALS
BODY MASS INDEX: 25.91 KG/M2 | TEMPERATURE: 98.6 F | DIASTOLIC BLOOD PRESSURE: 55 MMHG | WEIGHT: 181 LBS | OXYGEN SATURATION: 100 % | RESPIRATION RATE: 18 BRPM | HEART RATE: 75 BPM | SYSTOLIC BLOOD PRESSURE: 106 MMHG | HEIGHT: 70 IN

## 2022-10-07 DIAGNOSIS — D70.1 CHEMOTHERAPY INDUCED NEUTROPENIA: Primary | ICD-10-CM

## 2022-10-07 DIAGNOSIS — C20 RECTAL CANCER: ICD-10-CM

## 2022-10-07 DIAGNOSIS — T45.1X5A CHEMOTHERAPY INDUCED NEUTROPENIA: Primary | ICD-10-CM

## 2022-10-07 PROCEDURE — 96360 HYDRATION IV INFUSION INIT: CPT

## 2022-10-07 PROCEDURE — 96361 HYDRATE IV INFUSION ADD-ON: CPT

## 2022-10-07 RX ORDER — SODIUM CHLORIDE 9 MG/ML
500 INJECTION, SOLUTION INTRAVENOUS ONCE
Status: CANCELLED | OUTPATIENT
Start: 2022-10-07

## 2022-10-07 RX ORDER — SODIUM CHLORIDE 0.9 % (FLUSH) 0.9 %
10 SYRINGE (ML) INJECTION AS NEEDED
Status: DISCONTINUED | OUTPATIENT
Start: 2022-10-07 | End: 2022-10-07 | Stop reason: HOSPADM

## 2022-10-07 RX ORDER — HEPARIN SODIUM (PORCINE) LOCK FLUSH IV SOLN 100 UNIT/ML 100 UNIT/ML
500 SOLUTION INTRAVENOUS AS NEEDED
Status: DISCONTINUED | OUTPATIENT
Start: 2022-10-07 | End: 2022-10-07 | Stop reason: HOSPADM

## 2022-10-07 RX ORDER — SODIUM CHLORIDE 0.9 % (FLUSH) 0.9 %
10 SYRINGE (ML) INJECTION AS NEEDED
OUTPATIENT
Start: 2022-10-07

## 2022-10-07 RX ORDER — HEPARIN SODIUM (PORCINE) LOCK FLUSH IV SOLN 100 UNIT/ML 100 UNIT/ML
500 SOLUTION INTRAVENOUS AS NEEDED
OUTPATIENT
Start: 2022-10-07

## 2022-10-07 RX ORDER — SODIUM CHLORIDE 9 MG/ML
500 INJECTION, SOLUTION INTRAVENOUS ONCE
Status: COMPLETED | OUTPATIENT
Start: 2022-10-07 | End: 2022-10-07

## 2022-10-07 RX ADMIN — SODIUM CHLORIDE 500 ML/HR: 9 INJECTION, SOLUTION INTRAVENOUS at 09:45

## 2022-10-12 ENCOUNTER — OFFICE VISIT (OUTPATIENT)
Dept: ONCOLOGY | Facility: CLINIC | Age: 72
End: 2022-10-12

## 2022-10-12 VITALS
RESPIRATION RATE: 18 BRPM | WEIGHT: 179.4 LBS | OXYGEN SATURATION: 95 % | TEMPERATURE: 97.2 F | BODY MASS INDEX: 25.68 KG/M2 | DIASTOLIC BLOOD PRESSURE: 72 MMHG | HEIGHT: 70 IN | HEART RATE: 91 BPM | SYSTOLIC BLOOD PRESSURE: 116 MMHG

## 2022-10-12 DIAGNOSIS — C20 RECTAL CANCER: Primary | ICD-10-CM

## 2022-10-12 DIAGNOSIS — D50.8 IRON DEFICIENCY ANEMIA SECONDARY TO INADEQUATE DIETARY IRON INTAKE: ICD-10-CM

## 2022-10-12 PROCEDURE — 99215 OFFICE O/P EST HI 40 MIN: CPT | Performed by: INTERNAL MEDICINE

## 2022-10-12 RX ORDER — ONDANSETRON HYDROCHLORIDE 8 MG/1
8 TABLET, FILM COATED ORAL EVERY 8 HOURS PRN
Qty: 60 TABLET | Refills: 2 | Status: SHIPPED | OUTPATIENT
Start: 2022-10-12

## 2022-10-12 RX ORDER — PROCHLORPERAZINE MALEATE 10 MG
10 TABLET ORAL EVERY 4 HOURS PRN
Qty: 60 TABLET | Refills: 2 | Status: SHIPPED | OUTPATIENT
Start: 2022-10-12

## 2022-10-31 ENCOUNTER — OFFICE VISIT (OUTPATIENT)
Dept: INTERNAL MEDICINE | Facility: CLINIC | Age: 72
End: 2022-10-31

## 2022-10-31 VITALS
HEART RATE: 83 BPM | RESPIRATION RATE: 16 BRPM | OXYGEN SATURATION: 98 % | BODY MASS INDEX: 25.77 KG/M2 | SYSTOLIC BLOOD PRESSURE: 120 MMHG | WEIGHT: 180 LBS | HEIGHT: 70 IN | TEMPERATURE: 98.1 F | DIASTOLIC BLOOD PRESSURE: 60 MMHG

## 2022-10-31 DIAGNOSIS — E78.2 MIXED HYPERLIPIDEMIA: ICD-10-CM

## 2022-10-31 DIAGNOSIS — C20 RECTAL CANCER: ICD-10-CM

## 2022-10-31 DIAGNOSIS — Z23 NEED FOR INFLUENZA VACCINATION: ICD-10-CM

## 2022-10-31 DIAGNOSIS — E11.9 TYPE 2 DIABETES MELLITUS WITHOUT COMPLICATION, WITHOUT LONG-TERM CURRENT USE OF INSULIN: Primary | ICD-10-CM

## 2022-10-31 DIAGNOSIS — N18.31 STAGE 3A CHRONIC KIDNEY DISEASE: ICD-10-CM

## 2022-10-31 PROBLEM — E86.1 HYPOTENSION DUE TO HYPOVOLEMIA: Status: RESOLVED | Noted: 2020-09-18 | Resolved: 2022-10-31

## 2022-10-31 PROBLEM — K62.5 BRIGHT RED RECTAL BLEEDING: Status: RESOLVED | Noted: 2020-04-16 | Resolved: 2022-10-31

## 2022-10-31 PROBLEM — Z92.3 HISTORY OF RADIATION THERAPY: Status: RESOLVED | Noted: 2020-09-13 | Resolved: 2022-10-31

## 2022-10-31 PROBLEM — I95.89 HYPOTENSION DUE TO HYPOVOLEMIA: Status: RESOLVED | Noted: 2020-09-18 | Resolved: 2022-10-31

## 2022-10-31 LAB
EXPIRATION DATE: ABNORMAL
HBA1C MFR BLD: 6.4 %
Lab: ABNORMAL

## 2022-10-31 PROCEDURE — 83036 HEMOGLOBIN GLYCOSYLATED A1C: CPT | Performed by: INTERNAL MEDICINE

## 2022-10-31 PROCEDURE — G0008 ADMIN INFLUENZA VIRUS VAC: HCPCS | Performed by: INTERNAL MEDICINE

## 2022-10-31 PROCEDURE — 3044F HG A1C LEVEL LT 7.0%: CPT | Performed by: INTERNAL MEDICINE

## 2022-10-31 PROCEDURE — 99214 OFFICE O/P EST MOD 30 MIN: CPT | Performed by: INTERNAL MEDICINE

## 2022-10-31 PROCEDURE — 90662 IIV NO PRSV INCREASED AG IM: CPT | Performed by: INTERNAL MEDICINE

## 2022-10-31 RX ORDER — ATORVASTATIN CALCIUM 20 MG/1
20 TABLET, FILM COATED ORAL DAILY
Qty: 30 TABLET | Refills: 5 | Status: SHIPPED | OUTPATIENT
Start: 2022-10-31

## 2022-10-31 NOTE — PROGRESS NOTES
Chief Complaint   Patient presents with   • Diabetes   • Follow-up     Answers for HPI/ROS submitted by the patient on 10/29/2022  What is the primary reason for your visit?: Diabetes        History:  Attila Viramontes is a 72 y.o. male who presents today for evaluation of the above problems.      6 month follow up.  Since March 2022 his creatinine has been stable around 1.5 and estimated GFR 48.  CT of the abdomen and pelvis shows stable left-sided hydronephrosis. Attila states this has been that way since he has had surgery.    He was found to have a new pelvic metastasis and will be getting a port placement this Thursday by Dr. De Jesus.    A1c today 6.4% from 7.0% at the last visit.  He remains on metformin 1000 mg twice a day      Diabetes  He has type 2 diabetes mellitus. No MedicAlert identification noted. Pertinent negatives for hypoglycemia include no confusion, dizziness, headaches, hunger, mood changes, nervousness/anxiousness, pallor, seizures, sleepiness, speech difficulty, sweats or tremors. Associated symptoms include fatigue, foot paresthesias and polydipsia. Pertinent negatives for diabetes include no blurred vision, no chest pain, no foot ulcerations, no polyphagia, no polyuria, no visual change, no weakness and no weight loss. Pertinent negatives for hypoglycemia complications include no blackouts, no hospitalization, no nocturnal hypoglycemia, no required assistance and no required glucagon injection. Symptoms are stable. Risk factors for coronary artery disease include no known risk factors, dyslipidemia and tobacco exposure. Current diabetic treatment includes oral agent (monotherapy). He is compliant with treatment all of the time. His weight is stable. He is following a generally healthy diet. He has not had a previous visit with a dietitian. He participates in exercise daily. His home blood glucose trend is fluctuating minimally. His breakfast blood glucose range is generally 140-180 mg/dl.  His lunch blood glucose range is generally 140-180 mg/dl. His dinner blood glucose range is generally 140-180 mg/dl. He does not see a podiatrist.Eye exam is not current.         ROS:  Review of Systems   Constitutional: Positive for fatigue. Negative for weight loss.   Eyes: Negative for blurred vision.   Cardiovascular: Negative for chest pain.   Endocrine: Positive for polydipsia. Negative for polyphagia and polyuria.   Skin: Negative for pallor.   Neurological: Negative for dizziness, tremors, seizures, speech difficulty, weakness and headaches.   Psychiatric/Behavioral: Negative for confusion. The patient is not nervous/anxious.          Current Outpatient Medications:   •  aspirin 81 MG chewable tablet, Chew 81 mg Daily., Disp: , Rfl:   •  atorvastatin (LIPITOR) 20 MG tablet, Take 1 tablet by mouth Daily., Disp: 30 tablet, Rfl: 5  •  metFORMIN (Glucophage) 500 MG tablet, Take 2 tablets by mouth 2 (Two) Times a Day With Meals., Disp: 120 tablet, Rfl: 5  •  ondansetron (Zofran) 8 MG tablet, Take 1 tablet by mouth Every 8 (Eight) Hours As Needed for Nausea or Vomiting., Disp: 60 tablet, Rfl: 2  •  prochlorperazine (COMPAZINE) 10 MG tablet, Take 1 tablet by mouth Every 4 (Four) Hours As Needed for Nausea., Disp: 60 tablet, Rfl: 2    Lab Results   Component Value Date    GLUCOSE 132 (H) 10/05/2022    BUN 29 (H) 10/05/2022    CREATININE 1.53 (H) 10/05/2022    EGFRIFNONA 63 12/27/2021    BCR 19.0 10/05/2022    K 4.7 10/05/2022    CO2 23.0 10/05/2022    CALCIUM 9.7 10/05/2022    ALBUMIN 4.20 10/05/2022    AST 17 10/05/2022    ALT 17 10/05/2022       WBC   Date Value Ref Range Status   10/05/2022 8.71 3.40 - 10.80 10*3/mm3 Final     RBC   Date Value Ref Range Status   10/05/2022 4.23 4.14 - 5.80 10*6/mm3 Final     Hemoglobin   Date Value Ref Range Status   10/05/2022 13.2 13.0 - 17.7 g/dL Final     Hematocrit   Date Value Ref Range Status   10/05/2022 40.3 37.5 - 51.0 % Final   12/24/2020 28 (L) 41 - 49 % Final     MCV  "  Date Value Ref Range Status   10/05/2022 95.3 79.0 - 97.0 fL Final     MCH   Date Value Ref Range Status   10/05/2022 31.2 26.6 - 33.0 pg Final     MCHC   Date Value Ref Range Status   10/05/2022 32.8 31.5 - 35.7 g/dL Final     RDW   Date Value Ref Range Status   10/05/2022 14.6 12.3 - 15.4 % Final     RDW-SD   Date Value Ref Range Status   10/05/2022 50.6 37.0 - 54.0 fl Final     MPV   Date Value Ref Range Status   10/05/2022 9.1 6.0 - 12.0 fL Final     Platelets   Date Value Ref Range Status   10/05/2022 331 140 - 450 10*3/mm3 Final     Neutrophil %   Date Value Ref Range Status   10/05/2022 66.6 42.7 - 76.0 % Final     Lymphocyte %   Date Value Ref Range Status   10/05/2022 20.8 19.6 - 45.3 % Final     Monocyte %   Date Value Ref Range Status   10/05/2022 8.3 5.0 - 12.0 % Final     Eosinophil %   Date Value Ref Range Status   10/05/2022 3.1 0.3 - 6.2 % Final     Basophil %   Date Value Ref Range Status   10/05/2022 0.9 0.0 - 1.5 % Final     Immature Grans %   Date Value Ref Range Status   10/05/2022 0.3 0.0 - 0.5 % Final     Neutrophils, Absolute   Date Value Ref Range Status   10/05/2022 5.80 1.70 - 7.00 10*3/mm3 Final     Lymphocytes, Absolute   Date Value Ref Range Status   10/05/2022 1.81 0.70 - 3.10 10*3/mm3 Final     Monocytes, Absolute   Date Value Ref Range Status   10/05/2022 0.72 0.10 - 0.90 10*3/mm3 Final     Eosinophils, Absolute   Date Value Ref Range Status   10/05/2022 0.27 0.00 - 0.40 10*3/mm3 Final     Basophils, Absolute   Date Value Ref Range Status   10/05/2022 0.08 0.00 - 0.20 10*3/mm3 Final     Immature Grans, Absolute   Date Value Ref Range Status   10/05/2022 0.03 0.00 - 0.05 10*3/mm3 Final     nRBC   Date Value Ref Range Status   10/05/2022 0.0 0.0 - 0.2 /100 WBC Final         OBJECTIVE:  Visit Vitals  /60 (BP Location: Left arm, Patient Position: Sitting, Cuff Size: Adult)   Pulse 83   Temp 98.1 °F (36.7 °C) (Oral)   Resp 16   Ht 177.8 cm (70\")   Wt 81.6 kg (180 lb)   SpO2 98% "   BMI 25.83 kg/m²      Physical Exam  Vitals and nursing note reviewed.   Constitutional:       General: He is not in acute distress.     Appearance: Normal appearance. He is well-developed. He is not ill-appearing or toxic-appearing.      Comments: Pleasant   HENT:      Head: Normocephalic and atraumatic.   Eyes:      Pupils: Pupils are equal, round, and reactive to light.   Neck:      Thyroid: No thyromegaly.      Trachea: Phonation normal.   Pulmonary:      Effort: No respiratory distress.   Skin:     Coloration: Skin is not pale.      Findings: No erythema.   Neurological:      Mental Status: He is alert.   Psychiatric:         Behavior: Behavior normal.         Thought Content: Thought content normal.         Judgment: Judgment normal.         Assessment/Plan    Diagnoses and all orders for this visit:    1. Type 2 diabetes mellitus without complication, without long-term current use of insulin (HCC) (Primary)  -     POC Glycosylated Hemoglobin (Hb A1C)  -     metFORMIN (Glucophage) 500 MG tablet; Take 2 tablets by mouth 2 (Two) Times a Day With Meals.  Dispense: 120 tablet; Refill: 5    2. Stage 3a chronic kidney disease (HCC)    3. Rectal cancer (HCC)    4. Mixed hyperlipidemia  -     atorvastatin (LIPITOR) 20 MG tablet; Take 1 tablet by mouth Daily.  Dispense: 30 tablet; Refill: 5      We will continue metformin 1000 mg twice a day.  A1c is very good at 6.4%.  His estimated GFR  48 and creatinine of 1.5.  We will need to keep a close eye on this and discontinue his metformin if creatinine worsens.  He has chronic left-sided hydronephrosis which could be causing or contributing to his CKD.  We will continue to keep a close eye on this.    He has rectal cancer with metastasis and will be getting a port placement this Thursday for chemotherapy initiation on Tuesday.    I recommend getting influenza vaccine today.  Would like to get a COVID booster before starting chemo as well.  He may come in Friday to  get this done.    Return in about 6 months (around 4/30/2023) for Medicare Wellness.      KIP Hebert MD  13:07 CDT  10/31/2022

## 2022-11-04 ENCOUNTER — CLINICAL SUPPORT (OUTPATIENT)
Dept: INTERNAL MEDICINE | Facility: CLINIC | Age: 72
End: 2022-11-04

## 2022-11-04 DIAGNOSIS — Z23 NEED FOR COVID-19 VACCINE: Primary | ICD-10-CM

## 2022-11-04 PROCEDURE — 0124A PR ADM SARSCOV2 30MCG/0.3ML BST: CPT | Performed by: INTERNAL MEDICINE

## 2022-11-04 PROCEDURE — 91312 COVID-19 (PFIZER) BIVALENT BOOSTER 12+YRS: CPT | Performed by: INTERNAL MEDICINE

## 2022-11-04 NOTE — PROGRESS NOTES
Injection  Injection performed in Right Deltoid by Saba Carvalho MA. Patient tolerated the procedure well without complications.  11/04/22   Saba Carvalho MA

## 2023-03-15 NOTE — TELEPHONE ENCOUNTER
PT CALLED TO QUESTION WHY HIS SISTER COULD NOT COME INTO OFFICE. HE WAS ADVISED OUR POLICY WAS NO VISITORS AT THIS TIME. PT UNDERSTOOD. HE STATED AT HIS LAST VISIT HE SAW ANOTHER PATIENT COME IN WITH 2 PEOPLE AND WONDERED WHY THEY WERE NOT TURNED AWAY, BUT HIS SISTER WAS. HE ASKED SOMEONE IN THE OFFICE AT THE TIME AND SHE SAID SHE WOULD LOOK INTO THE SITUATION.    PT WAS ADVISED SOME HAVE BEEN CLEARED BY THE DOCTOR TO COME IN FOR A REASON FOR THE PATIENT AND WE COULD SEE IF HIS SISTER COULD COME IN.    HIS SISTER ATTENDS HIS OTHER APPTS IN OTHER STATES, WHICH I CLARIFIED EACH OFFICE HAS THEIR OWN POLICIES TO GO BY. WE HAVE A NO VISITOR POLICY AT THIS TIME. ADVISED PT SISTER COULD CALL TO HAVE MESSAGE GIVEN TO NURSE OR DOCTOR IF SHE HAS QUESTIONS FOR ANY APPTS. PT DID NOT WANT SISTER TO COME AND SIT IN CAR, IF SHE IS NOT ALLOWED IN OFFICE.    PLEASE CALL PT TO VERIFY HIS SITUATION:  [phone]    soft/nontender

## 2023-05-01 ENCOUNTER — LAB (OUTPATIENT)
Dept: LAB | Facility: HOSPITAL | Age: 73
End: 2023-05-01
Payer: MEDICARE

## 2023-05-01 ENCOUNTER — OFFICE VISIT (OUTPATIENT)
Dept: INTERNAL MEDICINE | Facility: CLINIC | Age: 73
End: 2023-05-01
Payer: MEDICARE

## 2023-05-01 VITALS
OXYGEN SATURATION: 99 % | BODY MASS INDEX: 26.04 KG/M2 | SYSTOLIC BLOOD PRESSURE: 120 MMHG | TEMPERATURE: 97.7 F | DIASTOLIC BLOOD PRESSURE: 68 MMHG | HEIGHT: 70 IN | WEIGHT: 181.9 LBS | HEART RATE: 95 BPM

## 2023-05-01 DIAGNOSIS — E78.2 MIXED HYPERLIPIDEMIA: ICD-10-CM

## 2023-05-01 DIAGNOSIS — Z00.00 MEDICARE ANNUAL WELLNESS VISIT, SUBSEQUENT: Primary | ICD-10-CM

## 2023-05-01 DIAGNOSIS — C20 RECTAL CANCER: ICD-10-CM

## 2023-05-01 DIAGNOSIS — N18.31 STAGE 3A CHRONIC KIDNEY DISEASE: ICD-10-CM

## 2023-05-01 DIAGNOSIS — Z23 NEED FOR VACCINATION: ICD-10-CM

## 2023-05-01 DIAGNOSIS — Z13.31 DEPRESSION SCREEN: ICD-10-CM

## 2023-05-01 DIAGNOSIS — Z00.00 MEDICARE ANNUAL WELLNESS VISIT, SUBSEQUENT: ICD-10-CM

## 2023-05-01 DIAGNOSIS — E11.9 TYPE 2 DIABETES MELLITUS WITHOUT COMPLICATION, WITHOUT LONG-TERM CURRENT USE OF INSULIN: ICD-10-CM

## 2023-05-01 DIAGNOSIS — Z91.81 AT LOW RISK FOR FALL: ICD-10-CM

## 2023-05-01 LAB
ALBUMIN SERPL-MCNC: 4.5 G/DL (ref 3.5–5.2)
ALBUMIN UR-MCNC: 3.3 MG/DL
ALBUMIN/GLOB SERPL: 1.4 G/DL
ALP SERPL-CCNC: 111 U/L (ref 39–117)
ALT SERPL W P-5'-P-CCNC: 10 U/L (ref 1–41)
ANION GAP SERPL CALCULATED.3IONS-SCNC: 12 MMOL/L (ref 5–15)
AST SERPL-CCNC: 13 U/L (ref 1–40)
BILIRUB SERPL-MCNC: <0.2 MG/DL (ref 0–1.2)
BUN SERPL-MCNC: 24 MG/DL (ref 8–23)
BUN/CREAT SERPL: 18 (ref 7–25)
CALCIUM SPEC-SCNC: 9.5 MG/DL (ref 8.6–10.5)
CHLORIDE SERPL-SCNC: 106 MMOL/L (ref 98–107)
CHOLEST SERPL-MCNC: 126 MG/DL (ref 0–200)
CO2 SERPL-SCNC: 22 MMOL/L (ref 22–29)
CREAT SERPL-MCNC: 1.33 MG/DL (ref 0.76–1.27)
CREAT UR-MCNC: 63.3 MG/DL
DEPRECATED RDW RBC AUTO: 50.5 FL (ref 37–54)
EGFRCR SERPLBLD CKD-EPI 2021: 57.1 ML/MIN/1.73
ERYTHROCYTE [DISTWIDTH] IN BLOOD BY AUTOMATED COUNT: 14.9 % (ref 12.3–15.4)
GLOBULIN UR ELPH-MCNC: 3.2 GM/DL
GLUCOSE SERPL-MCNC: 146 MG/DL (ref 65–99)
HBA1C MFR BLD: 7.7 % (ref 4.8–5.6)
HCT VFR BLD AUTO: 38.8 % (ref 37.5–51)
HDLC SERPL-MCNC: 39 MG/DL (ref 40–60)
HGB BLD-MCNC: 12.6 G/DL (ref 13–17.7)
LDLC SERPL CALC-MCNC: 61 MG/DL (ref 0–100)
LDLC/HDLC SERPL: 1.47 {RATIO}
MCH RBC QN AUTO: 30.4 PG (ref 26.6–33)
MCHC RBC AUTO-ENTMCNC: 32.5 G/DL (ref 31.5–35.7)
MCV RBC AUTO: 93.5 FL (ref 79–97)
MICROALBUMIN/CREAT UR: 52.1 MG/G
PLATELET # BLD AUTO: 419 10*3/MM3 (ref 140–450)
PMV BLD AUTO: 8.8 FL (ref 6–12)
POTASSIUM SERPL-SCNC: 4.8 MMOL/L (ref 3.5–5.2)
PROT SERPL-MCNC: 7.7 G/DL (ref 6–8.5)
RBC # BLD AUTO: 4.15 10*6/MM3 (ref 4.14–5.8)
SODIUM SERPL-SCNC: 140 MMOL/L (ref 136–145)
TRIGL SERPL-MCNC: 149 MG/DL (ref 0–150)
VLDLC SERPL-MCNC: 26 MG/DL (ref 5–40)
WBC NRBC COR # BLD: 8.99 10*3/MM3 (ref 3.4–10.8)

## 2023-05-01 PROCEDURE — 80053 COMPREHEN METABOLIC PANEL: CPT

## 2023-05-01 PROCEDURE — 82570 ASSAY OF URINE CREATININE: CPT

## 2023-05-01 PROCEDURE — 36415 COLL VENOUS BLD VENIPUNCTURE: CPT

## 2023-05-01 PROCEDURE — 83036 HEMOGLOBIN GLYCOSYLATED A1C: CPT

## 2023-05-01 PROCEDURE — 82043 UR ALBUMIN QUANTITATIVE: CPT

## 2023-05-01 PROCEDURE — 85027 COMPLETE CBC AUTOMATED: CPT

## 2023-05-01 PROCEDURE — 80061 LIPID PANEL: CPT

## 2023-05-01 NOTE — PROGRESS NOTES
The ABCs of the Annual Wellness Visit  Subsequent Medicare Wellness Visit    Subjective      Attila Viramontes is a 72 y.o. male who presents for a Subsequent Medicare Wellness Visit.    Since last visit he was diagnosed with recurrence of his rectal cancer.  He is going to [facility] for infusions of 5-FU every 14 days.  He has a lung nodule and mass in his pelvis.  He has no pain or symptoms.    The following portions of the patient's history were reviewed and   updated as appropriate: allergies, current medications, past family history, past medical history, past social history, past surgical history and problem list.    Compared to one year ago, the patient feels his physical   health is better.    Compared to one year ago, the patient feels his mental   health is the same.    Recent Hospitalizations:  He was not admitted to the hospital during the last year.       Current Medical Providers:  Patient Care Team:  EDUARDO Hebert MD as PCP - General (Internal Medicine)  Ga Hameed MD as Cardiologist (Cardiology)    Outpatient Medications Prior to Visit   Medication Sig Dispense Refill   • aspirin 81 MG chewable tablet Chew 1 tablet Daily.     • atorvastatin (LIPITOR) 20 MG tablet Take 1 tablet by mouth Daily. 30 tablet 5   • metFORMIN (Glucophage) 500 MG tablet Take 2 tablets by mouth 2 (Two) Times a Day With Meals. 120 tablet 5   • ondansetron (Zofran) 8 MG tablet Take 1 tablet by mouth Every 8 (Eight) Hours As Needed for Nausea or Vomiting. 60 tablet 2   • prochlorperazine (COMPAZINE) 10 MG tablet Take 1 tablet by mouth Every 4 (Four) Hours As Needed for Nausea. 60 tablet 2     No facility-administered medications prior to visit.       No opioid medication identified on active medication list. I have reviewed chart for other potential  high risk medication/s and harmful drug interactions in the elderly.          Aspirin is on active medication list. Aspirin use is indicated based on review of current  "medical condition/s. Pros and cons of this therapy have been discussed today. Benefits of this medication outweigh potential harm.  Patient has been encouraged to continue taking this medication.  .      Patient Active Problem List   Diagnosis   • Rectal cancer   • Current every day smoker   • Impaired gait and mobility   • 2nd degree AV block   • Tobacco abuse   • Normocytic anemia   • Chemotherapy induced neutropenia   • Cancer related pain   • Carotid artery stenosis, symptomatic, right   • Carotid occlusion, left   • Type 2 diabetes mellitus without complication, without long-term current use of insulin   • Iron deficiency anemia   • Ileostomy in place   • Colostomy in place   • Function kidney decreased   • Stage 3a chronic kidney disease     Advance Care Planning   Advance Care Planning     Advance Directive is not on file.  ACP discussion was held with the patient during this visit. Patient does not have an advance directive, declines further assistance.     Objective    Vitals:    05/01/23 1056   BP: 120/68   BP Location: Left arm   Patient Position: Sitting   Cuff Size: Adult   Pulse: 95   Temp: 97.7 °F (36.5 °C)   TempSrc: Temporal   SpO2: 99%   Weight: 82.5 kg (181 lb 14.4 oz)   Height: 177.8 cm (70\")   PainSc: 0-No pain     Estimated body mass index is 26.1 kg/m² as calculated from the following:    Height as of this encounter: 177.8 cm (70\").    Weight as of this encounter: 82.5 kg (181 lb 14.4 oz).    BMI is >= 25 and <30. (Overweight) The following options were offered after discussion;: exercise counseling/recommendations      Does the patient have evidence of cognitive impairment?   No            HEALTH RISK ASSESSMENT    Smoking Status:  Social History     Tobacco Use   Smoking Status Some Days   • Packs/day: 0.25   • Years: 53.00   • Pack years: 13.25   • Types: Cigarettes   Smokeless Tobacco Never   Tobacco Comments    2 cigarettes a day     Alcohol Consumption:  Social History     Substance and " Sexual Activity   Alcohol Use Not Currently     Fall Risk Screen:    ENRRIQUE Fall Risk Assessment was completed, and patient is at LOW risk for falls.Assessment completed on:2023    Depression Screening:      3/24/2022     9:08 AM   PHQ-2/PHQ-9 Depression Screening   Little Interest or Pleasure in Doing Things 0-->not at all   Feeling Down, Depressed or Hopeless 0-->not at all   PHQ-9: Brief Depression Severity Measure Score 0       Health Habits and Functional and Cognitive Screenin/1/2023    10:57 AM   Functional & Cognitive Status   Do you have difficulty preparing food and eating? No   Do you have difficulty bathing yourself, getting dressed or grooming yourself? No   Do you have difficulty using the toilet? No   Do you have difficulty moving around from place to place? No   Do you have trouble with steps or getting out of a bed or a chair? No   Current Diet Well Balanced Diet   Dental Exam Not up to date   Eye Exam Up to date   Exercise (times per week) 4 times per week   Current Exercises Include Yard Work   Do you need help using the phone?  No   Are you deaf or do you have serious difficulty hearing?  No   Do you need help with transportation? No   Do you need help shopping? No   Do you need help preparing meals?  No   Do you need help with housework?  No   Do you need help with laundry? No   Do you need help taking your medications? No   Do you need help managing money? No   Do you ever drive or ride in a car without wearing a seat belt? No   Have you felt unusual stress, anger or loneliness in the last month? No   Who do you live with? Alone   If you need help, do you have trouble finding someone available to you? No   Have you been bothered in the last four weeks by sexual problems? No   Do you have difficulty concentrating, remembering or making decisions? No       Age-appropriate Screening Schedule:  Refer to the list below for future screening recommendations based on patient's age, sex  and/or medical conditions. Orders for these recommended tests are listed in the plan section. The patient has been provided with a written plan.    Health Maintenance   Topic Date Due   • Pneumococcal Vaccine 65+ (1 - PCV) Never done   • TDAP/TD VACCINES (1 - Tdap) Never done   • ZOSTER VACCINE (1 of 2) Never done   • DIABETIC FOOT EXAM  Never done   • DIABETIC EYE EXAM  Never done   • URINE MICROALBUMIN  03/22/2022   • ANNUAL WELLNESS VISIT  03/24/2023   • LIPID PANEL  03/24/2023   • HEMOGLOBIN A1C  04/30/2023   • INFLUENZA VACCINE  08/01/2023   • HEPATITIS C SCREENING  Completed   • COVID-19 Vaccine  Completed   • AAA SCREEN (ONE-TIME)  Completed   • COLONOSCOPY  Discontinued          CMS Preventative Services Quick Reference  Risk Factors Identified During Encounter:    Immunizations Discussed/Encouraged: Prevnar 20 (Pneumococcal 20-valent conjugate)    The above risks/problems have been discussed with the patient.  Pertinent information has been shared with the patient in the After Visit Summary.    Diagnoses and all orders for this visit:    1. Medicare annual wellness visit, subsequent (Primary)  -     CBC (No Diff); Future  -     Comprehensive Metabolic Panel; Future  -     Hemoglobin A1c; Future  -     Lipid Panel; Future  -     Microalbumin / Creatinine Urine Ratio - Urine, Clean Catch; Future    2. Depression screen    3. At low risk for fall    4. Type 2 diabetes mellitus without complication, without long-term current use of insulin  -     Hemoglobin A1c; Future  -     Microalbumin / Creatinine Urine Ratio - Urine, Clean Catch; Future    5. Stage 3a chronic kidney disease  -     Comprehensive Metabolic Panel; Future    6. Mixed hyperlipidemia  -     Comprehensive Metabolic Panel; Future  -     Lipid Panel; Future    7. Rectal cancer        Follow Up:   6 months recheck A1c, and next Medicare Wellness visit to be scheduled in 1 year.      An After Visit Summary and PPPS were made available to the  patient.

## 2023-07-20 NOTE — TELEPHONE ENCOUNTER
Caller: ROBI OSBORN     Relationship to patient: SELF     Best call back number: [phone]    PT MISSED A CALL FROM THE OFFICE, BELIEVES IT IS IN REGARD TO HIS PET SCAN, PLEASE CALL BACK WHEN POSSIBLE  
PT presents to the ED c/o of neck pain, vomiting and dizziness starting last night. PT went for an epidural shot yesterday for chronic back pain. PT is worried as she has a h/x of PE (on Coumadin) but hasn't taken in a week due to the procedure.

## 2023-09-01 ENCOUNTER — TELEPHONE (OUTPATIENT)
Dept: VASCULAR SURGERY | Facility: CLINIC | Age: 73
End: 2023-09-01
Payer: MEDICARE

## 2023-09-05 ENCOUNTER — HOSPITAL ENCOUNTER (OUTPATIENT)
Dept: CT IMAGING | Facility: HOSPITAL | Age: 73
Discharge: HOME | End: 2023-09-05
Admitting: NURSE PRACTITIONER
Payer: MEDICARE

## 2023-09-05 ENCOUNTER — OFFICE VISIT (OUTPATIENT)
Dept: VASCULAR SURGERY | Facility: CLINIC | Age: 73
End: 2023-09-05
Payer: MEDICARE

## 2023-09-05 VITALS
DIASTOLIC BLOOD PRESSURE: 60 MMHG | SYSTOLIC BLOOD PRESSURE: 120 MMHG | OXYGEN SATURATION: 97 % | HEIGHT: 70 IN | WEIGHT: 180 LBS | HEART RATE: 62 BPM | BODY MASS INDEX: 25.77 KG/M2

## 2023-09-05 DIAGNOSIS — I65.22 CAROTID OCCLUSION, LEFT: ICD-10-CM

## 2023-09-05 DIAGNOSIS — I65.21 STENOSIS OF RIGHT CAROTID ARTERY: Primary | ICD-10-CM

## 2023-09-05 DIAGNOSIS — C20 RECTAL CANCER: ICD-10-CM

## 2023-09-05 DIAGNOSIS — Z72.0 TOBACCO ABUSE: ICD-10-CM

## 2023-09-05 DIAGNOSIS — I65.21 STENOSIS OF RIGHT CAROTID ARTERY: ICD-10-CM

## 2023-09-05 DIAGNOSIS — E78.2 MIXED HYPERLIPIDEMIA: ICD-10-CM

## 2023-09-05 DIAGNOSIS — E11.9 TYPE 2 DIABETES MELLITUS WITHOUT COMPLICATION, WITHOUT LONG-TERM CURRENT USE OF INSULIN: ICD-10-CM

## 2023-09-05 LAB — CREAT BLDA-MCNC: 1.6 MG/DL (ref 0.6–1.3)

## 2023-09-05 PROCEDURE — 25510000001 IOPAMIDOL PER 1 ML: Performed by: NURSE PRACTITIONER

## 2023-09-05 PROCEDURE — 70498 CT ANGIOGRAPHY NECK: CPT

## 2023-09-05 PROCEDURE — 82565 ASSAY OF CREATININE: CPT

## 2023-09-05 RX ORDER — CLOPIDOGREL BISULFATE 75 MG/1
75 TABLET ORAL DAILY
Qty: 30 TABLET | Refills: 2 | Status: SHIPPED | OUTPATIENT
Start: 2023-09-05

## 2023-09-05 RX ADMIN — IOPAMIDOL 100 ML: 755 INJECTION, SOLUTION INTRAVENOUS at 08:11

## 2023-09-05 NOTE — LETTER
September 5, 2023       No Recipients    Patient: Attila Viramontes   YOB: 1950   Date of Visit: 9/5/2023     Dear EDUARDO Hebert MD:       Thank you for referring Attila Viramontes to me for evaluation. Below are the relevant portions of my assessment and plan of care.    If you have questions, please do not hesitate to call me. I look forward to following Attila along with you.         Sincerely,        Jamie Reardon DO        CC:   No Recipients    Jamie Reardon DO  09/05/23 1310  Sign when Signing Visit  9/5/2023       EDUARDO Hebert MD  [address] [address] [address]  [facility] [address]    Attila Viramontes  1950    Chief Complaint   Patient presents with   • Follow-up     1 year follow up with testing for CTA Neck. Pt was last seen on 9.28.22. PT denies any stroke like symptoms or any other issues in the past year.        Dear EDUARDO Hebert MD   HPI  I had the pleasure of seeing your patient Attila Viramontes in the office today.  As you recall, Attila Viramontes is a 72 y.o.  male you are following for routine health maintenance.  In 2020, he was transferred to [place] after onset of right sided hemiparesis, global aphasia, and right lower facial drooping.  He was found to have a left carotid occlusion.  Since that time he is done well denies any stroke symptoms.  He does have rectal cancer and is currently undergoing treatments.  He did have noninvasive testing performed today, which I did review in office.    Review of Systems   Constitutional: Negative.    HENT: Negative.     Eyes: Negative.    Respiratory: Negative.     Cardiovascular: Negative.    Gastrointestinal: Negative.    Endocrine: Negative.    Genitourinary: Negative.    Musculoskeletal: Negative.    Skin: Negative.    Allergic/Immunologic: Negative.    Neurological: Negative.    Hematological: Negative.    Psychiatric/Behavioral: Negative.     All other systems reviewed and are  "negative.     /60   Pulse 62   Ht 177.8 cm (70\")   Wt 81.6 kg (180 lb)   SpO2 97%   BMI 25.83 kg/m²   Physical Exam  Vitals and nursing note reviewed.   Constitutional:       General: He is not in acute distress.     Appearance: Normal appearance. He is well-developed and normal weight. He is not diaphoretic.   HENT:      Head: Normocephalic and atraumatic.   Neck:      Vascular: No carotid bruit or JVD.   Cardiovascular:      Rate and Rhythm: Normal rate and regular rhythm.      Pulses: Normal pulses.           Femoral pulses are 2+ on the right side and 2+ on the left side.       Popliteal pulses are 2+ on the right side and 2+ on the left side.        Dorsalis pedis pulses are 2+ on the right side and 2+ on the left side.        Posterior tibial pulses are 2+ on the right side and 2+ on the left side.      Heart sounds: Normal heart sounds, S1 normal and S2 normal. No murmur heard.    No friction rub. No gallop.   Pulmonary:      Effort: Pulmonary effort is normal.      Breath sounds: Normal breath sounds.   Abdominal:      General: Bowel sounds are normal. There is no abdominal bruit.      Palpations: Abdomen is soft.      Tenderness: There is no abdominal tenderness.   Musculoskeletal:         General: Normal range of motion.   Skin:     General: Skin is warm and dry.   Neurological:      General: No focal deficit present.      Mental Status: He is alert and oriented to person, place, and time.      Cranial Nerves: No cranial nerve deficit.   Psychiatric:         Mood and Affect: Mood normal.         Behavior: Behavior normal.         Thought Content: Thought content normal.         Judgment: Judgment normal.          Diagnostic data:   CT Angiogram Neck    Result Date: 9/5/2023  Narrative: CT ANGIOGRAM NECK- 9/5/2023 8:03 AM CDT  HISTORY: Carotid artery stenosis; I65.21-Occlusion and stenosis of right carotid artery  DOSE LENGTH PRODUCT: 485 mGy cm. Automated exposure control was also utilized to " decrease patient radiation dose.  Exam: Axial CT angiogram of the neck after the uneventful administration of Isovue IV contrast. Sagittal and coronal reformations were also provided for review. 3D images were created on an independent workstation to better evaluate potential vascular abnormalities.  Comparison: 09/08/2022.  Findings:  There is a classic three-vessel branching pattern off the aortic arch without significant ostial narrowing. Common carotid arteries are normal in caliber. Calculated 75% right ICA origin atheromatous narrowing. Chronic left ICA occlusion. The vertebral arteries originate from the subclavian arteries without significant ostial narrowing.  No evidence of vertebral artery dissection or pseudoaneurysm.  No cervical adenopathy. Lung apices are clear. Advanced cervical spine osteoarthritis change. Right chest wall [id number], catheter curled within the right internal jugular vein.      Impression: Impression:  1. Calculated 75% right ICA origin atheromatous narrowing. 2. Chronic left ICA occlusion. 3. Bilateral vertebral arteries are patent. This report was finalized on 09/05/2023 08:25 by Dr Magdaleno Kelly, .    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 Multiplanar reformats were also generated. INTRAVENOUS CONTRAST ADMINISTRATION: IV Contrast Dose is documented in the electronic medical record DOSE REPORT: Total DLP: 886 mGycm Dose modulation was employed for ALARA by means of: Automated exposure control; adjustment of the mA and/or kV according to patient size (this includes techniques or standardized  protocols for targeted exams where dose is matched to indication/reason for  exam; i.e. extremities or head); and/or use of iterative reconstructive technique. FINDINGS: CT CHEST: Lungs: Similar biapical pleural and parenchymal scarring. Irregular left upper lobe pulmonary nodule, tethering the left oblique fissure measuring 10 mm on image 141 of series 4 is not significantly changed from 10 mm previously when remeasured with similar technique. Additional tiny micronodules in the right upper lobe are unchanged. No new focal opacity or enlarging pulmonary nodule. Pleura: Within normal limits Airways: Within normal limits Cardiovascular: Right chest wall port, with tip terminating near the cavoatrial junction. Coronary artery calcifications are noted. Mediastinum: Within normal limits Lower Neck/Chest Wall: As above CT ABDOMEN AND PELVIS: Liver: Within normal limits Gallbladder/Biliary tree: Within normal limits Spleen: Within normal limits Pancreas: Within normal limits Adrenals: Within normal limits Kidneys/Ureters: Diffuse cortical thinning throughout the left kidney, with moderate left-sided hydroureteronephrosis, with dilatation of the left ureter to the ureteroenteric anastomosis, similar to prior. Duplicated right renal collecting system, with mild right-sided hydronephrosis, similar, perhaps minimally increased from prior. No ureteral calculi. Gastrointestinal: Postsurgical changes related to proctocolectomy with left lower quadrant end colostomy as well as ileal conduit urinary diversion. The cecum is noted to protrude deep into the pelvic floor resection bed, similar to prior. No findings to  suggest mechanical bowel obstruction. Peritoneum: No appreciable change in the extent of presacral soft tissue thickening. A soft tissue nodule seen just deep to the left inferior pubic ramus on image 214 of series 3, correlating with site of biopsy-proven disease recurrence is mildly increased in size now measuring 2.2  x 1.6 x 1.8 cm, as compared with 2.0 x 1.4 x 1.6 cm previously similar Jessica appearance of the small bowel mesentery. No new peritoneal nodules.. Vasculature: Moderate atherosclerotic plaque in the aorta and branch vessels Lymph Nodes: Mildly enlarged left para-aortic retroperitoneal lymph nodes, each measuring 9-10 mm short axis seen on images 129 and 127 of series 3, not significantly changed. No definitively new or enlarging lymph nodes. Urinary Bladder: Surgically absent Reproductive organs: Surgically absent Abdominal Wall: Postsurgical scarring in the anterior abdominal wall. Other findings as above MUSCULOSKELETAL: No acute or suspicious osseous abnormality.    Impression: 1.  Compared with 5/23/2023, mild interval enlargement of soft tissue mass in the left lower pelvis, biopsy-proven disease recurrence. 2.  Mildly enlarged retroperitoneal lymph nodes do not appear significantly changed. 3.  Irregular left upper lobe pulmonary nodule, not significant changed in size. 4.  Other findings as above Electronically signed by  Reynaldo Walton MD on 8/15/2023 8:18 PM      Patient Active Problem List   Diagnosis   • Rectal cancer   • Current every day smoker   • Impaired gait and mobility   • 2nd degree AV block   • Tobacco abuse   • Normocytic anemia   • Chemotherapy induced neutropenia   • Cancer related pain   • Carotid artery stenosis, symptomatic, right   • Carotid occlusion, left   • Type 2 diabetes mellitus without complication, without long-term current use of insulin   • Iron deficiency anemia   • Ileostomy in place   • Colostomy in place   • Function kidney decreased   • Stage 3a chronic kidney disease        Diagnosis Plan   1. Stenosis of right carotid artery  Ambulatory Referral to Cardiology    clopidogrel (Plavix) 75 MG tablet      2. Tobacco abuse        3. Carotid occlusion, left        4. Mixed hyperlipidemia        5. Type 2 diabetes mellitus without complication, without long-term current use  of insulin        6. Rectal cancer            Plan: After thoroughly evaluating Attila Viramontes, I believe the best course of action is to proceed with right transcarotid artery revascularization for stroke risk reduction.  I did review his testing which does show about 80% right carotid stenosis.  He does have an occluded left carotid.  Risks of transcarotid artery revascularization include, but are not limited to, bleeding, infection, vessel damage, nerve damage, MI, stroke, and death.  The patient understands these risks and would like to proceed with the procedure.  I will need to add Plavix to his medication regimen that he will need to begin taking 1 week prior to surgery.  He will continue aspirin, Plavix, and Lipitor uninterrupted prior to surgery.  I will refer him to cardiology for cardiac restratification.  Once I receive clearance, I will schedule him appropriately.   I did discuss vascular risk factors as they pertain to the progression of vascular disease including controlling his hyperlipidemia.  He is maintained on Lipitor for his hyperlipidemia.  He is a current daily smoker but reports he is down to about 2 cigarettes/day.  The patient is to continue taking their medications as previously discussed.   This was all discussed in full with complete understanding.  Thank you for allowing me to participate in the care of your patient.  Please do not hesitate to call with any questions or concerns.  We will keep you aware of any further encounters with Attila Viramontes.        Sincerely yours,         DO Emilee Ulloa D Ryan, MD

## 2023-09-05 NOTE — PROGRESS NOTES
"9/5/2023       EDUARDO Hebert MD  [address] [address] [address]  [facility] [address]    Attila Viramontes  1950    Chief Complaint   Patient presents with    Follow-up     1 year follow up with testing for CTA Neck. Pt was last seen on 9.28.22. PT denies any stroke like symptoms or any other issues in the past year.        Dear EDUARDO Hebert MD   HPI  I had the pleasure of seeing your patient Attila Viramontes in the office today.  As you recall, Attila Viramontes is a 72 y.o.  male you are following for routine health maintenance.  In 2020, he was transferred to [place] after onset of right sided hemiparesis, global aphasia, and right lower facial drooping.  He was found to have a left carotid occlusion.  Since that time he is done well denies any stroke symptoms.  He does have rectal cancer and is currently undergoing treatments.  He did have noninvasive testing performed today, which I did review in office.    Review of Systems   Constitutional: Negative.    HENT: Negative.     Eyes: Negative.    Respiratory: Negative.     Cardiovascular: Negative.    Gastrointestinal: Negative.    Endocrine: Negative.    Genitourinary: Negative.    Musculoskeletal: Negative.    Skin: Negative.    Allergic/Immunologic: Negative.    Neurological: Negative.    Hematological: Negative.    Psychiatric/Behavioral: Negative.     All other systems reviewed and are negative.     /60   Pulse 62   Ht 177.8 cm (70\")   Wt 81.6 kg (180 lb)   SpO2 97%   BMI 25.83 kg/m²   Physical Exam  Vitals and nursing note reviewed.   Constitutional:       General: He is not in acute distress.     Appearance: Normal appearance. He is well-developed and normal weight. He is not diaphoretic.   HENT:      Head: Normocephalic and atraumatic.   Neck:      Vascular: No carotid bruit or JVD.   Cardiovascular:      Rate and Rhythm: Normal rate and regular rhythm.      Pulses: Normal pulses.           Femoral pulses are " 2+ on the right side and 2+ on the left side.       Popliteal pulses are 2+ on the right side and 2+ on the left side.        Dorsalis pedis pulses are 2+ on the right side and 2+ on the left side.        Posterior tibial pulses are 2+ on the right side and 2+ on the left side.      Heart sounds: Normal heart sounds, S1 normal and S2 normal. No murmur heard.    No friction rub. No gallop.   Pulmonary:      Effort: Pulmonary effort is normal.      Breath sounds: Normal breath sounds.   Abdominal:      General: Bowel sounds are normal. There is no abdominal bruit.      Palpations: Abdomen is soft.      Tenderness: There is no abdominal tenderness.   Musculoskeletal:         General: Normal range of motion.   Skin:     General: Skin is warm and dry.   Neurological:      General: No focal deficit present.      Mental Status: He is alert and oriented to person, place, and time.      Cranial Nerves: No cranial nerve deficit.   Psychiatric:         Mood and Affect: Mood normal.         Behavior: Behavior normal.         Thought Content: Thought content normal.         Judgment: Judgment normal.          Diagnostic data:   CT Angiogram Neck    Result Date: 9/5/2023  Narrative: CT ANGIOGRAM NECK- 9/5/2023 8:03 AM CDT  HISTORY: Carotid artery stenosis; I65.21-Occlusion and stenosis of right carotid artery  DOSE LENGTH PRODUCT: 485 mGy cm. Automated exposure control was also utilized to decrease patient radiation dose.  Exam: Axial CT angiogram of the neck after the uneventful administration of Isovue IV contrast. Sagittal and coronal reformations were also provided for review. 3D images were created on an independent workstation to better evaluate potential vascular abnormalities.  Comparison: 09/08/2022.  Findings:  There is a classic three-vessel branching pattern off the aortic arch without significant ostial narrowing. Common carotid arteries are normal in caliber. Calculated 75% right ICA origin atheromatous narrowing.  Chronic left ICA occlusion. The vertebral arteries originate from the subclavian arteries without significant ostial narrowing.  No evidence of vertebral artery dissection or pseudoaneurysm.  No cervical adenopathy. Lung apices are clear. Advanced cervical spine osteoarthritis change. Right chest wall [id number], catheter curled within the right internal jugular vein.      Impression: Impression:  1. Calculated 75% right ICA origin atheromatous narrowing. 2. Chronic left ICA occlusion. 3. Bilateral vertebral arteries are patent. This report was finalized on 09/05/2023 08:25 by Dr Magdaleno Kelly, .    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 Multiplanar reformats were also generated. INTRAVENOUS CONTRAST ADMINISTRATION: IV Contrast Dose is documented in the electronic medical record DOSE REPORT: Total DLP: 886 mGycm Dose modulation was employed for ALARA by means of: Automated exposure control; adjustment of the mA and/or kV according to patient size (this includes techniques or standardized protocols for targeted exams where dose is matched to indication/reason for  exam; i.e. extremities or head); and/or use of iterative reconstructive technique. FINDINGS: CT CHEST: Lungs: Similar biapical pleural and parenchymal scarring. Irregular left upper lobe pulmonary nodule, tethering the left oblique fissure measuring 10 mm on image 141 of series 4 is not significantly changed from 10 mm previously when remeasured with similar technique. Additional tiny micronodules in the right upper lobe are unchanged. No new focal opacity or  enlarging pulmonary nodule. Pleura: Within normal limits Airways: Within normal limits Cardiovascular: Right chest wall port, with tip terminating near the cavoatrial junction. Coronary artery calcifications are noted. Mediastinum: Within normal limits Lower Neck/Chest Wall: As above CT ABDOMEN AND PELVIS: Liver: Within normal limits Gallbladder/Biliary tree: Within normal limits Spleen: Within normal limits Pancreas: Within normal limits Adrenals: Within normal limits Kidneys/Ureters: Diffuse cortical thinning throughout the left kidney, with moderate left-sided hydroureteronephrosis, with dilatation of the left ureter to the ureteroenteric anastomosis, similar to prior. Duplicated right renal collecting system, with mild right-sided hydronephrosis, similar, perhaps minimally increased from prior. No ureteral calculi. Gastrointestinal: Postsurgical changes related to proctocolectomy with left lower quadrant end colostomy as well as ileal conduit urinary diversion. The cecum is noted to protrude deep into the pelvic floor resection bed, similar to prior. No findings to  suggest mechanical bowel obstruction. Peritoneum: No appreciable change in the extent of presacral soft tissue thickening. A soft tissue nodule seen just deep to the left inferior pubic ramus on image 214 of series 3, correlating with site of biopsy-proven disease recurrence is mildly increased in size now measuring 2.2 x 1.6 x 1.8 cm, as compared with 2.0 x 1.4 x 1.6 cm previously similar Jessica appearance of the small bowel mesentery. No new peritoneal nodules.. Vasculature: Moderate atherosclerotic plaque in the aorta and branch vessels Lymph Nodes: Mildly enlarged left para-aortic retroperitoneal lymph nodes, each measuring 9-10 mm short axis seen on images 129 and 127 of series 3, not significantly changed. No definitively new or enlarging lymph nodes. Urinary Bladder: Surgically absent Reproductive organs: Surgically absent Abdominal Wall:  Postsurgical scarring in the anterior abdominal wall. Other findings as above MUSCULOSKELETAL: No acute or suspicious osseous abnormality.    Impression: 1.  Compared with 5/23/2023, mild interval enlargement of soft tissue mass in the left lower pelvis, biopsy-proven disease recurrence. 2.  Mildly enlarged retroperitoneal lymph nodes do not appear significantly changed. 3.  Irregular left upper lobe pulmonary nodule, not significant changed in size. 4.  Other findings as above Electronically signed by  Reynaldo Walton MD on 8/15/2023 8:18 PM      Patient Active Problem List   Diagnosis    Rectal cancer    Current every day smoker    Impaired gait and mobility    2nd degree AV block    Tobacco abuse    Normocytic anemia    Chemotherapy induced neutropenia    Cancer related pain    Carotid artery stenosis, symptomatic, right    Carotid occlusion, left    Type 2 diabetes mellitus without complication, without long-term current use of insulin    Iron deficiency anemia    Ileostomy in place    Colostomy in place    Function kidney decreased    Stage 3a chronic kidney disease        Diagnosis Plan   1. Stenosis of right carotid artery  Ambulatory Referral to Cardiology    clopidogrel (Plavix) 75 MG tablet      2. Tobacco abuse        3. Carotid occlusion, left        4. Mixed hyperlipidemia        5. Type 2 diabetes mellitus without complication, without long-term current use of insulin        6. Rectal cancer            Plan: After thoroughly evaluating Attila Viramontes, I believe the best course of action is to proceed with right transcarotid artery revascularization for stroke risk reduction.  I did review his testing which does show about 80% right carotid stenosis.  He does have an occluded left carotid.  Risks of transcarotid artery revascularization include, but are not limited to, bleeding, infection, vessel damage, nerve damage, MI, stroke, and death.  The patient understands these risks and would like  to proceed with the procedure.  I will need to add Plavix to his medication regimen that he will need to begin taking 1 week prior to surgery.  He will continue aspirin, Plavix, and Lipitor uninterrupted prior to surgery.  I will refer him to cardiology for cardiac restratification.  Once I receive clearance, I will schedule him appropriately.   I did discuss vascular risk factors as they pertain to the progression of vascular disease including controlling his hyperlipidemia.  He is maintained on Lipitor for his hyperlipidemia.  He is a current daily smoker but reports he is down to about 2 cigarettes/day.  The patient is to continue taking their medications as previously discussed.   This was all discussed in full with complete understanding.  Thank you for allowing me to participate in the care of your patient.  Please do not hesitate to call with any questions or concerns.  We will keep you aware of any further encounters with Attila Viramontes.        Sincerely yours,         DO Emilee Ulloa D Ryan, MD

## 2023-09-07 ENCOUNTER — OFFICE VISIT (OUTPATIENT)
Dept: CARDIOLOGY | Facility: CLINIC | Age: 73
End: 2023-09-07
Payer: MEDICARE

## 2023-09-07 VITALS
WEIGHT: 178.2 LBS | HEART RATE: 86 BPM | HEIGHT: 70 IN | OXYGEN SATURATION: 97 % | DIASTOLIC BLOOD PRESSURE: 68 MMHG | BODY MASS INDEX: 25.51 KG/M2 | SYSTOLIC BLOOD PRESSURE: 124 MMHG

## 2023-09-07 DIAGNOSIS — I65.21 CAROTID ARTERY STENOSIS, SYMPTOMATIC, RIGHT: ICD-10-CM

## 2023-09-07 DIAGNOSIS — I44.1 2ND DEGREE AV BLOCK: Primary | ICD-10-CM

## 2023-09-07 DIAGNOSIS — R42 ORTHOSTATIC DIZZINESS: ICD-10-CM

## 2023-09-07 DIAGNOSIS — Z01.810 PREOP CARDIOVASCULAR EXAM: ICD-10-CM

## 2023-09-08 NOTE — PROGRESS NOTES
Subjective:     Encounter Date:09/07/2023      Patient ID: Attila Viramontes is a 72 y.o. male.    Chief Complaint: Needing operative assessment prior to carotid surgery with history of heart block    History of Present Illness    Mr. Viramontes comes to the clinic today accompanied by an adult female who contributes to his history. He was last seen via telemedicine in 04/2021. At that point, he had done heart monitors, and he was having gaps between heartbeats. The latest communication was talking about placing a pacemaker. He reports that he had cancer, and had a port removed, and never returned for follow up. The patient states that he currently has 2 tumors, and does chemotherapy every 14 days. He reports that he brings the pump home, and then they disconnect it after 46 hours. The patient reports that he explained all of this to Dr. Reardon. He states he was told that he has a 77% blockage. He reports that he is feeling much better.    The patient reports he has had some episodes of dizziness upon standing. He reports that he gets dehydrated quickly after chemotherapy, and he has to drink 10 to 12 bottles of water per day, which he states makes him feel better.    The patient denies chest discomfort, heaviness, pressure, or tightness. He reports that he can walk 1 block on flat ground, and normal weather with no problems. He reports that the other day he was mowing the lawn, and became short out of breath for 20 to 25 minutes.     The patient denies a history of myocardial infarction, blocked arteries in the heart, or congestive heart failure. He reports that he has had a stroke. He reports that he is not on insulin for diabetes, and he does not have kidney failure.    The following portions of the patient's history were reviewed and updated as appropriate: allergies, current medications, past family history, past medical history, past social history, past surgical history, and problem list.    Review of  Systems   Constitutional: Negative for malaise/fatigue.   Cardiovascular:  Negative for chest pain, claudication, dyspnea on exertion, leg swelling, near-syncope, orthopnea, palpitations, paroxysmal nocturnal dyspnea and syncope.   Respiratory:  Negative for shortness of breath.    Hematologic/Lymphatic: Does not bruise/bleed easily.   Neurological:  Positive for dizziness (upon standing, and that is only occasional).         Current Outpatient Medications:     aspirin 81 MG chewable tablet, Chew 1 tablet Daily., Disp: , Rfl:     atorvastatin (LIPITOR) 20 MG tablet, TAKE ONE TABLET BY MOUTH EVERY DAY, Disp: 30 tablet, Rfl: 11    metFORMIN (Glucophage) 500 MG tablet, Take 2 tablets by mouth 2 (Two) Times a Day With Meals., Disp: 120 tablet, Rfl: 5    clopidogrel (Plavix) 75 MG tablet, Take 1 tablet by mouth Daily. Begin taking 1 week prior to surgery (Patient not taking: Reported on 9/7/2023), Disp: 30 tablet, Rfl: 2       Objective:      Vitals:    09/07/23 1559   BP: 124/68   Pulse: 86   SpO2: 97%     Vitals and nursing note reviewed.   Constitutional:       General: Not in acute distress.     Appearance: Not in distress.   Neck:      Vascular: No JVD or JVR. JVD normal.   Pulmonary:      Effort: Pulmonary effort is normal.      Breath sounds: Normal breath sounds.   Cardiovascular:      Normal rate. Regular rhythm.      Murmurs: There is no murmur.      No gallop.  No rub.   Pulses:     Intact distal pulses.   Edema:     Peripheral edema absent.   Skin:     General: Skin is warm and dry.   Neurological:      Mental Status: Alert, oriented to person, place, and time and oriented to person, place and time.     Lab Review:         ECG 12 Lead    Date/Time: 9/7/2023 4:11 PM  Performed by: Ga Hameed MD  Authorized by: Ga Hameed MD   Comparison: compared with previous ECG from 5/26/2021  Comparison to previous ECG: Right bundle branch block is now present, as are criteria for inferior infarct  Rhythm:  sinus rhythm  Rate: normal  Conduction: right bundle branch block  Q waves: III and aVF      Clinical impression: abnormal EKG          Assessment/Plan:     Problem List Items Addressed This Visit (all established, and stable)         Cardiac and Vasculature    2nd degree AV block - Primary    Carotid artery stenosis, symptomatic, right     Other Visit Diagnoses       Orthostatic dizziness        Preop cardiovascular exam                  Recommendations/plans:    The patient returns today for preoperative risk assessment for an upcoming transcarotid arterial revascularization of the right side (has a known total occlusion of the left side). From a revised cardiac risk standpoint, he would be considered low-risk for major adverse cardiac event, and could proceed without any further delay. I would recommend usual intra, and postoperative telemetry monitoring simply in light of the nature of the operation being performed, but particularly for this patient, in light of issues raised in our last series of evaluations back in 2021. He was observed to have second-degree type II heart block with ventricular pauses of up to 6 seconds on a monitor worn back in 2021. However, he never had any symptoms attributable to this, and even though a pacemaker had been offered, he ended up falling ill, and having numerous other medical issues with precedence over that, which effectively caused him to be lost to follow up from a cardiac perspective, until now.     However, in the last 2.5 years since our last evaluation, he has not experienced any syncope or near syncope. He does describe some infrequent episodes of orthostatic dizziness that seemed to be exacerbated when he is particularly dehydrated after chemotherapy.     Otherwise, he has no random occurrences of dizziness or near syncope, and therefore, even if he is still having any degree of high-grade AV block, I do not think he is symptomatic from it. Of note, it was never  clear that the episodes documented on prior heart monitors were occurring during waking hours in a way that would have otherwise provoked symptoms.  Most were obviously occurring during sleeping times, which then is likely due to high vagal tone, and not an indication for a pacemaker. There was one episode that was during potential waking hours, and thus had prompted discussion of potential pacemaker at the time.     Again, to restate the above, at this point, I do not think he has any symptoms to suggest there would be an indication for implant of a permanent pacemaker, and he can proceed without delay with transcarotid arterial revascularization.    I have advised him to call us back if he does experience any symptoms such as syncope or near syncope, and we would certainly be happy to reevaluate, otherwise, I look forward to seeing him back in the office in 6 months.    Transcribed from ambient dictation for Ga Hameed MD by Barbara Stallworth.  09/07/23   21:00 CDT    Patient or patient representative verbalized consent to the visit recording.    I Ga Hameed MD have personally performed the services described in this document as scribed by the above individual, and it is both accurate and complete.   I have edited each component as needed.    Ga Hameed MD  9/8/2023  23:55 CDT

## 2023-09-14 ENCOUNTER — PREP FOR SURGERY (OUTPATIENT)
Dept: OTHER | Facility: HOSPITAL | Age: 73
End: 2023-09-14
Payer: MEDICARE

## 2023-09-14 DIAGNOSIS — I65.21 STENOSIS OF RIGHT CAROTID ARTERY: Primary | ICD-10-CM

## 2023-09-14 DIAGNOSIS — Z01.818 PREOP TESTING: ICD-10-CM

## 2023-09-14 DIAGNOSIS — Z79.02 ENCOUNTER FOR MONITORING ANTIPLATELET THERAPY: ICD-10-CM

## 2023-09-14 DIAGNOSIS — Z51.81 ENCOUNTER FOR MONITORING ANTIPLATELET THERAPY: ICD-10-CM

## 2023-09-14 RX ORDER — CEFAZOLIN SODIUM 1 G/50ML
1000 INJECTION, SOLUTION INTRAVENOUS ONCE
OUTPATIENT
Start: 2023-09-14 | End: 2023-09-14

## 2023-09-15 ENCOUNTER — TELEPHONE (OUTPATIENT)
Dept: VASCULAR SURGERY | Facility: CLINIC | Age: 73
End: 2023-09-15

## 2023-09-15 NOTE — TELEPHONE ENCOUNTER
Caller: Attila Viramontes    Relationship: Self    Best call back number:[phone]    What is the best time to reach you: ANY    Who are you requesting to speak with (clinical staff, provider,  specific staff member): CLINICAL    What was the call regarding: PT WANTING OFFICE TO KNOW HE HAS RECEIVED CARDIAC CLEARANCE & IS WANTING TO PROCEED WITH SURGERY SCHEDULING. THANK YOU

## 2023-09-20 ENCOUNTER — TELEPHONE (OUTPATIENT)
Dept: VASCULAR SURGERY | Facility: CLINIC | Age: 73
End: 2023-09-20
Payer: MEDICARE

## 2023-09-20 PROBLEM — Z01.818 PREOP TESTING: Status: ACTIVE | Noted: 2023-09-20

## 2023-09-20 NOTE — TELEPHONE ENCOUNTER
Pt expressed understanding of prework/surgery time and instruction for procedure scheduled 10/16/23 with Dr. Reardon.  NPO after midnight.  Pt to continue aspirin, plavix, and lipitor without interruption before procedure but will not take morning of procedure.

## 2023-10-09 ENCOUNTER — HOSPITAL ENCOUNTER (OUTPATIENT)
Dept: GENERAL RADIOLOGY | Facility: HOSPITAL | Age: 73
Discharge: HOME | End: 2023-10-09
Payer: MEDICARE

## 2023-10-09 ENCOUNTER — PRE-ADMISSION TESTING (OUTPATIENT)
Dept: PREADMISSION TESTING | Facility: HOSPITAL | Age: 73
End: 2023-10-09
Payer: MEDICARE

## 2023-10-09 VITALS
HEART RATE: 88 BPM | WEIGHT: 179.9 LBS | SYSTOLIC BLOOD PRESSURE: 121 MMHG | BODY MASS INDEX: 25.75 KG/M2 | RESPIRATION RATE: 18 BRPM | DIASTOLIC BLOOD PRESSURE: 65 MMHG | HEIGHT: 70 IN | OXYGEN SATURATION: 97 %

## 2023-10-09 DIAGNOSIS — Z79.02 ENCOUNTER FOR MONITORING ANTIPLATELET THERAPY: ICD-10-CM

## 2023-10-09 DIAGNOSIS — I65.21 STENOSIS OF RIGHT CAROTID ARTERY: ICD-10-CM

## 2023-10-09 DIAGNOSIS — Z01.818 PREOP TESTING: ICD-10-CM

## 2023-10-09 DIAGNOSIS — Z51.81 ENCOUNTER FOR MONITORING ANTIPLATELET THERAPY: ICD-10-CM

## 2023-10-09 LAB
ANION GAP SERPL CALCULATED.3IONS-SCNC: 11 MMOL/L (ref 5–15)
APTT PPP: 26.8 SECONDS (ref 24.5–36)
BASOPHILS # BLD AUTO: 0.08 10*3/MM3 (ref 0–0.2)
BASOPHILS NFR BLD AUTO: 0.8 % (ref 0–1.5)
BUN SERPL-MCNC: 32 MG/DL (ref 8–23)
BUN/CREAT SERPL: 21.9 (ref 7–25)
CALCIUM SPEC-SCNC: 9.4 MG/DL (ref 8.6–10.5)
CHLORIDE SERPL-SCNC: 107 MMOL/L (ref 98–107)
CO2 SERPL-SCNC: 22 MMOL/L (ref 22–29)
CREAT SERPL-MCNC: 1.46 MG/DL (ref 0.76–1.27)
DEPRECATED RDW RBC AUTO: 58.7 FL (ref 37–54)
EGFRCR SERPLBLD CKD-EPI 2021: 50.8 ML/MIN/1.73
EOSINOPHIL # BLD AUTO: 0.25 10*3/MM3 (ref 0–0.4)
EOSINOPHIL NFR BLD AUTO: 2.6 % (ref 0.3–6.2)
ERYTHROCYTE [DISTWIDTH] IN BLOOD BY AUTOMATED COUNT: 16.8 % (ref 12.3–15.4)
GLUCOSE SERPL-MCNC: 149 MG/DL (ref 65–99)
HCT VFR BLD AUTO: 41 % (ref 37.5–51)
HGB BLD-MCNC: 12.9 G/DL (ref 13–17.7)
IMM GRANULOCYTES # BLD AUTO: 0.09 10*3/MM3 (ref 0–0.05)
IMM GRANULOCYTES NFR BLD AUTO: 0.9 % (ref 0–0.5)
INR PPP: 0.94 (ref 0.91–1.09)
LYMPHOCYTES # BLD AUTO: 1.2 10*3/MM3 (ref 0.7–3.1)
LYMPHOCYTES NFR BLD AUTO: 12.4 % (ref 19.6–45.3)
MCH RBC QN AUTO: 29.9 PG (ref 26.6–33)
MCHC RBC AUTO-ENTMCNC: 31.5 G/DL (ref 31.5–35.7)
MCV RBC AUTO: 95.1 FL (ref 79–97)
MONOCYTES # BLD AUTO: 0.91 10*3/MM3 (ref 0.1–0.9)
MONOCYTES NFR BLD AUTO: 9.4 % (ref 5–12)
NEUTROPHILS NFR BLD AUTO: 7.14 10*3/MM3 (ref 1.7–7)
NEUTROPHILS NFR BLD AUTO: 73.9 % (ref 42.7–76)
NRBC BLD AUTO-RTO: 0 /100 WBC (ref 0–0.2)
PLATELET # BLD AUTO: 333 10*3/MM3 (ref 140–450)
PMV BLD AUTO: 8.7 FL (ref 6–12)
POTASSIUM SERPL-SCNC: 4.6 MMOL/L (ref 3.5–5.2)
PROTHROMBIN TIME: 12.7 SECONDS (ref 11.8–14.8)
RBC # BLD AUTO: 4.31 10*6/MM3 (ref 4.14–5.8)
SODIUM SERPL-SCNC: 140 MMOL/L (ref 136–145)
WBC NRBC COR # BLD: 9.67 10*3/MM3 (ref 3.4–10.8)

## 2023-10-09 PROCEDURE — 85610 PROTHROMBIN TIME: CPT

## 2023-10-09 PROCEDURE — 85025 COMPLETE CBC W/AUTO DIFF WBC: CPT

## 2023-10-09 PROCEDURE — 80048 BASIC METABOLIC PNL TOTAL CA: CPT

## 2023-10-09 PROCEDURE — 85730 THROMBOPLASTIN TIME PARTIAL: CPT

## 2023-10-09 PROCEDURE — 36415 COLL VENOUS BLD VENIPUNCTURE: CPT

## 2023-10-09 PROCEDURE — 71046 X-RAY EXAM CHEST 2 VIEWS: CPT

## 2023-10-09 NOTE — DISCHARGE INSTRUCTIONS
Before you come to the hospital        Arrival time: AS DIRECTED BY OFFICE     YOU MAY TAKE THE FOLLOWING MEDICATION(S) THE MORNING OF SURGERY WITH A SIP OF WATER: AS DIRECTED BY            ALL OTHER HOME MEDICATION CHECK WITH YOUR PHYSICIAN (especially if   you are taking diabetes medicines or blood thinners)    Do not take any Erectile Dysfunction medications (EX: CIALIS, VIAGRA) 24 hours prior to surgery.      If you were given and instructed to use a germ- killing soap, use as directed the night before surgery and again the morning of surgery or as directed by your surgeon. (Use one-half of the bottle with each shower.)   See attached information for How to Use Chlorhexidine for Bathing if applicable.            Eating and drinking restrictions prior to scheduled arrival time    2 Hours before arrival time STOP   Drinking Clear liquids (water, black coffee-NO CREAM,  apple juice-no pulp)      8 Hours before arrival time STOP   All food, full liquids, and dairy products    (It is extremely important that you follow these guidelines to prevent delay or cancelation of your procedure)     Clear Liquids  Water and flavored water                                                                      Clear Fruit juices, such as cranberry juice and apple juice.  Black coffee (NO cream of any kind, including powdered).  Plain tea  Clear bouillon or broth.  Flavored gelatin.  Soda.  Gatorade or Powerade.  Full liquid examples  Juices that have pulp.  Frozen ice pops that contain fruit pieces.  Coffee with creamer  Milk.  Yogurt.                MANAGING PAIN AFTER SURGERY    We know you are probably wondering what your pain will be like after surgery.  Following surgery it is unrealistic to expect you will not have pain.   Pain is how our bodies let us know that something is wrong or cautions us to be careful.  That said, our goal is to make your pain tolerable.    Methods we may use to treat your pain include (oral or IV  medications, PCAs, epidurals, nerve blocks, etc.)   While some procedures require IV pain medications for a short time after surgery, transitioning to pain medications by mouth allows for better management of pain.   Your nurse will encourage you to take oral pain medications whenever possible.  IV medications work almost immediately, but only last a short while.  Taking medications by mouth allows for a more constant level of medication in your blood stream for a longer period of time.      Once your pain is out of control it is harder to get back under control.  It is important you are aware when your next dose of pain medication is due.  If you are admitted, your nurse may write the time of your next dose on the white board in your room to help you remember.      We are interested in your pain and encourage you to inform us about aggravating factors during your visit.   Many times a simple repositioning every few hours can make a big difference.    If your physician says it is okay, do not let your pain prevent you from getting out of bed. Be sure to call your nurse for assistance prior to getting up so you do not fall.      Before surgery, please decide your tolerable pain goal.  These faces help describe the pain ratings we use on a 0-10 scale.   Be prepared to tell us your goal and whether or not you take pain or anxiety medications at home.          Preparing for Surgery  Preparing for surgery is an important part of your care. It can make things go more smoothly and help you avoid complications. The steps leading up to surgery may vary among hospitals. Follow all instructions given to you by your health care providers. Ask questions if you do not understand something. Talk about any concerns that you have.  Here are some questions to consider asking before your surgery:  If my surgery is not an emergency (is elective), when would be the best time to have the surgery?  What arrangements do I need to make for  work, home, or school?  What will my recovery be like? How long will it be before I can return to normal activities?  Will I need to prepare my home? Will I need to arrange care for me or my children?  Should I expect to have pain after surgery? What are my pain management options? Are there nonmedical options that I can try for pain?  Tell a health care provider about:  Any allergies you have.  All medicines you are taking, including vitamins, herbs, eye drops, creams, and over-the-counter medicines.  Any problems you or family members have had with anesthetic medicines.  Any blood disorders you have.  Any surgeries you have had.  Any medical conditions you have.  Whether you are pregnant or may be pregnant.  What are the risks?  The risks and complications of surgery depend on the specific procedure that you have. Discuss all the risks with your health care providers before your surgery. Ask about common surgical complications, which may include:  Infection.  Bleeding or a need for blood replacement (transfusion).  Allergic reactions to medicines.  Damage to surrounding nerves, tissues, or structures.  A blood clot.  Scarring.  Failure of the surgery to correct the problem.  Follow these instructions before the procedure:  Several days or weeks before your procedure  You may have a physical exam by your primary health care provider to make sure it is safe for you to have surgery.  You may have testing. This may include a chest X-ray, blood and urine tests, electrocardiogram (ECG), or other testing.  Ask your health care provider about:  Changing or stopping your regular medicines. This is especially important if you are taking diabetes medicines or blood thinners.  Taking medicines such as aspirin and ibuprofen. These medicines can thin your blood. Do not take these medicines unless your health care provider tells you to take them.  Taking over-the-counter medicines, vitamins, herbs, and supplements.  Do not use  any products that contain nicotine or tobacco, such as cigarettes and e-cigarettes. If you need help quitting, ask your health care provider.  Avoid alcohol.  Ask your health care provider if there are exercises you can do to prepare for surgery.  Eat a healthy diet.   Plan to have someone 18 years of age or older to take you home from the hospital. We will need to verify your ride on the morning of surgery if you are being discharged home on the same day. Tell your ride to be expecting a call from the hospital prior to your procedure.   Plan to have a responsible adult care for you for at least 24 hours after you leave the hospital or clinic. This is important.  The day before your procedure  You may be given antibiotic medicine to take by mouth to help prevent infection. Take it as told by your health care provider.  You may be asked to shower with a germ-killing soap.  Follow instructions from your health care provider about eating and drinking restrictions. This includes gum, mints and hard candy.  Pack comfortable clothes according to your procedure.   The day of your procedure  You may need to take another shower with a germ-killing soap before you leave home in the morning.  With a small sip of water, take only the medicines that you are told to take.  Remove all jewelry including rings.   Leave anything you consider valuable at home except hearing aids if needed.  You do not need to bring your home medications into the hospital.   Do not wear any makeup, nail polish, powder, deodorant, lotion, hair accessories, or anything on your skin or body except your clothes.  If you will be staying in the hospital, bring a case to hold your glasses, contacts, or dentures. You may also want to bring your robe and non-skid footwear.       (Do not use denture adhesives since you will be asked to remove them during  surgery).   If you wear oxygen at home, bring it with you the day of surgery.  If instructed by your health  care provider, bring your sleep apnea device with you on the day of your surgery (if this applies to you).  You may want to leave your suitcase and sleep apnea device in the car until after surgery.   Arrive at the hospital as scheduled.  Bring a friend or family member with you who can help to answer questions and be present while you meet with your health care provider.  At the hospital  When you arrive at the hospital:  Go to registration located at the main entrance of the hospital. You will be registered and given a beeper and a sticker sheet. Take the stickers to the Outpatient nurses desk and place in the black tray. This is to notify staff that you have arrived. Then return to the lobby to wait.   When your beeper lights up and vibrates proceed through the double doors, under the stairs, and a member of the Outpatient Surgery staff will escort you to your preoperative room.  You may have to wear compression sleeves. These help to prevent blood clots and reduce swelling in your legs.  An IV may be inserted into one of your veins.              In the operating room, you may be given one or more of the following:        A medicine to help you relax (sedative).        A medicine to numb the area (local anesthetic).        A medicine to make you fall asleep (general anesthetic).        A medicine that is injected into an area of your body to numb everything below the                      injection site (regional anesthetic).  You may be given an antibiotic through your IV to help prevent infection.  Your surgical site will be marked or identified.    Contact a health care provider if you:  Develop a fever of more than 100.4øF (38øC) or other feelings of illness during the 48 hours before your surgery.  Have symptoms that get worse.  Have questions or concerns about your surgery.  Summary  Preparing for surgery can make the procedure go more smoothly and lower your risk of complications.  Before surgery, make a  list of questions and concerns to discuss with your surgeon. Ask about the risks and possible complications.  In the days or weeks before your surgery, follow all instructions from your health care provider. You may need to stop smoking, avoid alcohol, follow eating restrictions, and change or stop your regular medicines.  Contact your surgeon if you develop a fever or other signs of illness during the few days before your surgery.  This information is not intended to replace advice given to you by your health care provider. Make sure you discuss any questions you have with your health care provider.  Document Revised: 12/21/2018 Document Reviewed: 10/23/2018  [employer] Patient Education c 2021 [employer]

## 2023-10-13 ENCOUNTER — TELEPHONE (OUTPATIENT)
Dept: VASCULAR SURGERY | Facility: CLINIC | Age: 73
End: 2023-10-13
Payer: MEDICARE

## 2023-10-13 NOTE — TELEPHONE ENCOUNTER
Pt expressed understanding of arrival time of 8 am for procedure scheduled with Dr. Reardon on 10/16/23.  Pt advised NPO after midnight. Pt verified has continued aspirin, plavix and lipitor uninterrupted and will continue through Sunday as normal.  Pt will not take morning of procedure.

## 2023-10-16 ENCOUNTER — APPOINTMENT (OUTPATIENT)
Dept: INTERVENTIONAL RADIOLOGY/VASCULAR | Facility: HOSPITAL | Age: 73
End: 2023-10-16
Payer: MEDICARE

## 2023-10-16 ENCOUNTER — HOSPITAL ENCOUNTER (INPATIENT)
Facility: HOSPITAL | Age: 73
LOS: 1 days | Discharge: HOME | End: 2023-10-17
Attending: SURGERY | Admitting: SURGERY
Payer: MEDICARE

## 2023-10-16 ENCOUNTER — ANESTHESIA (OUTPATIENT)
Dept: PERIOP | Facility: HOSPITAL | Age: 73
End: 2023-10-16
Payer: MEDICARE

## 2023-10-16 ENCOUNTER — ANESTHESIA EVENT (OUTPATIENT)
Dept: PERIOP | Facility: HOSPITAL | Age: 73
End: 2023-10-16
Payer: MEDICARE

## 2023-10-16 DIAGNOSIS — I65.21 STENOSIS OF RIGHT CAROTID ARTERY: ICD-10-CM

## 2023-10-16 DIAGNOSIS — Z01.818 PREOP TESTING: ICD-10-CM

## 2023-10-16 LAB
ABO GROUP BLD: NORMAL
ACT BLD: 239 SECONDS (ref 82–152)
BLD GP AB SCN SERPL QL: NEGATIVE
GLUCOSE BLDC GLUCOMTR-MCNC: 124 MG/DL (ref 70–130)
GLUCOSE BLDC GLUCOMTR-MCNC: 140 MG/DL (ref 70–130)
RH BLD: POSITIVE
T&S EXPIRATION DATE: NORMAL

## 2023-10-16 PROCEDURE — 037K3DZ DILATION OF RIGHT INTERNAL CAROTID ARTERY WITH INTRALUMINAL DEVICE, PERCUTANEOUS APPROACH: ICD-10-PCS | Performed by: SURGERY

## 2023-10-16 PROCEDURE — 25010000002 FENTANYL CITRATE (PF) 100 MCG/2ML SOLUTION

## 2023-10-16 PROCEDURE — 25010000002 MORPHINE PER 10 MG: Performed by: SURGERY

## 2023-10-16 PROCEDURE — P9041 ALBUMIN (HUMAN),5%, 50ML: HCPCS | Performed by: SURGERY

## 2023-10-16 PROCEDURE — X2AH336 CEREBRAL EMBOLIC FILTRATION, EXTRACORPOREAL FLOW REVERSAL CIRCUIT FROM RIGHT COMMON CAROTID ARTERY, PERCUTANEOUS APPROACH, NEW TECHNOLOGY GROUP 6: ICD-10-PCS | Performed by: SURGERY

## 2023-10-16 PROCEDURE — C1769 GUIDE WIRE: HCPCS | Performed by: SURGERY

## 2023-10-16 PROCEDURE — 25010000002 PHENYLEPHRINE 10 MG/ML SOLUTION 1 ML VIAL

## 2023-10-16 PROCEDURE — 86850 RBC ANTIBODY SCREEN: CPT | Performed by: NURSE PRACTITIONER

## 2023-10-16 PROCEDURE — C1876 STENT, NON-COA/NON-COV W/DEL: HCPCS | Performed by: SURGERY

## 2023-10-16 PROCEDURE — 25010000002 SUGAMMADEX 200 MG/2ML SOLUTION

## 2023-10-16 PROCEDURE — 76000 FLUOROSCOPY <1 HR PHYS/QHP: CPT

## 2023-10-16 PROCEDURE — C1884 EMBOLIZATION PROTECT SYST: HCPCS | Performed by: SURGERY

## 2023-10-16 PROCEDURE — 25010000002 CEFAZOLIN 1-4 GM/50ML-% SOLUTION: Performed by: NURSE PRACTITIONER

## 2023-10-16 PROCEDURE — 82948 REAGENT STRIP/BLOOD GLUCOSE: CPT

## 2023-10-16 PROCEDURE — 25010000002 CEFAZOLIN PER 500 MG: Performed by: SURGERY

## 2023-10-16 PROCEDURE — C1725 CATH, TRANSLUMIN NON-LASER: HCPCS | Performed by: SURGERY

## 2023-10-16 PROCEDURE — 37215 TRANSCATH STENT CCA W/EPS: CPT | Performed by: SURGERY

## 2023-10-16 PROCEDURE — 85347 COAGULATION TIME ACTIVATED: CPT

## 2023-10-16 PROCEDURE — 25010000002 DEXAMETHASONE PER 1 MG

## 2023-10-16 PROCEDURE — 25010000002 ONDANSETRON PER 1 MG

## 2023-10-16 PROCEDURE — 25010000002 PROTAMINE SULFATE PER 10 MG

## 2023-10-16 PROCEDURE — 86901 BLOOD TYPING SEROLOGIC RH(D): CPT | Performed by: NURSE PRACTITIONER

## 2023-10-16 PROCEDURE — 25010000002 BUPIVACAINE 0.5 % SOLUTION: Performed by: SURGERY

## 2023-10-16 PROCEDURE — 25010000002 HEPARIN (PORCINE) PER 1000 UNITS

## 2023-10-16 PROCEDURE — 25010000002 ALBUMIN HUMAN 5% PER 50 ML: Performed by: SURGERY

## 2023-10-16 PROCEDURE — 25810000003 SODIUM CHLORIDE 0.9 % SOLUTION 250 ML FLEX CONT

## 2023-10-16 PROCEDURE — 25010000002 HEPARIN (PORCINE) PER 1000 UNITS: Performed by: SURGERY

## 2023-10-16 PROCEDURE — 86900 BLOOD TYPING SEROLOGIC ABO: CPT | Performed by: NURSE PRACTITIONER

## 2023-10-16 PROCEDURE — C1894 INTRO/SHEATH, NON-LASER: HCPCS | Performed by: SURGERY

## 2023-10-16 PROCEDURE — 0 IOVERSOL 74 % SOLUTION: Performed by: SURGERY

## 2023-10-16 PROCEDURE — 25010000002 PROPOFOL 10 MG/ML EMULSION

## 2023-10-16 PROCEDURE — 25810000003 LACTATED RINGERS PER 1000 ML: Performed by: SURGERY

## 2023-10-16 DEVICE — 10 MM X 30 MM
Type: IMPLANTABLE DEVICE | Site: CAROTID | Status: FUNCTIONAL
Brand: ENROUTE TRANSCAROTID STENT

## 2023-10-16 RX ORDER — BUPIVACAINE HYDROCHLORIDE 5 MG/ML
INJECTION, SOLUTION PERINEURAL AS NEEDED
Status: DISCONTINUED | OUTPATIENT
Start: 2023-10-16 | End: 2023-10-16 | Stop reason: HOSPADM

## 2023-10-16 RX ORDER — HEPARIN SODIUM 1000 [USP'U]/ML
INJECTION, SOLUTION INTRAVENOUS; SUBCUTANEOUS AS NEEDED
Status: DISCONTINUED | OUTPATIENT
Start: 2023-10-16 | End: 2023-10-16 | Stop reason: SURG

## 2023-10-16 RX ORDER — ONDANSETRON 2 MG/ML
INJECTION INTRAMUSCULAR; INTRAVENOUS AS NEEDED
Status: DISCONTINUED | OUTPATIENT
Start: 2023-10-16 | End: 2023-10-16 | Stop reason: SURG

## 2023-10-16 RX ORDER — HYDROCODONE BITARTRATE AND ACETAMINOPHEN 10; 325 MG/1; MG/1
1 TABLET ORAL ONCE AS NEEDED
Status: DISCONTINUED | OUTPATIENT
Start: 2023-10-16 | End: 2023-10-16 | Stop reason: HOSPADM

## 2023-10-16 RX ORDER — SODIUM CHLORIDE, SODIUM LACTATE, POTASSIUM CHLORIDE, CALCIUM CHLORIDE 600; 310; 30; 20 MG/100ML; MG/100ML; MG/100ML; MG/100ML
1000 INJECTION, SOLUTION INTRAVENOUS CONTINUOUS
Status: DISCONTINUED | OUTPATIENT
Start: 2023-10-16 | End: 2023-10-16

## 2023-10-16 RX ORDER — ONDANSETRON 2 MG/ML
4 INJECTION INTRAMUSCULAR; INTRAVENOUS
Status: DISCONTINUED | OUTPATIENT
Start: 2023-10-16 | End: 2023-10-16 | Stop reason: HOSPADM

## 2023-10-16 RX ORDER — LIDOCAINE HYDROCHLORIDE 10 MG/ML
0.5 INJECTION, SOLUTION EPIDURAL; INFILTRATION; INTRACAUDAL; PERINEURAL ONCE AS NEEDED
Status: DISCONTINUED | OUTPATIENT
Start: 2023-10-16 | End: 2023-10-16 | Stop reason: HOSPADM

## 2023-10-16 RX ORDER — PROPOFOL 10 MG/ML
VIAL (ML) INTRAVENOUS AS NEEDED
Status: DISCONTINUED | OUTPATIENT
Start: 2023-10-16 | End: 2023-10-16 | Stop reason: SURG

## 2023-10-16 RX ORDER — SODIUM CHLORIDE 0.9 % (FLUSH) 0.9 %
3 SYRINGE (ML) INJECTION AS NEEDED
Status: DISCONTINUED | OUTPATIENT
Start: 2023-10-16 | End: 2023-10-16 | Stop reason: HOSPADM

## 2023-10-16 RX ORDER — SODIUM CHLORIDE 9 MG/ML
40 INJECTION, SOLUTION INTRAVENOUS AS NEEDED
Status: DISCONTINUED | OUTPATIENT
Start: 2023-10-16 | End: 2023-10-17 | Stop reason: HOSPADM

## 2023-10-16 RX ORDER — ACETAMINOPHEN 325 MG/1
650 TABLET ORAL EVERY 4 HOURS PRN
Status: DISCONTINUED | OUTPATIENT
Start: 2023-10-16 | End: 2023-10-17 | Stop reason: HOSPADM

## 2023-10-16 RX ORDER — SODIUM CHLORIDE 0.9 % (FLUSH) 0.9 %
10 SYRINGE (ML) INJECTION EVERY 12 HOURS SCHEDULED
Status: DISCONTINUED | OUTPATIENT
Start: 2023-10-16 | End: 2023-10-17 | Stop reason: HOSPADM

## 2023-10-16 RX ORDER — HYDROMORPHONE HYDROCHLORIDE 1 MG/ML
0.2 INJECTION, SOLUTION INTRAMUSCULAR; INTRAVENOUS; SUBCUTANEOUS
Status: DISCONTINUED | OUTPATIENT
Start: 2023-10-16 | End: 2023-10-16 | Stop reason: HOSPADM

## 2023-10-16 RX ORDER — ROCURONIUM BROMIDE 10 MG/ML
INJECTION, SOLUTION INTRAVENOUS AS NEEDED
Status: DISCONTINUED | OUTPATIENT
Start: 2023-10-16 | End: 2023-10-16 | Stop reason: SURG

## 2023-10-16 RX ORDER — SODIUM CHLORIDE 0.9 % (FLUSH) 0.9 %
10 SYRINGE (ML) INJECTION AS NEEDED
Status: DISCONTINUED | OUTPATIENT
Start: 2023-10-16 | End: 2023-10-16 | Stop reason: HOSPADM

## 2023-10-16 RX ORDER — LABETALOL HYDROCHLORIDE 5 MG/ML
10 INJECTION, SOLUTION INTRAVENOUS
Status: DISCONTINUED | OUTPATIENT
Start: 2023-10-16 | End: 2023-10-17 | Stop reason: HOSPADM

## 2023-10-16 RX ORDER — ALBUMIN, HUMAN INJ 5% 5 %
250 SOLUTION INTRAVENOUS ONCE AS NEEDED
Status: DISCONTINUED | OUTPATIENT
Start: 2023-10-16 | End: 2023-10-16 | Stop reason: HOSPADM

## 2023-10-16 RX ORDER — DROPERIDOL 2.5 MG/ML
0.62 INJECTION, SOLUTION INTRAMUSCULAR; INTRAVENOUS ONCE AS NEEDED
Status: DISCONTINUED | OUTPATIENT
Start: 2023-10-16 | End: 2023-10-16 | Stop reason: HOSPADM

## 2023-10-16 RX ORDER — SODIUM CHLORIDE, SODIUM LACTATE, POTASSIUM CHLORIDE, CALCIUM CHLORIDE 600; 310; 30; 20 MG/100ML; MG/100ML; MG/100ML; MG/100ML
1000 INJECTION, SOLUTION INTRAVENOUS CONTINUOUS
Status: DISCONTINUED | OUTPATIENT
Start: 2023-10-16 | End: 2023-10-16 | Stop reason: HOSPADM

## 2023-10-16 RX ORDER — NALOXONE HCL 0.4 MG/ML
0.4 VIAL (ML) INJECTION
Status: DISCONTINUED | OUTPATIENT
Start: 2023-10-16 | End: 2023-10-17 | Stop reason: HOSPADM

## 2023-10-16 RX ORDER — SODIUM CHLORIDE 9 MG/ML
40 INJECTION, SOLUTION INTRAVENOUS AS NEEDED
Status: DISCONTINUED | OUTPATIENT
Start: 2023-10-16 | End: 2023-10-16 | Stop reason: HOSPADM

## 2023-10-16 RX ORDER — LIDOCAINE HYDROCHLORIDE 20 MG/ML
INJECTION, SOLUTION EPIDURAL; INFILTRATION; INTRACAUDAL; PERINEURAL AS NEEDED
Status: DISCONTINUED | OUTPATIENT
Start: 2023-10-16 | End: 2023-10-16 | Stop reason: SURG

## 2023-10-16 RX ORDER — FENTANYL CITRATE 50 UG/ML
25 INJECTION, SOLUTION INTRAMUSCULAR; INTRAVENOUS
Status: DISCONTINUED | OUTPATIENT
Start: 2023-10-16 | End: 2023-10-16 | Stop reason: HOSPADM

## 2023-10-16 RX ORDER — ONDANSETRON 4 MG/1
4 TABLET, FILM COATED ORAL EVERY 6 HOURS PRN
Status: DISCONTINUED | OUTPATIENT
Start: 2023-10-16 | End: 2023-10-17 | Stop reason: HOSPADM

## 2023-10-16 RX ORDER — SODIUM CHLORIDE, SODIUM LACTATE, POTASSIUM CHLORIDE, CALCIUM CHLORIDE 600; 310; 30; 20 MG/100ML; MG/100ML; MG/100ML; MG/100ML
100 INJECTION, SOLUTION INTRAVENOUS CONTINUOUS
Status: DISCONTINUED | OUTPATIENT
Start: 2023-10-16 | End: 2023-10-16 | Stop reason: HOSPADM

## 2023-10-16 RX ORDER — LABETALOL HYDROCHLORIDE 5 MG/ML
5 INJECTION, SOLUTION INTRAVENOUS
Status: DISCONTINUED | OUTPATIENT
Start: 2023-10-16 | End: 2023-10-16 | Stop reason: HOSPADM

## 2023-10-16 RX ORDER — DEXAMETHASONE SODIUM PHOSPHATE 4 MG/ML
INJECTION, SOLUTION INTRA-ARTICULAR; INTRALESIONAL; INTRAMUSCULAR; INTRAVENOUS; SOFT TISSUE AS NEEDED
Status: DISCONTINUED | OUTPATIENT
Start: 2023-10-16 | End: 2023-10-16 | Stop reason: SURG

## 2023-10-16 RX ORDER — SODIUM CHLORIDE 0.9 % (FLUSH) 0.9 %
3 SYRINGE (ML) INJECTION EVERY 12 HOURS SCHEDULED
Status: DISCONTINUED | OUTPATIENT
Start: 2023-10-16 | End: 2023-10-16 | Stop reason: HOSPADM

## 2023-10-16 RX ORDER — ONDANSETRON 2 MG/ML
4 INJECTION INTRAMUSCULAR; INTRAVENOUS EVERY 6 HOURS PRN
Status: DISCONTINUED | OUTPATIENT
Start: 2023-10-16 | End: 2023-10-17 | Stop reason: HOSPADM

## 2023-10-16 RX ORDER — ATORVASTATIN CALCIUM 10 MG/1
20 TABLET, FILM COATED ORAL DAILY
Status: DISCONTINUED | OUTPATIENT
Start: 2023-10-17 | End: 2023-10-17 | Stop reason: HOSPADM

## 2023-10-16 RX ORDER — SODIUM CHLORIDE 0.9 % (FLUSH) 0.9 %
3-10 SYRINGE (ML) INJECTION AS NEEDED
Status: DISCONTINUED | OUTPATIENT
Start: 2023-10-16 | End: 2023-10-16 | Stop reason: HOSPADM

## 2023-10-16 RX ORDER — PSEUDOEPHEDRINE HCL 30 MG
60 TABLET ORAL EVERY 4 HOURS PRN
Status: DISCONTINUED | OUTPATIENT
Start: 2023-10-16 | End: 2023-10-17 | Stop reason: HOSPADM

## 2023-10-16 RX ORDER — FENTANYL CITRATE 50 UG/ML
INJECTION, SOLUTION INTRAMUSCULAR; INTRAVENOUS AS NEEDED
Status: DISCONTINUED | OUTPATIENT
Start: 2023-10-16 | End: 2023-10-16 | Stop reason: SURG

## 2023-10-16 RX ORDER — CLOPIDOGREL BISULFATE 75 MG/1
75 TABLET ORAL
Status: DISCONTINUED | OUTPATIENT
Start: 2023-10-16 | End: 2023-10-17 | Stop reason: HOSPADM

## 2023-10-16 RX ORDER — LIDOCAINE HYDROCHLORIDE 40 MG/ML
SOLUTION TOPICAL AS NEEDED
Status: DISCONTINUED | OUTPATIENT
Start: 2023-10-16 | End: 2023-10-16 | Stop reason: SURG

## 2023-10-16 RX ORDER — PROTAMINE SULFATE 10 MG/ML
INJECTION, SOLUTION INTRAVENOUS AS NEEDED
Status: DISCONTINUED | OUTPATIENT
Start: 2023-10-16 | End: 2023-10-16 | Stop reason: SURG

## 2023-10-16 RX ORDER — IBUPROFEN 600 MG/1
600 TABLET ORAL ONCE AS NEEDED
Status: DISCONTINUED | OUTPATIENT
Start: 2023-10-16 | End: 2023-10-16 | Stop reason: HOSPADM

## 2023-10-16 RX ORDER — NALOXONE HCL 0.4 MG/ML
0.04 VIAL (ML) INJECTION AS NEEDED
Status: DISCONTINUED | OUTPATIENT
Start: 2023-10-16 | End: 2023-10-16 | Stop reason: HOSPADM

## 2023-10-16 RX ORDER — FLUMAZENIL 0.1 MG/ML
0.2 INJECTION INTRAVENOUS AS NEEDED
Status: DISCONTINUED | OUTPATIENT
Start: 2023-10-16 | End: 2023-10-16 | Stop reason: HOSPADM

## 2023-10-16 RX ORDER — ACETAMINOPHEN 500 MG
1000 TABLET ORAL ONCE
Status: COMPLETED | OUTPATIENT
Start: 2023-10-16 | End: 2023-10-16

## 2023-10-16 RX ORDER — ATORVASTATIN CALCIUM 20 MG/1
20 TABLET, FILM COATED ORAL DAILY
COMMUNITY

## 2023-10-16 RX ORDER — HYDROCODONE BITARTRATE AND ACETAMINOPHEN 5; 325 MG/1; MG/1
1 TABLET ORAL EVERY 4 HOURS PRN
Status: DISCONTINUED | OUTPATIENT
Start: 2023-10-16 | End: 2023-10-17 | Stop reason: HOSPADM

## 2023-10-16 RX ORDER — CEFAZOLIN SODIUM 1 G/50ML
1000 INJECTION, SOLUTION INTRAVENOUS ONCE
Status: COMPLETED | OUTPATIENT
Start: 2023-10-16 | End: 2023-10-16

## 2023-10-16 RX ORDER — ASPIRIN 81 MG/1
81 TABLET, CHEWABLE ORAL DAILY
Status: DISCONTINUED | OUTPATIENT
Start: 2023-10-16 | End: 2023-10-17 | Stop reason: HOSPADM

## 2023-10-16 RX ORDER — SODIUM CHLORIDE 0.9 % (FLUSH) 0.9 %
10 SYRINGE (ML) INJECTION AS NEEDED
Status: DISCONTINUED | OUTPATIENT
Start: 2023-10-16 | End: 2023-10-17 | Stop reason: HOSPADM

## 2023-10-16 RX ORDER — HYDRALAZINE HYDROCHLORIDE 20 MG/ML
10 INJECTION INTRAMUSCULAR; INTRAVENOUS EVERY 4 HOURS PRN
Status: DISCONTINUED | OUTPATIENT
Start: 2023-10-16 | End: 2023-10-17 | Stop reason: HOSPADM

## 2023-10-16 RX ADMIN — PSEUDOEPHEDRINE HCL 60 MG: 30 TABLET, FILM COATED ORAL at 16:43

## 2023-10-16 RX ADMIN — PROPOFOL 100 MG: 10 INJECTION, EMULSION INTRAVENOUS at 15:21

## 2023-10-16 RX ADMIN — SODIUM CHLORIDE, POTASSIUM CHLORIDE, SODIUM LACTATE AND CALCIUM CHLORIDE 1000 ML: 600; 310; 30; 20 INJECTION, SOLUTION INTRAVENOUS at 09:14

## 2023-10-16 RX ADMIN — SUGAMMADEX 200 MG: 100 INJECTION, SOLUTION INTRAVENOUS at 16:18

## 2023-10-16 RX ADMIN — HEPARIN SODIUM 8000 UNITS: 1000 INJECTION, SOLUTION INTRAVENOUS; SUBCUTANEOUS at 15:27

## 2023-10-16 RX ADMIN — MORPHINE SULFATE 4 MG: 4 INJECTION, SOLUTION INTRAMUSCULAR; INTRAVENOUS at 18:11

## 2023-10-16 RX ADMIN — METFORMIN HCL 1000 MG: 500 TABLET ORAL at 20:33

## 2023-10-16 RX ADMIN — LIDOCAINE HYDROCHLORIDE 1 EACH: 40 SOLUTION TOPICAL at 15:23

## 2023-10-16 RX ADMIN — ALBUMIN HUMAN 250 ML: 0.05 INJECTION, SOLUTION INTRAVENOUS at 16:36

## 2023-10-16 RX ADMIN — LIDOCAINE HYDROCHLORIDE 80 MG: 20 INJECTION, SOLUTION EPIDURAL; INFILTRATION; INTRACAUDAL; PERINEURAL at 15:21

## 2023-10-16 RX ADMIN — SODIUM CHLORIDE, POTASSIUM CHLORIDE, SODIUM LACTATE AND CALCIUM CHLORIDE 1000 ML: 600; 310; 30; 20 INJECTION, SOLUTION INTRAVENOUS at 09:12

## 2023-10-16 RX ADMIN — HEPARIN SODIUM 1000 UNITS: 1000 INJECTION, SOLUTION INTRAVENOUS; SUBCUTANEOUS at 15:37

## 2023-10-16 RX ADMIN — ROCURONIUM BROMIDE 50 MG: 10 SOLUTION INTRAVENOUS at 15:21

## 2023-10-16 RX ADMIN — CEFAZOLIN SODIUM 1000 MG: 1 INJECTION, SOLUTION INTRAVENOUS at 15:30

## 2023-10-16 RX ADMIN — GLYCOPYRROLATE 0.2 MG: 0.2 INJECTION INTRAMUSCULAR; INTRAVENOUS at 15:32

## 2023-10-16 RX ADMIN — GLYCOPYRROLATE 0.1 MG: 0.2 INJECTION INTRAMUSCULAR; INTRAVENOUS at 15:50

## 2023-10-16 RX ADMIN — PHENYLEPHRINE HYDROCHLORIDE 0.3 MCG/KG/MIN: 10 INJECTION INTRAVENOUS at 15:30

## 2023-10-16 RX ADMIN — CLOPIDOGREL BISULFATE 75 MG: 75 TABLET, FILM COATED ORAL at 20:32

## 2023-10-16 RX ADMIN — ACETAMINOPHEN 1000 MG: 500 TABLET, FILM COATED ORAL at 14:47

## 2023-10-16 RX ADMIN — DEXAMETHASONE SODIUM PHOSPHATE 4 MG: 4 INJECTION, SOLUTION INTRA-ARTICULAR; INTRALESIONAL; INTRAMUSCULAR; INTRAVENOUS; SOFT TISSUE at 15:30

## 2023-10-16 RX ADMIN — PROTAMINE SULFATE 30 MG: 10 INJECTION, SOLUTION INTRAVENOUS at 16:02

## 2023-10-16 RX ADMIN — ALBUMIN HUMAN 250 ML: 0.05 INJECTION, SOLUTION INTRAVENOUS at 17:09

## 2023-10-16 RX ADMIN — ONDANSETRON 4 MG: 2 INJECTION INTRAMUSCULAR; INTRAVENOUS at 16:12

## 2023-10-16 RX ADMIN — FENTANYL CITRATE 100 MCG: 50 INJECTION, SOLUTION INTRAMUSCULAR; INTRAVENOUS at 15:21

## 2023-10-16 RX ADMIN — ASPIRIN 81 MG: 81 TABLET, CHEWABLE ORAL at 20:33

## 2023-10-16 NOTE — ANESTHESIA PROCEDURE NOTES
Airway  Urgency: elective    Date/Time: 10/16/2023 3:23 PM  Airway not difficult    General Information and Staff    Patient location during procedure: OR  CRNA/CAA: Jorge Bryan CRNA    Indications and Patient Condition  Indications for airway management: airway protection    Preoxygenated: yes  Mask difficulty assessment: 2 - vent by mask + OA or adjuvant +/- NMBA    Final Airway Details  Final airway type: endotracheal airway      Successful airway: ETT  Cuffed: yes   Successful intubation technique: video laryngoscopy  Facilitating devices/methods: intubating stylet  Endotracheal tube insertion site: oral  Blade: Hutchins  Blade size: 4  ETT size (mm): 7.5  Cormack-Lehane Classification: grade I - full view of glottis  Placement verified by: capnometry   Cuff volume (mL): 10  Measured from: lips  ETT/EBT  to lips (cm): 22  Number of attempts at approach: 1  Assessment: lips, teeth, and gum same as pre-op and atraumatic intubation

## 2023-10-16 NOTE — H&P
10/16/2023         EDUARDO Hebert MD  [address] [facility] [address]  [facility] [address]     Attila Viramontes  1950          Chief Complaint   Patient presents with    Follow-up       1 year follow up with testing for CTA Neck. Pt was last seen on 9.28.22. PT denies any stroke like symptoms or any other issues in the past year.          Dear EDUARDO Hebert MD   HPI  I had the pleasure of seeing your patient Attila Viramontes in the office today.  As you recall, Attila Viramontes is a 72 y.o.  male you are following for routine health maintenance.  In 2020, he was transferred to [place] after onset of right sided hemiparesis, global aphasia, and right lower facial drooping.  He was found to have a left carotid occlusion.  Since that time he is done well denies any stroke symptoms.  He does have rectal cancer and is currently undergoing treatments.  He did have noninvasive testing performed today, which I did review in office.    Past Medical History:   Diagnosis Date    Arthritis     AV block     recent hx of long pauses, pt supposed to have pacemaker placed ASAP     Bright red rectal bleeding 04/16/2020    Carotid arterial disease     History of chemotherapy     History of radiation therapy 09/13/2020    Hyperlipidemia     Hypotension due to hypovolemia 09/18/2020    Irregular heart rate     Pancreatitis     Rectal cancer 04/09/2020    Stroke     had mini stroke while port was placed     Type 2 diabetes mellitus without complication, without long-term current use of insulin 11/20/2020     Past Surgical History:   Procedure Laterality Date    ARM LESION/CYST EXCISION Left 06/15/2021    Procedure: WIDE EXCISION MELANOMA LEFT SHOULDER WITH SPLIT THICKNESS SKIN GRAFT AND SENTINEL LYMPH NODE BIOPSY;  Surgeon: Vielka Weller MD;  Location: [facility];  Service: General;  Laterality: Left;    CATARACT EXTRACTION W/ INTRAOCULAR LENS  IMPLANT, BILATERAL      COLONOSCOPY N/A 04/02/2020     Procedure: COLONOSCOPY WITH ANESTHESIA;  Surgeon: Yanick Flowers DO;  Location:  [facility];  Service: Gastroenterology;  Laterality: N/A;  pre: abnormal CT scan  post: rectal mass  PCP unknown    CYSTECTOMY      ILEOSTOMY      LYMPH NODE BIOPSY      MOUTH SURGERY      PROSTATECTOMY      RECTAL MASS EXCISION  12/21/2020    SENTINEL NODE BIOPSY Left 06/15/2021    Procedure: SENTINEL LYMPH NODE BIOPSY;  Surgeon: Vielka Weller MD;  Location:  [facility];  Service: General;  Laterality: Left;    VENOUS ACCESS DEVICE (PORT) INSERTION N/A 04/14/2020    Procedure: INSERTION VENOUS ACCESS DEVICE;  Surgeon: Vielka Weller MD;  Location:  [facility];  Service: General;  Laterality: N/A;    VENOUS ACCESS DEVICE (PORT) INSERTION  07/2022    VENOUS ACCESS DEVICE (PORT) REMOVAL N/A 05/28/2021    Procedure: PORT REMOVAL, EXCISION OF NEOPLASM UNCERTAIN BEHAVIOR LEFT SHOULDER;  Surgeon: Vielka Weller MD;  Location:  [facility];  Service: General;  Laterality: N/A;     Family History   Problem Relation Age of Onset    Cancer Mother     Stroke Father     Heart disease Father     Heart attack Brother      Social History     Tobacco Use    Smoking status: Some Days     Packs/day: 0.25     Years: 53.00     Additional pack years: 0.00     Total pack years: 13.25     Types: Cigars, Cigarettes    Smokeless tobacco: Never    Tobacco comments:     2 cigarettes a day   Vaping Use    Vaping Use: Never used   Substance Use Topics    Alcohol use: Not Currently    Drug use: Never     No Known Allergies    Current Outpatient Medications   Medication Instructions    aspirin 81 mg, Oral, Daily    atorvastatin (LIPITOR) 20 MG tablet TAKE ONE TABLET BY MOUTH EVERY DAY    clopidogrel (PLAVIX) 75 mg, Oral, Daily, Begin taking 1 week prior to surgery    metFORMIN (GLUCOPHAGE) 1,000 mg, Oral, 2 Times Daily With Meals         Review of Systems   Constitutional: Negative.    HENT: Negative.     Eyes: Negative.    Respiratory: Negative.    "  Cardiovascular: Negative.    Gastrointestinal: Negative.    Endocrine: Negative.    Genitourinary: Negative.    Musculoskeletal: Negative.    Skin: Negative.    Allergic/Immunologic: Negative.    Neurological: Negative.    Hematological: Negative.    Psychiatric/Behavioral: Negative.     All other systems reviewed and are negative.      /60   Pulse 62   Ht 177.8 cm (70\")   Wt 81.6 kg (180 lb)   SpO2 97%   BMI 25.83 kg/m²   Physical Exam  Vitals and nursing note reviewed.   Constitutional:       General: He is not in acute distress.     Appearance: Normal appearance. He is well-developed and normal weight. He is not diaphoretic.   HENT:      Head: Normocephalic and atraumatic.   Neck:      Vascular: No carotid bruit or JVD.   Cardiovascular:      Rate and Rhythm: Normal rate and regular rhythm.      Pulses: Normal pulses.           Femoral pulses are 2+ on the right side and 2+ on the left side.       Popliteal pulses are 2+ on the right side and 2+ on the left side.        Dorsalis pedis pulses are 2+ on the right side and 2+ on the left side.        Posterior tibial pulses are 2+ on the right side and 2+ on the left side.      Heart sounds: Normal heart sounds, S1 normal and S2 normal. No murmur heard.    No friction rub. No gallop.   Pulmonary:      Effort: Pulmonary effort is normal.      Breath sounds: Normal breath sounds.   Abdominal:      General: Bowel sounds are normal. There is no abdominal bruit.      Palpations: Abdomen is soft.      Tenderness: There is no abdominal tenderness.   Musculoskeletal:         General: Normal range of motion.   Skin:     General: Skin is warm and dry.   Neurological:      General: No focal deficit present.      Mental Status: He is alert and oriented to person, place, and time.      Cranial Nerves: No cranial nerve deficit.   Psychiatric:         Mood and Affect: Mood normal.         Behavior: Behavior normal.         Thought Content: Thought content normal.    "      Judgment: Judgment normal.            Diagnostic data:   CT Angiogram Neck     Result Date: 9/5/2023  Narrative: CT ANGIOGRAM NECK- 9/5/2023 8:03 AM CDT  HISTORY: Carotid artery stenosis; I65.21-Occlusion and stenosis of right carotid artery  DOSE LENGTH PRODUCT: 485 mGy cm. Automated exposure control was also utilized to decrease patient radiation dose.  Exam: Axial CT angiogram of the neck after the uneventful administration of Isovue IV contrast. Sagittal and coronal reformations were also provided for review. 3D images were created on an independent workstation to better evaluate potential vascular abnormalities.  Comparison: 09/08/2022.  Findings:  There is a classic three-vessel branching pattern off the aortic arch without significant ostial narrowing. Common carotid arteries are normal in caliber. Calculated 75% right ICA origin atheromatous narrowing. Chronic left ICA occlusion. The vertebral arteries originate from the subclavian arteries without significant ostial narrowing.  No evidence of vertebral artery dissection or pseudoaneurysm.  No cervical adenopathy. Lung apices are clear. Advanced cervical spine osteoarthritis change. Right chest wall [id number], catheter curled within the right internal jugular vein.       Impression: Impression:  1. Calculated 75% right ICA origin atheromatous narrowing. 2. Chronic left ICA occlusion. 3. Bilateral vertebral arteries are patent. This report was finalized on 09/05/2023 08:25 by Dr Magdaleno Kelly, .     CT chest abdomen pelvis w contrast     Result Date: 8/15/2023  Narrative: CONTRAST-ENHANCED CT OF THE CHEST, ABDOMEN, AND PELVIS REASON FOR STUDY/CLINICAL HISTORY: [id number] cancer (CMS/HCC);Additional Information [H1ST]:->pt with mCRC on maintenance chemo, please evaluate for response to chemo COMPARISON: 5/23/2023 TECHNIQUE: Axial CT images of the chest, abdomen, and pelvis were obtained after the IV administration of contrast. Multiplanar reformats  were also generated. INTRAVENOUS CONTRAST ADMINISTRATION: IV Contrast Dose is documented in the electronic medical record DOSE REPORT: Total DLP: 886 mGycm Dose modulation was employed for ALARA by means of: Automated exposure control; adjustment of the mA and/or kV according to patient size (this includes techniques or standardized protocols for targeted exams where dose is matched to indication/reason for  exam; i.e. extremities or head); and/or use of iterative reconstructive technique. FINDINGS: CT CHEST: Lungs: Similar biapical pleural and parenchymal scarring. Irregular left upper lobe pulmonary nodule, tethering the left oblique fissure measuring 10 mm on image 141 of series 4 is not significantly changed from 10 mm previously when remeasured with similar technique. Additional tiny micronodules in the right upper lobe are unchanged. No new focal opacity or enlarging pulmonary nodule. Pleura: Within normal limits Airways: Within normal limits Cardiovascular: Right chest wall port, with tip terminating near the cavoatrial junction. Coronary artery calcifications are noted. Mediastinum: Within normal limits Lower Neck/Chest Wall: As above CT ABDOMEN AND PELVIS: Liver: Within normal limits Gallbladder/Biliary tree: Within normal limits Spleen: Within normal limits Pancreas: Within normal limits Adrenals: Within normal limits Kidneys/Ureters: Diffuse cortical thinning throughout the left kidney, with moderate left-sided hydroureteronephrosis, with dilatation of the left ureter to the ureteroenteric anastomosis, similar to prior. Duplicated right renal collecting system, with mild right-sided hydronephrosis, similar, perhaps minimally increased from prior. No ureteral calculi. Gastrointestinal: Postsurgical changes related to proctocolectomy with left lower quadrant end colostomy as well as ileal conduit urinary diversion. The cecum is noted to protrude deep into the pelvic floor resection bed, similar to prior. No  findings to  suggest mechanical bowel obstruction. Peritoneum: No appreciable change in the extent of presacral soft tissue thickening. A soft tissue nodule seen just deep to the left inferior pubic ramus on image 214 of series 3, correlating with site of biopsy-proven disease recurrence is mildly increased in size now measuring 2.2 x 1.6 x 1.8 cm, as compared with 2.0 x 1.4 x 1.6 cm previously similar Jessica appearance of the small bowel mesentery. No new peritoneal nodules.. Vasculature: Moderate atherosclerotic plaque in the aorta and branch vessels Lymph Nodes: Mildly enlarged left para-aortic retroperitoneal lymph nodes, each measuring 9-10 mm short axis seen on images 129 and 127 of series 3, not significantly changed. No definitively new or enlarging lymph nodes. Urinary Bladder: Surgically absent Reproductive organs: Surgically absent Abdominal Wall: Postsurgical scarring in the anterior abdominal wall. Other findings as above MUSCULOSKELETAL: No acute or suspicious osseous abnormality.     Impression: 1.  Compared with 5/23/2023, mild interval enlargement of soft tissue mass in the left lower pelvis, biopsy-proven disease recurrence. 2.  Mildly enlarged retroperitoneal lymph nodes do not appear significantly changed. 3.  Irregular left upper lobe pulmonary nodule, not significant changed in size. 4.  Other findings as above Electronically signed by  Reynaldo Walton MD on 8/15/2023 8:18 PM           Patient Active Problem List   Diagnosis    Rectal cancer    Current every day smoker    Impaired gait and mobility    2nd degree AV block    Tobacco abuse    Normocytic anemia    Chemotherapy induced neutropenia    Cancer related pain    Carotid artery stenosis, symptomatic, right    Carotid occlusion, left    Type 2 diabetes mellitus without complication, without long-term current use of insulin    Iron deficiency anemia    Ileostomy in place    Colostomy in place    Function kidney decreased    Stage 3a  chronic kidney disease           Diagnosis Plan   1. Stenosis of right carotid artery  Ambulatory Referral to Cardiology     clopidogrel (Plavix) 75 MG tablet       2. Tobacco abuse          3. Carotid occlusion, left          4. Mixed hyperlipidemia          5. Type 2 diabetes mellitus without complication, without long-term current use of insulin          6. Rectal cancer                Plan: After thoroughly evaluating Attila Viramontes, I believe the best course of action is to proceed with right transcarotid artery revascularization for stroke risk reduction.  I did review his testing which does show about 80% right carotid stenosis.  He does have an occluded left carotid.  Risks of transcarotid artery revascularization include, but are not limited to, bleeding, infection, vessel damage, nerve damage, MI, stroke, and death.  The patient understands these risks and would like to proceed with the procedure.  I will need to add Plavix to his medication regimen that he will need to begin taking 1 week prior to surgery.  He will continue aspirin, Plavix, and Lipitor uninterrupted prior to surgery.  I will refer him to cardiology for cardiac restratification.  Once I receive clearance, I will schedule him appropriately.   I did discuss vascular risk factors as they pertain to the progression of vascular disease including controlling his hyperlipidemia.  He is maintained on Lipitor for his hyperlipidemia.  He is a current daily smoker but reports he is down to about 2 cigarettes/day.  The patient is to continue taking their medications as previously discussed.   This was all discussed in full with complete understanding.  Thank you for allowing me to participate in the care of your patient.  Please do not hesitate to call with any questions or concerns.  We will keep you aware of any further encounters with Attila Viramontes.         Sincerely yours,           Jamie Reardon, DO

## 2023-10-16 NOTE — ANESTHESIA PROCEDURE NOTES
Arterial Line    Pre-sedation assessment completed: 10/16/2023 2:45 PM    Patient reassessed immediately prior to procedure    Patient location during procedure: pre-op  Start time: 10/16/2023 2:45 PM  Stop Time:10/16/2023 2:45 PM       Line placed for hemodynamic monitoring.  Performed By   Anesthesiologist: Theresa Alejandro MD   Preanesthetic Checklist  Completed: patient identified, IV checked, site marked, risks and benefits discussed, surgical consent, monitors and equipment checked, pre-op evaluation and timeout performed  Arterial Line Prep    Sterile Tech: cap and gloves  Prep: ChloraPrep  Patient monitoring: continuous pulse oximetry  Arterial Line Procedure   Laterality:left  Location:  radial artery  Catheter size: 20 G   Guidance: palpation technique  Number of attempts: 1  Successful placement: yes Images: still images not obtained  Post Assessment   Dressing Type: secured with tape and wrist guard applied.   Complications no  Circ/Move/Sens Assessment: normal and unchanged.   Patient Tolerance: patient tolerated the procedure well with no apparent complications

## 2023-10-16 NOTE — PLAN OF CARE
Goal Outcome Evaluation:  Plan of Care Reviewed With: patient        Progress: no change  Outcome Evaluation: Pt awake, alert and oriented x 4. Pt admitted from PACU s/p TCAR. right neck and left groin dressing dry and intact. DTV. SCD's. PIV x 2, IV infusing from PACU. Supine BR until 2030. Safety maintained.

## 2023-10-16 NOTE — ANESTHESIA POSTPROCEDURE EVALUATION
Patient: Attila Viramontes    Procedure Summary       Date: 10/16/23 Room / Location: [facility] OR  /  [facility]BRID OR 12    Anesthesia Start: 1514 Anesthesia Stop: 1633    Procedure: RIGHT TRANSCAROTID ARTERY REVASCULARIZATION (Right: Neck) Diagnosis:       Stenosis of right carotid artery      Preop testing      (Stenosis of right carotid artery [I65.21])      (Preop testing [Z01.818])    Surgeons: Jamie Reardon DO Provider: Jorge Bryan CRNA    Anesthesia Type: general ASA Status: 3            Anesthesia Type: general    Vitals  Vitals Value Taken Time   /53 10/16/23 1755   Temp 98.6 °F (37 °C) 10/16/23 1755   Pulse 67 10/16/23 1755   Resp 15 10/16/23 1755   SpO2 98 % 10/16/23 1755           Post Anesthesia Care and Evaluation    Patient location during evaluation: PACU  Patient participation: complete - patient participated  Level of consciousness: awake and alert  Pain management: adequate    Airway patency: patent  Anesthetic complications: No anesthetic complications    Cardiovascular status: acceptable  Respiratory status: acceptable  Hydration status: acceptable    Comments: Blood pressure 115/53, pulse 67, temperature 98.6 °F (37 °C), resp. rate 15, weight 81.4 kg (179 lb 7.3 oz), SpO2 98%.    Pt discharged from PACU based on vicki score >8

## 2023-10-16 NOTE — OP NOTE
Attila Viramontes  10/16/2023     PREOPERATIVE DIAGNOSIS: Stenosis of right carotid artery [I65.21]  Preop testing [Z01.818]     POSTOPERATIVE DIAGNOSIS: Post-Op Diagnosis Codes:     * Stenosis of right carotid artery [I65.21]     * Preop testing [Z01.818]     PROCEDURE PERFORMED:   1.  Right internal carotid artery TCAR (transcarotid artery revascularization) with the ENROUTE transcarotid neuro protection and stent system     SURGEON: Jamie Reardon DO      ANESTHESIA: General    PREPARATION: Routine.    STAFF: Circulator: Ana Maria Ramos RN; Theresa Mustafa RN  Scrub Person: Silvano Barajas; Alison Angel  Assistant: Cholo Joseph  Vascular Radiology Technician: Rocio Walton    Estimated Blood Loss: minimal    SPECIMENS: None    COMPLICATIONS: None    INDICATIONS: Attila Viramontes is a 73 y.o. male In 2020, he was transferred to [place] after onset of right sided hemiparesis, global aphasia, and right lower facial drooping.  He was found to have a left carotid occlusion.  Since that time he is done well denies any stroke symptoms.  He does have rectal cancer and is currently undergoing treatments.  He did have noninvasive testing performed today, which I did review in office. The indications, risks, and possible complications of the procedure were explained to the patient, who voiced understanding and wished to proceed with surgery.     PROCEDURE IN DETAIL: The patient was taken to the operating room and placed on the operating table in a supine position. After general anesthesia was obtained, the right neck and bilateral groins were prepped and draped in a sterile manner.   A 4 cm transverse incision was made between the sternal and clavicular heads of the sternocleidomastoid muscle below the omohyoid.  Following longitudinal division of the carotid sheath the jugular vein was partially dissected and retracted laterally.  Once 3 cm of common carotid artery were isolated, the vessel loop  was placed around the proximal one third of the common carotid artery under direct visualization.  A 5-0 Prolene suture was preplaced in the anterior wall the common carotid artery, in a Z stitch configuration, close to the clavicle to facilitate hemostasis upon removal of the arterial sheath at completion of the procedure.  The contralateral left common femoral vein was accessed under ultrasound guidance, using standard Seldinger and micropuncture access technique.  The venous return sheath was advanced into the common femoral vein over the advantage Glidewire.  Blood was aspirated from the flow line followed by flushing of the venous sheath with heparinized saline.  The venous sheath was secured to the patient's skin with a 2-0 silk suture to maintain optimal position in the vessel.  9000 units of intravenous heparin was given to obtain a therapeutic ACT greater than 250 seconds prior to arterial access.  A 4 Gabonese micropuncture set was used, puncturing the artery with a 21-gauge needle through the preplaced Z stitch while holding gentle traction on the vessel loop to stabilize and centralize the common carotid artery within the incision.  Careful attention was paid to the change in common carotid shape when using the vessel loop to control or lift the artery.  The micropuncture wire was then advanced into the external carotid artery and the 21-gauge needle was removed.  The micropuncture sheath was advanced into the external carotid artery and the wire and dilator were removed.  Pulsatile backflow indicated correct positioning.  The J-wire was then inserted into the external carotid artery.  After micro puncture sheath removal, the transcarotid arterial sheath was advanced to the 2.5 cm marker and the J-wire and dilator were removed.    The arterial sheath position was assessed under fluoroscopy in 2 projections to ensure that the sheath tip was oriented coaxially in the common carotid artery.  The arterial  sheath was sutured to the patient in 2 separate areas.  Blood was slowly aspirated followed by flushing with heparinized saline.  Next, the flow controller was connected to the transcarotid arterial sheath, prepared by passively allowing a column of arterial blood to fill the line and connected to the venous return sheath.  The common carotid inflow was occluded proximal to the arteriotomy with a profunda clamp to achieve active flow reversal.  To confirm flow reversal, a saline bolus was delivered in the venous flow line on both high and low flow settings of the flow controller.  Angiograms were performed with slow injections of a small amount of contrast filling just past the lesion to minimize antegrade transmission of microbubbles.  It was also noted that the patient had a right anterior chest wall port with entrance into the internal jugular vein.  It was noted that the port tubing was looped inside the internal jugular vein in the neck.  No manipulation was made of the port tubing.  Prior to lesion manipulation, heart rate and blood pressure were managed upwards to optimize flow reversal and procedural neuro protection.  The lesion was crossed with an 014 guidewire and predilatation was performed with a 6 x 25 mm Enflate balloon.  Next, primary stenting was performed with the 10 x 30 mm ENROUTE transcarotid stent.  Imaging was performed in both in TAMY and SALDAÑA projections to ensure the stent was fully deployed.  Completion angiogram also showed the stent to be widely patent without any residual stenosis, dissection, or occlusion.  At TCAR case completion, antegrade flow was restored by releasing the clamp on the common carotid artery then closing the NPS stopcocks to the flow lines.  The transcarotid arterial sheath was removed and the preclosure suture was tied in place.  30 mg of protamine was given intravenously for reversal.  The venous return sheath from the left groin was removed and hemostasis was  achieved with 10 to 15 minutes of manual compression.  The neck wound bed was irrigated with antibiotic saline and hemostasis was observed.  The 2 heads of the sternocleidomastoid were carefully sutured back together with a 3-0 Vicryl in a running fashion.  The platysma muscle was reapproximated using a 3-0 Vicryl in a running fashion.  The skin was then reapproximated using a 4-0 Monocryl in a subcuticular fashion. Sterile dressings were applied. The patient tolerated the procedure well. Sponge and needle counts were correct. The patient was then awakened and extubated in the operating room and taken to the recovery room in good condition.    ICA stenosis: 80 to 85%  Stenosis after stent placement: 0%  Lesion length: 20 mm  Lesion calcification: Moderate  Lesion at level: C2  Neuro: Intact  ICA tortuosity: Mild  Arch type: Type 1  Flow reversal time: 5 minutes  Ralls: 0  Contrast amount: 12 mL  ACT: 239+1000 units of additional heparin    Jamie Reardon,   Date: 10/16/2023 Time: 16:30 CDT    CC:EDUARDO Hebert MD

## 2023-10-16 NOTE — ANESTHESIA PREPROCEDURE EVALUATION
Anesthesia Evaluation     Patient summary reviewed and Nursing notes reviewed   no history of anesthetic complications:   NPO Solid Status: > 8 hours  NPO Liquid Status: > 6 hours           Airway   Mallampati: II  TM distance: >3 FB  Neck ROM: full  No difficulty expected  Dental - normal exam         Pulmonary    (+) a smoker Current,  Cardiovascular   Exercise tolerance: good (4-7 METS)    PT is on anticoagulation therapy    (+) dysrhythmias Bradycardia, hyperlipidemia,  carotid artery disease left carotid    ROS comment: Holter monitor 2021 with high grade 2nd degree av block  He was felt to not be symptomatic, had ongoing medical issues and was lost to follow up. Recently seen by Dr Hameed in preparation for surgery, pt denies any syncope or dizziness, so is still felt to be asymptomatic. Will proceed with careful attention, 5 lead ekg    Neuro/Psych  (+) CVA  GI/Hepatic/Renal/Endo    (+) renal disease CRI, diabetes mellitus type 2    Musculoskeletal     Abdominal  - normal exam   Substance History      OB/GYN          Other   arthritis,   history of cancer (rectal cancer, on going chemotherapy) active                  Anesthesia Plan    ASA 3     general     (5 lead EKG )  intravenous induction     Anesthetic plan, risks, benefits, and alternatives have been provided, discussed and informed consent has been obtained with: patient.

## 2023-10-17 ENCOUNTER — READMISSION MANAGEMENT (OUTPATIENT)
Dept: CALL CENTER | Facility: HOSPITAL | Age: 73
End: 2023-10-17
Payer: MEDICARE

## 2023-10-17 VITALS
DIASTOLIC BLOOD PRESSURE: 59 MMHG | TEMPERATURE: 97.6 F | HEIGHT: 70 IN | HEART RATE: 58 BPM | WEIGHT: 179.45 LBS | BODY MASS INDEX: 25.69 KG/M2 | OXYGEN SATURATION: 99 % | SYSTOLIC BLOOD PRESSURE: 99 MMHG | RESPIRATION RATE: 14 BRPM

## 2023-10-17 PROCEDURE — 99024 POSTOP FOLLOW-UP VISIT: CPT | Performed by: SURGERY

## 2023-10-17 PROCEDURE — 25010000002 CEFAZOLIN PER 500 MG: Performed by: SURGERY

## 2023-10-17 RX ADMIN — ASPIRIN 81 MG: 81 TABLET, CHEWABLE ORAL at 09:01

## 2023-10-17 RX ADMIN — CEFAZOLIN 2000 MG: 2 INJECTION, POWDER, FOR SOLUTION INTRAMUSCULAR; INTRAVENOUS at 00:26

## 2023-10-17 RX ADMIN — Medication 10 ML: at 09:04

## 2023-10-17 RX ADMIN — CEFAZOLIN 2000 MG: 2 INJECTION, POWDER, FOR SOLUTION INTRAMUSCULAR; INTRAVENOUS at 08:49

## 2023-10-17 RX ADMIN — ATORVASTATIN CALCIUM 20 MG: 10 TABLET, FILM COATED ORAL at 09:02

## 2023-10-17 RX ADMIN — Medication 10 ML: at 00:27

## 2023-10-17 RX ADMIN — METFORMIN HCL 1000 MG: 500 TABLET ORAL at 09:01

## 2023-10-17 NOTE — DISCHARGE SUMMARY
Date of Discharge:  10/17/2023    Discharge Diagnosis:Symptomatic stenosis of right carotid artery [I65.21]     Presenting Problem/History of Present Illness  Stenosis of right carotid artery [I65.21]  Preop testing [Z01.818]  Symptomatic stenosis of right carotid artery [I65.21]       Hospital Course  Patient is a 73 y.o. male In 2020, he was transferred to [place] after onset of right sided hemiparesis, global aphasia, and right lower facial drooping.  He was found to have a left carotid occlusion.  Since that time he is done well denies any stroke symptoms.  He does have rectal cancer and is currently undergoing treatments. He did undergo right TCAR without incident.  The patient was transferred to  for continued care.  Overnight, the patient has done well. Attila Viramontes  vitals have remained stable, right neck soft without hematoma, and without neurological deficit.  Groin soft without hematoma.  Attila Viramontes  is stable and ready for discharge.  We will see him back in 2 weeks for post operative follow up.  His medications will stay the same.  Written and verbal instructions were given.  This all was discussed in full with complete understanding.     Procedures Performed  Procedure(s):  RIGHT TRANSCAROTID ARTERY REVASCULARIZATION       Consults:   Consults       No orders found for last 30 day(s).              Condition on Discharge: stable    Discharge Medications     Discharge Medications        Continue These Medications        Instructions Start Date   aspirin 81 MG EC tablet   81 mg, Oral, Daily      atorvastatin 20 MG tablet  Commonly known as: LIPITOR   20 mg, Oral, Daily      clopidogrel 75 MG tablet  Commonly known as: Plavix   75 mg, Oral, Daily, Begin taking 1 week prior to surgery      metFORMIN 500 MG tablet  Commonly known as: Glucophage   1,000 mg, Oral, 2 Times Daily With Meals               Discharge Diet:   Diet Instructions       Advance Diet As Tolerated -Target Diet: as  tolerated      Target Diet: as tolerated            Activity at Discharge:   Activity Instructions       Bathing Restrictions      Type of Restriction: Bathing    Bathing Restrictions: Other    Explain Bathing Restrictions: may shower tomorrow    Driving Restrictions      Type of Restriction: Driving    Driving Restrictions: No Driving (Time Limited)    Length: 1 Week    Lifting Restrictions      Type of Restriction: Lifting    Lifting Restrictions: Lifting Restriction (Indicate Limit)    Weight Limit (Pounds): 10    Length of Lifting Restriction: 1 week    Other Activity Restrictions      Type of Restriction: Other    Explain Other Restrictions: no bending, stooping, straining            Follow-up Appointments  Future Appointments   Date Time Provider Department Center   11/1/2023 10:30 AM EDUARDO Hebert MD [facility]   3/22/2024 11:00 AM Ga Hameed MD [facility]     Additional Instructions for the Follow-ups that You Need to Schedule       Discharge Follow-up with Specialty: Elsie BALTAZAR; 2 Weeks   As directed      Specialty: Elsie BALTAZAR   Follow Up: 2 Weeks   Follow Up Details: post op tcar                 I did spend more than 30 minutes reviewing the chart, face to face encounter, and organizing discharge.    Jamie Reardon DO  10/17/23  08:10 CDT

## 2023-10-17 NOTE — PLAN OF CARE
Goal Outcome Evaluation:  Plan of Care Reviewed With: patient        Progress: no change   Pt alert and oriented x 4. TCAR sites, right neck and left groin dressings dry and intact. DTV. SCD's.  IV IID and antibiotics given. Supine until 2030. Colostomy and urostomy sites clean dry and intact. Urostomy bag connected to jaffe drainage bag. Pt reports being very upset about surgery schedule and having to be NPO without having surgery till late and then not getting to eat when admitted to floor. Listened to complaints and offered assurance.  Pt still remains upset and just wants to go home. Safety maintained.

## 2023-10-17 NOTE — OUTREACH NOTE
Prep Survey      Flowsheet Row Responses   [facility] patient discharged from? [place]   Is LACE score < 7 ? No   Eligibility [facility]   Date of Admission 10/16/23   Date of Discharge 10/17/23   Discharge Disposition Home or Self Care   Discharge diagnosis RIGHT TRANSCAROTID ARTERY REVASCULARIZATION   Does the patient have one of the following disease processes/diagnoses(primary or secondary)? General Surgery   Does the patient have Home health ordered? No   Is there a DME ordered? No   Prep survey completed? Yes            Anne PAEZ - Registered Nurse

## 2023-10-18 ENCOUNTER — TRANSITIONAL CARE MANAGEMENT TELEPHONE ENCOUNTER (OUTPATIENT)
Dept: CALL CENTER | Facility: HOSPITAL | Age: 73
End: 2023-10-18
Payer: MEDICARE

## 2023-10-18 NOTE — OUTREACH NOTE
Call Center TCM Note      Flowsheet Row Responses   [facility] patient discharged from? [place]   Does the patient have one of the following disease processes/diagnoses(primary or secondary)? General Surgery   TCM attempt successful? No   Unsuccessful attempts Attempt 1            Avelina Cervantes LPN    10/18/2023, 14:47 CDT

## 2023-10-18 NOTE — OUTREACH NOTE
Call Center TCM Note      Flowsheet Row Responses   [facility] patient discharged from? [place]   Does the patient have one of the following disease processes/diagnoses(primary or secondary)? General Surgery   TCM attempt successful? Yes   Call start time 1519   Call end time 1521   Discharge diagnosis RIGHT TRANSCAROTID ARTERY REVASCULARIZATION   Meds reviewed with patient/caregiver? Yes   Does the patient have all medications related to this admission filled (includes all antibiotics, pain medications, etc.) N/A   Is the patient taking all medications as directed (includes completed medication regime)? Yes   Does the patient have an appointment with their PCP within 7-14 days of discharge? Yes   Has home health visited the patient within 72 hours of discharge? N/A   Psychosocial issues? No   Did the patient receive a copy of their discharge instructions? Yes   Nursing interventions Reviewed instructions with patient   What is the patient's perception of their health status since discharge? Improving   Is the patient /caregiver able to teach back basic post-op care? Drive as instructed by MD in discharge instructions, Take showers only when approved by MD-sponge bathe until then, Lifting as instructed by MD in discharge instructions, Keep incision areas clean,dry and protected   Is the patient/caregiver able to teach back signs and symptoms of incisional infection? Increased redness, swelling or pain at the incisonal site, Increased drainage or bleeding, Incisional warmth, Pus or odor from incision, Fever   Is the patient/caregiver able to teach back steps to recovery at home? Set small, achievable goals for return to baseline health, Rest and rebuild strength, gradually increase activity, Make a list of questions for surgeon's appointment   Is the patient/caregiver able to teach back the hierarchy of who to call/visit for symptoms/problems? PCP, Specialist, Home health nurse, Urgent Care, ED, 911 Yes   TCM  call completed? Yes   Call end time 1521   Would this patient benefit from a Referral to [facility] Social Work? No   Is the patient interested in additional calls from an ambulatory ? No            Avelina Cervantes LPN    10/18/2023, 15:22 CDT

## 2023-10-26 ENCOUNTER — READMISSION MANAGEMENT (OUTPATIENT)
Dept: CALL CENTER | Facility: HOSPITAL | Age: 73
End: 2023-10-26
Payer: MEDICARE

## 2023-10-26 NOTE — OUTREACH NOTE
General Surgery Week 2 Survey      Flowsheet Row Responses   [facility] patient discharged from? [place]   Does the patient have one of the following disease processes/diagnoses(primary or secondary)? General Surgery   Week 2 attempt successful? Yes   Call start time 1343   Call end time 1348   Discharge diagnosis RIGHT TRANSCAROTID ARTERY REVASCULARIZATION   Person spoke with today (if not patient) and relationship patient   Meds reviewed with patient/caregiver? Yes   Does the patient have all medications related to this admission filled (includes all antibiotics, pain medications, etc.) Yes   Is the patient taking all medications as directed (includes completed medication regime)? Yes   Does the patient have a follow up appointment scheduled with their surgeon? Yes   Has the patient kept scheduled appointments due by today? Yes   Comments Dr. Reardon on Nov 3.  Kalyani Nov 1st.   Psychosocial issues? No   Did the patient receive a copy of their discharge instructions? Yes   Nursing interventions Reviewed instructions with patient   What is the patient's perception of their health status since discharge? Improving   Is the patient/caregiver able to teach back steps to recovery at home? Set small, achievable goals for return to baseline health, Rest and rebuild strength, gradually increase activity   Is the patient/caregiver able to teach back the hierarchy of who to call/visit for symptoms/problems? PCP, Specialist, Home health nurse, Urgent Care, ED, 911 Yes   Week 2 call completed? Yes   Is the patient interested in additional calls from an ambulatory ? No   Would this patient benefit from a Referral to Amb Social Work? No   Wrap up additional comments Pt reports he is doing ok at this time.  He denies needs.   Call end time 1348            CARLOS A BLANTON - Registered Nurse

## 2023-11-01 ENCOUNTER — OFFICE VISIT (OUTPATIENT)
Dept: INTERNAL MEDICINE | Facility: CLINIC | Age: 73
End: 2023-11-01
Payer: MEDICARE

## 2023-11-01 VITALS
TEMPERATURE: 97.9 F | DIASTOLIC BLOOD PRESSURE: 70 MMHG | SYSTOLIC BLOOD PRESSURE: 130 MMHG | WEIGHT: 179.3 LBS | OXYGEN SATURATION: 96 % | BODY MASS INDEX: 25.67 KG/M2 | HEIGHT: 70 IN | HEART RATE: 85 BPM

## 2023-11-01 DIAGNOSIS — E11.9 TYPE 2 DIABETES MELLITUS WITHOUT COMPLICATION, WITHOUT LONG-TERM CURRENT USE OF INSULIN: Primary | ICD-10-CM

## 2023-11-01 DIAGNOSIS — I65.21 STENOSIS OF RIGHT CAROTID ARTERY: ICD-10-CM

## 2023-11-01 LAB
EXPIRATION DATE: ABNORMAL
HBA1C MFR BLD: 6.9 % (ref 4.5–5.7)
Lab: ABNORMAL

## 2023-11-01 NOTE — PROGRESS NOTES
Chief Complaint   Patient presents with    Follow-up    Diabetes     A1c=6.9     Answers submitted by the patient for this visit:  Primary Reason for Visit (Submitted on 10/31/2023)  What is the primary reason for your visit?: Diabetes    History:  Attila Viramontes is a 73 y.o. male who presents today for evaluation of the above problems.      Attila presents today for 6-month follow-up on diabetes.  His last A1c was 7.7% and today it is 6.9%.  He has adjusted his diet.  He is no longer drinking milk.  He has decrease his carbohydrates as well.    He had right transcarotid artery revascularization with Dr. Reardon on October 16, 2023.  He is recovering well without any issues.  He has an appointment with vascular surgery on November 3.      Diabetes  He has type 2 diabetes mellitus. No MedicAlert identification noted. The initial diagnosis of diabetes was made 2 years ago. Hypoglycemia symptoms include sleepiness and sweats. Pertinent negatives for hypoglycemia include no confusion, dizziness, headaches, hunger, mood changes, nervousness/anxiousness, pallor, seizures, speech difficulty or tremors. Associated symptoms include foot paresthesias, polydipsia and polyuria. Pertinent negatives for diabetes include no blurred vision, no chest pain, no fatigue, no foot ulcerations, no polyphagia, no visual change, no weakness and no weight loss. Pertinent negatives for hypoglycemia complications include no blackouts, no hospitalization, no nocturnal hypoglycemia, no required assistance and no required glucagon injection. Symptoms are stable. Pertinent negatives for diabetic complications include no CVA, heart disease, impotence, nephropathy, peripheral neuropathy, PVD or retinopathy. Risk factors for coronary artery disease include family history and tobacco exposure. Current diabetic treatment includes diet and oral agent (monotherapy). He is compliant with treatment most of the time. His weight is stable. He is  following a generally healthy diet. When asked about meal planning, he reported none. He has not had a previous visit with a dietitian. He participates in exercise daily. His home blood glucose trend is fluctuating minimally. His overall blood glucose range is 140-180 mg/dl. He does not see a podiatrist.Eye exam is not current.         ROS:  Review of Systems   Constitutional:  Negative for fatigue and weight loss.   Eyes:  Negative for blurred vision.   Cardiovascular:  Negative for chest pain.   Endocrine: Positive for polydipsia and polyuria. Negative for polyphagia.   Genitourinary:  Negative for impotence.   Skin:  Negative for pallor.   Neurological:  Negative for dizziness, tremors, seizures, speech difficulty, weakness and headaches.   Psychiatric/Behavioral:  Negative for confusion. The patient is not nervous/anxious.          Current Outpatient Medications:     aspirin 81 MG EC tablet, Take 1 tablet by mouth Daily., Disp: , Rfl:     atorvastatin (LIPITOR) 20 MG tablet, Take 1 tablet by mouth Daily., Disp: , Rfl:     clopidogrel (Plavix) 75 MG tablet, Take 1 tablet by mouth Daily. Begin taking 1 week prior to surgery, Disp: 30 tablet, Rfl: 2    metFORMIN (Glucophage) 500 MG tablet, Take 2 tablets by mouth 2 (Two) Times a Day With Meals., Disp: 120 tablet, Rfl: 5    Lab Results   Component Value Date    GLUCOSE 149 (H) 10/09/2023    BUN 32 (H) 10/09/2023    CREATININE 1.46 (H) 10/09/2023    EGFR 50.8 (L) 10/09/2023    BCR 21.9 10/09/2023    K 4.6 10/09/2023    CO2 22.0 10/09/2023    CALCIUM 9.4 10/09/2023    ALBUMIN 4.5 05/01/2023    BILITOT <0.2 05/01/2023    AST 13 05/01/2023    ALT 10 05/01/2023       WBC   Date Value Ref Range Status   10/09/2023 9.67 3.40 - 10.80 10*3/mm3 Final     RBC   Date Value Ref Range Status   10/09/2023 4.31 4.14 - 5.80 10*6/mm3 Final     Hemoglobin   Date Value Ref Range Status   10/09/2023 12.9 (L) 13.0 - 17.7 g/dL Final     Hematocrit   Date Value Ref Range Status    10/09/2023 41.0 37.5 - 51.0 % Final   12/24/2020 28 (L) 41 - 49 % Final     MCV   Date Value Ref Range Status   10/09/2023 95.1 79.0 - 97.0 fL Final     MCH   Date Value Ref Range Status   10/09/2023 29.9 26.6 - 33.0 pg Final     MCHC   Date Value Ref Range Status   10/09/2023 31.5 31.5 - 35.7 g/dL Final     RDW   Date Value Ref Range Status   10/09/2023 16.8 (H) 12.3 - 15.4 % Final     RDW-SD   Date Value Ref Range Status   10/09/2023 58.7 (H) 37.0 - 54.0 fl Final     MPV   Date Value Ref Range Status   10/09/2023 8.7 6.0 - 12.0 fL Final     Platelets   Date Value Ref Range Status   10/09/2023 333 140 - 450 10*3/mm3 Final     Neutrophil %   Date Value Ref Range Status   10/09/2023 73.9 42.7 - 76.0 % Final     Lymphocyte %   Date Value Ref Range Status   10/09/2023 12.4 (L) 19.6 - 45.3 % Final     Monocyte %   Date Value Ref Range Status   10/09/2023 9.4 5.0 - 12.0 % Final     Eosinophil %   Date Value Ref Range Status   10/09/2023 2.6 0.3 - 6.2 % Final     Basophil %   Date Value Ref Range Status   10/09/2023 0.8 0.0 - 1.5 % Final     Immature Grans %   Date Value Ref Range Status   10/09/2023 0.9 (H) 0.0 - 0.5 % Final     Neutrophils, Absolute   Date Value Ref Range Status   10/09/2023 7.14 (H) 1.70 - 7.00 10*3/mm3 Final     Lymphocytes, Absolute   Date Value Ref Range Status   10/09/2023 1.20 0.70 - 3.10 10*3/mm3 Final     Monocytes, Absolute   Date Value Ref Range Status   10/09/2023 0.91 (H) 0.10 - 0.90 10*3/mm3 Final     Eosinophils, Absolute   Date Value Ref Range Status   10/09/2023 0.25 0.00 - 0.40 10*3/mm3 Final     Basophils, Absolute   Date Value Ref Range Status   10/09/2023 0.08 0.00 - 0.20 10*3/mm3 Final     Immature Grans, Absolute   Date Value Ref Range Status   10/09/2023 0.09 (H) 0.00 - 0.05 10*3/mm3 Final     nRBC   Date Value Ref Range Status   10/09/2023 0.0 0.0 - 0.2 /100 WBC Final         OBJECTIVE:  Visit Vitals  /70 (BP Location: Left arm, Patient Position: Sitting, Cuff Size:  "Adult)   Pulse 85   Temp 97.9 °F (36.6 °C) (Temporal)   Ht 179 cm (70.47\")   Wt 81.3 kg (179 lb 4.8 oz)   SpO2 96%   BMI 25.38 kg/m²      Physical Exam  Constitutional:       General: He is not in acute distress.     Appearance: He is well-developed. He is not ill-appearing or toxic-appearing.   HENT:      Head: Normocephalic and atraumatic.   Eyes:      Pupils: Pupils are equal, round, and reactive to light.   Neck:      Thyroid: No thyromegaly.      Trachea: Phonation normal.   Cardiovascular:      Rate and Rhythm: Normal rate.      Heart sounds: No murmur heard.  Pulmonary:      Effort: No respiratory distress.   Skin:     Coloration: Skin is not pale.      Findings: No erythema.   Neurological:      Mental Status: He is alert.   Psychiatric:         Behavior: Behavior normal.         Thought Content: Thought content normal.         Judgment: Judgment normal.       Reviewed Dr. Reardon's discharge summary from October 16, 2023.  Assessment/Plan    Diagnoses and all orders for this visit:    1. Type 2 diabetes mellitus without complication, without long-term current use of insulin (Primary)  -     POC Glycosylated Hemoglobin (Hb A1C)    2. Stenosis of right carotid artery    Other orders  -     Fluzone High-Dose 65+yrs (9280-1140)      Attila is doing well.  His diabetes is under better control with an A1c of 6.9%.  Continue with his lifestyle changes he is already implemented.    Denies any issues with his recent right carotid artery revascularization.  He has an appointment with vascular surgery on November 3.    Influenza vaccine given today in the office.    Follow-up in 6 months for his Medicare wellness visit or sooner if indicated.    Return in about 6 months (around 5/1/2024) for Medicare Wellness after 5/1/2024.      KIP Hebert MD  10:41 CDT  11/1/2023   Electronically signed      "

## 2023-11-02 ENCOUNTER — TELEPHONE (OUTPATIENT)
Dept: VASCULAR SURGERY | Facility: CLINIC | Age: 73
End: 2023-11-02
Payer: MEDICARE

## 2023-11-10 DIAGNOSIS — E11.9 TYPE 2 DIABETES MELLITUS WITHOUT COMPLICATION, WITHOUT LONG-TERM CURRENT USE OF INSULIN: ICD-10-CM

## 2023-11-10 NOTE — TELEPHONE ENCOUNTER
Rx Refill Note  Requested Prescriptions     Pending Prescriptions Disp Refills    metFORMIN (GLUCOPHAGE) 500 MG tablet [Pharmacy Med Name: METFORMIN  MG TABLET] 120 tablet 11     Sig: Take 2 tablets by mouth 2 (Two) Times a Day With Meals.      Last office visit with prescribing clinician: 11/1/2023   Last telemedicine visit with prescribing clinician: Visit date not found   Next office visit with prescribing clinician: 5/2/2024                         Would you like a call back once the refill request has been completed: [] Yes [] No    If the office needs to give you a call back, can they leave a voicemail: [] Yes [] No    CATIA Barker  11/10/23, 14:29 CST

## 2024-03-22 ENCOUNTER — OFFICE VISIT (OUTPATIENT)
Dept: CARDIOLOGY | Facility: CLINIC | Age: 74
End: 2024-03-22
Payer: MEDICARE

## 2024-03-22 VITALS
OXYGEN SATURATION: 97 % | HEIGHT: 71 IN | WEIGHT: 183 LBS | HEART RATE: 86 BPM | BODY MASS INDEX: 25.62 KG/M2 | DIASTOLIC BLOOD PRESSURE: 65 MMHG | SYSTOLIC BLOOD PRESSURE: 140 MMHG

## 2024-03-22 DIAGNOSIS — I44.1 2ND DEGREE AV BLOCK: ICD-10-CM

## 2024-03-22 DIAGNOSIS — R06.09 DOE (DYSPNEA ON EXERTION): Primary | ICD-10-CM

## 2024-03-22 DIAGNOSIS — I99.9 VASCULAR DISEASE: ICD-10-CM

## 2024-03-22 PROCEDURE — 93000 ELECTROCARDIOGRAM COMPLETE: CPT | Performed by: INTERNAL MEDICINE

## 2024-03-22 PROCEDURE — 99214 OFFICE O/P EST MOD 30 MIN: CPT | Performed by: INTERNAL MEDICINE

## 2024-03-22 PROCEDURE — 1160F RVW MEDS BY RX/DR IN RCRD: CPT | Performed by: INTERNAL MEDICINE

## 2024-03-22 PROCEDURE — 1159F MED LIST DOCD IN RCRD: CPT | Performed by: INTERNAL MEDICINE

## 2024-03-22 NOTE — PROGRESS NOTES
Subjective:     Encounter Date:03/22/2024      Patient ID: Attila Viramontes is a 73 y.o. male.    Chief Complaint: Routine follow-up.     History of Present Illness    I last saw Mr. Viramontes in 09/2023 for preop risk assessment regarding carotid surgery. He continues to be seen because a heart monitor back in 2021 showed intermittent episodes of second-degree heart block with Mobitz type 2 with ventricular pauses of up to 6 seconds; however, he never had symptoms attributable to that. We had offered a pacemaker, but he ended up getting sick from noncardiac reasons afterwards, which took precedence, and he ended up being lost to follow up in 2021 until the visit here back for preoperative assessment in 09/2023. In the interim when he had been lost to follow-up, he never had any syncope or near syncope.    Today, he reports his carotid surgery went well. He confirms he is taking atorvastatin.    He denies any syncope or near syncope. If he does not drink enough water, he dehydrates quickly, but he has never felt lightheaded. If he stands up quickly, his blood pressure will drop.    He has chemotherapy every 2 weeks, which he states is tiring. He went out to play basketball the other day, and it was hard. He cannot run anymore. If he walks quickly, he gets out of breath. This is not more noticeable now than it was 6 months ago, but it bothers him. He does not know if it is due to his chemotherapy or his age. He confirms the dyspnea on exertion makes him stop, and it takes 5 minutes before he recovers. During the 5 minutes of rest, he does not feel any chest pain, tightness, squeezing, heaviness, or pressure.     He smokes cigars.    The following portions of the patient's history were reviewed and updated as appropriate: allergies, current medications, past family history, past medical history, past social history, past surgical history, and problem list.    Review of Systems   Constitutional: Negative for  malaise/fatigue.   Cardiovascular:  Positive for dyspnea on exertion. Negative for chest pain, claudication, leg swelling, near-syncope, orthopnea, palpitations, paroxysmal nocturnal dyspnea and syncope.   Respiratory:  Negative for shortness of breath.    Hematologic/Lymphatic: Does not bruise/bleed easily.           Current Outpatient Medications:     aspirin 81 MG EC tablet, Take 1 tablet by mouth Daily., Disp: , Rfl:     atorvastatin (LIPITOR) 20 MG tablet, Take 1 tablet by mouth Daily., Disp: , Rfl:     clopidogrel (Plavix) 75 MG tablet, Take 1 tablet by mouth Daily. Begin taking 1 week prior to surgery, Disp: 30 tablet, Rfl: 2    metFORMIN (GLUCOPHAGE) 500 MG tablet, Take 2 tablets by mouth 2 (Two) Times a Day With Meals., Disp: 120 tablet, Rfl: 11       Objective:      Vitals:    03/22/24 1105   BP: 140/65   Pulse: 86   SpO2: 97%     Vitals and nursing note reviewed.   Constitutional:       General: Not in acute distress.     Appearance: Not in distress.   Neck:      Vascular: No JVD or JVR. JVD normal.   Pulmonary:      Effort: Pulmonary effort is normal.      Breath sounds: Normal breath sounds.   Cardiovascular:      Normal rate. Regular rhythm.      Murmurs: There is no murmur.      No gallop.  No rub.   Pulses:     Intact distal pulses.   Edema:     Peripheral edema absent.   Skin:     General: Skin is warm and dry.   Neurological:      Mental Status: Alert, oriented to person, place, and time and oriented to person, place and time.         Lab Review:         ECG 12 Lead    Date/Time: 3/22/2024 11:19 AM  Performed by: Ga Hameed MD    Authorized by: Ga Hameed MD  Comparison: compared with previous ECG from 9/7/2023  Comparison to previous ECG: Inferior Q waves are no longer present, and T wave inversions in the inferior leads are no longer present.  Rhythm: sinus rhythm  BPM: 86  Conduction: right bundle branch block    Clinical impression: abnormal EKG            Assessment/Plan:      Problem List Items Addressed This Visit (all established and stable, except where otherwise noted below)         Cardiac and Vasculature    2nd degree AV block     Other Visit Diagnoses       HENDERSON (dyspnea on exertion)    -  Primary    Relevant Orders    Stress Test With Myocardial Perfusion (1 Day)    Vascular disease        Relevant Orders    Stress Test With Myocardial Perfusion (1 Day)              Recommendations/plans:    Mr. Viramontes returns today primarily for routine follow-up because of a cardiac showing nocturnal heart block years ago. He has never manifested with any symptoms while awake such as presyncope or syncope. Therefore, I think no further testing is warranted for this.     However, he has been discovered to have peripheral vascular disease as he had a mini stroke, and had occlusive carotid disease, and underwent carotid surgery with Dr. Reardon here last fall. He tolerated this well, and is on appropriate medical therapy for vascular disease now including dual antiplatelet therapy, and moderate intensity statin.     He does complain of somewhat limiting, and chronic exertional dyspnea, which, given his known vascular disease, could certainly be an anginal equivalent. He has never had an ischemic evaluation, so I have suggested that we now perform a Lexiscan to look for any high-risk findings. Otherwise, continue current medicines, and we will alert him as to findings. If abnormal, then we will discuss heart catheterization as I do think intervening on any potentially occlusive coronary lesions could lead to symptomatic improvement for him.      Transcribed from ambient dictation for Ga Hameed MD by Mayra Gonzalez.  03/22/24   15:37 CDT    Patient or patient representative verbalized consent to the visit recording.    I Ga Hameed MD have personally performed the services described in this document as scribed by the above individual, and it is both accurate and complete.   I have  edited each component as needed.    Ga Hameed MD  3/22/2024  23:24 CDT

## 2024-04-30 ENCOUNTER — TELEPHONE (OUTPATIENT)
Dept: VASCULAR SURGERY | Facility: CLINIC | Age: 74
End: 2024-04-30
Payer: MEDICARE

## 2024-04-30 NOTE — TELEPHONE ENCOUNTER
HUB TO RELAY     Called patient to confirm appointment on 05/01/24 at 2:15 pm. Patient also has testing at [facility] on 05/01/24 at 1:00 pm. Patient did not answer couldn't not leave VM.

## 2024-05-01 ENCOUNTER — OFFICE VISIT (OUTPATIENT)
Dept: VASCULAR SURGERY | Facility: CLINIC | Age: 74
End: 2024-05-01
Payer: MEDICARE

## 2024-05-01 ENCOUNTER — HOSPITAL ENCOUNTER (OUTPATIENT)
Dept: ULTRASOUND IMAGING | Facility: HOSPITAL | Age: 74
Discharge: HOME | End: 2024-05-01
Admitting: NURSE PRACTITIONER
Payer: MEDICARE

## 2024-05-01 VITALS
OXYGEN SATURATION: 98 % | BODY MASS INDEX: 24.62 KG/M2 | WEIGHT: 172 LBS | DIASTOLIC BLOOD PRESSURE: 74 MMHG | SYSTOLIC BLOOD PRESSURE: 122 MMHG | HEART RATE: 114 BPM | HEIGHT: 70 IN

## 2024-05-01 DIAGNOSIS — I65.21 STENOSIS OF RIGHT CAROTID ARTERY: ICD-10-CM

## 2024-05-01 DIAGNOSIS — Z72.0 TOBACCO ABUSE: ICD-10-CM

## 2024-05-01 DIAGNOSIS — I65.21 STENOSIS OF RIGHT CAROTID ARTERY: Primary | ICD-10-CM

## 2024-05-01 DIAGNOSIS — I65.22 CAROTID OCCLUSION, LEFT: ICD-10-CM

## 2024-05-01 DIAGNOSIS — E11.9 TYPE 2 DIABETES MELLITUS WITHOUT COMPLICATION, WITHOUT LONG-TERM CURRENT USE OF INSULIN: ICD-10-CM

## 2024-05-01 DIAGNOSIS — E78.2 MIXED HYPERLIPIDEMIA: ICD-10-CM

## 2024-05-01 PROCEDURE — 93880 EXTRACRANIAL BILAT STUDY: CPT

## 2024-05-01 NOTE — PROGRESS NOTES
"5/1/2024       EDUARDO Hebert MD  [address]  [place] [address]    Attila Viramontes  1950    Chief Complaint   Patient presents with    Follow-up     6 month follow up w/ testing. Last seen 11/3/23. Patient denies any stroke type symptoms.        Dear EDUARDO Hebert MD   HPI  I had the pleasure of seeing your patient Attila Viramontes in the office today.  As you recall, Attila Viramontes is a 73 y.o.  male you are following for routine health maintenance.  In 2020, he was transferred to [place] after onset of right sided hemiparesis, global aphasia, and right lower facial drooping.  He was found to have a left carotid occlusion.  He did undergo right TCAR on 10/16/2023.  Currently he is doing well and denies any strokelike symptoms.  He is having some dyspnea on exertion and will be undergoing a Alem scan next week.  He is maintained on aspirin, Plavix, and Lipitor.  He did have noninvasive testing performed today, which I did review in office.     Review of Systems   Constitutional: Negative.    HENT: Negative.     Eyes: Negative.    Respiratory:  Positive for shortness of breath (on exertion).    Cardiovascular: Negative.    Gastrointestinal: Negative.    Endocrine: Negative.    Genitourinary: Negative.    Musculoskeletal: Negative.    Skin: Negative.    Allergic/Immunologic: Negative.    Neurological: Negative.    Hematological: Negative.    Psychiatric/Behavioral: Negative.     All other systems reviewed and are negative.       /74   Pulse 114   Ht 177.8 cm (70\")   Wt 78 kg (172 lb)   SpO2 98%   BMI 24.68 kg/m²   Physical Exam  Vitals and nursing note reviewed.   Constitutional:       General: He is not in acute distress.     Appearance: Normal appearance. He is well-developed and normal weight. He is not diaphoretic.   HENT:      Head: Normocephalic and atraumatic.   Neck:      Vascular: No carotid bruit or JVD.   Cardiovascular:      Rate and " Rhythm: Normal rate and regular rhythm.      Pulses: Normal pulses.           Femoral pulses are 2+ on the right side and 2+ on the left side.       Popliteal pulses are 2+ on the right side and 2+ on the left side.        Dorsalis pedis pulses are 2+ on the right side and 2+ on the left side.        Posterior tibial pulses are 2+ on the right side and 2+ on the left side.      Heart sounds: Normal heart sounds, S1 normal and S2 normal. No murmur heard.     No friction rub. No gallop.   Pulmonary:      Effort: Pulmonary effort is normal.      Breath sounds: Normal breath sounds.   Abdominal:      General: Bowel sounds are normal. There is no abdominal bruit.      Palpations: Abdomen is soft.      Tenderness: There is no abdominal tenderness.      Comments: Urostomy, colostomy   Musculoskeletal:         General: Normal range of motion.   Skin:     General: Skin is warm and dry.   Neurological:      General: No focal deficit present.      Mental Status: He is alert and oriented to person, place, and time.      Cranial Nerves: No cranial nerve deficit.   Psychiatric:         Mood and Affect: Mood normal.         Behavior: Behavior normal.         Thought Content: Thought content normal.         Judgment: Judgment normal.       Diagnostic data:  Noninvasive testing including a carotid duplex shows 50 to 69% right carotid stenosis with a patent right carotid stent and bilateral antegrade vertebral flow.            Patient Active Problem List   Diagnosis    Rectal cancer    Current every day smoker    Impaired gait and mobility    2nd degree AV block    Tobacco abuse    Normocytic anemia    Chemotherapy induced neutropenia    Cancer related pain    Carotid artery stenosis, symptomatic, right    Carotid occlusion, left    Type 2 diabetes mellitus without complication, without long-term current use of insulin    Iron deficiency anemia    Ileostomy in place    Colostomy in place    Function kidney decreased    Stage 3a  chronic kidney disease    Preop testing    Symptomatic stenosis of right carotid artery    Stenosis of right carotid artery        Diagnosis Plan   1. Stenosis of right carotid artery  US Carotid Bilateral      2. Tobacco abuse        3. Carotid occlusion, left  US Carotid Bilateral      4. Mixed hyperlipidemia        5. Type 2 diabetes mellitus without complication, without long-term current use of insulin                Plan: After thoroughly evaluating Attila Viramontes, I believe the best course of action is to remain conservative from vascular surgery standpoint.  Currently he is doing well and denies any strokelike symptoms.  I did review his testing which shows 50 to 69% right carotid stenosis with patent right carotid stent and a known left carotid occlusion.  We will see him back in 6 months with repeat noninvasive testing including a carotid duplex for continued surveillance.  I did discuss vascular risk factors as they pertain to the progression of vascular disease including controlling his hyperlipidemia.  He should continue his aspirin 81 mg daily, Plavix 75 mg daily, and Lipitor 20 mg daily in addition to his other medications.  This was all discussed in full with complete understanding.  Thank you for allowing me to participate in the care of your patient.  Please do not hesitate to call with any questions or concerns.  We will keep you aware of any further encounters with Attila Viramontes.        Sincerely yours,         DELANEY Keith D Ryan, MD

## 2024-05-01 NOTE — LETTER
May 1, 2024       No Recipients    Patient: Attila Viramontes   YOB: 1950   Date of Visit: 5/1/2024     Dear EDUARDO Hebert MD:       Thank you for referring Attila Viramontes to me for evaluation. Below are the relevant portions of my assessment and plan of care.    If you have questions, please do not hesitate to call me. I look forward to following Attila along with you.         Sincerely,        Elsie DELANEY Quintero        CC:   No Recipients    Elsie QuinteroDELANEY  05/01/24 1428  Sign when Signing Visit  5/1/2024       EDUARDO Hebert MD  [address] [address] [address]  [facility] [address]    Attila Viramontes  1950    Chief Complaint   Patient presents with   • Follow-up     6 month follow up w/ testing. Last seen 11/3/23. Patient denies any stroke type symptoms.        Dear EDUARDO Hebert MD   HPI  I had the pleasure of seeing your patient Attila Viramontes in the office today.  As you recall, Attila Viramontes is a 73 y.o.  male you are following for routine health maintenance.  In 2020, he was transferred to [place] after onset of right sided hemiparesis, global aphasia, and right lower facial drooping.  He was found to have a left carotid occlusion.  He did undergo right TCAR on 10/16/2023.  Currently he is doing well and denies any strokelike symptoms.  He is having some dyspnea on exertion and will be undergoing a Alem scan next week.  He is maintained on aspirin, Plavix, and Lipitor.  He did have noninvasive testing performed today, which I did review in office.     Review of Systems   Constitutional: Negative.    HENT: Negative.     Eyes: Negative.    Respiratory:  Positive for shortness of breath (on exertion).    Cardiovascular: Negative.    Gastrointestinal: Negative.    Endocrine: Negative.    Genitourinary: Negative.    Musculoskeletal: Negative.    Skin: Negative.    Allergic/Immunologic: Negative.    Neurological: Negative.   "  Hematological: Negative.    Psychiatric/Behavioral: Negative.     All other systems reviewed and are negative.       /74   Pulse 114   Ht 177.8 cm (70\")   Wt 78 kg (172 lb)   SpO2 98%   BMI 24.68 kg/m²   Physical Exam  Vitals and nursing note reviewed.   Constitutional:       General: He is not in acute distress.     Appearance: Normal appearance. He is well-developed and normal weight. He is not diaphoretic.   HENT:      Head: Normocephalic and atraumatic.   Neck:      Vascular: No carotid bruit or JVD.   Cardiovascular:      Rate and Rhythm: Normal rate and regular rhythm.      Pulses: Normal pulses.           Femoral pulses are 2+ on the right side and 2+ on the left side.       Popliteal pulses are 2+ on the right side and 2+ on the left side.        Dorsalis pedis pulses are 2+ on the right side and 2+ on the left side.        Posterior tibial pulses are 2+ on the right side and 2+ on the left side.      Heart sounds: Normal heart sounds, S1 normal and S2 normal. No murmur heard.     No friction rub. No gallop.   Pulmonary:      Effort: Pulmonary effort is normal.      Breath sounds: Normal breath sounds.   Abdominal:      General: Bowel sounds are normal. There is no abdominal bruit.      Palpations: Abdomen is soft.      Tenderness: There is no abdominal tenderness.      Comments: Urostomy, colostomy   Musculoskeletal:         General: Normal range of motion.   Skin:     General: Skin is warm and dry.   Neurological:      General: No focal deficit present.      Mental Status: He is alert and oriented to person, place, and time.      Cranial Nerves: No cranial nerve deficit.   Psychiatric:         Mood and Affect: Mood normal.         Behavior: Behavior normal.         Thought Content: Thought content normal.         Judgment: Judgment normal.       Diagnostic data:  Noninvasive testing including a carotid duplex shows 50 to 69% right carotid stenosis with a patent right carotid stent and " bilateral antegrade vertebral flow.            Patient Active Problem List   Diagnosis   • Rectal cancer   • Current every day smoker   • Impaired gait and mobility   • 2nd degree AV block   • Tobacco abuse   • Normocytic anemia   • Chemotherapy induced neutropenia   • Cancer related pain   • Carotid artery stenosis, symptomatic, right   • Carotid occlusion, left   • Type 2 diabetes mellitus without complication, without long-term current use of insulin   • Iron deficiency anemia   • Ileostomy in place   • Colostomy in place   • Function kidney decreased   • Stage 3a chronic kidney disease   • Preop testing   • Symptomatic stenosis of right carotid artery   • Stenosis of right carotid artery        Diagnosis Plan   1. Stenosis of right carotid artery  US Carotid Bilateral      2. Tobacco abuse        3. Carotid occlusion, left  US Carotid Bilateral      4. Mixed hyperlipidemia        5. Type 2 diabetes mellitus without complication, without long-term current use of insulin                Plan: After thoroughly evaluating Attila Viramontes, I believe the best course of action is to remain conservative from vascular surgery standpoint.  Currently he is doing well and denies any strokelike symptoms.  I did review his testing which shows 50 to 69% right carotid stenosis with patent right carotid stent and a known left carotid occlusion.  We will see him back in 6 months with repeat noninvasive testing including a carotid duplex for continued surveillance.  I did discuss vascular risk factors as they pertain to the progression of vascular disease including controlling his hyperlipidemia.  He should continue his aspirin 81 mg daily, Plavix 75 mg daily, and Lipitor 20 mg daily in addition to his other medications.  This was all discussed in full with complete understanding.  Thank you for allowing me to participate in the care of your patient.  Please do not hesitate to call with any questions or concerns.  We will keep  you aware of any further encounters with Attila Viramontes.        Sincerely yours,         DELANEY Keith D Ryan, MD

## 2024-05-02 ENCOUNTER — OFFICE VISIT (OUTPATIENT)
Dept: INTERNAL MEDICINE | Facility: CLINIC | Age: 74
End: 2024-05-02
Payer: MEDICARE

## 2024-05-02 ENCOUNTER — LAB (OUTPATIENT)
Dept: LAB | Facility: HOSPITAL | Age: 74
End: 2024-05-02
Payer: MEDICARE

## 2024-05-02 VITALS
HEART RATE: 101 BPM | HEIGHT: 70 IN | TEMPERATURE: 97.3 F | WEIGHT: 175.8 LBS | OXYGEN SATURATION: 99 % | BODY MASS INDEX: 25.17 KG/M2 | SYSTOLIC BLOOD PRESSURE: 120 MMHG | DIASTOLIC BLOOD PRESSURE: 70 MMHG

## 2024-05-02 DIAGNOSIS — E66.3 OVERWEIGHT (BMI 25.0-29.9): ICD-10-CM

## 2024-05-02 DIAGNOSIS — N18.31 STAGE 3A CHRONIC KIDNEY DISEASE: ICD-10-CM

## 2024-05-02 DIAGNOSIS — E11.9 TYPE 2 DIABETES MELLITUS WITHOUT COMPLICATION, WITHOUT LONG-TERM CURRENT USE OF INSULIN: ICD-10-CM

## 2024-05-02 DIAGNOSIS — Z00.00 MEDICARE ANNUAL WELLNESS VISIT, SUBSEQUENT: ICD-10-CM

## 2024-05-02 DIAGNOSIS — Z13.6 HYPERTENSION SCREEN: ICD-10-CM

## 2024-05-02 DIAGNOSIS — Z91.81 AT LOW RISK FOR FALL: ICD-10-CM

## 2024-05-02 DIAGNOSIS — Z00.00 MEDICARE ANNUAL WELLNESS VISIT, SUBSEQUENT: Primary | ICD-10-CM

## 2024-05-02 DIAGNOSIS — Z72.0 TOBACCO ABUSE: ICD-10-CM

## 2024-05-02 DIAGNOSIS — Z93.2 ILEOSTOMY IN PLACE: ICD-10-CM

## 2024-05-02 DIAGNOSIS — Z93.3 COLOSTOMY IN PLACE: ICD-10-CM

## 2024-05-02 LAB
ALBUMIN SERPL-MCNC: 4.1 G/DL (ref 3.5–5.2)
ALBUMIN UR-MCNC: 8.5 MG/DL
ALBUMIN/GLOB SERPL: 1 G/DL
ALP SERPL-CCNC: 125 U/L (ref 39–117)
ALT SERPL W P-5'-P-CCNC: 32 U/L (ref 1–41)
ANION GAP SERPL CALCULATED.3IONS-SCNC: 12 MMOL/L (ref 5–15)
AST SERPL-CCNC: 21 U/L (ref 1–40)
BILIRUB SERPL-MCNC: 0.2 MG/DL (ref 0–1.2)
BUN SERPL-MCNC: 22 MG/DL (ref 8–23)
BUN/CREAT SERPL: 11.8 (ref 7–25)
CALCIUM SPEC-SCNC: 9.5 MG/DL (ref 8.6–10.5)
CHLORIDE SERPL-SCNC: 102 MMOL/L (ref 98–107)
CHOLEST SERPL-MCNC: 94 MG/DL (ref 0–200)
CO2 SERPL-SCNC: 23 MMOL/L (ref 22–29)
CREAT SERPL-MCNC: 1.86 MG/DL (ref 0.76–1.27)
CREAT UR-MCNC: 95.4 MG/DL
DEPRECATED RDW RBC AUTO: 55.8 FL (ref 37–54)
EGFRCR SERPLBLD CKD-EPI 2021: 38 ML/MIN/1.73
ERYTHROCYTE [DISTWIDTH] IN BLOOD BY AUTOMATED COUNT: 16 % (ref 12.3–15.4)
GLOBULIN UR ELPH-MCNC: 4 GM/DL
GLUCOSE SERPL-MCNC: 185 MG/DL (ref 65–99)
HBA1C MFR BLD: 7.4 % (ref 4.8–5.6)
HCT VFR BLD AUTO: 38 % (ref 37.5–51)
HDLC SERPL-MCNC: 29 MG/DL (ref 40–60)
HGB BLD-MCNC: 12.1 G/DL (ref 13–17.7)
LDLC SERPL CALC-MCNC: 40 MG/DL (ref 0–100)
LDLC/HDLC SERPL: 1.25 {RATIO}
MCH RBC QN AUTO: 30.3 PG (ref 26.6–33)
MCHC RBC AUTO-ENTMCNC: 31.8 G/DL (ref 31.5–35.7)
MCV RBC AUTO: 95 FL (ref 79–97)
MICROALBUMIN/CREAT UR: 89.1 MG/G (ref 0–29)
PLATELET # BLD AUTO: 664 10*3/MM3 (ref 140–450)
PMV BLD AUTO: 8.3 FL (ref 6–12)
POTASSIUM SERPL-SCNC: 4.9 MMOL/L (ref 3.5–5.2)
PROT SERPL-MCNC: 8.1 G/DL (ref 6–8.5)
RBC # BLD AUTO: 4 10*6/MM3 (ref 4.14–5.8)
SODIUM SERPL-SCNC: 137 MMOL/L (ref 136–145)
TRIGL SERPL-MCNC: 144 MG/DL (ref 0–150)
VLDLC SERPL-MCNC: 25 MG/DL (ref 5–40)
WBC NRBC COR # BLD AUTO: 7.63 10*3/MM3 (ref 3.4–10.8)

## 2024-05-02 PROCEDURE — 1170F FXNL STATUS ASSESSED: CPT | Performed by: INTERNAL MEDICINE

## 2024-05-02 PROCEDURE — 80061 LIPID PANEL: CPT

## 2024-05-02 PROCEDURE — 82570 ASSAY OF URINE CREATININE: CPT

## 2024-05-02 PROCEDURE — 83036 HEMOGLOBIN GLYCOSYLATED A1C: CPT

## 2024-05-02 PROCEDURE — 3051F HG A1C>EQUAL 7.0%<8.0%: CPT | Performed by: INTERNAL MEDICINE

## 2024-05-02 PROCEDURE — G0439 PPPS, SUBSEQ VISIT: HCPCS | Performed by: INTERNAL MEDICINE

## 2024-05-02 PROCEDURE — 82043 UR ALBUMIN QUANTITATIVE: CPT

## 2024-05-02 PROCEDURE — 36415 COLL VENOUS BLD VENIPUNCTURE: CPT

## 2024-05-02 PROCEDURE — 80053 COMPREHEN METABOLIC PANEL: CPT

## 2024-05-02 PROCEDURE — 85027 COMPLETE CBC AUTOMATED: CPT

## 2024-05-02 NOTE — PROGRESS NOTES
The ABCs of the Annual Wellness Visit  Subsequent Medicare Wellness Visit    Subjective      Attila Viramontes is a 73 y.o. male who presents for a Subsequent Medicare Wellness Visit.    Just got over a urinary tract infection.  He had a hard time eating during the infection and lost about 20 pounds.  He is feeling much better now.    Otherwise, he denies any new issues or complaints.    He saw vascular surgery yesterday for his carotid artery stenosis and is recommended to continue dual antiplatelets and statin.  He was recommended for conservative treatment.      The following portions of the patient's history were reviewed and   updated as appropriate: allergies, current medications, past family history, past medical history, past social history, past surgical history, and problem list.    Compared to one year ago, the patient feels his physical   health is worse.    Compared to one year ago, the patient feels his mental   health is the same.    Recent Hospitalizations:  This patient has had a [facility] [place] admission record on file within the last 365 days.    Current Medical Providers:  Patient Care Team:  EDUARDO Hebert MD as PCP - General (Internal Medicine)  Ga Hameed MD as Cardiologist (Cardiology)    Outpatient Medications Prior to Visit   Medication Sig Dispense Refill    aspirin 81 MG EC tablet Take 1 tablet by mouth Daily.      atorvastatin (LIPITOR) 20 MG tablet Take 1 tablet by mouth Daily.      clopidogrel (Plavix) 75 MG tablet Take 1 tablet by mouth Daily. Begin taking 1 week prior to surgery 30 tablet 2    metFORMIN (GLUCOPHAGE) 500 MG tablet Take 2 tablets by mouth 2 (Two) Times a Day With Meals. 120 tablet 11     No facility-administered medications prior to visit.       No opioid medication identified on active medication list. I have reviewed chart for other potential  high risk medication/s and harmful drug interactions in the elderly.        Aspirin is on active medication  "list. Aspirin use is indicated based on review of current medical condition/s. Pros and cons of this therapy have been discussed today. Benefits of this medication outweigh potential harm.  Patient has been encouraged to continue taking this medication.  .      Patient Active Problem List   Diagnosis    Rectal cancer    Current every day smoker    Impaired gait and mobility    2nd degree AV block    Tobacco abuse    Normocytic anemia    Chemotherapy induced neutropenia    Cancer related pain    Carotid artery stenosis, symptomatic, right    Carotid occlusion, left    Type 2 diabetes mellitus without complication, without long-term current use of insulin    Iron deficiency anemia    Ileostomy in place    Colostomy in place    Function kidney decreased    Stage 3a chronic kidney disease    Preop testing    Symptomatic stenosis of right carotid artery    Stenosis of right carotid artery    Overweight (BMI 25.0-29.9)     Advance Care Planning   Advance Care Planning     Advance Directive is not on file.  ACP discussion was held with the patient during this visit. Patient does not have an advance directive, declines further assistance.     Objective    Vitals:    05/02/24 1037   BP: 120/70   BP Location: Left arm   Patient Position: Sitting   Cuff Size: Adult   Pulse: 101   Temp: 97.3 °F (36.3 °C)   TempSrc: Temporal   SpO2: 99%   Weight: 79.7 kg (175 lb 12.8 oz)   Height: 177.8 cm (70\")     Estimated body mass index is 25.22 kg/m² as calculated from the following:    Height as of this encounter: 177.8 cm (70\").    Weight as of this encounter: 79.7 kg (175 lb 12.8 oz).    BMI is >= 25 and <30. (Overweight) The following options were offered after discussion;: weight loss educational material (shared in after visit summary) and exercise counseling/recommendations      Does the patient have evidence of cognitive impairment?   No    Lab Results   Component Value Date    TRIG 144 05/02/2024    HDL 29 (L) 05/02/2024    LDL 40 " 2024    VLDL 25 2024    HGBA1C 7.40 (H) 2024          HEALTH RISK ASSESSMENT    Smoking Status:  Social History     Tobacco Use   Smoking Status Some Days    Current packs/day: 0.25    Average packs/day: 0.3 packs/day for 53.0 years (13.3 ttl pk-yrs)    Types: Cigars, Cigarettes   Smokeless Tobacco Never   Tobacco Comments    2 cigarettes a day     Alcohol Consumption:  Social History     Substance and Sexual Activity   Alcohol Use Not Currently     Fall Risk Screen:    STEADI Fall Risk Assessment was completed, and patient is at LOW risk for falls.Assessment completed on:2024    Depression Screenin/2/2024    10:37 AM   PHQ-2/PHQ-9 Depression Screening   Little Interest or Pleasure in Doing Things 0-->not at all   Feeling Down, Depressed or Hopeless 0-->not at all   PHQ-9: Brief Depression Severity Measure Score 0       Health Habits and Functional and Cognitive Screenin/1/2024     9:26 AM   Functional & Cognitive Status   Do you have difficulty preparing food and eating? No   Do you have difficulty bathing yourself, getting dressed or grooming yourself? No   Do you have difficulty using the toilet? No   Do you have difficulty moving around from place to place? No   Do you have trouble with steps or getting out of a bed or a chair? No   Current Diet Well Balanced Diet   Dental Exam Other   Eye Exam Other   Exercise (times per week) 10 times per week   Current Exercises Include House Cleaning;Yard Work   Do you need help using the phone?  No   Are you deaf or do you have serious difficulty hearing?  No   Do you need help to go to places out of walking distance? No   Do you need help shopping? No   Do you need help preparing meals?  No   Do you need help with housework?  No   Do you need help with laundry? No   Do you need help taking your medications? No   Do you need help managing money? No   Do you ever drive or ride in a car without wearing a seat belt? No   Have you felt  unusual stress, anger or loneliness in the last month? No   Who do you live with? Alone   If you need help, do you have trouble finding someone available to you? No   Have you been bothered in the last four weeks by sexual problems? No   Do you have difficulty concentrating, remembering or making decisions? No       Age-appropriate Screening Schedule:  Refer to the list below for future screening recommendations based on patient's age, sex and/or medical conditions. Orders for these recommended tests are listed in the plan section. The patient has been provided with a written plan.    Health Maintenance   Topic Date Due    DIABETIC EYE EXAM  Never done    TDAP/TD VACCINES (1 - Tdap) Never done    ZOSTER VACCINE (1 of 2) Never done    RSV Vaccine - Adults (1 - 1-dose 60+ series) Never done    DIABETIC FOOT EXAM  Never done    COVID-19 Vaccine (5 - 2023-24 season) 09/01/2023    ANNUAL WELLNESS VISIT  05/01/2024    URINE MICROALBUMIN  05/01/2024    INFLUENZA VACCINE  08/01/2024    HEMOGLOBIN A1C  11/02/2024    LIPID PANEL  05/02/2025    BMI FOLLOWUP  05/02/2025    HEPATITIS C SCREENING  Completed    Pneumococcal Vaccine 65+  Completed    AAA SCREEN (ONE-TIME)  Completed    COLONOSCOPY  Discontinued    LUNG CANCER SCREENING  Discontinued                  CMS Preventative Services Quick Reference  Risk Factors Identified During Encounter:    Immunizations Discussed/Encouraged: Shingrix, COVID19, and RSV (Respiratory Syncytial Virus)    The above risks/problems have been discussed with the patient.  Pertinent information has been shared with the patient in the After Visit Summary.    Diagnoses and all orders for this visit:    1. Medicare annual wellness visit, subsequent (Primary)  -     CBC (No Diff); Future  -     Comprehensive Metabolic Panel; Future  -     Hemoglobin A1c; Future  -     Lipid Panel; Future    2. Hypertension screen    3. At low risk for fall    4. Type 2 diabetes mellitus without complication, without  long-term current use of insulin  -     Hemoglobin A1c; Future  -     Microalbumin / Creatinine Urine Ratio - Urine, Clean Catch; Future    5. Stage 3a chronic kidney disease  -     Comprehensive Metabolic Panel; Future    6. Colostomy in place    7. Ileostomy in place    8. Overweight (BMI 25.0-29.9)    9. Tobacco abuse      Attila smokes 1 cigar/day.  He is not ready to quit.    Attila Viramontes  reports that he has been smoking cigars and cigarettes. He has a 13.3 pack-year smoking history. He has never used smokeless tobacco. I have educated him on the risk of diseases from using tobacco products such as cancer, COPD, and heart disease.     I advised him to quit and he is not willing to quit.    I spent 3  minutes counseling the patient.      Follow Up:   6 months recheck A1c Medicare Wellness visit to be scheduled in 1 year.      An After Visit Summary and PPPS were made available to the patient.    EDUARDO Hebert MD  05/02/2024  Electronically signed by EDUARDO Hebert MD  05/02/24, 12:53 PM CDT

## 2024-05-07 DIAGNOSIS — N18.31 STAGE 3A CHRONIC KIDNEY DISEASE: Primary | ICD-10-CM

## 2024-05-13 ENCOUNTER — HOSPITAL ENCOUNTER (OUTPATIENT)
Dept: CARDIOLOGY | Facility: HOSPITAL | Age: 74
Discharge: HOME | End: 2024-05-13
Payer: MEDICARE

## 2024-05-13 VITALS
BODY MASS INDEX: 24.62 KG/M2 | SYSTOLIC BLOOD PRESSURE: 105 MMHG | DIASTOLIC BLOOD PRESSURE: 51 MMHG | WEIGHT: 172 LBS | HEIGHT: 70 IN | HEART RATE: 62 BPM

## 2024-05-13 DIAGNOSIS — I99.9 VASCULAR DISEASE: ICD-10-CM

## 2024-05-13 DIAGNOSIS — R06.09 DOE (DYSPNEA ON EXERTION): ICD-10-CM

## 2024-05-13 PROCEDURE — 0 TECHNETIUM TETROFOSMIN KIT: Performed by: INTERNAL MEDICINE

## 2024-05-13 PROCEDURE — 25010000002 REGADENOSON 0.4 MG/5ML SOLUTION: Performed by: INTERNAL MEDICINE

## 2024-05-13 PROCEDURE — A9502 TC99M TETROFOSMIN: HCPCS | Performed by: INTERNAL MEDICINE

## 2024-05-13 PROCEDURE — 78452 HT MUSCLE IMAGE SPECT MULT: CPT

## 2024-05-13 PROCEDURE — 93017 CV STRESS TEST TRACING ONLY: CPT

## 2024-05-13 RX ORDER — REGADENOSON 0.08 MG/ML
0.4 INJECTION, SOLUTION INTRAVENOUS ONCE
Status: COMPLETED | OUTPATIENT
Start: 2024-05-13 | End: 2024-05-13

## 2024-05-13 RX ADMIN — REGADENOSON 0.4 MG: 0.08 INJECTION, SOLUTION INTRAVENOUS at 11:05

## 2024-05-13 RX ADMIN — TETROFOSMIN 1 DOSE: 1.38 INJECTION, POWDER, LYOPHILIZED, FOR SOLUTION INTRAVENOUS at 11:15

## 2024-05-13 RX ADMIN — TETROFOSMIN 1 DOSE: 1.38 INJECTION, POWDER, LYOPHILIZED, FOR SOLUTION INTRAVENOUS at 09:45

## 2024-05-14 LAB
BH CV NUCLEAR PRIOR STUDY: 3
BH CV REST NUCLEAR ISOTOPE DOSE: 11.4 MCI
BH CV STRESS BP STAGE 1: NORMAL
BH CV STRESS COMMENTS STAGE 1: NORMAL
BH CV STRESS DOSE REGADENOSON STAGE 1: 0.4
BH CV STRESS DURATION MIN STAGE 1: 0
BH CV STRESS DURATION SEC STAGE 1: 10
BH CV STRESS HR STAGE 1: 86
BH CV STRESS NUCLEAR ISOTOPE DOSE: 35.3 MCI
BH CV STRESS PROTOCOL 1: NORMAL
BH CV STRESS RECOVERY BP: NORMAL MMHG
BH CV STRESS RECOVERY HR: 83 BPM
BH CV STRESS STAGE 1: 1
MAXIMAL PREDICTED HEART RATE: 148 BPM
PERCENT MAX PREDICTED HR: 58.11 %
STRESS BASELINE BP: NORMAL MMHG
STRESS BASELINE HR: 63 BPM
STRESS PERCENT HR: 68 %
STRESS POST EXERCISE DUR MIN: 0 MIN
STRESS POST EXERCISE DUR SEC: 10 SEC
STRESS POST PEAK BP: NORMAL MMHG
STRESS POST PEAK HR: 86 BPM
STRESS TARGET HR: 126 BPM

## 2024-05-23 ENCOUNTER — PREP FOR SURGERY (OUTPATIENT)
Dept: OTHER | Facility: HOSPITAL | Age: 74
End: 2024-05-23
Payer: MEDICARE

## 2024-05-23 DIAGNOSIS — R94.39 ABNORMAL STRESS TEST: Primary | ICD-10-CM

## 2024-05-23 DIAGNOSIS — R06.09 DOE (DYSPNEA ON EXERTION): ICD-10-CM

## 2024-05-23 RX ORDER — SODIUM CHLORIDE 9 MG/ML
40 INJECTION, SOLUTION INTRAVENOUS AS NEEDED
OUTPATIENT
Start: 2024-05-23

## 2024-05-23 RX ORDER — SODIUM CHLORIDE 0.9 % (FLUSH) 0.9 %
3 SYRINGE (ML) INJECTION EVERY 12 HOURS SCHEDULED
OUTPATIENT
Start: 2024-05-23

## 2024-05-23 RX ORDER — ASPIRIN 81 MG/1
81 TABLET ORAL DAILY
OUTPATIENT
Start: 2024-05-24

## 2024-05-23 RX ORDER — ASPIRIN 81 MG/1
324 TABLET, CHEWABLE ORAL ONCE
OUTPATIENT
Start: 2024-05-23 | End: 2024-05-23

## 2024-05-23 RX ORDER — SODIUM CHLORIDE 0.9 % (FLUSH) 0.9 %
10 SYRINGE (ML) INJECTION AS NEEDED
OUTPATIENT
Start: 2024-05-23

## 2024-06-12 ENCOUNTER — HOSPITAL ENCOUNTER (OUTPATIENT)
Facility: HOSPITAL | Age: 74
Setting detail: HOSPITAL OUTPATIENT SURGERY
Discharge: HOME | End: 2024-06-12
Attending: INTERNAL MEDICINE | Admitting: INTERNAL MEDICINE
Payer: MEDICARE

## 2024-06-12 VITALS
SYSTOLIC BLOOD PRESSURE: 122 MMHG | HEART RATE: 70 BPM | HEIGHT: 71 IN | TEMPERATURE: 97.4 F | BODY MASS INDEX: 25.23 KG/M2 | DIASTOLIC BLOOD PRESSURE: 65 MMHG | RESPIRATION RATE: 13 BRPM | WEIGHT: 180.2 LBS | OXYGEN SATURATION: 100 %

## 2024-06-12 DIAGNOSIS — R94.39 ABNORMAL STRESS TEST: ICD-10-CM

## 2024-06-12 DIAGNOSIS — I25.118 CORONARY ARTERY DISEASE OF NATIVE ARTERY OF NATIVE HEART WITH STABLE ANGINA PECTORIS: Primary | ICD-10-CM

## 2024-06-12 DIAGNOSIS — R06.09 DOE (DYSPNEA ON EXERTION): ICD-10-CM

## 2024-06-12 LAB
ANION GAP SERPL CALCULATED.3IONS-SCNC: 10 MMOL/L (ref 5–15)
BUN SERPL-MCNC: 27 MG/DL (ref 8–23)
BUN/CREAT SERPL: 16.8 (ref 7–25)
CALCIUM SPEC-SCNC: 9.2 MG/DL (ref 8.6–10.5)
CHLORIDE SERPL-SCNC: 99 MMOL/L (ref 98–107)
CO2 SERPL-SCNC: 25 MMOL/L (ref 22–29)
CREAT SERPL-MCNC: 1.61 MG/DL (ref 0.76–1.27)
DEPRECATED RDW RBC AUTO: 55 FL (ref 37–54)
EGFRCR SERPLBLD CKD-EPI 2021: 45.2 ML/MIN/1.73
ERYTHROCYTE [DISTWIDTH] IN BLOOD BY AUTOMATED COUNT: 16 % (ref 12.3–15.4)
GLUCOSE SERPL-MCNC: 167 MG/DL (ref 65–99)
HCT VFR BLD AUTO: 38.7 % (ref 37.5–51)
HGB BLD-MCNC: 12.4 G/DL (ref 13–17.7)
MCH RBC QN AUTO: 30.4 PG (ref 26.6–33)
MCHC RBC AUTO-ENTMCNC: 32 G/DL (ref 31.5–35.7)
MCV RBC AUTO: 94.9 FL (ref 79–97)
PLATELET # BLD AUTO: 345 10*3/MM3 (ref 140–450)
PMV BLD AUTO: 8.5 FL (ref 6–12)
POTASSIUM SERPL-SCNC: 4.6 MMOL/L (ref 3.5–5.2)
RBC # BLD AUTO: 4.08 10*6/MM3 (ref 4.14–5.8)
SODIUM SERPL-SCNC: 134 MMOL/L (ref 136–145)
WBC NRBC COR # BLD AUTO: 11.15 10*3/MM3 (ref 3.4–10.8)

## 2024-06-12 PROCEDURE — 25010000002 MIDAZOLAM HCL (PF) 5 MG/5ML SOLUTION: Performed by: INTERNAL MEDICINE

## 2024-06-12 PROCEDURE — 99152 MOD SED SAME PHYS/QHP 5/>YRS: CPT | Performed by: INTERNAL MEDICINE

## 2024-06-12 PROCEDURE — 25010000002 HEPARIN (PORCINE) 1000-0.9 UT/500ML-% SOLUTION: Performed by: INTERNAL MEDICINE

## 2024-06-12 PROCEDURE — 25010000002 FENTANYL CITRATE (PF) 50 MCG/ML SOLUTION: Performed by: INTERNAL MEDICINE

## 2024-06-12 PROCEDURE — 25510000001 IOPAMIDOL 61 % SOLUTION: Performed by: INTERNAL MEDICINE

## 2024-06-12 PROCEDURE — C1894 INTRO/SHEATH, NON-LASER: HCPCS | Performed by: INTERNAL MEDICINE

## 2024-06-12 PROCEDURE — 93458 L HRT ARTERY/VENTRICLE ANGIO: CPT | Performed by: INTERNAL MEDICINE

## 2024-06-12 PROCEDURE — 85027 COMPLETE CBC AUTOMATED: CPT | Performed by: INTERNAL MEDICINE

## 2024-06-12 PROCEDURE — C1760 CLOSURE DEV, VASC: HCPCS | Performed by: INTERNAL MEDICINE

## 2024-06-12 PROCEDURE — 25010000002 HEPARIN (PORCINE) 2000-0.9 UNIT/L-% SOLUTION: Performed by: INTERNAL MEDICINE

## 2024-06-12 PROCEDURE — 99153 MOD SED SAME PHYS/QHP EA: CPT | Performed by: INTERNAL MEDICINE

## 2024-06-12 PROCEDURE — 80048 BASIC METABOLIC PNL TOTAL CA: CPT | Performed by: INTERNAL MEDICINE

## 2024-06-12 PROCEDURE — 25010000002 DIPHENHYDRAMINE PER 50 MG: Performed by: INTERNAL MEDICINE

## 2024-06-12 PROCEDURE — 25810000003 SODIUM CHLORIDE 0.9 % SOLUTION: Performed by: INTERNAL MEDICINE

## 2024-06-12 RX ORDER — SODIUM CHLORIDE 0.9 % (FLUSH) 0.9 %
3 SYRINGE (ML) INJECTION EVERY 12 HOURS SCHEDULED
Status: DISCONTINUED | OUTPATIENT
Start: 2024-06-12 | End: 2024-06-12 | Stop reason: HOSPADM

## 2024-06-12 RX ORDER — DIPHENHYDRAMINE HYDROCHLORIDE 50 MG/ML
INJECTION INTRAMUSCULAR; INTRAVENOUS
Status: DISCONTINUED | OUTPATIENT
Start: 2024-06-12 | End: 2024-06-12 | Stop reason: HOSPADM

## 2024-06-12 RX ORDER — ASPIRIN 81 MG/1
TABLET, CHEWABLE ORAL
Status: COMPLETED
Start: 2024-06-12 | End: 2024-06-12

## 2024-06-12 RX ORDER — HEPARIN SODIUM 200 [USP'U]/100ML
INJECTION, SOLUTION INTRAVENOUS
Status: DISCONTINUED | OUTPATIENT
Start: 2024-06-12 | End: 2024-06-12 | Stop reason: HOSPADM

## 2024-06-12 RX ORDER — NITROGLYCERIN 0.4 MG/1
0.4 TABLET SUBLINGUAL
Status: CANCELLED | OUTPATIENT
Start: 2024-06-12

## 2024-06-12 RX ORDER — SODIUM CHLORIDE 0.9 % (FLUSH) 0.9 %
10 SYRINGE (ML) INJECTION AS NEEDED
Status: DISCONTINUED | OUTPATIENT
Start: 2024-06-12 | End: 2024-06-12 | Stop reason: HOSPADM

## 2024-06-12 RX ORDER — ATORVASTATIN CALCIUM 40 MG/1
40 TABLET, FILM COATED ORAL DAILY
Qty: 90 TABLET | Refills: 3 | Status: SHIPPED | OUTPATIENT
Start: 2024-06-12

## 2024-06-12 RX ORDER — SODIUM CHLORIDE 9 MG/ML
40 INJECTION, SOLUTION INTRAVENOUS AS NEEDED
Status: DISCONTINUED | OUTPATIENT
Start: 2024-06-12 | End: 2024-06-12 | Stop reason: HOSPADM

## 2024-06-12 RX ORDER — ASPIRIN 81 MG/1
81 TABLET ORAL DAILY
Status: DISCONTINUED | OUTPATIENT
Start: 2024-06-13 | End: 2024-06-12 | Stop reason: HOSPADM

## 2024-06-12 RX ORDER — MIDAZOLAM HYDROCHLORIDE 5 MG/5ML
INJECTION, SOLUTION INTRAMUSCULAR; INTRAVENOUS
Status: DISCONTINUED | OUTPATIENT
Start: 2024-06-12 | End: 2024-06-12 | Stop reason: HOSPADM

## 2024-06-12 RX ORDER — FENTANYL CITRATE 50 UG/ML
INJECTION, SOLUTION INTRAMUSCULAR; INTRAVENOUS
Status: DISCONTINUED | OUTPATIENT
Start: 2024-06-12 | End: 2024-06-12 | Stop reason: HOSPADM

## 2024-06-12 RX ORDER — ASPIRIN 81 MG/1
324 TABLET, CHEWABLE ORAL ONCE
Status: COMPLETED | OUTPATIENT
Start: 2024-06-12 | End: 2024-06-12

## 2024-06-12 RX ORDER — RANOLAZINE 500 MG/1
500 TABLET, EXTENDED RELEASE ORAL 2 TIMES DAILY
Qty: 60 TABLET | Refills: 11 | Status: SHIPPED | OUTPATIENT
Start: 2024-06-12

## 2024-06-12 RX ORDER — ACETAMINOPHEN 325 MG/1
650 TABLET ORAL EVERY 4 HOURS PRN
Status: CANCELLED | OUTPATIENT
Start: 2024-06-12

## 2024-06-12 RX ORDER — SODIUM CHLORIDE 9 MG/ML
125 INJECTION, SOLUTION INTRAVENOUS CONTINUOUS
Status: CANCELLED | OUTPATIENT
Start: 2024-06-12 | End: 2024-06-12

## 2024-06-12 RX ORDER — LIDOCAINE HYDROCHLORIDE 20 MG/ML
INJECTION, SOLUTION INFILTRATION; PERINEURAL
Status: DISCONTINUED | OUTPATIENT
Start: 2024-06-12 | End: 2024-06-12 | Stop reason: HOSPADM

## 2024-06-12 RX ADMIN — SODIUM CHLORIDE 234 ML: 9 INJECTION, SOLUTION INTRAVENOUS at 07:21

## 2024-06-12 RX ADMIN — ASPIRIN 324 MG: 81 TABLET, CHEWABLE ORAL at 07:24

## 2024-06-12 RX ADMIN — ASPIRIN 81 MG CHEWABLE TABLET 324 MG: 81 TABLET CHEWABLE at 07:24

## 2024-06-12 NOTE — H&P
[facility] - CARDIOLOGY  HISTORY AND PHYSICAL    Date of Admission: 6/12/2024  Primary Care Physician: EDUARDO Hebert MD    Subjective     Chief Complaint: Abnormal stress test, exertional dyspnea    History of Present Illness    73-year-old last seen in office 3/22/2024.  He has known peripheral vascular disease, including prior carotid surgery.  He smokes cigars.  At his last routine visit, he did admit to chronic and somewhat limiting exertional dyspnea without chest discomfort.  Lexiscan done to investigate the symptoms was high risk for ischemia, so cardiac catheterization was recommended.    Review of Systems   Otherwise complete ROS reviewed and negative except as mentioned in the HPI.    Past Medical History:   Past Medical History:   Diagnosis Date    Arthritis     AV block     recent hx of long pauses, pt supposed to have pacemaker placed ASAP     Bright red rectal bleeding 04/16/2020    Carotid arterial disease     History of chemotherapy     History of radiation therapy 09/13/2020    Hyperlipidemia     Hypotension due to hypovolemia 09/18/2020    Irregular heart rate     Pancreatitis     Rectal cancer 04/09/2020    Stroke     had mini stroke while port was placed     Type 2 diabetes mellitus without complication, without long-term current use of insulin 11/20/2020       Past Surgical History:  Past Surgical History:   Procedure Laterality Date    ARM LESION/CYST EXCISION Left 06/15/2021    Procedure: WIDE EXCISION MELANOMA LEFT SHOULDER WITH SPLIT THICKNESS SKIN GRAFT AND SENTINEL LYMPH NODE BIOPSY;  Surgeon: Vielka Weller MD;  Location: [facility] OR;  Service: General;  Laterality: Left;    CAROTID ARTERY ANGIOPLASTY Right 10/2023    stent placed in artery    CATARACT EXTRACTION W/ INTRAOCULAR LENS  IMPLANT, BILATERAL      COLONOSCOPY N/A 04/02/2020    Procedure: COLONOSCOPY WITH ANESTHESIA;  Surgeon: Yanick Flowers DO;  Location: [facility] ENDOSCOPY;  Service: Gastroenterology;  Laterality: N/A;   pre: abnormal CT scan  post: rectal mass  PCP unknown    CYSTECTOMY      ILEOSTOMY      LYMPH NODE BIOPSY      MOUTH SURGERY      PROSTATECTOMY      RECTAL MASS EXCISION  12/21/2020    SENTINEL NODE BIOPSY Left 06/15/2021    Procedure: SENTINEL LYMPH NODE BIOPSY;  Surgeon: Vielka Weller MD;  Location: [facility] OR;  Service: General;  Laterality: Left;    VENOUS ACCESS DEVICE (PORT) INSERTION N/A 04/14/2020    Procedure: INSERTION VENOUS ACCESS DEVICE;  Surgeon: Vielka Weller MD;  Location:  [facility] OR;  Service: General;  Laterality: N/A;    VENOUS ACCESS DEVICE (PORT) INSERTION  07/2022    VENOUS ACCESS DEVICE (PORT) REMOVAL N/A 05/28/2021    Procedure: PORT REMOVAL, EXCISION OF NEOPLASM UNCERTAIN BEHAVIOR LEFT SHOULDER;  Surgeon: Vielka Weller MD;  Location:  [facility] OR;  Service: General;  Laterality: N/A;       Family History: family history includes Cancer in his mother; Heart attack in his brother; Heart disease in his father; Stroke in his father.    Social History:  reports that he has been smoking cigars and cigarettes. He has a 13.3 pack-year smoking history. He has never used smokeless tobacco. He reports that he does not currently use alcohol. He reports that he does not use drugs.    Medications:  Prior to Admission medications    Medication Sig Start Date End Date Taking? Authorizing Provider   aspirin 81 MG EC tablet Take 1 tablet by mouth Daily.   Yes ProviderSarah MD   atorvastatin (LIPITOR) 20 MG tablet Take 1 tablet by mouth Daily.   Yes ProviderSarah MD   clopidogrel (Plavix) 75 MG tablet Take 1 tablet by mouth Daily. Begin taking 1 week prior to surgery 9/5/23  Yes Elsie Quintero APRN   metFORMIN (GLUCOPHAGE) 500 MG tablet Take 2 tablets by mouth 2 (Two) Times a Day With Meals. 11/10/23  Yes EDUARDO Hebert MD     Allergies:  No Known Allergies    Objective     Vital Signs: /62 (BP Location: Right arm, Patient Position: Lying)   Pulse 68   Temp 97.4 °F (36.3  "°C) (Temporal)   Resp 16   Ht 179.1 cm (70.5\")   Wt 81.7 kg (180 lb 3.2 oz)   SpO2 100%   BMI 25.49 kg/m²     Vitals and nursing note reviewed.   Constitutional:       General: Not in acute distress.     Appearance: Not in distress.   Neck:      Vascular: No JVD or JVR. JVD normal.   Pulmonary:      Effort: Pulmonary effort is normal.      Breath sounds: Normal breath sounds.   Cardiovascular:      Normal rate. Regular rhythm.      Murmurs: There is no murmur.      No gallop.  No rub.   Pulses:     Intact distal pulses.   Edema:     Peripheral edema absent.   Skin:     General: Skin is warm and dry.   Neurological:      Mental Status: Alert, oriented to person, place, and time and oriented to person, place and time.         Results Reviewed:    Labs pending    Lexiscan: High risk for inferior ischemia  Assessment / Plan        Active Hospital Problems    Diagnosis     Abnormal stress test     HENDERSON (dyspnea on exertion)      I discussed cardiac catheterization, the procedure, risks (including bleeding, infection, vascular damage [including minor oozing, bruising, bleeding, and up to and including the need for vascular surgery], contrast reaction, renal failure, respiratory failure, heart attack, stroke, arrhythmia and even death), benefits, and alternatives and the patient has voiced understanding and is willing to proceed.        Code Status: Full     I discussed the patients findings and my recommendations with: Patient    Estimated length of stay: Unknown    Ga Hameed MD   06/12/24   07:42 CDT    "

## 2024-06-19 ENCOUNTER — OFFICE VISIT (OUTPATIENT)
Dept: CARDIAC SURGERY | Facility: CLINIC | Age: 74
End: 2024-06-19
Payer: MEDICARE

## 2024-06-19 VITALS
HEIGHT: 71 IN | OXYGEN SATURATION: 95 % | WEIGHT: 178.2 LBS | HEART RATE: 85 BPM | SYSTOLIC BLOOD PRESSURE: 115 MMHG | BODY MASS INDEX: 24.95 KG/M2 | DIASTOLIC BLOOD PRESSURE: 68 MMHG

## 2024-06-19 DIAGNOSIS — F17.200 CURRENT EVERY DAY SMOKER: ICD-10-CM

## 2024-06-19 DIAGNOSIS — N18.31 STAGE 3A CHRONIC KIDNEY DISEASE: ICD-10-CM

## 2024-06-19 DIAGNOSIS — C20 RECTAL CANCER: ICD-10-CM

## 2024-06-19 DIAGNOSIS — I25.118 CORONARY ARTERY DISEASE OF NATIVE ARTERY OF NATIVE HEART WITH STABLE ANGINA PECTORIS: Primary | ICD-10-CM

## 2024-06-19 NOTE — PROGRESS NOTES
Chief Complaint   Patient presents with    Coronary Artery Disease     New patient from Dr Hameed         Subjective     History of Present Illness  The patient is a 73-year-old male with a history of recurrent rectal adenocarcinoma, which was treated in 2020. The lesion pathology is moderately differentiated invasive adenocarcinoma as well as the finding on 04/2020, a noncalcified left upper lobe lung nodule. Staging suggested invasion of mesorectal fat and mesorectal lymphadenopathy as well as pre and post sacral soft tissue changes. He was advised neoadjuvant chemoradiotherapy and then underwent APR with end colostomy. He also had prostatectomy with ileal conduit creation and bilateral gracilis muscle flaps with perineal defect. There is still residual invasive tumor post treatment and his final pathology demonstrated was pT4b and C1. His last dose of chemotherapy was on 04/2025 with radiographic data suggesting recurrent rectal adenocarcinoma. Additional history includes history of CVA in 2020. The patient had preop planning for carotid surgery. Dr. Adam did see with the finding of increasing fatigue, declining performance measures, dyspnea on exertion, but no tramaine chest pain or tightness. His stress test was positive in the inferior wall territory. Left heart catheterization identified LAD, left circumflex and right coronary artery disease with right coronary artery occlusion and left to right collaterals. It is noted on the plan per Dr. Adam if the patient is deemed not a candidate for CABG, the mid LAD will be a reasonable target for PCI with the proximal circumflex to be considered but will be a greater complex intervention with elevated risk. He does not believe that the right coronary artery could be revascularized via percutaneous efforts. With the aforementioned, he presents today for this consideration of coronary artery bypass grafting.    The patient expresses dissatisfaction with Dr. Hameed about  open heart surgery, citing concerns about potential complications. He acknowledges the presence of cancer and is currently undergoing chemotherapy for suppression, with a decision to alter the treatment regimen due to perceived ineffectiveness. He expresses a desire to consult with Dr. Hameed regarding the possibility of stent placement. He recalls Dr. Hameed discussing his sister's queries. He acknowledges that his neck vessel was previously repaired by Dr. Hameed. He expresses a desire to schedule his next CT scan in 2 months. He recalls that Dr. Hameed initiated him on Ranexa, which he reports has not improved his shortness of breath. He denies experiencing chest pain.    Review of Systems   Constitutional:  Positive for activity change and fatigue. Negative for appetite change, chills, diaphoresis, fever and unexpected weight change.   HENT:  Negative for dental problem, hearing loss, nosebleeds, sore throat, trouble swallowing and voice change.    Eyes:  Negative for photophobia, redness and visual disturbance.   Respiratory:  Positive for shortness of breath. Negative for apnea, cough, chest tightness, wheezing and stridor.    Cardiovascular:  Negative for chest pain, palpitations and leg swelling.   Gastrointestinal:  Negative for abdominal distention, abdominal pain, blood in stool, constipation, diarrhea, nausea and vomiting.   Endocrine: Negative for cold intolerance, heat intolerance, polyphagia and polyuria.   Genitourinary:  Negative for decreased urine volume, difficulty urinating, dysuria, flank pain, frequency, hematuria and urgency.   Musculoskeletal:  Negative for arthralgias, back pain, gait problem, joint swelling, myalgias and neck pain.   Skin:  Negative for pallor, rash and wound.   Allergic/Immunologic: Negative for immunocompromised state.   Neurological:  Positive for weakness. Negative for dizziness, tremors, seizures, syncope, speech difficulty, light-headedness, numbness and headaches.    Hematological:  Bruises/bleeds easily.   Psychiatric/Behavioral:  Negative for confusion, sleep disturbance and suicidal ideas. The patient is not nervous/anxious.           Past Medical History:   Diagnosis Date    Arthritis     AV block     recent hx of long pauses, pt supposed to have pacemaker placed ASAP     Bright red rectal bleeding 04/16/2020    Carotid arterial disease     History of chemotherapy     History of radiation therapy 09/13/2020    Hyperlipidemia     Hypotension due to hypovolemia 09/18/2020    Irregular heart rate     Pancreatitis     Rectal cancer 04/09/2020    Stroke     had mini stroke while port was placed     Type 2 diabetes mellitus without complication, without long-term current use of insulin 11/20/2020     Past Surgical History:   Procedure Laterality Date    ARM LESION/CYST EXCISION Left 06/15/2021    Procedure: WIDE EXCISION MELANOMA LEFT SHOULDER WITH SPLIT THICKNESS SKIN GRAFT AND SENTINEL LYMPH NODE BIOPSY;  Surgeon: Vielka Weller MD;  Location: [facility] OR;  Service: General;  Laterality: Left;    CARDIAC CATHETERIZATION N/A 6/12/2024    Procedure: Coronary angiography;  Surgeon: Ga Hameed MD;  Location: [facility] CATH INVASIVE LOCATION;  Service: Cardiology;  Laterality: N/A;    CARDIAC CATHETERIZATION N/A 6/12/2024    Procedure: Percutaneous Coronary Intervention;  Surgeon: Ga Hameed MD;  Location: [facility] CATH INVASIVE LOCATION;  Service: Cardiology;  Laterality: N/A;    CAROTID ARTERY ANGIOPLASTY Right 10/2023    stent placed in artery    CATARACT EXTRACTION W/ INTRAOCULAR LENS  IMPLANT, BILATERAL      COLONOSCOPY N/A 04/02/2020    Procedure: COLONOSCOPY WITH ANESTHESIA;  Surgeon: Yanick Flowers DO;  Location: [facility] ENDOSCOPY;  Service: Gastroenterology;  Laterality: N/A;  pre: abnormal CT scan  post: rectal mass  PCP unknown    CYSTECTOMY      ILEOSTOMY      LYMPH NODE BIOPSY      MOUTH SURGERY      PROSTATECTOMY      RECTAL MASS EXCISION  12/21/2020     SENTINEL NODE BIOPSY Left 06/15/2021    Procedure: SENTINEL LYMPH NODE BIOPSY;  Surgeon: Vielka Weller MD;  Location:  [facility] OR;  Service: General;  Laterality: Left;    VENOUS ACCESS DEVICE (PORT) INSERTION N/A 04/14/2020    Procedure: INSERTION VENOUS ACCESS DEVICE;  Surgeon: Vielka Weller MD;  Location:  [facility] OR;  Service: General;  Laterality: N/A;    VENOUS ACCESS DEVICE (PORT) INSERTION  07/2022    VENOUS ACCESS DEVICE (PORT) REMOVAL N/A 05/28/2021    Procedure: PORT REMOVAL, EXCISION OF NEOPLASM UNCERTAIN BEHAVIOR LEFT SHOULDER;  Surgeon: Vielka Weller MD;  Location:  [facility] OR;  Service: General;  Laterality: N/A;     Family History   Problem Relation Age of Onset    Cancer Mother     Stroke Father     Heart disease Father     Heart attack Brother      Social History     Tobacco Use    Smoking status: Some Days     Current packs/day: 0.25     Average packs/day: 0.3 packs/day for 53.0 years (13.3 ttl pk-yrs)     Types: Cigars, Cigarettes    Smokeless tobacco: Never    Tobacco comments:     As of 6/19/24 - smokes around 6-8 cigarettes per day   Vaping Use    Vaping status: Never Used   Substance Use Topics    Alcohol use: Not Currently    Drug use: Never     Current Outpatient Medications   Medication Sig Dispense Refill    aspirin 81 MG EC tablet Take 1 tablet by mouth Daily.      atorvastatin (LIPITOR) 40 MG tablet Take 1 tablet by mouth Daily. 90 tablet 3    clopidogrel (Plavix) 75 MG tablet Take 1 tablet by mouth Daily. Begin taking 1 week prior to surgery 30 tablet 2    metFORMIN (GLUCOPHAGE) 500 MG tablet Take 2 tablets by mouth 2 (Two) Times a Day With Meals. 120 tablet 11    ranolazine (Ranexa) 500 MG 12 hr tablet Take 1 tablet by mouth 2 (Two) Times a Day. 60 tablet 11     No current facility-administered medications for this visit.     Allergies:  Patient has no known allergies.    Objective      Vital Signs  Visit Vitals  /68 (BP Location: Right arm, Patient Position: Sitting, Cuff  "Size: Adult)   Pulse 85   Ht 179.1 cm (70.5\")   Wt 80.8 kg (178 lb 3.2 oz)   SpO2 95%   BMI 25.21 kg/m²         Physical Exam  Physical Exam  Physical Exam:    General: No acute distress, in good spirits  Cardiovascular: RRR, no murmur, rubs, or gallops.    Pulmonary: Clear to auscultation bilaterally, no wheezing, rubs, or rales.  Abdomen: Soft, nondistended, and nontender.  Extremities: Warm, moves all extremities.  Neurologic:  No focal deficits, CN II-XII intact grossly.        Results Review:   No results found.  Results  Laboratory Studies  Creatinine of 1.55.    Imaging  Left heart catheterization identified LAD, left circumflex and right coronary artery disease with right coronary artery occlusion and left to right collaterals.  I reviewed the patient's new clinical results.  Discussed with patient      Assessment & Plan       Diagnoses and all orders for this visit:    1. Coronary artery disease of native artery of native heart with stable angina pectoris (Primary)    2. Stage 3a chronic kidney disease    3. Rectal cancer    4. Current every day smoker      Assessment & Plan  1. Multivessel coronary disease.  Upon reviewing available records from [facility] and Dr. Orozco, it is evident that the patient continues to undergo chemotherapy with evidence of local regional recurrence and progression, with plans to change regimens. The patient's multivessel coronary disease is currently manifesting as dyspnea. From an anatomical perspective, it is deemed relatively straightforward and that coronary artery bypass grafting will provide the most complete solution for the management of his multivessel coronary artery disease. The options of medical management only and PCI were discussed. Regrettably, PCI would not offer complete revascularization. While the LAD is certainly amenable to PCI, his first obtuse marginal stenosis will be a high-risk stenosis to manage percutaneously. This is echoed by Dr. Adam in his " note, and the right coronary artery is not amenable to PCI. The rationale for the recommendation is to consider surgery based on his coronary angiography first, as there is no equal alternative as well as the conferred survival advantage, reduction in heart failure incidence long-term, reduction in long-term reinvention events, probability, and improved quality of life. The primary concern is the known presence of recurrent rectal adenocarcinoma locally and regionally. I will need to consult with his oncologist prior to making a final recommendation that is tailored to the patient. Given that his cardiac symptoms are relatively stable, time can be made to allow ongoing systemic therapy with plans to reevaluate after repeat imaging is performed to evaluate the efficacy of changes in recent systemic therapy. It is likely, but I will need to confirm with his oncologist. There will be a need for time to allow hematologic recovery as well as time off from chemotherapy to prevent suitable surgical recovery. The time may become significant long-term changing his oncologic outcome. At this time, I answered all questions to the best of my ability with the patient, satisfied with the current plans to include oncology as well as my partner, Dr. Hameed, and an augmented heart team approach for the best treatment plan. I will call him to discuss further recommendations forthcoming. He is a non-smoker. This is a level 5 consult. As this is 44-minute time encounter with greater than 50% of time counseling the patient, including time to prepare the working document, review documents in the medical record from [place], as well as reviewing left heart catheterization. The complexity of care is high with high-risk decision making. Many thanks again for the option in the care of patient. I will continue to keep you apprised of his care.    He is a smoker and we discussed the value of smoking cessation from both an oncologic as well  as cardiovascular perspective.  He is working on it.      Transcribed from ambient dictation for Joe Back MD by Joe Back MD.  06/19/24   12:54 CDT    Patient or patient representative verbalized consent for the use of Ambient Listening during the visit with  Joe Back MD for chart documentation. 7/7/2024  09:25 CDT

## 2024-10-30 ENCOUNTER — TELEPHONE (OUTPATIENT)
Dept: CARDIAC SURGERY | Facility: CLINIC | Age: 74
End: 2024-10-30
Payer: MEDICARE

## 2024-10-30 NOTE — TELEPHONE ENCOUNTER
Attempted to call pt re: this but no answer.  VM message left for pt to call back.  Appt sched for next week to hold this appt for pt.  If pt calls back, please tx to me or document pt's response to appt in this message/kahm

## 2024-10-30 NOTE — TELEPHONE ENCOUNTER
Dr. Back spoke with patient's oncologist Dr. Orozco yesterday afternoon.  He would like to see patient back in the office for discussion of proceeding with CABG versus other options. Can you schedule a follow up?

## 2024-10-31 ENCOUNTER — TELEPHONE (OUTPATIENT)
Dept: VASCULAR SURGERY | Facility: CLINIC | Age: 74
End: 2024-10-31
Payer: MEDICARE

## 2024-10-31 NOTE — TELEPHONE ENCOUNTER
Pt called back and was offered appt on 11-6-24.  Pt is not able to make this date as he gets his chemo tx in [place] every other Tues and this will be the following day and he will not be up to making any appts.  Pt would need 11-13-24 or 11-27-24 or 12-11-24 etc.  No openings on the November dates and not sure if should wait until the December date or not.  Please advise and I will call pt/norm

## 2024-10-31 NOTE — TELEPHONE ENCOUNTER
Sounds like it will have to be a December appointment if he cannot come any of the available times in November

## 2024-11-01 ENCOUNTER — OFFICE VISIT (OUTPATIENT)
Dept: VASCULAR SURGERY | Facility: CLINIC | Age: 74
End: 2024-11-01
Payer: MEDICARE

## 2024-11-01 ENCOUNTER — HOSPITAL ENCOUNTER (OUTPATIENT)
Dept: ULTRASOUND IMAGING | Facility: HOSPITAL | Age: 74
Discharge: HOME | End: 2024-11-01
Payer: MEDICARE

## 2024-11-01 VITALS
DIASTOLIC BLOOD PRESSURE: 60 MMHG | BODY MASS INDEX: 23.77 KG/M2 | WEIGHT: 166 LBS | SYSTOLIC BLOOD PRESSURE: 112 MMHG | OXYGEN SATURATION: 100 % | HEIGHT: 70 IN | HEART RATE: 80 BPM

## 2024-11-01 DIAGNOSIS — F17.200 CURRENT EVERY DAY SMOKER: ICD-10-CM

## 2024-11-01 DIAGNOSIS — I65.21 STENOSIS OF RIGHT CAROTID ARTERY: ICD-10-CM

## 2024-11-01 DIAGNOSIS — I65.22 CAROTID OCCLUSION, LEFT: ICD-10-CM

## 2024-11-01 DIAGNOSIS — I65.21 STENOSIS OF RIGHT CAROTID ARTERY: Primary | ICD-10-CM

## 2024-11-01 DIAGNOSIS — I65.23 CAROTID STENOSIS, BILATERAL: ICD-10-CM

## 2024-11-01 DIAGNOSIS — E11.9 TYPE 2 DIABETES MELLITUS WITHOUT COMPLICATION, WITHOUT LONG-TERM CURRENT USE OF INSULIN: ICD-10-CM

## 2024-11-01 PROBLEM — N13.30 HYDRONEPHROSIS: Status: ACTIVE | Noted: 2021-03-02

## 2024-11-01 PROBLEM — Z98.890 HISTORY OF URINARY DIVERSION PROCEDURE: Status: ACTIVE | Noted: 2020-09-13

## 2024-11-01 PROCEDURE — 93880 EXTRACRANIAL BILAT STUDY: CPT

## 2024-11-01 NOTE — PROGRESS NOTES
"11/1/2024       EDUARDO Hebert MD  [address]  [place] [address]    Attila Viramontes  1950    Chief Complaint   Patient presents with    Follow-up     6 month follow up w/ testing. Last seen 5/1/24. Patient denies any stroke type symptoms.        Dear EDUARDO Hebert MD     I had the pleasure of seeing your patient Attila Viramontes in the office today.  As you recall, Attila Viramontes is a 74 y.o.  male you are following for routine health maintenance.  In 2020, he was transferred to [place] after onset of right sided hemiparesis, global aphasia, and right lower facial drooping.  He was found to have a left carotid occlusion.  He did undergo right TCAR on 10/16/2023.  Currently he is doing well and denies any strokelike symptoms.  He is maintained on aspirin, Plavix, and Lipitor.  He did have noninvasive testing performed today, which I did review in office.        Review of Systems   Constitutional: Negative.  Negative for diaphoresis and fever.   HENT: Negative.     Eyes: Negative.    Respiratory:  Positive for shortness of breath. Negative for wheezing.    Cardiovascular: Negative.  Negative for chest pain and leg swelling.   Gastrointestinal: Negative.  Negative for abdominal pain.   Endocrine: Negative.    Genitourinary: Negative.    Musculoskeletal: Negative.    Skin: Negative.    Allergic/Immunologic: Negative.    Neurological: Negative.  Negative for dizziness and weakness.   Hematological: Negative.    Psychiatric/Behavioral: Negative.          /60   Pulse 80   Ht 177.8 cm (70\")   Wt 75.3 kg (166 lb)   SpO2 100%   BMI 23.82 kg/m²       Physical Exam  Vitals and nursing note reviewed.   Constitutional:       General: He is not in acute distress.     Appearance: Normal appearance. He is not diaphoretic.   HENT:      Head: Normocephalic. No right periorbital erythema or left periorbital erythema.      Nose: Nose normal.   Eyes:      General: " No scleral icterus.     Pupils: Pupils are equal.   Cardiovascular:      Rate and Rhythm: Normal rate and regular rhythm.      Pulses: Normal pulses.           Femoral pulses are 2+ on the right side and 2+ on the left side.       Popliteal pulses are 2+ on the right side and 2+ on the left side.        Dorsalis pedis pulses are 2+ on the right side and 2+ on the left side.        Posterior tibial pulses are 2+ on the right side and 2+ on the left side.      Heart sounds: Normal heart sounds. No murmur heard.  Pulmonary:      Effort: Pulmonary effort is normal. No respiratory distress.      Breath sounds: Normal breath sounds.   Abdominal:      General: Bowel sounds are normal. There is no distension.      Palpations: Abdomen is soft.      Tenderness: There is no abdominal tenderness. There is no guarding.      Comments: Colostomy and ileostomy bags in place   Musculoskeletal:         General: No swelling or tenderness. Normal range of motion.      Cervical back: Normal range of motion and neck supple.      Right lower leg: No edema.      Left lower leg: No edema.   Feet:      Right foot:      Skin integrity: Skin integrity normal.      Left foot:      Skin integrity: Skin integrity normal.   Skin:     General: Skin is warm and dry.      Findings: No erythema or rash.   Neurological:      General: No focal deficit present.      Mental Status: He is alert and oriented to person, place, and time. Mental status is at baseline.      Cranial Nerves: No cranial nerve deficit.      Gait: Gait normal.   Psychiatric:         Attention and Perception: Attention normal.         Mood and Affect: Mood normal.         Behavior: Behavior normal.         Thought Content: Thought content normal.         Judgment: Judgment normal.       Diagnostic data:  Noninvasive testing including a carotid duplex shows 50 to 69% right carotid stenosis with a patent right carotid stent, known left carotid occlusion and bilateral antegrade  vertebral flow.             Patient Active Problem List   Diagnosis    Rectal cancer    Current every day smoker    Impaired gait and mobility    Hypertension    2nd degree AV block    Tobacco abuse    Normocytic anemia    Chemotherapy induced neutropenia    History of urinary diversion procedure    Cancer related pain    Bilateral carotid artery stenosis    Carotid occlusion, left    Type 2 diabetes mellitus without complication, without long-term current use of insulin    Iron deficiency anemia    Ileostomy in place    Colostomy in place    Function kidney decreased    Stage 3a chronic kidney disease    Preop testing    Symptomatic stenosis of right carotid artery    Stenosis of right carotid artery    Overweight (BMI 25.0-29.9)    Abnormal stress test    HENDERSON (dyspnea on exertion)    Coronary artery disease of native artery of native heart with stable angina pectoris    Hydronephrosis        Diagnosis Plan   1. Stenosis of right carotid artery        2. Carotid occlusion, left        3. Type 2 diabetes mellitus without complication, without long-term current use of insulin        4. Current every day smoker        5. Carotid stenosis, bilateral                  Plan: After thoroughly evaluating Attila Viramontes, I believe the best course of action is to remain conservative from vascular surgery standpoint.  Currently he is doing well and denies any strokelike symptoms.  I did review his testing which shows 50 to 69% right carotid stenosis with patent right carotid stent and a known left carotid occlusion.  We will see him back in 6 months with repeat noninvasive testing including a carotid duplex for continued surveillance.  I did discuss vascular risk factors as they pertain to the progression of vascular disease including controlling his hyperlipidemia and smoking cessation..  His lipid panel from 6 months ago shows all values within normal limits except for his HDL decreased at 29.  Unfortunately, he is a  daily smoker and has no desire to quit smoking at this time.  He should continue his aspirin 81 mg daily, Plavix 75 mg daily, and Lipitor 20 mg daily in addition to his other medications.  Body mass index is 23.82 kg/m².  BMI is within normal parameters. No other follow-up for BMI required.    This was all discussed in full with complete understanding.    Thank you for allowing me to participate in the care of your patient.  Please do not hesitate to call with any questions or concerns.  We will keep you aware of any further encounters with Attila Viramontes.        Sincerely yours,         DELANEY Rider D Ryan, MD

## 2024-11-04 ENCOUNTER — OFFICE VISIT (OUTPATIENT)
Dept: INTERNAL MEDICINE | Facility: CLINIC | Age: 74
End: 2024-11-04
Payer: MEDICARE

## 2024-11-04 VITALS
DIASTOLIC BLOOD PRESSURE: 64 MMHG | HEART RATE: 84 BPM | SYSTOLIC BLOOD PRESSURE: 108 MMHG | OXYGEN SATURATION: 98 % | WEIGHT: 170 LBS | HEIGHT: 70 IN | BODY MASS INDEX: 24.34 KG/M2

## 2024-11-04 DIAGNOSIS — Z93.3 COLOSTOMY IN PLACE: ICD-10-CM

## 2024-11-04 DIAGNOSIS — Z93.2 ILEOSTOMY IN PLACE: ICD-10-CM

## 2024-11-04 DIAGNOSIS — Z72.0 TOBACCO ABUSE: ICD-10-CM

## 2024-11-04 DIAGNOSIS — E11.9 TYPE 2 DIABETES MELLITUS WITHOUT COMPLICATION, WITHOUT LONG-TERM CURRENT USE OF INSULIN: Primary | ICD-10-CM

## 2024-11-04 LAB
EXPIRATION DATE: ABNORMAL
HBA1C MFR BLD: 7.9 % (ref 4.5–5.7)
Lab: ABNORMAL

## 2024-11-04 RX ORDER — LANCETS 33 GAUGE
1 EACH MISCELLANEOUS 4 TIMES DAILY
Qty: 200 EACH | Refills: 5 | Status: SHIPPED | OUTPATIENT
Start: 2024-11-04

## 2024-11-04 RX ORDER — BLOOD-GLUCOSE METER
1 KIT MISCELLANEOUS ONCE
Qty: 1 EACH | Refills: 0 | Status: SHIPPED | OUTPATIENT
Start: 2024-11-04 | End: 2024-11-04

## 2024-11-04 RX ORDER — BLOOD SUGAR DIAGNOSTIC
1 STRIP MISCELLANEOUS 4 TIMES DAILY
Qty: 200 STRIP | Refills: 5 | Status: SHIPPED | OUTPATIENT
Start: 2024-11-04

## 2024-11-04 NOTE — PROGRESS NOTES
Chief Complaint   Patient presents with    Diabetes         History:      Attila Viramontes is a 74 y.o. male who presents today for evaluation of the above problems.      HPI  History of Present Illness  The patient is a 74-year-old male who presents for a 6-month follow-up on diabetes.    He has not been adhering to a diabetic diet due to his ongoing tena with cancer. He is making an effort to monitor his sugar and starch intake, but the new treatment has impacted his appetite. He consumes juices and takes vitamins regularly. He drinks juice twice daily and has a preference for bananas and strawberries. He has been consuming a significant amount of donuts recently. He does not own a glucometer. He has a history of elevated A1c levels, with a reading of 12 five years ago. His blood work results are satisfactory. He has expressed a desire to consult with an endocrinologist in the future.    He is currently on various medications and is undergoing chemotherapy every two weeks. His oncologist has initiated a new treatment regimen, which has resulted in a reduction in the size of his tumors. He has a history of weight loss, having lost 15 pounds following his first chemotherapy session.    He is a smoker and has no intention of quitting at this time. He reports no pain.      ROS:  Review of Systems   Endocrine: Positive for polydipsia and polyuria.   Neurological:  Negative for tremors, speech difficulty and headaches.   Psychiatric/Behavioral:  Negative for confusion.          Current Outpatient Medications:     aspirin 81 MG EC tablet, Take 1 tablet by mouth Daily., Disp: , Rfl:     atorvastatin (LIPITOR) 40 MG tablet, Take 1 tablet by mouth Daily., Disp: 90 tablet, Rfl: 3    clopidogrel (Plavix) 75 MG tablet, Take 1 tablet by mouth Daily. Begin taking 1 week prior to surgery, Disp: 30 tablet, Rfl: 2    metFORMIN (GLUCOPHAGE) 500 MG tablet, Take 2 tablets by mouth 2 (Two) Times a Day With Meals., Disp: 120  tablet, Rfl: 11    ranolazine (Ranexa) 500 MG 12 hr tablet, Take 1 tablet by mouth 2 (Two) Times a Day., Disp: 60 tablet, Rfl: 11    Glucose Blood (Blood Glucose Test Strips 333) strip, 1 each by In Vitro route 4 (Four) Times a Day., Disp: 200 strip, Rfl: 5    glucose monitor monitoring kit, Use 1 each 1 time for 1 dose., Disp: 1 each, Rfl: 0    Lancets 33G misc, Use 1 each 4 (Four) Times a Day., Disp: 200 each, Rfl: 5    Lab Results   Component Value Date    GLUCOSE 167 (H) 06/12/2024    BUN 27 (H) 06/12/2024    CREATININE 1.61 (H) 06/12/2024     (L) 06/12/2024    K 4.6 06/12/2024    CL 99 06/12/2024    CALCIUM 9.2 06/12/2024    PROTEINTOT 8.1 05/02/2024    ALBUMIN 4.1 05/02/2024    ALT 32 05/02/2024    AST 21 05/02/2024    ALKPHOS 125 (H) 05/02/2024    BILITOT 0.2 05/02/2024    GLOB 4.0 05/02/2024    AGRATIO 1.0 05/02/2024    BCR 16.8 06/12/2024    ANIONGAP 10.0 06/12/2024    EGFR 45.2 (L) 06/12/2024       WBC   Date Value Ref Range Status   06/12/2024 11.15 (H) 3.40 - 10.80 10*3/mm3 Final     RBC   Date Value Ref Range Status   06/12/2024 4.08 (L) 4.14 - 5.80 10*6/mm3 Final     Hemoglobin   Date Value Ref Range Status   06/12/2024 12.4 (L) 13.0 - 17.7 g/dL Final     Hematocrit   Date Value Ref Range Status   06/12/2024 38.7 37.5 - 51.0 % Final   12/24/2020 28 (L) 41 - 49 % Final     MCV   Date Value Ref Range Status   06/12/2024 94.9 79.0 - 97.0 fL Final     MCH   Date Value Ref Range Status   06/12/2024 30.4 26.6 - 33.0 pg Final     MCHC   Date Value Ref Range Status   06/12/2024 32.0 31.5 - 35.7 g/dL Final     RDW   Date Value Ref Range Status   06/12/2024 16.0 (H) 12.3 - 15.4 % Final     RDW-SD   Date Value Ref Range Status   06/12/2024 55.0 (H) 37.0 - 54.0 fl Final     MPV   Date Value Ref Range Status   06/12/2024 8.5 6.0 - 12.0 fL Final     Platelets   Date Value Ref Range Status   06/12/2024 345 140 - 450 10*3/mm3 Final     Neutrophil %   Date Value Ref Range Status   10/09/2023 73.9 42.7 - 76.0 %  "Final     Lymphocyte %   Date Value Ref Range Status   10/09/2023 12.4 (L) 19.6 - 45.3 % Final     Monocyte %   Date Value Ref Range Status   10/09/2023 9.4 5.0 - 12.0 % Final     Eosinophil %   Date Value Ref Range Status   10/09/2023 2.6 0.3 - 6.2 % Final     Basophil %   Date Value Ref Range Status   10/09/2023 0.8 0.0 - 1.5 % Final     Immature Grans %   Date Value Ref Range Status   10/09/2023 0.9 (H) 0.0 - 0.5 % Final     Neutrophils, Absolute   Date Value Ref Range Status   10/09/2023 7.14 (H) 1.70 - 7.00 10*3/mm3 Final     Lymphocytes, Absolute   Date Value Ref Range Status   10/09/2023 1.20 0.70 - 3.10 10*3/mm3 Final     Monocytes, Absolute   Date Value Ref Range Status   10/09/2023 0.91 (H) 0.10 - 0.90 10*3/mm3 Final     Eosinophils, Absolute   Date Value Ref Range Status   10/09/2023 0.25 0.00 - 0.40 10*3/mm3 Final     Basophils, Absolute   Date Value Ref Range Status   10/09/2023 0.08 0.00 - 0.20 10*3/mm3 Final     Immature Grans, Absolute   Date Value Ref Range Status   10/09/2023 0.09 (H) 0.00 - 0.05 10*3/mm3 Final     nRBC   Date Value Ref Range Status   10/09/2023 0.0 0.0 - 0.2 /100 WBC Final     OBJECTIVE:  Visit Vitals  /64 (BP Location: Left arm, Patient Position: Sitting, Cuff Size: Adult)   Pulse 84   Ht 177.8 cm (70\")   Wt 77.1 kg (170 lb)   SpO2 98%   BMI 24.39 kg/m²      Physical Exam  Constitutional:       Appearance: Normal appearance.   HENT:      Head: Normocephalic.   Cardiovascular:      Rate and Rhythm: Normal rate and regular rhythm.      Pulses: Normal pulses.      Heart sounds: Normal heart sounds.   Pulmonary:      Effort: Pulmonary effort is normal.      Breath sounds: Normal breath sounds.   Musculoskeletal:         General: Normal range of motion.      Cervical back: Normal range of motion.   Skin:     General: Skin is warm and dry.   Neurological:      Mental Status: He is alert and oriented to person, place, and time.   Psychiatric:         Mood and Affect: Mood normal.  "        Behavior: Behavior normal.         Thought Content: Thought content normal.         Judgment: Judgment normal.     Results  Laboratory Studies  A1c increased from 7.4 to 7.9.    Assessment/Plan    Diagnoses and all orders for this visit:    1. Type 2 diabetes mellitus without complication, without long-term current use of insulin (Primary)  -     POC Glycosylated Hemoglobin (Hb A1C)  -     glucose monitor monitoring kit; Use 1 each 1 time for 1 dose.  Dispense: 1 each; Refill: 0  -     Lancets 33G misc; Use 1 each 4 (Four) Times a Day.  Dispense: 200 each; Refill: 5  -     Glucose Blood (Blood Glucose Test Strips 333) strip; 1 each by In Vitro route 4 (Four) Times a Day.  Dispense: 200 strip; Refill: 5    2. Ileostomy in place    3. Tobacco abuse    4. Colostomy in place      Assessment & Plan  1. Diabetes Mellitus.  His hemoglobin A1c has increased from 7.2 to 7.9. He has not been following a diabetic diet due to his ongoing cancer treatment. He was advised to limit his intake of sugars and starches and to replace simple carbohydrates such as white pasta, white bread, white potatoes, and white rice with complex carbohydrates like whole wheat pasta, whole wheat bread, sweet potatoes, and brown rice. An increase in protein intake was recommended, including meats, eggs, nuts, and beans. The use of Glucerna as a nutritional supplement was suggested. A prescription for a glucometer was provided, with instructions to check his blood sugar four times a day, including fasting in the morning and after meals to gain better understanding of how foods affect his blood sugar.    2. Cancer.  He is currently undergoing chemotherapy every two weeks and has experienced tumor shrinkage with the new treatment. He is managing his weight and strength through diet and nutritional supplements. He is advised to continue his current treatment regimen and maintain his weight.    Follow-up  Return in 3 months for  follow-up.      Return in about 3 months (around 2/4/2025), or f/u on diabete.    I spent 48 minutes caring for Attila on this date of service. This time includes time spent by me in the following activities: preparing for the visit, reviewing tests, performing a medically appropriate examination and/or evaluation, counseling and educating the patient/family/caregiver, and documenting information in the medical record      DELANEY King  09:37 CST  11/4/2024   Electronically signed      Patient or patient representative verbalized consent for the use of Ambient Listening during the visit with  DELANEY King for chart documentation. 11/4/2024  13:07 CST  Answers submitted by the patient for this visit:  Primary Reason for Visit (Submitted on 11/2/2024)  What is the primary reason for your visit?: Diabetes     No

## 2024-12-11 ENCOUNTER — OFFICE VISIT (OUTPATIENT)
Dept: CARDIAC SURGERY | Facility: CLINIC | Age: 74
End: 2024-12-11
Payer: MEDICARE

## 2024-12-11 VITALS
BODY MASS INDEX: 24.74 KG/M2 | DIASTOLIC BLOOD PRESSURE: 68 MMHG | WEIGHT: 172.8 LBS | OXYGEN SATURATION: 96 % | SYSTOLIC BLOOD PRESSURE: 128 MMHG | HEIGHT: 70 IN | HEART RATE: 92 BPM

## 2024-12-11 DIAGNOSIS — N18.31 STAGE 3A CHRONIC KIDNEY DISEASE: ICD-10-CM

## 2024-12-11 DIAGNOSIS — E11.9 TYPE 2 DIABETES MELLITUS WITHOUT COMPLICATION, WITHOUT LONG-TERM CURRENT USE OF INSULIN: ICD-10-CM

## 2024-12-11 DIAGNOSIS — C20 RECTAL CANCER: ICD-10-CM

## 2024-12-11 DIAGNOSIS — I25.118 CORONARY ARTERY DISEASE OF NATIVE ARTERY OF NATIVE HEART WITH STABLE ANGINA PECTORIS: Primary | ICD-10-CM

## 2024-12-11 RX ORDER — DIPHENOXYLATE HYDROCHLORIDE AND ATROPINE SULFATE 2.5; .025 MG/1; MG/1
TABLET ORAL
COMMUNITY

## 2024-12-22 NOTE — PROGRESS NOTES
Chief Complaint   Patient presents with    Lung Cancer     Patient is here to discuss surgery          Subjective     History of Present Illness  The patient returns today for a subsequent follow-up for reconsideration of coronary artery bypass grafting. For the ,interested reader, I reference my clinic note from 06/19/2024. He is accompanied by his sister.    He has been diagnosed with colorectal cancer and is currently undergoing treatment. His oncologist has expressed optimism regarding the efficacy of the current treatment regimen, as evidenced by the shrinkage of the tumor. A CT scan has been scheduled for 12/31/2024 to further evaluate the response to treatment. He is scheduled to receive an infusion on 01/17/2025. His oncologist has advised a cessation of chemotherapy for a minimum of one month prior to any surgical intervention. He reports experiencing dizziness, which he attributes to his chemotherapy treatment. He also reports episodes of dizziness upon standing up too quickly in the morning, which he believes may be due to dehydration from not consuming water overnight. He reports no chest pain but does experience fatigue, which he attributes to his chemotherapy treatment. This symptom appears to be alleviated by hydration.    He has not completely quit smoking.    SOCIAL HISTORY  The patient admits to smoking and has not completely quit.    Review of Systems       Past Medical History:   Diagnosis Date    Arthritis     AV block     recent hx of long pauses, pt supposed to have pacemaker placed ASAP     Bright red rectal bleeding 04/16/2020    Carotid arterial disease     History of chemotherapy     History of radiation therapy 09/13/2020    Hyperlipidemia     Hypotension due to hypovolemia 09/18/2020    Irregular heart rate     Pancreatitis     Rectal cancer 04/09/2020    Stroke     had mini stroke while port was placed     Type 2 diabetes mellitus without complication, without long-term current use of  insulin 11/20/2020     Past Surgical History:   Procedure Laterality Date    ARM LESION/CYST EXCISION Left 06/15/2021    Procedure: WIDE EXCISION MELANOMA LEFT SHOULDER WITH SPLIT THICKNESS SKIN GRAFT AND SENTINEL LYMPH NODE BIOPSY;  Surgeon: Vielka Weller MD;  Location:  [facility] OR;  Service: General;  Laterality: Left;    CARDIAC CATHETERIZATION N/A 6/12/2024    Procedure: Coronary angiography;  Surgeon: Ga Hameed MD;  Location:  [facility] CATH INVASIVE LOCATION;  Service: Cardiology;  Laterality: N/A;    CARDIAC CATHETERIZATION N/A 6/12/2024    Procedure: Percutaneous Coronary Intervention;  Surgeon: Ga Hameed MD;  Location:  [facility] CATH INVASIVE LOCATION;  Service: Cardiology;  Laterality: N/A;    CAROTID ARTERY ANGIOPLASTY Right 10/2023    stent placed in artery    CATARACT EXTRACTION W/ INTRAOCULAR LENS  IMPLANT, BILATERAL      COLONOSCOPY N/A 04/02/2020    Procedure: COLONOSCOPY WITH ANESTHESIA;  Surgeon: Yanick Flowers DO;  Location: [facility] ENDOSCOPY;  Service: Gastroenterology;  Laterality: N/A;  pre: abnormal CT scan  post: rectal mass  PCP unknown    CYSTECTOMY      ILEOSTOMY      LYMPH NODE BIOPSY      MOUTH SURGERY      PROSTATECTOMY      RECTAL MASS EXCISION  12/21/2020    SENTINEL NODE BIOPSY Left 06/15/2021    Procedure: SENTINEL LYMPH NODE BIOPSY;  Surgeon: Vielka Weller MD;  Location:  [facility] OR;  Service: General;  Laterality: Left;    VENOUS ACCESS DEVICE (PORT) INSERTION N/A 04/14/2020    Procedure: INSERTION VENOUS ACCESS DEVICE;  Surgeon: Vielka Weller MD;  Location:  [facility] OR;  Service: General;  Laterality: N/A;    VENOUS ACCESS DEVICE (PORT) INSERTION  07/2022    VENOUS ACCESS DEVICE (PORT) REMOVAL N/A 05/28/2021    Procedure: PORT REMOVAL, EXCISION OF NEOPLASM UNCERTAIN BEHAVIOR LEFT SHOULDER;  Surgeon: Vielka Weller MD;  Location:  [facility] OR;  Service: General;  Laterality: N/A;     Family History   Problem Relation Age of Onset    Cancer Mother     Stroke Father      "Heart disease Father     Heart attack Brother      Social History     Tobacco Use    Smoking status: Some Days     Current packs/day: 0.25     Average packs/day: 0.3 packs/day for 53.0 years (13.3 ttl pk-yrs)     Types: Cigars, Cigarettes    Smokeless tobacco: Never    Tobacco comments:     As of 6/19/24 - smokes around 6-8 cigarettes per day   Vaping Use    Vaping status: Never Used   Substance Use Topics    Alcohol use: Not Currently    Drug use: Never     Current Outpatient Medications   Medication Sig Dispense Refill    aspirin 81 MG EC tablet Take 1 tablet by mouth Daily.      atorvastatin (LIPITOR) 40 MG tablet Take 1 tablet by mouth Daily. 90 tablet 3    clopidogrel (Plavix) 75 MG tablet Take 1 tablet by mouth Daily. Begin taking 1 week prior to surgery 30 tablet 2    diphenoxylate-atropine (LOMOTIL) 2.5-0.025 MG per tablet Take 2 tablets by mouth 4 times a day as needed for diarrhea.      metFORMIN (GLUCOPHAGE) 500 MG tablet Take 2 tablets by mouth 2 (Two) Times a Day With Meals. 120 tablet 11    ranolazine (Ranexa) 500 MG 12 hr tablet Take 1 tablet by mouth 2 (Two) Times a Day. 60 tablet 11     No current facility-administered medications for this visit.     Allergies:  Patient has no known allergies.    Objective      Vital Signs  Visit Vitals  /68   Pulse 92   Ht 177.8 cm (70\")   Wt 78.4 kg (172 lb 12.8 oz)   SpO2 96%   BMI 24.79 kg/m²         Physical Exam  Physical Exam  The patient is in no acute distress, appears appropriate, and is alert.  Lungs are clear to auscultation bilaterally without wheezing, rubs, or rales.  Heart has a regular rate and rhythm without murmurs, rubs, or gallops.  Abdomen is soft and nontender.  There is no clubbing, cyanosis, or edema in the extremities.    Results Review:   No results found.  Results  Laboratory Studies  Creatinine is 1.43.  I reviewed the patient's new clinical results.  Discussed with patient      Assessment & Plan       Diagnoses and all orders for " this visit:    1. Coronary artery disease of native artery of native heart with stable angina pectoris (Primary)    2. Type 2 diabetes mellitus without complication, without long-term current use of insulin    3. Stage 3a chronic kidney disease    4. Rectal cancer      Assessment & Plan  1. Multivessel coronary disease with left main.  After discussing the findings of his most recent left heart catheterization done in June 2024 and consulting with his medical oncologist, it is believed that he could benefit from coronary artery bypass grafting both short and long term, especially given his good response to the new regimen. Plans are in place for repeat imaging at the end of this year to ascertain ongoing therapeutic response. If suitable, coronary artery bypass grafting will proceed. Testing to ensure he is a reasonable candidate for the surgery will include standard cardiac surgery labs, bilateral carotid ultrasound, 2D complete echocardiogram, CTA of the chest, complete pre and post bronchodilator pulmonary function tests with DLCO and ABG, and bilateral lower extremity vein mapping. Due to the time elapsed, a repeat left heart catheterization will be required, coordinated with Dr. Hameed. All questions were answered, and the expected postoperative course, risks, and benefits were discussed. He is agreeable to proceed forward.    2. Type 2 diabetes mellitus.  His diabetes will be monitored as part of the pre-surgical evaluation. An A1C test will be conducted to assess current control.    3. Carotid artery disease.  A bilateral carotid ultrasound will be performed as part of the pre-surgical testing to evaluate the extent of the disease.    4. Chronic kidney disease, stage 3a.  His creatinine level is 1.43, consistent with stage 3 chronic kidney disease. He is at risk of his kidneys taking a few more days to bounce back post-surgery and a 2 to 5% risk of dialysis. This will be monitored closely during the  pre-surgical evaluation.    5. Rectal cancer.  Reportedly responsive to adjuvant therapy at this time. Plans are in place for repeat imaging at the end of this year to ascertain ongoing therapeutic response. If the response remains positive, coronary artery bypass grafting will proceed.    6. Smoking cessation.  He is still smoking a few cigarettes. Strongly encouraged him to proceed with smoking cessation to aid in healing and overall prognosis.    Follow-up  The patient will follow up in early January 2025 with all workup completed as well as left heart catheterization with intent to discuss with med onc one more time prior to making a final recommendation.    He is a current smoker.  Smoking cessation advised.    Many thanks for the opportunity to care for your patient.    I will continue to keep you apprised of provided care as it ensues.            Transcribed from ambient dictation for Joe Back MD by Joe Back MD.  12/22/24   15:53 CST    Patient or patient representative verbalized consent for the use of Ambient Listening during the visit with  Joe Back MD for chart documentation. 12/22/2024  15:54 CST

## 2025-01-08 ENCOUNTER — OFFICE VISIT (OUTPATIENT)
Dept: CARDIAC SURGERY | Facility: CLINIC | Age: 75
End: 2025-01-08
Payer: MEDICARE

## 2025-01-08 VITALS
WEIGHT: 172.8 LBS | SYSTOLIC BLOOD PRESSURE: 120 MMHG | HEIGHT: 70 IN | HEART RATE: 77 BPM | OXYGEN SATURATION: 99 % | DIASTOLIC BLOOD PRESSURE: 64 MMHG | BODY MASS INDEX: 24.74 KG/M2

## 2025-01-08 DIAGNOSIS — I79.8 OTHER DISORDERS OF ARTERIES, ARTERIOLES AND CAPILLARIES IN DISEASES CLASSIFIED ELSEWHERE: ICD-10-CM

## 2025-01-08 DIAGNOSIS — C20 RECTAL CANCER: ICD-10-CM

## 2025-01-08 DIAGNOSIS — I25.118 CORONARY ARTERY DISEASE OF NATIVE ARTERY OF NATIVE HEART WITH STABLE ANGINA PECTORIS: Primary | ICD-10-CM

## 2025-01-08 DIAGNOSIS — N18.31 STAGE 3A CHRONIC KIDNEY DISEASE: ICD-10-CM

## 2025-01-08 DIAGNOSIS — F17.200 CURRENT EVERY DAY SMOKER: ICD-10-CM

## 2025-01-08 DIAGNOSIS — E11.9 TYPE 2 DIABETES MELLITUS WITHOUT COMPLICATION, WITHOUT LONG-TERM CURRENT USE OF INSULIN: ICD-10-CM

## 2025-01-09 ENCOUNTER — PREP FOR SURGERY (OUTPATIENT)
Dept: OTHER | Facility: HOSPITAL | Age: 75
End: 2025-01-09
Payer: MEDICARE

## 2025-01-09 DIAGNOSIS — I25.118 CORONARY ARTERY DISEASE OF NATIVE ARTERY OF NATIVE HEART WITH STABLE ANGINA PECTORIS: Primary | ICD-10-CM

## 2025-01-09 DIAGNOSIS — E11.9 TYPE 2 DIABETES MELLITUS WITHOUT COMPLICATION, WITHOUT LONG-TERM CURRENT USE OF INSULIN: ICD-10-CM

## 2025-01-09 RX ORDER — ASPIRIN 81 MG/1
81 TABLET ORAL DAILY
OUTPATIENT
Start: 2025-01-10

## 2025-01-09 RX ORDER — SODIUM CHLORIDE 0.9 % (FLUSH) 0.9 %
3 SYRINGE (ML) INJECTION EVERY 12 HOURS SCHEDULED
OUTPATIENT
Start: 2025-01-09

## 2025-01-09 RX ORDER — SODIUM CHLORIDE 0.9 % (FLUSH) 0.9 %
10 SYRINGE (ML) INJECTION AS NEEDED
OUTPATIENT
Start: 2025-01-09

## 2025-01-09 RX ORDER — ASPIRIN 81 MG/1
324 TABLET, CHEWABLE ORAL ONCE
OUTPATIENT
Start: 2025-01-09 | End: 2025-01-09

## 2025-01-09 RX ORDER — SODIUM CHLORIDE 9 MG/ML
40 INJECTION, SOLUTION INTRAVENOUS AS NEEDED
OUTPATIENT
Start: 2025-01-09

## 2025-01-09 NOTE — TELEPHONE ENCOUNTER
Rx Refill Note  Requested Prescriptions     Pending Prescriptions Disp Refills    metFORMIN (GLUCOPHAGE) 500 MG tablet [Pharmacy Med Name: metformin 500 mg tablet] 120 tablet 11     Sig: Take 2 tablets by mouth 2 (Two) Times a Day With Meals.      Last office visit with prescribing clinician: 5/2/2024   Last telemedicine visit with prescribing clinician: Visit date not found   Next office visit with prescribing clinician: 2/7/2025                         Would you like a call back once the refill request has been completed: [] Yes [] No    If the office needs to give you a call back, can they leave a voicemail: [] Yes [] No    Jamel Farnsworth MA  01/09/25, 13:34 CST

## 2025-01-12 NOTE — H&P (VIEW-ONLY)
Chief Complaint   Patient presents with    Coronary Artery Disease     Pt is here for follow up         Subjective     History of Present Illness  The patient returns today for a subsequent follow-up for reconsideration of coronary artery bypass grafting. For the interested reader, I reference my clinic note from 06/19/2024. He is accompanied by his sister.    He has been diagnosed with colorectal cancer and is currently undergoing treatment. His oncologist has expressed optimism regarding the efficacy of the current treatment regimen, as evidenced by the shrinkage of the tumor. A CT scan has been scheduled for 12/31/2024 to further evaluate the response to treatment. He is scheduled to receive an infusion on 01/17/2025. His oncologist has advised a cessation of chemotherapy for a minimum of one month prior to any surgical intervention. He reports experiencing dizziness, which he attributes to his chemotherapy treatment. He also reports episodes of dizziness upon standing up too quickly in the morning, which he believes may be due to dehydration from not consuming water overnight. He reports no chest pain but does experience fatigue, which he attributes to his chemotherapy treatment. This symptom appears to be alleviated by hydration.    He has not completely quit smoking.    SOCIAL HISTORY  The patient admits to smoking and has not completely quit.      The patient presents for left main multivessel coronary disease and advanced colon cancer.    He has been informed about the potential need for radiation therapy as part of his treatment plan, which has caused him some anxiety. He is curious about the possibility of regaining his strength post-treatment.    He expresses concern regarding the scheduling of his surgical procedure, given the involvement of two different specialists. He is also apprehensive about the impact of chemotherapy on his healing process.    SOCIAL HISTORY  He is a non-smoker.    Review of  Systems       Past Medical History:   Diagnosis Date    Arthritis     AV block     recent hx of long pauses, pt supposed to have pacemaker placed ASAP     Bright red rectal bleeding 04/16/2020    Carotid arterial disease     Chronic kidney disease [id number]    Coronary artery disease     History of chemotherapy     History of radiation therapy 09/13/2020    Hyperlipidemia     Hypotension due to hypovolemia 09/18/2020    Irregular heart rate     Pancreatitis     Rectal cancer 04/09/2020    Stroke     had mini stroke while port was placed     Type 2 diabetes mellitus without complication, without long-term current use of insulin 11/20/2020     Past Surgical History:   Procedure Laterality Date    ARM LESION/CYST EXCISION Left 06/15/2021    Procedure: WIDE EXCISION MELANOMA LEFT SHOULDER WITH SPLIT THICKNESS SKIN GRAFT AND SENTINEL LYMPH NODE BIOPSY;  Surgeon: Vielka Weller MD;  Location: [facility] OR;  Service: General;  Laterality: Left;    CARDIAC CATHETERIZATION N/A 06/12/2024    Procedure: Coronary angiography;  Surgeon: Ga Hameed MD;  Location: [facility] CATH INVASIVE LOCATION;  Service: Cardiology;  Laterality: N/A;    CARDIAC CATHETERIZATION N/A 06/12/2024    Procedure: Percutaneous Coronary Intervention;  Surgeon: Ga Hameed MD;  Location: [facility] CATH INVASIVE LOCATION;  Service: Cardiology;  Laterality: N/A;    CAROTID ARTERY ANGIOPLASTY Right 10/2023    stent placed in artery    CAROTID STENT      CATARACT EXTRACTION W/ INTRAOCULAR LENS  IMPLANT, BILATERAL      COLONOSCOPY N/A 04/02/2020    Procedure: COLONOSCOPY WITH ANESTHESIA;  Surgeon: Yanick Flowers DO;  Location: [facility] ENDOSCOPY;  Service: Gastroenterology;  Laterality: N/A;  pre: abnormal CT scan  post: rectal mass  PCP unknown    CYSTECTOMY      ILEOSTOMY      LYMPH NODE BIOPSY      MOUTH SURGERY      PROSTATECTOMY      RECTAL MASS EXCISION  12/21/2020    SENTINEL NODE BIOPSY Left 06/15/2021    Procedure: SENTINEL LYMPH NODE BIOPSY;   Surgeon: Vielka Weller MD;  Location:  [facility] OR;  Service: General;  Laterality: Left;    VENOUS ACCESS DEVICE (PORT) INSERTION N/A 04/14/2020    Procedure: INSERTION VENOUS ACCESS DEVICE;  Surgeon: Vielka Weller MD;  Location:  [facility] OR;  Service: General;  Laterality: N/A;    VENOUS ACCESS DEVICE (PORT) INSERTION  07/2022    VENOUS ACCESS DEVICE (PORT) REMOVAL N/A 05/28/2021    Procedure: PORT REMOVAL, EXCISION OF NEOPLASM UNCERTAIN BEHAVIOR LEFT SHOULDER;  Surgeon: Vielka Weller MD;  Location:  [facility] OR;  Service: General;  Laterality: N/A;     Family History   Problem Relation Age of Onset    Cancer Mother     Stroke Father     Heart disease Father     Heart attack Brother      Social History     Tobacco Use    Smoking status: Some Days     Current packs/day: 0.50     Average packs/day: 0.9 packs/day for 59.0 years (56.0 ttl pk-yrs)     Types: Cigarettes     Start date: 1966     Passive exposure: Past    Smokeless tobacco: Never    Tobacco comments:     As of 6/19/24 - smokes around 6-8 cigarettes per day     As of 01/08/2025 - smokes about 5-10 cigarettes per day   Vaping Use    Vaping status: Never Used   Substance Use Topics    Alcohol use: Not Currently    Drug use: Never     Current Outpatient Medications   Medication Sig Dispense Refill    aspirin 81 MG EC tablet Take 1 tablet by mouth Daily.      atorvastatin (LIPITOR) 40 MG tablet Take 1 tablet by mouth Daily. 90 tablet 3    clopidogrel (Plavix) 75 MG tablet Take 1 tablet by mouth Daily. Begin taking 1 week prior to surgery 30 tablet 2    diphenoxylate-atropine (LOMOTIL) 2.5-0.025 MG per tablet Take 2 tablets by mouth 4 times a day as needed for diarrhea.      ranolazine (Ranexa) 500 MG 12 hr tablet Take 1 tablet by mouth 2 (Two) Times a Day. 60 tablet 11    metFORMIN (GLUCOPHAGE) 500 MG tablet Take 2 tablets by mouth 2 (Two) Times a Day With Meals. 120 tablet 11     No current facility-administered medications for this visit.     Allergies:   "Patient has no known allergies.    Objective      Vital Signs  Visit Vitals  /64   Pulse 77   Ht 177.8 cm (70\")   Wt 78.4 kg (172 lb 12.8 oz)   SpO2 99%   BMI 24.79 kg/m²         Physical Exam  Physical Exam  The patient is in no acute distress, appears appropriate, and is alert.  Lungs are clear to auscultation bilaterally without wheezing, rubs, or rales.  Heart has a regular rate and rhythm without murmurs, rubs, or gallops.  Abdomen is soft and nontender.  There is no clubbing, cyanosis, or edema in the extremities.          Results Review:   CT chest abdomen pelvis w contrast    Result Date: 12/31/2024  Narrative: CONTRAST-ENHANCED CT OF THE CHEST, ABDOMEN, AND PELVIS REASON FOR STUDY/CLINICAL HISTORY: [id number] cancer (CMS/HCC);Info for RADIOLOGIST [H1ST]:->pt with mCRC on chemo, please assess for response to chemo COMPARISON: Prior CT dated 10/7/2024  TECHNIQUE: Axial CT images of the chest, abdomen, and pelvis were obtained after the IV administration of contrast. Multiplanar reformats were also generated. INTRAVENOUS CONTRAST ADMINISTRATION: IV Contrast Dose is documented in the electronic medical record DOSE REPORT: Total DLP: 490 mGycm . Dose modulation was employed for ALARA by means of: Automated exposure control; adjustment of the mA and/or kV according to patient size (this includes techniques or standardized protocols for targeted exams where dose is matched to indication/reason for  exam; i.e. extremities or head); and/or use of iterative reconstructive technique. FINDINGS: CT CHEST: Lungs: Biapical pleural parenchymal scarring with background emphysematous changes. Left upper lobe nodule measures 1.1 x 0.8 cm (series 4 image 155), previously 1.1 x 0.7 cm. Right middle lobe 6 mm nodule is similar in size (image 177), previously 5 to 6 mm. Pleura: Within normal limits Airways: Patent central airways. Cardiovascular: Heart is normal in size. No pericardial effusion. Right chest port with tip in " the distal SVC. Coronary artery calcific lesions are present. Mediastinum: No threshold mediastinal or hilar lymphadenopathy. Stable 7 mm subcarinal lymph node (series 3 image 58). Lower Neck/Chest Wall: Right chest port. No axillary adenopathy. CT ABDOMEN AND PELVIS: Liver: No focal hepatic lesion. Gallbladder/Biliary tree: No biliary ductal dilation. Gallbladder is nondistended. Spleen: Within normal limits Pancreas: Within normal limits Adrenals: Within normal limits Kidneys/Ureters: Right kidney: Duplicated collecting system. Similar lower moiety pelvocaliectasis. Subtle enhancement of the collecting system and ureteral walls, unchanged from prior study. Left kidney: Similar severe hydroureteronephrosis to the level of the anastomosis. Associated renal marked cortical atrophy Gastrointestinal: Postsurgical changes from left ileal conduit urinary diversion and APR with left lower quadrant end colostomy. No significant change in the presacral soft tissue thickening measuring 11 mm (series 3 image 205). Cecum loops into the pelvis as on previous imaging. No obstruction. Peritoneum: No free fluid or free gas left pelvic sidewall nodule may be slightly increased in size measuring 3.9 x 2.5 cm (series 3 image 219), previously 3.5 x 2.2 cm (remeasured) on 10/7/2024 exam and previously measuring 2.2 x 1.6 cm on more remote 8/15/2023 exam. Mild mesenteric fat stranding is similar. Vasculature: Advanced atherosclerotic changes of the abdominal aorta. Patent main portal vein. Lymph Nodes: No change in size of the 8mm left periaortic lymph node (series 3 image 134). Urinary Bladder: Postsurgical changes from cystoprostatectomy and right lower quadrant ileal conduit. Reproductive organs: Prostatectomy Abdominal Wall: Bilateral ostomies. Suggestion of changes of anterior abdominal wall. Bilateral fat-containing hernias. Small right para midline fat-containing hernia. MUSCULOSKELETAL: No acute or suspicious osseous  abnormalities. Degenerative changes of the spine are similar to prior.    Impression: 1.  Postsurgical changes from cystoprostatectomy and right lower quadrant ileal conduit as well as APR with left lower quadrant colostomy. 2.  Left enhancing pelvic soft tissue nodule which demonstrates slight interval increase in size compared to most recent prior exam, but more apparent increase in size compared to more report prior exams. Continued attention on follow-up is suggested. 3.  Stable left upper and right middle lobe pulmonary nodules. 4.  Stable chronic left ureteral obstruction and associated left renal parenchymal atrophy. Duplicated right collecting system with similar lower pole moiety dilation and urothelial enhancement. If not previously performed, correlation with urinalysis is  recommended. 5.  Additional findings as outlined in the body of the report Electronically signed by  Alejandra Edwards on 12/31/2024 1:35 PM   Results  Laboratory Studies  Creatinine is 1.43.  I reviewed the patient's new clinical results.  Discussed with patient      Assessment & Plan       Diagnoses and all orders for this visit:    1. Coronary artery disease of native artery of native heart with stable angina pectoris (Primary)  -     Adult Transthoracic Echo Complete W/ Cont if Necessary Per Protocol; Future  -     CT angiogram chest w contrast; Future  -     Complete PFT - Pre & Post Bronchodilator; Future  -     Blood Gas, Arterial -; Future  -     US Carotid Bilateral; Future  -     US Vein Mapping Bilateral; Future    2. Other disorders of arteries, arterioles and capillaries in diseases classified elsewhere  -     US Carotid Bilateral; Future    3. Current every day smoker    4. Stage 3a chronic kidney disease    5. Rectal cancer    6. Type 2 diabetes mellitus without complication, without long-term current use of insulin      Assessment & Plan  1. Left main multivessel coronary disease.  A comprehensive discussion was held  with Dr. Lyla Orozco regarding his condition. A tumor conference was conducted, and it was collectively decided that despite the potential risk of disease progression, it would be prudent to discontinue systemic therapy to address his significant coronary artery disease. In light of the tumor conference discussion, a cardiac approach to reevaluate PCI versus bypass grafting for optimal efficacy will need to be reconsidered. The operative conduct and rationale for the recommendation for coronary artery bypass grafting based on existing information were reviewed. A potential preliminary plan of action was discussed, including the risks of the procedure based on available data. His complete workup will be obtained to facilitate decision-making. He is open to proceeding with surgical revascularization if it is ultimately recommended. He comprehends the potential limitations and benefits of PCI. A new left heart catheterization will be required, and communication with Dr. Adam will be initiated to arrange this. Subsequently, contact with Dr. Orozco will be made to determine the most appropriate treatment plan moving forward.    2. Advanced colon cancer.  He is currently under systemic therapy for advanced colon cancer under the care of Dr. Lyla Orozco. The potential need to discontinue chemotherapy temporarily to address the coronary artery disease was discussed. Depending on the drug, he may need to stop chemotherapy for 2 to 6 weeks. Radiation therapy was also discussed, and it was noted that it does not interfere with the cardiac treatment plan. He will need at least 6 to 8 weeks off chemotherapy before starting it again.      3. Type 2 diabetes mellitus.  His diabetes will be monitored as part of the pre-surgical evaluation. An A1C test will be conducted to assess current control.    4. Carotid artery disease.  A bilateral carotid ultrasound will be performed as part of the pre-surgical testing to evaluate  the extent of the disease.    5. Chronic kidney disease, stage 3a.  His creatinine level is 1.43, consistent with stage 3 chronic kidney disease. He is at risk of his kidneys taking a few more days to bounce back post-surgery and a 2 to 5% risk of dialysis. This will be monitored closely during the pre-surgical evaluation.    6. Smoking cessation.  He is still smoking a few cigarettes. Strongly encouraged him to proceed with smoking cessation to aid in healing and overall prognosis.      He is a current smoker.  Smoking cessation advised.    Many thanks for the opportunity to care for your patient.    I will continue to keep you apprised of provided care as it ensues.              Patient or patient representative verbalized consent for the use of Ambient Listening during the visit with  Joe Back MD for chart documentation. 1/12/2025  15:54 CST

## 2025-01-12 NOTE — PROGRESS NOTES
Chief Complaint   Patient presents with    Coronary Artery Disease     Pt is here for follow up         Subjective     History of Present Illness  The patient returns today for a subsequent follow-up for reconsideration of coronary artery bypass grafting. For the interested reader, I reference my clinic note from 06/19/2024. He is accompanied by his sister.    He has been diagnosed with colorectal cancer and is currently undergoing treatment. His oncologist has expressed optimism regarding the efficacy of the current treatment regimen, as evidenced by the shrinkage of the tumor. A CT scan has been scheduled for 12/31/2024 to further evaluate the response to treatment. He is scheduled to receive an infusion on 01/17/2025. His oncologist has advised a cessation of chemotherapy for a minimum of one month prior to any surgical intervention. He reports experiencing dizziness, which he attributes to his chemotherapy treatment. He also reports episodes of dizziness upon standing up too quickly in the morning, which he believes may be due to dehydration from not consuming water overnight. He reports no chest pain but does experience fatigue, which he attributes to his chemotherapy treatment. This symptom appears to be alleviated by hydration.    He has not completely quit smoking.    SOCIAL HISTORY  The patient admits to smoking and has not completely quit.      The patient presents for left main multivessel coronary disease and advanced colon cancer.    He has been informed about the potential need for radiation therapy as part of his treatment plan, which has caused him some anxiety. He is curious about the possibility of regaining his strength post-treatment.    He expresses concern regarding the scheduling of his surgical procedure, given the involvement of two different specialists. He is also apprehensive about the impact of chemotherapy on his healing process.    SOCIAL HISTORY  He is a non-smoker.    Review of  Systems       Past Medical History:   Diagnosis Date    Arthritis     AV block     recent hx of long pauses, pt supposed to have pacemaker placed ASAP     Bright red rectal bleeding 04/16/2020    Carotid arterial disease     Chronic kidney disease [id number]    Coronary artery disease     History of chemotherapy     History of radiation therapy 09/13/2020    Hyperlipidemia     Hypotension due to hypovolemia 09/18/2020    Irregular heart rate     Pancreatitis     Rectal cancer 04/09/2020    Stroke     had mini stroke while port was placed     Type 2 diabetes mellitus without complication, without long-term current use of insulin 11/20/2020     Past Surgical History:   Procedure Laterality Date    ARM LESION/CYST EXCISION Left 06/15/2021    Procedure: WIDE EXCISION MELANOMA LEFT SHOULDER WITH SPLIT THICKNESS SKIN GRAFT AND SENTINEL LYMPH NODE BIOPSY;  Surgeon: Vielka Weller MD;  Location: [facility] OR;  Service: General;  Laterality: Left;    CARDIAC CATHETERIZATION N/A 06/12/2024    Procedure: Coronary angiography;  Surgeon: Ga Hameed MD;  Location: [facility] CATH INVASIVE LOCATION;  Service: Cardiology;  Laterality: N/A;    CARDIAC CATHETERIZATION N/A 06/12/2024    Procedure: Percutaneous Coronary Intervention;  Surgeon: Ga Hameed MD;  Location: [facility] CATH INVASIVE LOCATION;  Service: Cardiology;  Laterality: N/A;    CAROTID ARTERY ANGIOPLASTY Right 10/2023    stent placed in artery    CAROTID STENT      CATARACT EXTRACTION W/ INTRAOCULAR LENS  IMPLANT, BILATERAL      COLONOSCOPY N/A 04/02/2020    Procedure: COLONOSCOPY WITH ANESTHESIA;  Surgeon: Yanick Flowers DO;  Location: [facility] ENDOSCOPY;  Service: Gastroenterology;  Laterality: N/A;  pre: abnormal CT scan  post: rectal mass  PCP unknown    CYSTECTOMY      ILEOSTOMY      LYMPH NODE BIOPSY      MOUTH SURGERY      PROSTATECTOMY      RECTAL MASS EXCISION  12/21/2020    SENTINEL NODE BIOPSY Left 06/15/2021    Procedure: SENTINEL LYMPH NODE BIOPSY;   Surgeon: Vielka Weller MD;  Location:  [facility] OR;  Service: General;  Laterality: Left;    VENOUS ACCESS DEVICE (PORT) INSERTION N/A 04/14/2020    Procedure: INSERTION VENOUS ACCESS DEVICE;  Surgeon: Vielka Weller MD;  Location:  [facility] OR;  Service: General;  Laterality: N/A;    VENOUS ACCESS DEVICE (PORT) INSERTION  07/2022    VENOUS ACCESS DEVICE (PORT) REMOVAL N/A 05/28/2021    Procedure: PORT REMOVAL, EXCISION OF NEOPLASM UNCERTAIN BEHAVIOR LEFT SHOULDER;  Surgeon: Vielka Weller MD;  Location:  [facility] OR;  Service: General;  Laterality: N/A;     Family History   Problem Relation Age of Onset    Cancer Mother     Stroke Father     Heart disease Father     Heart attack Brother      Social History     Tobacco Use    Smoking status: Some Days     Current packs/day: 0.50     Average packs/day: 0.9 packs/day for 59.0 years (56.0 ttl pk-yrs)     Types: Cigarettes     Start date: 1966     Passive exposure: Past    Smokeless tobacco: Never    Tobacco comments:     As of 6/19/24 - smokes around 6-8 cigarettes per day     As of 01/08/2025 - smokes about 5-10 cigarettes per day   Vaping Use    Vaping status: Never Used   Substance Use Topics    Alcohol use: Not Currently    Drug use: Never     Current Outpatient Medications   Medication Sig Dispense Refill    aspirin 81 MG EC tablet Take 1 tablet by mouth Daily.      atorvastatin (LIPITOR) 40 MG tablet Take 1 tablet by mouth Daily. 90 tablet 3    clopidogrel (Plavix) 75 MG tablet Take 1 tablet by mouth Daily. Begin taking 1 week prior to surgery 30 tablet 2    diphenoxylate-atropine (LOMOTIL) 2.5-0.025 MG per tablet Take 2 tablets by mouth 4 times a day as needed for diarrhea.      ranolazine (Ranexa) 500 MG 12 hr tablet Take 1 tablet by mouth 2 (Two) Times a Day. 60 tablet 11    metFORMIN (GLUCOPHAGE) 500 MG tablet Take 2 tablets by mouth 2 (Two) Times a Day With Meals. 120 tablet 11     No current facility-administered medications for this visit.     Allergies:   "Patient has no known allergies.    Objective      Vital Signs  Visit Vitals  /64   Pulse 77   Ht 177.8 cm (70\")   Wt 78.4 kg (172 lb 12.8 oz)   SpO2 99%   BMI 24.79 kg/m²         Physical Exam  Physical Exam  The patient is in no acute distress, appears appropriate, and is alert.  Lungs are clear to auscultation bilaterally without wheezing, rubs, or rales.  Heart has a regular rate and rhythm without murmurs, rubs, or gallops.  Abdomen is soft and nontender.  There is no clubbing, cyanosis, or edema in the extremities.          Results Review:   CT chest abdomen pelvis w contrast    Result Date: 12/31/2024  Narrative: CONTRAST-ENHANCED CT OF THE CHEST, ABDOMEN, AND PELVIS REASON FOR STUDY/CLINICAL HISTORY: [id number] cancer (CMS/HCC);Info for RADIOLOGIST [H1ST]:->pt with mCRC on chemo, please assess for response to chemo COMPARISON: Prior CT dated 10/7/2024  TECHNIQUE: Axial CT images of the chest, abdomen, and pelvis were obtained after the IV administration of contrast. Multiplanar reformats were also generated. INTRAVENOUS CONTRAST ADMINISTRATION: IV Contrast Dose is documented in the electronic medical record DOSE REPORT: Total DLP: 490 mGycm . Dose modulation was employed for ALARA by means of: Automated exposure control; adjustment of the mA and/or kV according to patient size (this includes techniques or standardized protocols for targeted exams where dose is matched to indication/reason for  exam; i.e. extremities or head); and/or use of iterative reconstructive technique. FINDINGS: CT CHEST: Lungs: Biapical pleural parenchymal scarring with background emphysematous changes. Left upper lobe nodule measures 1.1 x 0.8 cm (series 4 image 155), previously 1.1 x 0.7 cm. Right middle lobe 6 mm nodule is similar in size (image 177), previously 5 to 6 mm. Pleura: Within normal limits Airways: Patent central airways. Cardiovascular: Heart is normal in size. No pericardial effusion. Right chest port with tip in " the distal SVC. Coronary artery calcific lesions are present. Mediastinum: No threshold mediastinal or hilar lymphadenopathy. Stable 7 mm subcarinal lymph node (series 3 image 58). Lower Neck/Chest Wall: Right chest port. No axillary adenopathy. CT ABDOMEN AND PELVIS: Liver: No focal hepatic lesion. Gallbladder/Biliary tree: No biliary ductal dilation. Gallbladder is nondistended. Spleen: Within normal limits Pancreas: Within normal limits Adrenals: Within normal limits Kidneys/Ureters: Right kidney: Duplicated collecting system. Similar lower moiety pelvocaliectasis. Subtle enhancement of the collecting system and ureteral walls, unchanged from prior study. Left kidney: Similar severe hydroureteronephrosis to the level of the anastomosis. Associated renal marked cortical atrophy Gastrointestinal: Postsurgical changes from left ileal conduit urinary diversion and APR with left lower quadrant end colostomy. No significant change in the presacral soft tissue thickening measuring 11 mm (series 3 image 205). Cecum loops into the pelvis as on previous imaging. No obstruction. Peritoneum: No free fluid or free gas left pelvic sidewall nodule may be slightly increased in size measuring 3.9 x 2.5 cm (series 3 image 219), previously 3.5 x 2.2 cm (remeasured) on 10/7/2024 exam and previously measuring 2.2 x 1.6 cm on more remote 8/15/2023 exam. Mild mesenteric fat stranding is similar. Vasculature: Advanced atherosclerotic changes of the abdominal aorta. Patent main portal vein. Lymph Nodes: No change in size of the 8mm left periaortic lymph node (series 3 image 134). Urinary Bladder: Postsurgical changes from cystoprostatectomy and right lower quadrant ileal conduit. Reproductive organs: Prostatectomy Abdominal Wall: Bilateral ostomies. Suggestion of changes of anterior abdominal wall. Bilateral fat-containing hernias. Small right para midline fat-containing hernia. MUSCULOSKELETAL: No acute or suspicious osseous  abnormalities. Degenerative changes of the spine are similar to prior.    Impression: 1.  Postsurgical changes from cystoprostatectomy and right lower quadrant ileal conduit as well as APR with left lower quadrant colostomy. 2.  Left enhancing pelvic soft tissue nodule which demonstrates slight interval increase in size compared to most recent prior exam, but more apparent increase in size compared to more report prior exams. Continued attention on follow-up is suggested. 3.  Stable left upper and right middle lobe pulmonary nodules. 4.  Stable chronic left ureteral obstruction and associated left renal parenchymal atrophy. Duplicated right collecting system with similar lower pole moiety dilation and urothelial enhancement. If not previously performed, correlation with urinalysis is  recommended. 5.  Additional findings as outlined in the body of the report Electronically signed by  Aleajndra Edwards on 12/31/2024 1:35 PM   Results  Laboratory Studies  Creatinine is 1.43.  I reviewed the patient's new clinical results.  Discussed with patient      Assessment & Plan       Diagnoses and all orders for this visit:    1. Coronary artery disease of native artery of native heart with stable angina pectoris (Primary)  -     Adult Transthoracic Echo Complete W/ Cont if Necessary Per Protocol; Future  -     CT angiogram chest w contrast; Future  -     Complete PFT - Pre & Post Bronchodilator; Future  -     Blood Gas, Arterial -; Future  -     US Carotid Bilateral; Future  -     US Vein Mapping Bilateral; Future    2. Other disorders of arteries, arterioles and capillaries in diseases classified elsewhere  -     US Carotid Bilateral; Future    3. Current every day smoker    4. Stage 3a chronic kidney disease    5. Rectal cancer    6. Type 2 diabetes mellitus without complication, without long-term current use of insulin      Assessment & Plan  1. Left main multivessel coronary disease.  A comprehensive discussion was held  with Dr. Lyla Orozco regarding his condition. A tumor conference was conducted, and it was collectively decided that despite the potential risk of disease progression, it would be prudent to discontinue systemic therapy to address his significant coronary artery disease. In light of the tumor conference discussion, a cardiac approach to reevaluate PCI versus bypass grafting for optimal efficacy will need to be reconsidered. The operative conduct and rationale for the recommendation for coronary artery bypass grafting based on existing information were reviewed. A potential preliminary plan of action was discussed, including the risks of the procedure based on available data. His complete workup will be obtained to facilitate decision-making. He is open to proceeding with surgical revascularization if it is ultimately recommended. He comprehends the potential limitations and benefits of PCI. A new left heart catheterization will be required, and communication with Dr. Adam will be initiated to arrange this. Subsequently, contact with Dr. Orozco will be made to determine the most appropriate treatment plan moving forward.    2. Advanced colon cancer.  He is currently under systemic therapy for advanced colon cancer under the care of Dr. Lyla Orozco. The potential need to discontinue chemotherapy temporarily to address the coronary artery disease was discussed. Depending on the drug, he may need to stop chemotherapy for 2 to 6 weeks. Radiation therapy was also discussed, and it was noted that it does not interfere with the cardiac treatment plan. He will need at least 6 to 8 weeks off chemotherapy before starting it again.      3. Type 2 diabetes mellitus.  His diabetes will be monitored as part of the pre-surgical evaluation. An A1C test will be conducted to assess current control.    4. Carotid artery disease.  A bilateral carotid ultrasound will be performed as part of the pre-surgical testing to evaluate  the extent of the disease.    5. Chronic kidney disease, stage 3a.  His creatinine level is 1.43, consistent with stage 3 chronic kidney disease. He is at risk of his kidneys taking a few more days to bounce back post-surgery and a 2 to 5% risk of dialysis. This will be monitored closely during the pre-surgical evaluation.    6. Smoking cessation.  He is still smoking a few cigarettes. Strongly encouraged him to proceed with smoking cessation to aid in healing and overall prognosis.      He is a current smoker.  Smoking cessation advised.    Many thanks for the opportunity to care for your patient.    I will continue to keep you apprised of provided care as it ensues.              Patient or patient representative verbalized consent for the use of Ambient Listening during the visit with  Joe Back MD for chart documentation. 1/12/2025  15:54 CST

## 2025-01-14 ENCOUNTER — HOSPITAL ENCOUNTER (OUTPATIENT)
Dept: ULTRASOUND IMAGING | Facility: HOSPITAL | Age: 75
Discharge: HOME | End: 2025-01-14
Payer: MEDICARE

## 2025-01-14 DIAGNOSIS — I25.118 CORONARY ARTERY DISEASE OF NATIVE ARTERY OF NATIVE HEART WITH STABLE ANGINA PECTORIS: ICD-10-CM

## 2025-01-14 DIAGNOSIS — I79.8 OTHER DISORDERS OF ARTERIES, ARTERIOLES AND CAPILLARIES IN DISEASES CLASSIFIED ELSEWHERE: ICD-10-CM

## 2025-01-14 PROCEDURE — 93880 EXTRACRANIAL BILAT STUDY: CPT

## 2025-01-14 PROCEDURE — 93970 EXTREMITY STUDY: CPT

## 2025-01-14 PROCEDURE — 93880 EXTRACRANIAL BILAT STUDY: CPT | Performed by: SURGERY

## 2025-01-14 PROCEDURE — 93970 EXTREMITY STUDY: CPT | Performed by: SURGERY

## 2025-01-15 ENCOUNTER — HOSPITAL ENCOUNTER (OUTPATIENT)
Facility: HOSPITAL | Age: 75
Setting detail: HOSPITAL OUTPATIENT SURGERY
Discharge: HOME | End: 2025-01-15
Attending: INTERNAL MEDICINE | Admitting: INTERNAL MEDICINE
Payer: MEDICARE

## 2025-01-15 VITALS
HEART RATE: 66 BPM | WEIGHT: 172.3 LBS | OXYGEN SATURATION: 97 % | HEIGHT: 70 IN | TEMPERATURE: 98 F | BODY MASS INDEX: 24.67 KG/M2 | RESPIRATION RATE: 15 BRPM | DIASTOLIC BLOOD PRESSURE: 68 MMHG | SYSTOLIC BLOOD PRESSURE: 122 MMHG

## 2025-01-15 DIAGNOSIS — I25.118 CORONARY ARTERY DISEASE OF NATIVE ARTERY OF NATIVE HEART WITH STABLE ANGINA PECTORIS: ICD-10-CM

## 2025-01-15 LAB
ANION GAP SERPL CALCULATED.3IONS-SCNC: 11 MMOL/L (ref 5–15)
BUN SERPL-MCNC: 17 MG/DL (ref 8–23)
BUN/CREAT SERPL: 12.3 (ref 7–25)
CALCIUM SPEC-SCNC: 9.1 MG/DL (ref 8.6–10.5)
CHLORIDE SERPL-SCNC: 103 MMOL/L (ref 98–107)
CO2 SERPL-SCNC: 24 MMOL/L (ref 22–29)
CREAT SERPL-MCNC: 1.38 MG/DL (ref 0.76–1.27)
DEPRECATED RDW RBC AUTO: 53.2 FL (ref 37–54)
EGFRCR SERPLBLD CKD-EPI 2021: 54 ML/MIN/1.73
ERYTHROCYTE [DISTWIDTH] IN BLOOD BY AUTOMATED COUNT: 15.8 % (ref 12.3–15.4)
GLUCOSE SERPL-MCNC: 153 MG/DL (ref 65–99)
HCT VFR BLD AUTO: 34.5 % (ref 37.5–51)
HGB BLD-MCNC: 11.1 G/DL (ref 13–17.7)
MCH RBC QN AUTO: 29.7 PG (ref 26.6–33)
MCHC RBC AUTO-ENTMCNC: 32.2 G/DL (ref 31.5–35.7)
MCV RBC AUTO: 92.2 FL (ref 79–97)
PLATELET # BLD AUTO: 295 10*3/MM3 (ref 140–450)
PMV BLD AUTO: 8.6 FL (ref 6–12)
POTASSIUM SERPL-SCNC: 4.5 MMOL/L (ref 3.5–5.2)
RBC # BLD AUTO: 3.74 10*6/MM3 (ref 4.14–5.8)
SODIUM SERPL-SCNC: 138 MMOL/L (ref 136–145)
WBC NRBC COR # BLD AUTO: 8.27 10*3/MM3 (ref 3.4–10.8)

## 2025-01-15 PROCEDURE — 99153 MOD SED SAME PHYS/QHP EA: CPT | Performed by: INTERNAL MEDICINE

## 2025-01-15 PROCEDURE — 85027 COMPLETE CBC AUTOMATED: CPT | Performed by: INTERNAL MEDICINE

## 2025-01-15 PROCEDURE — 25010000002 HEPARIN (PORCINE) 1000-0.9 UT/500ML-% SOLUTION: Performed by: INTERNAL MEDICINE

## 2025-01-15 PROCEDURE — C1894 INTRO/SHEATH, NON-LASER: HCPCS | Performed by: INTERNAL MEDICINE

## 2025-01-15 PROCEDURE — 25010000002 DIPHENHYDRAMINE PER 50 MG: Performed by: INTERNAL MEDICINE

## 2025-01-15 PROCEDURE — C1769 GUIDE WIRE: HCPCS | Performed by: INTERNAL MEDICINE

## 2025-01-15 PROCEDURE — 25010000002 HEPARIN (PORCINE) 2000-0.9 UNIT/L-% SOLUTION: Performed by: INTERNAL MEDICINE

## 2025-01-15 PROCEDURE — 80048 BASIC METABOLIC PNL TOTAL CA: CPT | Performed by: INTERNAL MEDICINE

## 2025-01-15 PROCEDURE — 76937 US GUIDE VASCULAR ACCESS: CPT | Performed by: INTERNAL MEDICINE

## 2025-01-15 PROCEDURE — 93458 L HRT ARTERY/VENTRICLE ANGIO: CPT | Performed by: INTERNAL MEDICINE

## 2025-01-15 PROCEDURE — 99152 MOD SED SAME PHYS/QHP 5/>YRS: CPT | Performed by: INTERNAL MEDICINE

## 2025-01-15 PROCEDURE — 25010000002 MIDAZOLAM HCL (PF) 5 MG/5ML SOLUTION: Performed by: INTERNAL MEDICINE

## 2025-01-15 PROCEDURE — 25010000002 FENTANYL CITRATE (PF) 50 MCG/ML SOLUTION: Performed by: INTERNAL MEDICINE

## 2025-01-15 PROCEDURE — 25010000002 HEPARIN (PORCINE) PER 1000 UNITS: Performed by: INTERNAL MEDICINE

## 2025-01-15 PROCEDURE — 25010000002 LIDOCAINE 2% SOLUTION: Performed by: INTERNAL MEDICINE

## 2025-01-15 PROCEDURE — 25510000001 IOPAMIDOL 61 % SOLUTION: Performed by: INTERNAL MEDICINE

## 2025-01-15 RX ORDER — HEPARIN SODIUM 200 [USP'U]/100ML
INJECTION, SOLUTION INTRAVENOUS
Status: DISCONTINUED | OUTPATIENT
Start: 2025-01-15 | End: 2025-01-15 | Stop reason: HOSPADM

## 2025-01-15 RX ORDER — ASPIRIN 81 MG/1
81 TABLET ORAL DAILY
Status: DISCONTINUED | OUTPATIENT
Start: 2025-01-16 | End: 2025-01-15 | Stop reason: HOSPADM

## 2025-01-15 RX ORDER — FENTANYL CITRATE 50 UG/ML
INJECTION, SOLUTION INTRAMUSCULAR; INTRAVENOUS
Status: DISCONTINUED | OUTPATIENT
Start: 2025-01-15 | End: 2025-01-15 | Stop reason: HOSPADM

## 2025-01-15 RX ORDER — HEPARIN SODIUM 1000 [USP'U]/ML
INJECTION, SOLUTION INTRAVENOUS; SUBCUTANEOUS
Status: DISCONTINUED | OUTPATIENT
Start: 2025-01-15 | End: 2025-01-15 | Stop reason: HOSPADM

## 2025-01-15 RX ORDER — ACETAMINOPHEN 325 MG/1
650 TABLET ORAL EVERY 4 HOURS PRN
Status: CANCELLED | OUTPATIENT
Start: 2025-01-15

## 2025-01-15 RX ORDER — LIDOCAINE HYDROCHLORIDE 20 MG/ML
INJECTION, SOLUTION INFILTRATION; PERINEURAL
Status: DISCONTINUED | OUTPATIENT
Start: 2025-01-15 | End: 2025-01-15 | Stop reason: HOSPADM

## 2025-01-15 RX ORDER — IOPAMIDOL 612 MG/ML
INJECTION, SOLUTION INTRAVASCULAR
Status: DISCONTINUED | OUTPATIENT
Start: 2025-01-15 | End: 2025-01-15 | Stop reason: HOSPADM

## 2025-01-15 RX ORDER — SODIUM CHLORIDE 9 MG/ML
40 INJECTION, SOLUTION INTRAVENOUS AS NEEDED
Status: DISCONTINUED | OUTPATIENT
Start: 2025-01-15 | End: 2025-01-15 | Stop reason: HOSPADM

## 2025-01-15 RX ORDER — LOPERAMIDE HYDROCHLORIDE 2 MG/1
2 CAPSULE ORAL 4 TIMES DAILY PRN
COMMUNITY

## 2025-01-15 RX ORDER — SODIUM CHLORIDE 9 MG/ML
100 INJECTION, SOLUTION INTRAVENOUS CONTINUOUS
Status: CANCELLED | OUTPATIENT
Start: 2025-01-15 | End: 2025-01-15

## 2025-01-15 RX ORDER — SODIUM CHLORIDE 0.9 % (FLUSH) 0.9 %
3 SYRINGE (ML) INJECTION EVERY 12 HOURS SCHEDULED
Status: DISCONTINUED | OUTPATIENT
Start: 2025-01-15 | End: 2025-01-15 | Stop reason: HOSPADM

## 2025-01-15 RX ORDER — ASPIRIN 81 MG/1
TABLET, CHEWABLE ORAL
Status: DISCONTINUED
Start: 2025-01-15 | End: 2025-01-15 | Stop reason: WASHOUT

## 2025-01-15 RX ORDER — NITROGLYCERIN 0.4 MG/1
0.4 TABLET SUBLINGUAL
Status: CANCELLED | OUTPATIENT
Start: 2025-01-15

## 2025-01-15 RX ORDER — DIPHENHYDRAMINE HYDROCHLORIDE 50 MG/ML
INJECTION INTRAMUSCULAR; INTRAVENOUS
Status: DISCONTINUED | OUTPATIENT
Start: 2025-01-15 | End: 2025-01-15 | Stop reason: HOSPADM

## 2025-01-15 RX ORDER — VERAPAMIL HYDROCHLORIDE 2.5 MG/ML
INJECTION, SOLUTION INTRAVENOUS
Status: DISCONTINUED | OUTPATIENT
Start: 2025-01-15 | End: 2025-01-15 | Stop reason: HOSPADM

## 2025-01-15 RX ORDER — ASPIRIN 81 MG/1
324 TABLET, CHEWABLE ORAL ONCE
Status: COMPLETED | OUTPATIENT
Start: 2025-01-15 | End: 2025-01-15

## 2025-01-15 RX ORDER — SODIUM CHLORIDE 0.9 % (FLUSH) 0.9 %
10 SYRINGE (ML) INJECTION AS NEEDED
Status: DISCONTINUED | OUTPATIENT
Start: 2025-01-15 | End: 2025-01-15 | Stop reason: HOSPADM

## 2025-01-15 RX ORDER — MIDAZOLAM HYDROCHLORIDE 5 MG/5ML
INJECTION, SOLUTION INTRAMUSCULAR; INTRAVENOUS
Status: DISCONTINUED | OUTPATIENT
Start: 2025-01-15 | End: 2025-01-15 | Stop reason: HOSPADM

## 2025-01-15 RX ADMIN — ASPIRIN 243 MG: 81 TABLET, CHEWABLE ORAL at 07:23

## 2025-01-15 NOTE — INTERVAL H&P NOTE
H&P reviewed. The patient was examined and there are no changes to the H&P.      I discussed cardiac catheterization, the procedure, risks (including bleeding, infection, vascular damage [including minor oozing, bruising, bleeding, and up to and including the need for vascular surgery], contrast reaction, renal failure, respiratory failure, heart attack, stroke, arrhythmia and even death), benefits, and alternatives and the patient has voiced understanding and is willing to proceed.    Will attempt radial access with ultrasound.  With pulse ox on right first digit, there is good waveform when including both ulnar and radial arteries, individually and respectively. His prior diagnostic catheterization had been performed via femoral approach because right radial pulse is not easily palpable.

## 2025-01-15 NOTE — Clinical Note
Bilateral wrist restraints applied for patient safety during procedure.  Head atraumatic, normal cephalic shape.

## 2025-01-15 NOTE — PROCEDURES
"Please see procedural report under \"results\" tab.    Diagnostic coronary angiography completed via left ulnar approach, using 5 [place] catheters.  In brief, no significant changes noted compared to prior procedure from May 2024.  Patient tolerated procedure well with no immediate complications noted.    Electronically signed by Ga Hameed MD, 01/15/25, 8:50 AM CST.    "

## 2025-01-15 NOTE — Clinical Note
A 6 fr sheath was successfully inserted with ultrasound guidance into the right radial artery. Ulnar access

## 2025-01-17 DIAGNOSIS — I65.22 OCCLUSION OF LEFT CAROTID ARTERY: ICD-10-CM

## 2025-01-17 DIAGNOSIS — I65.21 STENOSIS OF RIGHT CAROTID ARTERY: ICD-10-CM

## 2025-01-17 DIAGNOSIS — I25.118 CORONARY ARTERY DISEASE OF NATIVE ARTERY OF NATIVE HEART WITH STABLE ANGINA PECTORIS: Primary | ICD-10-CM

## 2025-01-17 NOTE — PROGRESS NOTES
Reviewed with Dr. Back.  He would like patient to have a CT angiogram of the neck.  Can you coordinate this to be done same day as CT angiogram of the chest

## 2025-01-21 ENCOUNTER — HOSPITAL ENCOUNTER (OUTPATIENT)
Dept: CARDIOLOGY | Facility: HOSPITAL | Age: 75
Discharge: HOME | End: 2025-01-21
Payer: MEDICARE

## 2025-01-21 ENCOUNTER — HOSPITAL ENCOUNTER (OUTPATIENT)
Dept: PULMONOLOGY | Facility: HOSPITAL | Age: 75
Discharge: HOME | End: 2025-01-21
Payer: MEDICARE

## 2025-01-21 ENCOUNTER — HOSPITAL ENCOUNTER (OUTPATIENT)
Dept: CT IMAGING | Facility: HOSPITAL | Age: 75
Discharge: HOME | End: 2025-01-21
Payer: MEDICARE

## 2025-01-21 ENCOUNTER — TELEPHONE (OUTPATIENT)
Dept: CARDIAC SURGERY | Facility: CLINIC | Age: 75
End: 2025-01-21
Payer: MEDICARE

## 2025-01-21 DIAGNOSIS — I65.21 STENOSIS OF RIGHT CAROTID ARTERY: ICD-10-CM

## 2025-01-21 DIAGNOSIS — I65.22 OCCLUSION OF LEFT CAROTID ARTERY: ICD-10-CM

## 2025-01-21 DIAGNOSIS — I25.118 CORONARY ARTERY DISEASE OF NATIVE ARTERY OF NATIVE HEART WITH STABLE ANGINA PECTORIS: ICD-10-CM

## 2025-01-21 LAB
ARTERIAL PATENCY WRIST A: POSITIVE
ATMOSPHERIC PRESS: 769 MMHG
BASE EXCESS BLDA CALC-SCNC: -2.4 MMOL/L (ref 0–2)
BDY SITE: ABNORMAL
BODY TEMPERATURE: 37
HCO3 BLDA-SCNC: 22.3 MMOL/L (ref 20–26)
Lab: ABNORMAL
MODALITY: ABNORMAL
PCO2 BLDA: 37.3 MM HG (ref 35–45)
PCO2 TEMP ADJ BLD: 37.3 MM HG (ref 35–45)
PH BLDA: 7.38 PH UNITS (ref 7.35–7.45)
PH, TEMP CORRECTED: 7.38 PH UNITS (ref 7.35–7.45)
PO2 BLDA: 95 MM HG (ref 83–108)
PO2 TEMP ADJ BLD: 95 MM HG (ref 83–108)
SAO2 % BLDCOA: 96.2 % (ref 94–99)
VENTILATOR MODE: ABNORMAL

## 2025-01-21 PROCEDURE — 25510000001 IOPAMIDOL PER 1 ML: Performed by: NURSE PRACTITIONER

## 2025-01-21 PROCEDURE — 94060 EVALUATION OF WHEEZING: CPT

## 2025-01-21 PROCEDURE — 82565 ASSAY OF CREATININE: CPT

## 2025-01-21 PROCEDURE — 82803 BLOOD GASES ANY COMBINATION: CPT

## 2025-01-21 PROCEDURE — 70498 CT ANGIOGRAPHY NECK: CPT

## 2025-01-21 PROCEDURE — 94726 PLETHYSMOGRAPHY LUNG VOLUMES: CPT

## 2025-01-21 PROCEDURE — 71275 CT ANGIOGRAPHY CHEST: CPT

## 2025-01-21 PROCEDURE — 93306 TTE W/DOPPLER COMPLETE: CPT

## 2025-01-21 PROCEDURE — 36600 WITHDRAWAL OF ARTERIAL BLOOD: CPT

## 2025-01-21 PROCEDURE — 94729 DIFFUSING CAPACITY: CPT

## 2025-01-21 RX ORDER — IOPAMIDOL 755 MG/ML
100 INJECTION, SOLUTION INTRAVASCULAR
Status: COMPLETED | OUTPATIENT
Start: 2025-01-21 | End: 2025-01-21

## 2025-01-21 RX ORDER — ALBUTEROL SULFATE 0.83 MG/ML
2.5 SOLUTION RESPIRATORY (INHALATION) ONCE
Status: COMPLETED | OUTPATIENT
Start: 2025-01-21 | End: 2025-01-21

## 2025-01-21 RX ADMIN — IOPAMIDOL 100 ML: 755 INJECTION, SOLUTION INTRAVENOUS at 09:05

## 2025-01-21 RX ADMIN — ALBUTEROL SULFATE 2.5 MG: 2.5 SOLUTION RESPIRATORY (INHALATION) at 10:04

## 2025-01-21 NOTE — TELEPHONE ENCOUNTER
Pt is here for testing today. He is still here as testing is not complete. He will need to get disc from radiology as these are not made in office. Called pt to let him know. No answer. LM requesting call back before he leaves the hospital.

## 2025-01-21 NOTE — TELEPHONE ENCOUNTER
Pt returned call. Informed him of the above and he voiced understanding. He will get disc and report before he leaves the hospital today.

## 2025-01-21 NOTE — TELEPHONE ENCOUNTER
Dr. Back spoke with patient's oncologist.  He has an upcoming appointment on January 28, please make sure CT scan of the chest is on a disk for patient to take to that appointment with his oncologist.

## 2025-01-22 LAB — CREAT BLDA-MCNC: 1.6 MG/DL (ref 0.6–1.3)

## 2025-01-24 LAB
AV MEAN PRESS GRAD SYS DOP V1V2: 11.3 MMHG
AV VMAX SYS DOP: 238 CM/SEC
BH CV ECHO MEAS - AO MAX PG: 22.7 MMHG
BH CV ECHO MEAS - AO V2 VTI: 53.8 CM
BH CV ECHO MEAS - AVA(I,D): 3.2 CM2
BH CV ECHO MEAS - EDV(CUBED): 91.1 ML
BH CV ECHO MEAS - EDV(MOD-SP2): 82.6 ML
BH CV ECHO MEAS - EDV(MOD-SP4): 58.1 ML
BH CV ECHO MEAS - EF(MOD-SP2): 70.8 %
BH CV ECHO MEAS - EF(MOD-SP4): 54.6 %
BH CV ECHO MEAS - ESV(CUBED): 29.8 ML
BH CV ECHO MEAS - ESV(MOD-SP2): 24.1 ML
BH CV ECHO MEAS - ESV(MOD-SP4): 26.4 ML
BH CV ECHO MEAS - FS: 31.1 %
BH CV ECHO MEAS - IVS/LVPW: 1.27 CM
BH CV ECHO MEAS - IVSD: 1.4 CM
BH CV ECHO MEAS - LA DIMENSION: 3.4 CM
BH CV ECHO MEAS - LAT PEAK E' VEL: 9.6 CM/SEC
BH CV ECHO MEAS - LV DIASTOLIC VOL/BSA (35-75): 29.7 CM2
BH CV ECHO MEAS - LV MASS(C)D: 210.2 GRAMS
BH CV ECHO MEAS - LV MAX PG: 8.1 MMHG
BH CV ECHO MEAS - LV MEAN PG: 5 MMHG
BH CV ECHO MEAS - LV SYSTOLIC VOL/BSA (12-30): 13.5 CM2
BH CV ECHO MEAS - LV V1 MAX: 142 CM/SEC
BH CV ECHO MEAS - LV V1 VTI: 38.1 CM
BH CV ECHO MEAS - LVIDD: 4.5 CM
BH CV ECHO MEAS - LVIDS: 3.1 CM
BH CV ECHO MEAS - LVOT AREA: 4.5 CM2
BH CV ECHO MEAS - LVOT DIAM: 2.4 CM
BH CV ECHO MEAS - LVPWD: 1.1 CM
BH CV ECHO MEAS - MED PEAK E' VEL: 9.4 CM/SEC
BH CV ECHO MEAS - MR MAX PG: 21.3 MMHG
BH CV ECHO MEAS - MR MAX VEL: 231 CM/SEC
BH CV ECHO MEAS - MV A MAX VEL: 91.7 CM/SEC
BH CV ECHO MEAS - MV DEC SLOPE: 278 CM/SEC2
BH CV ECHO MEAS - MV E MAX VEL: 86.5 CM/SEC
BH CV ECHO MEAS - MV E/A: 0.94
BH CV ECHO MEAS - MV P1/2T: 95.1 MSEC
BH CV ECHO MEAS - MVA(P1/2T): 2.31 CM2
BH CV ECHO MEAS - PA V2 MAX: 86.9 CM/SEC
BH CV ECHO MEAS - RAP SYSTOLE: 3 MMHG
BH CV ECHO MEAS - RV MAX PG: 2.02 MMHG
BH CV ECHO MEAS - RV V1 MAX: 71.1 CM/SEC
BH CV ECHO MEAS - RVDD: 3.4 CM
BH CV ECHO MEAS - RVSP: 21.3 MMHG
BH CV ECHO MEAS - SV(LVOT): 172.4 ML
BH CV ECHO MEAS - SV(MOD-SP2): 58.5 ML
BH CV ECHO MEAS - SV(MOD-SP4): 31.7 ML
BH CV ECHO MEAS - SVI(LVOT): 88 ML/M2
BH CV ECHO MEAS - SVI(MOD-SP2): 29.9 ML/M2
BH CV ECHO MEAS - SVI(MOD-SP4): 16.2 ML/M2
BH CV ECHO MEAS - TAPSE (>1.6): 2.8 CM
BH CV ECHO MEAS - TR MAX PG: 18.3 MMHG
BH CV ECHO MEAS - TR MAX VEL: 214 CM/SEC
BH CV ECHO MEASUREMENTS AVERAGE E/E' RATIO: 9.11
BH CV XLRA - RV BASE: 2.8 CM
LEFT ATRIUM VOLUME INDEX: 28 ML/M2
LEFT ATRIUM VOLUME: 54.9 ML
LV EF BIPLANE MOD: 64.1 %
SINUS: 3.1 CM

## 2025-01-30 ENCOUNTER — TELEPHONE (OUTPATIENT)
Dept: CARDIAC SURGERY | Facility: CLINIC | Age: 75
End: 2025-01-30
Payer: MEDICARE

## 2025-01-30 NOTE — TELEPHONE ENCOUNTER
No, last discussion Dr. Back had with his oncologist-patient took new disc to appointment 2 days ago for review.  Need records.

## 2025-01-30 NOTE — TELEPHONE ENCOUNTER
Pt calling to see if a date has been set for his heart surgery yet.  Can reach pt at #[phone]/norm

## 2025-02-03 NOTE — TELEPHONE ENCOUNTER
Per DELANEY Renner, called pt and asked if he is still taking Plavix. He confirms that he is. Informed him that Dr. Back is looking for OR date and we will contact him soon with a date.

## 2025-02-04 ENCOUNTER — HOSPITAL ENCOUNTER (OUTPATIENT)
Facility: HOSPITAL | Age: 75
Setting detail: SURGERY ADMIT
End: 2025-02-04
Attending: THORACIC SURGERY (CARDIOTHORACIC VASCULAR SURGERY) | Admitting: THORACIC SURGERY (CARDIOTHORACIC VASCULAR SURGERY)
Payer: MEDICARE

## 2025-02-04 ENCOUNTER — PREP FOR SURGERY (OUTPATIENT)
Dept: OTHER | Facility: HOSPITAL | Age: 75
End: 2025-02-04
Payer: MEDICARE

## 2025-02-04 DIAGNOSIS — I25.118 CORONARY ARTERY DISEASE OF NATIVE ARTERY OF NATIVE HEART WITH STABLE ANGINA PECTORIS: Primary | ICD-10-CM

## 2025-02-04 DIAGNOSIS — R79.9 ABNORMAL FINDING OF BLOOD CHEMISTRY, UNSPECIFIED: ICD-10-CM

## 2025-02-04 DIAGNOSIS — D69.1 ASPIRIN-LIKE PLATELET FUNCTION DEFECT: ICD-10-CM

## 2025-02-04 RX ORDER — DEXTROSE MONOHYDRATE 25 G/50ML
10-50 INJECTION, SOLUTION INTRAVENOUS
OUTPATIENT
Start: 2025-02-04

## 2025-02-04 RX ORDER — CHLORHEXIDINE GLUCONATE 500 MG/1
CLOTH TOPICAL EVERY 12 HOURS PRN
OUTPATIENT
Start: 2025-02-04

## 2025-02-04 RX ORDER — NICOTINE POLACRILEX 4 MG
15 LOZENGE BUCCAL
OUTPATIENT
Start: 2025-02-04

## 2025-02-04 RX ORDER — SODIUM CHLORIDE 0.9 % (FLUSH) 0.9 %
30 SYRINGE (ML) INJECTION ONCE AS NEEDED
OUTPATIENT
Start: 2025-02-04

## 2025-02-04 RX ORDER — PREGABALIN 25 MG/1
25 CAPSULE ORAL ONCE
OUTPATIENT
Start: 2025-02-13

## 2025-02-04 RX ORDER — SODIUM CHLORIDE 0.9 % (FLUSH) 0.9 %
3 SYRINGE (ML) INJECTION EVERY 12 HOURS SCHEDULED
OUTPATIENT
Start: 2025-02-04

## 2025-02-04 RX ORDER — SODIUM CHLORIDE 9 MG/ML
30 INJECTION, SOLUTION INTRAVENOUS CONTINUOUS PRN
OUTPATIENT
Start: 2025-02-04 | End: 2025-02-05

## 2025-02-04 RX ORDER — IBUPROFEN 600 MG/1
1 TABLET ORAL
OUTPATIENT
Start: 2025-02-04

## 2025-02-04 RX ORDER — ACETAMINOPHEN 500 MG
1000 TABLET ORAL ONCE
OUTPATIENT
Start: 2025-02-13

## 2025-02-04 RX ORDER — SODIUM CHLORIDE 0.9 % (FLUSH) 0.9 %
3-10 SYRINGE (ML) INJECTION AS NEEDED
OUTPATIENT
Start: 2025-02-04

## 2025-02-04 NOTE — TELEPHONE ENCOUNTER
Surgery orders have been placed, Bactroban has been sent to his pharmacy.  Please schedule & notify patient.

## 2025-02-04 NOTE — TELEPHONE ENCOUNTER
Attila Viramontes is aware of prework date/time and surgery date 20/13, arrival at 0500. Pt is aware of Bactroban prescription and instructions for use at 1700 the evening prior to surgery. Pt is aware nothing to eat or drink and no medications after midnight (unless approved by prework). Aware to stop Plavix on 02/08/2025. Pt voiced understanding to all and requested a [employer] message. Will send this to him today.

## 2025-02-07 ENCOUNTER — OFFICE VISIT (OUTPATIENT)
Dept: INTERNAL MEDICINE | Facility: CLINIC | Age: 75
End: 2025-02-07
Payer: MEDICARE

## 2025-02-07 VITALS
HEIGHT: 70 IN | HEART RATE: 74 BPM | DIASTOLIC BLOOD PRESSURE: 70 MMHG | WEIGHT: 178.2 LBS | OXYGEN SATURATION: 97 % | BODY MASS INDEX: 25.51 KG/M2 | TEMPERATURE: 97.7 F | SYSTOLIC BLOOD PRESSURE: 120 MMHG

## 2025-02-07 DIAGNOSIS — E11.22 TYPE 2 DIABETES MELLITUS WITH STAGE 3A CHRONIC KIDNEY DISEASE, WITHOUT LONG-TERM CURRENT USE OF INSULIN: Primary | ICD-10-CM

## 2025-02-07 DIAGNOSIS — Z93.3 COLOSTOMY IN PLACE: ICD-10-CM

## 2025-02-07 DIAGNOSIS — N18.31 STAGE 3A CHRONIC KIDNEY DISEASE: ICD-10-CM

## 2025-02-07 DIAGNOSIS — N18.31 TYPE 2 DIABETES MELLITUS WITH STAGE 3A CHRONIC KIDNEY DISEASE, WITHOUT LONG-TERM CURRENT USE OF INSULIN: Primary | ICD-10-CM

## 2025-02-07 DIAGNOSIS — I25.118 CORONARY ARTERY DISEASE OF NATIVE ARTERY OF NATIVE HEART WITH STABLE ANGINA PECTORIS: ICD-10-CM

## 2025-02-07 PROBLEM — E11.29 TYPE 2 DIABETES MELLITUS WITH KIDNEY COMPLICATION, WITHOUT LONG-TERM CURRENT USE OF INSULIN: Status: ACTIVE | Noted: 2020-11-20

## 2025-02-07 PROCEDURE — 1126F AMNT PAIN NOTED NONE PRSNT: CPT | Performed by: INTERNAL MEDICINE

## 2025-02-07 PROCEDURE — 99214 OFFICE O/P EST MOD 30 MIN: CPT | Performed by: INTERNAL MEDICINE

## 2025-02-07 PROCEDURE — 3078F DIAST BP <80 MM HG: CPT | Performed by: INTERNAL MEDICINE

## 2025-02-07 PROCEDURE — G2211 COMPLEX E/M VISIT ADD ON: HCPCS | Performed by: INTERNAL MEDICINE

## 2025-02-07 PROCEDURE — 3074F SYST BP LT 130 MM HG: CPT | Performed by: INTERNAL MEDICINE

## 2025-02-11 ENCOUNTER — PRE-ADMISSION TESTING (OUTPATIENT)
Dept: PREADMISSION TESTING | Facility: HOSPITAL | Age: 75
End: 2025-02-11
Payer: MEDICARE

## 2025-02-11 ENCOUNTER — HOSPITAL ENCOUNTER (OUTPATIENT)
Dept: GENERAL RADIOLOGY | Facility: HOSPITAL | Age: 75
Discharge: HOME | End: 2025-02-11
Payer: MEDICARE

## 2025-02-11 ENCOUNTER — ANESTHESIA EVENT (OUTPATIENT)
Dept: PERIOP | Facility: HOSPITAL | Age: 75
End: 2025-02-11
Payer: MEDICARE

## 2025-02-11 VITALS
BODY MASS INDEX: 25.31 KG/M2 | RESPIRATION RATE: 18 BRPM | WEIGHT: 176.81 LBS | HEART RATE: 85 BPM | HEIGHT: 70 IN | SYSTOLIC BLOOD PRESSURE: 94 MMHG | OXYGEN SATURATION: 98 % | DIASTOLIC BLOOD PRESSURE: 55 MMHG

## 2025-02-11 DIAGNOSIS — D69.1 ASPIRIN-LIKE PLATELET FUNCTION DEFECT: ICD-10-CM

## 2025-02-11 DIAGNOSIS — I25.118 CORONARY ARTERY DISEASE OF NATIVE ARTERY OF NATIVE HEART WITH STABLE ANGINA PECTORIS: ICD-10-CM

## 2025-02-11 DIAGNOSIS — R79.9 ABNORMAL FINDING OF BLOOD CHEMISTRY, UNSPECIFIED: ICD-10-CM

## 2025-02-11 LAB
ABO GROUP BLD: NORMAL
ALBUMIN SERPL-MCNC: 4.4 G/DL (ref 3.5–5.2)
ALBUMIN/GLOB SERPL: 1.3 G/DL
ALP SERPL-CCNC: 106 U/L (ref 39–117)
ALT SERPL W P-5'-P-CCNC: 15 U/L (ref 1–41)
ANION GAP SERPL CALCULATED.3IONS-SCNC: 11 MMOL/L (ref 5–15)
APTT PPP: 24.6 SECONDS (ref 24.5–36)
AST SERPL-CCNC: 17 U/L (ref 1–40)
BASOPHILS # BLD AUTO: 0.08 10*3/MM3 (ref 0–0.2)
BASOPHILS NFR BLD AUTO: 0.7 % (ref 0–1.5)
BILIRUB SERPL-MCNC: 0.3 MG/DL (ref 0–1.2)
BLD GP AB SCN SERPL QL: NEGATIVE
BUN SERPL-MCNC: 26 MG/DL (ref 8–23)
BUN/CREAT SERPL: 17.4 (ref 7–25)
CALCIUM SPEC-SCNC: 9.3 MG/DL (ref 8.6–10.5)
CHLORIDE SERPL-SCNC: 106 MMOL/L (ref 98–107)
CO2 SERPL-SCNC: 22 MMOL/L (ref 22–29)
CREAT SERPL-MCNC: 1.49 MG/DL (ref 0.76–1.27)
DEPRECATED RDW RBC AUTO: 54.1 FL (ref 37–54)
EGFRCR SERPLBLD CKD-EPI 2021: 49.2 ML/MIN/1.73
EOSINOPHIL # BLD AUTO: 0.43 10*3/MM3 (ref 0–0.4)
EOSINOPHIL NFR BLD AUTO: 4 % (ref 0.3–6.2)
ERYTHROCYTE [DISTWIDTH] IN BLOOD BY AUTOMATED COUNT: 15.9 % (ref 12.3–15.4)
GLOBULIN UR ELPH-MCNC: 3.3 GM/DL
GLUCOSE SERPL-MCNC: 145 MG/DL (ref 65–99)
HBA1C MFR BLD: 7.8 % (ref 4.8–5.6)
HCT VFR BLD AUTO: 39.1 % (ref 37.5–51)
HGB BLD-MCNC: 12.3 G/DL (ref 13–17.7)
IMM GRANULOCYTES # BLD AUTO: 0.1 10*3/MM3 (ref 0–0.05)
IMM GRANULOCYTES NFR BLD AUTO: 0.9 % (ref 0–0.5)
INR PPP: 0.96 (ref 0.91–1.09)
LYMPHOCYTES # BLD AUTO: 1.12 10*3/MM3 (ref 0.7–3.1)
LYMPHOCYTES NFR BLD AUTO: 10.5 % (ref 19.6–45.3)
MCH RBC QN AUTO: 29 PG (ref 26.6–33)
MCHC RBC AUTO-ENTMCNC: 31.5 G/DL (ref 31.5–35.7)
MCV RBC AUTO: 92.2 FL (ref 79–97)
MONOCYTES # BLD AUTO: 0.79 10*3/MM3 (ref 0.1–0.9)
MONOCYTES NFR BLD AUTO: 7.4 % (ref 5–12)
NEUTROPHILS NFR BLD AUTO: 76.5 % (ref 42.7–76)
NEUTROPHILS NFR BLD AUTO: 8.19 10*3/MM3 (ref 1.7–7)
NRBC BLD AUTO-RTO: 0 /100 WBC (ref 0–0.2)
PLATELET # BLD AUTO: 343 10*3/MM3 (ref 140–450)
PMV BLD AUTO: 8.9 FL (ref 6–12)
POTASSIUM SERPL-SCNC: 4.4 MMOL/L (ref 3.5–5.2)
PROT SERPL-MCNC: 7.7 G/DL (ref 6–8.5)
PROTHROMBIN TIME: 13.3 SECONDS (ref 11.8–14.8)
QT INTERVAL: 414 MS
QTC INTERVAL: 468 MS
RBC # BLD AUTO: 4.24 10*6/MM3 (ref 4.14–5.8)
RH BLD: POSITIVE
SODIUM SERPL-SCNC: 139 MMOL/L (ref 136–145)
T&S EXPIRATION DATE: NORMAL
WBC NRBC COR # BLD AUTO: 10.71 10*3/MM3 (ref 3.4–10.8)

## 2025-02-11 PROCEDURE — 86901 BLOOD TYPING SEROLOGIC RH(D): CPT

## 2025-02-11 PROCEDURE — 85730 THROMBOPLASTIN TIME PARTIAL: CPT

## 2025-02-11 PROCEDURE — 93005 ELECTROCARDIOGRAM TRACING: CPT

## 2025-02-11 PROCEDURE — 80053 COMPREHEN METABOLIC PANEL: CPT

## 2025-02-11 PROCEDURE — 71046 X-RAY EXAM CHEST 2 VIEWS: CPT

## 2025-02-11 PROCEDURE — 85610 PROTHROMBIN TIME: CPT

## 2025-02-11 PROCEDURE — 86900 BLOOD TYPING SEROLOGIC ABO: CPT

## 2025-02-11 PROCEDURE — 36415 COLL VENOUS BLD VENIPUNCTURE: CPT

## 2025-02-11 PROCEDURE — 83036 HEMOGLOBIN GLYCOSYLATED A1C: CPT

## 2025-02-11 PROCEDURE — 86850 RBC ANTIBODY SCREEN: CPT

## 2025-02-11 PROCEDURE — 85025 COMPLETE CBC W/AUTO DIFF WBC: CPT

## 2025-02-11 RX ORDER — SODIUM CHLORIDE 0.9 % (FLUSH) 0.9 %
3-10 SYRINGE (ML) INJECTION AS NEEDED
Status: CANCELLED | OUTPATIENT
Start: 2025-02-11

## 2025-02-11 RX ORDER — MIDAZOLAM HYDROCHLORIDE 2 MG/2ML
2 INJECTION, SOLUTION INTRAMUSCULAR; INTRAVENOUS
Status: CANCELLED | OUTPATIENT
Start: 2025-02-11

## 2025-02-11 RX ORDER — SODIUM CHLORIDE, SODIUM LACTATE, POTASSIUM CHLORIDE, CALCIUM CHLORIDE 600; 310; 30; 20 MG/100ML; MG/100ML; MG/100ML; MG/100ML
100 INJECTION, SOLUTION INTRAVENOUS CONTINUOUS
Status: CANCELLED | OUTPATIENT
Start: 2025-02-11 | End: 2025-02-12

## 2025-02-11 RX ORDER — SODIUM CHLORIDE 9 MG/ML
40 INJECTION, SOLUTION INTRAVENOUS AS NEEDED
Status: CANCELLED | OUTPATIENT
Start: 2025-02-11 | End: 2025-02-12

## 2025-02-11 RX ORDER — SODIUM CHLORIDE 0.9 % (FLUSH) 0.9 %
3 SYRINGE (ML) INJECTION EVERY 12 HOURS SCHEDULED
Status: CANCELLED | OUTPATIENT
Start: 2025-02-11

## 2025-02-11 NOTE — TELEPHONE ENCOUNTER
Per Anne Mac, DELANEY instruction, called pt and informed him that his case is moved from 02/13/2024 to 02/14/2025. Proceed to check in at main registration at 0500 and use Bactroban on 02/13/2025. All other instructions remain the same. Pt had prework today. Pt voiced understanding.    Case will be moved on the schedule.

## 2025-02-11 NOTE — ANESTHESIA PREPROCEDURE EVALUATION
Anesthesia Evaluation     Patient summary reviewed and Nursing notes reviewed   no history of anesthetic complications:   NPO Solid Status: > 8 hours  NPO Liquid Status: > 8 hours           Airway   Mallampati: II  TM distance: >3 FB  Neck ROM: full  No difficulty expected  Dental - normal exam     Comment: Numerous missing teeth      Pulmonary    (+) a smoker (quit 3 weeks ago) Former,  (-) asthma, sleep apnea  Cardiovascular   Exercise tolerance: good (4-7 METS)    (+) hypertension, CAD, dysrhythmias Bradycardia, angina, hyperlipidemia,  carotid artery disease left carotid  (-) pacemaker, cardiac stents    ROS comment: Echo:  ·  Left ventricular systolic function is normal. Left ventricular ejection fraction appears to be 61 - 65%.  ·  Left ventricular wall thickness is consistent with mild septal asymmetric hypertrophy.  ·  There is mild calcification of the aortic valve, with trace-mild aortic regurgitation and no significant stenosis.  ·  Estimated right ventricular systolic pressure from tricuspid regurgitation is normal (<35 mmHg).  ·  Normal size and function of the right ventricle.  ·  No significant (worse than mild) valvular pathology.  ·  No previous studies performed here available for comparison.    Cardiac cath:  Impression:  1. 3 vessel coronary artery disease, including focal severe mid LAD stenosis, severe ostial circumflex and OM1 stenosis, and 100% chronic total occlusion of the right coronary artery. Overall, no significant changes compared to previous catheterization in 2024 (6/12/24)  2. Normal LVEF with normal EDP          Neuro/Psych  (+) CVA  (-) seizures, TIA  GI/Hepatic/Renal/Endo    (+) renal disease- CRI, diabetes mellitus type 2  (-) liver disease    ROS Comment: Colostomy  Urostomy    Musculoskeletal     Abdominal  - normal exam   Substance History      OB/GYN          Other   arthritis,   history of cancer (rectal cancer,) remission                      Anesthesia Plan    ASA 4      general, Sherice and CVL     intravenous induction     Anesthetic plan, risks, benefits, and alternatives have been provided, discussed and informed consent has been obtained with: patient.

## 2025-02-11 NOTE — DISCHARGE INSTRUCTIONS
Preparing for Surgery  Follow these instructions before the procedure:  Several days or weeks before your procedure  Medication(s) you need to stop   _______ days/week prior to surgery:       Ask your health care provider about:  Changing or stopping your regular medicines. This is especially important if you are taking diabetes medicines or blood thinners.  Taking medicines such as aspirin and ibuprofen. These medicines can thin your blood. Do not take these medicines unless your health care provider tells you to take them.  Taking over-the-counter medicines, vitamins, herbs, and supplements.    Contact your surgeon if you:  Develop a fever of more than 100.4°F (38°C) or other feelings of illness during the 48 hours before your surgery.  Have symptoms that get worse.  Have questions or concerns about your surgery.  If you are going home the same day of your surgery you will need to arrange for a responsible adult, age 18 years old or older, to drive you home from the hospital and stay with you for 24 hours. Verification of the  will be made prior to any procedure requiring sedation. You may not go home in a taxi or any form of public transportation by yourself.     Day before your procedure    24 hours before your procedure DO NOT drink alcoholic beverages or smoke.  24 hours before your procedure STOP taking Erectile Dysfunction medication (i.e.,Cialis, Viagra)   You may be asked to shower with a germ-killing soap.  Day of your procedure       8 hours before your scheduled arrival time, STOP all food, any dairy products, and full liquids. This includes hard candy, chewing gum or mints. This is extremely important to prevent serious complications.     Up to 2 hours before your scheduled arrival time, you may have clear liquids no cream, powder, or pulp of any kind. Safe options are water, black coffee, plain tea, soda, Gatorade/Powerade, clear broth, apple juice.    2 hours before your scheduled arrival time,  STOP drinking clear liquids.    You may need to take another shower with a germ-killing soap before you leave home in the morning. Do not use perfumes, colognes, or body lotions.  Wear comfortable loose-fitting clothing.  Remove all jewelry including body piercing and rings, dark colored nail polish, and make up prior to arrival at the hospital. Leave all valuables at home.   Bring your hearing aids if you rely on them.  Do not wear contact lenses. If you wear eyeglasses remember to bring a case to store them in while you are in surgery.  Do not use denture adhesives since you will be asked to remove them during your surgery.    You do not need to bring your home medications into the hospital.   Bring your sleep apnea device with you on the day of your surgery (if this applies to you).  If you have an Inspire implant for sleep apnea, please bring the remote with you on the day of surgery.  If you wear portable oxygen, bring it with you.   If you are staying overnight, you may bring a bag of items you may need such as slippers, robe and a change of clothes for your discharge. You may want to leave these items in the car until you are ready for them since your family will take your belongings when you leave the pre-operative area.  Arrive at the hospital as scheduled by the office. You will be asked to arrive 2 hours prior to your surgery time in order to prepare for your procedure.  When you arrive at the hospital  Go to the registration desk located at the main entrance of the hospital.  After registration is completed, you will be given a beeper and a sticker sheet. Take the stickers to Outpatient Surgery and place in the tray at the end of the desk to notify the staff that you have arrived and registered.   Return to the lobby to wait. You are not always called back according to the time of arrival but rather the time your doctor will be ready.  When your beeper lights up and vibrates proceed through the double  doors, under the stairs, and a member of the Outpatient Surgery staff will escort you to your preoperative room.   How to Use Chlorhexidine Before Surgery  Chlorhexidine gluconate (CHG) is a germ-killing (antiseptic) solution that is used to clean the skin. It can get rid of the bacteria that normally live on the skin and can keep them away for about 24 hours. To clean your skin with CHG, you may be given:  A CHG solution to use in the shower or as part of a sponge bath.  A prepackaged cloth that contains CHG.  Cleaning your skin with CHG may help lower the risk for infection:  While you are staying in the intensive care unit of the hospital.  If you have a vascular access, such as a central line, to provide short-term or long-term access to your veins.  If you have a catheter to drain urine from your bladder.  If you are on a ventilator. A ventilator is a machine that helps you breathe by moving air in and out of your lungs.  After surgery.  What are the risks?  Risks of using CHG include:  A skin reaction.  Hearing loss, if CHG gets in your ears and you have a perforated eardrum.  Eye injury, if CHG gets in your eyes and is not rinsed out.  The CHG product catching fire.  Make sure that you avoid smoking and flames after applying CHG to your skin.  Do not use CHG:  If you have a chlorhexidine allergy or have previously reacted to chlorhexidine.  On babies younger than 2 months of age.  How to use CHG solution  Use CHG only as told by your health care provider, and follow the instructions on the label.  Use the full amount of CHG as directed. Usually, this is one bottle.  During a shower    Follow these steps when using CHG solution during a shower (unless your health care provider gives you different instructions):  Start the shower.  Use your normal soap and shampoo to wash your face and hair.  Turn off the shower or move out of the shower stream.  Pour the CHG onto a clean washcloth. Do not use any type of brush  or rough-edged sponge.  Starting at your neck, lather your body down to your toes. Make sure you follow these instructions:  If you will be having surgery, pay special attention to the part of your body where you will be having surgery. Scrub this area for at least 1 minute.  Do not use CHG on your head or face. If the solution gets into your ears or eyes, rinse them well with water.  Avoid your genital area.  Avoid any areas of skin that have broken skin, cuts, or scrapes.  Scrub your back and under your arms. Make sure to wash skin folds.  Let the lather sit on your skin for 1-2 minutes or as long as told by your health care provider.  Thoroughly rinse your entire body in the shower. Make sure that all body creases and crevices are rinsed well.  Dry off with a clean towel. Do not put any substances on your body afterward--such as powder, lotion, or perfume--unless you are told to do so by your health care provider. Only use lotions that are recommended by the .  Put on clean clothes or pajamas.  If it is the night before your surgery, sleep in clean sheets.     During a sponge bath  Follow these steps when using CHG solution during a sponge bath (unless your health care provider gives you different instructions):  Use your normal soap and shampoo to wash your face and hair.  Pour the CHG onto a clean washcloth.  Starting at your neck, lather your body down to your toes. Make sure you follow these instructions:  If you will be having surgery, pay special attention to the part of your body where you will be having surgery. Scrub this area for at least 1 minute.  Do not use CHG on your head or face. If the solution gets into your ears or eyes, rinse them well with water.  Avoid your genital area.  Avoid any areas of skin that have broken skin, cuts, or scrapes.  Scrub your back and under your arms. Make sure to wash skin folds.  Let the lather sit on your skin for 1-2 minutes or as long as told by your  health care provider.  Using a different clean, wet washcloth, thoroughly rinse your entire body. Make sure that all body creases and crevices are rinsed well.  Dry off with a clean towel. Do not put any substances on your body afterward--such as powder, lotion, or perfume--unless you are told to do so by your health care provider. Only use lotions that are recommended by the .  Put on clean clothes or pajamas.  If it is the night before your surgery, sleep in clean sheets.  How to use CHG prepackaged cloths  Only use CHG cloths as told by your health care provider, and follow the instructions on the label.  Use the CHG cloth on clean, dry skin.  Do not use the CHG cloth on your head or face unless your health care provider tells you to.  When washing with the CHG cloth:  Avoid your genital area.  Avoid any areas of skin that have broken skin, cuts, or scrapes.  Before surgery    Follow these steps when using a CHG cloth to clean before surgery (unless your health care provider gives you different instructions):  Using the CHG cloth, vigorously scrub the part of your body where you will be having surgery. Scrub using a back-and-forth motion for 3 minutes. The area on your body should be completely wet with CHG when you are done scrubbing.  Do not rinse. Discard the cloth and let the area air-dry. Do not put any substances on the area afterward, such as powder, lotion, or perfume.  Put on clean clothes or pajamas.  If it is the night before your surgery, sleep in clean sheets.     For general bathing  Follow these steps when using CHG cloths for general bathing (unless your health care provider gives you different instructions).  Use a separate CHG cloth for each area of your body. Make sure you wash between any folds of skin and between your fingers and toes. Wash your body in the following order, switching to a new cloth after each step:  The front of your neck, shoulders, and chest.  Both of your arms,  under your arms, and your hands.  Your stomach and groin area, avoiding the genitals.  Your right leg and foot.  Your left leg and foot.  The back of your neck, your back, and your buttocks.  Do not rinse. Discard the cloth and let the area air-dry. Do not put any substances on your body afterward--such as powder, lotion, or perfume--unless you are told to do so by your health care provider. Only use lotions that are recommended by the .  Put on clean clothes or pajamas.  Contact a health care provider if:  Your skin gets irritated after scrubbing.  You have questions about using your solution or cloth.  You swallow any chlorhexidine. Call your local poison control center (1-177.406.9061 in the [place]  Get help right away if:  Your eyes itch badly, or they become very red or swollen.  Your skin itches badly and is red or swollen.  Your hearing changes.  You have trouble seeing.  You have swelling or tingling in your mouth or throat.  You have trouble breathing.  These symptoms may represent a serious problem that is an emergency. Do not wait to see if the symptoms will go away. Get medical help right away. Call your local emergency services (114 in the [place] Do not drive yourself to the hospital.  Summary  Chlorhexidine gluconate (CHG) is a germ-killing (antiseptic) solution that is used to clean the skin. Cleaning your skin with CHG may help to lower your risk for infection.  You may be given CHG to use for bathing. It may be in a bottle or in a prepackaged cloth to use on your skin. Carefully follow your health care provider's instructions and the instructions on the product label.  Do not use CHG if you have a chlorhexidine allergy.  Contact your health care provider if your skin gets irritated after scrubbing.  This information is not intended to replace advice given to you by your health care provider. Make sure you discuss any questions you have with your health care provider.  Document Revised:  04/17/2023 Document Reviewed: 02/28/2022  Elsevier Patient Education © 2023 [employer]

## 2025-02-13 ENCOUNTER — ANESTHESIA (OUTPATIENT)
Dept: PERIOP | Facility: HOSPITAL | Age: 75
End: 2025-02-13
Payer: MEDICARE

## 2025-02-14 ENCOUNTER — TELEPHONE (OUTPATIENT)
Dept: CARDIAC SURGERY | Facility: CLINIC | Age: 75
End: 2025-02-14
Payer: MEDICARE

## 2025-02-14 ENCOUNTER — PREP FOR SURGERY (OUTPATIENT)
Dept: OTHER | Facility: HOSPITAL | Age: 75
End: 2025-02-14
Payer: MEDICARE

## 2025-02-14 NOTE — TELEPHONE ENCOUNTER
Called pt and discussed the above. Registration has an office in ER. Advised pt to report to security window in ER at 0500 and they will make sure registration is aware pt is preset for direct admit for surgery. NPO/no medications after midnight. Use Bactroban this evening at 1700. Pt voiced understanding to all.

## 2025-02-14 NOTE — TELEPHONE ENCOUNTER
Received further information, please inform patient to arrive at 5 AM tomorrow February 15 to the ER triage area.  He will register there and then be direct admitted to the ICU where they will proceed to get him ready for surgery that morning.

## 2025-02-14 NOTE — TELEPHONE ENCOUNTER
Dr. Back spoke with patient yesterday afternoon and informed him that unfortunately his surgery would be moved to February 15, tomorrow.  Patient is agreeable to this plan.  Dr. Back once his arrival time to be 530 tomorrow morning.  We are working on identifying arrival location and when I have more information from staff I will update this message thread.

## 2025-02-15 ENCOUNTER — APPOINTMENT (OUTPATIENT)
Dept: CARDIOLOGY | Facility: HOSPITAL | Age: 75
End: 2025-02-15
Payer: MEDICARE

## 2025-02-15 ENCOUNTER — APPOINTMENT (OUTPATIENT)
Dept: GENERAL RADIOLOGY | Facility: HOSPITAL | Age: 75
End: 2025-02-15
Payer: MEDICARE

## 2025-02-15 ENCOUNTER — NURSE TRIAGE (OUTPATIENT)
Dept: CALL CENTER | Facility: HOSPITAL | Age: 75
End: 2025-02-15
Payer: MEDICARE

## 2025-02-15 ENCOUNTER — HOSPITAL ENCOUNTER (INPATIENT)
Facility: HOSPITAL | Age: 75
LOS: 5 days | Discharge: HOME | End: 2025-02-20
Attending: THORACIC SURGERY (CARDIOTHORACIC VASCULAR SURGERY) | Admitting: THORACIC SURGERY (CARDIOTHORACIC VASCULAR SURGERY)
Payer: MEDICARE

## 2025-02-15 DIAGNOSIS — N28.9 FUNCTION KIDNEY DECREASED: ICD-10-CM

## 2025-02-15 DIAGNOSIS — Z74.09 IMPAIRED MOBILITY: Primary | ICD-10-CM

## 2025-02-15 DIAGNOSIS — I25.118 CORONARY ARTERY DISEASE OF NATIVE ARTERY OF NATIVE HEART WITH STABLE ANGINA PECTORIS: ICD-10-CM

## 2025-02-15 DIAGNOSIS — N18.31 STAGE 3A CHRONIC KIDNEY DISEASE: ICD-10-CM

## 2025-02-15 PROBLEM — I25.10 CAD (CORONARY ARTERY DISEASE): Status: ACTIVE | Noted: 2025-02-15

## 2025-02-15 LAB
A-A DO2: ABNORMAL
ABO GROUP BLD: NORMAL
ALBUMIN SERPL-MCNC: 3.2 G/DL (ref 3.5–5.2)
ALBUMIN SERPL-MCNC: 3.3 G/DL (ref 3.5–5.2)
ALBUMIN/GLOB SERPL: 1.7 G/DL
ALP SERPL-CCNC: 76 U/L (ref 39–117)
ALT SERPL W P-5'-P-CCNC: 11 U/L (ref 1–41)
ANION GAP SERPL CALCULATED.3IONS-SCNC: 6 MMOL/L (ref 5–15)
ANION GAP SERPL CALCULATED.3IONS-SCNC: 9 MMOL/L (ref 5–15)
APTT PPP: 32.2 SECONDS (ref 24.5–36)
APTT PPP: 42.9 SECONDS (ref 24.5–36)
ARTERIAL PATENCY WRIST A: ABNORMAL
AST SERPL-CCNC: 21 U/L (ref 1–40)
ATMOSPHERIC PRESS: 742 MMHG
ATMOSPHERIC PRESS: 743 MMHG
ATMOSPHERIC PRESS: 747 MMHG
ATMOSPHERIC PRESS: 749 MMHG
ATMOSPHERIC PRESS: 750 MMHG
ATMOSPHERIC PRESS: 750 MMHG
ATMOSPHERIC PRESS: 751 MMHG
ATMOSPHERIC PRESS: 751 MMHG
BASE EXCESS BLDA CALC-SCNC: -1.2 MMOL/L (ref 0–2)
BASE EXCESS BLDA CALC-SCNC: -1.3 MMOL/L (ref 0–2)
BASE EXCESS BLDA CALC-SCNC: -2 MMOL/L (ref 0–2)
BASE EXCESS BLDA CALC-SCNC: -2.2 MMOL/L (ref 0–2)
BASE EXCESS BLDA CALC-SCNC: -4.3 MMOL/L (ref 0–2)
BASE EXCESS BLDA CALC-SCNC: -4.8 MMOL/L (ref 0–2)
BASE EXCESS BLDA CALC-SCNC: -5.6 MMOL/L (ref 0–2)
BASE EXCESS BLDA CALC-SCNC: 0.1 MMOL/L (ref 0–2)
BASOPHILS # BLD AUTO: 0.07 10*3/MM3 (ref 0–0.2)
BASOPHILS NFR BLD AUTO: 0.4 % (ref 0–1.5)
BDY SITE: ABNORMAL
BILIRUB SERPL-MCNC: 0.2 MG/DL (ref 0–1.2)
BLD GP AB SCN SERPL QL: NEGATIVE
BODY TEMPERATURE: 37
BUN SERPL-MCNC: 22 MG/DL (ref 8–23)
BUN SERPL-MCNC: 22 MG/DL (ref 8–23)
BUN/CREAT SERPL: 16.3 (ref 7–25)
BUN/CREAT SERPL: 16.4 (ref 7–25)
CA-I BLD-MCNC: 4.15 MG/DL (ref 4.6–5.4)
CA-I BLD-MCNC: 4.28 MG/DL (ref 4.6–5.4)
CA-I BLD-MCNC: 4.69 MG/DL (ref 4.6–5.4)
CA-I BLD-MCNC: 4.8 MG/DL (ref 4.6–5.4)
CA-I BLD-MCNC: 4.93 MG/DL (ref 4.6–5.4)
CA-I BLD-MCNC: 5.07 MG/DL (ref 4.6–5.4)
CALCIUM SPEC-SCNC: 8 MG/DL (ref 8.6–10.5)
CALCIUM SPEC-SCNC: 8.3 MG/DL (ref 8.6–10.5)
CHLORIDE SERPL-SCNC: 112 MMOL/L (ref 98–107)
CHLORIDE SERPL-SCNC: 113 MMOL/L (ref 98–107)
CO2 SERPL-SCNC: 22 MMOL/L (ref 22–29)
CO2 SERPL-SCNC: 24 MMOL/L (ref 22–29)
COHGB MFR BLD: 1.7 % (ref 0–5)
COHGB MFR BLD: 2 % (ref 0–5)
COHGB MFR BLD: 2 % (ref 0–5)
COHGB MFR BLD: 2.1 % (ref 0–5)
COHGB MFR BLD: 2.9 % (ref 0–5)
CPAP: ABNORMAL
CREAT SERPL-MCNC: 1.34 MG/DL (ref 0.76–1.27)
CREAT SERPL-MCNC: 1.35 MG/DL (ref 0.76–1.27)
DEPRECATED RDW RBC AUTO: 52.5 FL (ref 37–54)
DEPRECATED RDW RBC AUTO: 54.2 FL (ref 37–54)
EGFRCR SERPLBLD CKD-EPI 2021: 55.4 ML/MIN/1.73
EGFRCR SERPLBLD CKD-EPI 2021: 55.9 ML/MIN/1.73
EOSINOPHIL # BLD AUTO: 0.37 10*3/MM3 (ref 0–0.4)
EOSINOPHIL NFR BLD AUTO: 2.2 % (ref 0.3–6.2)
EPAP: ABNORMAL
ERYTHROCYTE [DISTWIDTH] IN BLOOD BY AUTOMATED COUNT: 15.9 % (ref 12.3–15.4)
ERYTHROCYTE [DISTWIDTH] IN BLOOD BY AUTOMATED COUNT: 15.9 % (ref 12.3–15.4)
FIBRINOGEN PPP-MCNC: 357 MG/DL (ref 240–460)
GAS FLOW AIRWAY: ABNORMAL L/MIN
GLOBULIN UR ELPH-MCNC: 1.9 GM/DL
GLUCOSE BLDA-MCNC: 108 MG/DL (ref 65–99)
GLUCOSE BLDA-MCNC: 126 MG/DL (ref 65–99)
GLUCOSE BLDA-MCNC: 96 MG/DL (ref 65–99)
GLUCOSE BLDC GLUCOMTR-MCNC: 111 MG/DL (ref 70–130)
GLUCOSE BLDC GLUCOMTR-MCNC: 128 MG/DL (ref 70–130)
GLUCOSE BLDC GLUCOMTR-MCNC: 131 MG/DL (ref 70–130)
GLUCOSE BLDC GLUCOMTR-MCNC: 131 MG/DL (ref 70–130)
GLUCOSE BLDC GLUCOMTR-MCNC: 135 MG/DL (ref 70–130)
GLUCOSE BLDC GLUCOMTR-MCNC: 137 MG/DL (ref 70–130)
GLUCOSE BLDC GLUCOMTR-MCNC: 140 MG/DL (ref 70–130)
GLUCOSE BLDC GLUCOMTR-MCNC: 144 MG/DL (ref 70–130)
GLUCOSE BLDC GLUCOMTR-MCNC: 157 MG/DL (ref 70–130)
GLUCOSE SERPL-MCNC: 113 MG/DL (ref 65–99)
GLUCOSE SERPL-MCNC: 139 MG/DL (ref 65–99)
HCO3 BLDA-SCNC: 20.9 MMOL/L (ref 20–26)
HCO3 BLDA-SCNC: 21.1 MMOL/L (ref 20–26)
HCO3 BLDA-SCNC: 22.2 MMOL/L (ref 20–26)
HCO3 BLDA-SCNC: 23.4 MMOL/L (ref 20–26)
HCO3 BLDA-SCNC: 23.5 MMOL/L (ref 20–26)
HCO3 BLDA-SCNC: 24.4 MMOL/L (ref 20–26)
HCO3 BLDA-SCNC: 24.7 MMOL/L (ref 20–26)
HCO3 BLDA-SCNC: 25.3 MMOL/L (ref 20–26)
HCT VFR BLD AUTO: 29.3 % (ref 37.5–51)
HCT VFR BLD AUTO: 29.9 % (ref 37.5–51)
HCT VFR BLD CALC: 22.2 % (ref 38–51)
HCT VFR BLD CALC: 22.7 % (ref 38–51)
HCT VFR BLD CALC: 27.7 % (ref 38–51)
HCT VFR BLD CALC: 30.1 % (ref 38–51)
HCT VFR BLD CALC: 30.9 % (ref 38–51)
HGB BLD-MCNC: 9.4 G/DL (ref 13–17.7)
HGB BLD-MCNC: 9.7 G/DL (ref 13–17.7)
HGB BLDA-MCNC: 10.1 G/DL (ref 14–18)
HGB BLDA-MCNC: 7.3 G/DL (ref 14–18)
HGB BLDA-MCNC: 7.4 G/DL (ref 14–18)
HGB BLDA-MCNC: 9 G/DL (ref 14–18)
HGB BLDA-MCNC: 9.8 G/DL (ref 14–18)
IMM GRANULOCYTES # BLD AUTO: 0.18 10*3/MM3 (ref 0–0.05)
IMM GRANULOCYTES NFR BLD AUTO: 1.1 % (ref 0–0.5)
INHALED O2 CONCENTRATION: 100 %
INHALED O2 CONCENTRATION: 30 %
INHALED O2 CONCENTRATION: 30 %
INHALED O2 CONCENTRATION: 60 %
INR PPP: 1.21 (ref 0.91–1.09)
INR PPP: 1.22 (ref 0.91–1.09)
IPAP: ABNORMAL
LACTATE BLDA-SCNC: 0.7 MMOL/L (ref 0.5–2)
LACTATE BLDA-SCNC: 0.8 MMOL/L (ref 0.5–2)
LACTATE BLDA-SCNC: 0.9 MMOL/L (ref 0.5–2)
LYMPHOCYTES # BLD AUTO: 1.19 10*3/MM3 (ref 0.7–3.1)
LYMPHOCYTES NFR BLD AUTO: 7 % (ref 19.6–45.3)
Lab: ABNORMAL
Lab: NORMAL
MAGNESIUM SERPL-MCNC: 2.9 MG/DL (ref 1.6–2.4)
MCH RBC QN AUTO: 29.3 PG (ref 26.6–33)
MCH RBC QN AUTO: 29.8 PG (ref 26.6–33)
MCHC RBC AUTO-ENTMCNC: 32.1 G/DL (ref 31.5–35.7)
MCHC RBC AUTO-ENTMCNC: 32.4 G/DL (ref 31.5–35.7)
MCV RBC AUTO: 91.3 FL (ref 79–97)
MCV RBC AUTO: 92 FL (ref 79–97)
METHGB BLD QL: 1 % (ref 0–3)
METHGB BLD QL: 1.1 % (ref 0–3)
METHGB BLD QL: 1.2 % (ref 0–3)
METHGB BLD QL: 1.2 % (ref 0–3)
METHGB BLD QL: 1.3 % (ref 0–3)
MODALITY: ABNORMAL
MONOCYTES # BLD AUTO: 1.02 10*3/MM3 (ref 0.1–0.9)
MONOCYTES NFR BLD AUTO: 6 % (ref 5–12)
NEUTROPHILS NFR BLD AUTO: 14.18 10*3/MM3 (ref 1.7–7)
NEUTROPHILS NFR BLD AUTO: 83.3 % (ref 42.7–76)
NITRIC OXIDE: ABNORMAL
NOTE: ABNORMAL
NOTIFIED BY: ABNORMAL
NOTIFIED WHO: ABNORMAL
NRBC BLD AUTO-RTO: 0 /100 WBC (ref 0–0.2)
OXYHGB MFR BLDV: 96 % (ref 94–99)
OXYHGB MFR BLDV: 96.9 % (ref 94–99)
OXYHGB MFR BLDV: 96.9 % (ref 94–99)
OXYHGB MFR BLDV: 97.2 % (ref 94–99)
OXYHGB MFR BLDV: 97.3 % (ref 94–99)
PAW @ PEAK INSP FLOW SETTING VENT: ABNORMAL CM[H2O]
PCO2 BLDA: 37.4 MM HG (ref 35–45)
PCO2 BLDA: 38.6 MM HG (ref 35–45)
PCO2 BLDA: 38.8 MM HG (ref 35–45)
PCO2 BLDA: 40.2 MM HG (ref 35–45)
PCO2 BLDA: 43.1 MM HG (ref 35–45)
PCO2 BLDA: 44.3 MM HG (ref 35–45)
PCO2 BLDA: 53.6 MM HG (ref 35–45)
PCO2 BLDA: 55.1 MM HG (ref 35–45)
PCO2 TEMP ADJ BLD: 37.4 MM HG (ref 35–45)
PCO2 TEMP ADJ BLD: 38.6 MM HG (ref 35–45)
PCO2 TEMP ADJ BLD: 38.8 MM HG (ref 35–45)
PCO2 TEMP ADJ BLD: 40.2 MM HG (ref 35–45)
PCO2 TEMP ADJ BLD: 43.1 MM HG (ref 35–45)
PCO2 TEMP ADJ BLD: 44.3 MM HG (ref 35–45)
PCO2 TEMP ADJ BLD: 53.6 MM HG (ref 35–45)
PCO2 TEMP ADJ BLD: 55.1 MM HG (ref 35–45)
PEEP RESPIRATORY: 5 CM[H2O]
PEEP RESPIRATORY: ABNORMAL CM[H2O]
PH BLDA: 7.22 PH UNITS (ref 7.35–7.45)
PH BLDA: 7.27 PH UNITS (ref 7.35–7.45)
PH BLDA: 7.33 PH UNITS (ref 7.35–7.45)
PH BLDA: 7.34 PH UNITS (ref 7.35–7.45)
PH BLDA: 7.34 PH UNITS (ref 7.35–7.45)
PH BLDA: 7.35 PH UNITS (ref 7.35–7.45)
PH BLDA: 7.4 PH UNITS (ref 7.35–7.45)
PH BLDA: 7.41 PH UNITS (ref 7.35–7.45)
PH, TEMP CORRECTED: 7.22 PH UNITS (ref 7.35–7.45)
PH, TEMP CORRECTED: 7.27 PH UNITS (ref 7.35–7.45)
PH, TEMP CORRECTED: 7.33 PH UNITS (ref 7.35–7.45)
PH, TEMP CORRECTED: 7.34 PH UNITS (ref 7.35–7.45)
PH, TEMP CORRECTED: 7.34 PH UNITS (ref 7.35–7.45)
PH, TEMP CORRECTED: 7.35 PH UNITS (ref 7.35–7.45)
PH, TEMP CORRECTED: 7.4 PH UNITS (ref 7.35–7.45)
PH, TEMP CORRECTED: 7.41 PH UNITS (ref 7.35–7.45)
PHOSPHATE SERPL-MCNC: 3.4 MG/DL (ref 2.5–4.5)
PLATELET # BLD AUTO: 204 10*3/MM3 (ref 140–450)
PLATELET # BLD AUTO: 206 10*3/MM3 (ref 140–450)
PMV BLD AUTO: 8.9 FL (ref 6–12)
PMV BLD AUTO: 9.3 FL (ref 6–12)
PO2 BLD: 308 MM[HG] (ref 0–500)
PO2 BLD: 332 MM[HG] (ref 0–500)
PO2 BLD: 383 MM[HG] (ref 0–500)
PO2 BLDA: 115 MM HG (ref 83–108)
PO2 BLDA: 185 MM HG (ref 83–108)
PO2 BLDA: 435 MM HG (ref 83–108)
PO2 BLDA: 99.5 MM HG (ref 83–108)
PO2 BLDA: >547 MM HG (ref 83–108)
PO2 BLDA: ABNORMAL MM[HG]
PO2 TEMP ADJ BLD: 115 MM HG (ref 83–108)
PO2 TEMP ADJ BLD: 185 MM HG (ref 83–108)
PO2 TEMP ADJ BLD: 435 MM HG (ref 83–108)
PO2 TEMP ADJ BLD: 441 MM HG (ref 83–108)
PO2 TEMP ADJ BLD: 442 MM HG (ref 83–108)
PO2 TEMP ADJ BLD: 547 MM HG (ref 83–108)
PO2 TEMP ADJ BLD: 99.5 MM HG (ref 83–108)
PO2 TEMP ADJ BLD: >547 MM HG (ref 83–108)
POTASSIUM BLDA-SCNC: 3.5 MMOL/L (ref 3.5–5.2)
POTASSIUM BLDA-SCNC: 3.5 MMOL/L (ref 3.5–5.2)
POTASSIUM BLDA-SCNC: 3.8 MMOL/L (ref 3.5–5.2)
POTASSIUM BLDA-SCNC: 4.5 MMOL/L (ref 3.5–5.2)
POTASSIUM BLDA-SCNC: 4.6 MMOL/L (ref 3.5–5.2)
POTASSIUM SERPL-SCNC: 4 MMOL/L (ref 3.5–5.2)
POTASSIUM SERPL-SCNC: 4 MMOL/L (ref 3.5–5.2)
PROT SERPL-MCNC: 5.2 G/DL (ref 6–8.5)
PROTHROMBIN TIME: 16 SECONDS (ref 11.8–14.8)
PROTHROMBIN TIME: 16.1 SECONDS (ref 11.8–14.8)
PSV: 10 CMH2O
PSV: ABNORMAL
PULSE OX: ABNORMAL
RBC # BLD AUTO: 3.21 10*6/MM3 (ref 4.14–5.8)
RBC # BLD AUTO: 3.25 10*6/MM3 (ref 4.14–5.8)
RH BLD: POSITIVE
SAO2 % BLDCOA: 100 % (ref 94–99)
SAO2 % BLDCOA: 97.7 % (ref 94–99)
SAO2 % BLDCOA: 98.4 % (ref 94–99)
SAO2 % BLDCOA: 99.2 % (ref 94–99)
SAO2 % BLDCOA: >100.1 % (ref 94–99)
SAO2 % BLDCOA: >100.1 % (ref 94–99)
SET MECH RESP RATE: 18
SET MECH RESP RATE: 22
SET MECH RESP RATE: ABNORMAL
SODIUM BLDA-SCNC: 140 MMOL/L (ref 136–145)
SODIUM BLDA-SCNC: 140 MMOL/L (ref 136–145)
SODIUM BLDA-SCNC: 141 MMOL/L (ref 136–145)
SODIUM BLDA-SCNC: 142 MMOL/L (ref 136–145)
SODIUM BLDA-SCNC: 143 MMOL/L (ref 136–145)
SODIUM SERPL-SCNC: 142 MMOL/L (ref 136–145)
SODIUM SERPL-SCNC: 144 MMOL/L (ref 136–145)
T&S EXPIRATION DATE: NORMAL
TOTAL RATE: ABNORMAL
VENTILATOR MODE: ABNORMAL
VT ON VENT VENT: 450 ML
VT ON VENT VENT: 500 ML
VT ON VENT VENT: ABNORMAL ML
WBC NRBC COR # BLD AUTO: 12.52 10*3/MM3 (ref 3.4–10.8)
WBC NRBC COR # BLD AUTO: 17.01 10*3/MM3 (ref 3.4–10.8)

## 2025-02-15 PROCEDURE — 25010000002 METHYLENE BLUE 50 MG/10ML SOLUTION: Performed by: THORACIC SURGERY (CARDIOTHORACIC VASCULAR SURGERY)

## 2025-02-15 PROCEDURE — S0260 H&P FOR SURGERY: HCPCS | Performed by: THORACIC SURGERY (CARDIOTHORACIC VASCULAR SURGERY)

## 2025-02-15 PROCEDURE — 94799 UNLISTED PULMONARY SVC/PX: CPT

## 2025-02-15 PROCEDURE — 25810000003 SODIUM CHLORIDE 0.9 % SOLUTION

## 2025-02-15 PROCEDURE — 86900 BLOOD TYPING SEROLOGIC ABO: CPT

## 2025-02-15 PROCEDURE — 25010000002 HEPARIN (PORCINE) PER 1000 UNITS

## 2025-02-15 PROCEDURE — 93005 ELECTROCARDIOGRAM TRACING: CPT | Performed by: THORACIC SURGERY (CARDIOTHORACIC VASCULAR SURGERY)

## 2025-02-15 PROCEDURE — 25010000002 MIDAZOLAM HCL (PF) 5 MG/5ML SOLUTION

## 2025-02-15 PROCEDURE — 33518 CABG ARTERY-VEIN TWO: CPT | Performed by: THORACIC SURGERY (CARDIOTHORACIC VASCULAR SURGERY)

## 2025-02-15 PROCEDURE — 83605 ASSAY OF LACTIC ACID: CPT

## 2025-02-15 PROCEDURE — 25010000002 PROTAMINE SULFATE PER 10 MG

## 2025-02-15 PROCEDURE — 25010000002 AMIODARONE IN DEXTROSE 5% 360-4.14 MG/200ML-% SOLUTION: Performed by: THORACIC SURGERY (CARDIOTHORACIC VASCULAR SURGERY)

## 2025-02-15 PROCEDURE — 36430 TRANSFUSION BLD/BLD COMPNT: CPT

## 2025-02-15 PROCEDURE — 25010000002 CEFAZOLIN PER 500 MG: Performed by: NURSE PRACTITIONER

## 2025-02-15 PROCEDURE — 25810000003 LACTATED RINGERS PER 1000 ML: Performed by: ANESTHESIOLOGY

## 2025-02-15 PROCEDURE — 83735 ASSAY OF MAGNESIUM: CPT | Performed by: THORACIC SURGERY (CARDIOTHORACIC VASCULAR SURGERY)

## 2025-02-15 PROCEDURE — 25810000003 LACTATED RINGERS PER 1000 ML

## 2025-02-15 PROCEDURE — 84295 ASSAY OF SERUM SODIUM: CPT

## 2025-02-15 PROCEDURE — 25010000002 MIDAZOLAM PER 1 MG

## 2025-02-15 PROCEDURE — 25810000003 SODIUM CHLORIDE 0.9 % SOLUTION: Performed by: THORACIC SURGERY (CARDIOTHORACIC VASCULAR SURGERY)

## 2025-02-15 PROCEDURE — 86850 RBC ANTIBODY SCREEN: CPT | Performed by: NURSE PRACTITIONER

## 2025-02-15 PROCEDURE — 82805 BLOOD GASES W/O2 SATURATION: CPT

## 2025-02-15 PROCEDURE — 02100Z9 BYPASS CORONARY ARTERY, ONE ARTERY FROM LEFT INTERNAL MAMMARY, OPEN APPROACH: ICD-10-PCS | Performed by: THORACIC SURGERY (CARDIOTHORACIC VASCULAR SURGERY)

## 2025-02-15 PROCEDURE — 25010000002 HEPARIN (PORCINE) PER 1000 UNITS: Performed by: THORACIC SURGERY (CARDIOTHORACIC VASCULAR SURGERY)

## 2025-02-15 PROCEDURE — 25010000002 NITROGLYCERIN 200 MCG/ML SOLUTION: Performed by: THORACIC SURGERY (CARDIOTHORACIC VASCULAR SURGERY)

## 2025-02-15 PROCEDURE — 94640 AIRWAY INHALATION TREATMENT: CPT

## 2025-02-15 PROCEDURE — 86901 BLOOD TYPING SEROLOGIC RH(D): CPT | Performed by: NURSE PRACTITIONER

## 2025-02-15 PROCEDURE — 93318 ECHO TRANSESOPHAGEAL INTRAOP: CPT

## 2025-02-15 PROCEDURE — 25810000003 DEXTROSE 5 % WITH KCL 20 MEQ 20 MEQ/L SOLUTION: Performed by: THORACIC SURGERY (CARDIOTHORACIC VASCULAR SURGERY)

## 2025-02-15 PROCEDURE — 82948 REAGENT STRIP/BLOOD GLUCOSE: CPT

## 2025-02-15 PROCEDURE — 25010000002 FENTANYL CITRATE (PF) 250 MCG/5ML SOLUTION

## 2025-02-15 PROCEDURE — 25010000002 LIDOCAINE PF 2% 2 % SOLUTION

## 2025-02-15 PROCEDURE — 06BQ0ZZ EXCISION OF LEFT SAPHENOUS VEIN, OPEN APPROACH: ICD-10-PCS | Performed by: THORACIC SURGERY (CARDIOTHORACIC VASCULAR SURGERY)

## 2025-02-15 PROCEDURE — 021109W BYPASS CORONARY ARTERY, TWO ARTERIES FROM AORTA WITH AUTOLOGOUS VENOUS TISSUE, OPEN APPROACH: ICD-10-PCS | Performed by: THORACIC SURGERY (CARDIOTHORACIC VASCULAR SURGERY)

## 2025-02-15 PROCEDURE — 86900 BLOOD TYPING SEROLOGIC ABO: CPT | Performed by: NURSE PRACTITIONER

## 2025-02-15 PROCEDURE — 85025 COMPLETE CBC W/AUTO DIFF WBC: CPT | Performed by: THORACIC SURGERY (CARDIOTHORACIC VASCULAR SURGERY)

## 2025-02-15 PROCEDURE — C1713 ANCHOR/SCREW BN/BN,TIS/BN: HCPCS | Performed by: THORACIC SURGERY (CARDIOTHORACIC VASCULAR SURGERY)

## 2025-02-15 PROCEDURE — 85730 THROMBOPLASTIN TIME PARTIAL: CPT | Performed by: THORACIC SURGERY (CARDIOTHORACIC VASCULAR SURGERY)

## 2025-02-15 PROCEDURE — 25010000002 VANCOMYCIN 750 MG/150ML SOLUTION: Performed by: THORACIC SURGERY (CARDIOTHORACIC VASCULAR SURGERY)

## 2025-02-15 PROCEDURE — 84100 ASSAY OF PHOSPHORUS: CPT | Performed by: THORACIC SURGERY (CARDIOTHORACIC VASCULAR SURGERY)

## 2025-02-15 PROCEDURE — 85384 FIBRINOGEN ACTIVITY: CPT | Performed by: THORACIC SURGERY (CARDIOTHORACIC VASCULAR SURGERY)

## 2025-02-15 PROCEDURE — 25010000002 PHENYLEPHRINE 10 MG/ML SOLUTION 5 ML VIAL

## 2025-02-15 PROCEDURE — 83050 HGB METHEMOGLOBIN QUAN: CPT

## 2025-02-15 PROCEDURE — 25010000002 ACETAMINOPHEN 10 MG/ML SOLUTION: Performed by: THORACIC SURGERY (CARDIOTHORACIC VASCULAR SURGERY)

## 2025-02-15 PROCEDURE — 86901 BLOOD TYPING SEROLOGIC RH(D): CPT

## 2025-02-15 PROCEDURE — 82375 ASSAY CARBOXYHB QUANT: CPT

## 2025-02-15 PROCEDURE — 84132 ASSAY OF SERUM POTASSIUM: CPT

## 2025-02-15 PROCEDURE — 25810000003 LACTATED RINGERS SOLUTION: Performed by: THORACIC SURGERY (CARDIOTHORACIC VASCULAR SURGERY)

## 2025-02-15 PROCEDURE — 94002 VENT MGMT INPAT INIT DAY: CPT

## 2025-02-15 PROCEDURE — 25010000002 CEFAZOLIN PER 500 MG

## 2025-02-15 PROCEDURE — 71045 X-RAY EXAM CHEST 1 VIEW: CPT

## 2025-02-15 PROCEDURE — 85018 HEMOGLOBIN: CPT

## 2025-02-15 PROCEDURE — P9016 RBC LEUKOCYTES REDUCED: HCPCS

## 2025-02-15 PROCEDURE — 5A1221Z PERFORMANCE OF CARDIAC OUTPUT, CONTINUOUS: ICD-10-PCS | Performed by: THORACIC SURGERY (CARDIOTHORACIC VASCULAR SURGERY)

## 2025-02-15 PROCEDURE — 25010000002 VANCOMYCIN 1 G RECONSTITUTED SOLUTION 1 EACH VIAL: Performed by: THORACIC SURGERY (CARDIOTHORACIC VASCULAR SURGERY)

## 2025-02-15 PROCEDURE — 82330 ASSAY OF CALCIUM: CPT

## 2025-02-15 PROCEDURE — C1751 CATH, INF, PER/CENT/MIDLINE: HCPCS | Performed by: ANESTHESIOLOGY

## 2025-02-15 PROCEDURE — 93010 ELECTROCARDIOGRAM REPORT: CPT | Performed by: INTERNAL MEDICINE

## 2025-02-15 PROCEDURE — 25810000003 SODIUM CHLORIDE 0.9 % SOLUTION 250 ML FLEX CONT

## 2025-02-15 PROCEDURE — 80053 COMPREHEN METABOLIC PANEL: CPT | Performed by: THORACIC SURGERY (CARDIOTHORACIC VASCULAR SURGERY)

## 2025-02-15 PROCEDURE — 82947 ASSAY GLUCOSE BLOOD QUANT: CPT

## 2025-02-15 PROCEDURE — A4648 IMPLANTABLE TISSUE MARKER: HCPCS | Performed by: THORACIC SURGERY (CARDIOTHORACIC VASCULAR SURGERY)

## 2025-02-15 PROCEDURE — 86923 COMPATIBILITY TEST ELECTRIC: CPT

## 2025-02-15 PROCEDURE — 0PH000Z INSERTION OF RIGID PLATE INTERNAL FIXATION DEVICE INTO STERNUM, OPEN APPROACH: ICD-10-PCS | Performed by: THORACIC SURGERY (CARDIOTHORACIC VASCULAR SURGERY)

## 2025-02-15 PROCEDURE — 33533 CABG ARTERIAL SINGLE: CPT | Performed by: THORACIC SURGERY (CARDIOTHORACIC VASCULAR SURGERY)

## 2025-02-15 PROCEDURE — 85610 PROTHROMBIN TIME: CPT | Performed by: THORACIC SURGERY (CARDIOTHORACIC VASCULAR SURGERY)

## 2025-02-15 PROCEDURE — 25010000002 CEFAZOLIN PER 500 MG: Performed by: THORACIC SURGERY (CARDIOTHORACIC VASCULAR SURGERY)

## 2025-02-15 PROCEDURE — 85027 COMPLETE CBC AUTOMATED: CPT | Performed by: THORACIC SURGERY (CARDIOTHORACIC VASCULAR SURGERY)

## 2025-02-15 PROCEDURE — 82803 BLOOD GASES ANY COMBINATION: CPT

## 2025-02-15 PROCEDURE — 25010000002 PAPAVERINE PER 60 MG: Performed by: THORACIC SURGERY (CARDIOTHORACIC VASCULAR SURGERY)

## 2025-02-15 PROCEDURE — 25010000002 NICARDIPINE HCL IN NACL 20-0.9 MG/200ML-% SOLUTION

## 2025-02-15 PROCEDURE — 94761 N-INVAS EAR/PLS OXIMETRY MLT: CPT

## 2025-02-15 DEVICE — HEMOST ABS SURGIFOAM SZ100 8X12 10MM: Type: IMPLANTABLE DEVICE | Site: STERNUM | Status: FUNCTIONAL

## 2025-02-15 DEVICE — CLIP LIGAT VASC HORIZON TI LG ORNG 6CT: Type: IMPLANTABLE DEVICE | Site: CHEST | Status: FUNCTIONAL

## 2025-02-15 DEVICE — DISK-SHAPED STYLE, SILICONE (1 PER STERILE PKG)
Type: IMPLANTABLE DEVICE | Site: HEART | Status: FUNCTIONAL
Brand: SCANLAN® RADIOMARK® GRAFT MARKERS

## 2025-02-15 DEVICE — PLEDGET INCISIONLINE REINF TFE SFT PTFE 1.5X3X7MM WHT: Type: IMPLANTABLE DEVICE | Site: HEART | Status: FUNCTIONAL

## 2025-02-15 DEVICE — KT STERNALOCK XP X/PLATE: Type: IMPLANTABLE DEVICE | Site: STERNUM | Status: FUNCTIONAL

## 2025-02-15 DEVICE — KT HEMOST ABS SURGIFOAM PORCN 1GRAM: Type: IMPLANTABLE DEVICE | Site: STERNUM | Status: FUNCTIONAL

## 2025-02-15 DEVICE — KT PLT STERNALOCK/XP AXILR SHRP: Type: IMPLANTABLE DEVICE | Site: STERNUM | Status: FUNCTIONAL

## 2025-02-15 RX ORDER — CHLORHEXIDINE GLUCONATE 500 MG/1
1 CLOTH TOPICAL DAILY
Status: DISCONTINUED | OUTPATIENT
Start: 2025-02-15 | End: 2025-02-17

## 2025-02-15 RX ORDER — SODIUM CHLORIDE, SODIUM LACTATE, POTASSIUM CHLORIDE, CALCIUM CHLORIDE 600; 310; 30; 20 MG/100ML; MG/100ML; MG/100ML; MG/100ML
100 INJECTION, SOLUTION INTRAVENOUS CONTINUOUS
Status: DISPENSED | OUTPATIENT
Start: 2025-02-15 | End: 2025-02-16

## 2025-02-15 RX ORDER — IBUPROFEN 600 MG/1
1 TABLET ORAL
Status: DISCONTINUED | OUTPATIENT
Start: 2025-02-15 | End: 2025-02-15 | Stop reason: HOSPADM

## 2025-02-15 RX ORDER — ACETAMINOPHEN 650 MG/1
650 SUPPOSITORY RECTAL EVERY 6 HOURS
Status: DISCONTINUED | OUTPATIENT
Start: 2025-02-16 | End: 2025-02-20 | Stop reason: HOSPADM

## 2025-02-15 RX ORDER — DEXMEDETOMIDINE HYDROCHLORIDE 4 UG/ML
INJECTION, SOLUTION INTRAVENOUS CONTINUOUS PRN
Status: DISCONTINUED | OUTPATIENT
Start: 2025-02-15 | End: 2025-02-15 | Stop reason: SURG

## 2025-02-15 RX ORDER — METOPROLOL TARTRATE 1 MG/ML
INJECTION, SOLUTION INTRAVENOUS AS NEEDED
Status: DISCONTINUED | OUTPATIENT
Start: 2025-02-15 | End: 2025-02-15 | Stop reason: SURG

## 2025-02-15 RX ORDER — PHENYLEPHRINE HCL IN 0.9% NACL 50MG/250ML
.5-3 PLASTIC BAG, INJECTION (ML) INTRAVENOUS CONTINUOUS PRN
Status: DISCONTINUED | OUTPATIENT
Start: 2025-02-15 | End: 2025-02-17

## 2025-02-15 RX ORDER — NICOTINE POLACRILEX 4 MG
15 LOZENGE BUCCAL
Status: DISCONTINUED | OUTPATIENT
Start: 2025-02-15 | End: 2025-02-15 | Stop reason: HOSPADM

## 2025-02-15 RX ORDER — LIDOCAINE 4 G/G
2 PATCH TOPICAL
Status: DISCONTINUED | OUTPATIENT
Start: 2025-02-15 | End: 2025-02-20 | Stop reason: HOSPADM

## 2025-02-15 RX ORDER — ACETAMINOPHEN 500 MG
1000 TABLET ORAL EVERY 6 HOURS
Status: DISCONTINUED | OUTPATIENT
Start: 2025-02-16 | End: 2025-02-20 | Stop reason: HOSPADM

## 2025-02-15 RX ORDER — OXYCODONE HYDROCHLORIDE 10 MG/1
10 TABLET ORAL
Status: DISCONTINUED | OUTPATIENT
Start: 2025-02-15 | End: 2025-02-17

## 2025-02-15 RX ORDER — ALBUTEROL SULFATE 0.83 MG/ML
2.5 SOLUTION RESPIRATORY (INHALATION) EVERY 4 HOURS PRN
Status: ACTIVE | OUTPATIENT
Start: 2025-02-15 | End: 2025-02-16

## 2025-02-15 RX ORDER — MIDAZOLAM HYDROCHLORIDE 1 MG/ML
INJECTION, SOLUTION INTRAMUSCULAR; INTRAVENOUS AS NEEDED
Status: DISCONTINUED | OUTPATIENT
Start: 2025-02-15 | End: 2025-02-15 | Stop reason: SURG

## 2025-02-15 RX ORDER — POLYETHYLENE GLYCOL 3350 17 G/17G
17 POWDER, FOR SOLUTION ORAL DAILY
Status: DISCONTINUED | OUTPATIENT
Start: 2025-02-16 | End: 2025-02-15

## 2025-02-15 RX ORDER — CEFAZOLIN SODIUM 1 G/3ML
INJECTION, POWDER, FOR SOLUTION INTRAMUSCULAR; INTRAVENOUS AS NEEDED
Status: DISCONTINUED | OUTPATIENT
Start: 2025-02-15 | End: 2025-02-15 | Stop reason: SURG

## 2025-02-15 RX ORDER — NICOTINE POLACRILEX 4 MG
15 LOZENGE BUCCAL
Status: DISCONTINUED | OUTPATIENT
Start: 2025-02-15 | End: 2025-02-16

## 2025-02-15 RX ORDER — OXYCODONE HYDROCHLORIDE 5 MG/1
5 TABLET ORAL
Status: DISCONTINUED | OUTPATIENT
Start: 2025-02-15 | End: 2025-02-17

## 2025-02-15 RX ORDER — IBUPROFEN 600 MG/1
1 TABLET ORAL
Status: DISCONTINUED | OUTPATIENT
Start: 2025-02-15 | End: 2025-02-16

## 2025-02-15 RX ORDER — ACETAMINOPHEN 500 MG
1000 TABLET ORAL ONCE
Status: COMPLETED | OUTPATIENT
Start: 2025-02-15 | End: 2025-02-15

## 2025-02-15 RX ORDER — DEXTROSE MONOHYDRATE 25 G/50ML
10-50 INJECTION, SOLUTION INTRAVENOUS
Status: DISCONTINUED | OUTPATIENT
Start: 2025-02-15 | End: 2025-02-15 | Stop reason: HOSPADM

## 2025-02-15 RX ORDER — CHLORHEXIDINE GLUCONATE 500 MG/1
CLOTH TOPICAL EVERY 12 HOURS PRN
Status: DISCONTINUED | OUTPATIENT
Start: 2025-02-15 | End: 2025-02-15 | Stop reason: HOSPADM

## 2025-02-15 RX ORDER — SODIUM CHLORIDE 0.9 % (FLUSH) 0.9 %
3 SYRINGE (ML) INJECTION EVERY 12 HOURS SCHEDULED
Status: DISCONTINUED | OUTPATIENT
Start: 2025-02-15 | End: 2025-02-15 | Stop reason: HOSPADM

## 2025-02-15 RX ORDER — PREGABALIN 25 MG/1
25 CAPSULE ORAL EVERY 12 HOURS
Status: DISCONTINUED | OUTPATIENT
Start: 2025-02-16 | End: 2025-02-20 | Stop reason: HOSPADM

## 2025-02-15 RX ORDER — PANTOPRAZOLE SODIUM 40 MG/10ML
40 INJECTION, POWDER, LYOPHILIZED, FOR SOLUTION INTRAVENOUS ONCE
Status: COMPLETED | OUTPATIENT
Start: 2025-02-15 | End: 2025-02-15

## 2025-02-15 RX ORDER — ONDANSETRON 2 MG/ML
4 INJECTION INTRAMUSCULAR; INTRAVENOUS EVERY 6 HOURS PRN
Status: DISCONTINUED | OUTPATIENT
Start: 2025-02-15 | End: 2025-02-20 | Stop reason: HOSPADM

## 2025-02-15 RX ORDER — SODIUM CHLORIDE 0.9 % (FLUSH) 0.9 %
3-10 SYRINGE (ML) INJECTION AS NEEDED
Status: DISCONTINUED | OUTPATIENT
Start: 2025-02-15 | End: 2025-02-15 | Stop reason: HOSPADM

## 2025-02-15 RX ORDER — CHLORHEXIDINE GLUCONATE ORAL RINSE 1.2 MG/ML
15 SOLUTION DENTAL EVERY 12 HOURS SCHEDULED
Status: DISCONTINUED | OUTPATIENT
Start: 2025-02-15 | End: 2025-02-15

## 2025-02-15 RX ORDER — BISACODYL 5 MG/1
10 TABLET, DELAYED RELEASE ORAL 2 TIMES DAILY
Status: DISCONTINUED | OUTPATIENT
Start: 2025-02-16 | End: 2025-02-15

## 2025-02-15 RX ORDER — VANCOMYCIN 750 MG/150ML
750 INJECTION, SOLUTION INTRAVENOUS EVERY 12 HOURS SCHEDULED
Status: COMPLETED | OUTPATIENT
Start: 2025-02-15 | End: 2025-02-16

## 2025-02-15 RX ORDER — SODIUM CHLORIDE 9 MG/ML
30 INJECTION, SOLUTION INTRAVENOUS CONTINUOUS PRN
Status: DISCONTINUED | OUTPATIENT
Start: 2025-02-15 | End: 2025-02-15 | Stop reason: HOSPADM

## 2025-02-15 RX ORDER — MEPERIDINE HYDROCHLORIDE 50 MG/ML
25 INJECTION INTRAMUSCULAR; INTRAVENOUS; SUBCUTANEOUS
Status: ACTIVE | OUTPATIENT
Start: 2025-02-15 | End: 2025-02-16

## 2025-02-15 RX ORDER — CLOPIDOGREL BISULFATE 75 MG/1
75 TABLET ORAL DAILY
Status: DISCONTINUED | OUTPATIENT
Start: 2025-02-16 | End: 2025-02-20 | Stop reason: HOSPADM

## 2025-02-15 RX ORDER — MIDAZOLAM HYDROCHLORIDE 2 MG/2ML
2 INJECTION, SOLUTION INTRAMUSCULAR; INTRAVENOUS
Status: DISCONTINUED | OUTPATIENT
Start: 2025-02-15 | End: 2025-02-15 | Stop reason: HOSPADM

## 2025-02-15 RX ORDER — FENTANYL CITRATE 50 UG/ML
50 INJECTION, SOLUTION INTRAMUSCULAR; INTRAVENOUS
Status: DISCONTINUED | OUTPATIENT
Start: 2025-02-15 | End: 2025-02-16

## 2025-02-15 RX ORDER — SODIUM CHLORIDE 0.9 % (FLUSH) 0.9 %
30 SYRINGE (ML) INJECTION ONCE AS NEEDED
Status: DISCONTINUED | OUTPATIENT
Start: 2025-02-15 | End: 2025-02-15 | Stop reason: HOSPADM

## 2025-02-15 RX ORDER — PREGABALIN 25 MG/1
25 CAPSULE ORAL ONCE
Status: COMPLETED | OUTPATIENT
Start: 2025-02-15 | End: 2025-02-15

## 2025-02-15 RX ORDER — ACETAMINOPHEN 160 MG/5ML
1000 SOLUTION ORAL EVERY 6 HOURS
Status: DISCONTINUED | OUTPATIENT
Start: 2025-02-16 | End: 2025-02-20 | Stop reason: HOSPADM

## 2025-02-15 RX ORDER — VECURONIUM BROMIDE 1 MG/ML
INJECTION, POWDER, LYOPHILIZED, FOR SOLUTION INTRAVENOUS AS NEEDED
Status: DISCONTINUED | OUTPATIENT
Start: 2025-02-15 | End: 2025-02-15 | Stop reason: SURG

## 2025-02-15 RX ORDER — CHLORHEXIDINE GLUCONATE 500 MG/1
1 CLOTH TOPICAL EVERY 24 HOURS
Status: DISCONTINUED | OUTPATIENT
Start: 2025-02-16 | End: 2025-02-17

## 2025-02-15 RX ORDER — LIDOCAINE HYDROCHLORIDE 20 MG/ML
INJECTION, SOLUTION EPIDURAL; INFILTRATION; INTRACAUDAL; PERINEURAL AS NEEDED
Status: DISCONTINUED | OUTPATIENT
Start: 2025-02-15 | End: 2025-02-15 | Stop reason: SURG

## 2025-02-15 RX ORDER — HEPARIN SODIUM 1000 [USP'U]/ML
INJECTION, SOLUTION INTRAVENOUS; SUBCUTANEOUS AS NEEDED
Status: DISCONTINUED | OUTPATIENT
Start: 2025-02-15 | End: 2025-02-15 | Stop reason: SURG

## 2025-02-15 RX ORDER — FENTANYL CITRATE 50 UG/ML
INJECTION, SOLUTION INTRAMUSCULAR; INTRAVENOUS AS NEEDED
Status: DISCONTINUED | OUTPATIENT
Start: 2025-02-15 | End: 2025-02-15 | Stop reason: SURG

## 2025-02-15 RX ORDER — NITROGLYCERIN 20 MG/100ML
5 INJECTION INTRAVENOUS CONTINUOUS
Status: DISCONTINUED | OUTPATIENT
Start: 2025-02-15 | End: 2025-02-17

## 2025-02-15 RX ORDER — AMIODARONE HYDROCHLORIDE 200 MG/1
400 TABLET ORAL 2 TIMES DAILY WITH MEALS
Status: DISCONTINUED | OUTPATIENT
Start: 2025-02-15 | End: 2025-02-20 | Stop reason: HOSPADM

## 2025-02-15 RX ORDER — DEXTROSE MONOHYDRATE 25 G/50ML
10-50 INJECTION, SOLUTION INTRAVENOUS
Status: DISCONTINUED | OUTPATIENT
Start: 2025-02-15 | End: 2025-02-16

## 2025-02-15 RX ORDER — PROTAMINE SULFATE 10 MG/ML
INJECTION, SOLUTION INTRAVENOUS AS NEEDED
Status: DISCONTINUED | OUTPATIENT
Start: 2025-02-15 | End: 2025-02-15 | Stop reason: SURG

## 2025-02-15 RX ORDER — VANCOMYCIN/0.9 % SOD CHLORIDE 750 MG/250
10 PLASTIC BAG, INJECTION (ML) INTRAVENOUS EVERY 12 HOURS
Status: DISCONTINUED | OUTPATIENT
Start: 2025-02-15 | End: 2025-02-15

## 2025-02-15 RX ORDER — POTASSIUM CHLORIDE, DEXTROSE MONOHYDRATE 150; 5 MG/100ML; G/100ML
30 INJECTION, SOLUTION INTRAVENOUS CONTINUOUS
Status: DISCONTINUED | OUTPATIENT
Start: 2025-02-15 | End: 2025-02-17

## 2025-02-15 RX ORDER — ROCURONIUM BROMIDE 10 MG/ML
INJECTION, SOLUTION INTRAVENOUS AS NEEDED
Status: DISCONTINUED | OUTPATIENT
Start: 2025-02-15 | End: 2025-02-15 | Stop reason: SURG

## 2025-02-15 RX ORDER — CHLORHEXIDINE GLUCONATE ORAL RINSE 1.2 MG/ML
15 SOLUTION DENTAL EVERY 12 HOURS
Status: DISCONTINUED | OUTPATIENT
Start: 2025-02-15 | End: 2025-02-18

## 2025-02-15 RX ORDER — ENOXAPARIN SODIUM 100 MG/ML
40 INJECTION SUBCUTANEOUS DAILY
Status: DISCONTINUED | OUTPATIENT
Start: 2025-02-16 | End: 2025-02-20 | Stop reason: HOSPADM

## 2025-02-15 RX ORDER — CHLORHEXIDINE GLUCONATE 500 MG/1
1 CLOTH TOPICAL ONCE
Status: DISCONTINUED | OUTPATIENT
Start: 2025-02-15 | End: 2025-02-17

## 2025-02-15 RX ORDER — SODIUM CHLORIDE 9 MG/ML
40 INJECTION, SOLUTION INTRAVENOUS AS NEEDED
Status: DISCONTINUED | OUTPATIENT
Start: 2025-02-15 | End: 2025-02-15 | Stop reason: HOSPADM

## 2025-02-15 RX ORDER — ASPIRIN 81 MG/1
81 TABLET ORAL DAILY
Status: DISCONTINUED | OUTPATIENT
Start: 2025-02-17 | End: 2025-02-20 | Stop reason: HOSPADM

## 2025-02-15 RX ORDER — MAGNESIUM HYDROXIDE 1200 MG/15ML
LIQUID ORAL AS NEEDED
Status: DISCONTINUED | OUTPATIENT
Start: 2025-02-15 | End: 2025-02-15 | Stop reason: HOSPADM

## 2025-02-15 RX ORDER — ATORVASTATIN CALCIUM 10 MG/1
20 TABLET, FILM COATED ORAL NIGHTLY
Status: DISCONTINUED | OUTPATIENT
Start: 2025-02-16 | End: 2025-02-20 | Stop reason: HOSPADM

## 2025-02-15 RX ORDER — NICARDIPINE HYDROCHLORIDE 2.5 MG/ML
INJECTION INTRAVENOUS AS NEEDED
Status: DISCONTINUED | OUTPATIENT
Start: 2025-02-15 | End: 2025-02-15 | Stop reason: SURG

## 2025-02-15 RX ORDER — NOREPINEPHRINE BITARTRATE 0.03 MG/ML
.02-.3 INJECTION, SOLUTION INTRAVENOUS
Status: DISCONTINUED | OUTPATIENT
Start: 2025-02-15 | End: 2025-02-17

## 2025-02-15 RX ORDER — NALOXONE HCL 0.4 MG/ML
0.4 VIAL (ML) INJECTION
Status: DISCONTINUED | OUTPATIENT
Start: 2025-02-15 | End: 2025-02-17

## 2025-02-15 RX ORDER — IPRATROPIUM BROMIDE AND ALBUTEROL SULFATE 2.5; .5 MG/3ML; MG/3ML
1.5 SOLUTION RESPIRATORY (INHALATION)
Status: DISCONTINUED | OUTPATIENT
Start: 2025-02-15 | End: 2025-02-17

## 2025-02-15 RX ORDER — FENTANYL CITRATE 50 UG/ML
25 INJECTION, SOLUTION INTRAMUSCULAR; INTRAVENOUS
Status: DISCONTINUED | OUTPATIENT
Start: 2025-02-15 | End: 2025-02-16

## 2025-02-15 RX ORDER — ACETAMINOPHEN 10 MG/ML
1000 INJECTION, SOLUTION INTRAVENOUS EVERY 8 HOURS
Status: COMPLETED | OUTPATIENT
Start: 2025-02-15 | End: 2025-02-16

## 2025-02-15 RX ORDER — SODIUM CHLORIDE 9 MG/ML
INJECTION, SOLUTION INTRAVENOUS CONTINUOUS PRN
Status: DISCONTINUED | OUTPATIENT
Start: 2025-02-15 | End: 2025-02-15 | Stop reason: SURG

## 2025-02-15 RX ORDER — ASPIRIN 81 MG/1
162 TABLET, CHEWABLE ORAL ONCE
Status: COMPLETED | OUTPATIENT
Start: 2025-02-16 | End: 2025-02-16

## 2025-02-15 RX ORDER — DEXMEDETOMIDINE HYDROCHLORIDE 4 UG/ML
.2-1.5 INJECTION, SOLUTION INTRAVENOUS
Status: DISCONTINUED | OUTPATIENT
Start: 2025-02-15 | End: 2025-02-16

## 2025-02-15 RX ORDER — PANTOPRAZOLE SODIUM 40 MG/1
40 TABLET, DELAYED RELEASE ORAL EVERY MORNING
Status: DISCONTINUED | OUTPATIENT
Start: 2025-02-16 | End: 2025-02-17

## 2025-02-15 RX ORDER — ENOXAPARIN SODIUM 100 MG/ML
40 INJECTION SUBCUTANEOUS DAILY
Status: DISCONTINUED | OUTPATIENT
Start: 2025-02-15 | End: 2025-02-15

## 2025-02-15 RX ORDER — SODIUM CHLORIDE, SODIUM LACTATE, POTASSIUM CHLORIDE, CALCIUM CHLORIDE 600; 310; 30; 20 MG/100ML; MG/100ML; MG/100ML; MG/100ML
INJECTION, SOLUTION INTRAVENOUS CONTINUOUS PRN
Status: DISCONTINUED | OUTPATIENT
Start: 2025-02-15 | End: 2025-02-15 | Stop reason: SURG

## 2025-02-15 RX ORDER — METOPROLOL TARTRATE 25 MG/1
12.5 TABLET, FILM COATED ORAL EVERY 12 HOURS SCHEDULED
Status: DISCONTINUED | OUTPATIENT
Start: 2025-02-16 | End: 2025-02-17

## 2025-02-15 RX ORDER — MIDAZOLAM HYDROCHLORIDE 5 MG/5ML
INJECTION, SOLUTION INTRAMUSCULAR; INTRAVENOUS AS NEEDED
Status: DISCONTINUED | OUTPATIENT
Start: 2025-02-15 | End: 2025-02-15 | Stop reason: SURG

## 2025-02-15 RX ADMIN — ACETAMINOPHEN 1000 MG: 500 TABLET ORAL at 05:45

## 2025-02-15 RX ADMIN — VANCOMYCIN 750 MG: 750 INJECTION, SOLUTION INTRAVENOUS at 21:11

## 2025-02-15 RX ADMIN — DEXMEDETOMIDINE HYDROCHLORIDE 0.5 MCG/KG/HR: 4 INJECTION, SOLUTION INTRAVENOUS at 08:12

## 2025-02-15 RX ADMIN — FENTANYL CITRATE 250 MCG: 50 INJECTION, SOLUTION INTRAMUSCULAR; INTRAVENOUS at 07:10

## 2025-02-15 RX ADMIN — LIDOCAINE HYDROCHLORIDE 100 MG: 20 INJECTION, SOLUTION EPIDURAL; INFILTRATION; INTRACAUDAL; PERINEURAL at 07:10

## 2025-02-15 RX ADMIN — DEXMEDETOMIDINE HYDROCHLORIDE 1 MCG/KG/HR: 4 INJECTION, SOLUTION INTRAVENOUS at 15:09

## 2025-02-15 RX ADMIN — NOREPINEPHRINE BITARTRATE 0.02 MCG/KG/MIN: 0.03 INJECTION, SOLUTION INTRAVENOUS at 14:00

## 2025-02-15 RX ADMIN — METOPROLOL TARTRATE 2.5 MG: 5 INJECTION INTRAVENOUS at 08:43

## 2025-02-15 RX ADMIN — NITROGLYCERIN 5 MCG/MIN: 20 INJECTION INTRAVENOUS at 14:00

## 2025-02-15 RX ADMIN — METOPROLOL TARTRATE 2.5 MG: 5 INJECTION INTRAVENOUS at 07:18

## 2025-02-15 RX ADMIN — AMINOCAPROIC ACID 5 G: 250 INJECTION, SOLUTION INTRAVENOUS at 07:29

## 2025-02-15 RX ADMIN — CEFAZOLIN 2 G: 1 INJECTION, POWDER, FOR SOLUTION INTRAMUSCULAR; INTRAVENOUS; PARENTERAL at 08:04

## 2025-02-15 RX ADMIN — MIDAZOLAM HYDROCHLORIDE 1 MG: 1 INJECTION, SOLUTION INTRAMUSCULAR; INTRAVENOUS at 11:27

## 2025-02-15 RX ADMIN — PANTOPRAZOLE SODIUM 40 MG: 40 INJECTION, POWDER, FOR SOLUTION INTRAVENOUS at 15:37

## 2025-02-15 RX ADMIN — VECURONIUM BROMIDE 1 MCG/KG/MIN: 1 INJECTION, POWDER, LYOPHILIZED, FOR SOLUTION INTRAVENOUS at 08:30

## 2025-02-15 RX ADMIN — POTASSIUM CHLORIDE, DEXTROSE MONOHYDRATE 30 ML/HR: 150; 5 INJECTION, SOLUTION INTRAVENOUS at 15:12

## 2025-02-15 RX ADMIN — ROCURONIUM BROMIDE 100 MG: 10 INJECTION, SOLUTION INTRAVENOUS at 07:14

## 2025-02-15 RX ADMIN — MIDAZOLAM HYDROCHLORIDE 2 MG: 1 INJECTION, SOLUTION INTRAMUSCULAR; INTRAVENOUS at 08:47

## 2025-02-15 RX ADMIN — IPRATROPIUM BROMIDE AND ALBUTEROL SULFATE 1.5 ML: .5; 3 SOLUTION RESPIRATORY (INHALATION) at 18:04

## 2025-02-15 RX ADMIN — MIDAZOLAM 2 MG: 1 INJECTION INTRAMUSCULAR; INTRAVENOUS at 07:10

## 2025-02-15 RX ADMIN — CEFAZOLIN 2 G: 2 INJECTION, POWDER, FOR SOLUTION INTRAMUSCULAR; INTRAVENOUS at 19:43

## 2025-02-15 RX ADMIN — NICARDIPINE HYDROCHLORIDE 5 MG/HR: 0.1 INJECTION INTRAVENOUS at 13:30

## 2025-02-15 RX ADMIN — AMIODARONE HYDROCHLORIDE 1 MG/MIN: 1.8 INJECTION, SOLUTION INTRAVENOUS at 19:44

## 2025-02-15 RX ADMIN — PROTAMINE SULFATE 200 MG: 10 INJECTION, SOLUTION INTRAVENOUS at 11:40

## 2025-02-15 RX ADMIN — FENTANYL CITRATE 250 MCG: 50 INJECTION, SOLUTION INTRAMUSCULAR; INTRAVENOUS at 11:43

## 2025-02-15 RX ADMIN — FENTANYL CITRATE 250 MCG: 50 INJECTION, SOLUTION INTRAMUSCULAR; INTRAVENOUS at 07:12

## 2025-02-15 RX ADMIN — CEFAZOLIN 2000 MG: 2 INJECTION, POWDER, FOR SOLUTION INTRAMUSCULAR; INTRAVENOUS at 05:44

## 2025-02-15 RX ADMIN — FENTANYL CITRATE 250 MCG: 50 INJECTION, SOLUTION INTRAMUSCULAR; INTRAVENOUS at 10:58

## 2025-02-15 RX ADMIN — CHLORHEXIDINE GLUCONATE 0.12% ORAL RINSE 15 ML: 1.2 LIQUID ORAL at 18:33

## 2025-02-15 RX ADMIN — INSULIN HUMAN 1.9 UNITS/HR: 1 INJECTION, SOLUTION INTRAVENOUS at 06:18

## 2025-02-15 RX ADMIN — NICARDIPINE HYDROCHLORIDE 250 MCG: 2.5 INJECTION INTRAVENOUS at 11:49

## 2025-02-15 RX ADMIN — SODIUM CHLORIDE 1000 ML: 9 INJECTION, SOLUTION INTRAVENOUS at 14:00

## 2025-02-15 RX ADMIN — MIDAZOLAM HYDROCHLORIDE 2 MG: 1 INJECTION, SOLUTION INTRAMUSCULAR; INTRAVENOUS at 06:59

## 2025-02-15 RX ADMIN — PHENYLEPHRINE HYDROCHLORIDE 1 MCG/KG/MIN: 10 INJECTION INTRAVENOUS at 07:51

## 2025-02-15 RX ADMIN — CEFAZOLIN 2 G: 1 INJECTION, POWDER, FOR SOLUTION INTRAMUSCULAR; INTRAVENOUS; PARENTERAL at 12:15

## 2025-02-15 RX ADMIN — IPRATROPIUM BROMIDE AND ALBUTEROL SULFATE 1.5 ML: .5; 3 SOLUTION RESPIRATORY (INHALATION) at 14:14

## 2025-02-15 RX ADMIN — SODIUM CHLORIDE, POTASSIUM CHLORIDE, SODIUM LACTATE AND CALCIUM CHLORIDE: 600; 310; 30; 20 INJECTION, SOLUTION INTRAVENOUS at 06:58

## 2025-02-15 RX ADMIN — FENTANYL CITRATE 250 MCG: 50 INJECTION, SOLUTION INTRAMUSCULAR; INTRAVENOUS at 08:46

## 2025-02-15 RX ADMIN — LIDOCAINE 2 PATCH: 4 PATCH TOPICAL at 15:37

## 2025-02-15 RX ADMIN — Medication 1 APPLICATION: at 05:44

## 2025-02-15 RX ADMIN — SODIUM CHLORIDE, POTASSIUM CHLORIDE, SODIUM LACTATE AND CALCIUM CHLORIDE 1000 ML: 600; 310; 30; 20 INJECTION, SOLUTION INTRAVENOUS at 20:04

## 2025-02-15 RX ADMIN — ACETAMINOPHEN 1000 MG: 10 INJECTION, SOLUTION INTRAVENOUS at 15:37

## 2025-02-15 RX ADMIN — POTASSIUM CHLORIDE, DEXTROSE MONOHYDRATE 30 ML/HR: 150; 5 INJECTION, SOLUTION INTRAVENOUS at 15:11

## 2025-02-15 RX ADMIN — ACETAMINOPHEN 1000 MG: 10 INJECTION, SOLUTION INTRAVENOUS at 22:04

## 2025-02-15 RX ADMIN — SODIUM CHLORIDE, POTASSIUM CHLORIDE, SODIUM LACTATE AND CALCIUM CHLORIDE 100 ML/HR: 600; 310; 30; 20 INJECTION, SOLUTION INTRAVENOUS at 05:35

## 2025-02-15 RX ADMIN — PREGABALIN 25 MG: 25 CAPSULE ORAL at 05:45

## 2025-02-15 RX ADMIN — Medication 1 APPLICATION: at 17:14

## 2025-02-15 RX ADMIN — HEPARIN SODIUM 37000 UNITS: 1000 INJECTION, SOLUTION INTRAVENOUS; SUBCUTANEOUS at 09:03

## 2025-02-15 RX ADMIN — SODIUM CHLORIDE: 9 INJECTION, SOLUTION INTRAVENOUS at 07:27

## 2025-02-15 NOTE — ANESTHESIA PROCEDURE NOTES
Central Line      Patient reassessed immediately prior to procedure    Patient location during procedure: OR  Start time: 2/15/2025 7:19 AM  Indications: vascular access, MD/Surgeon request and central pressure monitoring  Staff  Anesthesiologist: Carol Hernandez MD  Preanesthetic Checklist  Completed: patient identified, IV checked, site marked, risks and benefits discussed, surgical consent, monitors and equipment checked, pre-op evaluation and timeout performed  Central Line Prep  Sterile Tech:cap, gloves, gown, mask and sterile barriers  Prep: chloraprep  Patient monitoring: blood pressure monitoring, continuous pulse oximetry and EKG  Central Line Procedure  Laterality:right  Location:internal jugular  Catheter Type:MAC  Catheter Size:9 Fr  Guidance:ultrasound guided  PROCEDURE NOTE/ULTRASOUND INTERPRETATION.  Using ultrasound guidance the potential vascular sites for insertion of the catheter were visualized to determine the patency of the vessel to be used for vascular access.  After selecting the appropriate site for insertion, the needle was visualized under ultrasound being inserted into the internal jugular vein, followed by ultrasound confirmation of wire and catheter placement. There were no abnormalities seen on ultrasound; an image was taken; and the patient tolerated the procedure with no complications.   Assessment  Post procedure:biopatch applied, line sutured and occlusive dressing applied  Assessement:blood return through all ports, chest x-ray ordered and free fluid flow  Complications:no  Patient Tolerance:patient tolerated the procedure well with no apparent complications

## 2025-02-15 NOTE — PLAN OF CARE
Goal Outcome Evaluation:  Plan of Care Reviewed With: patient        Progress: no change               Problem: Adult Inpatient Plan of Care  Goal: Plan of Care Review  Outcome: Progressing  Flowsheets (Taken 2/15/2025 0619)  Progress: no change  Plan of Care Reviewed With: patient  Goal: Patient-Specific Goal (Individualized)  Outcome: Progressing  Goal: Absence of Hospital-Acquired Illness or Injury  Outcome: Progressing  Intervention: Identify and Manage Fall Risk  Recent Flowsheet Documentation  Taken 2/15/2025 0600 by Monserrat Arias RN  Safety Promotion/Fall Prevention: safety round/check completed  Taken 2/15/2025 0530 by Monserrat Arias RN  Safety Promotion/Fall Prevention: safety round/check completed  Intervention: Prevent Skin Injury  Recent Flowsheet Documentation  Taken 2/15/2025 0530 by Monserrat Arias RN  Skin Protection:   drying agents applied   silicone foam dressing in place  Intervention: Prevent and Manage VTE (Venous Thromboembolism) Risk  Recent Flowsheet Documentation  Taken 2/15/2025 0501 by Monserrat Arias RN  VTE Prevention/Management:   bilateral   SCDs (sequential compression devices) on  Intervention: Prevent Infection  Recent Flowsheet Documentation  Taken 2/15/2025 0530 by Monserrat Arias RN  Infection Prevention: hand hygiene promoted  Goal: Optimal Comfort and Wellbeing  Outcome: Progressing  Intervention: Provide Person-Centered Care  Recent Flowsheet Documentation  Taken 2/15/2025 0530 by Monserrat Arias RN  Trust Relationship/Rapport: care explained  Goal: Readiness for Transition of Care  Outcome: Progressing  Intervention: Mutually Develop Transition Plan  Recent Flowsheet Documentation  Taken 2/15/2025 0557 by Monserrat Arias RN  Equipment Currently Used at Home: none  Transportation Anticipated: family or friend will provide  Patient/Family Anticipated Services at Transition: none  Patient/Family Anticipates Transition to: home

## 2025-02-15 NOTE — TELEPHONE ENCOUNTER
Getting CABG at 0700 per Dr. Back-Notified He is to go to ICU first, get admitted and labs all drawn then he will go from ICU to surgery and return to that room. He states his sister is going with him and he would be there about 450 am

## 2025-02-15 NOTE — ANESTHESIA POSTPROCEDURE EVALUATION
Patient: Attila Viramontes    Procedure Summary       Date: 02/15/25 Room / Location: [facility] OR  /  [facility] OR    Anesthesia Start: 0658 Anesthesia Stop: 1300    Procedure: CORONARY ARTERY BYPASS GRAFTING x3, LEFT INTERNAL MAMMARY ARTERY GRAFT, LEFT LEG OPEN VEIN HARVEST, TRANSESOPHAGEAL ECHOCARDIOGRAM, XP STERNAL PLATING (Chest) Diagnosis:       Coronary artery disease of native artery of native heart with stable angina pectoris      (Coronary artery disease of native artery of native heart with stable angina pectoris [I25.118])    Surgeons: Joe Back MD Provider: Carol Hernandez MD    Anesthesia Type: general, [place], CVL ASA Status: 4            Anesthesia Type: general, [place], CVL    Vitals  Vitals Value Taken Time   BP 98/62 02/15/25 1301   Temp     Pulse 64 02/15/25 1303   Resp     SpO2 99 % 02/15/25 1303   Vitals shown include unfiled device data.        Post Anesthesia Care and Evaluation    Patient location during evaluation: ICU  Patient participation: complete - patient cannot participate  Post-procedure mental status: patient intubated and sedated.  Pain management: adequate    Airway patency: patent  Anesthetic complications: No anesthetic complications  PONV Status: NA  Cardiovascular status: acceptable and hemodynamically stable  Respiratory status: acceptable, ETT and ventilator  Hydration status: acceptable    Comments: Patient brought to ICU on ventilator, hemodynamically stable. Report given at bedside to ICU RN. No concerns

## 2025-02-15 NOTE — PROGRESS NOTES
RT EQUIPMENT DEVICE RELATED - SKIN ASSESSMENT    Vasquez Score:  Vasquez Score: 19     RT Medical Equipment/Device:     ETT Aceves/Anchorfast    Skin Assessment:      Cheek:  Intact  Lips:  Intact  Mouth:  Intact    Device Skin Pressure Protection:  Skin-to-device areas padded:  Anchorfast    Nurse Notification:  Jennifer Sierra, RRT

## 2025-02-15 NOTE — ANESTHESIA PROCEDURE NOTES
Arterial Line      Patient reassessed immediately prior to procedure    Start time: 2/15/2025 7:06 AM   Performed By   Anesthesiologist: Carol Hernandez MD   Preanesthetic Checklist  Completed: patient identified, IV checked, site marked, risks and benefits discussed, surgical consent, monitors and equipment checked, pre-op evaluation and timeout performed  Arterial Line Prep    Sterile Tech: cap, gloves, sterile barriers and mask  Prep: ChloraPrep  Patient monitoring: EKG, continuous pulse oximetry and blood pressure monitoring  Arterial Line Procedure   Laterality:left  Location:  radial artery  Catheter size: 20 G   Guidance: ultrasound guided  PROCEDURE NOTE/ULTRASOUND INTERPRETATION.  Using ultrasound guidance the potential vascular sites for insertion of the catheter were visualized to determine the patency of the vessel to be used for vascular access.  After selecting the appropriate site for insertion, the needle was visualized under ultrasound being inserted into the radial artery, followed by ultrasound confirmation of wire and catheter placement. There were no abnormalities seen on ultrasound; an image was taken; and the patient tolerated the procedure with no complications.   Number of attempts: 1  Successful placement: yes   Post Assessment   Dressing Type: wrist guard applied, secured with tape and occlusive dressing applied.   Complications no  Circ/Move/Sens Assessment: normal and unchanged.   Patient Tolerance: patient tolerated the procedure well with no apparent complications

## 2025-02-15 NOTE — ANESTHESIA PROCEDURE NOTES
Intra-Op Anesthesia VALERY    Procedure Performed: Intra-Op Anesthesia VALERY       Start Time:  2/15/2025 7:28 AM       End Time:      Preanesthesia Checklist:  Patient identified, IV assessed, risks and benefits discussed, monitors and equipment assessed, procedure being performed at surgeon's request and anesthesia consent obtained.    General Procedure Information  VALERY Placed for monitoring purposes only -- This is not a diagnostic VALERY  Diagnostic Indications for Echo:  hemodynamic monitoring  Physician Requesting Echo: Joe Back MD  Location performed:  OR  Intubated  Bite block not placed  Heart visualized  Probe Insertion:  Easy  Probe Type:  Multiplane  Modalities:  2D only, color flow mapping and continuous wave Doppler        Anesthesia Information  Performed Personally  Anesthesiologist:  Carol Hernandez MD      Echocardiogram Comments:       VALERY placed at surgeon request.     Please see cardiology diagnostic evaluation for complete report.

## 2025-02-15 NOTE — TELEPHONE ENCOUNTER
"Answer Assessment - Initial Assessment Questions  1. REASON FOR CALL or QUESTION: \"What is your reason for calling today?\" or \"How can I best help you?\" or \"What question do you have that I can help answer?\"  He is to go to ICU first, get admitted and labs all drawn then he will go from ICU to surgery and return to that room.    Protocols used: Information Only Call - No Triage-ADULT-    "

## 2025-02-15 NOTE — H&P
Chief Complaint   Patient presents with    Coronary Artery Disease       Pt is here for follow up               Subjective  History of Present Illness  The patient returns today for a subsequent follow-up for reconsideration of coronary artery bypass grafting. For the interested reader, I reference my clinic note from 06/19/2024. He is accompanied by his sister.     He has been diagnosed with colorectal cancer and is currently undergoing treatment. His oncologist has expressed optimism regarding the efficacy of the current treatment regimen, as evidenced by the shrinkage of the tumor. A CT scan has been scheduled for 12/31/2024 to further evaluate the response to treatment. He is scheduled to receive an infusion on 01/17/2025. His oncologist has advised a cessation of chemotherapy for a minimum of one month prior to any surgical intervention. He reports experiencing dizziness, which he attributes to his chemotherapy treatment. He also reports episodes of dizziness upon standing up too quickly in the morning, which he believes may be due to dehydration from not consuming water overnight. He reports no chest pain but does experience fatigue, which he attributes to his chemotherapy treatment. This symptom appears to be alleviated by hydration.     He has not completely quit smoking.     No changes since last office visit     SOCIAL HISTORY  The patient admits to smoking and has not completely quit.        The patient presents for left main multivessel coronary disease and advanced colon cancer.     He has been informed about the potential need for radiation therapy as part of his treatment plan, which has caused him some anxiety. He is curious about the possibility of regaining his strength post-treatment.     He expresses concern regarding the scheduling of his surgical procedure, given the involvement of two different specialists. He is also apprehensive about the impact of chemotherapy on his healing process.      SOCIAL HISTORY  He is a non-smoker.     Review of Systems         Medical History        Past Medical History:   Diagnosis Date    Arthritis      AV block       recent hx of long pauses, pt supposed to have pacemaker placed ASAP     Bright red rectal bleeding 04/16/2020    Carotid arterial disease      Chronic kidney disease [id number]    Coronary artery disease      History of chemotherapy      History of radiation therapy 09/13/2020    Hyperlipidemia      Hypotension due to hypovolemia 09/18/2020    Irregular heart rate      Pancreatitis      Rectal cancer 04/09/2020    Stroke       had mini stroke while port was placed     Type 2 diabetes mellitus without complication, without long-term current use of insulin 11/20/2020         Surgical History         Past Surgical History:   Procedure Laterality Date    ARM LESION/CYST EXCISION Left 06/15/2021     Procedure: WIDE EXCISION MELANOMA LEFT SHOULDER WITH SPLIT THICKNESS SKIN GRAFT AND SENTINEL LYMPH NODE BIOPSY;  Surgeon: Vielka Weller MD;  Location: [facility] OR;  Service: General;  Laterality: Left;    CARDIAC CATHETERIZATION N/A 06/12/2024     Procedure: Coronary angiography;  Surgeon: Ga Hameed MD;  Location: [facility] CATH INVASIVE LOCATION;  Service: Cardiology;  Laterality: N/A;    CARDIAC CATHETERIZATION N/A 06/12/2024     Procedure: Percutaneous Coronary Intervention;  Surgeon: Ga Hameed MD;  Location: [facility] CATH INVASIVE LOCATION;  Service: Cardiology;  Laterality: N/A;    CAROTID ARTERY ANGIOPLASTY Right 10/2023     stent placed in artery    CAROTID STENT        CATARACT EXTRACTION W/ INTRAOCULAR LENS  IMPLANT, BILATERAL        COLONOSCOPY N/A 04/02/2020     Procedure: COLONOSCOPY WITH ANESTHESIA;  Surgeon: Yanick Flowers DO;  Location: [facility] ENDOSCOPY;  Service: Gastroenterology;  Laterality: N/A;  pre: abnormal CT scan  post: rectal mass  PCP unknown    CYSTECTOMY        ILEOSTOMY        LYMPH NODE BIOPSY        MOUTH SURGERY         PROSTATECTOMY        RECTAL MASS EXCISION   12/21/2020    SENTINEL NODE BIOPSY Left 06/15/2021     Procedure: SENTINEL LYMPH NODE BIOPSY;  Surgeon: Vielka Weller MD;  Location:  [facility] OR;  Service: General;  Laterality: Left;    VENOUS ACCESS DEVICE (PORT) INSERTION N/A 04/14/2020     Procedure: INSERTION VENOUS ACCESS DEVICE;  Surgeon: Vielka Weller MD;  Location:  [facility] OR;  Service: General;  Laterality: N/A;    VENOUS ACCESS DEVICE (PORT) INSERTION   07/2022    VENOUS ACCESS DEVICE (PORT) REMOVAL N/A 05/28/2021     Procedure: PORT REMOVAL, EXCISION OF NEOPLASM UNCERTAIN BEHAVIOR LEFT SHOULDER;  Surgeon: Vielka Weller MD;  Location:  [facility] OR;  Service: General;  Laterality: N/A;               Family History   Problem Relation Age of Onset    Cancer Mother      Stroke Father      Heart disease Father      Heart attack Brother        Social History   Social History            Tobacco Use    Smoking status: Some Days       Current packs/day: 0.50       Average packs/day: 0.9 packs/day for 59.0 years (56.0 ttl pk-yrs)       Types: Cigarettes       Start date: 1966       Passive exposure: Past    Smokeless tobacco: Never    Tobacco comments:       As of 6/19/24 - smokes around 6-8 cigarettes per day       As of 01/08/2025 - smokes about 5-10 cigarettes per day   Vaping Use    Vaping status: Never Used   Substance Use Topics    Alcohol use: Not Currently    Drug use: Never         Current Medications          Current Outpatient Medications   Medication Sig Dispense Refill    aspirin 81 MG EC tablet Take 1 tablet by mouth Daily.        atorvastatin (LIPITOR) 40 MG tablet Take 1 tablet by mouth Daily. 90 tablet 3    clopidogrel (Plavix) 75 MG tablet Take 1 tablet by mouth Daily. Begin taking 1 week prior to surgery 30 tablet 2    diphenoxylate-atropine (LOMOTIL) 2.5-0.025 MG per tablet Take 2 tablets by mouth 4 times a day as needed for diarrhea.        ranolazine (Ranexa) 500 MG 12 hr tablet Take 1 tablet by  "mouth 2 (Two) Times a Day. 60 tablet 11    metFORMIN (GLUCOPHAGE) 500 MG tablet Take 2 tablets by mouth 2 (Two) Times a Day With Meals. 120 tablet 11      No current facility-administered medications for this visit.         Allergies:  Patient has no known allergies.        Objective  Vital Signs  Visit Vitals  /62   Pulse 76   Temp 98.2 °F (36.8 °C) (Oral)   Resp 20   Ht 179.1 cm (70.51\")   Wt 78.6 kg (173 lb 3.2 oz)   SpO2 98%   BMI 24.49 kg/m²             Physical Exam  Physical Exam  The patient is in no acute distress, appears appropriate, and is alert.  Lungs are clear to auscultation bilaterally without wheezing, rubs, or rales.  Heart has a regular rate and rhythm without murmurs, rubs, or gallops.  Abdomen is soft and nontender.  There is no clubbing, cyanosis, or edema in the extremities.              Results Review:   CT chest abdomen pelvis w contrast     Result Date: 12/31/2024  Narrative: CONTRAST-ENHANCED CT OF THE CHEST, ABDOMEN, AND PELVIS REASON FOR STUDY/CLINICAL HISTORY: [id number] cancer (CMS/HCC);Info for RADIOLOGIST [H1ST]:->pt with mCRC on chemo, please assess for response to chemo COMPARISON: Prior CT dated 10/7/2024  TECHNIQUE: Axial CT images of the chest, abdomen, and pelvis were obtained after the IV administration of contrast. Multiplanar reformats were also generated. INTRAVENOUS CONTRAST ADMINISTRATION: IV Contrast Dose is documented in the electronic medical record DOSE REPORT: Total DLP: 490 mGycm . Dose modulation was employed for ALARA by means of: Automated exposure control; adjustment of the mA and/or kV according to patient size (this includes techniques or standardized protocols for targeted exams where dose is matched to indication/reason for  exam; i.e. extremities or head); and/or use of iterative reconstructive technique. FINDINGS: CT CHEST: Lungs: Biapical pleural parenchymal scarring with background emphysematous changes. Left upper lobe nodule measures 1.1 x 0.8 cm " (series 4 image 155), previously 1.1 x 0.7 cm. Right middle lobe 6 mm nodule is similar in size (image 177), previously 5 to 6 mm. Pleura: Within normal limits Airways: Patent central airways. Cardiovascular: Heart is normal in size. No pericardial effusion. Right chest port with tip in the distal SVC. Coronary artery calcific lesions are present. Mediastinum: No threshold mediastinal or hilar lymphadenopathy. Stable 7 mm subcarinal lymph node (series 3 image 58). Lower Neck/Chest Wall: Right chest port. No axillary adenopathy. CT ABDOMEN AND PELVIS: Liver: No focal hepatic lesion. Gallbladder/Biliary tree: No biliary ductal dilation. Gallbladder is nondistended. Spleen: Within normal limits Pancreas: Within normal limits Adrenals: Within normal limits Kidneys/Ureters: Right kidney: Duplicated collecting system. Similar lower moiety pelvocaliectasis. Subtle enhancement of the collecting system and ureteral walls, unchanged from prior study. Left kidney: Similar severe hydroureteronephrosis to the level of the anastomosis. Associated renal marked cortical atrophy Gastrointestinal: Postsurgical changes from left ileal conduit urinary diversion and APR with left lower quadrant end colostomy. No significant change in the presacral soft tissue thickening measuring 11 mm (series 3 image 205). Cecum loops into the pelvis as on previous imaging. No obstruction. Peritoneum: No free fluid or free gas left pelvic sidewall nodule may be slightly increased in size measuring 3.9 x 2.5 cm (series 3 image 219), previously 3.5 x 2.2 cm (remeasured) on 10/7/2024 exam and previously measuring 2.2 x 1.6 cm on more remote 8/15/2023 exam. Mild mesenteric fat stranding is similar. Vasculature: Advanced atherosclerotic changes of the abdominal aorta. Patent main portal vein. Lymph Nodes: No change in size of the 8mm left periaortic lymph node (series 3 image 134). Urinary Bladder: Postsurgical changes from cystoprostatectomy and right  lower quadrant ileal conduit. Reproductive organs: Prostatectomy Abdominal Wall: Bilateral ostomies. Suggestion of changes of anterior abdominal wall. Bilateral fat-containing hernias. Small right para midline fat-containing hernia. MUSCULOSKELETAL: No acute or suspicious osseous abnormalities. Degenerative changes of the spine are similar to prior.     Impression: 1.  Postsurgical changes from cystoprostatectomy and right lower quadrant ileal conduit as well as APR with left lower quadrant colostomy. 2.  Left enhancing pelvic soft tissue nodule which demonstrates slight interval increase in size compared to most recent prior exam, but more apparent increase in size compared to more report prior exams. Continued attention on follow-up is suggested. 3.  Stable left upper and right middle lobe pulmonary nodules. 4.  Stable chronic left ureteral obstruction and associated left renal parenchymal atrophy. Duplicated right collecting system with similar lower pole moiety dilation and urothelial enhancement. If not previously performed, correlation with urinalysis is  recommended. 5.  Additional findings as outlined in the body of the report Electronically signed by  Alejandra Edwards on 12/31/2024 1:35 PM   Results  Laboratory Studies  Creatinine is 1.43.  I reviewed the patient's new clinical results.  Discussed with patient              Assessment & Plan  Diagnoses and all orders for this visit:     1. Coronary artery disease of native artery of native heart with stable angina pectoris (Primary)  -     Adult Transthoracic Echo Complete W/ Cont if Necessary Per Protocol; Future  -     CT angiogram chest w contrast; Future  -     Complete PFT - Pre & Post Bronchodilator; Future  -     Blood Gas, Arterial -; Future  -     US Carotid Bilateral; Future  -     US Vein Mapping Bilateral; Future     2. Other disorders of arteries, arterioles and capillaries in diseases classified elsewhere  -     US Carotid Bilateral; Future      3. Current every day smoker     4. Stage 3a chronic kidney disease     5. Rectal cancer     6. Type 2 diabetes mellitus without complication, without long-term current use of insulin        Assessment & Plan  1. Left main multivessel coronary disease.  A comprehensive discussion was held with Dr. Lyla Orozco regarding his condition. A tumor conference was conducted, and it was collectively decided that despite the potential risk of disease progression, it would be prudent to discontinue systemic therapy to address his significant coronary artery disease. In light of the tumor conference discussion, a cardiac approach to reevaluate PCI versus bypass grafting for optimal efficacy will need to be reconsidered. The operative conduct and rationale for the recommendation for coronary artery bypass grafting based on existing information were reviewed. A potential preliminary plan of action was discussed, including the risks of the procedure based on available data. His complete workup will be obtained to facilitate decision-making. He is open to proceeding with surgical revascularization if it is ultimately recommended. He comprehends the potential limitations and benefits of PCI. A new left heart catheterization will be required, and communication with Dr. Adam will be initiated to arrange this. Subsequently, contact with Dr. Orozco will be made to determine the most appropriate treatment plan moving forward.     2. Advanced colon cancer.  He is currently under systemic therapy for advanced colon cancer under the care of Dr. Lyla Orozco. The potential need to discontinue chemotherapy temporarily to address the coronary artery disease was discussed. Depending on the drug, he may need to stop chemotherapy for 2 to 6 weeks. Radiation therapy was also discussed, and it was noted that it does not interfere with the cardiac treatment plan. He will need at least 6 to 8 weeks off chemotherapy before starting it  again.        3. Type 2 diabetes mellitus.  His diabetes will be monitored as part of the pre-surgical evaluation. An A1C test will be conducted to assess current control.     4. Carotid artery disease.  A bilateral carotid ultrasound will be performed as part of the pre-surgical testing to evaluate the extent of the disease.     5. Chronic kidney disease, stage 3a.  His creatinine level is 1.43, consistent with stage 3 chronic kidney disease. He is at risk of his kidneys taking a few more days to bounce back post-surgery and a 2 to 5% risk of dialysis. This will be monitored closely during the pre-surgical evaluation.     6. Smoking cessation.  He is still smoking a few cigarettes. Strongly encouraged him to proceed with smoking cessation to aid in healing and overall prognosis.        He is a current smoker.  Smoking cessation advised.     No changes  ok to proceed forward.    He verbalizes understanding and has provided consent.

## 2025-02-15 NOTE — ANESTHESIA PROCEDURE NOTES
Airway  Urgency: elective    Date/Time: 2/15/2025 7:15 AM  Airway not difficult    General Information and Staff    Patient location during procedure: OR    Indications and Patient Condition  Indications for airway management: airway protection    Preoxygenated: yes  MILS maintained throughout  Mask difficulty assessment: 2 - vent by mask + OA or adjuvant +/- NMBA    Final Airway Details  Final airway type: endotracheal airway      Successful airway: ETT  Cuffed: yes   Successful intubation technique: video laryngoscopy  Facilitating devices/methods: intubating stylet  Endotracheal tube insertion site: oral  Blade: Hutchins  Blade size: 4  ETT size (mm): 7.5  Cormack-Lehane Classification: grade I - full view of glottis  Placement verified by: chest auscultation and capnometry   Cuff volume (mL): 8  Measured from: teeth  ETT/EBT  to teeth (cm): 22  Number of attempts at approach: 1  Assessment: lips, teeth, and gum same as pre-op and atraumatic intubation

## 2025-02-15 NOTE — BRIEF OP NOTE
Attila Viramontes  2/15/2025    Pre-op Diagnosis:   Coronary artery disease of native artery of native heart with stable angina pectoris [I25.118]  Current every day smoker  Normocytic anemia  Bilateral carotid artery stenosis  Type 2 diabetes mellitus  Stage IIIa chronic kidney disease  Colon cancer         Post-Op Diagnosis Codes:  Same         Procedure(s):  CORONARY ARTERY BYPASS GRAFTING x3 (LIMA/LAD, RSVG/OM, and RSVG/PDA)  LEFT INTERNAL MAMMARY ARTERY GRAFT  LEFT LEG OPEN VEIN HARVEST  STERNALOCK XP STERNAL PLATING        Surgeon(s):  Joe Back MD    Anesthesia: General    Staff:   Circulator: Vanita Purvis RN  Perfusionist: Taniya Barajas CCP  Scrub Person: Yana Soler; Ada Ramirez; Lissa Jensen         Estimated Blood Loss: CELL SAVER    Urine Voided: 1000 mL    Specimens:                None          Drains:   Closed/Suction Drain 1 Left Pleural Bulb 19 Fr. (Active)   Dressing Status Clean;Dry 02/15/25 1137   Status To bulb suction 02/15/25 1137       Colostomy LLQ (Active)   Peristomal Assessment Clean;Intact 02/15/25 0530   Stoma Function stool 02/15/25 0530   Stool Color brown 02/15/25 0530   Stool Consistency loose 02/15/25 0530       Urostomy RLQ (Active)   Peristomal Assessment Clean;Intact 02/15/25 0530   Treatment Other (Comment) 02/15/25 0720       Y Chest Tube 1 and 2 1 Right Mediastinal 24 Fr. 2 Left Mediastinal 24 Fr. (Active)   Dressing Type 1 Gauze;Other (Comment) 02/15/25 1137   Dressing Status 1 Clean;Dry 02/15/25 1137   Dressing Intervention 1 New dressing 02/15/25 1137   Securement 1 tubing anchored to body distal to insertion site with tape 02/15/25 1137   Dressing Type 2 Gauze;Other (Comment) 02/15/25 1137   Dressing Status 2 Clean;Dry 02/15/25 1137   Dressing Intervention 2 New dressing 02/15/25 1137   Securement 2 tubing anchored to body distal to insertion site with tape 02/15/25 1137       Findings: SEE OP NOTE        Complications:  NONE          Joe Back MD     Date: 2/15/2025  Time: 13:21 CST

## 2025-02-16 ENCOUNTER — APPOINTMENT (OUTPATIENT)
Dept: GENERAL RADIOLOGY | Facility: HOSPITAL | Age: 75
End: 2025-02-16
Payer: MEDICARE

## 2025-02-16 LAB
ALBUMIN SERPL-MCNC: 3.4 G/DL (ref 3.5–5.2)
ANION GAP SERPL CALCULATED.3IONS-SCNC: 10 MMOL/L (ref 5–15)
BASOPHILS # BLD AUTO: 0.03 10*3/MM3 (ref 0–0.2)
BASOPHILS NFR BLD AUTO: 0.3 % (ref 0–1.5)
BUN SERPL-MCNC: 17 MG/DL (ref 8–23)
BUN/CREAT SERPL: 13 (ref 7–25)
CALCIUM SPEC-SCNC: 8 MG/DL (ref 8.6–10.5)
CHLORIDE SERPL-SCNC: 112 MMOL/L (ref 98–107)
CO2 SERPL-SCNC: 20 MMOL/L (ref 22–29)
CREAT SERPL-MCNC: 1.31 MG/DL (ref 0.76–1.27)
DEPRECATED RDW RBC AUTO: 54.4 FL (ref 37–54)
EGFRCR SERPLBLD CKD-EPI 2021: 57.5 ML/MIN/1.73
EOSINOPHIL # BLD AUTO: 0.04 10*3/MM3 (ref 0–0.4)
EOSINOPHIL NFR BLD AUTO: 0.4 % (ref 0.3–6.2)
ERYTHROCYTE [DISTWIDTH] IN BLOOD BY AUTOMATED COUNT: 16.2 % (ref 12.3–15.4)
GLUCOSE BLDC GLUCOMTR-MCNC: 133 MG/DL (ref 70–130)
GLUCOSE BLDC GLUCOMTR-MCNC: 137 MG/DL (ref 70–130)
GLUCOSE BLDC GLUCOMTR-MCNC: 140 MG/DL (ref 70–130)
GLUCOSE BLDC GLUCOMTR-MCNC: 143 MG/DL (ref 70–130)
GLUCOSE BLDC GLUCOMTR-MCNC: 163 MG/DL (ref 70–130)
GLUCOSE BLDC GLUCOMTR-MCNC: 175 MG/DL (ref 70–130)
GLUCOSE BLDC GLUCOMTR-MCNC: 180 MG/DL (ref 70–130)
GLUCOSE BLDC GLUCOMTR-MCNC: 236 MG/DL (ref 70–130)
GLUCOSE BLDC GLUCOMTR-MCNC: 249 MG/DL (ref 70–130)
GLUCOSE BLDC GLUCOMTR-MCNC: 265 MG/DL (ref 70–130)
GLUCOSE SERPL-MCNC: 134 MG/DL (ref 65–99)
HCT VFR BLD AUTO: 26.3 % (ref 37.5–51)
HGB BLD-MCNC: 8.6 G/DL (ref 13–17.7)
IMM GRANULOCYTES # BLD AUTO: 0.04 10*3/MM3 (ref 0–0.05)
IMM GRANULOCYTES NFR BLD AUTO: 0.4 % (ref 0–0.5)
INR PPP: 1.27 (ref 0.91–1.09)
LYMPHOCYTES # BLD AUTO: 0.89 10*3/MM3 (ref 0.7–3.1)
LYMPHOCYTES NFR BLD AUTO: 7.8 % (ref 19.6–45.3)
MCH RBC QN AUTO: 29.8 PG (ref 26.6–33)
MCHC RBC AUTO-ENTMCNC: 32.7 G/DL (ref 31.5–35.7)
MCV RBC AUTO: 91 FL (ref 79–97)
MONOCYTES # BLD AUTO: 1.03 10*3/MM3 (ref 0.1–0.9)
MONOCYTES NFR BLD AUTO: 9.1 % (ref 5–12)
NEUTROPHILS NFR BLD AUTO: 82 % (ref 42.7–76)
NEUTROPHILS NFR BLD AUTO: 9.34 10*3/MM3 (ref 1.7–7)
NRBC BLD AUTO-RTO: 0 /100 WBC (ref 0–0.2)
PHOSPHATE SERPL-MCNC: 2.7 MG/DL (ref 2.5–4.5)
PLATELET # BLD AUTO: 165 10*3/MM3 (ref 140–450)
PMV BLD AUTO: 9 FL (ref 6–12)
POTASSIUM SERPL-SCNC: 3.7 MMOL/L (ref 3.5–5.2)
POTASSIUM SERPL-SCNC: 4.3 MMOL/L (ref 3.5–5.2)
PROTHROMBIN TIME: 16.6 SECONDS (ref 11.8–14.8)
RBC # BLD AUTO: 2.89 10*6/MM3 (ref 4.14–5.8)
SODIUM SERPL-SCNC: 142 MMOL/L (ref 136–145)
WBC NRBC COR # BLD AUTO: 11.37 10*3/MM3 (ref 3.4–10.8)

## 2025-02-16 PROCEDURE — 80069 RENAL FUNCTION PANEL: CPT | Performed by: THORACIC SURGERY (CARDIOTHORACIC VASCULAR SURGERY)

## 2025-02-16 PROCEDURE — P9041 ALBUMIN (HUMAN),5%, 50ML: HCPCS | Performed by: THORACIC SURGERY (CARDIOTHORACIC VASCULAR SURGERY)

## 2025-02-16 PROCEDURE — 99024 POSTOP FOLLOW-UP VISIT: CPT | Performed by: NURSE PRACTITIONER

## 2025-02-16 PROCEDURE — 85025 COMPLETE CBC W/AUTO DIFF WBC: CPT | Performed by: THORACIC SURGERY (CARDIOTHORACIC VASCULAR SURGERY)

## 2025-02-16 PROCEDURE — 94761 N-INVAS EAR/PLS OXIMETRY MLT: CPT

## 2025-02-16 PROCEDURE — 25010000002 AMIODARONE IN DEXTROSE 5% 360-4.14 MG/200ML-% SOLUTION: Performed by: THORACIC SURGERY (CARDIOTHORACIC VASCULAR SURGERY)

## 2025-02-16 PROCEDURE — 94664 DEMO&/EVAL PT USE INHALER: CPT

## 2025-02-16 PROCEDURE — 97162 PT EVAL MOD COMPLEX 30 MIN: CPT

## 2025-02-16 PROCEDURE — 25010000002 VANCOMYCIN 750 MG/150ML SOLUTION: Performed by: THORACIC SURGERY (CARDIOTHORACIC VASCULAR SURGERY)

## 2025-02-16 PROCEDURE — 94799 UNLISTED PULMONARY SVC/PX: CPT

## 2025-02-16 PROCEDURE — 82948 REAGENT STRIP/BLOOD GLUCOSE: CPT

## 2025-02-16 PROCEDURE — 25010000002 PHENYLEPHRINE 10 MG/ML SOLUTION: Performed by: THORACIC SURGERY (CARDIOTHORACIC VASCULAR SURGERY)

## 2025-02-16 PROCEDURE — 25010000002 ALBUMIN HUMAN 5% PER 50 ML: Performed by: THORACIC SURGERY (CARDIOTHORACIC VASCULAR SURGERY)

## 2025-02-16 PROCEDURE — 97530 THERAPEUTIC ACTIVITIES: CPT

## 2025-02-16 PROCEDURE — 71045 X-RAY EXAM CHEST 1 VIEW: CPT

## 2025-02-16 PROCEDURE — 25010000002 CEFAZOLIN PER 500 MG: Performed by: THORACIC SURGERY (CARDIOTHORACIC VASCULAR SURGERY)

## 2025-02-16 PROCEDURE — 84132 ASSAY OF SERUM POTASSIUM: CPT | Performed by: THORACIC SURGERY (CARDIOTHORACIC VASCULAR SURGERY)

## 2025-02-16 PROCEDURE — 85610 PROTHROMBIN TIME: CPT | Performed by: THORACIC SURGERY (CARDIOTHORACIC VASCULAR SURGERY)

## 2025-02-16 PROCEDURE — 25010000002 ENOXAPARIN PER 10 MG: Performed by: THORACIC SURGERY (CARDIOTHORACIC VASCULAR SURGERY)

## 2025-02-16 PROCEDURE — 63710000001 INSULIN LISPRO (HUMAN) PER 5 UNITS: Performed by: THORACIC SURGERY (CARDIOTHORACIC VASCULAR SURGERY)

## 2025-02-16 PROCEDURE — 93005 ELECTROCARDIOGRAM TRACING: CPT | Performed by: THORACIC SURGERY (CARDIOTHORACIC VASCULAR SURGERY)

## 2025-02-16 PROCEDURE — 93010 ELECTROCARDIOGRAM REPORT: CPT | Performed by: INTERNAL MEDICINE

## 2025-02-16 PROCEDURE — 97116 GAIT TRAINING THERAPY: CPT

## 2025-02-16 PROCEDURE — 25810000003 SODIUM CHLORIDE 0.9 % SOLUTION: Performed by: THORACIC SURGERY (CARDIOTHORACIC VASCULAR SURGERY)

## 2025-02-16 PROCEDURE — 25010000002 POTASSIUM CHLORIDE PER 2 MEQ: Performed by: THORACIC SURGERY (CARDIOTHORACIC VASCULAR SURGERY)

## 2025-02-16 PROCEDURE — 25010000002 ACETAMINOPHEN 10 MG/ML SOLUTION: Performed by: THORACIC SURGERY (CARDIOTHORACIC VASCULAR SURGERY)

## 2025-02-16 RX ORDER — DIPHENOXYLATE HYDROCHLORIDE AND ATROPINE SULFATE 2.5; .025 MG/1; MG/1
2 TABLET ORAL 4 TIMES DAILY PRN
COMMUNITY

## 2025-02-16 RX ORDER — INSULIN LISPRO 100 [IU]/ML
2-9 INJECTION, SOLUTION INTRAVENOUS; SUBCUTANEOUS
Status: DISCONTINUED | OUTPATIENT
Start: 2025-02-16 | End: 2025-02-20 | Stop reason: HOSPADM

## 2025-02-16 RX ORDER — POTASSIUM CHLORIDE 29.8 MG/ML
20 INJECTION INTRAVENOUS ONCE
Status: COMPLETED | OUTPATIENT
Start: 2025-02-16 | End: 2025-02-16

## 2025-02-16 RX ORDER — NICOTINE POLACRILEX 4 MG
15 LOZENGE BUCCAL
Status: DISCONTINUED | OUTPATIENT
Start: 2025-02-16 | End: 2025-02-20 | Stop reason: HOSPADM

## 2025-02-16 RX ORDER — ALBUMIN HUMAN 50 G/1000ML
500 SOLUTION INTRAVENOUS ONCE
Status: COMPLETED | OUTPATIENT
Start: 2025-02-16 | End: 2025-02-16

## 2025-02-16 RX ORDER — IBUPROFEN 600 MG/1
1 TABLET ORAL
Status: DISCONTINUED | OUTPATIENT
Start: 2025-02-16 | End: 2025-02-20 | Stop reason: HOSPADM

## 2025-02-16 RX ORDER — DEXTROSE MONOHYDRATE 25 G/50ML
25 INJECTION, SOLUTION INTRAVENOUS
Status: DISCONTINUED | OUTPATIENT
Start: 2025-02-16 | End: 2025-02-20 | Stop reason: HOSPADM

## 2025-02-16 RX ADMIN — OXYCODONE HYDROCHLORIDE 10 MG: 10 TABLET ORAL at 12:45

## 2025-02-16 RX ADMIN — CEFAZOLIN 2 G: 2 INJECTION, POWDER, FOR SOLUTION INTRAMUSCULAR; INTRAVENOUS at 20:07

## 2025-02-16 RX ADMIN — Medication 1 APPLICATION: at 08:36

## 2025-02-16 RX ADMIN — IPRATROPIUM BROMIDE AND ALBUTEROL SULFATE 1.5 ML: .5; 3 SOLUTION RESPIRATORY (INHALATION) at 19:41

## 2025-02-16 RX ADMIN — PHENYLEPHRINE HYDROCHLORIDE 0.3 MCG/KG/MIN: 10 INJECTION INTRAVENOUS at 01:21

## 2025-02-16 RX ADMIN — ACETAMINOPHEN 1000 MG: 500 TABLET, FILM COATED ORAL at 20:08

## 2025-02-16 RX ADMIN — POTASSIUM CHLORIDE 20 MEQ: 29.8 INJECTION, SOLUTION INTRAVENOUS at 08:31

## 2025-02-16 RX ADMIN — CHLORHEXIDINE GLUCONATE 1 APPLICATION: 500 CLOTH TOPICAL at 04:13

## 2025-02-16 RX ADMIN — PREGABALIN 25 MG: 25 CAPSULE ORAL at 05:05

## 2025-02-16 RX ADMIN — METOPROLOL TARTRATE 12.5 MG: 25 TABLET, FILM COATED ORAL at 20:08

## 2025-02-16 RX ADMIN — LIDOCAINE 2 PATCH: 4 PATCH TOPICAL at 09:15

## 2025-02-16 RX ADMIN — ASPIRIN 162 MG: 81 TABLET, CHEWABLE ORAL at 08:35

## 2025-02-16 RX ADMIN — OXYCODONE HYDROCHLORIDE 10 MG: 10 TABLET ORAL at 17:17

## 2025-02-16 RX ADMIN — PREGABALIN 25 MG: 25 CAPSULE ORAL at 18:08

## 2025-02-16 RX ADMIN — OXYCODONE HYDROCHLORIDE 10 MG: 10 TABLET ORAL at 22:08

## 2025-02-16 RX ADMIN — METOPROLOL TARTRATE 12.5 MG: 25 TABLET, FILM COATED ORAL at 08:36

## 2025-02-16 RX ADMIN — ACETAMINOPHEN 1000 MG: 500 TABLET, FILM COATED ORAL at 13:30

## 2025-02-16 RX ADMIN — OXYCODONE HYDROCHLORIDE 10 MG: 10 TABLET ORAL at 06:13

## 2025-02-16 RX ADMIN — CHLORHEXIDINE GLUCONATE 1 APPLICATION: 500 CLOTH TOPICAL at 08:36

## 2025-02-16 RX ADMIN — ATORVASTATIN CALCIUM 20 MG: 10 TABLET, FILM COATED ORAL at 20:08

## 2025-02-16 RX ADMIN — INSULIN LISPRO 4 UNITS: 100 INJECTION, SOLUTION INTRAVENOUS; SUBCUTANEOUS at 17:22

## 2025-02-16 RX ADMIN — Medication 1 APPLICATION: at 20:08

## 2025-02-16 RX ADMIN — CEFAZOLIN 2 G: 2 INJECTION, POWDER, FOR SOLUTION INTRAMUSCULAR; INTRAVENOUS at 03:44

## 2025-02-16 RX ADMIN — AMIODARONE HYDROCHLORIDE 0.5 MG/MIN: 1.8 INJECTION, SOLUTION INTRAVENOUS at 01:41

## 2025-02-16 RX ADMIN — ALBUMIN (HUMAN) 500 ML: 12.5 INJECTION, SOLUTION INTRAVENOUS at 00:14

## 2025-02-16 RX ADMIN — PANTOPRAZOLE SODIUM 40 MG: 40 TABLET, DELAYED RELEASE ORAL at 06:14

## 2025-02-16 RX ADMIN — CHLORHEXIDINE GLUCONATE 0.12% ORAL RINSE 15 ML: 1.2 LIQUID ORAL at 05:05

## 2025-02-16 RX ADMIN — INSULIN LISPRO 6 UNITS: 100 INJECTION, SOLUTION INTRAVENOUS; SUBCUTANEOUS at 20:14

## 2025-02-16 RX ADMIN — INSULIN LISPRO 4 UNITS: 100 INJECTION, SOLUTION INTRAVENOUS; SUBCUTANEOUS at 11:54

## 2025-02-16 RX ADMIN — AMIODARONE HYDROCHLORIDE 400 MG: 200 TABLET ORAL at 18:08

## 2025-02-16 RX ADMIN — ACETAMINOPHEN 1000 MG: 10 INJECTION, SOLUTION INTRAVENOUS at 05:32

## 2025-02-16 RX ADMIN — CLOPIDOGREL BISULFATE 75 MG: 75 TABLET ORAL at 18:08

## 2025-02-16 RX ADMIN — OXYCODONE HYDROCHLORIDE 10 MG: 10 TABLET ORAL at 09:23

## 2025-02-16 RX ADMIN — OXYCODONE HYDROCHLORIDE 5 MG: 5 TABLET ORAL at 03:12

## 2025-02-16 RX ADMIN — IPRATROPIUM BROMIDE AND ALBUTEROL SULFATE 1.5 ML: .5; 3 SOLUTION RESPIRATORY (INHALATION) at 14:14

## 2025-02-16 RX ADMIN — CEFAZOLIN 2 G: 2 INJECTION, POWDER, FOR SOLUTION INTRAMUSCULAR; INTRAVENOUS at 11:55

## 2025-02-16 RX ADMIN — VANCOMYCIN 750 MG: 750 INJECTION, SOLUTION INTRAVENOUS at 20:07

## 2025-02-16 RX ADMIN — SODIUM BICARBONATE 50 MEQ: 84 INJECTION INTRAVENOUS at 08:08

## 2025-02-16 RX ADMIN — IPRATROPIUM BROMIDE AND ALBUTEROL SULFATE 1.5 ML: .5; 3 SOLUTION RESPIRATORY (INHALATION) at 06:00

## 2025-02-16 RX ADMIN — ENOXAPARIN SODIUM 40 MG: 100 INJECTION SUBCUTANEOUS at 08:36

## 2025-02-16 RX ADMIN — IPRATROPIUM BROMIDE AND ALBUTEROL SULFATE 1.5 ML: .5; 3 SOLUTION RESPIRATORY (INHALATION) at 10:02

## 2025-02-16 RX ADMIN — CHLORHEXIDINE GLUCONATE 0.12% ORAL RINSE 15 ML: 1.2 LIQUID ORAL at 18:08

## 2025-02-16 RX ADMIN — POTASSIUM CHLORIDE 20 MEQ: 29.8 INJECTION, SOLUTION INTRAVENOUS at 03:44

## 2025-02-16 RX ADMIN — VANCOMYCIN 750 MG: 750 INJECTION, SOLUTION INTRAVENOUS at 10:02

## 2025-02-16 RX ADMIN — AMIODARONE HYDROCHLORIDE 400 MG: 200 TABLET ORAL at 09:14

## 2025-02-16 NOTE — THERAPY TREATMENT NOTE
Acute Care - Physical Therapy Treatment Note   [place]     Patient Name: Attila Viramontes  : 1950  MRN: [id number]  Today's Date: 2025      Visit Dx:     ICD-10-CM ICD-9-CM   1. Impaired mobility [Z74.09]  Z74.09 799.89   2. Coronary artery disease of native artery of native heart with stable angina pectoris  I25.118 414.01     413.9     Patient Active Problem List   Diagnosis    Rectal cancer    Current every day smoker    Impaired gait and mobility    Hypertension    2nd degree AV block    Tobacco abuse    Normocytic anemia    Chemotherapy induced neutropenia    History of urinary diversion procedure    Cancer related pain    Bilateral carotid artery stenosis    Carotid occlusion, left    Type 2 diabetes mellitus with kidney complication, without long-term current use of insulin    Iron deficiency anemia    Ileostomy in place    Colostomy in place    Function kidney decreased    Stage 3a chronic kidney disease    Preop testing    Symptomatic stenosis of right carotid artery    Stenosis of right carotid artery    Overweight (BMI 25.0-29.9)    Abnormal stress test    HENDERSON (dyspnea on exertion)    Coronary artery disease of native artery of native heart with stable angina pectoris    Hydronephrosis    CAD (coronary artery disease)     Past Medical History:   Diagnosis Date    Arthritis     AV block     recent hx of long pauses, pt supposed to have pacemaker placed ASAP     Bright red rectal bleeding 2020    Carotid arterial disease     Chronic kidney disease [id number]    Coronary artery disease     History of chemotherapy     History of radiation therapy 2020    Hyperlipidemia     Hypotension due to hypovolemia 2020    Irregular heart rate     Pancreatitis     Rectal cancer 2020    Stroke     had mini stroke while port was placed     Type 2 diabetes mellitus without complication, without long-term current use of insulin 2020     Past Surgical History:   Procedure  Laterality Date    ARM LESION/CYST EXCISION Left 06/15/2021    Procedure: WIDE EXCISION MELANOMA LEFT SHOULDER WITH SPLIT THICKNESS SKIN GRAFT AND SENTINEL LYMPH NODE BIOPSY;  Surgeon: Vielka Weller MD;  Location: [facility] OR;  Service: General;  Laterality: Left;    CARDIAC CATHETERIZATION N/A 06/12/2024    Procedure: Coronary angiography;  Surgeon: Ga Hameed MD;  Location:  [facility] CATH INVASIVE LOCATION;  Service: Cardiology;  Laterality: N/A;    CARDIAC CATHETERIZATION N/A 06/12/2024    Procedure: Percutaneous Coronary Intervention;  Surgeon: Ga Hameed MD;  Location:  [facility]ATH [facility];  Service: Cardiology;  Laterality: N/A;    CARDIAC CATHETERIZATION N/A 1/15/2025    Procedure: Coronary angiography;  Surgeon: Ga Hameed MD;  Location:  [facility];  Service: Cardiology;  Laterality: N/A;    CARDIAC CATHETERIZATION N/A 1/15/2025    Procedure: Percutaneous Coronary Intervention;  Surgeon: Ga Hameed MD;  Location:  [facility];  Service: Cardiology;  Laterality: N/A;    CAROTID ARTERY ANGIOPLASTY Right 10/2023    stent placed in artery    CAROTID STENT      CATARACT EXTRACTION W/ INTRAOCULAR LENS  IMPLANT, BILATERAL      COLONOSCOPY N/A 04/02/2020    Procedure: COLONOSCOPY WITH ANESTHESIA;  Surgeon: Yanick Flowers DO;  Location: [facility] ENDOSCOPY;  Service: Gastroenterology;  Laterality: N/A;  pre: abnormal CT scan  post: rectal mass  PCP unknown    CYSTECTOMY      ILEOSTOMY      LYMPH NODE BIOPSY      MOUTH SURGERY      PROSTATECTOMY      RECTAL MASS EXCISION  12/21/2020    SENTINEL NODE BIOPSY Left 06/15/2021    Procedure: SENTINEL LYMPH NODE BIOPSY;  Surgeon: Vielka Weller MD;  Location: [facility] OR;  Service: General;  Laterality: Left;    VENOUS ACCESS DEVICE (PORT) INSERTION N/A 04/14/2020    Procedure: INSERTION VENOUS ACCESS DEVICE;  Surgeon: Vielka Weller MD;  Location: [facility] OR;  Service: General;  Laterality: N/A;    VENOUS  ACCESS DEVICE (PORT) INSERTION  07/2022    VENOUS ACCESS DEVICE (PORT) REMOVAL N/A 05/28/2021    Procedure: PORT REMOVAL, EXCISION OF NEOPLASM UNCERTAIN BEHAVIOR LEFT SHOULDER;  Surgeon: Vielka Weller MD;  Location:  [facility] OR;  Service: General;  Laterality: N/A;     PT Assessment (Last 12 Hours)       PT Evaluation and Treatment       Row Name 02/16/25 1335          Physical Therapy Time and Intention    Subjective Information complains of;pain  -     Document Type therapy note (daily note)  -     Mode of Treatment physical therapy  -     Comment spent time answering pt's questions concerning sternal precautions, progression of activity, and about his pain-pain management  -       Row Name 02/16/25 1335          General Information    Existing Precautions/Restrictions fall;sternal  chest tube, external pacer  -       Row Name 02/16/25 1335          Pain    Pretreatment Pain Rating 8/10  -     Posttreatment Pain Rating 8/10  -     Pain Location neck;back  -     Pain Side/Orientation upper;generalized  -     Pain Management Interventions exercise or physical activity utilized;other (see comments)  education  -     Response to Pain Interventions activity participation with tolerable pain  -       Row Name 02/16/25 1335          Bed Mobility    Comment, (Bed Mobility) up in chair  -       Row Name 02/16/25 1335          Sit-Stand Transfer    Sit-Stand [place] (Transfers) verbal cues;contact guard  -       Row Name 02/16/25 1335          Stand-Sit Transfer    Stand-Sit [place] (Transfers) contact guard  -       Row Name 02/16/25 1335          Gait/Stairs (Locomotion)    [place] Level (Gait) contact guard  -     Distance in Feet (Gait) 400  -     Left Sided Gait Deviations forward flexed posture  cues to keep head up  -       Row Name 02/16/25 1335          Motor Skills    Comments, Therapeutic Exercise cardiac warm ups x 15-pt asking about doing exercises throughout  the day and explained that pt could continue the warm ups on and off throughout the day.  -MF       Row Name             Wound 02/15/25 0830 midline sternal    Wound - Properties Group Placement Date: 02/15/25  -SF Placement Time: 0830 -SF Orientation: midline  -SF Location: sternal  -SF Primary Wound Type: Incision  -SF    Retired Wound - Properties Group Placement Date: 02/15/25  -SF Placement Time: 0830  -SF Orientation: midline  -SF Location: sternal  -SF Primary Wound Type: Incision  -SF    Retired Wound - Properties Group Placement Date: 02/15/25  -SF Placement Time: 0830  -SF Orientation: midline  -SF Location: sternal  -SF Primary Wound Type: Incision  -SF    Retired Wound - Properties Group Date first assessed: 02/15/25  -SF Time first assessed: 0830  -SF Location: sternal  -SF Primary Wound Type: Incision  -SF      Row Name             Wound 02/15/25 0835 Left lower leg    Wound - Properties Group Placement Date: 02/15/25  -SF Placement Time: 0835  -SF Side: Left  -SF Orientation: lower  -SF Location: leg  -SF Primary Wound Type: Incision  -SF    Retired Wound - Properties Group Placement Date: 02/15/25  -SF Placement Time: 0835  -SF Side: Left  -SF Orientation: lower  -SF Location: leg  -SF Primary Wound Type: Incision  -SF    Retired Wound - Properties Group Placement Date: 02/15/25  -SF Placement Time: 0835  -SF Side: Left  -SF Orientation: lower  -SF Location: leg  -SF Primary Wound Type: Incision  -SF    Retired Wound - Properties Group Date first assessed: 02/15/25  -SF Time first assessed: 0835  -SF Side: Left  -SF Location: leg  -SF Primary Wound Type: Incision  -SF      Row Name 02/16/25 1335          Vital Signs    Pre SpO2 (%) 94  -MF     O2 Delivery Pre Treatment room air  -MF     Intra SpO2 (%) 91  -MF     O2 Delivery Intra Treatment room air  nsg applied o2 during gait.  -MF     Post SpO2 (%) 97  -MF     O2 Delivery Post Treatment room air  -MF     Pre Patient Position Sitting  -MF     Intra  Patient Position Standing  -     Post Patient Position Sitting  -       Row Name 02/16/25 1335          Positioning and Restraints    Pre-Treatment Position sitting in chair/recliner  -     Post Treatment Position chair  -MF     In Chair notified nsg;sitting;call light within reach;encouraged to call for assist;RUE elevated;LUE elevated  -               User Key  (r) = Recorded By, (t) = Taken By, (c) = Cosigned By      Initials Name Provider Type    Kendra Lopez PTA Physical Therapist Assistant    Vanita Miranda, RN Registered Nurse                    Physical Therapy Education       Title: PT OT SLP Therapies (Done)       Topic: Physical Therapy (Done)       Point: Mobility training (Done)       Learning Progress Summary            Patient Acceptance, E, VU by  at 2/16/2025 0948    Comment: role of PT, sternal precuations, pursed lip breathing, goal setting                      Point: Home exercise program (Done)       Learning Progress Summary            Patient Acceptance, E, VU by  at 2/16/2025 0948    Comment: role of PT, sternal precuations, pursed lip breathing, goal setting                      Point: Body mechanics (Done)       Learning Progress Summary            Patient Acceptance, E, VU by  at 2/16/2025 0948    Comment: role of PT, sternal precuations, pursed lip breathing, goal setting                      Point: Precautions (Done)       Learning Progress Summary            Patient Acceptance, E, VU by  at 2/16/2025 0948    Comment: role of PT, sternal precuations, pursed lip breathing, goal setting                                      User Key       Initials Effective Dates Name Provider Type Discipline     08/15/24 -  Zachary Walton, OMAYRA DPT Physical Therapist PT                  PT Recommendation and Plan             Time Calculation:    PT Charges       Row Name 02/16/25 1413 02/16/25 0833          Time Calculation    Start Time 1335  - 0833  -     Stop Time 1414   - 0911  +5 for chart review  -     Time Calculation (min) 39 min  -MF 38 min  -AJ     PT Received On 02/16/25  - 02/16/25  -     PT Goal Re-Cert Due Date -- 02/26/25  -        Time Calculation- PT    Total Timed Code Minutes- PT 39 minute(s)  -MF --        Timed Charges    76691 - Gait Training Minutes  30  -MF --     52573 - PT Therapeutic Activity Minutes 9  -MF --        Untimed Charges    PT Eval/Re-eval Minutes -- 43  -AJ        Total Minutes    Timed Charges Total Minutes 39  -MF --     Untimed Charges Total Minutes -- 43  -AJ      Total Minutes 39  -MF 43  -AJ               User Key  (r) = Recorded By, (t) = Taken By, (c) = Cosigned By      Initials Name Provider Type    Kendra Lopez PTA Physical Therapist Assistant    Zachary Lockhart, PT DPT Physical Therapist                  Therapy Charges for Today       Code Description Service Date Service Provider Modifiers Qty    35547692396 HC GAIT TRAINING EA 15 MIN 2/16/2025 Kendra Shipley PTA GP 2    [id number] HC PT THERAPEUTIC ACT EA 15 MIN 2/16/2025 Kendra Shipley PTA GP 1            PT G-Codes  Outcome Measure Options: AM-PAC 6 Clicks Basic Mobility (PT)  AM-PAC 6 Clicks Score (PT): 17    Kendra Shipley PTA  2/16/2025

## 2025-02-16 NOTE — THERAPY EVALUATION
Patient Name: Attila Viramontes  : 1950    MRN: [id number]                              Today's Date: 2025       Admit Date: 2/15/2025    Visit Dx:     ICD-10-CM ICD-9-CM   1. Impaired mobility [Z74.09]  Z74.09 799.89   2. Coronary artery disease of native artery of native heart with stable angina pectoris  I25.118 414.01     413.9     Patient Active Problem List   Diagnosis    Rectal cancer    Current every day smoker    Impaired gait and mobility    Hypertension    2nd degree AV block    Tobacco abuse    Normocytic anemia    Chemotherapy induced neutropenia    History of urinary diversion procedure    Cancer related pain    Bilateral carotid artery stenosis    Carotid occlusion, left    Type 2 diabetes mellitus with kidney complication, without long-term current use of insulin    Iron deficiency anemia    Ileostomy in place    Colostomy in place    Function kidney decreased    Stage 3a chronic kidney disease    Preop testing    Symptomatic stenosis of right carotid artery    Stenosis of right carotid artery    Overweight (BMI 25.0-29.9)    Abnormal stress test    HENDERSON (dyspnea on exertion)    Coronary artery disease of native artery of native heart with stable angina pectoris    Hydronephrosis    CAD (coronary artery disease)     Past Medical History:   Diagnosis Date    Arthritis     AV block     recent hx of long pauses, pt supposed to have pacemaker placed ASAP     Bright red rectal bleeding 2020    Carotid arterial disease     Chronic kidney disease [id number]    Coronary artery disease     History of chemotherapy     History of radiation therapy 2020    Hyperlipidemia     Hypotension due to hypovolemia 2020    Irregular heart rate     Pancreatitis     Rectal cancer 2020    Stroke     had mini stroke while port was placed     Type 2 diabetes mellitus without complication, without long-term current use of insulin 2020     Past Surgical History:   Procedure Laterality  Date    ARM LESION/CYST EXCISION Left 06/15/2021    Procedure: WIDE EXCISION MELANOMA LEFT SHOULDER WITH SPLIT THICKNESS SKIN GRAFT AND SENTINEL LYMPH NODE BIOPSY;  Surgeon: Vielka Weller MD;  Location: [facility] OR;  Service: General;  Laterality: Left;    CARDIAC CATHETERIZATION N/A 06/12/2024    Procedure: Coronary angiography;  Surgeon: Ga Hameed MD;  Location: [facility] CATH INVASIVE LOCATION;  Service: Cardiology;  Laterality: N/A;    CARDIAC CATHETERIZATION N/A 06/12/2024    Procedure: Percutaneous Coronary Intervention;  Surgeon: Ga Hameed MD;  Location:  [facility] CATH INVASIVE LOCATION;  Service: Cardiology;  Laterality: N/A;    CARDIAC CATHETERIZATION N/A 1/15/2025    Procedure: Coronary angiography;  Surgeon: Ga Hameed MD;  Location:  [facility] CATH INVASIVE LOCATION;  Service: Cardiology;  Laterality: N/A;    CARDIAC CATHETERIZATION N/A 1/15/2025    Procedure: Percutaneous Coronary Intervention;  Surgeon: Ga Hameed MD;  Location:  [facility] CATH INVASIVE LOCATION;  Service: Cardiology;  Laterality: N/A;    CAROTID ARTERY ANGIOPLASTY Right 10/2023    stent placed in artery    CAROTID STENT      CATARACT EXTRACTION W/ INTRAOCULAR LENS  IMPLANT, BILATERAL      COLONOSCOPY N/A 04/02/2020    Procedure: COLONOSCOPY WITH ANESTHESIA;  Surgeon: Yanick Flowers DO;  Location: [facility] ENDOSCOPY;  Service: Gastroenterology;  Laterality: N/A;  pre: abnormal CT scan  post: rectal mass  PCP unknown    CYSTECTOMY      ILEOSTOMY      LYMPH NODE BIOPSY      MOUTH SURGERY      PROSTATECTOMY      RECTAL MASS EXCISION  12/21/2020    SENTINEL NODE BIOPSY Left 06/15/2021    Procedure: SENTINEL LYMPH NODE BIOPSY;  Surgeon: Vielka Weller MD;  Location: [facility] OR;  Service: General;  Laterality: Left;    VENOUS ACCESS DEVICE (PORT) INSERTION N/A 04/14/2020    Procedure: INSERTION VENOUS ACCESS DEVICE;  Surgeon: Vielka Weller MD;  Location:  [facility] OR;  Service: General;  Laterality: N/A;    VENOUS ACCESS  DEVICE (PORT) INSERTION  07/2022    VENOUS ACCESS DEVICE (PORT) REMOVAL N/A 05/28/2021    Procedure: PORT REMOVAL, EXCISION OF NEOPLASM UNCERTAIN BEHAVIOR LEFT SHOULDER;  Surgeon: Vielka Weller MD;  Location: [facility] OR;  Service: General;  Laterality: N/A;      General Information       Row Name 02/16/25 0833          Physical Therapy Time and Intention    Document Type evaluation  -     Mode of Treatment physical therapy  -       Row Name 02/16/25 0833          General Information    Patient Profile Reviewed yes  -AJ     Prior Level of Function independent:;all household mobility;community mobility;home management;gait;ADL's;bed mobility  -     Existing Precautions/Restrictions sternal;fall;oxygen therapy device and L/min  no O2 donned, 2L at end of ld hernandez, 1 chest tube, external pacer  -     Barriers to Rehab medically complex;physical barrier  -       Row Name 02/16/25 0833          Living Environment    People in Home alone  -       Row Name 02/16/25 [facility]          Home Main Entrance    Number of Stairs, Main Entrance eight  -     Stair Railings, Main Entrance railings on both sides of stairs  -       Row Name 02/16/25 0833          Stairs Within Home, Primary    Number of Stairs, Within Home, Primary none  -       Row Name 02/16/25 0833          Cognition    Orientation Status (Cognition) oriented x 4  -       Row Name 02/16/25 0833          Safety Issues/Impairments Affecting Functional Mobility    Impairments Affecting Function (Mobility) shortness of breath;pain  -               User Key  (r) = Recorded By, (t) = Taken By, (c) = Cosigned By      Initials Name Provider Type    AJ Zachary Walton PT DPT Physical Therapist                   Mobility       Row Name 02/16/25 0833          Bed Mobility    Comment, (Bed Mobility) in chair upon arrival  -       Row Name 02/16/25 0833          Bed-Chair Transfer    Bed-Chair [place] (Transfers) contact guard;1 person to manage  equipment  -       Row Name 02/16/25 0833          Sit-Stand Transfer    Sit-Stand [place] (Transfers) contact guard;1 person to manage equipment  -       Row Name 02/16/25 0833          Gait/Stairs (Locomotion)    [place] Level (Gait) contact guard  -     Assistive Device (Gait) other (see comments)  support under elbow, heart pillow  -     Patient was able to Ambulate yes  -     Distance in Feet (Gait) 220  -     Deviations/Abnormal Patterns (Gait) bilateral deviations;gait speed decreased;stride length decreased;base of support, narrow  -     Bilateral Gait Deviations forward flexed posture  -               User Key  (r) = Recorded By, (t) = Taken By, (c) = Cosigned By      Initials Name Provider Type    Zachary Lockhart PT DPT Physical Therapist                   Obj/Interventions       Row Name 02/16/25 0833          Range of Motion Comprehensive    General Range of Motion bilateral lower extremity ROM WFL;lower extremity range of motion deficits identified  -[facility] Name 02/16/25 0833          Strength Comprehensive (MMT)    General Manual Muscle Testing (MMT) Assessment no strength deficits identified  -     Comment, General Manual Muscle Testing (MMT) Assessment Deferred MMT due to pain, remains function through sit<>stand and ambulation  -       Row Name 02/16/25 0833          Balance    Balance Assessment sitting static balance;sitting dynamic balance;standing static balance;standing dynamic balance  -     Static Sitting Balance independent  -     Dynamic Sitting Balance independent  -     Position, Sitting Balance supported;sitting in chair  -     Static Standing Balance contact guard;1 person to manage equipment  -     Dynamic Standing Balance contact guard;1 person to manage equipment  -     Position/Device Used, Standing Balance supported  -     Balance Interventions sitting;standing;sit to stand;supported;static;dynamic  -       Row Name 02/16/25  0833          Sensory Assessment (Somatosensory)    Sensory Assessment (Somatosensory) sensation intact  -AJ               User Key  (r) = Recorded By, (t) = Taken By, (c) = Cosigned By      Initials Name Provider Type    Zachary Lockhart, PT DPT Physical Therapist                   Goals/Plan       Row Name 02/16/25 0833          Bed Mobility Goal 1 (PT)    Activity/Assistive Device (Bed Mobility Goal 1, PT) bed mobility activities, all;rolling to left;rolling to right;supine to sit;sit to supine  -AJ     [place] Level/Cues Needed (Bed Mobility Goal 1, PT) independent  -AJ     Time Frame (Bed Mobility Goal 1, PT) long term goal (LTG);10 days  -AJ     Strategies/Barriers (Bed Mobility Goal 1, PT) maintain sternal precuations  -AJ     Progress/Outcomes (Bed Mobility Goal 1, PT) new goal  -       Row Name 02/16/25 0833          Transfer Goal 1 (PT)    Activity/Assistive Device (Transfer Goal 1, PT) sit-to-stand/stand-to-sit;bed-to-chair/chair-to-bed;toilet;wheelchair transfer;car transfer;other (see comments)  -AJ     [place] Level/Cues Needed (Transfer Goal 1, PT) independent  -AJ     Time Frame (Transfer Goal 1, PT) long term goal (LTG);10 days  -AJ     Strategies/Barriers (Transfers Goal 1, PT) maintain sternal precuations  -AJ     Progress/Outcome (Transfer Goal 1, PT) new goal  -       Row Name 02/16/25 0833          Gait Training Goal 1 (PT)    Activity/Assistive Device (Gait Training Goal 1, PT) gait (walking locomotion);decrease asymmetrical patterns;decrease fall risk;diminish gait deviation;forward stepping;improve balance and speed;increase endurance/gait distance;increase energy conservation;normalize weight shifts  -AJ     [place] Level (Gait Training Goal 1, PT) standby assist;contact guard required  -AJ     Distance (Gait Training Goal 1, PT) 500' with no pain and maintain sternal precautions  -AJ     Time Frame (Gait Training Goal 1, PT) long term goal (LTG);10 days  -AJ      Progress/Outcome (Gait Training Goal 1, PT) new goal  -AJ       Row Name 02/16/25 0833          Stairs Goal 1 (PT)    Activity/Assistive Device (Stairs Goal 1, PT) stairs, all skills;ascending stairs;descending stairs;step-to-step;decrease fall risk;improve balance and speed  -AJ     [place] Level/Cues Needed (Stairs Goal 1, PT) standby assist  -AJ     Number of Stairs (Stairs Goal 1, PT) 8 as needed for home safety  -AJ     Time Frame (Stairs Goal 1, PT) long term goal (LTG);10 days  -AJ     Progress/Outcome (Stairs Goal 1, PT) new goal  -AJ       Row Name 02/16/25 0833          Therapy Assessment/Plan (PT)    Planned Therapy Interventions (PT) balance training;bed mobility training;gait training;home exercise program;postural re-education;patient/family education;transfer training;ROM (range of motion);stair training;strengthening;stretching  -AJ               User Key  (r) = Recorded By, (t) = Taken By, (c) = Cosigned By      Initials Name Provider Type    AJ Zachary Walton, PT DPT Physical Therapist                   Clinical Impression       Row Name 02/16/25 0833          Pain    Pretreatment Pain Rating 2/10  -AJ     Posttreatment Pain Rating 2/10  -AJ     Pain Location chest;shoulder  -AJ     Pain Side/Orientation generalized;left  -AJ     Pain Management Interventions nursing notified;exercise or physical activity utilized;premedicated for activity  -AJ     Response to Pain Interventions activity participation with tolerable pain;no change per patient report  -AJ       Row Name 02/16/25 0833          Plan of Care Review    Plan of Care Reviewed With patient  -AJ     Outcome Evaluation PT eval completed. Pt up in recliner, AOx4 with complaints of pain in L shoulder but is very tolerable. Pt reports independence at home and has sister for assist as needed. Pt pre vitals; 91 HR, 94 SpO2 on RA, 23 RR, 101/59 BP. Pt educated on pursed lip breathing with mobility and sternal precuations prior to mobility, and  verbalized understanding. Pt performed sit<>stand with CGA, and ambulated a lap around until totaling 220', prior to returning to recliner. Pt initially ambulated with narrow stance and fwd flexed posture but improved as session progress. pt required 1 short standing rest break due to SOA but was eager to continue. Pt left sitting in recliner and educated on role of PT and continued progression of activity. Pt post vitals; 94 HR, 97 SpO2 2L donned by RN, 19 RR, 117/65BP. Pt hank benefit from skilled PT services to improve activity tolerance, decrease fall risk, stair negotiation, gait safety and maintaining sternal precautions. Anticipate d/c home with assist when medically stable and PT will continue to progress as tolerated.  -       Row Name 02/16/25 0833          Therapy Assessment/Plan (PT)    Patient/Family Therapy Goals Statement (PT) get better so he can go home  -     Rehab Potential (PT) good  -     Criteria for Skilled Interventions Met (PT) yes;meets criteria;skilled treatment is necessary  -     Therapy Frequency (PT) 2 times/day  -     Predicted Duration of Therapy Intervention (PT) until d/c  -       Row Name 02/16/25 0833          Vital Signs    Pre Systolic BP Rehab 101  -AJ     Pre Treatment Diastolic BP 59  -AJ     Post Systolic BP Rehab 117  -AJ     Post Treatment Diastolic BP 65  -AJ     Pretreatment Heart Rate (beats/min) 91  -AJ     Posttreatment Heart Rate (beats/min) 94  -AJ     Pretreatment Resp Rate (breaths/min) 23  -AJ     Posttreatment Resp Rate (breaths/min) 19  -AJ     Pre SpO2 (%) 94  -AJ     O2 Delivery Pre Treatment room air  -AJ     Post SpO2 (%) 97  -AJ     O2 Delivery Post Treatment supplemental O2  2L  -AJ     Pre Patient Position Sitting  -AJ     Intra Patient Position Standing  -AJ     Post Patient Position Sitting  -       Row Name 02/16/25 0833          Positioning and Restraints    Pre-Treatment Position sitting in chair/recliner  -AJ     Post Treatment  Position chair  -AJ     In Chair notified nsg;reclined;sitting;call light within reach;encouraged to call for assist;waffle cushion;legs elevated;patient within staff view  -AJ               User Key  (r) = Recorded By, (t) = Taken By, (c) = Cosigned By      Initials Name Provider Type    Zachary Lockhart, PT DPT Physical Therapist                   Outcome Measures       Row Name 02/16/25 0833 02/16/25 0755       How much help from another person do you currently need...    Turning from your back to your side while in flat bed without using bedrails? 3  -AJ 3  -DH    Moving from lying on back to sitting on the side of a flat bed without bedrails? 3  -AJ 3  -DH    Moving to and from a bed to a chair (including a wheelchair)? 3  -AJ 3  -DH    Standing up from a chair using your arms (e.g., wheelchair, bedside chair)? 3  -AJ 2  -DH    Climbing 3-5 steps with a railing? 2  -AJ 2  -DH    To walk in hospital room? 3  -AJ 2  -DH    AM-PAC 6 Clicks Score (PT) 17  -AJ 15  -DH    Highest Level of Mobility Goal 5 --> Static standing  -AJ 4 --> Transfer to chair/commode  -DH      Row Name 02/16/25 0000          How much help from another person do you currently need...    Turning from your back to your side while in flat bed without using bedrails? 3  -SY     Moving from lying on back to sitting on the side of a flat bed without bedrails? 3  -SY     Moving to and from a bed to a chair (including a wheelchair)? 3  -SY     Standing up from a chair using your arms (e.g., wheelchair, bedside chair)? 2  -SY     Climbing 3-5 steps with a railing? 2  -SY     To walk in hospital room? 2  -SY     AM-PAC 6 Clicks Score (PT) 15  -SY     Highest Level of Mobility Goal 4 --> Transfer to chair/commode  -SY       Row Name 02/16/25 0833          Functional Assessment    Outcome Measure Options AM-PAC 6 Clicks Basic Mobility (PT)  -               User Key  (r) = Recorded By, (t) = Taken By, (c) = Cosigned By      Initials Name Provider Type     Yogesh Escoto, RN Registered Nurse    Gerhard Palomares, RN Registered Nurse    Zachary Lockhart, PT DPT Physical Therapist                                 Physical Therapy Education       Title: PT OT SLP Therapies (Done)       Topic: Physical Therapy (Done)       Point: Mobility training (Done)       Learning Progress Summary            Patient Acceptance, E, VU by DONNA at 2/16/2025 0948    Comment: role of PT, sternal precuations, pursed lip breathing, goal setting                      Point: Home exercise program (Done)       Learning Progress Summary            Patient Acceptance, E, VU by DONNA at 2/16/2025 0948    Comment: role of PT, sternal precuations, pursed lip breathing, goal setting                      Point: Body mechanics (Done)       Learning Progress Summary            Patient Acceptance, E, VU by DONNA at 2/16/2025 0948    Comment: role of PT, sternal precuations, pursed lip breathing, goal setting                      Point: Precautions (Done)       Learning Progress Summary            Patient Acceptance, E, VU by DONNA at 2/16/2025 0948    Comment: role of PT, sternal precuations, pursed lip breathing, goal setting                                      User Key       Initials Effective Dates Name Provider Type Discipline    DONNA 08/15/24 -  Zachary Walton, PT DPT Physical Therapist PT                  PT Recommendation and Plan  Planned Therapy Interventions (PT): balance training, bed mobility training, gait training, home exercise program, postural re-education, patient/family education, transfer training, ROM (range of motion), stair training, strengthening, stretching  Outcome Evaluation: PT eval completed. Pt up in recliner, AOx4 with complaints of pain in L shoulder but is very tolerable. Pt reports independence at home and has sister for assist as needed. Pt pre vitals; 91 HR, 94 SpO2 on RA, 23 RR, 101/59 BP. Pt educated on pursed lip breathing with mobility and sternal precuations prior to  mobility, and verbalized understanding. Pt performed sit<>stand with CGA, and ambulated a lap around until totaling 220', prior to returning to recliner. Pt initially ambulated with narrow stance and fwd flexed posture but improved as session progress. pt required 1 short standing rest break due to SOA but was eager to continue. Pt left sitting in recliner and educated on role of PT and continued progression of activity. Pt post vitals; 94 HR, 97 SpO2 2L donned by RN, 19 RR, 117/65BP. Pt hank benefit from skilled PT services to improve activity tolerance, decrease fall risk, stair negotiation, gait safety and maintaining sternal precautions. Anticipate d/c home with assist when medically stable and PT will continue to progress as tolerated.     Time Calculation:         PT Charges       Row Name 02/16/25 0833             Time Calculation    Start Time 0833  -      Stop Time 0911  +5 for chart review  -      Time Calculation (min) 38 min  -AJ      PT Received On 02/16/25  -AJ      PT Goal Re-Cert Due Date 02/26/25  -AJ         Untimed Charges    PT Eval/Re-eval Minutes 43  -AJ         Total Minutes    Untimed Charges Total Minutes 43  -AJ       Total Minutes 43  -AJ                User Key  (r) = Recorded By, (t) = Taken By, (c) = Cosigned By      Initials Name Provider Type    Zachary Lockhart PT DPT Physical Therapist                  Therapy Charges for Today       Code Description Service Date Service Provider Modifiers Qty    [id number] HC PT EVAL MOD COMPLEXITY 3 2/16/2025 Zachary Walton PT DPJEANNINE GP 1            PT G-Codes  Outcome Measure Options: AM-PAC 6 Clicks Basic Mobility (PT)  AM-PAC 6 Clicks Score (PT): 17  PT Discharge Summary  Anticipated Discharge Disposition (PT): home with assist    Zachary Walton PT DPT  2/16/2025

## 2025-02-16 NOTE — PLAN OF CARE
Goal Outcome Evaluation:  Plan of Care Reviewed With: patient           Outcome Evaluation: PT eval completed. Pt up in recliner, AOx4 with complaints of pain in L shoulder but is very tolerable. Pt reports independence at home and has sister for assist as needed. Pt pre vitals; 91 HR, 94 SpO2 on RA, 23 RR, 101/59 BP. Pt educated on pursed lip breathing with mobility and sternal precuations prior to mobility, and verbalized understanding. Pt performed sit<>stand with CGA, and ambulated a lap around until totaling 220', prior to returning to recliner. Pt initially ambulated with narrow stance and fwd flexed posture but improved as session progress. pt required 1 short standing rest break due to SOA but was eager to continue. Pt left sitting in recliner and educated on role of PT and continued progression of activity. Pt post vitals; 94 HR, 97 SpO2 2L donned by RN, 19 RR, 117/65BP. Pt hank benefit from skilled PT services to improve activity tolerance, decrease fall risk, stair negotiation, gait safety and maintaining sternal precautions. Anticipate d/c home with assist when medically stable and PT will continue to progress as tolerated.    Anticipated Discharge Disposition (PT): home with assist

## 2025-02-16 NOTE — PLAN OF CARE
Goal Outcome Evaluation:  Plan of Care Reviewed With: family        Progress: improving     Recovering in ICU s/p CABG today. Awakens to verbal and GANT to command. Weaning to extubate per postop routine.  CO/CI 5.2/2.6, SV 62, SVR 1015  Drips: Levophed, Precedex, NTG, Insulin, IVF  MST: 140 ml, ALEX 20, UOP > 100 ml/hr.       Problem: Adult Inpatient Plan of Care  Goal: Plan of Care Review  Outcome: Progressing  Flowsheets (Taken 2/15/2025 1807)  Progress: improving  Plan of Care Reviewed With: family  Goal: Patient-Specific Goal (Individualized)  Outcome: Progressing  Goal: Absence of Hospital-Acquired Illness or Injury  Outcome: Progressing  Intervention: Identify and Manage Fall Risk  Recent Flowsheet Documentation  Taken 2/15/2025 1803 by Araceli Tam RN  Safety Promotion/Fall Prevention: safety round/check completed  Taken 2/15/2025 1700 by Araceli Tam RN  Safety Promotion/Fall Prevention: safety round/check completed  Taken 2/15/2025 1630 by Araceli Tam RN  Safety Promotion/Fall Prevention: safety round/check completed  Taken 2/15/2025 1600 by Araceli Tam RN  Safety Promotion/Fall Prevention: safety round/check completed  Taken 2/15/2025 1502 by Araceli Tam RN  Safety Promotion/Fall Prevention: safety round/check completed  Taken 2/15/2025 1400 by Araceli Tam RN  Safety Promotion/Fall Prevention: safety round/check completed  Taken 2/15/2025 1300 by Araceli Tam RN  Safety Promotion/Fall Prevention: safety round/check completed  Intervention: Prevent Skin Injury  Recent Flowsheet Documentation  Taken 2/15/2025 1803 by Araceli Tam RN  Body Position:   turned   supine   lower extremity elevated   upper extremity elevated  Taken 2/15/2025 1630 by Araceli Tam RN  Skin Protection: incontinence pads utilized  Taken 2/15/2025 1600 by Araceli Tam RN  Body Position:   turned   left   lower extremity elevated   upper extremity elevated  Taken 2/15/2025 1400 by Araceli Tam RN  Body  Position:   turned   right   lower extremity elevated   upper extremity elevated  Taken 2/15/2025 1300 by Araceli Tam RN  Body Position: supine  Skin Protection:   incontinence pads utilized   transparent dressing maintained  Intervention: Prevent and Manage VTE (Venous Thromboembolism) Risk  Recent Flowsheet Documentation  Taken 2/15/2025 1630 by Araceli Tam RN  VTE Prevention/Management:   right   SCDs (sequential compression devices) on  Goal: Optimal Comfort and Wellbeing  Outcome: Progressing  Goal: Readiness for Transition of Care  Outcome: Progressing     Problem: Mechanical Ventilation Invasive  Goal: Effective Communication  Outcome: Progressing  Goal: Optimal Device Function  Outcome: Progressing  Intervention: Optimize Device Care and Function  Recent Flowsheet Documentation  Taken 2/15/2025 1803 by Araceli Tam RN  Oral Care: swabbed with antiseptic solution  Taken 2/15/2025 1600 by Araceli Tam RN  Oral Care: teeth brushed - suction toothbrush  Taken 2/15/2025 1300 by Araceli Tam RN  Oral Care: swabbed with antiseptic solution  Goal: Mechanical Ventilation Liberation  Outcome: Progressing  Goal: Absence of Device-Related Skin and Tissue Injury  Outcome: Progressing  Intervention: Maintain Skin and Tissue Health  Recent Flowsheet Documentation  Taken 2/15/2025 1630 by Araceli Tam RN  Device Skin Pressure Protection:   tubing/devices free from skin contact   absorbent pad utilized/changed  Taken 2/15/2025 1300 by Araceli Tam RN  Device Skin Pressure Protection: absorbent pad utilized/changed  Goal: Absence of Ventilator-Induced Lung Injury  Outcome: Progressing  Intervention: Prevent Ventilator-Associated Pneumonia  Recent Flowsheet Documentation  Taken 2/15/2025 1803 by Araceli Tam RN  Head of Bed ([facility]) Positioning: HOB at 30 degrees  Oral Care: swabbed with antiseptic solution  Taken 2/15/2025 1600 by Araceli Tam RN  Head of Bed ([facility]) Positioning: HOB at 30  degrees  Oral Care: teeth brushed - suction toothbrush  Taken 2/15/2025 1400 by Araceli Tam RN  Head of Bed ([facility]) Positioning: HOB at 30 degrees  Taken 2/15/2025 1300 by Araceli Tam RN  Head of Bed ([facility]) Positioning: HOB at 30 degrees  Oral Care: swabbed with antiseptic solution     Problem: Skin Injury Risk Increased  Goal: Skin Health and Integrity  Outcome: Progressing  Intervention: Optimize Skin Protection  Recent Flowsheet Documentation  Taken 2/15/2025 1803 by Araceli Tam RN  Head of Bed ([facility]) Positioning: HOB at 30 degrees  Taken 2/15/2025 1630 by Araceli Tam RN  Activity Management: bedrest  Pressure Reduction Techniques:   heels elevated off bed   weight shift assistance provided  Pressure Reduction Devices: pressure-redistributing mattress utilized  Skin Protection: incontinence pads utilized  Taken 2/15/2025 1600 by Araceli Tam RN  Head of Bed ([facility]) Positioning: HOB at 30 degrees  Taken 2/15/2025 1400 by Araceli Tam RN  Head of Bed ([facility]) Positioning: HOB at 30 degrees  Taken 2/15/2025 1300 by Araceli Tam RN  Activity Management: bedrest  Pressure Reduction Techniques:   heels elevated off bed   weight shift assistance provided  Head of Bed ([facility]) Positioning: HOB at 30 degrees  Pressure Reduction Devices: pressure-redistributing mattress utilized  Skin Protection:   incontinence pads utilized   transparent dressing maintained

## 2025-02-16 NOTE — PROGRESS NOTES
"Patient name: Attila Viramontes  Patient : 1950  VISIT # [id number]  MR #[id number]    Procedure:Procedure(s):  CORONARY ARTERY BYPASS GRAFTING x3, LEFT INTERNAL MAMMARY ARTERY GRAFT, LEFT LEG OPEN VEIN HARVEST, TRANSESOPHAGEAL ECHOCARDIOGRAM, XP STERNAL PLATING  Procedure Date:2/15/2025  POD:1 Day Post-Op    Subjective   Chest pain      Afebrile.  On 2 L.  Up in the chair.  Having minimal pain.  Pulling 1500 on incentive.  Weight up 9 pounds.  Mild acidosis noted.  Potassium 3.7.  He has not ambulated yet but overall doing very well.     Telemetry: Sinus, 90s  IV gtts: Nitroglycerin, phenylephrine, amiodarone         Objective     Visit Vitals  /59   Pulse 87   Temp 97.9 °F (36.6 °C) (Axillary)   Resp 15   Ht 179.1 cm (70.51\")   Wt 82.7 kg (182 lb 6.4 oz)   SpO2 96%   BMI 25.79 kg/m²       Intake/Output Summary (Last 24 hours) at 2025 0818  Last data filed at 2025 0743  Gross per 24 hour   Intake 6757.89 ml   Output 5090 ml   Net 1667.89 ml     MCT: 360 mL/24 hrs, serosanguineous, no airleak  Pleural drain: 60ml/24 hours, serosanguineous, no leak      LABS:    CBC  WBC   Date/Time Value Ref Range Status   2025 0250 11.37 (H) 3.40 - 10.80 10*3/mm3 Final     RBC   Date/Time Value Ref Range Status   2025 0250 2.89 (L) 4.14 - 5.80 10*6/mm3 Final     Hemoglobin   Date/Time Value Ref Range Status   2025 0250 8.6 (L) 13.0 - 17.7 g/dL Final     Hematocrit   Date/Time Value Ref Range Status   2025 0250 26.3 (L) 37.5 - 51.0 % Final     MCH   Date/Time Value Ref Range Status   2025 0250 29.8 26.6 - 33.0 pg Final     MCHC   Date/Time Value Ref Range Status   2025 0250 32.7 31.5 - 35.7 g/dL Final     RDW   Date/Time Value Ref Range Status   2025 025 16.2 (H) 12.3 - 15.4 % Final     RDW-SD   Date/Time Value Ref Range Status   2025 54.4 (H) 37.0 - 54.0 fl Final     MPV   Date/Time Value Ref Range Status   2025 9.0 6.0 - 12.0 fL Final "     Platelets   Date/Time Value Ref Range Status   02/16/2025 0250 165 140 - 450 10*3/mm3 Final     IMAGES:       Imaging Results (Last 24 Hours)       Procedure Component Value Units Date/Time    XR Chest 1 View [[id number]] Collected: 02/16/25 0751     Updated: 02/16/25 0756    Narrative:      EXAMINATION: XR CHEST 1 VW- 2/16/2025 7:51 AM     HISTORY: Post-Op Heart Surgery; I25.118-Atherosclerotic heart disease of  native coronary artery with other forms of angina pectoris.     REPORT: Frontal view of the chest was obtained.     COMPARISON: Chest x-ray 2/15/2025 1331 hours.     Opacities in the left lung base are increased and favored to represent  atelectasis. The right lung remains clear. The patient has been  extubated. The mediastinal drains and left pleural drain remain in good  position. No pneumothorax is identified. The right internal jugular  central line remains in good position. There is also no change in the  right sided port catheter. Heart size is normal. Previous CABG is noted.       Impression:      Extubation of the patient with mild increase in left basilar  atelectasis. No pneumothorax is identified. The overlying support lines  remain in good position.     This report was signed and finalized on 2/16/2025 7:53 AM by Dr. Balta Oro MD.       XR Chest 1 View [[id number]] Collected: 02/15/25 1406     Updated: 02/15/25 1411    Narrative:      EXAMINATION: XR CHEST 1 VW- 2/15/2025 2:06 PM     HISTORY: Post-Op Check Line & Tube Placement; I25.118-Atherosclerotic  heart disease of native coronary artery with other forms of angina  pectoris.     REPORT: Frontal view of the chest was obtained.     COMPARISON: Chest x-rays 2/11/2025.     The patient has undergone interval CABG, the endotracheal tube tip is  obscured median sternotomy hardware, though the tip appears to be in  satisfactory position in the trachea about 6 cm superior to the elio.  The right internal jugular central line and right  sided port catheter  appearing in satisfactory position. Mediastinal drains and left pleural  drain appear in good position. No pneumothorax is identified. The lungs  are clear. Heart size is normal.       Impression:      Status post median sternotomy/CABG, the tip of the  endotracheal tube is obscured but the tube appears to be in satisfactory  position as described above. Mediastinal drains, left pleural drain,  right internal jugular central line also appear in good position.     This report was signed and finalized on 2/15/2025 2:08 PM by Dr. Balta Oro MD.             My image interpretation: Lines and tubes in position, good aeration    Physical Exam:  General: Alert, oriented. No apparent distress.  Nasal cannula  Cardiovascular: Regular rate and rhythm without murmur, rubs, or gallops.    Pulmonary: Clear to auscultation bilaterally without wheezing, rubs, or rales.  Chest: Sternotomy incision clean, dry, and intact. Sternum stable. No clicks. Thoracic incisions clean, dry, and intact. Chest tube to 20 cm H2O suction. No air leak. Fluid is serosanguineous    Abdomen: Soft, nondistended, and nontender.  Colostomy/urostomy  Extremities: Warm, moves all extremities. No edema. Saphenectomy site clean, dry, and intact.   Neurologic:  Grossly intact with no focal deficits.           Impression:  Coronary artery disease of native artery of native heart with stable angina pectoris, status post surgery  Other disorders of the arteries  Current everyday smoker  Stage IIIa chronic kidney disease  Rectal cancer  Type 2 diabetes mellitus without complication, without long-term current insulin        Plan:  Multimodality pain control regimen  Routine postcardiac surgery care  Encourage pulmonary toilet and ambulation  Keep drains and tubes  Discontinue amiodarone  KCl replacement IV   1 amp of bicarb  Possibly to the floor later today  Discussed with patient and nursing        Irene Dwyer  APRN  02/16/25  08:18 CST

## 2025-02-16 NOTE — CASE MANAGEMENT/SOCIAL WORK
Discharge Planning Assessment   [place]     Patient Name: Attila Viramontes  MRN: [id number]  Today's Date: 2/16/2025    Admit Date: 2/15/2025    Plan: Home   Discharge Needs Assessment       Row Name 02/16/25 1018       Living Environment    People in Home alone    Current Living Arrangements home    Potentially Unsafe Housing Conditions none    In the past 12 months has the electric, gas, oil, or water company threatened to shut off services in your home? No    Primary Care Provided by self    Provides Primary Care For no one    Family Caregiver if Needed sibling(s)    Quality of Family Relationships supportive;involved;helpful    Able to Return to Prior Arrangements yes       Resource/Environmental Concerns    Resource/Environmental Concerns none    Transportation Concerns none       Transportation Needs    In the past 12 months, has lack of transportation kept you from medical appointments or from getting medications? no    In the past 12 months, has lack of transportation kept you from meetings, work, or from getting things needed for daily living? No       Food Insecurity    Within the past 12 months, you worried that your food would run out before you got the money to buy more. Never true    Within the past 12 months, the food you bought just didn't last and you didn't have money to get more. Never true       Transition Planning    Patient/Family Anticipates Transition to home    Patient/Family Anticipated Services at Transition none    Transportation Anticipated family or friend will provide       Discharge Needs Assessment    Readmission Within the Last 30 Days no previous admission in last 30 days    Equipment Currently Used at Home none    Concerns to be Addressed denies needs/concerns at this time    Anticipated Changes Related to Illness none    Equipment Needed After Discharge none    Current Discharge Risk lives alone;chronically ill                   Discharge Plan       Row Name 02/16/25 1022        Plan    Plan Home    Patient/Family in Agreement with Plan yes    Plan Comments Spoke with pt to assess for d/c planning. Pt lives at home alone and plans same, sister in room and can assist as needed. Pt does have PCP, no rx coverage but agreeable to meds to beds and able to afford meds this far.  Pt was independent. Will follow progress.                  Continued Care and Services - Admitted Since 2/15/2025    No active coordination exists for this encounter.          Demographic Summary    No documentation.                  Functional Status    No documentation.                  Psychosocial    No documentation.                  Abuse/Neglect    No documentation.                  Legal    No documentation.                  Substance Abuse    No documentation.                  Patient Forms    No documentation.                     ILENE WatsonW

## 2025-02-16 NOTE — PLAN OF CARE
Problem: Adult Inpatient Plan of Care  Goal: Plan of Care Review  Outcome: Progressing  Flowsheets (Taken 2/16/2025 0547)  Progress: improving  Goal: Patient-Specific Goal (Individualized)  Outcome: Progressing  Goal: Absence of Hospital-Acquired Illness or Injury  Outcome: Progressing  Intervention: Identify and Manage Fall Risk  Recent Flowsheet Documentation  Taken 2/15/2025 2000 by Yogesh Roldan RN  Safety Promotion/Fall Prevention: safety round/check completed  Intervention: Prevent Skin Injury  Recent Flowsheet Documentation  Taken 2/16/2025 0300 by Yogesh Roldan RN  Body Position: (Simultaneous filing. User may be unaware of other data.)   turned   supine  Taken 2/15/2025 1900 by Yogesh Roldan RN  Body Position:   turned   supine  Intervention: Prevent and Manage VTE (Venous Thromboembolism) Risk  Recent Flowsheet Documentation  Taken 2/15/2025 2000 by Yogesh Roldan RN  VTE Prevention/Management:   right   SCDs (sequential compression devices) on  Goal: Optimal Comfort and Wellbeing  Outcome: Progressing  Intervention: Monitor Pain and Promote Comfort  Recent Flowsheet Documentation  Taken 2/16/2025 0312 by Yogesh Roldan RN  Pain Management Interventions: pain medication given  Intervention: Provide Person-Centered Care  Recent Flowsheet Documentation  Taken 2/15/2025 2000 by Yogesh Roldan RN  Trust Relationship/Rapport: care explained  Goal: Readiness for Transition of Care  Outcome: Progressing     Problem: Mechanical Ventilation Invasive  Goal: Effective Communication  Outcome: Progressing  Intervention: Ensure Effective Communication  Recent Flowsheet Documentation  Taken 2/15/2025 2000 by Yogesh Roldan RN  Trust Relationship/Rapport: care explained  Goal: Optimal Device Function  Outcome: Progressing  Intervention: Optimize Device Care and Function  Recent Flowsheet Documentation  Taken 2/15/2025 2000 by Yogesh Roldan RN  Airway/Ventilation Management: pulmonary hygiene promoted  Goal: Mechanical  Ventilation Liberation  Outcome: Progressing  Goal: Absence of Device-Related Skin and Tissue Injury  Outcome: Progressing  Intervention: Maintain Skin and Tissue Health  Recent Flowsheet Documentation  Taken 2/15/2025 2000 by Yogesh Roldan RN  Device Skin Pressure Protection: skin-to-device areas padded  Goal: Absence of Ventilator-Induced Lung Injury  Outcome: Progressing  Intervention: Prevent Ventilator-Associated Pneumonia  Recent Flowsheet Documentation  Taken 2/16/2025 0300 by Yogesh Roldan RN  Head of Bed (HOB) Positioning: (Simultaneous filing. User may be unaware of other data.) HOB at 30 degrees  Taken 2/15/2025 1900 by Yogesh Roldan RN  Head of Bed (HOB) Positioning: HOB at 30-45 degrees     Problem: Skin Injury Risk Increased  Goal: Skin Health and Integrity  Outcome: Progressing  Intervention: Optimize Skin Protection  Recent Flowsheet Documentation  Taken 2/16/2025 0300 by Yogesh Roldan RN  Head of Bed (HOB) Positioning: (Simultaneous filing. User may be unaware of other data.) HOB at 30 degrees  Taken 2/15/2025 2000 by Yogesh Roldan RN  Activity Management: bedrest  Taken 2/15/2025 1900 by Yogesh Roldan RN  Head of Bed (HOB) Positioning: HOB at 30-45 degrees   Goal Outcome Evaluation:   Patient extubated 1921 now on 1lnc. Neuro intact. Afebrile. NSR 75-90. Hypotensive given 1 liter LR and 500mls albumin overnight. Now requiring low dose cristino to maintain SBP greater than 90. UOP greater than 100mls/hr. 160mls output from MST drains and 40 from PL drain. C/o shoulder and chest pain given oxycodone 5 x1.         Progress: improving                                 Unknown if ever smoked

## 2025-02-17 ENCOUNTER — APPOINTMENT (OUTPATIENT)
Dept: GENERAL RADIOLOGY | Facility: HOSPITAL | Age: 75
End: 2025-02-17
Payer: MEDICARE

## 2025-02-17 LAB
ALBUMIN SERPL-MCNC: 3.4 G/DL (ref 3.5–5.2)
ANION GAP SERPL CALCULATED.3IONS-SCNC: 12 MMOL/L (ref 5–15)
ANION GAP SERPL CALCULATED.3IONS-SCNC: 9 MMOL/L (ref 5–15)
BASOPHILS # BLD AUTO: 0.02 10*3/MM3 (ref 0–0.2)
BASOPHILS NFR BLD AUTO: 0.2 % (ref 0–1.5)
BUN SERPL-MCNC: 15 MG/DL (ref 8–23)
BUN SERPL-MCNC: 19 MG/DL (ref 8–23)
BUN/CREAT SERPL: 13.3 (ref 7–25)
BUN/CREAT SERPL: 16.5 (ref 7–25)
CALCIUM SPEC-SCNC: 8.2 MG/DL (ref 8.6–10.5)
CALCIUM SPEC-SCNC: 8.6 MG/DL (ref 8.6–10.5)
CHLORIDE SERPL-SCNC: 102 MMOL/L (ref 98–107)
CHLORIDE SERPL-SCNC: 108 MMOL/L (ref 98–107)
CO2 SERPL-SCNC: 20 MMOL/L (ref 22–29)
CO2 SERPL-SCNC: 21 MMOL/L (ref 22–29)
CREAT SERPL-MCNC: 1.13 MG/DL (ref 0.76–1.27)
CREAT SERPL-MCNC: 1.15 MG/DL (ref 0.76–1.27)
DEPRECATED RDW RBC AUTO: 55 FL (ref 37–54)
EGFRCR SERPLBLD CKD-EPI 2021: 67.2 ML/MIN/1.73
EGFRCR SERPLBLD CKD-EPI 2021: 68.6 ML/MIN/1.73
EOSINOPHIL # BLD AUTO: 0.17 10*3/MM3 (ref 0–0.4)
EOSINOPHIL NFR BLD AUTO: 1.4 % (ref 0.3–6.2)
ERYTHROCYTE [DISTWIDTH] IN BLOOD BY AUTOMATED COUNT: 16.3 % (ref 12.3–15.4)
GLUCOSE BLDC GLUCOMTR-MCNC: 138 MG/DL (ref 70–130)
GLUCOSE BLDC GLUCOMTR-MCNC: 178 MG/DL (ref 70–130)
GLUCOSE BLDC GLUCOMTR-MCNC: 186 MG/DL (ref 70–130)
GLUCOSE BLDC GLUCOMTR-MCNC: 234 MG/DL (ref 70–130)
GLUCOSE SERPL-MCNC: 139 MG/DL (ref 65–99)
GLUCOSE SERPL-MCNC: 168 MG/DL (ref 65–99)
HCT VFR BLD AUTO: 26.4 % (ref 37.5–51)
HGB BLD-MCNC: 8.6 G/DL (ref 13–17.7)
IMM GRANULOCYTES # BLD AUTO: 0.05 10*3/MM3 (ref 0–0.05)
IMM GRANULOCYTES NFR BLD AUTO: 0.4 % (ref 0–0.5)
LYMPHOCYTES # BLD AUTO: 1.11 10*3/MM3 (ref 0.7–3.1)
LYMPHOCYTES NFR BLD AUTO: 9.3 % (ref 19.6–45.3)
MCH RBC QN AUTO: 30 PG (ref 26.6–33)
MCHC RBC AUTO-ENTMCNC: 32.6 G/DL (ref 31.5–35.7)
MCV RBC AUTO: 92 FL (ref 79–97)
MONOCYTES # BLD AUTO: 1.45 10*3/MM3 (ref 0.1–0.9)
MONOCYTES NFR BLD AUTO: 12.2 % (ref 5–12)
NEUTROPHILS NFR BLD AUTO: 76.5 % (ref 42.7–76)
NEUTROPHILS NFR BLD AUTO: 9.11 10*3/MM3 (ref 1.7–7)
NRBC BLD AUTO-RTO: 0 /100 WBC (ref 0–0.2)
PHOSPHATE SERPL-MCNC: 2.5 MG/DL (ref 2.5–4.5)
PLATELET # BLD AUTO: 154 10*3/MM3 (ref 140–450)
PMV BLD AUTO: 9.3 FL (ref 6–12)
POTASSIUM SERPL-SCNC: 3.8 MMOL/L (ref 3.5–5.2)
POTASSIUM SERPL-SCNC: 4.3 MMOL/L (ref 3.5–5.2)
POTASSIUM SERPL-SCNC: 4.6 MMOL/L (ref 3.5–5.2)
RBC # BLD AUTO: 2.87 10*6/MM3 (ref 4.14–5.8)
SODIUM SERPL-SCNC: 135 MMOL/L (ref 136–145)
SODIUM SERPL-SCNC: 137 MMOL/L (ref 136–145)
WBC NRBC COR # BLD AUTO: 11.91 10*3/MM3 (ref 3.4–10.8)

## 2025-02-17 PROCEDURE — 80069 RENAL FUNCTION PANEL: CPT | Performed by: THORACIC SURGERY (CARDIOTHORACIC VASCULAR SURGERY)

## 2025-02-17 PROCEDURE — 84132 ASSAY OF SERUM POTASSIUM: CPT | Performed by: THORACIC SURGERY (CARDIOTHORACIC VASCULAR SURGERY)

## 2025-02-17 PROCEDURE — 25010000002 CEFAZOLIN PER 500 MG: Performed by: THORACIC SURGERY (CARDIOTHORACIC VASCULAR SURGERY)

## 2025-02-17 PROCEDURE — 94664 DEMO&/EVAL PT USE INHALER: CPT

## 2025-02-17 PROCEDURE — 71045 X-RAY EXAM CHEST 1 VIEW: CPT

## 2025-02-17 PROCEDURE — 85025 COMPLETE CBC W/AUTO DIFF WBC: CPT | Performed by: THORACIC SURGERY (CARDIOTHORACIC VASCULAR SURGERY)

## 2025-02-17 PROCEDURE — 99024 POSTOP FOLLOW-UP VISIT: CPT | Performed by: THORACIC SURGERY (CARDIOTHORACIC VASCULAR SURGERY)

## 2025-02-17 PROCEDURE — 94799 UNLISTED PULMONARY SVC/PX: CPT

## 2025-02-17 PROCEDURE — 80048 BASIC METABOLIC PNL TOTAL CA: CPT | Performed by: NURSE PRACTITIONER

## 2025-02-17 PROCEDURE — 97116 GAIT TRAINING THERAPY: CPT

## 2025-02-17 PROCEDURE — 63710000001 INSULIN LISPRO (HUMAN) PER 5 UNITS: Performed by: THORACIC SURGERY (CARDIOTHORACIC VASCULAR SURGERY)

## 2025-02-17 PROCEDURE — 25010000002 FUROSEMIDE PER 20 MG: Performed by: NURSE PRACTITIONER

## 2025-02-17 PROCEDURE — 93010 ELECTROCARDIOGRAM REPORT: CPT | Performed by: INTERNAL MEDICINE

## 2025-02-17 PROCEDURE — 82948 REAGENT STRIP/BLOOD GLUCOSE: CPT

## 2025-02-17 PROCEDURE — 25010000002 ENOXAPARIN PER 10 MG: Performed by: THORACIC SURGERY (CARDIOTHORACIC VASCULAR SURGERY)

## 2025-02-17 PROCEDURE — 93005 ELECTROCARDIOGRAM TRACING: CPT | Performed by: THORACIC SURGERY (CARDIOTHORACIC VASCULAR SURGERY)

## 2025-02-17 PROCEDURE — 25010000002 POTASSIUM CHLORIDE PER 2 MEQ: Performed by: THORACIC SURGERY (CARDIOTHORACIC VASCULAR SURGERY)

## 2025-02-17 RX ORDER — POTASSIUM CHLORIDE 1500 MG/1
20 TABLET, EXTENDED RELEASE ORAL ONCE
Status: COMPLETED | OUTPATIENT
Start: 2025-02-17 | End: 2025-02-17

## 2025-02-17 RX ORDER — DOCUSATE SODIUM 100 MG/1
100 CAPSULE, LIQUID FILLED ORAL 2 TIMES DAILY
Status: DISCONTINUED | OUTPATIENT
Start: 2025-02-17 | End: 2025-02-20 | Stop reason: HOSPADM

## 2025-02-17 RX ORDER — OXYCODONE HYDROCHLORIDE 5 MG/1
5 TABLET ORAL EVERY 4 HOURS PRN
Status: DISCONTINUED | OUTPATIENT
Start: 2025-02-17 | End: 2025-02-20 | Stop reason: HOSPADM

## 2025-02-17 RX ORDER — FUROSEMIDE 10 MG/ML
20 INJECTION INTRAMUSCULAR; INTRAVENOUS
Status: DISCONTINUED | OUTPATIENT
Start: 2025-02-17 | End: 2025-02-18

## 2025-02-17 RX ORDER — SODIUM BICARBONATE 650 MG/1
650 TABLET ORAL 3 TIMES DAILY
Status: DISCONTINUED | OUTPATIENT
Start: 2025-02-17 | End: 2025-02-20 | Stop reason: HOSPADM

## 2025-02-17 RX ORDER — POLYETHYLENE GLYCOL 3350 17 G/17G
17 POWDER, FOR SOLUTION ORAL DAILY
Status: DISCONTINUED | OUTPATIENT
Start: 2025-02-17 | End: 2025-02-20 | Stop reason: HOSPADM

## 2025-02-17 RX ORDER — POTASSIUM CHLORIDE 750 MG/1
20 CAPSULE, EXTENDED RELEASE ORAL 2 TIMES DAILY WITH MEALS
Status: DISCONTINUED | OUTPATIENT
Start: 2025-02-17 | End: 2025-02-18

## 2025-02-17 RX ORDER — PANTOPRAZOLE SODIUM 40 MG/1
40 TABLET, DELAYED RELEASE ORAL EVERY MORNING
Status: DISCONTINUED | OUTPATIENT
Start: 2025-02-18 | End: 2025-02-20 | Stop reason: HOSPADM

## 2025-02-17 RX ORDER — METOPROLOL TARTRATE 25 MG/1
25 TABLET, FILM COATED ORAL EVERY 12 HOURS SCHEDULED
Status: DISCONTINUED | OUTPATIENT
Start: 2025-02-17 | End: 2025-02-20 | Stop reason: HOSPADM

## 2025-02-17 RX ORDER — POTASSIUM CHLORIDE 29.8 MG/ML
20 INJECTION INTRAVENOUS ONCE
Status: COMPLETED | OUTPATIENT
Start: 2025-02-17 | End: 2025-02-17

## 2025-02-17 RX ADMIN — POTASSIUM CHLORIDE 20 MEQ: 750 CAPSULE, EXTENDED RELEASE ORAL at 17:01

## 2025-02-17 RX ADMIN — IPRATROPIUM BROMIDE AND ALBUTEROL SULFATE 1.5 ML: .5; 3 SOLUTION RESPIRATORY (INHALATION) at 07:00

## 2025-02-17 RX ADMIN — ATORVASTATIN CALCIUM 20 MG: 10 TABLET, FILM COATED ORAL at 20:50

## 2025-02-17 RX ADMIN — ACETAMINOPHEN 1000 MG: 500 TABLET, FILM COATED ORAL at 14:29

## 2025-02-17 RX ADMIN — Medication 1 APPLICATION: at 20:50

## 2025-02-17 RX ADMIN — POTASSIUM CHLORIDE 20 MEQ: 1500 TABLET, EXTENDED RELEASE ORAL at 10:48

## 2025-02-17 RX ADMIN — CLOPIDOGREL BISULFATE 75 MG: 75 TABLET ORAL at 17:01

## 2025-02-17 RX ADMIN — OXYCODONE HYDROCHLORIDE 5 MG: 5 TABLET ORAL at 20:50

## 2025-02-17 RX ADMIN — POTASSIUM CHLORIDE 20 MEQ: 29.8 INJECTION, SOLUTION INTRAVENOUS at 03:39

## 2025-02-17 RX ADMIN — ASPIRIN 81 MG: 81 TABLET, COATED ORAL at 08:12

## 2025-02-17 RX ADMIN — PREGABALIN 25 MG: 25 CAPSULE ORAL at 06:05

## 2025-02-17 RX ADMIN — METOPROLOL TARTRATE 12.5 MG: 25 TABLET, FILM COATED ORAL at 08:12

## 2025-02-17 RX ADMIN — ACETAMINOPHEN 1000 MG: 500 TABLET, FILM COATED ORAL at 02:54

## 2025-02-17 RX ADMIN — CHLORHEXIDINE GLUCONATE 0.12% ORAL RINSE 15 ML: 1.2 LIQUID ORAL at 17:01

## 2025-02-17 RX ADMIN — POLYETHYLENE GLYCOL 3350 17 G: 17 POWDER, FOR SOLUTION ORAL at 09:36

## 2025-02-17 RX ADMIN — FUROSEMIDE 20 MG: 10 INJECTION, SOLUTION INTRAMUSCULAR; INTRAVENOUS at 17:01

## 2025-02-17 RX ADMIN — CHLORHEXIDINE GLUCONATE 0.12% ORAL RINSE 15 ML: 1.2 LIQUID ORAL at 06:05

## 2025-02-17 RX ADMIN — AMIODARONE HYDROCHLORIDE 400 MG: 200 TABLET ORAL at 07:16

## 2025-02-17 RX ADMIN — CHLORHEXIDINE GLUCONATE 1 APPLICATION: 500 CLOTH TOPICAL at 03:42

## 2025-02-17 RX ADMIN — PANTOPRAZOLE SODIUM 40 MG: 40 TABLET, DELAYED RELEASE ORAL at 06:05

## 2025-02-17 RX ADMIN — METOPROLOL TARTRATE 25 MG: 25 TABLET, FILM COATED ORAL at 20:50

## 2025-02-17 RX ADMIN — DOCUSATE SODIUM 100 MG: 100 CAPSULE, LIQUID FILLED ORAL at 09:36

## 2025-02-17 RX ADMIN — SODIUM BICARBONATE 650 MG TABLET 650 MG: at 20:50

## 2025-02-17 RX ADMIN — SODIUM BICARBONATE 650 MG TABLET 650 MG: at 09:36

## 2025-02-17 RX ADMIN — FUROSEMIDE 20 MG: 10 INJECTION, SOLUTION INTRAMUSCULAR; INTRAVENOUS at 09:36

## 2025-02-17 RX ADMIN — PREGABALIN 25 MG: 25 CAPSULE ORAL at 17:01

## 2025-02-17 RX ADMIN — INSULIN LISPRO 2 UNITS: 100 INJECTION, SOLUTION INTRAVENOUS; SUBCUTANEOUS at 17:01

## 2025-02-17 RX ADMIN — INSULIN LISPRO 4 UNITS: 100 INJECTION, SOLUTION INTRAVENOUS; SUBCUTANEOUS at 11:22

## 2025-02-17 RX ADMIN — DOCUSATE SODIUM 100 MG: 100 CAPSULE, LIQUID FILLED ORAL at 20:49

## 2025-02-17 RX ADMIN — ACETAMINOPHEN 1000 MG: 500 TABLET, FILM COATED ORAL at 20:49

## 2025-02-17 RX ADMIN — OXYCODONE HYDROCHLORIDE 10 MG: 10 TABLET ORAL at 02:54

## 2025-02-17 RX ADMIN — ENOXAPARIN SODIUM 40 MG: 100 INJECTION SUBCUTANEOUS at 08:12

## 2025-02-17 RX ADMIN — AMIODARONE HYDROCHLORIDE 400 MG: 200 TABLET ORAL at 17:01

## 2025-02-17 RX ADMIN — SODIUM BICARBONATE 650 MG TABLET 650 MG: at 15:57

## 2025-02-17 RX ADMIN — CHLORHEXIDINE GLUCONATE 1 APPLICATION: 500 CLOTH TOPICAL at 08:12

## 2025-02-17 RX ADMIN — LIDOCAINE 2 PATCH: 4 PATCH TOPICAL at 08:12

## 2025-02-17 RX ADMIN — INSULIN LISPRO 2 UNITS: 100 INJECTION, SOLUTION INTRAVENOUS; SUBCUTANEOUS at 20:48

## 2025-02-17 RX ADMIN — Medication 1 APPLICATION: at 08:12

## 2025-02-17 RX ADMIN — OXYCODONE HYDROCHLORIDE 5 MG: 5 TABLET ORAL at 15:57

## 2025-02-17 RX ADMIN — CEFAZOLIN 2 G: 2 INJECTION, POWDER, FOR SOLUTION INTRAMUSCULAR; INTRAVENOUS at 03:39

## 2025-02-17 RX ADMIN — ACETAMINOPHEN 1000 MG: 500 TABLET, FILM COATED ORAL at 07:16

## 2025-02-17 NOTE — CONSULTS
Inpatient Nutrition Services  Patient Name:  Attila Viramontes  YOB: 1950  MRN: [id number]  Admit Date:  2/15/2025   Reason for Assessment       Row Name 02/17/25 1006          Reason for Assessment    Reason For Assessment physician consult;other (see comments)  CTS open heart post-op     Identified At Risk by Screening Criteria need for education          Nutrition/Diet History       Row Name 02/17/25 1007          Nutrition/Diet History    Typical Intake (Food/Fluid/EN/PN) NKFA. Receives chemo every 2 weeks. Mindful of food choices due to ostomy output and GI side effects of chemotherapy. Drinks water to remain hydrated. Bowel pattern typically loose, diarrhea. Takes up to 14-16 immodium per day to manage stool output. Pt food preferences noted. Reviewed inpatient alternatives, snacks available on the unit, and a la carte options. Will follow per protocol.     Food Intolerance(s) NKFA       Electronically signed by:  Nicolasa Hewitt RDN, LD  02/17/25 10:22 CST

## 2025-02-17 NOTE — PLAN OF CARE
Goal Outcome Evaluation:      Patient stands and ambulates 450' with CGA. RA SATs 96 - 93%. Reviewed sternal restrictions again. Patient attempted to use UEs x3. Reminded patient to use pillow.

## 2025-02-17 NOTE — PLAN OF CARE
Goal Outcome Evaluation:      Patient stood and ambulated 450' with CGA. Worked on posture and awareness of surrondings, not walking into objects. Patient also cued on purse lip breathing, sternal restrictions.

## 2025-02-17 NOTE — PROGRESS NOTES
"Patient name: Attila Viramontes  Patient : 1950  VISIT # [id number]  MR #[id number]    Procedures Performed:  1. Coronary artery bypass grafting-3 vessel (left internal mammary artery/left anterior descending, reverse saphenous vein graft/obtuse marginal, and reverse saphenous vein graft/posterior descending artery)  2. Left open vein harvest  3. Sternalock XP sternal plating   Procedure Date:2/15/2025  POD:2 Days Post-Op    Subjective   Up in the chair eating breakfast.  On room air.  He is already walked 450 feet with physical therapy.  Good pain control overall.  Reaching up to 1500 mL on incentive spirometer.  Weight is up 4 pounds in the last 24 hours and he is 13 pounds above baseline weight.    Telemetry: Sinus, 90s  IV gtts: None         Objective     Visit Vitals  BP 92/53   Pulse 83   Temp 98.7 °F (37.1 °C) (Axillary)   Resp 23   Ht 179.1 cm (70.51\")   Wt 84.6 kg (186 lb 9.6 oz)   SpO2 100%   BMI 26.39 kg/m²   Baseline weight: 173 pounds    Intake/Output Summary (Last 24 hours) at 2025 0842  Last data filed at 2025 0720  Gross per 24 hour   Intake 250 ml   Output 2155 ml   Net -1905 ml     MCT: 130 mL/24 hrs, serosanguineous, no airleak  L Pleural drain:  25 ml/24 hours, serosanguineous, no leak      LABS:    CBC  WBC   Date/Time Value Ref Range Status   2025 11.91 (H) 3.40 - 10.80 10*3/mm3 Final     RBC   Date/Time Value Ref Range Status   2025 025 2.87 (L) 4.14 - 5.80 10*6/mm3 Final     Hemoglobin   Date/Time Value Ref Range Status   2025 8.6 (L) 13.0 - 17.7 g/dL Final     Hematocrit   Date/Time Value Ref Range Status   2025 26.4 (L) 37.5 - 51.0 % Final     MCH   Date/Time Value Ref Range Status   2025 30.0 26.6 - 33.0 pg Final     MCHC   Date/Time Value Ref Range Status   2025 0259 32.6 31.5 - 35.7 g/dL Final     RDW   Date/Time Value Ref Range Status   2025 0259 16.3 (H) 12.3 - 15.4 % Final     RDW-SD   Date/Time Value " Ref Range Status   02/17/2025 0259 55.0 (H) 37.0 - 54.0 fl Final     MPV   Date/Time Value Ref Range Status   02/17/2025 0259 9.3 6.0 - 12.0 fL Final     Platelets   Date/Time Value Ref Range Status   02/17/2025 0259 154 140 - 450 10*3/mm3 Final     IMAGES:       Imaging Results (Last 24 Hours)       Procedure Component Value Units Date/Time    XR Chest 1 View [[id number]] Collected: 02/17/25 0703     Updated: 02/17/25 0707    Narrative:      EXAMINATION: XR CHEST 1 VW- 2/17/2025 7:03 AM     HISTORY: Post-Op Heart Surgery; Z74.09-Other reduced mobility;  I25.118-Atherosclerotic heart disease of native coronary artery with  other forms of angina pectoris.     REPORT: Frontal view of the chest was obtained.     COMPARISON: Chest x-ray 2/16/2025 0313 hours. The right internal jugular  central line, mediastinal drain, left pleural drain appear in good  position as before. There is mild diffuse atelectasis greater on the  left without lung consolidation. No pneumothorax or pleural effusion is  identified. There is previous CABG. The right sided port catheter  remains in good position.       Impression:      Stable 1 day appearance of the chest.     This report was signed and finalized on 2/17/2025 7:04 AM by Dr. Balta Oro MD.               Physical Exam:  General: Alert, oriented. No apparent distress.   Cardiovascular: Regular rate and rhythm without murmur, rubs, or gallops.    Pulmonary: Clear to auscultation bilaterally without wheezing, rubs, or rales.  Chest: Sternotomy incision clean, dry, and intact. Sternum stable. No clicks. MCT to -20 cm H2O suction. No air leak. Fluid is serosanguineous    Abdomen: Soft, nondistended, and nontender.  Colostomy/urostomy  Extremities: Warm, moves all extremities. Peripheral edema. Saphenectomy site clean, dry, and intact.   Neurologic:  Grossly intact with no focal deficits.           Impression:  Coronary artery disease of native artery of native heart with stable angina  pectoris   Current every day smoker  Normocytic anemia  Bilateral carotid artery stenosis  Type 2 diabetes mellitus  Stage IIIa chronic kidney disease  Colon cancer    Plan:  Discontinue Wlaler catheter  Start diuresis  Discontinue mediastinal chest tubes, keep pleural Etienne drain  Start bowel regimen  Increase beta-blocker  Start sodium bicarbonate supplementation  Repeat BMP this afternoon  Encourage pulmonary toilet and ambulation  Routine postcardiac surgery regimen  Transfer to  when bed available  Discussed with patient and nursing        Anne Mac, APRSAVANNA  02/17/25  08:42 CST

## 2025-02-17 NOTE — THERAPY TREATMENT NOTE
Acute Care - Physical Therapy Treatment Note   [place]     Patient Name: Attila Viramontes  : 1950  MRN: [id number]  Today's Date: 2025      Visit Dx:     ICD-10-CM ICD-9-CM   1. Impaired mobility [Z74.09]  Z74.09 799.89   2. Coronary artery disease of native artery of native heart with stable angina pectoris  I25.118 414.01     413.9     Patient Active Problem List   Diagnosis    Rectal cancer    Current every day smoker    Impaired gait and mobility    Hypertension    2nd degree AV block    Tobacco abuse    Normocytic anemia    Chemotherapy induced neutropenia    History of urinary diversion procedure    Cancer related pain    Bilateral carotid artery stenosis    Carotid occlusion, left    Type 2 diabetes mellitus with kidney complication, without long-term current use of insulin    Iron deficiency anemia    Ileostomy in place    Colostomy in place    Function kidney decreased    Stage 3a chronic kidney disease    Preop testing    Symptomatic stenosis of right carotid artery    Stenosis of right carotid artery    Overweight (BMI 25.0-29.9)    Abnormal stress test    HENDERSON (dyspnea on exertion)    Coronary artery disease of native artery of native heart with stable angina pectoris    Hydronephrosis    CAD (coronary artery disease)     Past Medical History:   Diagnosis Date    Arthritis     AV block     recent hx of long pauses, pt supposed to have pacemaker placed ASAP     Bright red rectal bleeding 2020    Carotid arterial disease     Chronic kidney disease [id number]    Coronary artery disease     History of chemotherapy     History of radiation therapy 2020    Hyperlipidemia     Hypotension due to hypovolemia 2020    Irregular heart rate     Pancreatitis     Rectal cancer 2020    Stroke     had mini stroke while port was placed     Type 2 diabetes mellitus without complication, without long-term current use of insulin 2020     Past Surgical History:   Procedure  Laterality Date    ARM LESION/CYST EXCISION Left 06/15/2021    Procedure: WIDE EXCISION MELANOMA LEFT SHOULDER WITH SPLIT THICKNESS SKIN GRAFT AND SENTINEL LYMPH NODE BIOPSY;  Surgeon: Vielka Weller MD;  Location: [facility] OR;  Service: General;  Laterality: Left;    CARDIAC CATHETERIZATION N/A 06/12/2024    Procedure: Coronary angiography;  Surgeon: Ga Hameed MD;  Location:  [facility] CATH INVASIVE LOC[facility];  Service: Cardiology;  Laterality: N/A;    CARDIAC CATHETERIZATION N/A 06/12/2024    Procedure: Percutaneous Coronary Intervention;  Surgeon: Ga Hameed MD;  Location:  [facility] CATH [facility]IVE [facility];  Service: Cardiology;  Laterality: N/A;    CARDIAC CATHETERIZATION N/A 1/15/2025    Procedure: Coronary angiography;  Surgeon: Ga Hameed MD;  Location:  [facility];  Service: Cardiology;  Laterality: N/A;    CARDIAC CATHETERIZATION N/A 1/15/2025    Procedure: Percutaneous Coronary Intervention;  Surgeon: Ga Hameed MD;  Location:  [facility]ATH [facility];  Service: Cardiology;  Laterality: N/A;    CAROTID ARTERY ANGIOPLASTY Right 10/2023    stent placed in artery    CAROTID STENT      CATARACT EXTRACTION W/ INTRAOCULAR LENS  IMPLANT, BILATERAL      COLONOSCOPY N/A 04/02/2020    Procedure: COLONOSCOPY WITH ANESTHESIA;  Surgeon: Yanick [facility] DO;  Location: [facility] ENDOSCOPY;  Service: Gastroenterology;  Laterality: N/A;  pre: abnormal CT scan  post: rectal mass  PCP unknown    CYSTECTOMY      ILEOSTOMY      LYMPH NODE BIOPSY      MOUTH SURGERY      PROSTATECTOMY      RECTAL MASS EXCISION  12/21/2020    SENTINEL NODE BIOPSY Left 06/15/2021    Procedure: SENTINEL LYMPH NODE BIOPSY;  Surgeon: Vielka Weller MD;  Location: [facility] OR;  Service: General;  Laterality: Left;    VENOUS ACCESS DEVICE (PORT) INSERTION N/A 04/14/2020    Procedure: INSERTION VENOUS ACCESS DEVICE;  Surgeon: Vielka Weller MD;  Location: [facility] OR;  Service: General;  Laterality: N/A;    VENOUS  ACCESS DEVICE (PORT) INSERTION  07/2022    VENOUS ACCESS DEVICE (PORT) REMOVAL N/A 05/28/2021    Procedure: PORT REMOVAL, EXCISION OF NEOPLASM UNCERTAIN BEHAVIOR LEFT SHOULDER;  Surgeon: Vielka Weller MD;  Location:  [facility] OR;  Service: General;  Laterality: N/A;     PT Assessment (Last 12 Hours)       PT Evaluation and Treatment       Row Name 02/17/25 0735          Physical Therapy Time and Intention    Subjective Information complains of;pain  -ALEX     Document Type therapy note (daily note)  -ALEX     Mode of Treatment physical therapy  -ALEX       Row Name 02/17/25 0735          General Information    Existing Precautions/Restrictions fall;sternal  chest tube  -ALEX     Limitations/Impairments hearing  -ALEX       Row Name 02/17/25 0735          Pain    Pretreatment Pain Rating 4/10  -ALEX     Posttreatment Pain Rating 7/10  -ALEX     Pain Location chest  -ALEX       Row Name 02/17/25 0735          Sit-Stand Transfer    Sit-Stand [place] (Transfers) verbal cues;contact guard  -ALEX       Row Name 02/17/25 0735          Stand-Sit Transfer    Stand-Sit [place] (Transfers) verbal cues;standby assist  -ALEX       Row Name 02/17/25 0735          Gait/Stairs (Locomotion)    [place] Level (Gait) contact guard  -ALEX     Distance in Feet (Gait) 450  -ALEX     Deviations/Abnormal Patterns (Gait) lucia decreased  -ALEX     Comment, (Gait/Stairs) cues to hold up. look ahead  -ALEX       Row Name 02/17/25 0735          Motor Skills    Comments, Therapeutic Exercise warm ups x 20  -ALEX       Row Name             Wound 02/15/25 0830 midline sternal    Wound - Properties Group Placement Date: 02/15/25  -SF Placement Time: 0830 -SF Orientation: midline  -SF Location: sternal  -SF Primary Wound Type: Incision  -SF    Retired Wound - Properties Group Placement Date: 02/15/25  -SF Placement Time: 0830 -SF Orientation: midline  -SF Location: sternal  -SF Primary Wound Type: Incision  -SF    Retired Wound - Properties Group Placement  Date: 02/15/25  -SF Placement Time: 0830 -SF Orientation: midline  -SF Location: sternal  -SF Primary Wound Type: Incision  -SF    Retired Wound - Properties Group Date first assessed: 02/15/25  -SF Time first assessed: 0830 -SF Location: sternal  -SF Primary Wound Type: Incision  -SF      Row Name             Wound 02/15/25 0835 Left lower leg    Wound - Properties Group Placement Date: 02/15/25  -SF Placement Time: 0835 -SF Side: Left  -SF Orientation: lower  -SF Location: leg  -SF Primary Wound Type: Incision  -SF    Retired Wound - Properties Group Placement Date: 02/15/25  -SF Placement Time: 0835 -SF Side: Left  -SF Orientation: lower  -SF Location: leg  -SF Primary Wound Type: Incision  -SF    Retired Wound - Properties Group Placement Date: 02/15/25  -SF Placement Time: 0835 -SF Side: Left  -SF Orientation: lower  -SF Location: leg  -SF Primary Wound Type: Incision  -SF    Retired Wound - Properties Group Date first assessed: 02/15/25  -SF Time first assessed: 0835 -SF Side: Left  -SF Location: leg  -SF Primary Wound Type: Incision  -SF      Row Name 02/17/25 0735          Vital Signs    Pre Systolic BP Rehab 136  -ALEX     Pre Treatment Diastolic BP 67  -ALEX     Pretreatment Heart Rate (beats/min) 87  -ALEX     Posttreatment Heart Rate (beats/min) 95  -ALEX     Pre SpO2 (%) 95  -ALEX     O2 Delivery Pre Treatment room air  -ALEX     Post SpO2 (%) 92  -ALEX     O2 Delivery Post Treatment room air  -ALEX       Row Name 02/17/25 0735          Positioning and Restraints    Pre-Treatment Position sitting in chair/recliner  -ALEX     In Chair sitting;call light within reach;notified nsg;RUE elevated;LUE elevated  breakfast  -ALEX               User Key  (r) = Recorded By, (t) = Taken By, (c) = Cosigned By      Initials Name Provider Type    Yoselin Robles PTA Physical Therapist Assistant    Vanita Miranda, RN Registered Nurse                    Physical Therapy Education       Title: PT OT SLP Therapies (In  Progress)       Topic: Physical Therapy (In Progress)       Point: Mobility training (Done)       Learning Progress Summary            Patient Acceptance, E, VU by AJ at 2/16/2025 0948    Comment: role of PT, sternal precuations, pursed lip breathing, goal setting                      Point: Home exercise program (Done)       Learning Progress Summary            Patient Acceptance, E, VU by AJ at 2/16/2025 0948    Comment: role of PT, sternal precuations, pursed lip breathing, goal setting                      Point: Body mechanics (Done)       Learning Progress Summary            Patient Acceptance, E, VU by AJ at 2/16/2025 0948    Comment: role of PT, sternal precuations, pursed lip breathing, goal setting                      Point: Precautions (In Progress)       Learning Progress Summary            Patient Acceptance, E,D, NR by ALEX at 2/17/2025 0811    Comment: sternal precautions    Acceptance, E, VU by DONNA at 2/16/2025 0948    Comment: role of PT, sternal precuations, pursed lip breathing, goal setting                                      User Key       Initials Effective Dates Name Provider Type Discipline     02/03/23 -  Yoselin Ennis, PTA Physical Therapist Assistant PT     08/15/24 -  Zachary Walton, PT DPT Physical Therapist PT                  PT Recommendation and Plan         Outcome Measures       Row Name 02/17/25 0800             How much help from another person do you currently need...    Turning from your back to your side while in flat bed without using bedrails? 3  -ALEX      Moving from lying on back to sitting on the side of a flat bed without bedrails? 3  -ALEX      Moving to and from a bed to a chair (including a wheelchair)? 4  -ALEX      Standing up from a chair using your arms (e.g., wheelchair, bedside chair)? 4  no arms  -ALEX      Climbing 3-5 steps with a railing? 3  -ALEX      To walk in hospital room? 3  -ALEX      AM-PAC 6 Clicks Score (PT) 20  -ALEX                User Key  (r) =  Recorded By, (t) = Taken By, (c) = Cosigned By      Initials Name Provider Type    Yoselin Robles PTA Physical Therapist Assistant                     Time Calculation:    PT Charges       Row Name 02/17/25 0813             Time Calculation    Start Time 0735  -ALEX      Stop Time 0801  -ALEX      Time Calculation (min) 26 min  -ALEX         Timed Charges    42859 - Gait Training Minutes  26  -ALEX         Total Minutes    Timed Charges Total Minutes 26  -ALEX       Total Minutes 26  -ALEX                User Key  (r) = Recorded By, (t) = Taken By, (c) = Cosigned By      Initials Name Provider Type    Yoselin Robles PTA Physical Therapist Assistant                  Therapy Charges for Today       Code Description Service Date Service Provider Modifiers Qty    [id number] HC GAIT TRAINING EA 15 MIN 2/17/2025 Yoselin Ennis PTA GP 2            PT G-Codes  Outcome Measure Options: AM-PAC 6 Clicks Basic Mobility (PT)  AM-PAC 6 Clicks Score (PT): 20    Yoselin Ennis PTA  2/17/2025

## 2025-02-17 NOTE — OP NOTE
Patient Name:  Attila Viramontes  YOB: 1950    Date of Procedure:  2/15/2025    Preoperative Diagnosis:   Coronary artery disease of native artery of native heart with stable angina pectoris [I25.118]  Current every day smoker  Normocytic anemia  Bilateral carotid artery stenosis  Type 2 diabetes mellitus  Stage IIIa chronic kidney disease  Colon cancer       Postoperative Diagnosis:    Same    Procedures Performed:  1. Coronary artery bypass grafting-3 vessel (left internal mammary artery/left anterior descending, reverse saphenous vein graft/obtuse marginal, and reverse saphenous vein graft/posterior descending artery)  2. Left open vein harvest  3. Sternalock XP sternal plating     Surgeon:  Joe Back MD  Assistants:   Ada Ramirez  Anesthesia Staff:  Anesthesiologist: Carol Hernandez MD  CRNA: Joe Wilhelm CRNA  Anesthesia Type:  General    Estimated Blood Loss:  Minimal (Cell Saver)  Drains:  1. 19 Amharic Etienne drain-left pleural space  2. 24 Amharic Etienne drains-anterior and posterior mediastinum    Specimens:  None     Operative Findings:  Excellent arterial conduit. Good venous conduit. The LAD at site of grafting measured 2 mm in size and had minimal atherosclerotic disease burden. The LAD was intramyocardial.  Post bypass grafting had excellent arterial runoff.  The OM artery measured 2.2 mm in size and had mild atherosclerotic disease burden.  Post bypass grafting had excellent arterial runoff. The posterior descending artery measured 1.9 mm in size and had excellent arterial runoff with mild atherosclerotic disease burden.  Transesophageal echocardiography salient findings include preserved left ventricular function with no significant valve dysfunction.         Operative description in detail:  The patient was taken to the operative suite where he  was placed in a supine position.  Induction of general anesthesia and placement of a single-lumen endotracheal  tube was performed without remark.  Appropriate arterial and venous access was established without remark.  Through the previously placed right internal jugular central venous line, a pulmonary artery catheter was floated into position.  The patient was then prepped and draped in the usual and sterile surgical fashion.  A timeout was performed.  Perioperative antibiotics were administered. Beta blocker was given.    A two team approach was utilized to harvest both the left internal mammary artery and the left greater saphenous vein.   Briefly the greater saphenous vein was taken at the level of the ankle open and taken in a prograde fashion for an anticipated length of vein.  All side branches were ligated in continuity and divided.  The vein was extracted from a hemostatic bed.  The leg was closed in a layered fashion with Vicryl suture.  The vein was prepared without remark.    While the vein was being harvested, median sternotomy was performed by me. Pericardial fat in midline from the level of the innominate vein to the level of the diaphragm was divided in midline.  A Rultract device was used to expose the posterior sternal table.  The left internal mammary artery was taken down using a standard pedicle technique with a combination of electrocautery and/or clips to control all side branches.  At that time, the patient was systemically anticoagulated with IV heparin.  A 19 Irish Etienne drain was placed in the left pleural space.  After suitable time of circulating heparin, clips were placed doubly onto the mammary artery distally and it was divided proximal to the previously placed clips.  It had excellent arterial inflow.  The mammary artery was controlled distally.  The mammary artery harvest bed was hemostatic.  The Rultract device was removed from the sterile field and a [place] retractor was used for exposure.  The mammary artery was prepared for bypass grafting and deemed excellent.  A pericardial well was  created. I elected to cannulate the heart centrally accessing distally the ascending aorta and the right atrial appendage.  Each cannula was placed in continuity with the appropriate pump line. Retrograde autologous prime was completed as indicated.  A combined cardioplegia/aortic root vent set was secured with a horizontal mattress 4-0 Prolene suture.  With an appropriate ACT and all in readiness, cardiopulmonary bypass was commenced.  I dissected out the obtuse marginal, PDA, and the LAD for suitable sites for bypass grafting.  With that, I proceeded to apply the aortic cross-clamp and administered cardioplegia utilizing a warm induction strategy.  Upon achieving electrical-mechanical arrest, cold blood potassium cardioplegia was administered to a total standard dose.  We did implement systemic hypothermia mild and applied topical hypothermia to the ventricle.  At appropriate intervals, doses of cardioplegia were administered throughout the conduct of bypass grafting.     I directed my attention towards the PDA.  A coronary arteriotomy was made and augmented to size with Abel scissors.  It is as per operative findings.  The anastomosis was constructed in an end-to-side orientation with running 7-0 Prolene suture.  With that its proximal anastomosis was  constructed following aortotomy with 11 blade and augmented size with 4 mm arterial punch subsequently.  This was constructed in a side aorta end vein graft fashion with running 6-0 Prolene suture. An AC  was placed.  The graft was assessed for lay which was excellent. It is without tension or torsion.     To that end I directed my attention towards the obtuse marginal.  A coronary arteriotomy was made and augmented to size with Abel scissors.  It is as per operative findings.  The anastomosis was constructed in an end-to-side orientation with running 7-0 Prolene suture.  With that its proximal anastomosis was constructed following aortotomy with 11 blade  and augmented size with 4 mm arterial punch subsequently.  This was constructed in a side aorta end vein graft fashion with running 6-0 Prolene suture. An AC  was placed.  The graft was assessed for lay which was excellent.  It is without tension or torsion.     During a dose of cardioplegia, a pericardial slit left lateral was made while dividing associate pericardiophrenic fat and vasculature while being mindful of the lay of the phrenic nerve.  As such I proceeded to obtain control proximally onto the mammary artery and spatulated it distally.  I grafted this onto the LAD following coronary arteriotomy using previous described techniques.  The anastomosis was constructed in an end-to-side orientation with running 7-0 Prolene suture.  It is as per operative findings and the anastomosis was hemostatic.  I did tack the mammary artery pedicle to the anterior aspect heart with 6-0 Prolene sutures.    With that being accomplished, a terminal hotshot was administered.  The patient was placed in Trendelenburg position.  Upon completion of terminal hotshot and placement of temporary epicardial pacing wires, with the aortic vent on high and pump flows diminished, the aortic cross-clamp was released.  With that, full support was implemented.  A perfusable rhythm was obtained after cardioversion.  A nonworking beating phase was implemented.  Ventilation restored.  I surveyed each graft and each anastomosis was hemostatic and had excellent lay.  With all in readiness, the heart was allowed to fill.  De-airing maneuvers were performed as guided by transesophageal echocardiography.  With that the heart was decompressed and we removed the aortic root vent/cardioplegia cannula set.  Its associated pursestring suture was tied securely and this was reinforced as per matter of routine. The table was now placed in neutral position.  With all in readiness, we proceeded to wean from and separate from cardiopulmonary bypass.  I  did decannulate the venous line and snared down its associated pursestring suture.  Systemic intravenous protamine was administered.  All associated blood volume was returned to the patient. With continued good hemodynamics, I decannulated the arterial line and tied down its associated pursestring sutures.  At this time I tied down the previously snared pursestring suture.  The mediastinum was drained with 24 Syriac Etienne drains placed anteriorly and posteriorly.  I surveyed the chest and hemostasis was pristine.  With that I impregnated the sternal edges with vancomycin, thrombin, and Gelfoam paste.  The sternum was reapproximated with stainless sterile wires placed in an interrupted fashion and the use of Sternalock XP sternal plating system.  Please see the implant record for utilized plates and screws.  In layers anatomically the soft tissue planes were reapproximated. Instruments, sharps, and sponge counts were reported as correct.      Complications: None     Disposition: Transferred to ICU in stable and guarded condition.    Joe Back MD

## 2025-02-17 NOTE — PLAN OF CARE
Goal Outcome Evaluation:  Plan of Care Reviewed With: patient        Progress: improving  Outcome Evaluation: Patient transferred out of ICU this afternoon.  Had complaints of incisional pain one time this shift, which was relieved with prn pain medicine.  NSR on tele.

## 2025-02-17 NOTE — PLAN OF CARE
Goal Outcome Evaluation:  Plan of Care Reviewed With: patient        Progress: improving     Pt a/ox4, slept most of tonight. Ambulating 400 ft . IJ and ART line removed per protocol. Urine output adequate. MST drain output 50mL; PL drain output 5mL this shift. Afebrile. Room air. Floor orders in place. Safety maintained. Call light in place.   Problem: Adult Inpatient Plan of Care  Goal: Plan of Care Review  Outcome: Progressing  Flowsheets (Taken 2/17/2025 0546)  Progress: improving  Plan of Care Reviewed With: patient  Goal: Patient-Specific Goal (Individualized)  Outcome: Progressing  Goal: Absence of Hospital-Acquired Illness or Injury  Outcome: Progressing  Intervention: Identify and Manage Fall Risk  Recent Flowsheet Documentation  Taken 2/17/2025 0500 by Monica Angel RNA  Safety Promotion/Fall Prevention: safety round/check completed  Taken 2/17/2025 0400 by Monica Angel RNA  Safety Promotion/Fall Prevention: safety round/check completed  Taken 2/17/2025 0300 by Monica Angel RNA  Safety Promotion/Fall Prevention: safety round/check completed  Taken 2/17/2025 0200 by Monica Angel RNA  Safety Promotion/Fall Prevention: safety round/check completed  Taken 2/17/2025 0100 by Monica Angel RNA  Safety Promotion/Fall Prevention: safety round/check completed  Taken 2/17/2025 0000 by Monica Angel RNA  Safety Promotion/Fall Prevention: safety round/check completed  Taken 2/16/2025 2300 by Monica Angel RNA  Safety Promotion/Fall Prevention: safety round/check completed  Taken 2/16/2025 2208 by Monica Angel RNA  Safety Promotion/Fall Prevention: safety round/check completed  Taken 2/16/2025 2100 by Monica Angel RNA  Safety Promotion/Fall Prevention: safety round/check completed  Taken 2/16/2025 2000 by Monica Angel RNA  Safety Promotion/Fall Prevention: safety round/check completed  Taken 2/16/2025 1900 by Monica Angel RNA  Safety Promotion/Fall  Prevention: safety round/check completed  Intervention: Prevent Skin Injury  Recent Flowsheet Documentation  Taken 2/17/2025 0500 by Monica Angel RNA  Body Position:   position changed independently   supine   weight shifting  Taken 2/17/2025 0300 by Monica Angel RNA  Body Position:   position changed independently   side-lying   right   weight shifting  Taken 2/17/2025 0100 by Monica Angel RNA  Body Position:   position changed independently   side-lying   left  Taken 2/16/2025 2300 by Monica Angel RNA  Body Position:   position changed independently   side-lying   right  Taken 2/16/2025 2100 by Monica Angel RNA  Body Position:   position changed independently   tilted   left   weight shifting  Taken 2/16/2025 2000 by Monica Angel RNA  Skin Protection: silicone foam dressing in place  Taken 2/16/2025 1900 by Monica Angel RNA  Body Position:   position changed independently   tilted   right   weight shifting  Intervention: Prevent Infection  Recent Flowsheet Documentation  Taken 2/16/2025 2000 by Monica Angel RNA  Infection Prevention: single patient room provided  Goal: Optimal Comfort and Wellbeing  Outcome: Progressing  Intervention: Monitor Pain and Promote Comfort  Recent Flowsheet Documentation  Taken 2/17/2025 0300 by Monica Angel RNA  Pain Management Interventions: pain medication given  Taken 2/16/2025 2208 by Monica Agnel RNA  Pain Management Interventions: pain medication given  Taken 2/16/2025 2000 by Monica Angel RNA  Pain Management Interventions: pain medication given  Intervention: Provide Person-Centered Care  Recent Flowsheet Documentation  Taken 2/16/2025 2000 by Monica Angel RNA  Trust Relationship/Rapport:   care explained   choices provided   thoughts/feelings acknowledged  Goal: Readiness for Transition of Care  Outcome: Progressing     Problem: Mechanical Ventilation Invasive  Goal: Effective Communication  Outcome:  Progressing  Intervention: Ensure Effective Communication  Recent Flowsheet Documentation  Taken 2/16/2025 2000 by Monica Angel RNA  Trust Relationship/Rapport:   care explained   choices provided   thoughts/feelings acknowledged  Family/Support System Care: support provided  Goal: Optimal Device Function  Outcome: Progressing  Intervention: Optimize Device Care and Function  Recent Flowsheet Documentation  Taken 2/17/2025 0400 by Monica Angel RNA  Oral Care:   oral rinse provided   suction provided  Taken 2/16/2025 2000 by Monica Angel RNA  Oral Care: oral rinse provided  Airway Safety Measures:   suction at bedside   oxygen flowmeter at bedside  Goal: Mechanical Ventilation Liberation  Outcome: Progressing  Intervention: Promote Extubation and Mechanical Ventilation Liberation  Recent Flowsheet Documentation  Taken 2/16/2025 2000 by Monica Angel RNA  Medication Review/Management: medications reviewed  Goal: Absence of Device-Related Skin and Tissue Injury  Outcome: Progressing  Intervention: Maintain Skin and Tissue Health  Recent Flowsheet Documentation  Taken 2/16/2025 2000 by Monica Angel RNA  Device Skin Pressure Protection: tubing/devices free from skin contact  Goal: Absence of Ventilator-Induced Lung Injury  Outcome: Progressing  Intervention: Prevent Ventilator-Associated Pneumonia  Recent Flowsheet Documentation  Taken 2/17/2025 0500 by Monica Angel RNA  Head of Bed (HOB) Positioning: HOB at 20-30 degrees  Taken 2/17/2025 0400 by Monica Angel RNA  Oral Care:   oral rinse provided   suction provided  Taken 2/17/2025 0300 by Monica Angel RNA  Head of Bed (HOB) Positioning: HOB at 20-30 degrees  Taken 2/17/2025 0100 by Monica Angel RNA  Head of Bed (HOB) Positioning: HOB at 30-45 degrees  Taken 2/16/2025 2300 by Monica Angel RNA  Head of Bed (HOB) Positioning: HOB at 20-30 degrees  Taken 2/16/2025 2100 by Monica Angel RNA  Head of Bed (HOB)  Positioning: HOB at 60-90 degrees  Taken 2/16/2025 2000 by Monica Angel RNA  Oral Care: oral rinse provided  Taken 2/16/2025 1900 by Monica Angel RNA  Head of Bed (HOB) Positioning: HOB at 60-90 degrees     Problem: Skin Injury Risk Increased  Goal: Skin Health and Integrity  Outcome: Progressing  Intervention: Optimize Skin Protection  Recent Flowsheet Documentation  Taken 2/17/2025 0500 by Monica Angel RNA  Activity Management: up in chair  Head of Bed (HOB) Positioning: HOB at 20-30 degrees  Taken 2/17/2025 0300 by Monica Angel RNA  Activity Management: bedrest  Head of Bed (HOB) Positioning: HOB at 20-30 degrees  Taken 2/17/2025 0200 by Monica Angel RNA  Activity Management: bedrest  Taken 2/17/2025 0100 by Monica Angel RNA  Activity Management: bedrest  Head of Bed (HOB) Positioning: HOB at 30-45 degrees  Taken 2/17/2025 0000 by Monica Angel RNA  Activity Management: bedrest  Taken 2/16/2025 2300 by Monica Angel RNA  Activity Management: back to bed  Head of Bed (HOB) Positioning: HOB at 20-30 degrees  Taken 2/16/2025 2208 by Monica Angel RNA  Activity Management: ambulated outside room  Taken 2/16/2025 2100 by Monica Angel RNA  Activity Management: up in chair  Head of Bed (HOB) Positioning: HOB at 60-90 degrees  Taken 2/16/2025 2000 by Monica Angel RNA  Activity Management: up in chair  Pressure Reduction Techniques: weight shift assistance provided  Pressure Reduction Devices: pressure-redistributing mattress utilized  Skin Protection: silicone foam dressing in place  Taken 2/16/2025 1900 by Monica Angel RNA  Activity Management: up in chair  Head of Bed (HOB) Positioning: HOB at 60-90 degrees     Problem: Fall Injury Risk  Goal: Absence of Fall and Fall-Related Injury  Outcome: Progressing  Intervention: Identify and Manage Contributors  Recent Flowsheet Documentation  Taken 2/16/2025 2000 by Monica Angel RNA  Medication  Review/Management: medications reviewed  Intervention: Promote Injury-Free Environment  Recent Flowsheet Documentation  Taken 2/17/2025 0500 by Monica Angel RNA  Safety Promotion/Fall Prevention: safety round/check completed  Taken 2/17/2025 0400 by Monica Angel RNA  Safety Promotion/Fall Prevention: safety round/check completed  Taken 2/17/2025 0300 by Monica Angel RNA  Safety Promotion/Fall Prevention: safety round/check completed  Taken 2/17/2025 0200 by Monica Angel RNA  Safety Promotion/Fall Prevention: safety round/check completed  Taken 2/17/2025 0100 by Monica Angel RNA  Safety Promotion/Fall Prevention: safety round/check completed  Taken 2/17/2025 0000 by Monica Angel RNA  Safety Promotion/Fall Prevention: safety round/check completed  Taken 2/16/2025 2300 by Monica Angel RNA  Safety Promotion/Fall Prevention: safety round/check completed  Taken 2/16/2025 2208 by Monica Angel RNA  Safety Promotion/Fall Prevention: safety round/check completed  Taken 2/16/2025 2100 by Monica Angel RNA  Safety Promotion/Fall Prevention: safety round/check completed  Taken 2/16/2025 2000 by Monica Angel RNA  Safety Promotion/Fall Prevention: safety round/check completed  Taken 2/16/2025 1900 by Monica Angel RNA  Safety Promotion/Fall Prevention: safety round/check completed

## 2025-02-17 NOTE — PLAN OF CARE
Problem: Adult Inpatient Plan of Care  Goal: Plan of Care Review  Outcome: Progressing  Flowsheets (Taken 2/16/2025 1815)  Progress: improving  Plan of Care Reviewed With: patient  Goal: Patient-Specific Goal (Individualized)  Outcome: Progressing  Goal: Absence of Hospital-Acquired Illness or Injury  Outcome: Progressing  Intervention: Identify and Manage Fall Risk  Recent Flowsheet Documentation  Taken 2/16/2025 1700 by Carol Olmos RN  Safety Promotion/Fall Prevention: safety round/check completed  Taken 2/16/2025 1600 by Carol Olmos RN  Safety Promotion/Fall Prevention: safety round/check completed  Taken 2/16/2025 1500 by Carol Olmos RN  Safety Promotion/Fall Prevention: safety round/check completed  Taken 2/16/2025 1400 by Carol Olmos RN  Safety Promotion/Fall Prevention: safety round/check completed  Taken 2/16/2025 1300 by Carol Olmos RN  Safety Promotion/Fall Prevention: safety round/check completed  Intervention: Prevent Skin Injury  Recent Flowsheet Documentation  Taken 2/16/2025 1700 by Carol Olmos RN  Body Position:   supine   weight shifting  Taken 2/16/2025 1500 by Carol Olmos RN  Body Position:   tilted   right  Taken 2/16/2025 1300 by Carol Olmos RN  Body Position: supine  Goal: Optimal Comfort and Wellbeing  Outcome: Progressing  Goal: Readiness for Transition of Care  Outcome: Progressing     Problem: Mechanical Ventilation Invasive  Goal: Effective Communication  Outcome: Progressing  Goal: Optimal Device Function  Outcome: Progressing  Goal: Mechanical Ventilation Liberation  Outcome: Progressing  Goal: Absence of Device-Related Skin and Tissue Injury  Outcome: Progressing  Goal: Absence of Ventilator-Induced Lung Injury  Outcome: Progressing     Problem: Skin Injury Risk Increased  Goal: Skin Health and Integrity  Outcome: Progressing     Problem: Fall Injury Risk  Goal: Absence of Fall and Fall-Related Injury  Outcome: Progressing  Intervention: Promote  Injury-Free Environment  Recent Flowsheet Documentation  Taken 2/16/2025 1700 by Carol Olmos RN  Safety Promotion/Fall Prevention: safety round/check completed  Taken 2/16/2025 1600 by Carol Olmos RN  Safety Promotion/Fall Prevention: safety round/check completed  Taken 2/16/2025 1500 by Carol Olmos RN  Safety Promotion/Fall Prevention: safety round/check completed  Taken 2/16/2025 1400 by Carol Olmos RN  Safety Promotion/Fall Prevention: safety round/check completed  Taken 2/16/2025 1300 by Carol Olmos RN  Safety Promotion/Fall Prevention: safety round/check completed   Goal Outcome Evaluation:  Plan of Care Reviewed With: patient        Progress: improving

## 2025-02-18 ENCOUNTER — APPOINTMENT (OUTPATIENT)
Dept: GENERAL RADIOLOGY | Facility: HOSPITAL | Age: 75
End: 2025-02-18
Payer: MEDICARE

## 2025-02-18 PROBLEM — C18.9 COLON CANCER: Status: ACTIVE | Noted: 2025-02-18

## 2025-02-18 LAB
ANION GAP SERPL CALCULATED.3IONS-SCNC: 10 MMOL/L (ref 5–15)
BASOPHILS # BLD AUTO: 0.03 10*3/MM3 (ref 0–0.2)
BASOPHILS NFR BLD AUTO: 0.3 % (ref 0–1.5)
BUN SERPL-MCNC: 20 MG/DL (ref 8–23)
BUN/CREAT SERPL: 15.4 (ref 7–25)
CALCIUM SPEC-SCNC: 8.7 MG/DL (ref 8.6–10.5)
CHLORIDE SERPL-SCNC: 104 MMOL/L (ref 98–107)
CO2 SERPL-SCNC: 23 MMOL/L (ref 22–29)
CREAT SERPL-MCNC: 1.3 MG/DL (ref 0.76–1.27)
DEPRECATED RDW RBC AUTO: 55.2 FL (ref 37–54)
EGFRCR SERPLBLD CKD-EPI 2021: 58 ML/MIN/1.73
EOSINOPHIL # BLD AUTO: 0.45 10*3/MM3 (ref 0–0.4)
EOSINOPHIL NFR BLD AUTO: 4.4 % (ref 0.3–6.2)
ERYTHROCYTE [DISTWIDTH] IN BLOOD BY AUTOMATED COUNT: 16.4 % (ref 12.3–15.4)
GLUCOSE BLDC GLUCOMTR-MCNC: 140 MG/DL (ref 70–130)
GLUCOSE BLDC GLUCOMTR-MCNC: 148 MG/DL (ref 70–130)
GLUCOSE BLDC GLUCOMTR-MCNC: 290 MG/DL (ref 70–130)
GLUCOSE BLDC GLUCOMTR-MCNC: 393 MG/DL (ref 70–130)
GLUCOSE SERPL-MCNC: 136 MG/DL (ref 65–99)
HCT VFR BLD AUTO: 26.5 % (ref 37.5–51)
HGB BLD-MCNC: 8.6 G/DL (ref 13–17.7)
IMM GRANULOCYTES # BLD AUTO: 0.04 10*3/MM3 (ref 0–0.05)
IMM GRANULOCYTES NFR BLD AUTO: 0.4 % (ref 0–0.5)
LYMPHOCYTES # BLD AUTO: 1.14 10*3/MM3 (ref 0.7–3.1)
LYMPHOCYTES NFR BLD AUTO: 11.3 % (ref 19.6–45.3)
MCH RBC QN AUTO: 29.8 PG (ref 26.6–33)
MCHC RBC AUTO-ENTMCNC: 32.5 G/DL (ref 31.5–35.7)
MCV RBC AUTO: 91.7 FL (ref 79–97)
MONOCYTES # BLD AUTO: 1.13 10*3/MM3 (ref 0.1–0.9)
MONOCYTES NFR BLD AUTO: 11.2 % (ref 5–12)
NEUTROPHILS NFR BLD AUTO: 7.34 10*3/MM3 (ref 1.7–7)
NEUTROPHILS NFR BLD AUTO: 72.4 % (ref 42.7–76)
NRBC BLD AUTO-RTO: 0 /100 WBC (ref 0–0.2)
PLATELET # BLD AUTO: 174 10*3/MM3 (ref 140–450)
PMV BLD AUTO: 9.6 FL (ref 6–12)
POTASSIUM SERPL-SCNC: 4.2 MMOL/L (ref 3.5–5.2)
RBC # BLD AUTO: 2.89 10*6/MM3 (ref 4.14–5.8)
SODIUM SERPL-SCNC: 137 MMOL/L (ref 136–145)
WBC NRBC COR # BLD AUTO: 10.13 10*3/MM3 (ref 3.4–10.8)

## 2025-02-18 PROCEDURE — 71046 X-RAY EXAM CHEST 2 VIEWS: CPT

## 2025-02-18 PROCEDURE — 97116 GAIT TRAINING THERAPY: CPT

## 2025-02-18 PROCEDURE — 82948 REAGENT STRIP/BLOOD GLUCOSE: CPT

## 2025-02-18 PROCEDURE — 25010000002 FUROSEMIDE PER 20 MG: Performed by: NURSE PRACTITIONER

## 2025-02-18 PROCEDURE — 25010000002 ENOXAPARIN PER 10 MG: Performed by: THORACIC SURGERY (CARDIOTHORACIC VASCULAR SURGERY)

## 2025-02-18 PROCEDURE — 80048 BASIC METABOLIC PNL TOTAL CA: CPT | Performed by: NURSE PRACTITIONER

## 2025-02-18 PROCEDURE — 63710000001 INSULIN LISPRO (HUMAN) PER 5 UNITS: Performed by: THORACIC SURGERY (CARDIOTHORACIC VASCULAR SURGERY)

## 2025-02-18 PROCEDURE — 85025 COMPLETE CBC W/AUTO DIFF WBC: CPT | Performed by: THORACIC SURGERY (CARDIOTHORACIC VASCULAR SURGERY)

## 2025-02-18 PROCEDURE — 99024 POSTOP FOLLOW-UP VISIT: CPT | Performed by: THORACIC SURGERY (CARDIOTHORACIC VASCULAR SURGERY)

## 2025-02-18 PROCEDURE — 74018 RADEX ABDOMEN 1 VIEW: CPT

## 2025-02-18 RX ORDER — FUROSEMIDE 10 MG/ML
20 INJECTION INTRAMUSCULAR; INTRAVENOUS DAILY
Status: DISCONTINUED | OUTPATIENT
Start: 2025-02-19 | End: 2025-02-20 | Stop reason: HOSPADM

## 2025-02-18 RX ORDER — POTASSIUM CHLORIDE 750 MG/1
20 CAPSULE, EXTENDED RELEASE ORAL DAILY
Status: DISCONTINUED | OUTPATIENT
Start: 2025-02-19 | End: 2025-02-20 | Stop reason: HOSPADM

## 2025-02-18 RX ADMIN — ASPIRIN 81 MG: 81 TABLET, COATED ORAL at 08:39

## 2025-02-18 RX ADMIN — POTASSIUM CHLORIDE 20 MEQ: 750 CAPSULE, EXTENDED RELEASE ORAL at 08:38

## 2025-02-18 RX ADMIN — DOCUSATE SODIUM 100 MG: 100 CAPSULE, LIQUID FILLED ORAL at 08:38

## 2025-02-18 RX ADMIN — ACETAMINOPHEN 1000 MG: 500 TABLET, FILM COATED ORAL at 08:38

## 2025-02-18 RX ADMIN — Medication 1 APPLICATION: at 08:39

## 2025-02-18 RX ADMIN — METOPROLOL TARTRATE 25 MG: 25 TABLET, FILM COATED ORAL at 21:22

## 2025-02-18 RX ADMIN — AMIODARONE HYDROCHLORIDE 400 MG: 200 TABLET ORAL at 17:22

## 2025-02-18 RX ADMIN — AMIODARONE HYDROCHLORIDE 400 MG: 200 TABLET ORAL at 08:39

## 2025-02-18 RX ADMIN — OXYCODONE HYDROCHLORIDE 5 MG: 5 TABLET ORAL at 03:13

## 2025-02-18 RX ADMIN — ACETAMINOPHEN 1000 MG: 500 TABLET, FILM COATED ORAL at 03:10

## 2025-02-18 RX ADMIN — ATORVASTATIN CALCIUM 20 MG: 10 TABLET, FILM COATED ORAL at 21:23

## 2025-02-18 RX ADMIN — SODIUM BICARBONATE 650 MG TABLET 650 MG: at 21:22

## 2025-02-18 RX ADMIN — ACETAMINOPHEN 1000 MG: 500 TABLET, FILM COATED ORAL at 21:22

## 2025-02-18 RX ADMIN — PREGABALIN 25 MG: 25 CAPSULE ORAL at 17:22

## 2025-02-18 RX ADMIN — ENOXAPARIN SODIUM 40 MG: 100 INJECTION SUBCUTANEOUS at 08:39

## 2025-02-18 RX ADMIN — INSULIN LISPRO 6 UNITS: 100 INJECTION, SOLUTION INTRAVENOUS; SUBCUTANEOUS at 11:39

## 2025-02-18 RX ADMIN — ACETAMINOPHEN 1000 MG: 500 TABLET, FILM COATED ORAL at 14:11

## 2025-02-18 RX ADMIN — POLYETHYLENE GLYCOL 3350 17 G: 17 POWDER, FOR SOLUTION ORAL at 08:39

## 2025-02-18 RX ADMIN — LIDOCAINE 2 PATCH: 4 PATCH TOPICAL at 08:39

## 2025-02-18 RX ADMIN — Medication 1 APPLICATION: at 21:22

## 2025-02-18 RX ADMIN — SODIUM BICARBONATE 650 MG TABLET 650 MG: at 17:21

## 2025-02-18 RX ADMIN — DOCUSATE SODIUM 100 MG: 100 CAPSULE, LIQUID FILLED ORAL at 21:22

## 2025-02-18 RX ADMIN — PREGABALIN 25 MG: 25 CAPSULE ORAL at 05:30

## 2025-02-18 RX ADMIN — METOPROLOL TARTRATE 25 MG: 25 TABLET, FILM COATED ORAL at 08:38

## 2025-02-18 RX ADMIN — PANTOPRAZOLE SODIUM 40 MG: 40 TABLET, DELAYED RELEASE ORAL at 05:30

## 2025-02-18 RX ADMIN — CLOPIDOGREL BISULFATE 75 MG: 75 TABLET ORAL at 17:22

## 2025-02-18 RX ADMIN — SODIUM BICARBONATE 650 MG TABLET 650 MG: at 08:38

## 2025-02-18 RX ADMIN — INSULIN LISPRO 8 UNITS: 100 INJECTION, SOLUTION INTRAVENOUS; SUBCUTANEOUS at 17:22

## 2025-02-18 RX ADMIN — FUROSEMIDE 20 MG: 10 INJECTION, SOLUTION INTRAMUSCULAR; INTRAVENOUS at 08:39

## 2025-02-18 NOTE — PLAN OF CARE
Goal Outcome Evaluation:      Patient improving daily. Patient stands and sits with supervision. Ambulated 420' with supervision. Instructed on bed transfer and practiced x 3 with SBA. Answered many good questions concerning activity after D/C. Patient with improved understanding of UE restrictions.

## 2025-02-18 NOTE — CASE MANAGEMENT/SOCIAL WORK
Continued Stay Note   [place]     Patient Name: Attila Viramontes  MRN: [id number]  Today's Date: 2/18/2025    Admit Date: 2/15/2025    Plan: Home   Discharge Plan       Row Name 02/18/25 1036       Plan    Plan Home    Patient/Family in Agreement with Plan yes    Plan Comments Chart reviewed; events noted.  Pt plans home; no dc needs.  Pt ambulating 450 ft.    Final Discharge Disposition Code 01 - home or self-care                   Discharge Codes    No documentation.                       TOMMY Escudero

## 2025-02-18 NOTE — PLAN OF CARE
Goal Outcome Evaluation:  Plan of Care Reviewed With: patient   Patient walked from room to 4C nurse station this AM. C/O Chest incisional pain as well as back pain rating it 8/10. Had Oxycodone x 2 occasions this shift. 10 cc total out of ALEX Pleural drain. No output per Colostomy but patient is passing gas-had to burp colostomy bag twice. Great output from Urostomy Clear, light yellow urine. Patient has a productive cough with thick sputum

## 2025-02-18 NOTE — DISCHARGE SUMMARY
[facility] Cardiothoracic Surgery  DISCHARGE SUMMARY        Date of Admission: 2/15/2025  Date of Discharge:  2/20/2025  Primary Care Physician: EDUARDO Hebert MD    Discharge Diagnoses:  Active Hospital Problems    Diagnosis     **Coronary artery disease of native artery of native heart with stable angina pectoris     Colon cancer     Stenosis of right carotid artery     Stage 3a chronic kidney disease     Colostomy in place     Ileostomy in place     Type 2 diabetes mellitus with kidney complication, without long-term current use of insulin     Carotid occlusion, left     Current every day smoker     Hypertension      Procedures Performed: Coronary artery bypass grafting-3 vessel (left internal mammary artery/left anterior descending, reverse saphenous vein graft/obtuse marginal, and reverse saphenous vein graft/posterior descending artery), Left open vein harvest, Sternalock XP sternal plating performed on 2/15/2025 by Dr. Back.     HPI:  Mr. Attila Viramontes is a 74 y.o. male with with a history of recurrent rectal adenocarcinoma, which was treated in 2020. The lesion pathology is moderately differentiated invasive adenocarcinoma as well as the finding on 04/2020, a noncalcified left upper lobe lung nodule. Staging suggested invasion of mesorectal fat and mesorectal lymphadenopathy as well as pre and post sacral soft tissue changes. He was advised neoadjuvant chemoradiotherapy and then underwent APR with end colostomy. He also had prostatectomy with ileal conduit creation and bilateral gracilis muscle flaps with perineal defect. There is still residual invasive tumor post treatment and his final pathology demonstrated was pT4b and C1. Additional history includes history of CVA in 2020. Dr. Hameed did see with the finding of increasing fatigue, declining performance measures, dyspnea on exertion, but no tramaine chest pain or tightness. His stress test was positive in the inferior  wall territory. Left heart catheterization identified LAD, left circumflex and right coronary artery disease with right coronary artery occlusion and left to right collaterals. He was referred to cardiothoracic surgery services for consideration of CABG. He is currently undergoing treatment and his oncologist has expressed optimism regarding the efficacy of the current treatment regimen, as evidenced by the shrinkage of the tumor. His oncologist has advised a cessation of chemotherapy for a minimum of one month prior to any surgical intervention. He reports experiencing dizziness, which he attributes to his chemotherapy treatment. He also reports episodes of dizziness upon standing up too quickly in the morning, which he believes may be due to dehydration from not consuming water overnight. He reports no chest pain but does experience fatigue, which he attributes to his chemotherapy treatment. This symptom appears to be alleviated by hydration. He has not completely quit smoking.  Decision making made to proceed with surgical revascularization and he is agreeable to proceed.    Hospital Course: Mr. Bender was admitted on the day of surgery on February 15.  Please see a separate op note by Dr. Back.  Following surgery, he was transferred to the intensive care unit in stable guarded condition.  He remained hemodynamically stable overnight.  He was extubated and demonstrated a neurologically intact exam.  On postop day 1, he was up to the chair and walking with physical therapy.  He was given multimodality pain control strategies.  He met criteria to transfer to .  On postop day 2, mediastinal chest tubes were removed without remark.  The remaining stay of his hospitalization was remarkable for diuresis, bowel regimen, weaning supplemental oxygen, and encouraging pulmonary toilet and ambulation. Pacing wires have been removed without remark on post op day 4. He meets criteria to discharge home on postop day 5 and  is agreeable to do so with family. He has remained in NSR during the duration of his hospital stay.     Condition on Discharge:  Neurologically intact and has good pain control.  He is eating well and has demonstrable good bowel function.  The sternum is stable without clicks and the saphenectomy incisions are healing nicely.  The heart is in normal sinus rhythm.  He has met all physical therapy criteria and verbalizes understanding of sternotomy precautions.   He verbalizes understanding of a separately handed out cardiac surgery handout.       Discharge Disposition:  Home or Self Care [1]    Discharge Medications:     Discharge Medications        New Medications        Instructions Start Date   clopidogrel 75 MG tablet  Commonly known as: PLAVIX   75 mg, Oral, Daily      furosemide 40 MG tablet  Commonly known as: Lasix   40 mg, Oral, Daily      lisinopril 2.5 MG tablet  Commonly known as: PRINIVIL,ZESTRIL   2.5 mg, Oral, Every 24 Hours Scheduled   Start Date: February 21, 2025     metoprolol tartrate 25 MG tablet  Commonly known as: LOPRESSOR   25 mg, Oral, Every 12 Hours Scheduled      pantoprazole 40 MG EC tablet  Commonly known as: PROTONIX   40 mg, Oral, Every Morning      potassium chloride 10 MEQ CR capsule  Commonly known as: MICRO-K   20 mEq, Oral, Daily             Continue These Medications        Instructions Start Date   aspirin 81 MG EC tablet   81 mg, Daily      atorvastatin 40 MG tablet  Commonly known as: LIPITOR   40 mg, Oral, Daily      diphenoxylate-atropine 2.5-0.025 MG per tablet  Commonly known as: LOMOTIL   2 tablets, Oral, 4 Times Daily PRN      loperamide 2 MG capsule  Commonly known as: IMODIUM   2 mg, Oral, 4 Times Daily PRN      metFORMIN 500 MG tablet  Commonly known as: GLUCOPHAGE   1,000 mg, 2 Times Daily With Meals             Stop These Medications      mupirocin 2 % nasal ointment  Commonly known as: BACTROBAN     ranolazine 500 MG 12 hr tablet  Commonly known as: Ranexa               Discharge Diet: No concentrated sweets diet with an effort to maintain acceptable glycemic control.      Discharge Care Plan / Instructions: Please see the separately handed out cardiac surgery handout. Cardiac rehab deferred at this time due to sternotomy precautions-will reassess at formal office visit.     Activity at Discharge:   No heavy lifting greater than 5 pounds or a gallon of milk while maintaining sternal precautions.  Attila Jaimen Vikkicarmela has been instructed on an exercise  regimen as detailed in a handed out cardiac surgery handout.    Tobacco:  Patient has been counseled as to smoking cessation. We discussed its benefits in regards to immediate recovery and the long-term benefits of avoidance of tobacco products. Informed of the smoking cessation program available here. We discussed the benefit of long-term health that tobacco cessation would convey.      BMI: Body mass index is 24.8 kg/m². Continue to follow with primary care.     Follow-up Appointments: Attila Viramontes  is requested to see EDUARDO Hebert MD within 1-2 weeks from time of discharge, to see Anne BALTAZAR in 1 week with [facility], to follow-up with Dr. Back in 4 weeks,  and to follow-up with Dr. Hameed of the cardiology service in 6 weeks. He will follow back up with oncology services at their discretion.         0.02

## 2025-02-18 NOTE — PLAN OF CARE
Goal Outcome Evaluation:  Plan of Care Reviewed With: patient        Progress: no change  Outcome Evaluation: No change. Patient doing well. ALEX drain d/c'd. No BM yet, KUB ordered. No c/o pain. Cont to monitor.

## 2025-02-18 NOTE — PLAN OF CARE
Goal Outcome Evaluation:   Patient had a very smooth walk for 420' Answered more good questions. Patient has done very well.

## 2025-02-18 NOTE — PROGRESS NOTES
"Patient name: Attila Viramontes  Patient : 1950  VISIT # [id number]  MR #[id number]    Procedures Performed:  1. Coronary artery bypass grafting-3 vessel (left internal mammary artery/left anterior descending, reverse saphenous vein graft/obtuse marginal, and reverse saphenous vein graft/posterior descending artery)  2. Left open vein harvest  3. Sternalock XP sternal plating   Procedure Date:2/15/2025  POD:3 Days Post-Op    Subjective   The patient is resting in bed this morning.  He is on room air.  He reports that his pain is tolerable.  He is currently NSR-86.  He has not had bowel movement as of yet.  He is ambulating with therapy.  He is down 5 pounds from yesterday, but remains 8 pounds above baseline weight.     Telemetry: Sinus, 80s  IV gtts: None         Objective     Visit Vitals  /61 (BP Location: Left arm, Patient Position: Lying)   Pulse 83   Temp 98.6 °F (37 °C) (Oral)   Resp 17   Ht 179.1 cm (70.51\")   Wt 82.3 kg (181 lb 6.4 oz)   SpO2 94%   BMI 25.65 kg/m²   Baseline weight: 173 pounds    Intake/Output Summary (Last 24 hours) at 2025 0927  Last data filed at 2025 0910  Gross per 24 hour   Intake 640 ml   Output 4735 ml   Net -4095 ml     L Pleural drain:  10 ml/24 hours, serosanguineous, no leak      LABS:    CBC  WBC   Date/Time Value Ref Range Status   2025 024 10.13 3.40 - 10.80 10*3/mm3 Final     RBC   Date/Time Value Ref Range Status   2025 2.89 (L) 4.14 - 5.80 10*6/mm3 Final     Hemoglobin   Date/Time Value Ref Range Status   2025 8.6 (L) 13.0 - 17.7 g/dL Final     Hematocrit   Date/Time Value Ref Range Status   2025 26.5 (L) 37.5 - 51.0 % Final     MCH   Date/Time Value Ref Range Status   2025 29.8 26.6 - 33.0 pg Final     MCHC   Date/Time Value Ref Range Status   2025 0241 32.5 31.5 - 35.7 g/dL Final     RDW   Date/Time Value Ref Range Status   2025 16.4 (H) 12.3 - 15.4 % Final     RDW-SD "   Date/Time Value Ref Range Status   02/18/2025 0241 55.2 (H) 37.0 - 54.0 fl Final     MPV   Date/Time Value Ref Range Status   02/18/2025 0241 9.6 6.0 - 12.0 fL Final     Platelets   Date/Time Value Ref Range Status   02/18/2025 0241 174 140 - 450 10*3/mm3 Final     IMAGES:       Imaging Results (Last 24 Hours)       Procedure Component Value Units Date/Time    XR Chest PA & Lateral [[id number]] Collected: 02/18/25 0707     Updated: 02/18/25 0712    Narrative:      EXAMINATION: XR CHEST PA AND LATERAL-     2/18/2025 4:03 AM     HISTORY: Post CABG; Z74.09-Other reduced mobility;  I25.118-Atherosclerotic heart disease of native coronary artery with  other forms of angina pectoris     2 views of the chest are obtained. Comparison is made with the previous  study dated 2/17/2025.     Lungs are well-expanded.     There is removal of the right IJ vascular sheath/catheter. Left-sided  chest tube is in place. Mediastinal drain is not identified.     There is a small left lower lobar consolidation.     There is small bibasilar pleural effusion left more than the right.     There is no pulmonary congestion or pneumothorax.     There is moderate cardiomegaly. There is evidence of prior cardiac  surgery.     Right IJ approach Mediport system is in place. No change.       Impression:      1. Removal of the right IJ vascular sheath/catheter since the previous  study. Mediastinal drain have been removed. Left-sided chest tube is in  place. A Mediport system in place.  2. Small left basal consolidation and small bibasilar pleural effusion.  No change.              This report was signed and finalized on 2/18/2025 7:09 AM by Dr. Greg Lewis MD.               Physical Exam:  General: Alert, oriented. No apparent distress.   Cardiovascular: Regular rate and rhythm without murmur, rubs, or gallops.    Pulmonary: Clear to auscultation bilaterally without wheezing, rubs, or rales.  Chest: Sternotomy incision clean, dry, and  intact. Sternum stable. No clicks. MCT to -20 cm H2O suction. No air leak. Fluid is serosanguineous    Abdomen: Soft, nondistended, and nontender.  Colostomy/urostomy  Extremities: Warm, moves all extremities. Peripheral edema. Saphenectomy site clean, dry, and intact.   Neurologic:  Grossly intact with no focal deficits.           Impression:  Coronary artery disease of native artery of native heart with stable angina pectoris   Current every day smoker  Normocytic anemia  Bilateral carotid artery stenosis  Type 2 diabetes mellitus  Stage IIIa chronic kidney disease  Colon cancer    Plan:  Continue current diuresis therapy  Discontinue pleural Etienne drain  Continue bowel regimen  Continue sodium bicarbonate supplementation  Encourage pulmonary toilet and ambulation  Routine postcardiac surgery regimen  Discussed with patient      Anne Whaley, APRN  02/18/25  09:27 CST

## 2025-02-19 LAB
ANION GAP SERPL CALCULATED.3IONS-SCNC: 9 MMOL/L (ref 5–15)
BH BB BLOOD EXPIRATION DATE: NORMAL
BH BB BLOOD TYPE BARCODE: 7300
BH BB DISPENSE STATUS: NORMAL
BH BB PRODUCT CODE: NORMAL
BH BB UNIT NUMBER: NORMAL
BUN SERPL-MCNC: 23 MG/DL (ref 8–23)
BUN/CREAT SERPL: 18.1 (ref 7–25)
CALCIUM SPEC-SCNC: 8.6 MG/DL (ref 8.6–10.5)
CHLORIDE SERPL-SCNC: 106 MMOL/L (ref 98–107)
CO2 SERPL-SCNC: 24 MMOL/L (ref 22–29)
CREAT SERPL-MCNC: 1.27 MG/DL (ref 0.76–1.27)
CROSSMATCH INTERPRETATION: NORMAL
DEPRECATED RDW RBC AUTO: 55.3 FL (ref 37–54)
EGFRCR SERPLBLD CKD-EPI 2021: 59.7 ML/MIN/1.73
ERYTHROCYTE [DISTWIDTH] IN BLOOD BY AUTOMATED COUNT: 16.2 % (ref 12.3–15.4)
GLUCOSE BLDC GLUCOMTR-MCNC: 128 MG/DL (ref 70–130)
GLUCOSE BLDC GLUCOMTR-MCNC: 134 MG/DL (ref 70–130)
GLUCOSE BLDC GLUCOMTR-MCNC: 196 MG/DL (ref 70–130)
GLUCOSE BLDC GLUCOMTR-MCNC: 256 MG/DL (ref 70–130)
GLUCOSE SERPL-MCNC: 150 MG/DL (ref 65–99)
HCT VFR BLD AUTO: 27 % (ref 37.5–51)
HGB BLD-MCNC: 8.8 G/DL (ref 13–17.7)
MCH RBC QN AUTO: 30.3 PG (ref 26.6–33)
MCHC RBC AUTO-ENTMCNC: 32.6 G/DL (ref 31.5–35.7)
MCV RBC AUTO: 93.1 FL (ref 79–97)
PLATELET # BLD AUTO: 211 10*3/MM3 (ref 140–450)
PMV BLD AUTO: 9.2 FL (ref 6–12)
POTASSIUM SERPL-SCNC: 4.2 MMOL/L (ref 3.5–5.2)
QT INTERVAL: 414 MS
QTC INTERVAL: 468 MS
RBC # BLD AUTO: 2.9 10*6/MM3 (ref 4.14–5.8)
SODIUM SERPL-SCNC: 139 MMOL/L (ref 136–145)
UNIT  ABO: NORMAL
UNIT  RH: NORMAL
WBC NRBC COR # BLD AUTO: 9.61 10*3/MM3 (ref 3.4–10.8)

## 2025-02-19 PROCEDURE — 99024 POSTOP FOLLOW-UP VISIT: CPT | Performed by: THORACIC SURGERY (CARDIOTHORACIC VASCULAR SURGERY)

## 2025-02-19 PROCEDURE — 80048 BASIC METABOLIC PNL TOTAL CA: CPT | Performed by: NURSE PRACTITIONER

## 2025-02-19 PROCEDURE — 25010000002 FUROSEMIDE PER 20 MG: Performed by: NURSE PRACTITIONER

## 2025-02-19 PROCEDURE — 85027 COMPLETE CBC AUTOMATED: CPT | Performed by: NURSE PRACTITIONER

## 2025-02-19 PROCEDURE — 82948 REAGENT STRIP/BLOOD GLUCOSE: CPT

## 2025-02-19 PROCEDURE — 25010000002 ENOXAPARIN PER 10 MG: Performed by: THORACIC SURGERY (CARDIOTHORACIC VASCULAR SURGERY)

## 2025-02-19 PROCEDURE — 63710000001 INSULIN LISPRO (HUMAN) PER 5 UNITS: Performed by: THORACIC SURGERY (CARDIOTHORACIC VASCULAR SURGERY)

## 2025-02-19 PROCEDURE — 97116 GAIT TRAINING THERAPY: CPT

## 2025-02-19 RX ORDER — LISINOPRIL 2.5 MG/1
2.5 TABLET ORAL
Status: DISCONTINUED | OUTPATIENT
Start: 2025-02-19 | End: 2025-02-20 | Stop reason: HOSPADM

## 2025-02-19 RX ADMIN — DOCUSATE SODIUM 100 MG: 100 CAPSULE, LIQUID FILLED ORAL at 08:56

## 2025-02-19 RX ADMIN — ENOXAPARIN SODIUM 40 MG: 100 INJECTION SUBCUTANEOUS at 08:55

## 2025-02-19 RX ADMIN — ACETAMINOPHEN 1000 MG: 500 TABLET, FILM COATED ORAL at 19:43

## 2025-02-19 RX ADMIN — POTASSIUM CHLORIDE 20 MEQ: 750 CAPSULE, EXTENDED RELEASE ORAL at 08:56

## 2025-02-19 RX ADMIN — PANTOPRAZOLE SODIUM 40 MG: 40 TABLET, DELAYED RELEASE ORAL at 07:21

## 2025-02-19 RX ADMIN — CLOPIDOGREL BISULFATE 75 MG: 75 TABLET ORAL at 17:06

## 2025-02-19 RX ADMIN — LISINOPRIL 2.5 MG: 2.5 TABLET ORAL at 11:39

## 2025-02-19 RX ADMIN — Medication 1 APPLICATION: at 08:56

## 2025-02-19 RX ADMIN — INSULIN LISPRO 2 UNITS: 100 INJECTION, SOLUTION INTRAVENOUS; SUBCUTANEOUS at 19:50

## 2025-02-19 RX ADMIN — AMIODARONE HYDROCHLORIDE 400 MG: 200 TABLET ORAL at 17:06

## 2025-02-19 RX ADMIN — METOPROLOL TARTRATE 25 MG: 25 TABLET, FILM COATED ORAL at 08:56

## 2025-02-19 RX ADMIN — ACETAMINOPHEN 1000 MG: 500 TABLET, FILM COATED ORAL at 03:50

## 2025-02-19 RX ADMIN — SODIUM BICARBONATE 650 MG TABLET 650 MG: at 19:44

## 2025-02-19 RX ADMIN — AMIODARONE HYDROCHLORIDE 400 MG: 200 TABLET ORAL at 08:56

## 2025-02-19 RX ADMIN — PREGABALIN 25 MG: 25 CAPSULE ORAL at 07:21

## 2025-02-19 RX ADMIN — METOPROLOL TARTRATE 25 MG: 25 TABLET, FILM COATED ORAL at 19:43

## 2025-02-19 RX ADMIN — LIDOCAINE 2 PATCH: 4 PATCH TOPICAL at 08:57

## 2025-02-19 RX ADMIN — INSULIN LISPRO 6 UNITS: 100 INJECTION, SOLUTION INTRAVENOUS; SUBCUTANEOUS at 11:40

## 2025-02-19 RX ADMIN — FUROSEMIDE 20 MG: 10 INJECTION, SOLUTION INTRAMUSCULAR; INTRAVENOUS at 08:56

## 2025-02-19 RX ADMIN — METFORMIN HYDROCHLORIDE 500 MG: 500 TABLET ORAL at 11:39

## 2025-02-19 RX ADMIN — SODIUM BICARBONATE 650 MG TABLET 650 MG: at 08:56

## 2025-02-19 RX ADMIN — POLYETHYLENE GLYCOL 3350 17 G: 17 POWDER, FOR SOLUTION ORAL at 08:57

## 2025-02-19 RX ADMIN — PREGABALIN 25 MG: 25 CAPSULE ORAL at 17:06

## 2025-02-19 RX ADMIN — ACETAMINOPHEN 1000 MG: 500 TABLET, FILM COATED ORAL at 14:52

## 2025-02-19 RX ADMIN — DOCUSATE SODIUM 100 MG: 100 CAPSULE, LIQUID FILLED ORAL at 19:43

## 2025-02-19 RX ADMIN — ATORVASTATIN CALCIUM 20 MG: 10 TABLET, FILM COATED ORAL at 19:43

## 2025-02-19 RX ADMIN — Medication 1 APPLICATION: at 19:43

## 2025-02-19 RX ADMIN — ASPIRIN 81 MG: 81 TABLET, COATED ORAL at 08:56

## 2025-02-19 RX ADMIN — SODIUM BICARBONATE 650 MG TABLET 650 MG: at 14:52

## 2025-02-19 NOTE — PLAN OF CARE
Goal Outcome Evaluation:  Plan of Care Reviewed With: patient        Progress: improving  Outcome Evaluation: PT tx complete. Pt with no complaints. Cardiac exs x15. Stood SBA. Amb 400' with SBA. No difficulty amb. SBA for tolieting. Will cont POC.

## 2025-02-19 NOTE — PLAN OF CARE
Goal Outcome Evaluation:  Plan of Care Reviewed With: patient        Progress: improving  Outcome Evaluation: Patient doing well. BM today. Wires d/c'd. Started on Lisinopril 2.5 mg. Ambulating well. No c/o pain.

## 2025-02-19 NOTE — THERAPY TREATMENT NOTE
Acute Care - Physical Therapy Treatment Note   [place]     Patient Name: Attila Viramontes  : 1950  MRN: [id number]  Today's Date: 2025      Visit Dx:     ICD-10-CM ICD-9-CM   1. Impaired mobility [Z74.09]  Z74.09 799.89   2. Coronary artery disease of native artery of native heart with stable angina pectoris  I25.118 414.01     413.9     Patient Active Problem List   Diagnosis    Rectal cancer    Current every day smoker    Impaired gait and mobility    Hypertension    2nd degree AV block    Tobacco abuse    Normocytic anemia    Chemotherapy induced neutropenia    History of urinary diversion procedure    Cancer related pain    Bilateral carotid artery stenosis    Carotid occlusion, left    Type 2 diabetes mellitus with kidney complication, without long-term current use of insulin    Iron deficiency anemia    Ileostomy in place    Colostomy in place    Function kidney decreased    Stage 3a chronic kidney disease    Preop testing    Symptomatic stenosis of right carotid artery    Stenosis of right carotid artery    Overweight (BMI 25.0-29.9)    Abnormal stress test    HENDERSON (dyspnea on exertion)    Coronary artery disease of native artery of native heart with stable angina pectoris    Hydronephrosis    CAD (coronary artery disease)    Colon cancer     Past Medical History:   Diagnosis Date    Arthritis     AV block     recent hx of long pauses, pt supposed to have pacemaker placed ASAP     Bright red rectal bleeding 2020    Carotid arterial disease     Chronic kidney disease [id number]    Coronary artery disease     History of chemotherapy     History of radiation therapy 2020    Hyperlipidemia     Hypotension due to hypovolemia 2020    Irregular heart rate     Pancreatitis     Rectal cancer 2020    Stroke     had mini stroke while port was placed     Type 2 diabetes mellitus without complication, without long-term current use of insulin 2020     Past Surgical History:    Procedure Laterality Date    ARM LESION/CYST EXCISION Left 06/15/2021    Procedure: WIDE EXCISION MELANOMA LEFT SHOULDER WITH SPLIT THICKNESS SKIN GRAFT AND SENTINEL LYMPH NODE BIOPSY;  Surgeon: Vielka Weller MD;  Location: [facility] OR;  Service: General;  Laterality: Left;    CARDIAC CATHETERIZATION N/A 06/12/2024    Procedure: Coronary angiography;  Surgeon: Ga Hameed MD;  Location: [facility] CATH INVASIVE LOCATION;  Service: Cardiology;  Laterality: N/A;    CARDIAC CATHETERIZATION N/A 06/12/2024    Procedure: Percutaneous Coronary Intervention;  Surgeon: Ga Hameed MD;  Location: [facility] CATH INVASIVE LOCATION;  Service: Cardiology;  Laterality: N/A;    CARDIAC CATHETERIZATION N/A 1/15/2025    Procedure: Coronary angiography;  Surgeon: Ga Hameed MD;  Location:  [facility] CATH INVASIVE LOCATION;  Service: Cardiology;  Laterality: N/A;    CARDIAC CATHETERIZATION N/A 1/15/2025    Procedure: Percutaneous Coronary Intervention;  Surgeon: Ga Hameed MD;  Location: [facility] CATH INVASIVE LOCATION;  Service: Cardiology;  Laterality: N/A;    CAROTID ARTERY ANGIOPLASTY Right 10/2023    stent placed in artery    CAROTID STENT      CATARACT EXTRACTION W/ INTRAOCULAR LENS  IMPLANT, BILATERAL      COLONOSCOPY N/A 04/02/2020    Procedure: COLONOSCOPY WITH ANESTHESIA;  Surgeon: Yanick Flowers DO;  Location: [facility] ENDOSCOPY;  Service: Gastroenterology;  Laterality: N/A;  pre: abnormal CT scan  post: rectal mass  PCP unknown    CORONARY ARTERY BYPASS GRAFT N/A 2/15/2025    Procedure: CORONARY ARTERY BYPASS GRAFTING x3, LEFT INTERNAL MAMMARY ARTERY GRAFT, LEFT LEG OPEN VEIN HARVEST, TRANSESOPHAGEAL ECHOCARDIOGRAM, XP STERNAL PLATING;  Surgeon: Joe Back MD;  Location: [facility] OR;  Service: Cardiothoracic;  Laterality: N/A;    CYSTECTOMY      ILEOSTOMY      LYMPH NODE BIOPSY      MOUTH SURGERY      PROSTATECTOMY      RECTAL MASS EXCISION  12/21/2020    SENTINEL NODE BIOPSY Left 06/15/2021     Procedure: SENTINEL LYMPH NODE BIOPSY;  Surgeon: Vielka Weller MD;  Location:  [facility] OR;  Service: General;  Laterality: Left;    VENOUS ACCESS DEVICE (PORT) INSERTION N/A 04/14/2020    Procedure: INSERTION VENOUS ACCESS DEVICE;  Surgeon: Vielka Weller MD;  Location:  [facility] OR;  Service: General;  Laterality: N/A;    VENOUS ACCESS DEVICE (PORT) INSERTION  07/2022    VENOUS ACCESS DEVICE (PORT) REMOVAL N/A 05/28/2021    Procedure: PORT REMOVAL, EXCISION OF NEOPLASM UNCERTAIN BEHAVIOR LEFT SHOULDER;  Surgeon: Vielka Weller MD;  Location:  [facility] OR;  Service: General;  Laterality: N/A;     PT Assessment (Last 12 Hours)       PT Evaluation and Treatment       Row Name 02/19/25 0910          Physical Therapy Time and Intention    Subjective Information no complaints  -TB     Document Type therapy note (daily note)  -TB     Mode of Treatment physical therapy  -TB       Row Name 02/19/25 0910          General Information    Existing Precautions/Restrictions fall;sternal  -TB     Limitations/Impairments hearing  -TB       Row Name 02/19/25 0910          Pain    Pretreatment Pain Rating 0/10 - no pain  -TB     Posttreatment Pain Rating 0/10 - no pain  -TB       Row Name 02/19/25 0910          Bed Mobility    Comment, (Bed Mobility) Chair  -TB       Row Name 02/19/25 0910          Transfers    Transfers sit-stand transfer;stand-sit transfer;toilet transfer  -TB       Row Name 02/19/25 0910          Sit-Stand Transfer    Sit-Stand [place] (Transfers) supervision  -TB       Row Name 02/19/25 0910          Stand-Sit Transfer    Stand-Sit [place] (Transfers) supervision  -TB       Row Name 02/19/25 0910          Toilet Transfer    Type (Toilet Transfer) sit-stand;stand-sit  -TB     [place] Level (Toilet Transfer) supervision  -TB       Row Name 02/19/25 0910          Gait/Stairs (Locomotion)    [place] Level (Gait) standby assist  -TB     Distance in Feet (Gait) 400  -TB     Deviations/Abnormal  Patterns (Gait) lucia decreased  -TB       Row Name 02/19/25 0910          Motor Skills    Comments, Therapeutic Exercise Cardiac x15  -TB       Row Name             Wound 02/15/25 0830 midline sternal    Wound - Properties Group Placement Date: 02/15/25  -SF Placement Time: 0830 -SF Orientation: midline  -SF Location: sternal  -SF Primary Wound Type: Incision  -SF    Retired Wound - Properties Group Placement Date: 02/15/25  -SF Placement Time: 0830 -SF Orientation: midline  -SF Location: sternal  -SF Primary Wound Type: Incision  -SF    Retired Wound - Properties Group Placement Date: 02/15/25  -SF Placement Time: 0830 -SF Orientation: midline  -SF Location: sternal  -SF Primary Wound Type: Incision  -SF    Retired Wound - Properties Group Date first assessed: 02/15/25  -SF Time first assessed: 0830  -SF Location: sternal  -SF Primary Wound Type: Incision  -SF      Row Name             Wound 02/15/25 0835 Left lower leg    Wound - Properties Group Placement Date: 02/15/25  -SF Placement Time: 0835  -SF Side: Left  -SF Orientation: lower  -SF Location: leg  -SF Primary Wound Type: Incision  -SF    Retired Wound - Properties Group Placement Date: 02/15/25  -SF Placement Time: 0835  -SF Side: Left  -SF Orientation: lower  -SF Location: leg  -SF Primary Wound Type: Incision  -SF    Retired Wound - Properties Group Placement Date: 02/15/25  -SF Placement Time: 0835  -SF Side: Left  -SF Orientation: lower  -SF Location: leg  -SF Primary Wound Type: Incision  -SF    Retired Wound - Properties Group Date first assessed: 02/15/25  -SF Time first assessed: 0835  -SF Side: Left  -SF Location: leg  -SF Primary Wound Type: Incision  -SF      Row Name 02/19/25 0910          Plan of Care Review    Plan of Care Reviewed With patient  -TB     Progress improving  -TB     Outcome Evaluation PT tx complete. Pt with no complaints. Cardiac exs x15. Stood SBA. Amb 400' with SBA. Pt went up/down 9 steps. No difficulty amb. SBA for  tim. Will cont POC.  -TB       Row Name 02/19/25 0910          Positioning and Restraints    Pre-Treatment Position sitting in chair/recliner  -TB     Post Treatment Position chair  -TB     In Chair notified nsg;reclined;sitting;call light within reach;encouraged to call for assist;legs elevated  -TB               User Key  (r) = Recorded By, (t) = Taken By, (c) = Cosigned By      Initials Name Provider Type    TB Federico Kenyon PTA Physical Therapist Assistant    Vanita Miranda, RN Registered Nurse                    Physical Therapy Education       Title: PT OT SLP Therapies (In Progress)       Topic: Physical Therapy (In Progress)       Point: Mobility training (Done)       Learning Progress Summary            Patient Acceptance, E,D, DU,VU by ALEX at 2/18/2025 1130    Comment: bed transfers    Acceptance, E, VU by DONNA at 2/16/2025 0948    Comment: role of PT, sternal precuations, pursed lip breathing, goal setting                      Point: Home exercise program (Done)       Learning Progress Summary            Patient Acceptance, E, VU by DONNA at 2/16/2025 0948    Comment: role of PT, sternal precuations, pursed lip breathing, goal setting                      Point: Body mechanics (Done)       Learning Progress Summary            Patient Acceptance, E, VU by DONNA at 2/16/2025 0948    Comment: role of PT, sternal precuations, pursed lip breathing, goal setting                      Point: Precautions (In Progress)       Learning Progress Summary            Patient Acceptance, E,D, NR by ALEX at 2/17/2025 0811    Comment: sternal precautions    Acceptance, E, VU by DONNA at 2/16/2025 0948    Comment: role of PT, sternal precuations, pursed lip breathing, goal setting                                      User Key       Initials Effective Dates Name Provider Type Discipline    ALEX 02/03/23 -  Yoselin Ennis PTA Physical Therapist Assistant PT    DONNA 08/15/24 -  Zachary Walton PT DPT Physical Therapist PT                   PT Recommendation and Plan     Plan of Care Reviewed With: patient  Progress: improving  Outcome Evaluation: PT tx complete. Pt with no complaints. Cardiac exs x15. Stood SBA. Amb 400' with SBA. Pt went up/down 9 steps. No difficulty amb. SBA for tolieting. Will cont POC.   Outcome Measures       Row Name 02/18/25 1100 02/17/25 0800          How much help from another person do you currently need...    Turning from your back to your side while in flat bed without using bedrails? 4  -ALEX 3  -ALEX     Moving from lying on back to sitting on the side of a flat bed without bedrails? 4  -ALEX 3  -ALEX     Moving to and from a bed to a chair (including a wheelchair)? 4  -ALEX 4  -ALEX     Standing up from a chair using your arms (e.g., wheelchair, bedside chair)? 4  no arms  -ALEX 4  no arms  -ALEX     Climbing 3-5 steps with a railing? 3  -ALEX 3  -ALEX     To walk in hospital room? 4  -ALEX 3  -ALEX     AM-PAC 6 Clicks Score (PT) 23  -ALEX 20  -ALEX               User Key  (r) = Recorded By, (t) = Taken By, (c) = Cosigned By      Initials Name Provider Type    ALEX Yoselin Ennis PTA Physical Therapist Assistant                     Time Calculation:    PT Charges       Row Name 02/19/25 1308             Time Calculation    Start Time 0910  -TB      Stop Time 0936  -TB      Time Calculation (min) 26 min  -TB      PT Received On 02/19/25  -TB         Time Calculation- PT    Total Timed Code Minutes- PT 23 minute(s)  -TB                User Key  (r) = Recorded By, (t) = Taken By, (c) = Cosigned By      Initials Name Provider Type    TB Federico Kenyon PTA Physical Therapist Assistant                  Therapy Charges for Today       Code Description Service Date Service Provider Modifiers Qty    [id number] HC GAIT TRAINING EA 15 MIN 2/19/2025 Federico Kenyon PTA GP 2            PT G-Codes  Outcome Measure Options: AM-PAC 6 Clicks Basic Mobility (PT)  AM-PAC 6 Clicks Score (PT): 23    Federico Kenyon  PTA  2/19/2025

## 2025-02-19 NOTE — THERAPY TREATMENT NOTE
Acute Care - Physical Therapy Treatment Note   [place]     Patient Name: Attila Viramontes  : 1950  MRN: [id number]  Today's Date: 2025      Visit Dx:     ICD-10-CM ICD-9-CM   1. Impaired mobility [Z74.09]  Z74.09 799.89   2. Coronary artery disease of native artery of native heart with stable angina pectoris  I25.118 414.01     413.9     Patient Active Problem List   Diagnosis    Rectal cancer    Current every day smoker    Impaired gait and mobility    Hypertension    2nd degree AV block    Tobacco abuse    Normocytic anemia    Chemotherapy induced neutropenia    History of urinary diversion procedure    Cancer related pain    Bilateral carotid artery stenosis    Carotid occlusion, left    Type 2 diabetes mellitus with kidney complication, without long-term current use of insulin    Iron deficiency anemia    Ileostomy in place    Colostomy in place    Function kidney decreased    Stage 3a chronic kidney disease    Preop testing    Symptomatic stenosis of right carotid artery    Stenosis of right carotid artery    Overweight (BMI 25.0-29.9)    Abnormal stress test    HENDERSON (dyspnea on exertion)    Coronary artery disease of native artery of native heart with stable angina pectoris    Hydronephrosis    CAD (coronary artery disease)    Colon cancer     Past Medical History:   Diagnosis Date    Arthritis     AV block     recent hx of long pauses, pt supposed to have pacemaker placed ASAP     Bright red rectal bleeding 2020    Carotid arterial disease     Chronic kidney disease [id number]    Coronary artery disease     History of chemotherapy     History of radiation therapy 2020    Hyperlipidemia     Hypotension due to hypovolemia 2020    Irregular heart rate     Pancreatitis     Rectal cancer 2020    Stroke     had mini stroke while port was placed     Type 2 diabetes mellitus without complication, without long-term current use of insulin 2020     Past Surgical History:    Procedure Laterality Date    ARM LESION/CYST EXCISION Left 06/15/2021    Procedure: WIDE EXCISION MELANOMA LEFT SHOULDER WITH SPLIT THICKNESS SKIN GRAFT AND SENTINEL LYMPH NODE BIOPSY;  Surgeon: Vielka Weller MD;  Location: [facility] OR;  Service: General;  Laterality: Left;    CARDIAC CATHETERIZATION N/A 06/12/2024    Procedure: Coronary angiography;  Surgeon: Ga Hameed MD;  Location: [facility] CATH INVASIVE LOCATION;  Service: Cardiology;  Laterality: N/A;    CARDIAC CATHETERIZATION N/A 06/12/2024    Procedure: Percutaneous Coronary Intervention;  Surgeon: Ga Hameed MD;  Location: [facility] CATH INVASIVE LOCATION;  Service: Cardiology;  Laterality: N/A;    CARDIAC CATHETERIZATION N/A 1/15/2025    Procedure: Coronary angiography;  Surgeon: Ga Hameed MD;  Location:  [facility] CATH INVASIVE LOCATION;  Service: Cardiology;  Laterality: N/A;    CARDIAC CATHETERIZATION N/A 1/15/2025    Procedure: Percutaneous Coronary Intervention;  Surgeon: Ga Hameed MD;  Location: [facility] CATH INVASIVE LOCATION;  Service: Cardiology;  Laterality: N/A;    CAROTID ARTERY ANGIOPLASTY Right 10/2023    stent placed in artery    CAROTID STENT      CATARACT EXTRACTION W/ INTRAOCULAR LENS  IMPLANT, BILATERAL      COLONOSCOPY N/A 04/02/2020    Procedure: COLONOSCOPY WITH ANESTHESIA;  Surgeon: Yanick Flowers DO;  Location: [facility] ENDOSCOPY;  Service: Gastroenterology;  Laterality: N/A;  pre: abnormal CT scan  post: rectal mass  PCP unknown    CORONARY ARTERY BYPASS GRAFT N/A 2/15/2025    Procedure: CORONARY ARTERY BYPASS GRAFTING x3, LEFT INTERNAL MAMMARY ARTERY GRAFT, LEFT LEG OPEN VEIN HARVEST, TRANSESOPHAGEAL ECHOCARDIOGRAM, XP STERNAL PLATING;  Surgeon: Joe Back MD;  Location: [facility] OR;  Service: Cardiothoracic;  Laterality: N/A;    CYSTECTOMY      ILEOSTOMY      LYMPH NODE BIOPSY      MOUTH SURGERY      PROSTATECTOMY      RECTAL MASS EXCISION  12/21/2020    SENTINEL NODE BIOPSY Left 06/15/2021     Procedure: SENTINEL LYMPH NODE BIOPSY;  Surgeon: Vielka Weller MD;  Location:  [facility] OR;  Service: General;  Laterality: Left;    VENOUS ACCESS DEVICE (PORT) INSERTION N/A 04/14/2020    Procedure: INSERTION VENOUS ACCESS DEVICE;  Surgeon: Vielka Weller MD;  Location:  [facility] OR;  Service: General;  Laterality: N/A;    VENOUS ACCESS DEVICE (PORT) INSERTION  07/2022    VENOUS ACCESS DEVICE (PORT) REMOVAL N/A 05/28/2021    Procedure: PORT REMOVAL, EXCISION OF NEOPLASM UNCERTAIN BEHAVIOR LEFT SHOULDER;  Surgeon: Vielka Weller MD;  Location:  [facility] OR;  Service: General;  Laterality: N/A;     PT Assessment (Last 12 Hours)       PT Evaluation and Treatment       Row Name 02/19/25 0910          Physical Therapy Time and Intention    Subjective Information no complaints  -TB     Document Type therapy note (daily note)  -TB     Mode of Treatment physical therapy  -TB       Row Name 02/19/25 0910          General Information    Existing Precautions/Restrictions fall;sternal  -TB     Limitations/Impairments hearing  -TB       Row Name 02/19/25 0910          Pain    Pretreatment Pain Rating 0/10 - no pain  -TB     Posttreatment Pain Rating 0/10 - no pain  -TB       Row Name 02/19/25 0910          Bed Mobility    Comment, (Bed Mobility) Chair  -TB       Row Name 02/19/25 0910          Transfers    Transfers sit-stand transfer;stand-sit transfer;toilet transfer  -TB       Row Name 02/19/25 0910          Sit-Stand Transfer    Sit-Stand [place] (Transfers) supervision  -TB       Row Name 02/19/25 0910          Stand-Sit Transfer    Stand-Sit [place] (Transfers) supervision  -TB       Row Name 02/19/25 0910          Toilet Transfer    Type (Toilet Transfer) sit-stand;stand-sit  -TB     [place] Level (Toilet Transfer) supervision  -TB       Row Name 02/19/25 0910          Gait/Stairs (Locomotion)    [place] Level (Gait) standby assist  -TB     Distance in Feet (Gait) 400  -TB     Deviations/Abnormal  Patterns (Gait) lucia decreased  -TB       Row Name 02/19/25 0910          Motor Skills    Comments, Therapeutic Exercise Cardiac x15  -TB       Row Name             Wound 02/15/25 0830 midline sternal    Wound - Properties Group Placement Date: 02/15/25  -SF Placement Time: 0830 -SF Orientation: midline  -SF Location: sternal  -SF Primary Wound Type: Incision  -SF    Retired Wound - Properties Group Placement Date: 02/15/25  -SF Placement Time: 0830 -SF Orientation: midline  -SF Location: sternal  -SF Primary Wound Type: Incision  -SF    Retired Wound - Properties Group Placement Date: 02/15/25  -SF Placement Time: 0830 -SF Orientation: midline  -SF Location: sternal  -SF Primary Wound Type: Incision  -SF    Retired Wound - Properties Group Date first assessed: 02/15/25  -SF Time first assessed: 0830 -SF Location: sternal  -SF Primary Wound Type: Incision  -SF      Row Name             Wound 02/15/25 0835 Left lower leg    Wound - Properties Group Placement Date: 02/15/25  -SF Placement Time: 0835  -SF Side: Left  -SF Orientation: lower  -SF Location: leg  -SF Primary Wound Type: Incision  -SF    Retired Wound - Properties Group Placement Date: 02/15/25  -SF Placement Time: 0835  -SF Side: Left  -SF Orientation: lower  -SF Location: leg  -SF Primary Wound Type: Incision  -SF    Retired Wound - Properties Group Placement Date: 02/15/25  -SF Placement Time: 0835  -SF Side: Left  -SF Orientation: lower  -SF Location: leg  -SF Primary Wound Type: Incision  -SF    Retired Wound - Properties Group Date first assessed: 02/15/25  -SF Time first assessed: 0835  -SF Side: Left  -SF Location: leg  -SF Primary Wound Type: Incision  -SF      Row Name 02/19/25 0910          Plan of Care Review    Plan of Care Reviewed With patient  -TB     Progress improving  -TB     Outcome Evaluation PT tx complete. Pt with no complaints. Cardiac exs x15. Stood SBA. Amb 400' with SBA. No difficulty amb. SBA for tolieting. Will cont POC.   -TB       Row Name 02/19/25 0910          Positioning and Restraints    Pre-Treatment Position sitting in chair/recliner  -TB     Post Treatment Position chair  -TB     In Chair notified nsg;reclined;sitting;call light within reach;encouraged to call for assist;legs elevated  -TB               User Key  (r) = Recorded By, (t) = Taken By, (c) = Cosigned By      Initials Name Provider Type    TB Federico Kenyon, PTA Physical Therapist Assistant    Vanita Miranda, RN Registered Nurse                    Physical Therapy Education       Title: PT OT SLP Therapies (In Progress)       Topic: Physical Therapy (In Progress)       Point: Mobility training (Done)       Learning Progress Summary            Patient Acceptance, E,D, DU,VU by ALEX at 2/18/2025 1130    Comment: bed transfers    Acceptance, E, VU by DONNA at 2/16/2025 0948    Comment: role of PT, sternal precuations, pursed lip breathing, goal setting                      Point: Home exercise program (Done)       Learning Progress Summary            Patient Acceptance, E, VU by DONNA at 2/16/2025 0948    Comment: role of PT, sternal precuations, pursed lip breathing, goal setting                      Point: Body mechanics (Done)       Learning Progress Summary            Patient Acceptance, E, VU by DONNA at 2/16/2025 0948    Comment: role of PT, sternal precuations, pursed lip breathing, goal setting                      Point: Precautions (In Progress)       Learning Progress Summary            Patient Acceptance, E,D, NR by ALEX at 2/17/2025 0811    Comment: sternal precautions    Acceptance, E, VU by DONNA at 2/16/2025 0948    Comment: role of PT, sternal precuations, pursed lip breathing, goal setting                                      User Key       Initials Effective Dates Name Provider Type Discipline     02/03/23 -  Yoselin Ennis PTA Physical Therapist Assistant PT    DONNA 08/15/24 -  Zachary Walton, OMAYRA DPT Physical Therapist PT                  PT  Recommendation and Plan     Plan of Care Reviewed With: patient  Progress: improving  Outcome Evaluation: PT tx complete. Pt with no complaints. Cardiac exs x15. Stood SBA. Amb 400' with SBA. No difficulty amb. SBA for tolieting. Will cont POC.   Outcome Measures       Row Name 02/18/25 1100 02/17/25 0800          How much help from another person do you currently need...    Turning from your back to your side while in flat bed without using bedrails? 4  -ALEX 3  -ALEX     Moving from lying on back to sitting on the side of a flat bed without bedrails? 4  -ALEX 3  -ALEX     Moving to and from a bed to a chair (including a wheelchair)? 4  -ALEX 4  -ALEX     Standing up from a chair using your arms (e.g., wheelchair, bedside chair)? 4  no arms  -ALEX 4  no arms  -ALEX     Climbing 3-5 steps with a railing? 3  -ALEX 3  -ALEX     To walk in hospital room? 4  -ALEX 3  -ALEX     AM-PAC 6 Clicks Score (PT) 23  -ALEX 20  -ALEX               User Key  (r) = Recorded By, (t) = Taken By, (c) = Cosigned By      Initials Name Provider Type    Yoselin Robles, PTA Physical Therapist Assistant                     Time Calculation:    PT Charges       Row Name 02/19/25 1308             Time Calculation    Start Time 0910  -TB      Stop Time 0936  -TB      Time Calculation (min) 26 min  -TB      PT Received On 02/19/25  -TB         Time Calculation- PT    Total Timed Code Minutes- PT 23 minute(s)  -TB                User Key  (r) = Recorded By, (t) = Taken By, (c) = Cosigned By      Initials Name Provider Type    Federico Johnson PTA Physical Therapist Assistant                  Therapy Charges for Today       Code Description Service Date Service Provider Modifiers Qty    [id number] HC GAIT TRAINING EA 15 MIN 2/19/2025 Federico Kenyon PTA GP 2            PT G-Codes  Outcome Measure Options: AM-PAC 6 Clicks Basic Mobility (PT)  AM-PAC 6 Clicks Score (PT): 23    Federico Kenyon PTA  2/19/2025

## 2025-02-19 NOTE — THERAPY TREATMENT NOTE
Acute Care - Physical Therapy Treatment Note   [place]     Patient Name: Attila Viramontes  : 1950  MRN: [id number]  Today's Date: 2025      Visit Dx:     ICD-10-CM ICD-9-CM   1. Impaired mobility [Z74.09]  Z74.09 799.89   2. Coronary artery disease of native artery of native heart with stable angina pectoris  I25.118 414.01     413.9     Patient Active Problem List   Diagnosis    Rectal cancer    Current every day smoker    Impaired gait and mobility    Hypertension    2nd degree AV block    Tobacco abuse    Normocytic anemia    Chemotherapy induced neutropenia    History of urinary diversion procedure    Cancer related pain    Bilateral carotid artery stenosis    Carotid occlusion, left    Type 2 diabetes mellitus with kidney complication, without long-term current use of insulin    Iron deficiency anemia    Ileostomy in place    Colostomy in place    Function kidney decreased    Stage 3a chronic kidney disease    Preop testing    Symptomatic stenosis of right carotid artery    Stenosis of right carotid artery    Overweight (BMI 25.0-29.9)    Abnormal stress test    HENDERSON (dyspnea on exertion)    Coronary artery disease of native artery of native heart with stable angina pectoris    Hydronephrosis    CAD (coronary artery disease)    Colon cancer     Past Medical History:   Diagnosis Date    Arthritis     AV block     recent hx of long pauses, pt supposed to have pacemaker placed ASAP     Bright red rectal bleeding 2020    Carotid arterial disease     Chronic kidney disease [id number]    Coronary artery disease     History of chemotherapy     History of radiation therapy 2020    Hyperlipidemia     Hypotension due to hypovolemia 2020    Irregular heart rate     Pancreatitis     Rectal cancer 2020    Stroke     had mini stroke while port was placed     Type 2 diabetes mellitus without complication, without long-term current use of insulin 2020     Past Surgical History:    Procedure Laterality Date    ARM LESION/CYST EXCISION Left 06/15/2021    Procedure: WIDE EXCISION MELANOMA LEFT SHOULDER WITH SPLIT THICKNESS SKIN GRAFT AND SENTINEL LYMPH NODE BIOPSY;  Surgeon: Vielka Weller MD;  Location: [facility] OR;  Service: General;  Laterality: Left;    CARDIAC CATHETERIZATION N/A 06/12/2024    Procedure: Coronary angiography;  Surgeon: Ga Hameed MD;  Location: [facility] CATH INVASIVE LOCATION;  Service: Cardiology;  Laterality: N/A;    CARDIAC CATHETERIZATION N/A 06/12/2024    Procedure: Percutaneous Coronary Intervention;  Surgeon: Ga Hameed MD;  Location: [facility] CATH INVASIVE LOCATION;  Service: Cardiology;  Laterality: N/A;    CARDIAC CATHETERIZATION N/A 1/15/2025    Procedure: Coronary angiography;  Surgeon: Ga Hameed MD;  Location:  [facility] CATH INVASIVE LOCATION;  Service: Cardiology;  Laterality: N/A;    CARDIAC CATHETERIZATION N/A 1/15/2025    Procedure: Percutaneous Coronary Intervention;  Surgeon: Ga Hameed MD;  Location: [facility] CATH INVASIVE LOCATION;  Service: Cardiology;  Laterality: N/A;    CAROTID ARTERY ANGIOPLASTY Right 10/2023    stent placed in artery    CAROTID STENT      CATARACT EXTRACTION W/ INTRAOCULAR LENS  IMPLANT, BILATERAL      COLONOSCOPY N/A 04/02/2020    Procedure: COLONOSCOPY WITH ANESTHESIA;  Surgeon: Yanick Flowers DO;  Location: [facility] ENDOSCOPY;  Service: Gastroenterology;  Laterality: N/A;  pre: abnormal CT scan  post: rectal mass  PCP unknown    CORONARY ARTERY BYPASS GRAFT N/A 2/15/2025    Procedure: CORONARY ARTERY BYPASS GRAFTING x3, LEFT INTERNAL MAMMARY ARTERY GRAFT, LEFT LEG OPEN VEIN HARVEST, TRANSESOPHAGEAL ECHOCARDIOGRAM, XP STERNAL PLATING;  Surgeon: Joe Back MD;  Location: [facility] OR;  Service: Cardiothoracic;  Laterality: N/A;    CYSTECTOMY      ILEOSTOMY      LYMPH NODE BIOPSY      MOUTH SURGERY      PROSTATECTOMY      RECTAL MASS EXCISION  12/21/2020    SENTINEL NODE BIOPSY Left 06/15/2021     Procedure: SENTINEL LYMPH NODE BIOPSY;  Surgeon: Vielka Weller MD;  Location:  [facility] OR;  Service: General;  Laterality: Left;    VENOUS ACCESS DEVICE (PORT) INSERTION N/A 04/14/2020    Procedure: INSERTION VENOUS ACCESS DEVICE;  Surgeon: Vielka Weller MD;  Location:  [facility] OR;  Service: General;  Laterality: N/A;    VENOUS ACCESS DEVICE (PORT) INSERTION  07/2022    VENOUS ACCESS DEVICE (PORT) REMOVAL N/A 05/28/2021    Procedure: PORT REMOVAL, EXCISION OF NEOPLASM UNCERTAIN BEHAVIOR LEFT SHOULDER;  Surgeon: Vielka Weller MD;  Location:  [facility] OR;  Service: General;  Laterality: N/A;     PT Assessment (Last 12 Hours)       PT Evaluation and Treatment       Row Name 02/19/25 1529 02/19/25 0910       Physical Therapy Time and Intention    Subjective Information no complaints  -TB no complaints  -TB    Document Type therapy note (daily note)  -TB therapy note (daily note)  -TB    Mode of Treatment physical therapy  -TB physical therapy  -TB      Row Name 02/19/25 1529 02/19/25 0910       General Information    Existing Precautions/Restrictions fall;sternal  -TB fall;sternal  -TB    Limitations/Impairments hearing  -TB hearing  -TB      Row Name 02/19/25 1529 02/19/25 0910       Pain    Pretreatment Pain Rating 0/10 - no pain  -TB 0/10 - no pain  -TB    Posttreatment Pain Rating 0/10 - no pain  -TB 0/10 - no pain  -TB      Row Name 02/19/25 1529 02/19/25 0910       Bed Mobility    Comment, (Bed Mobility) Chair  -TB Chair  -TB      Row Name 02/19/25 1529 02/19/25 0910       Transfers    Transfers sit-stand transfer;stand-sit transfer  -TB sit-stand transfer;stand-sit transfer;toilet transfer  -TB      Row Name 02/19/25 1529 02/19/25 0910       Sit-Stand Transfer    Sit-Stand [place] (Transfers) supervision  -TB supervision  -TB      Row Name 02/19/25 1529 02/19/25 0910       Stand-Sit Transfer    Stand-Sit [place] (Transfers) supervision  -TB supervision  -TB      Row Name 02/19/25 0910          Toilet  Transfer    Type (Toilet Transfer) sit-stand;stand-sit  -TB     [place] Level (Toilet Transfer) supervision  -TB       Row Name 02/19/25 1529 02/19/25 0910       Gait/Stairs (Locomotion)    [place] Level (Gait) standby assist  -TB standby assist  -TB    Distance in Feet (Gait) 400  -  -TB    Deviations/Abnormal Patterns (Gait) lucia decreased  -TB lucia decreased  -TB      Row Name 02/19/25 1529 02/19/25 0910       Motor Skills    Comments, Therapeutic Exercise cardiac x15  -TB Cardiac x15  -TB      Row Name             Wound 02/15/25 0830 midline sternal    Wound - Properties Group Placement Date: 02/15/25  -SF Placement Time: 0830  -SF Orientation: midline  -SF Location: sternal  -SF Primary Wound Type: Incision  -SF    Retired Wound - Properties Group Placement Date: 02/15/25  -SF Placement Time: 0830  -SF Orientation: midline  -SF Location: sternal  -SF Primary Wound Type: Incision  -SF    Retired Wound - Properties Group Placement Date: 02/15/25  -SF Placement Time: 0830  -SF Orientation: midline  -SF Location: sternal  -SF Primary Wound Type: Incision  -SF    Retired Wound - Properties Group Date first assessed: 02/15/25  -SF Time first assessed: 0830  -SF Location: sternal  -SF Primary Wound Type: Incision  -SF      Row Name             Wound 02/15/25 0835 Left lower leg    Wound - Properties Group Placement Date: 02/15/25  -SF Placement Time: 0835  -SF Side: Left  -SF Orientation: lower  -SF Location: leg  -SF Primary Wound Type: Incision  -SF    Retired Wound - Properties Group Placement Date: 02/15/25  -SF Placement Time: 0835  -SF Side: Left  -SF Orientation: lower  -SF Location: leg  -SF Primary Wound Type: Incision  -SF    Retired Wound - Properties Group Placement Date: 02/15/25  -SF Placement Time: 0835  -SF Side: Left  -SF Orientation: lower  -SF Location: leg  -SF Primary Wound Type: Incision  -SF    Retired Wound - Properties Group Date first assessed: 02/15/25  -SF Time first  assessed: 0835  -SF Side: Left  -SF Location: leg  -SF Primary Wound Type: Incision  -SF      Row Name 02/19/25 0910          Plan of Care Review    Plan of Care Reviewed With patient  -TB     Progress improving  -TB     Outcome Evaluation PT tx complete. Pt with no complaints. Cardiac exs x15. Stood SBA. Amb 400' with SBA. Pt went up/down 9 steps. No difficulty amb. SBA for tolieting. Will cont POC.  -TB       Row Name 02/19/25 1529 02/19/25 0910       Positioning and Restraints    Pre-Treatment Position sitting in chair/recliner  -TB sitting in chair/recliner  -TB    Post Treatment Position chair  -TB chair  -TB    In Chair notified nsg;reclined;sitting;call light within reach;encouraged to call for assist;legs elevated  -TB notified nsg;reclined;sitting;call light within reach;encouraged to call for assist;legs elevated  -TB              User Key  (r) = Recorded By, (t) = Taken By, (c) = Cosigned By      Initials Name Provider Type    TB Federico Kenyon, PTA Physical Therapist Assistant    SF Vanita Purvis, RN Registered Nurse                    Physical Therapy Education       Title: PT OT SLP Therapies (In Progress)       Topic: Physical Therapy (In Progress)       Point: Mobility training (Done)       Learning Progress Summary            Patient Acceptance, E,D, DU,VU by ALEX at 2/18/2025 1130    Comment: bed transfers    Acceptance, E, VU by DONNA at 2/16/2025 0948    Comment: role of PT, sternal precuations, pursed lip breathing, goal setting                      Point: Home exercise program (Done)       Learning Progress Summary            Patient Acceptance, E, VU by DONNA at 2/16/2025 0948    Comment: role of PT, sternal precuations, pursed lip breathing, goal setting                      Point: Body mechanics (Done)       Learning Progress Summary            Patient Acceptance, E, VU by DONNA at 2/16/2025 0948    Comment: role of PT, sternal precuations, pursed lip breathing, goal setting                       Point: Precautions (In Progress)       Learning Progress Summary            Patient Acceptance, E,D, NR by ALEX at 2/17/2025 0811    Comment: sternal precautions    Acceptance, E, VU by DONNA at 2/16/2025 0948    Comment: role of PT, sternal precuations, pursed lip breathing, goal setting                                      User Key       Initials Effective Dates Name Provider Type Discipline     02/03/23 -  Yoselin Ennis PTA Physical Therapist Assistant PT    DONNA 08/15/24 -  Zachary Walton, PT DPT Physical Therapist PT                  PT Recommendation and Plan     Plan of Care Reviewed With: patient  Progress: improving  Outcome Evaluation: PT tx complete. Pt with no complaints. Cardiac exs x15. Stood SBA. Amb 400' with SBA. Pt went up/down 9 steps. No difficulty amb. SBA for tolieting. Will cont POC.   Outcome Measures       Row Name 02/18/25 1100 02/17/25 0800          How much help from another person do you currently need...    Turning from your back to your side while in flat bed without using bedrails? 4  -ALEX 3  -ALEX     Moving from lying on back to sitting on the side of a flat bed without bedrails? 4  -ALEX 3  -ALEX     Moving to and from a bed to a chair (including a wheelchair)? 4  -ALEX 4  -ALEX     Standing up from a chair using your arms (e.g., wheelchair, bedside chair)? 4  no arms  -ALEX 4  no arms  -ALEX     Climbing 3-5 steps with a railing? 3  -ALEX 3  -ALEX     To walk in hospital room? 4  -ALEX 3  -ALEX     AM-PAC 6 Clicks Score (PT) 23  -ALEX 20  -ALEX               User Key  (r) = Recorded By, (t) = Taken By, (c) = Cosigned By      Initials Name Provider Type    ALEX Yoselin Ennis PTA Physical Therapist Assistant                     Time Calculation:    PT Charges       Row Name 02/19/25 1543 02/19/25 1308          Time Calculation    Start Time 1529  -TB 0910  -TB     Stop Time 1543  -TB 0936  -TB     Time Calculation (min) 14 min  -TB 26 min  -TB     PT Received On 02/19/25  -TB 02/19/25  -TB        Time  Calculation- PT    Total Timed Code Minutes- PT 14 minute(s)  -TB 23 minute(s)  -TB               User Key  (r) = Recorded By, (t) = Taken By, (c) = Cosigned By      Initials Name Provider Type    TB Federico Kenyon, DUC Physical Therapist Assistant                  Therapy Charges for Today       Code Description Service Date Service Provider Modifiers Qty    74128924595 HC GAIT TRAINING EA 15 MIN 2/19/2025 Federico Kenyon, DUC GP 2    74667279095 HC GAIT TRAINING EA 15 MIN 2/19/2025 Federico Kenyon, DUC GP 1            PT G-Codes  Outcome Measure Options: AM-PAC 6 Clicks Basic Mobility (PT)  AM-PAC 6 Clicks Score (PT): 23    Federico Kenyon PTA  2/19/2025

## 2025-02-19 NOTE — PLAN OF CARE
Goal Outcome Evaluation:  Plan of Care Reviewed With: patient        Progress: improving  Outcome Evaluation: PT tx complete. Pt with no complaints. Cardiac exs x15. Stood SBA. Amb 400' with SBA. Pt went up/down 9 steps. No difficulty amb. SBA for tolieting. Will cont POC.

## 2025-02-19 NOTE — PROGRESS NOTES
"Patient name: Attila Viramontes  Patient : 1950  VISIT # [id number]  MR #[id number]    Procedures Performed:  1. Coronary artery bypass grafting-3 vessel (left internal mammary artery/left anterior descending, reverse saphenous vein graft/obtuse marginal, and reverse saphenous vein graft/posterior descending artery)  2. Left open vein harvest  3. Sternalock XP sternal plating   Procedure Date:2/15/2025  POD:4 Days Post-Op    Subjective   The patient is sitting up in chair.  He is on room air.  He reports pain 0/10 while sitting.  He is currently NSR-86.  He has has x2 BMs.  He is ambulating with therapy.  He is down 4 pounds from yesterday, but remains 4 pounds above baseline weight.     Telemetry: Sinus, 80s  IV gtts: None         Objective     Visit Vitals  /67 (BP Location: Left arm, Patient Position: Lying)   Pulse 91   Temp 98.2 °F (36.8 °C) (Oral)   Resp 18   Ht 179.1 cm (70.51\")   Wt 80.3 kg (177 lb)   SpO2 96%   BMI 25.03 kg/m²   Baseline weight: 173 pounds    Intake/Output Summary (Last 24 hours) at 2025 1151  Last data filed at 2025 0900  Gross per 24 hour   Intake 810 ml   Output 4600 ml   Net -3790 ml   All drains have been removed.     LABS:    CBC  WBC   Date/Time Value Ref Range Status   2025 9.61 3.40 - 10.80 10*3/mm3 Final     RBC   Date/Time Value Ref Range Status   2025 035 2.90 (L) 4.14 - 5.80 10*6/mm3 Final     Hemoglobin   Date/Time Value Ref Range Status   2025 8.8 (L) 13.0 - 17.7 g/dL Final     Hematocrit   Date/Time Value Ref Range Status   2025 27.0 (L) 37.5 - 51.0 % Final     MCH   Date/Time Value Ref Range Status   2025 30.3 26.6 - 33.0 pg Final     MCHC   Date/Time Value Ref Range Status   2025 32.6 31.5 - 35.7 g/dL Final     RDW   Date/Time Value Ref Range Status   2025 16.2 (H) 12.3 - 15.4 % Final     RDW-SD   Date/Time Value Ref Range Status   2025 55.3 (H) 37.0 - 54.0 fl " Final     MPV   Date/Time Value Ref Range Status   02/19/2025 0351 9.2 6.0 - 12.0 fL Final     Platelets   Date/Time Value Ref Range Status   02/19/2025 0351 211 140 - 450 10*3/mm3 Final     IMAGES:       Imaging Results (Last 24 Hours)       Procedure Component Value Units Date/Time    XR Abdomen KUB [[id number]] Collected: 02/18/25 1915     Updated: 02/18/25 1920    Narrative:      XR ABDOMEN KUB- 2/18/2025 5:58 PM     HISTORY: colostomy hx colon ca; Z74.09-Other reduced mobility;  I25.118-Atherosclerotic heart disease of native coronary artery with  other forms of angina pectoris       TECHNIQUE:Single AP view of the abdomen.     COMPARISON: None     FINDINGS:     Left upper quadrant colostomy.     Gaseous distention of bowel loops adjacent to the colostomy.     No visualized free air.     Moderate stool burden.     No acute osseous abnormality.       Impression:         Left upper quadrant colostomy with mild gaseous distention of adjacent  bowel loops.     Moderate stool burden.                 This report was signed and finalized on 2/18/2025 7:17 PM by Zachary Barney.               Physical Exam:  General: Alert, oriented. No apparent distress.   Cardiovascular: Regular rate and rhythm without murmur, rubs, or gallops.    Pulmonary: Clear to auscultation bilaterally without wheezing, rubs, or rales.  Chest: Sternotomy incision clean, dry, and intact. Sternum stable. No clicks. MCT to -20 cm H2O suction. No air leak. Fluid is serosanguineous    Abdomen: Soft, nondistended, and nontender.  Colostomy/urostomy  Extremities: Warm, moves all extremities. Peripheral edema. Saphenectomy site clean, dry, and intact.   Neurologic:  Grossly intact with no focal deficits.         Impression:  Coronary artery disease of native artery of native heart with stable angina pectoris   Current every day smoker  Normocytic anemia  Bilateral carotid artery stenosis  Type 2 diabetes mellitus  Stage IIIa chronic kidney  disease  Colon cancer    Plan:  Add lisinopril 2.5mg po daily  Resume metformin 500mg po daily  DC pacing wires   Continue current diuresis therapy  Continue bowel regimen  Continue sodium bicarbonate supplementation  Encourage pulmonary toilet and ambulation  Routine postcardiac surgery regimen  Discussed with patient and nursing  Will likely DC home tomorrow  Follow up with DELANEY Joaquin in 1 week      DELANEY Stevens  02/19/25  11:51 CST

## 2025-02-19 NOTE — PLAN OF CARE
Goal Outcome Evaluation:  Plan of Care Reviewed With: patient        Progress: improving   VSS, getting up to 1500 on IS,  n/o pain, incisions c/d/I, He has one large BM this a.m., Sinus 75-87 with a BBB

## 2025-02-20 ENCOUNTER — READMISSION MANAGEMENT (OUTPATIENT)
Dept: CALL CENTER | Facility: HOSPITAL | Age: 75
End: 2025-02-20
Payer: MEDICARE

## 2025-02-20 ENCOUNTER — APPOINTMENT (OUTPATIENT)
Dept: GENERAL RADIOLOGY | Facility: HOSPITAL | Age: 75
End: 2025-02-20
Payer: MEDICARE

## 2025-02-20 VITALS
OXYGEN SATURATION: 96 % | DIASTOLIC BLOOD PRESSURE: 63 MMHG | RESPIRATION RATE: 16 BRPM | BODY MASS INDEX: 24.56 KG/M2 | SYSTOLIC BLOOD PRESSURE: 100 MMHG | TEMPERATURE: 97.9 F | HEIGHT: 71 IN | WEIGHT: 175.4 LBS | HEART RATE: 78 BPM

## 2025-02-20 LAB
ANION GAP SERPL CALCULATED.3IONS-SCNC: 12 MMOL/L (ref 5–15)
BUN SERPL-MCNC: 26 MG/DL (ref 8–23)
BUN/CREAT SERPL: 17.1 (ref 7–25)
CALCIUM SPEC-SCNC: 8.2 MG/DL (ref 8.6–10.5)
CHLORIDE SERPL-SCNC: 103 MMOL/L (ref 98–107)
CO2 SERPL-SCNC: 25 MMOL/L (ref 22–29)
CREAT SERPL-MCNC: 1.52 MG/DL (ref 0.76–1.27)
DEPRECATED RDW RBC AUTO: 52.2 FL (ref 37–54)
EGFRCR SERPLBLD CKD-EPI 2021: 48.1 ML/MIN/1.73
ERYTHROCYTE [DISTWIDTH] IN BLOOD BY AUTOMATED COUNT: 15.9 % (ref 12.3–15.4)
GLUCOSE BLDC GLUCOMTR-MCNC: 153 MG/DL (ref 70–130)
GLUCOSE BLDC GLUCOMTR-MCNC: 255 MG/DL (ref 70–130)
GLUCOSE SERPL-MCNC: 146 MG/DL (ref 65–99)
HCT VFR BLD AUTO: 25.9 % (ref 37.5–51)
HGB BLD-MCNC: 8.4 G/DL (ref 13–17.7)
MCH RBC QN AUTO: 29.5 PG (ref 26.6–33)
MCHC RBC AUTO-ENTMCNC: 32.4 G/DL (ref 31.5–35.7)
MCV RBC AUTO: 90.9 FL (ref 79–97)
PLATELET # BLD AUTO: 260 10*3/MM3 (ref 140–450)
PMV BLD AUTO: 9.3 FL (ref 6–12)
POTASSIUM SERPL-SCNC: 4.2 MMOL/L (ref 3.5–5.2)
QT INTERVAL: 402 MS
QT INTERVAL: 410 MS
QT INTERVAL: 452 MS
QTC INTERVAL: 466 MS
QTC INTERVAL: 472 MS
QTC INTERVAL: 476 MS
RBC # BLD AUTO: 2.85 10*6/MM3 (ref 4.14–5.8)
SODIUM SERPL-SCNC: 140 MMOL/L (ref 136–145)
WBC NRBC COR # BLD AUTO: 9.39 10*3/MM3 (ref 3.4–10.8)

## 2025-02-20 PROCEDURE — 71046 X-RAY EXAM CHEST 2 VIEWS: CPT

## 2025-02-20 PROCEDURE — 97116 GAIT TRAINING THERAPY: CPT

## 2025-02-20 PROCEDURE — 25010000002 ENOXAPARIN PER 10 MG: Performed by: THORACIC SURGERY (CARDIOTHORACIC VASCULAR SURGERY)

## 2025-02-20 PROCEDURE — 85027 COMPLETE CBC AUTOMATED: CPT | Performed by: NURSE PRACTITIONER

## 2025-02-20 PROCEDURE — 80048 BASIC METABOLIC PNL TOTAL CA: CPT | Performed by: NURSE PRACTITIONER

## 2025-02-20 PROCEDURE — 82948 REAGENT STRIP/BLOOD GLUCOSE: CPT

## 2025-02-20 PROCEDURE — 99024 POSTOP FOLLOW-UP VISIT: CPT | Performed by: THORACIC SURGERY (CARDIOTHORACIC VASCULAR SURGERY)

## 2025-02-20 PROCEDURE — 63710000001 INSULIN LISPRO (HUMAN) PER 5 UNITS: Performed by: THORACIC SURGERY (CARDIOTHORACIC VASCULAR SURGERY)

## 2025-02-20 RX ORDER — CLOPIDOGREL BISULFATE 75 MG/1
75 TABLET ORAL DAILY
Qty: 30 TABLET | Refills: 2 | Status: SHIPPED | OUTPATIENT
Start: 2025-02-20

## 2025-02-20 RX ORDER — METOPROLOL TARTRATE 25 MG/1
25 TABLET, FILM COATED ORAL EVERY 12 HOURS SCHEDULED
Qty: 60 TABLET | Refills: 2 | Status: SHIPPED | OUTPATIENT
Start: 2025-02-20

## 2025-02-20 RX ORDER — FUROSEMIDE 40 MG/1
40 TABLET ORAL DAILY
Qty: 5 TABLET | Refills: 0 | Status: SHIPPED | OUTPATIENT
Start: 2025-02-20 | End: 2025-02-25

## 2025-02-20 RX ORDER — LISINOPRIL 2.5 MG/1
2.5 TABLET ORAL
Qty: 30 TABLET | Refills: 2 | Status: SHIPPED | OUTPATIENT
Start: 2025-02-21

## 2025-02-20 RX ORDER — PANTOPRAZOLE SODIUM 40 MG/1
40 TABLET, DELAYED RELEASE ORAL EVERY MORNING
Qty: 30 TABLET | Refills: 0 | Status: SHIPPED | OUTPATIENT
Start: 2025-02-20

## 2025-02-20 RX ORDER — POTASSIUM CHLORIDE 750 MG/1
20 CAPSULE, EXTENDED RELEASE ORAL DAILY
Qty: 10 CAPSULE | Refills: 0 | Status: SHIPPED | OUTPATIENT
Start: 2025-02-20 | End: 2025-02-25

## 2025-02-20 RX ADMIN — PANTOPRAZOLE SODIUM 40 MG: 40 TABLET, DELAYED RELEASE ORAL at 05:38

## 2025-02-20 RX ADMIN — INSULIN LISPRO 6 UNITS: 100 INJECTION, SOLUTION INTRAVENOUS; SUBCUTANEOUS at 11:54

## 2025-02-20 RX ADMIN — LIDOCAINE 2 PATCH: 4 PATCH TOPICAL at 08:27

## 2025-02-20 RX ADMIN — POTASSIUM CHLORIDE 20 MEQ: 750 CAPSULE, EXTENDED RELEASE ORAL at 08:19

## 2025-02-20 RX ADMIN — METFORMIN HYDROCHLORIDE 500 MG: 500 TABLET ORAL at 08:19

## 2025-02-20 RX ADMIN — ASPIRIN 81 MG: 81 TABLET, COATED ORAL at 08:19

## 2025-02-20 RX ADMIN — INSULIN LISPRO 2 UNITS: 100 INJECTION, SOLUTION INTRAVENOUS; SUBCUTANEOUS at 08:19

## 2025-02-20 RX ADMIN — POLYETHYLENE GLYCOL 3350 17 G: 17 POWDER, FOR SOLUTION ORAL at 08:18

## 2025-02-20 RX ADMIN — ACETAMINOPHEN 1000 MG: 500 TABLET, FILM COATED ORAL at 08:19

## 2025-02-20 RX ADMIN — LISINOPRIL 2.5 MG: 2.5 TABLET ORAL at 08:19

## 2025-02-20 RX ADMIN — Medication 1 APPLICATION: at 08:19

## 2025-02-20 RX ADMIN — METOPROLOL TARTRATE 25 MG: 25 TABLET, FILM COATED ORAL at 08:20

## 2025-02-20 RX ADMIN — DOCUSATE SODIUM 100 MG: 100 CAPSULE, LIQUID FILLED ORAL at 08:20

## 2025-02-20 RX ADMIN — ACETAMINOPHEN 1000 MG: 500 TABLET, FILM COATED ORAL at 13:55

## 2025-02-20 RX ADMIN — AMIODARONE HYDROCHLORIDE 400 MG: 200 TABLET ORAL at 08:18

## 2025-02-20 RX ADMIN — SODIUM BICARBONATE 650 MG TABLET 650 MG: at 08:19

## 2025-02-20 RX ADMIN — ENOXAPARIN SODIUM 40 MG: 100 INJECTION SUBCUTANEOUS at 08:18

## 2025-02-20 RX ADMIN — PREGABALIN 25 MG: 25 CAPSULE ORAL at 05:38

## 2025-02-20 NOTE — PLAN OF CARE
Goal Outcome Evaluation:  Plan of Care Reviewed With: patient        Progress: improving  Outcome Evaluation: PT tx complete. Pt with no complaints, Cardiac exs x15. Stood ind. Amb 450' eith SBA. Pt feels good about going home.

## 2025-02-20 NOTE — PROGRESS NOTES
"Patient name: Attila Viramotnes  Patient : 1950  VISIT # [id number]  MR #[id number]    Procedures Performed:  1. Coronary artery bypass grafting-3 vessel (left internal mammary artery/left anterior descending, reverse saphenous vein graft/obtuse marginal, and reverse saphenous vein graft/posterior descending artery)  2. Left open vein harvest  3. Sternalock XP sternal plating   Procedure Date:2/15/2025  POD:5 Days Post-Op    Subjective   The patient is up on chair.  He is on room air.  Pain is 0/10.  He is currently NSR-85.  He has had BM.  He is ambulating with therapy.  He is 2 pounds above baseline weight.     Telemetry: Sinus, 80s  IV gtts: None         Objective     Visit Vitals  /51 (BP Location: Left arm, Patient Position: Lying)   Pulse 85   Temp 98.2 °F (36.8 °C) (Oral)   Resp 20   Ht 179.1 cm (70.51\")   Wt 79.6 kg (175 lb 6.4 oz)   SpO2 95%   BMI 24.80 kg/m²   Baseline weight: 173 pounds    Intake/Output Summary (Last 24 hours) at 2025 0845  Last data filed at 2025 0740  Gross per 24 hour   Intake 360 ml   Output 3900 ml   Net -3540 ml       LABS:    CBC  WBC   Date/Time Value Ref Range Status   2025 0342 9.39 3.40 - 10.80 10*3/mm3 Final     RBC   Date/Time Value Ref Range Status   2025 0342 2.85 (L) 4.14 - 5.80 10*6/mm3 Final     Hemoglobin   Date/Time Value Ref Range Status   2025 0342 8.4 (L) 13.0 - 17.7 g/dL Final     Hematocrit   Date/Time Value Ref Range Status   2025 0342 25.9 (L) 37.5 - 51.0 % Final     MCH   Date/Time Value Ref Range Status   2025 0342 29.5 26.6 - 33.0 pg Final     MCHC   Date/Time Value Ref Range Status   2025 0342 32.4 31.5 - 35.7 g/dL Final     RDW   Date/Time Value Ref Range Status   2025 15.9 (H) 12.3 - 15.4 % Final     RDW-SD   Date/Time Value Ref Range Status   2025 52.2 37.0 - 54.0 fl Final     MPV   Date/Time Value Ref Range Status   2025 9.3 6.0 - 12.0 fL Final     Platelets "   Date/Time Value Ref Range Status   02/20/2025 0342 260 140 - 450 10*3/mm3 Final     IMAGES:       Imaging Results (Last 24 Hours)       Procedure Component Value Units Date/Time    XR Chest PA & Lateral [[id number]] Collected: 02/20/25 0706     Updated: 02/20/25 0713    Narrative:      EXAMINATION: XR CHEST PA AND LATERAL-     2/20/2025 4:23 AM     HISTORY: Post CABG; Z74.09-Other reduced mobility;  I25.118-Atherosclerotic heart disease of native coronary artery with  other forms of angina pectoris     2 views of the chest are compared with the previous study dated  2/18/2025.     The lungs are well-expanded.     There is removal of the left-sided chest tube and mediastinal drain in  the interval.     There is resolution of the left basal pleural effusion since the  previous study.     Moderate improvement in the left lower lung consolidation.     No pulmonary congestion or pneumothorax.     There is mild cardiomegaly. Atheromatous change of thoracic aorta are  noted. There is evidence of prior cardiac surgery.     No acute bony abnormality.     Right internal jugular approach Mediport system is in place. No change.       Impression:      1. Improved left lower lobar consolidation. Resolution of the left basal  pleural effusion since the previous study.  2. Removal of the left chest tube and mediastinal drain since the  previous study. Right internal jugular approach Mediport system is in  place.                    This report was signed and finalized on 2/20/2025 7:10 AM by Dr. Greg Lewis MD.               Physical Exam:  General: Alert, oriented. No apparent distress.   Cardiovascular: Regular rate and rhythm without murmur, rubs, or gallops.    Pulmonary: Clear to auscultation bilaterally without wheezing, rubs, or rales.  Chest: Sternotomy incision clean, dry, and intact. Sternum stable. No clicks.     Abdomen: Soft, nondistended, and nontender.  Colostomy/urostomy  Extremities: Warm, moves all  extremities. Peripheral edema. Saphenectomy site clean, dry, and intact.   Neurologic:  Grossly intact with no focal deficits.           Impression:  Coronary artery disease of native artery of native heart with stable angina pectoris   Current every day smoker  Normocytic anemia  Bilateral carotid artery stenosis  Type 2 diabetes mellitus  Stage IIIa chronic kidney disease  Colon cancer    Plan:  Shower today   Encourage pulmonary toilet and ambulation  Routine postcardiac surgery regimen  Prepare for DC this afternoon  Follow up with DELANEY Joaquin in 1 week     DELANEY Stevens  02/20/25  08:45 CST

## 2025-02-20 NOTE — THERAPY TREATMENT NOTE
Acute Care - Physical Therapy Treatment Note  [facility]     Patient Name: Attila Viramontes  : 1950  MRN: [id number]  Today's Date: 2025      Visit Dx:     ICD-10-CM ICD-9-CM   1. Impaired mobility [Z74.09]  Z74.09 799.89   2. Coronary artery disease of native artery of native heart with stable angina pectoris  I25.118 414.01     413.9   3. Stage 3a chronic kidney disease  N18.31 585.3   4. Function kidney decreased  N28.9 593.9     Patient Active Problem List   Diagnosis    Rectal cancer    Current every day smoker    Impaired gait and mobility    Hypertension    2nd degree AV block    Tobacco abuse    Normocytic anemia    Chemotherapy induced neutropenia    History of urinary diversion procedure    Cancer related pain    Bilateral carotid artery stenosis    Carotid occlusion, left    Type 2 diabetes mellitus with kidney complication, without long-term current use of insulin    Iron deficiency anemia    Ileostomy in place    Colostomy in place    Function kidney decreased    Stage 3a chronic kidney disease    Preop testing    Symptomatic stenosis of right carotid artery    Stenosis of right carotid artery    Overweight (BMI 25.0-29.9)    Abnormal stress test    HENDERSON (dyspnea on exertion)    Coronary artery disease of native artery of native heart with stable angina pectoris    Hydronephrosis    CAD (coronary artery disease)    Colon cancer     Past Medical History:   Diagnosis Date    Arthritis     AV block     recent hx of long pauses, pt supposed to have pacemaker placed ASAP     Bright red rectal bleeding 2020    Carotid arterial disease     Chronic kidney disease [id number]    Coronary artery disease     History of chemotherapy     History of radiation therapy 2020    Hyperlipidemia     Hypotension due to hypovolemia 2020    Irregular heart rate     Pancreatitis     Rectal cancer 2020    Stroke     had mini stroke while port was placed     Type 2 diabetes mellitus without  complication, without long-term current use of insulin 11/20/2020     Past Surgical History:   Procedure Laterality Date    ARM LESION/CYST EXCISION Left 06/15/2021    Procedure: WIDE EXCISION MELANOMA LEFT SHOULDER WITH SPLIT THICKNESS SKIN GRAFT AND SENTINEL LYMPH NODE BIOPSY;  Surgeon: Vielka Weller MD;  Location: [facility] OR;  Service: General;  Laterality: Left;    CARDIAC CATHETERIZATION N/A 06/12/2024    Procedure: Coronary angiography;  Surgeon: Ga Hameed MD;  Location: [facility] CATH INVASIVE LOCATION;  Service: Cardiology;  Laterality: N/A;    CARDIAC CATHETERIZATION N/A 06/12/2024    Procedure: Percutaneous Coronary Intervention;  Surgeon: Ga Hameed MD;  Location: [facility] CATH INVASIVE LOCATION;  Service: Cardiology;  Laterality: N/A;    CARDIAC CATHETERIZATION N/A 1/15/2025    Procedure: Coronary angiography;  Surgeon: Ga Hameed MD;  Location: [facility] CATH INVASIVE LOCATION;  Service: Cardiology;  Laterality: N/A;    CARDIAC CATHETERIZATION N/A 1/15/2025    Procedure: Percutaneous Coronary Intervention;  Surgeon: Ga Hameed MD;  Location: [facility] CATH INVASIVE LOCATION;  Service: Cardiology;  Laterality: N/A;    CAROTID ARTERY ANGIOPLASTY Right 10/2023    stent placed in artery    CAROTID STENT      CATARACT EXTRACTION W/ INTRAOCULAR LENS  IMPLANT, BILATERAL      COLONOSCOPY N/A 04/02/2020    Procedure: COLONOSCOPY WITH ANESTHESIA;  Surgeon: Yanick Flowers DO;  Location: [facility] ENDOSCOPY;  Service: Gastroenterology;  Laterality: N/A;  pre: abnormal CT scan  post: rectal mass  PCP unknown    CORONARY ARTERY BYPASS GRAFT N/A 2/15/2025    Procedure: CORONARY ARTERY BYPASS GRAFTING x3, LEFT INTERNAL MAMMARY ARTERY GRAFT, LEFT LEG OPEN VEIN HARVEST, TRANSESOPHAGEAL ECHOCARDIOGRAM, XP STERNAL PLATING;  Surgeon: Joe Back MD;  Location: [facility] OR;  Service: Cardiothoracic;  Laterality: N/A;    CYSTECTOMY      ILEOSTOMY      LYMPH NODE BIOPSY      MOUTH SURGERY       PROSTATECTOMY      RECTAL MASS EXCISION  12/21/2020    SENTINEL NODE BIOPSY Left 06/15/2021    Procedure: SENTINEL LYMPH NODE BIOPSY;  Surgeon: Vielka Weller MD;  Location:  [facility] OR;  Service: General;  Laterality: Left;    VENOUS ACCESS DEVICE (PORT) INSERTION N/A 04/14/2020    Procedure: INSERTION VENOUS ACCESS DEVICE;  Surgeon: Vielka Weller MD;  Location:  [facility] OR;  Service: General;  Laterality: N/A;    VENOUS ACCESS DEVICE (PORT) INSERTION  07/2022    VENOUS ACCESS DEVICE (PORT) REMOVAL N/A 05/28/2021    Procedure: PORT REMOVAL, EXCISION OF NEOPLASM UNCERTAIN BEHAVIOR LEFT SHOULDER;  Surgeon: Vielka Weller MD;  Location:  [facility] OR;  Service: General;  Laterality: N/A;     PT Assessment (Last 12 Hours)       PT Evaluation and Treatment       Row Name 02/20/25 1003          Physical Therapy Time and Intention    Subjective Information no complaints  -TB     Document Type therapy note (daily note)  -TB     Mode of Treatment physical therapy  -TB       Row Name 02/20/25 1003          General Information    Existing Precautions/Restrictions fall;sternal  -TB     Limitations/Impairments hearing  -TB       Row Name 02/20/25 1003          Pain    Pretreatment Pain Rating 0/10 - no pain  -TB     Posttreatment Pain Rating 0/10 - no pain  -TB       Row Name 02/20/25 1003          Transfers    Transfers sit-stand transfer;stand-sit transfer  -TB       Row Name 02/20/25 1003          Sit-Stand Transfer    Sit-Stand [place] (Transfers) independent  -TB       Row Name 02/20/25 1003          Stand-Sit Transfer    Stand-Sit [place] (Transfers) independent  -TB       Row Name 02/20/25 1003          Gait/Stairs (Locomotion)    [place] Level (Gait) standby assist  -TB     Distance in Feet (Gait) 450  -TB     Deviations/Abnormal Patterns (Gait) lucia decreased  -TB       Row Name 02/20/25 1003          Motor Skills    Comments, Therapeutic Exercise cardaic x15  -TB       Row Name             Wound  02/15/25 0830 midline sternal    Wound - Properties Group Placement Date: 02/15/25  -SF Placement Time: 0830  -SF Orientation: midline  -SF Location: sternal  -SF Primary Wound Type: Incision  -SF    Retired Wound - Properties Group Placement Date: 02/15/25  -SF Placement Time: 0830  -SF Orientation: midline  -SF Location: sternal  -SF Primary Wound Type: Incision  -SF    Retired Wound - Properties Group Placement Date: 02/15/25  -SF Placement Time: 0830  -SF Orientation: midline  -SF Location: sternal  -SF Primary Wound Type: Incision  -SF    Retired Wound - Properties Group Date first assessed: 02/15/25  -SF Time first assessed: 0830  -SF Location: sternal  -SF Primary Wound Type: Incision  -SF      Row Name             Wound 02/15/25 0835 Left lower leg    Wound - Properties Group Placement Date: 02/15/25  -SF Placement Time: 0835  -SF Side: Left  -SF Orientation: lower  -SF Location: leg  -SF Primary Wound Type: Incision  -SF    Retired Wound - Properties Group Placement Date: 02/15/25  -SF Placement Time: 0835  -SF Side: Left  -SF Orientation: lower  -SF Location: leg  -SF Primary Wound Type: Incision  -SF    Retired Wound - Properties Group Placement Date: 02/15/25  -SF Placement Time: 0835  -SF Side: Left  -SF Orientation: lower  -SF Location: leg  -SF Primary Wound Type: Incision  -SF    Retired Wound - Properties Group Date first assessed: 02/15/25  -SF Time first assessed: 0835  -SF Side: Left  -SF Location: leg  -SF Primary Wound Type: Incision  -SF      Row Name 02/20/25 1003          Plan of Care Review    Plan of Care Reviewed With patient  -TB     Progress improving  -TB     Outcome Evaluation PT tx complete. Pt with no complaints, Cardiac exs x15. Stood ind. Amb 450' eith SBA. Pt feels good about going home.  -TB       Row Name 02/20/25 1003          Positioning and Restraints    Pre-Treatment Position sitting in chair/recliner  -TB     Post Treatment Position chair  -TB     In Chair sitting;call  light within reach;encouraged to call for assist;with family/caregiver  -TB               User Key  (r) = Recorded By, (t) = Taken By, (c) = Cosigned By      Initials Name Provider Type    TB Federico Kenyon PTA Physical Therapist Assistant    Vanita Miranda, RN Registered Nurse                    Physical Therapy Education       Title: PT OT SLP Therapies (In Progress)       Topic: Physical Therapy (In Progress)       Point: Mobility training (Done)       Learning Progress Summary            Patient Acceptance, E,D, DU,VU by ALEX at 2/18/2025 1130    Comment: bed transfers    Acceptance, E, VU by DONNA at 2/16/2025 0948    Comment: role of PT, sternal precuations, pursed lip breathing, goal setting                      Point: Home exercise program (Done)       Learning Progress Summary            Patient Acceptance, E, VU by  at 2/16/2025 0948    Comment: role of PT, sternal precuations, pursed lip breathing, goal setting                      Point: Body mechanics (Done)       Learning Progress Summary            Patient Acceptance, E, VU by  at 2/16/2025 0948    Comment: role of PT, sternal precuations, pursed lip breathing, goal setting                      Point: Precautions (In Progress)       Learning Progress Summary            Patient Acceptance, E,D, NR by ALEX at 2/17/2025 0811    Comment: sternal precautions    Acceptance, E, VU by DONNA at 2/16/2025 0948    Comment: role of PT, sternal precuations, pursed lip breathing, goal setting                                      User Key       Initials Effective Dates Name Provider Type Discipline     02/03/23 -  Yoselin Ennis PTA Physical Therapist Assistant PT     08/15/24 -  Zachary Walton, OMAYRA DPT Physical Therapist PT                  PT Recommendation and Plan     Plan of Care Reviewed With: patient  Progress: improving  Outcome Evaluation: PT tx complete. Pt with no complaints, Cardiac exs x15. Stood ind. Amb 450' eith SBA. Pt feels good about  going home.   Outcome Measures       Row Name 02/18/25 1100             How much help from another person do you currently need...    Turning from your back to your side while in flat bed without using bedrails? 4  -ALEX      Moving from lying on back to sitting on the side of a flat bed without bedrails? 4  -ALEX      Moving to and from a bed to a chair (including a wheelchair)? 4  -ALEX      Standing up from a chair using your arms (e.g., wheelchair, bedside chair)? 4  no arms  -ALEX      Climbing 3-5 steps with a railing? 3  -ALEX      To walk in hospital room? 4  -ALEX      AM-PAC 6 Clicks Score (PT) 23  -ALEX                User Key  (r) = Recorded By, (t) = Taken By, (c) = Cosigned By      Initials Name Provider Type    Yoselin Robles, PTA Physical Therapist Assistant                     Time Calculation:    PT Charges       Row Name 02/20/25 1336             Time Calculation    Start Time 1003  -TB      Stop Time 1026  -TB      Time Calculation (min) 23 min  -TB      PT Received On 02/20/25  -TB         Time Calculation- PT    Total Timed Code Minutes- PT 23 minute(s)  -TB                User Key  (r) = Recorded By, (t) = Taken By, (c) = Cosigned By      Initials Name Provider Type    Federico Johnson PTA Physical Therapist Assistant                  Therapy Charges for Today       Code Description Service Date Service Provider Modifiers Qty    15882633350 HC GAIT TRAINING EA 15 MIN 2/19/2025 Federico Kenyon, PTA GP 2    91311970266 HC GAIT TRAINING EA 15 MIN 2/19/2025 Federico Kenyon, PTA GP 1    01773336731 HC GAIT TRAINING EA 15 MIN 2/20/2025 Federico Kenyon, PTA GP 2            PT G-Codes  Outcome Measure Options: AM-PAC 6 Clicks Basic Mobility (PT)  AM-PAC 6 Clicks Score (PT): 18    Federico Kenyon PTA  2/20/2025

## 2025-02-20 NOTE — THERAPY DISCHARGE NOTE
Acute Care - Physical Therapy Discharge Summary  [facility]       Patient Name: Attila Viramontes  : 1950  MRN: [id number]    Today's Date: 2025                 Admit Date: 2/15/2025      PT Recommendation and Plan    Visit Dx:    ICD-10-CM ICD-9-CM   1. Impaired mobility [Z74.09]  Z74.09 799.89   2. Coronary artery disease of native artery of native heart with stable angina pectoris  I25.118 414.01     413.9   3. Stage 3a chronic kidney disease  N18.31 585.3   4. Function kidney decreased  N28.9 593.9        Outcome Measures       Row Name 25 1100             How much help from another person do you currently need...    Turning from your back to your side while in flat bed without using bedrails? 4  -ALEX      Moving from lying on back to sitting on the side of a flat bed without bedrails? 4  -ALEX      Moving to and from a bed to a chair (including a wheelchair)? 4  -ALEX      Standing up from a chair using your arms (e.g., wheelchair, bedside chair)? 4  no arms  -ALEX      Climbing 3-5 steps with a railing? 3  -ALEX      To walk in hospital room? 4  -ALEX      AM-PAC 6 Clicks Score (PT) 23  -ALEX                User Key  (r) = Recorded By, (t) = Taken By, (c) = Cosigned By      Initials Name Provider Type    ALEX Yoselin Ennis, PTA Physical Therapist Assistant                     PT Charges       Row Name 25 1336             Time Calculation    Start Time 1003  -TB      Stop Time 1026  -TB      Time Calculation (min) 23 min  -TB      PT Received On 25  -TB         Time Calculation- PT    Total Timed Code Minutes- PT 23 minute(s)  -TB                User Key  (r) = Recorded By, (t) = Taken By, (c) = Cosigned By      Initials Name Provider Type    TB Federico Kenyon, PTA Physical Therapist Assistant                     PT Rehab Goals       Row Name 25 1500             Bed Mobility Goal 1 (PT)    Activity/Assistive Device (Bed Mobility Goal 1, PT) bed mobility activities,  all;rolling to left;rolling to right;supine to sit;sit to supine  -LY      Reagan Level/Cues Needed (Bed Mobility Goal 1, PT) independent  -LY      Time Frame (Bed Mobility Goal 1, PT) long term goal (LTG);10 days  -LY      Strategies/Barriers (Bed Mobility Goal 1, PT) maintain sternal precuations  -LY      Progress/Outcomes (Bed Mobility Goal 1, PT) goal met  -LY         Transfer Goal 1 (PT)    Activity/Assistive Device (Transfer Goal 1, PT) sit-to-stand/stand-to-sit;bed-to-chair/chair-to-bed;toilet;wheelchair transfer;car transfer;other (see comments)  -LY      Reagan Level/Cues Needed (Transfer Goal 1, PT) independent  -LY      Time Frame (Transfer Goal 1, PT) long term goal (LTG);10 days  -LY      Strategies/Barriers (Transfers Goal 1, PT) maintain sternal precuations  -LY      Progress/Outcome (Transfer Goal 1, PT) goal met  -LY         Gait Training Goal 1 (PT)    Activity/Assistive Device (Gait Training Goal 1, PT) gait (walking locomotion);decrease asymmetrical patterns;decrease fall risk;diminish gait deviation;forward stepping;improve balance and speed;increase endurance/gait distance;increase energy conservation;normalize weight shifts  -LY      [place]agan Level (Gait Training Goal 1, PT) standby assist;contact guard required  -LY      Distance (Gait Training Goal 1, PT) 500' with no pain and maintain sternal precautions  -LY      Time Frame (Gait Training Goal 1, PT) long term goal (LTG);10 days  -LY      Progress/Outcome (Gait Training Goal 1, PT) goal not met  -LY         Stairs Goal 1 (PT)    Activity/Assistive Device (Stairs Goal 1, PT) stairs, all skills;ascending stairs;descending stairs;step-to-step;decrease fall risk;improve balance and speed  -LY      [place] Level/Cues Needed (Stairs Goal 1, PT) standby assist  -LY      Number of Stairs (Stairs Goal 1, PT) 8 as needed for home safety  -LY      Time Frame (Stairs Goal 1, PT) long term goal (LTG);10 days  -LY       Progress/Outcome (Stairs Goal 1, PT) goal not met  -LY                User Key  (r) = Recorded By, (t) = Taken By, (c) = Cosigned By      Initials Name Provider Type Discipline    Kya Hall, PTA Physical Therapist Assistant PT                        PT Discharge Summary  Anticipated Discharge Disposition (PT): home with assist  Reason for Discharge: Discharge from facility  Outcomes Achieved: Refer to plan of care for updates on goals achieved  Discharge Destination: Home with assist      Kya Siegel PTA   2/20/2025

## 2025-02-20 NOTE — PLAN OF CARE
Goal Outcome Evaluation:      Pt. A&Ox4. VSS. RA. No c/o pain this shift. NSR on tele  BBB. Pt. Should d/c home today. Safety maintained.

## 2025-02-20 NOTE — DISCHARGE INSTRUCTIONS
Someone from the [facility] will be contacting you after discharge to check on your recovery.

## 2025-02-20 NOTE — OUTREACH NOTE
Prep Survey      Flowsheet Row Responses   [facility] patient discharged from? [facility]   Is LACE score < 7 ? No   Eligibility [facility]   Date of Admission 02/15/25   Date of Discharge 02/20/25   Discharge diagnosis CORONARY ARTERY BYPASS GRAFTING x3, LEFT INTERNAL MAMMARY ARTERY GRAFT, LEFT LEG OPEN VEIN HARVEST, TRANSESOPHAGEAL ECHOCARDIOGRAM, XP STERNAL PLATING   Does the patient have one of the following disease processes/diagnoses(primary or secondary)? Cardiothoracic surgery   Prep survey completed? Yes            Anne CLARK - Registered Nurse

## 2025-02-21 ENCOUNTER — TRANSITIONAL CARE MANAGEMENT TELEPHONE ENCOUNTER (OUTPATIENT)
Dept: CALL CENTER | Facility: HOSPITAL | Age: 75
End: 2025-02-21
Payer: MEDICARE

## 2025-02-21 NOTE — PROGRESS NOTES
Anne Whaley, DELANEY  [facility]  [address]   [address]            [place] [address]  Phone: [phone]  Fax: [phone]          Chief Complaint  Post-op Follow-up (Pt had CORONARY ARTERY BYPASS GRAFTING x3, LEFT INTERNAL MAMMARY ARTERY GRAFT, LEFT LEG OPEN VEIN HARVEST, TRANSESOPHAGEAL ECHOCARDIOGRAM, XP STERNAL PLATING on 2/15/2025 )    Subjective     Attila Viramontes presents to [facility]ARD[facility] for appointment.    History of Present Illness  Coronary artery bypass grafting-3 vessel (left internal mammary artery/left anterior descending, reverse saphenous vein graft/obtuse marginal, and reverse saphenous vein graft/posterior descending artery), Left open vein harvest, Sternalock XP sternal plating performed on 2/15/2025 by Dr. Back.     The patient presents for HFU s/p cardiac surgery.  He is not accompanied by anyone.    He reports experiencing fatigue, but is not in any pain. He has been managing to perform light activities such as feeding his cats, although he experienced shortness of breath during this task today. He has been consuming Gatorade Zero, approximately 12 bottles per week, but has not had any today. He has completed his course of Lasix and potassium supplements. He has a scheduled follow-up with his cardiologist, Dr. Hameed. He has abstained from driving due to sternal precautions. He has observed an increase in phlegm production, which he attributes to his smoking habit. He quit smoking 10 days prior to his last visit with Dr. Back. His diet consists of smaller portions, consumed three times daily.     Objective   Past Medical History:   Diagnosis Date    Arthritis     AV block     recent hx of long pauses, pt supposed to have pacemaker placed ASAP     Bright red rectal bleeding 04/16/2020    Carotid arterial disease     Chronic kidney disease [id number]    Coronary artery disease     History of chemotherapy     History of  radiation therapy 09/13/2020    Hyperlipidemia     Hypotension due to hypovolemia 09/18/2020    Irregular heart rate     Pancreatitis     Rectal cancer 04/09/2020    Stroke     had mini stroke while port was placed     Type 2 diabetes mellitus without complication, without long-term current use of insulin 11/20/2020   ,   Past Surgical History:   Procedure Laterality Date    ARM LESION/CYST EXCISION Left 06/15/2021    Procedure: WIDE EXCISION MELANOMA LEFT SHOULDER WITH SPLIT THICKNESS SKIN GRAFT AND SENTINEL LYMPH NODE BIOPSY;  Surgeon: Vielka Weller MD;  Location: [facility] OR;  Service: General;  Laterality: Left;    CARDIAC CATHETERIZATION N/A 06/12/2024    Procedure: Coronary angiography;  Surgeon: Ga Hameed MD;  Location:  [facility] CATH INVASIVE LOCATION;  Service: Cardiology;  Laterality: N/A;    CARDIAC CATHETERIZATION N/A 06/12/2024    Procedure: Percutaneous Coronary Intervention;  Surgeon: Ga Hameed MD;  Location:  [facility] CATH INVASIVE LOC[facility];  Service: Cardiology;  Laterality: N/A;    CARDIAC CATHETERIZATION N/A 1/15/2025    Procedure: Coronary angiography;  Surgeon: Ga Hameed MD;  Location:  [facility] CATH INVASIVE [facility];  Service: Cardiology;  Laterality: N/A;    CARDIAC CATHETERIZATION N/A 1/15/2025    Procedure: Percutaneous Coronary Intervention;  Surgeon: Ga Hameed MD;  Location: [facility] CATH INVASIVE LOCATION;  Service: Cardiology;  Laterality: N/A;    CAROTID ARTERY ANGIOPLASTY Right 10/2023    stent placed in artery    CAROTID STENT      CATARACT EXTRACTION W/ INTRAOCULAR LENS  IMPLANT, BILATERAL      COLONOSCOPY N/A 04/02/2020    Procedure: COLONOSCOPY WITH ANESTHESIA;  Surgeon: Yanick Flowers DO;  Location: [facility] ENDOSCOPY;  Service: Gastroenterology;  Laterality: N/A;  pre: abnormal CT scan  post: rectal mass  PCP unknown    CORONARY ARTERY BYPASS GRAFT N/A 2/15/2025    Procedure: CORONARY ARTERY BYPASS GRAFTING x3, LEFT INTERNAL MAMMARY ARTERY GRAFT, LEFT LEG  "OPEN VEIN HARVEST, TRANSESOPHAGEAL ECHOCARDIOGRAM, XP STERNAL PLATING;  Surgeon: Joe Back MD;  Location:  [facility] OR;  Service: Cardiothoracic;  Laterality: N/A;    CYSTECTOMY      ILEOSTOMY      LYMPH NODE BIOPSY      MOUTH SURGERY      PROSTATECTOMY      RECTAL MASS EXCISION  2020    SENTINEL NODE BIOPSY Left 06/15/2021    Procedure: SENTINEL LYMPH NODE BIOPSY;  Surgeon: Vielka Weller MD;  Location:  [facility] OR;  Service: General;  Laterality: Left;    VENOUS ACCESS DEVICE (PORT) INSERTION N/A 2020    Procedure: INSERTION VENOUS ACCESS DEVICE;  Surgeon: Vielka Weller MD;  Location:  [facility] OR;  Service: General;  Laterality: N/A;    VENOUS ACCESS DEVICE (PORT) INSERTION  2022    VENOUS ACCESS DEVICE (PORT) REMOVAL N/A 2021    Procedure: PORT REMOVAL, EXCISION OF NEOPLASM UNCERTAIN BEHAVIOR LEFT SHOULDER;  Surgeon: Vielka Weller MD;  Location:  [facility] OR;  Service: General;  Laterality: N/A;   ,   Family History   Problem Relation Age of Onset    Cancer Mother     Stroke Father     Heart disease Father     Heart attack Brother    ,   Social History     Tobacco Use    Smoking status: Former     Current packs/day: 0.00     Average packs/day: 0.9 packs/day for 59.1 years (56.0 ttl pk-yrs)     Types: Cigarettes     Start date: 1966     Quit date: 2025     Years since quittin.0     Passive exposure: Past    Smokeless tobacco: Never    Tobacco comments:     As of 25 - smokes around 6-8 cigarettes per day     As of 2025 - smokes about 5-10 cigarettes per day     As of 2025 - pt states he quit \"almost\" 3 weeks ago   Vaping Use    Vaping status: Never Used   Substance Use Topics    Alcohol use: Not Currently    Drug use: Never   , (Not in a hospital admission)  , Allergies: Patient has no known allergies.    Vital Signs:   BP 98/58   Pulse 88   Ht 179.1 cm (70.51\")   Wt 78.9 kg (174 lb)   SpO2 97%   BMI 24.61 kg/m²        Physical Exam  Vitals reviewed. "   Constitutional:       General: He is not in acute distress.     Appearance: He is well-developed.   HENT:      Head: Normocephalic and atraumatic.   Eyes:      General: No scleral icterus.     Conjunctiva/sclera: Conjunctivae normal.      Pupils: Pupils are equal, round, and reactive to light.   Cardiovascular:      Rate and Rhythm: Normal rate.   Pulmonary:      Effort: Pulmonary effort is normal. No respiratory distress.      Breath sounds: Normal breath sounds. No wheezing or rales.   Chest:      Comments: Sternotomy incision is clean dry and intact with Steri-Strips in place.  Chest tube sites are healing well without signs or symptoms of infection.    Sternum is stable without clicks.  Musculoskeletal:         General: Normal range of motion.      Cervical back: Normal range of motion and neck supple.   Skin:     General: Skin is warm and dry.      Comments: Vein harvesting sites are healing well.  Areas are clean dry and intact.  No signs or symptoms of infection.  Some bruising is present.   Neurological:      Mental Status: He is alert and oriented to person, place, and time.   Psychiatric:         Behavior: Behavior normal.         Thought Content: Thought content normal.         Judgment: Judgment normal.          Result Review :  The following data was reviewed by: DELANEY Stevens on 02/25/2025:    Basic Metabolic Panel (02/25/2025 13:34)       Results for orders placed during the hospital encounter of 01/21/25    Complete PFT - Pre & Post Bronchodilator    Narrative  [facility] - Pulmonary Function Test    [address] [place] [address]  [id number]  [phone]    Patient : Attila Viramontes  MRN : [id number]  CSN : [id number]  Pulmonologist : Zak Elizabeth MD  Date : 1/21/2025    ______________________________________________________________________    Interpretation :  1.  Spirometry is consistent with a mild obstructive ventilatory defect manifested by a decrease in  midflows.  2.  There is some improvement in spirometry postbronchodilator but a mild obstructive ventilatory defect manifested by a decrease in midflows is still present.  3.  Lung volumes reveal an elevated expiratory reserve volume with a decrease in inspiratory capacity and otherwise are within normal limits.  4.  There is a mild diffusion impairment even when corrected for alveolar volume.      Zak Elizabeth MD             Assessment and Plan  Diagnoses and all orders for this visit:    1. Coronary artery disease involving native coronary artery of native heart, unspecified whether angina present (Primary)    2. Stage 3a chronic kidney disease  -     Basic Metabolic Panel; Future    3. Personal history of nicotine dependence  Assessment & Plan:  Attila Viramontes  reports that he quit smoking about 3 weeks ago. His smoking use included cigarettes. He started smoking about 59 years ago. He has a 56 pack-year smoking history. He has been exposed to tobacco smoke. He has never used smokeless tobacco. I have educated him on the risk of diseases from using tobacco products such as cancer, COPD, and heart disease.             The patient is doing well postop.  The patient reports ambulating well.  We discussed continuing to ambulate 5 to 10 minutes twice a day with go to increase to 30 minutes by the time he sees Dr. Back in 4 weeks.  He is maintaining sternal precautions.  His weight is 1 pound above baseline at 174.  All incisions are clean dry and intact with no signs or symptoms of infection.  He reports that he is eating and drinking well.  He has completed Lasix and potassium.  BMP reviewed and shows a creatinine of 1.71, sodium 131, potassium 4.2.  Advised the patient to hydrate as he has previously done with water and Gatorade zero.  Discussed with Dr. Back.  Will obtain follow-up BMP next week and see Anne Mac.    Advance Care Planning   There are no Advance Care Planning documents on file.           Follow Up  DELANEY Stevens  2/25/2025  16:35 CST    Return in 1 week (on 3/4/2025) for With Anne Mac and keep upcoming appt with Dr. Back .    Patient was given instructions and counseling regarding his condition or for health maintenance advice. Please see specific information pulled into the AVS if appropriate.     Please note that portions of this note were completed with a voice recognition program.    Patient or patient representative verbalized consent for the use of Ambient Listening during the visit with  DELANEY Stevens for chart documentation. 2/25/2025  15:34 CST

## 2025-02-21 NOTE — OUTREACH NOTE
Call Center TCM Note      Flowsheet Row Responses   [facility] patient discharged from? [place]   Does the patient have one of the following disease processes/diagnoses(primary or secondary)? Cardiothoracic surgery   TCM attempt successful? Yes   Call start time 1407   Call end time 1410   Discharge diagnosis CORONARY ARTERY BYPASS GRAFTING x3, LEFT INTERNAL MAMMARY ARTERY GRAFT, LEFT LEG OPEN VEIN HARVEST, TRANSESOPHAGEAL ECHOCARDIOGRAM, XP STERNAL PLATING   Is patient permission given to speak with other caregiver? Yes   List who call center can speak with Sister   Person spoke with today (if not patient) and relationship Sister   Meds reviewed with patient/caregiver? Yes   Is the patient having any side effects they believe may be caused by any medication additions or changes? No   Does the patient have all medications related to this admission filled (includes all antibiotics, pain medications, cardiac medications, etc.) Yes   Is the patient taking all medications as directed (includes completed medication regime)? Yes   Comments Hospital d/c follow up 2/26/25@1015.   Does the patient have an appointment with their PCP within 7-14 days of discharge? Yes   Has home health visited the patient within 72 hours of discharge? N/A   Psychosocial issues? No   Did the patient receive a copy of their discharge instructions? Yes   Nursing interventions Reviewed instructions with patient   What is the patient's perception of their health status since discharge? Improving   Nursing interventions Nurse provided patient education   Is the patient/caregiver able to teach back normal signs of recovery? Nausea and lack of appetite, Constipation   Nursing interventions Reassured on normal signs of recovery   Is the patient /caregiver able to teach back basic post-op care? Practice cough and deep breath every 4 hours while awake, Hold pillow to support chest when coughing, No tub bath, swimming, or hot tub until instructed by  MD   Is the patient/caregiver able to teach back signs and symptoms of incisional infection? Increased redness, swelling or pain at the incisonal site, Increased drainage or bleeding, Incisional warmth, Pus or odor from incision, Fever   Is the patient/caregiver able to teach back steps to recovery at home? Set small, achievable goals for return to baseline health, Rest and rebuild strength, gradually increase activity, Eat a well-balance diet   If the patient is a current smoker, are they able to teach back resources for cessation? Smoking cessation medications   Is the patient/caregiver able to teach back the hierarchy of who to call/visit for symptoms/problems? PCP, Specialist, Home health nurse, Urgent Care, ED, 911 Yes   TCM call completed? Yes   Wrap up additional comments Very brief call with Sister, she states she spoke with patient yesterday and he was doing well.   Call end time 1410   Would this patient benefit from a Referral to Amb Social Work? No   Is the patient interested in additional calls from an ambulatory ? No            Lala Anderson RN    2/21/2025, 14:11 CST

## 2025-02-25 ENCOUNTER — OFFICE VISIT (OUTPATIENT)
Dept: CARDIAC SURGERY | Facility: CLINIC | Age: 75
End: 2025-02-25
Payer: MEDICARE

## 2025-02-25 ENCOUNTER — LAB (OUTPATIENT)
Dept: LAB | Facility: HOSPITAL | Age: 75
End: 2025-02-25
Payer: MEDICARE

## 2025-02-25 VITALS
DIASTOLIC BLOOD PRESSURE: 58 MMHG | BODY MASS INDEX: 24.36 KG/M2 | SYSTOLIC BLOOD PRESSURE: 98 MMHG | HEIGHT: 71 IN | WEIGHT: 174 LBS | HEART RATE: 88 BPM | OXYGEN SATURATION: 97 %

## 2025-02-25 DIAGNOSIS — I25.10 CORONARY ARTERY DISEASE INVOLVING NATIVE CORONARY ARTERY OF NATIVE HEART, UNSPECIFIED WHETHER ANGINA PRESENT: Primary | ICD-10-CM

## 2025-02-25 DIAGNOSIS — N28.9 FUNCTION KIDNEY DECREASED: ICD-10-CM

## 2025-02-25 DIAGNOSIS — Z87.891 PERSONAL HISTORY OF NICOTINE DEPENDENCE: ICD-10-CM

## 2025-02-25 DIAGNOSIS — N18.31 STAGE 3A CHRONIC KIDNEY DISEASE: ICD-10-CM

## 2025-02-25 PROBLEM — N18.9 CKD (CHRONIC KIDNEY DISEASE): Status: ACTIVE | Noted: 2025-02-25

## 2025-02-25 LAB
ANION GAP SERPL CALCULATED.3IONS-SCNC: 14 MMOL/L (ref 5–15)
BUN SERPL-MCNC: 22 MG/DL (ref 8–23)
BUN/CREAT SERPL: 12.9 (ref 7–25)
CALCIUM SPEC-SCNC: 8.8 MG/DL (ref 8.6–10.5)
CHLORIDE SERPL-SCNC: 94 MMOL/L (ref 98–107)
CO2 SERPL-SCNC: 23 MMOL/L (ref 22–29)
CREAT SERPL-MCNC: 1.71 MG/DL (ref 0.76–1.27)
EGFRCR SERPLBLD CKD-EPI 2021: 41.7 ML/MIN/1.73
GLUCOSE SERPL-MCNC: 145 MG/DL (ref 65–99)
POTASSIUM SERPL-SCNC: 4.2 MMOL/L (ref 3.5–5.2)
SODIUM SERPL-SCNC: 131 MMOL/L (ref 136–145)

## 2025-02-25 PROCEDURE — 36415 COLL VENOUS BLD VENIPUNCTURE: CPT

## 2025-02-25 PROCEDURE — 99024 POSTOP FOLLOW-UP VISIT: CPT | Performed by: NURSE PRACTITIONER

## 2025-02-25 PROCEDURE — 3074F SYST BP LT 130 MM HG: CPT | Performed by: NURSE PRACTITIONER

## 2025-02-25 PROCEDURE — 80048 BASIC METABOLIC PNL TOTAL CA: CPT

## 2025-02-25 PROCEDURE — 3078F DIAST BP <80 MM HG: CPT | Performed by: NURSE PRACTITIONER

## 2025-02-25 NOTE — Clinical Note
Please schedule patient 1 week with Anne Mac.  He will need to have labs obtained prior to appointment.  Please call and let the patient know.

## 2025-02-25 NOTE — ASSESSMENT & PLAN NOTE
Attila Aylin Viramontes  reports that he quit smoking about 3 weeks ago. His smoking use included cigarettes. He started smoking about 59 years ago. He has a 56 pack-year smoking history. He has been exposed to tobacco smoke. He has never used smokeless tobacco. I have educated him on the risk of diseases from using tobacco products such as cancer, COPD, and heart disease.

## 2025-03-04 NOTE — PROGRESS NOTES
Subjective   Chief Complaint   Patient presents with    Post-op Follow-up     Patient had CABG x 3 on 2/15. Labs today.       Patient ID: Attila Viramontes is a 74 y.o. male who is here for follow-up having had  Coronary artery bypass grafting-3 vessel (left internal mammary artery/left anterior descending, reverse saphenous vein graft/obtuse marginal, and reverse saphenous vein graft/posterior descending artery), Left open vein harvest, Sternalock XP sternal plating performed on February 15, 2025 by Dr. Back.       Post-op Follow-up  Pain Control:  No pain  Fever:  No fever  Diet:  Adequate intake  Activity:  Returning to normal  Operative Site Issues: No      Post operative recovery was uneventful without any major complications.  He was seen in the office last week for follow-up with lab work by DELANEY Joaquin.  Overall, he was felt to be doing well.  Lab work showed a creatinine of 1.71, sodium 131, potassium 4.2 and he was advised to continue oral hydration with water and Gatorade.  He returns today for follow-up with labs.  Unfortunately, at time of exam lab work is not available.  He looks well and reports he is doing well.  Weight is up 2 pounds since last week and reports he has been drinking lots of water and Powerade. Sleep habits are fair. Pain control has been great. No fevers/sweats/chills. No drainage from incisions.  No sternal clicks. No chest pain or shortness of breath. Appetite is good.  He has not checked his blood sugar nor does he have a glucometer at home to manage blood sugar at home.  He has decrease metformin to one 500 mg tablet daily and mentions discussing further titration with Dr. Hebert and Dr. Orozco.  Ambulating between 5 and 10 minutes twice daily.  Joined with family today.      The following portions of the patient's history were reviewed and updated as appropriate: allergies, current medications, past family history, past medical history, past social history, past  "surgical history and problem list.    Review of Systems   Constitutional:  Negative for chills, diaphoresis and fever.   Respiratory:  Negative for shortness of breath.    Cardiovascular:  Positive for chest pain (incisional). Negative for palpitations and leg swelling.   Gastrointestinal:  Negative for abdominal distention.        Colostomy and urostomy in place.   Skin:  Negative for poor wound healing.       Objective   Visit Vitals  /68   Pulse 66   Ht 179.1 cm (70.51\")   Wt 79.9 kg (176 lb 3.2 oz)   SpO2 99%   BMI 24.92 kg/m²       Physical Exam  Vitals reviewed.   Constitutional:       General: He is not in acute distress.     Appearance: He is well-developed. He is not diaphoretic.   HENT:      Head: Normocephalic and atraumatic.   Eyes:      Pupils: Pupils are equal, round, and reactive to light.   Cardiovascular:      Rate and Rhythm: Normal rate and regular rhythm.      Heart sounds: Normal heart sounds. No murmur heard.     No friction rub.   Pulmonary:      Effort: Pulmonary effort is normal. No respiratory distress.      Breath sounds: Normal breath sounds. No wheezing or rales.   Abdominal:      General: There is no distension.      Palpations: Abdomen is soft.      Tenderness: There is no abdominal tenderness. There is no guarding.      Comments: (+) Colostomy and urostomy.   Musculoskeletal:      Right lower leg: No edema.      Left lower leg: No edema.   Skin:     General: Skin is warm and dry.      Coloration: Skin is not pale.      Findings: No rash.      Comments: Sternotomy site clean, dry, and intact. Sternum stable. No clicks.  Saphenectomy site clean, dry, and intact.  No evidence of infection.  No drainage from surgical sites.  Steri-Strips remain intact with exception of most distal saphenectomy site.   Neurological:      Mental Status: He is alert and oriented to person, place, and time.   Psychiatric:         Behavior: Behavior normal.         Assessment & Plan       Diagnoses and " all orders for this visit:    1. Coronary artery disease involving native coronary artery of native heart, unspecified whether angina present (Primary)    2. Tobacco abuse    3. Type 2 diabetes mellitus with stage 3a chronic kidney disease, without long-term current use of insulin    4. Stage 3a chronic kidney disease    5. Hyponatremia      Labs not available at time of exam.  Will call once resulted.     Overall, Attila Viramontes is doing well.  Surgical incisions are clean and dry without evidence of infection. We discussed current sternotomy precautions and how these will not be advanced yet. Continue post op surgical wound care: shower daily with antibacterial soap and water, avoid use of lotions, creams, ointments, powders etc, allow steri strips to fall off on their own. Following post op cardiac surgery home instructions.     We discussed the importance of strict glycemic control in the post operative setting and this impacts wound healing, infection risk, and future cardiovascular disease. Continue to follow with primary care for management of diabetes mellitus.  Hemoglobin A1c 7.8%.  Encouraged to speak with to primary care provider to obtain supplies to monitor blood sugars at home.    Medication reconciliation performed.     Provided support and encouragement. All questions have been answered to the best of my ability. Will discuss starting cardiac rehabilitation at official 1 month post op visit. Patient has follow up with Dr. Back in a few weeks. Patient has follow up with Cardiology in a few weeks. Patient has been instructed to contact our office with any questions or concerns should they arise prior to the next office visit.  Keep planned follow-up with Dr. Orozco, oncologist for colon cancer.    BMI is within normal parameters. No other follow-up for BMI required.      Attila Viramontes  reports that he quit smoking about 4 weeks ago. His smoking use included cigarettes. He started smoking  about 59 years ago. He has a 56 pack-year smoking history. He has been exposed to tobacco smoke. He has never used smokeless tobacco.        Advance Care Planning   ACP discussion was declined by the patient. Patient does not have an advance directive, declines further assistance.

## 2025-03-05 ENCOUNTER — LAB (OUTPATIENT)
Dept: LAB | Facility: HOSPITAL | Age: 75
End: 2025-03-05
Payer: MEDICARE

## 2025-03-05 ENCOUNTER — OFFICE VISIT (OUTPATIENT)
Dept: CARDIAC SURGERY | Facility: CLINIC | Age: 75
End: 2025-03-05
Payer: MEDICARE

## 2025-03-05 VITALS
SYSTOLIC BLOOD PRESSURE: 115 MMHG | OXYGEN SATURATION: 99 % | HEIGHT: 71 IN | WEIGHT: 176.2 LBS | BODY MASS INDEX: 24.67 KG/M2 | DIASTOLIC BLOOD PRESSURE: 68 MMHG | HEART RATE: 66 BPM

## 2025-03-05 DIAGNOSIS — E87.1 HYPONATREMIA: ICD-10-CM

## 2025-03-05 DIAGNOSIS — N18.31 STAGE 3A CHRONIC KIDNEY DISEASE: Primary | ICD-10-CM

## 2025-03-05 DIAGNOSIS — N18.31 STAGE 3A CHRONIC KIDNEY DISEASE: ICD-10-CM

## 2025-03-05 DIAGNOSIS — E11.22 TYPE 2 DIABETES MELLITUS WITH STAGE 3A CHRONIC KIDNEY DISEASE, WITHOUT LONG-TERM CURRENT USE OF INSULIN: ICD-10-CM

## 2025-03-05 DIAGNOSIS — N18.31 TYPE 2 DIABETES MELLITUS WITH STAGE 3A CHRONIC KIDNEY DISEASE, WITHOUT LONG-TERM CURRENT USE OF INSULIN: ICD-10-CM

## 2025-03-05 DIAGNOSIS — Z72.0 TOBACCO ABUSE: ICD-10-CM

## 2025-03-05 DIAGNOSIS — I25.10 CORONARY ARTERY DISEASE INVOLVING NATIVE CORONARY ARTERY OF NATIVE HEART, UNSPECIFIED WHETHER ANGINA PRESENT: Primary | ICD-10-CM

## 2025-03-05 LAB
ANION GAP SERPL CALCULATED.3IONS-SCNC: 12 MMOL/L (ref 5–15)
BUN SERPL-MCNC: 25 MG/DL (ref 8–23)
BUN/CREAT SERPL: 15.6 (ref 7–25)
CALCIUM SPEC-SCNC: 9.2 MG/DL (ref 8.6–10.5)
CHLORIDE SERPL-SCNC: 100 MMOL/L (ref 98–107)
CO2 SERPL-SCNC: 20 MMOL/L (ref 22–29)
CREAT SERPL-MCNC: 1.6 MG/DL (ref 0.76–1.27)
EGFRCR SERPLBLD CKD-EPI 2021: 45.2 ML/MIN/1.73
GLUCOSE SERPL-MCNC: 202 MG/DL (ref 65–99)
POTASSIUM SERPL-SCNC: 4.4 MMOL/L (ref 3.5–5.2)
SODIUM SERPL-SCNC: 132 MMOL/L (ref 136–145)

## 2025-03-05 PROCEDURE — 1159F MED LIST DOCD IN RCRD: CPT | Performed by: NURSE PRACTITIONER

## 2025-03-05 PROCEDURE — 3078F DIAST BP <80 MM HG: CPT | Performed by: NURSE PRACTITIONER

## 2025-03-05 PROCEDURE — 80048 BASIC METABOLIC PNL TOTAL CA: CPT

## 2025-03-05 PROCEDURE — 1160F RVW MEDS BY RX/DR IN RCRD: CPT | Performed by: NURSE PRACTITIONER

## 2025-03-05 PROCEDURE — 36415 COLL VENOUS BLD VENIPUNCTURE: CPT

## 2025-03-05 PROCEDURE — 99024 POSTOP FOLLOW-UP VISIT: CPT | Performed by: NURSE PRACTITIONER

## 2025-03-05 PROCEDURE — 3074F SYST BP LT 130 MM HG: CPT | Performed by: NURSE PRACTITIONER

## 2025-03-06 ENCOUNTER — OFFICE VISIT (OUTPATIENT)
Dept: INTERNAL MEDICINE | Facility: CLINIC | Age: 75
End: 2025-03-06
Payer: MEDICARE

## 2025-03-06 VITALS
HEIGHT: 71 IN | SYSTOLIC BLOOD PRESSURE: 120 MMHG | DIASTOLIC BLOOD PRESSURE: 80 MMHG | HEART RATE: 54 BPM | BODY MASS INDEX: 24.65 KG/M2 | TEMPERATURE: 97.6 F | WEIGHT: 176.1 LBS

## 2025-03-06 DIAGNOSIS — E87.1 HYPONATREMIA: ICD-10-CM

## 2025-03-06 DIAGNOSIS — N18.31 TYPE 2 DIABETES MELLITUS WITH STAGE 3A CHRONIC KIDNEY DISEASE, WITHOUT LONG-TERM CURRENT USE OF INSULIN: Primary | ICD-10-CM

## 2025-03-06 DIAGNOSIS — N18.31 STAGE 3A CHRONIC KIDNEY DISEASE: ICD-10-CM

## 2025-03-06 DIAGNOSIS — E11.65 TYPE 2 DIABETES MELLITUS WITH HYPERGLYCEMIA, WITHOUT LONG-TERM CURRENT USE OF INSULIN: ICD-10-CM

## 2025-03-06 DIAGNOSIS — E11.22 TYPE 2 DIABETES MELLITUS WITH STAGE 3A CHRONIC KIDNEY DISEASE, WITHOUT LONG-TERM CURRENT USE OF INSULIN: Primary | ICD-10-CM

## 2025-03-06 RX ORDER — GLIMEPIRIDE 1 MG/1
1 TABLET ORAL
Qty: 30 TABLET | Refills: 2 | Status: SHIPPED | OUTPATIENT
Start: 2025-03-06

## 2025-03-06 NOTE — PROGRESS NOTES
"Chief Complaint   Patient presents with    Follow-up     Diabetes:         History:  Attila Viramontes is a 74 y.o. male who presents today for evaluation of the above problems.      Diabetes  No MedicAlert identification noted. Pertinent negatives for hypoglycemia include no confusion, headaches, speech difficulty, sweats or tremors. Associated symptoms include foot paresthesias, polydipsia and polyuria. Pertinent negatives for diabetes include no blurred vision, no foot ulcerations and no weight loss. Pertinent negatives for hypoglycemia complications include no blackouts, no hospitalization, no nocturnal hypoglycemia, no required assistance and no required glucagon injection. Symptoms are worsening. He is following a generally healthy diet. When asked about meal planning, he reported none and avoidance of concentrated sweets. Meal planning includes none and avoidance of concentrated sweets. He participates in exercise weekly. He does not see a podiatrist. Eye exam is not current.     He recently underwent a three-vessel CABG by Dr. Back on February 15, 2025, and has been recovering well.  However, lab work showed some abnormalities with a creatinine of 1.71, sodium of 131 on February 25, 2025.  Of note, it his lab work on February 20, 2025 was better with a creatinine of 1.52, and a sodium of 140.  He had blood work on March 5, 2025 showing sodium level of 132 and a creatinine of 1.60 with estimate GFR 45.2.    His baseline creatinine is around 1.3-1.4.    He was advised by cardiothoracic surgery to stop the metformin and discuss other options to treat his diabetes with this.  His A1c was 7.8% on February 11, 2025 which has been stable.    He was on Lasix 40 mg for 5 days but this was stopped on February 25, 2025.    He says his eating is  \"off\".  He has been eating more fast food due to his decreased ability to cook at the moment.    He states his blood sugars usually 150-160s, and has not had any " hypoglycemic episodes with glucose less than 100    He is scheduled for repeat blood work next Wednesday per cardiothoracic surgery.    ROS:      Review of Systems   Constitutional:  Negative for weight loss.   Eyes:  Negative for blurred vision.   Endocrine: Positive for polydipsia and polyuria.   Neurological:  Negative for tremors, speech difficulty and headaches.   Psychiatric/Behavioral:  Negative for confusion.      See above    Current Outpatient Medications:     aspirin 81 MG EC tablet, Take 1 tablet by mouth Daily., Disp: , Rfl:     atorvastatin (LIPITOR) 40 MG tablet, Take 1 tablet by mouth Daily., Disp: 90 tablet, Rfl: 3    clopidogrel (PLAVIX) 75 MG tablet, Take 1 tablet by mouth Daily., Disp: 30 tablet, Rfl: 2    diphenoxylate-atropine (LOMOTIL) 2.5-0.025 MG per tablet, Take 2 tablets by mouth 4 (Four) Times a Day As Needed for Diarrhea., Disp: , Rfl:     lisinopril (PRINIVIL,ZESTRIL) 2.5 MG tablet, Take 1 tablet by mouth Daily., Disp: 30 tablet, Rfl: 2    loperamide (IMODIUM) 2 MG capsule, Take 1 capsule by mouth 4 (Four) Times a Day As Needed for Diarrhea., Disp: , Rfl:     metoprolol tartrate (LOPRESSOR) 25 MG tablet, Take 1 tablet by mouth Every 12 (Twelve) Hours., Disp: 60 tablet, Rfl: 2    pantoprazole (PROTONIX) 40 MG EC tablet, Take 1 tablet by mouth Every Morning., Disp: 30 tablet, Rfl: 0    glimepiride (Amaryl) 1 MG tablet, Take 1 tablet by mouth Every Morning Before Breakfast., Disp: 30 tablet, Rfl: 2    metFORMIN (GLUCOPHAGE) 500 MG tablet, Take 2 tablets by mouth 2 (Two) Times a Day With Meals. (Patient not taking: Reported on 3/6/2025), Disp: , Rfl:     Lab Results   Component Value Date    GLUCOSE 202 (H) 03/05/2025    BUN 25 (H) 03/05/2025    CREATININE 1.60 (H) 03/05/2025     (L) 03/05/2025    K 4.4 03/05/2025     03/05/2025    CALCIUM 9.2 03/05/2025    PROTEINTOT 5.2 (L) 02/15/2025    ALBUMIN 3.4 (L) 02/17/2025    ALT 11 02/15/2025    AST 21 02/15/2025    ALKPHOS 76  02/15/2025    BILITOT 0.2 02/15/2025    GLOB 1.9 02/15/2025    AGRATIO 1.7 02/15/2025    BCR 15.6 03/05/2025    ANIONGAP 12.0 03/05/2025    EGFR 45.2 (L) 03/05/2025       WBC   Date Value Ref Range Status   02/20/2025 9.39 3.40 - 10.80 10*3/mm3 Final     RBC   Date Value Ref Range Status   02/20/2025 2.85 (L) 4.14 - 5.80 10*6/mm3 Final     Hemoglobin   Date Value Ref Range Status   02/20/2025 8.4 (L) 13.0 - 17.7 g/dL Final     Hematocrit   Date Value Ref Range Status   02/20/2025 25.9 (L) 37.5 - 51.0 % Final   12/24/2020 28 (L) 41 - 49 % Final     MCV   Date Value Ref Range Status   02/20/2025 90.9 79.0 - 97.0 fL Final     MCH   Date Value Ref Range Status   02/20/2025 29.5 26.6 - 33.0 pg Final     MCHC   Date Value Ref Range Status   02/20/2025 32.4 31.5 - 35.7 g/dL Final     RDW   Date Value Ref Range Status   02/20/2025 15.9 (H) 12.3 - 15.4 % Final     RDW-SD   Date Value Ref Range Status   02/20/2025 52.2 37.0 - 54.0 fl Final     MPV   Date Value Ref Range Status   02/20/2025 9.3 6.0 - 12.0 fL Final     Platelets   Date Value Ref Range Status   02/20/2025 260 140 - 450 10*3/mm3 Final     Neutrophil %   Date Value Ref Range Status   02/18/2025 72.4 42.7 - 76.0 % Final     Lymphocyte %   Date Value Ref Range Status   02/18/2025 11.3 (L) 19.6 - 45.3 % Final     Monocyte %   Date Value Ref Range Status   02/18/2025 11.2 5.0 - 12.0 % Final     Eosinophil %   Date Value Ref Range Status   02/18/2025 4.4 0.3 - 6.2 % Final     Basophil %   Date Value Ref Range Status   02/18/2025 0.3 0.0 - 1.5 % Final     Immature Grans %   Date Value Ref Range Status   02/18/2025 0.4 0.0 - 0.5 % Final     Neutrophils, Absolute   Date Value Ref Range Status   02/18/2025 7.34 (H) 1.70 - 7.00 10*3/mm3 Final     Lymphocytes, Absolute   Date Value Ref Range Status   02/18/2025 1.14 0.70 - 3.10 10*3/mm3 Final     Monocytes, Absolute   Date Value Ref Range Status   02/18/2025 1.13 (H) 0.10 - 0.90 10*3/mm3 Final     Eosinophils, Absolute  "  Date Value Ref Range Status   02/18/2025 0.45 (H) 0.00 - 0.40 10*3/mm3 Final     Basophils, Absolute   Date Value Ref Range Status   02/18/2025 0.03 0.00 - 0.20 10*3/mm3 Final     Immature Grans, Absolute   Date Value Ref Range Status   02/18/2025 0.04 0.00 - 0.05 10*3/mm3 Final     nRBC   Date Value Ref Range Status   02/18/2025 0.0 0.0 - 0.2 /100 WBC Final         OBJECTIVE:  Visit Vitals  /80 (BP Location: Left arm, Patient Position: Sitting, Cuff Size: Adult)   Pulse 54   Temp 97.6 °F (36.4 °C) (Temporal)   Ht 179.1 cm (70.51\")   Wt 79.9 kg (176 lb 1.6 oz)   BMI 24.90 kg/m²      Physical Exam  Constitutional:       General: He is not in acute distress.     Appearance: He is well-developed. He is not ill-appearing or toxic-appearing.   HENT:      Head: Normocephalic and atraumatic.   Eyes:      Pupils: Pupils are equal, round, and reactive to light.   Neck:      Thyroid: No thyromegaly.      Trachea: Phonation normal.   Cardiovascular:      Rate and Rhythm: Normal rate.   Pulmonary:      Effort: No respiratory distress.   Skin:     Coloration: Skin is not pale.      Findings: No erythema.   Neurological:      Mental Status: He is alert.   Psychiatric:         Behavior: Behavior normal.         Thought Content: Thought content normal.         Judgment: Judgment normal.         Assessment/Plan      Diagnoses and all orders for this visit:    1. Type 2 diabetes mellitus with stage 3a chronic kidney disease, without long-term current use of insulin (Primary)    2. Stage 3a chronic kidney disease    3. Type 2 diabetes mellitus with hyperglycemia, without long-term current use of insulin    4. Hyponatremia    Other orders  -     glimepiride (Amaryl) 1 MG tablet; Take 1 tablet by mouth Every Morning Before Breakfast.  Dispense: 30 tablet; Refill: 2      Charles's metformin had to be discontinued due to his worsening renal function on top of his known CKD stage IIIa with a baseline creatinine of 1.3-1.4.  I suspect " the worsening renal function to be related to the recent surgery and diuretics, and should improve in the coming weeks.  We will need to provide antihyperglycemic medication while he is off the metformin.  We discussed options.  Ideally, Jardiance the preferred, but this is cost prohibitive.  Therefore, we will start the lowest dose of glimepiride at 1 mg in the morning.  We discussed the possibility of hypoglycemia with this medication given his renal disease.  However, he has not had any hypoglycemic episodes in the past.    Suspect his hyponatremia was also related to the diuretic.    Restart metformin when his creatinine returns back to baseline, and discontinue the glimepiride at that time.  He has follow-up labs scheduled next week.    Continue follow-up with cardiothoracic surgery as already scheduled.    Return for Next scheduled follow up.      KIP Hebert MD  07:38 CST  3/6/2025   Electronically signed

## 2025-03-12 ENCOUNTER — LAB (OUTPATIENT)
Dept: LAB | Facility: HOSPITAL | Age: 75
End: 2025-03-12
Payer: MEDICARE

## 2025-03-12 DIAGNOSIS — N18.31 STAGE 3A CHRONIC KIDNEY DISEASE: ICD-10-CM

## 2025-03-12 LAB
ANION GAP SERPL CALCULATED.3IONS-SCNC: 10 MMOL/L (ref 5–15)
BUN SERPL-MCNC: 21 MG/DL (ref 8–23)
BUN/CREAT SERPL: 12.6 (ref 7–25)
CALCIUM SPEC-SCNC: 8.9 MG/DL (ref 8.6–10.5)
CHLORIDE SERPL-SCNC: 101 MMOL/L (ref 98–107)
CO2 SERPL-SCNC: 20 MMOL/L (ref 22–29)
CREAT SERPL-MCNC: 1.67 MG/DL (ref 0.76–1.27)
EGFRCR SERPLBLD CKD-EPI 2021: 42.9 ML/MIN/1.73
GLUCOSE SERPL-MCNC: 145 MG/DL (ref 65–99)
POTASSIUM SERPL-SCNC: 4.8 MMOL/L (ref 3.5–5.2)
SODIUM SERPL-SCNC: 131 MMOL/L (ref 136–145)

## 2025-03-12 PROCEDURE — 36415 COLL VENOUS BLD VENIPUNCTURE: CPT

## 2025-03-12 PROCEDURE — 80048 BASIC METABOLIC PNL TOTAL CA: CPT

## 2025-03-18 ENCOUNTER — TELEPHONE (OUTPATIENT)
Dept: CARDIAC SURGERY | Facility: CLINIC | Age: 75
End: 2025-03-18
Payer: MEDICARE

## 2025-03-18 NOTE — TELEPHONE ENCOUNTER
Called patient to review labs from last week and updated that Dr. Hebert has reviewed - his office will plan on reaching out to patient to repeat. Mr. Viramontes says he is doing well and feeling great. He has upcoming appt with Dr. Back and Dr. Orozco.     Separately he reports an issue with his [employer] account.  He has been locked out and was advised to speak to his doctor about this.  I informed him that I would pass this message along and if anyone has any helpful tips they would reach out to him, otherwise he plans on discussing this at follow-up next week but if we could get on this sooner that would be best.

## 2025-03-25 ENCOUNTER — OFFICE VISIT (OUTPATIENT)
Dept: CARDIAC SURGERY | Facility: CLINIC | Age: 75
End: 2025-03-25
Payer: MEDICARE

## 2025-03-25 ENCOUNTER — LAB (OUTPATIENT)
Dept: LAB | Facility: HOSPITAL | Age: 75
End: 2025-03-25
Payer: MEDICARE

## 2025-03-25 VITALS
HEIGHT: 71 IN | SYSTOLIC BLOOD PRESSURE: 116 MMHG | DIASTOLIC BLOOD PRESSURE: 72 MMHG | HEART RATE: 64 BPM | OXYGEN SATURATION: 98 % | BODY MASS INDEX: 24.67 KG/M2 | WEIGHT: 176.2 LBS

## 2025-03-25 DIAGNOSIS — Z87.891 HISTORY OF TOBACCO USE: ICD-10-CM

## 2025-03-25 DIAGNOSIS — C20 RECTAL CANCER: ICD-10-CM

## 2025-03-25 DIAGNOSIS — E87.1 HYPONATREMIA: ICD-10-CM

## 2025-03-25 DIAGNOSIS — Z95.1 S/P CABG (CORONARY ARTERY BYPASS GRAFT): Primary | ICD-10-CM

## 2025-03-25 DIAGNOSIS — N18.31 STAGE 3A CHRONIC KIDNEY DISEASE: ICD-10-CM

## 2025-03-25 LAB
ANION GAP SERPL CALCULATED.3IONS-SCNC: 13 MMOL/L (ref 5–15)
BUN SERPL-MCNC: 26 MG/DL (ref 8–23)
BUN/CREAT SERPL: 16.6 (ref 7–25)
CALCIUM SPEC-SCNC: 9.2 MG/DL (ref 8.6–10.5)
CHLORIDE SERPL-SCNC: 100 MMOL/L (ref 98–107)
CO2 SERPL-SCNC: 24 MMOL/L (ref 22–29)
CREAT SERPL-MCNC: 1.57 MG/DL (ref 0.76–1.27)
EGFRCR SERPLBLD CKD-EPI 2021: 46.3 ML/MIN/1.73
GLUCOSE SERPL-MCNC: 119 MG/DL (ref 65–99)
POTASSIUM SERPL-SCNC: 4.9 MMOL/L (ref 3.5–5.2)
SODIUM SERPL-SCNC: 137 MMOL/L (ref 136–145)

## 2025-03-25 PROCEDURE — 80048 BASIC METABOLIC PNL TOTAL CA: CPT

## 2025-03-25 PROCEDURE — 1159F MED LIST DOCD IN RCRD: CPT | Performed by: THORACIC SURGERY (CARDIOTHORACIC VASCULAR SURGERY)

## 2025-03-25 PROCEDURE — 3074F SYST BP LT 130 MM HG: CPT | Performed by: THORACIC SURGERY (CARDIOTHORACIC VASCULAR SURGERY)

## 2025-03-25 PROCEDURE — 3078F DIAST BP <80 MM HG: CPT | Performed by: THORACIC SURGERY (CARDIOTHORACIC VASCULAR SURGERY)

## 2025-03-25 PROCEDURE — 36415 COLL VENOUS BLD VENIPUNCTURE: CPT

## 2025-03-25 PROCEDURE — 99024 POSTOP FOLLOW-UP VISIT: CPT | Performed by: THORACIC SURGERY (CARDIOTHORACIC VASCULAR SURGERY)

## 2025-03-25 PROCEDURE — 1160F RVW MEDS BY RX/DR IN RCRD: CPT | Performed by: THORACIC SURGERY (CARDIOTHORACIC VASCULAR SURGERY)

## 2025-03-31 NOTE — PROGRESS NOTES
"Chief Complaint   Patient presents with    Post-op Follow-up     Patient had CABG x 3 on 2/15           History of Present Illness  The patient presents for his initial post CABG follow up.      He reports a significant improvement in his overall health status, with no current complaints of breathlessness. He is able to engage in outdoor activities for a duration of 30 minutes, although without any heavy lifting. His mobility has improved, allowing him to walk again. He has been experiencing fatigue, which he attributes to his chemotherapy treatment. However, he notes a decrease in the need for daytime naps. He expresses a desire to regain his physical fitness and plans to initiate a walking regimen at the [facility]. He has abstained from smoking since 01/15/2025. He has been consuming cashews as part of his diet.    He is scheduled to resume chemotherapy on Thursday, a prospect that fills him with apprehension. He typically experiences a period of weakness following his chemotherapy sessions, after which he gradually regains his strength. He also reports episodes of diarrhea during the second week post-chemotherapy. He expresses discomfort with public outings during his chemotherapy treatment due to potential side effects such as diarrhea.    SOCIAL HISTORY  The patient quit smoking on 01/15/2025.  Post operative recovery was unevent.  Sleep habits are good.  Pain control has been excellent.    No fevers/sweats/chills.   No drainage from incisions.  No sternal clicks.  No chest pain or shortness of breath.   His appetite is normal.  He is ambulating better than pre op.  He has quit smoking.      The following portions of the patient's history were reviewed and updated as appropriate: allergies, current medications, past family history, past medical history, past social history, past surgical history and problem list.        Objective   Visit Vitals  /72   Pulse 64   Ht 179.1 cm (70.51\")   Wt 79.9 kg " OFFICE VISIT 3/1/2023    Chief Complaint   Patient presents with   • Office Visit     Follow-up       HISTORY OF PRESENT ILLNESS:    Shannon Zuñiga is a 80 year old female with a past medical history of CAD s/p CABG x 4 5/2020, peripheral artery disease, hyperlipidemia, and hypertension who presents for a hospital follow up s/p CABG x 4, breast cancer s/p partial left mastectomy 3/11/2020 and full left mastectomy 6/25/2020. The patient presents today for a 6 week follow up.     Today, patient presents to the clinic unaccompanied. She notes that she was up all night with a toothache and will be having a tooth extraction tomorrow. She is walking about 30 minutes per day for exercise. It happens when she is about [facility] done with her walk, in both of her legs. Recent testing was reviewed with patient today. Patient denies chest pain/tightness, shortness of breath, CONTI, syncope/presyncope, orthopnea, or BLE edema. Patient does not complain of any other cardiac symptoms at this time.    NM Stress Test 02/03/2023  Normal myocardial perfusion scans following a regadenoson injection  and at rest  Normal post-regadenoson LV systolic function.  LVEF: 73%  Cardiac Risk Assessment: Low.   Compared to prior pre-CABG study from April 2, 2020, the pattern of  inferior infarction has resolved indicating that the previous fixed defect was due to low flow and not true infarction.    Cardiac Event Monitor 01/04/2023  1. The primary rhythm was normal sinus rhythm.   2. During the recording, the heart rate ranged from a minimum of 51 bpm to a maximum of 157 bpm, with average heart rate 79 bpm.   3. There was no significant bradycardia.   4. There was 5852 ventricular ectopy (1% of all beats), of which, the majority were PVC.   5. There was 1823 supraventricular ectopy (<1% of all beats), of which, the majority were PAC.   6. One SVT episode at 157 beats per minute possibly atrial tachycardia   7. Symptoms associated with sinus rhythm      US Arterial Duplex 06/02/2022  There is no prior study immediately available for comparison.  50-99% stenosis of the left proximal popliteal artery.  Complete occlusion of the right mid superficial femoral artery.     MAE 12/06/2021    The right brachial systolic pressure was 146 mmHg.  The right posterior  tibial systolic pressure was 95 mmHg.  The right dorsalis pedis systolic  pressure was 78 mmHg.  The right MAE was 0.65. The ankle waveform was  moderately abnormal on the right.    The left brachial systolic pressure was 131 mmHg.  The left posterior  tibial systolic pressure was 131 mmHg.  The left dorsalis pedis systolic  pressure was 117 mmHg.  The left MAE was 0.9. The ankle waveform was mildly  abnormal on the left.    The patient was subsequently placed on a treadmill at 12% grade at 1.2 mph  for 5 minutes.  The right brachial systolic pressure was 166 mmHg.  The  right posterior tibial systolic pressure was 120 mmHg.  The right MAE was  0.72.  The left posterior tibial systolic pressure was 150 mmHg.  The left  MAE was 0.9.    Left buttock claudication developed at 1 minutes.    There was a moderate reduction in the MAE on the right leg post exercise.    There was a mild reduction in the MAE on the left leg post exercise.     Echo 06/2021  Normal LV systolic function.  Grade I left ventricular diastolic dysfunction.  Mildly enlarged right ventricular chamber size.  Mildy reduced right ventricular systolic function     CABG x4 5/11/2020  Off-pump coronary artery bypass, pump standby, coronary bypass grafting x4 vessels with saphenous vein graft to posterior descending artery, saphenous vein graft sequential side-to-side to D1, end-to-side to M1, left internal mammary to the left anterior descending.  Endoscopic saphenous vein harvesting.     Coronary angiogram 5/7/2020  ·          The ejection fraction is estimated to be 50%.  ·          Ost RCA lesion with 95% stenosis.  ·          Mid RCA lesion with  (176 lb 3.2 oz)   SpO2 98%   BMI 24.92 kg/m²       Physical Exam  Physical Exam  The patient is in no acute distress and appears appropriate.  Lungs are clear to auscultation bilaterally without wheezing, rubs, or rales.  Heart has a regular rate and rhythm without murmurs, rubs, or gallops.  Abdomen is soft, nondistended, nontender.  Sternum is stable. No clicks. The incision is healing nicely.  There is no clubbing, cyanosis, or edema in the extremities.      No results found.  Results      Assessment & Plan       Diagnoses and all orders for this visit:    1. S/P CABG (coronary artery bypass graft) (Primary)  -     Ambulatory Referral to Cardiac Rehab    2. Rectal cancer    3. History of tobacco use      Assessment & Plan  1. Multivessel coronary artery disease.  He returns today for his formal first postoperative visit. Sternal precautions were discussed, and he can now advance them via pathway one. He has been cleared to start cardiac rehabilitation, which he will do at [facility]. Cardiovascular risk factor modification strategies were discussed, including progressive and durable exercise and smoking cessation. He has quit smoking since January 15 and remains free from smoking at this time. He is advised to start with light activities and gradually increase intensity. He can lift up to 10 pounds now and add 5 pounds more every other week. He is advised to monitor portion sizes of nutrient-dense foods like cashews to avoid weight gain. He is advised to avoid fried foods like potato chips due to their lack of nutritional value and potential harm. He is advised to take a hot shower and remove any remaining sutures or adhesive strips. If there are any issues with wound healing or other concerns, he should contact the office immediately.    2. Rectal cancer.  He is scheduled to resume chemotherapy on Thursday. The balance between wound healing and the risk of cancer progression was discussed. It is reasonable  70% stenosis.  ·          1st Mrg lesion with 80% stenosis.  ·          Ost 2nd Diag to 2nd Diag lesion with 90% stenosis.  ·          Mid LAD lesion with 70% stenosis.     1. Severe three vessel coronary artery disease.               -severe mLAD stenosis involving prior proximal stent and bifurcation of large diagonal.               -severe stenosis of large OM 1.               -severe ostial and mid stenosis of RCA.  5. Preserved EF with normal LVEDP at 13 mmHg.     NM Stress 04/02/2020  1.  Abnormal myocardial perfusion scans during regadenoson/low-level exercise and at rest suggestive of a medium size inferior wall infarct/scar and small area of mild apical inferior wall ischemia.   2.  Normal resting left ventricular systolic function.  3. Cardiac Risk Assessment: Low to Intermediate    4. Compared to prior study, 4/21/2017, there is now evidence of inferior wall infarct/scar and mild apical inferior wall ischemia. The study is partially compromised especially the inferior wall by significant subdiaphragmatic activity.      CT Angio Abd Aorta Iliofem 05/07/2018  1.  Total occlusion of the distal infrarenal abdominal aorta.  2.  Patent aortobifemoral surgical conduits  3.  Total occlusion of the right superficial femoral artery with distal  reconstitution.  4. Atherosclerotic, small caliber right popliteal artery with three-vessel  runoff to the right foot.  5. Severe calcification of the left popliteal artery precluding accurate  vessel analysis but no severe stenosis suspected. There is atherosclerotic  three-vessel runoff to the left foot.  6. No previous study for comparison.    Cholesterol (mg/dL)   Date Value   10/10/2022 178     HDL (mg/dL)   Date Value   10/10/2022 50     Cholesterol/ HDL Ratio (no units)   Date Value   10/10/2022 3.6     Triglycerides (mg/dL)   Date Value   10/10/2022 224 (H)     LDL (mg/dL)   Date Value   10/10/2022 83        Patient Active Problem List   Diagnosis   • Coronary artery  to proceed with starting chemotherapy at the discretion of Dr. Orozco.  Plans are in place to begin restaging and therapy by the end of this week. He is advised to monitor for any signs of infection or complications and report them immediately.    Follow-up  The patient will follow up in 6 weeks.    He is a non smoker.    Many thanks for the opportunity to care for your patient.    I will continue to keep you apprised of provided care as it ensues.              Transcribed from ambient dictation for Joe Back MD by Joe Back MD.  03/31/25   08:27 CDT    Patient or patient representative verbalized consent for the use of Ambient Listening during the visit with  Joe Back MD for chart documentation. 3/31/2025  08:29 CDT   disease involving native coronary artery of native heart without angina pectoris   • HTN (hypertension)   • PVD (peripheral vascular disease) (CMS/HCC)   • Bronchospasm   • Mixed hyperlipidemia   • SOB (shortness of breath) on exertion   • Hypothyroidism   • Chronic obstructive pulmonary disease (CMS/HCC)   • Unspecified hypothyroidism   • Allergic rhinitis   • Constipation   • Diverticulosis of colon   • Abdominal pain   • Elevated LFTs   • Cataract of left eye   • Cataract   • Chronic fatigue   • Vitamin D deficiency   • Paresthesia of both feet   • Dyslipidemia   • Prediabetes   • History of tobacco use, presenting hazards to health   • Need for vaccination   • Diverticulitis   • Abdominal pain, LLQ (left lower quadrant)   • Gastroesophageal reflux disease with esophagitis   • Dyspepsia   • Infiltrating ductal carcinoma of left breast (CMS/HCC)   • S/P CABG x 4   • Skin flap necrosis (CMS/HCC)   • Osteopenia   • Preop cardiovascular exam   • Diverticulitis of sigmoid colon   • Renovascular hypertension   • Type 2 diabetes mellitus without complication, without long-term current use of insulin (CMS/HCC)   • Postoperative anemia due to acute blood loss   • Encounter for follow-up surveillance of breast cancer   • Use of anastrozole        I have personally reviewed and updated the following Epic sections Current medications, Allergies, Problem list, Past Medical History, Past Surgical History, Social History and Family History.    Medications:  Outpatient Medications Marked as Taking for the 3/1/23 encounter (Office Visit) with Rodney Webber MD   Medication Sig Dispense Refill   • alendronate (FOSAMAX) 70 MG tablet TAKE 1 TABLET BY MOUTH ONE TIME A WEEK 36 tablet 0   • folic acid (FOLATE) 1 MG tablet TAKE 1 TABLET BY MOUTH EVERY DAY 90 tablet 0   • fenofibrate (TRICOR) 48 MG tablet Take 1 tablet by mouth daily. 90 tablet 3   • levothyroxine 75 MCG tablet TAKE 1 TABLET BY MOUTH EVERY DAY 90 tablet 0   • cilostazol  (PLETAL) 50 MG tablet TAKE 1 TABLET BY MOUTH TWO TIMES A DAY 60 tablet 11   • rosuvastatin (CRESTOR) 40 MG tablet TAKE 1 TABLET BY MOUTH EVERY DAY 90 tablet 0   • anastrozole (ARIMIDEX) 1 MG tablet Take 1 tablet by mouth daily. 90 tablet 3   • pantoprazole (PROTONIX) 40 MG tablet Take 1 tablet by mouth in the morning and 1 tablet in the evening. 180 tablet 3   • metFORMIN (GLUCOPHAGE) 500 MG tablet Take 1 tablet by mouth in the morning and 1 tablet in the evening. Take with meals. 180 tablet 3   • metoPROLOL tartrate (LOPRESSOR) 25 MG tablet TAKE 1/2 TABLET BY MOUTH EVERY TWELVE HOURS 90 tablet 3   • aspirin 81 MG EC tablet Take 1 tablet by mouth daily. Do not start before November 2, 2021. 30 tablet 11   • ferrous sulfate 325 (65 FE) MG tablet Take 325 mg by mouth daily (with breakfast).     • Cholecalciferol (VITAMIN D3) 2000 units capsule Take 2,000 Units by mouth daily.     • CALCIUM CARBONATE-VITAMIN D PO Take by mouth 2 times daily. Vitamin D3         PHYSICAL EXAMINATION:  Vitals:    03/01/23 0919   BP: 122/74   Pulse: 79       Review of Systems   Constitutional: Negative.   Cardiovascular: Positive for claudication and palpitations.   Respiratory: Negative.    Hematologic/Lymphatic: Negative.    Skin: Negative.    Musculoskeletal: Negative.    Gastrointestinal: Negative.    Neurological: Positive for headaches and light-headedness.       CONSTITUTIONAL:  Well-developed, well-nourished.  PSYCHIATRIC/NEUROLOGIC:  Comfortable and in no apparent distress.  Alert and oriented x3.  In a good mood.  SKIN:  Soft, warm, and dry, without rashes or bruises.    LUNGS:  Respiratory effort is unlabored.  Lungs are clear without wheezing or crackles.  CARDIAC EXAM:  The PMI is non-displaced.  Auscultation reveals a normal S1, normal S2.  No S3 or S4.  No murmurs, clicks, or pericardial friction rub.   EXTREMITIES:  Without clubbing, cyanosis or edema.  Femoral and pedal pulses intact.     ASSESSMENT/PLAN:  CAD s/p CABG x 4  5/2020 SVG  to PDA, SVG sequential side-to-side to D1, end-to-side to M1, LIMA  to LAD. NM Stress Test 02/2023 was normal and did not show any evidence of ischemia. On 81 mg aspirin, 40 mg rosuvastatin daily, and 12.5 lopressor BID.     Palpitations: Cardiac Event Monitor 01/04/2023 normal sinus rhythm with average HR of 79bpm, with a 1% burden of PVCs and <1% burden of PAC's, 1 episode of SVT / possible atrial tachycardia.       PAD: US Arterial Duplex from 06/2022 showed 50-99% stenosis of the left proximal popliteal artery and complete occlusion of the right mid superficial femoral artery. Patient complained of claudication with 30 minutes of walking. On 50 mg pletal BID, aspirin, and 40 mg rosuvastatin daily.      Hypertension: Controlled in clinic, 122/74. On 12.5 mg lopressor BID.     Hyperlipidemia: Most recent FLP reviewed from 10/10/22. Chol 178, HDL 50, LDL 83, . On 40 mg rosuvastatin and 48 mg fenofibrate daily.      Diabetes Mellitus: A1C of 5.4% as of 10/10/22. On 500 mg metformin BID.       Return in about 1 year (around 3/1/2024). or sooner if problems arise.      On 3/1/2023, IDianna RN scribed the services personally performed by Rodney Webber MD    The documentation recorded by the scribe accurately and completely reflects the service(s) I personally performed and the decisions made by me.

## 2025-04-03 ENCOUNTER — OFFICE VISIT (OUTPATIENT)
Dept: CARDIOLOGY | Facility: CLINIC | Age: 75
End: 2025-04-03
Payer: MEDICARE

## 2025-04-03 VITALS
DIASTOLIC BLOOD PRESSURE: 58 MMHG | BODY MASS INDEX: 24.72 KG/M2 | OXYGEN SATURATION: 100 % | HEIGHT: 71 IN | SYSTOLIC BLOOD PRESSURE: 106 MMHG | HEART RATE: 72 BPM | WEIGHT: 176.6 LBS

## 2025-04-03 DIAGNOSIS — E11.65 TYPE 2 DIABETES MELLITUS WITH HYPERGLYCEMIA, WITHOUT LONG-TERM CURRENT USE OF INSULIN: ICD-10-CM

## 2025-04-03 DIAGNOSIS — C20 RECTAL CANCER: ICD-10-CM

## 2025-04-03 DIAGNOSIS — I65.23 BILATERAL CAROTID ARTERY STENOSIS: ICD-10-CM

## 2025-04-03 DIAGNOSIS — I25.10 CORONARY ARTERY DISEASE INVOLVING NATIVE CORONARY ARTERY OF NATIVE HEART WITHOUT ANGINA PECTORIS: Primary | ICD-10-CM

## 2025-04-03 DIAGNOSIS — I44.1 2ND DEGREE AV BLOCK: ICD-10-CM

## 2025-04-03 RX ORDER — LISINOPRIL 2.5 MG/1
2.5 TABLET ORAL
Qty: 30 TABLET | Refills: 11 | Status: SHIPPED | OUTPATIENT
Start: 2025-04-03

## 2025-04-03 RX ORDER — CLOPIDOGREL BISULFATE 75 MG/1
75 TABLET ORAL DAILY
Qty: 30 TABLET | Refills: 11 | Status: SHIPPED | OUTPATIENT
Start: 2025-04-03

## 2025-04-03 RX ORDER — ATORVASTATIN CALCIUM 40 MG/1
40 TABLET, FILM COATED ORAL DAILY
Qty: 90 TABLET | Refills: 3 | Status: SHIPPED | OUTPATIENT
Start: 2025-04-03

## 2025-04-03 NOTE — PROGRESS NOTES
Subjective:     Encounter Date:04/03/2025      Patient ID: Attila Viramontes is a 74 y.o. male.    Chief Complaint: follow up CAD s/p CABG     History of Present Illness    The patient presents to follow-up regarding his coronary artery disease.  He initially followed with Dr. Hameed due to evidence of second-degree type II heart block as well as pauses up to 6 seconds on holter.  Initially pacemaker implantation was offered but this did not happen due to other medical issues.  He was also asymptomatic to this and never had any syncope or near syncope.    The patient has a history of carotid artery disease, TIA and was a previous smoker.  Due to exertional dyspnea Dr. Hameed ordered a Lexiscan which was abnormal.  Cath revealed multivessel disease.  He has a normal LVEF on echocardiogram.    He is also receiving chemotherapy for rectal cancer.  He tells me he has a double ostomy and chemotherapy is targeting a pelvic lesion and lymph nodes.     He eventually underwent three-vessel CABG with Dr. Back in February 2025.    He is a type II diabetic as well.    He has CKD stage III and chronic anemia.    Today the patient reports he is doing very well after CABG.  He states for about 7 days after a chemotherapy treatment he is very tired and has no energy but then this gradually improves until the next treatment.  He denies any chest pain, shortness of breath, palpitations, orthopnea, PND, syncope or presyncope.  He reports his systolic blood pressure is typically in the low 100s to 120s.  Notes per Dr. Hebert indicate SGLT2 inhibitor was considered recently for his diabetes but this ended up being cost prohibitive.    The following portions of the patient's history were reviewed and updated as appropriate: allergies, current medications, past family history, past medical history, past social history, past surgical history and problem list.    Review of Systems   Constitutional: Positive for malaise/fatigue.  "  Cardiovascular:  Negative for chest pain, claudication, dyspnea on exertion, leg swelling, near-syncope, orthopnea, palpitations, paroxysmal nocturnal dyspnea and syncope.   Respiratory:  Negative for cough and shortness of breath.    Hematologic/Lymphatic: Does not bruise/bleed easily.   Musculoskeletal:  Negative for falls.   Gastrointestinal:  Negative for bloating.   Neurological:  Positive for weakness. Negative for dizziness and light-headedness.       No Known Allergies    Current Outpatient Medications:     aspirin 81 MG EC tablet, Take 1 tablet by mouth Daily., Disp: , Rfl:     atorvastatin (LIPITOR) 40 MG tablet, Take 1 tablet by mouth Daily., Disp: 90 tablet, Rfl: 3    clopidogrel (PLAVIX) 75 MG tablet, Take 1 tablet by mouth Daily., Disp: 30 tablet, Rfl: 11    diphenoxylate-atropine (LOMOTIL) 2.5-0.025 MG per tablet, Take 2 tablets by mouth 4 (Four) Times a Day As Needed., Disp: 240 tablet, Rfl: 5    glimepiride (Amaryl) 1 MG tablet, Take 1 tablet by mouth Every Morning Before Breakfast., Disp: 30 tablet, Rfl: 2    lisinopril (PRINIVIL,ZESTRIL) 2.5 MG tablet, Take 1 tablet by mouth Daily., Disp: 30 tablet, Rfl: 11    loperamide (IMODIUM) 2 MG capsule, Take 1 capsule by mouth 4 (Four) Times a Day As Needed for Diarrhea., Disp: , Rfl:     metoprolol tartrate (LOPRESSOR) 25 MG tablet, Take 1 tablet by mouth Every 12 (Twelve) Hours., Disp: 60 tablet, Rfl: 2    pantoprazole (PROTONIX) 40 MG EC tablet, Take 1 tablet by mouth Every Morning., Disp: 30 tablet, Rfl: 0         Objective:    /58   Pulse 72   Ht 179.1 cm (70.51\")   Wt 80.1 kg (176 lb 9.6 oz)   SpO2 100%   BMI 24.97 kg/m²        Vitals and nursing note reviewed.   Constitutional:       General: Not in acute distress.     Appearance: Well-developed and not in distress. Not diaphoretic.   Neck:      Vascular: No JVD.   Pulmonary:      Effort: Pulmonary effort is normal. No respiratory distress.      Breath sounds: Normal breath sounds. "   Cardiovascular:      Normal rate. Regular rhythm.      Murmurs: There is no murmur.   Edema:     Peripheral edema absent.   Abdominal:      Tenderness: There is no abdominal tenderness.   Skin:     General: Skin is warm and dry.   Neurological:      Mental Status: Alert and oriented to person, place, and time.         Lab Review:   Lab Results   Component Value Date    GLUCOSE 119 (H) 03/25/2025    BUN 26 (H) 03/25/2025    CREATININE 1.57 (H) 03/25/2025     03/25/2025    K 4.9 03/25/2025     03/25/2025    CALCIUM 9.2 03/25/2025    PROTEINTOT 5.2 (L) 02/15/2025    ALBUMIN 3.4 (L) 02/17/2025    ALT 11 02/15/2025    AST 21 02/15/2025    ALKPHOS 76 02/15/2025    BILITOT 0.2 02/15/2025    GLOB 1.9 02/15/2025    AGRATIO 1.7 02/15/2025    BCR 16.6 03/25/2025    ANIONGAP 13.0 03/25/2025    EGFR 46.3 (L) 03/25/2025        Lab Results   Component Value Date    WBC 9.39 02/20/2025    HGB 8.4 (L) 02/20/2025    HCT 25.9 (L) 02/20/2025    MCV 90.9 02/20/2025     02/20/2025        Lab Results   Component Value Date    CHOL 94 05/02/2024    CHLPL 118 11/16/2020    TRIG 144 05/02/2024    HDL 29 (L) 05/02/2024    LDL 40 05/02/2024        Lab Results   Component Value Date    HGBA1C 7.80 (H) 02/11/2025              ECG 12 Lead    Date/Time: 4/3/2025 4:39 PM  Performed by: Jessica Byers APRN    Authorized by: Jessica Byers APRN  Comparison: compared with previous ECG from 2/17/2025  Similar to previous ECG  Rhythm: sinus rhythm  BPM: 72  Conduction: right bundle branch block    Clinical impression: abnormal EKG          Results for orders placed during the hospital encounter of 02/15/25    Intra-Op TAVR Transesophageal Echo    Interpretation Summary    Left ventricular ejection fraction appears to be 61 - 65%.    Left ventricular wall thickness is consistent with moderate septal asymmetric hypertrophy.    There is mild calcification of the aortic valve.    No hemodynamically significant (greater than mild)  valvular abnormalities are noted.    Postprocedure images reveal well-preserved left ventricular systolic function with no wall motion abnormalities identified.      Assessment:      Problem List Items Addressed This Visit          Cardiac and Vasculature    2nd degree AV block    Bilateral carotid artery stenosis    Overview   Added automatically from request for surgery [id number]         CAD (coronary artery disease) - Primary    Overview   Coronary artery bypass grafting-3 vessel (left internal mammary artery/left anterior descending, reverse saphenous vein graft/obtuse marginal, and reverse saphenous vein graft/posterior descending artery) - 2/15/2025 per Dr. Back (non ACS indication)          Relevant Medications    clopidogrel (PLAVIX) 75 MG tablet       Endocrine and Metabolic    Type 2 diabetes mellitus with hyperglycemia, without long-term current use of insulin       Hematology and Neoplasia    Rectal cancer       Plan:     The patient is doing well from a cardiovascular standpoint.  He is not having any angina.  He will continue his current medical therapy including aspirin, Plavix (can hold temporarily for surgeries or procedures after 3 months post op given non ACS indication for surgery), high intensity statin, ACE inhibitor and beta-blocker (this was prescribed at discharge following CABG).  We will continue his beta-blocker for now as he is not having any syncope or near syncope and is in normal sinus rhythm with normal heart rate, but we will have a low threshold to discontinue this medication if he has any recurrent high-grade AV block with symptomatic bradycardia, pauses, syncope or near syncope.  His blood pressure is well-controlled.  His LDL is well-controlled at 40.  His A1c is elevated at 7.8.  He will continue to work with his primary care physician for control of his diabetes. PCP notes indicate SGLT2 inhibitor was cost prohibitive.  He will follow-up with Dr. Hameed in February 2026, but  call sooner with symptoms or concerns.    I spent 34 minutes caring for Attila on this date of service. This time includes time spent by me in the following activities: preparing for the visit, reviewing tests, performing a medically appropriate examination and/or evaluation, counseling and educating the patient/family/caregiver, and documenting information in the medical record

## 2025-05-01 ENCOUNTER — TELEPHONE (OUTPATIENT)
Dept: VASCULAR SURGERY | Facility: CLINIC | Age: 75
End: 2025-05-01
Payer: MEDICARE

## 2025-05-01 NOTE — TELEPHONE ENCOUNTER
SPOKE WITH PT AS A REMINDER OF 0730 ARRIVAL FOR 0800 [facility] THEN TO SEE CHAPARRO AT 0900

## 2025-05-02 ENCOUNTER — HOSPITAL ENCOUNTER (OUTPATIENT)
Dept: ULTRASOUND IMAGING | Facility: HOSPITAL | Age: 75
Discharge: HOME | End: 2025-05-02
Payer: MEDICARE

## 2025-05-02 ENCOUNTER — OFFICE VISIT (OUTPATIENT)
Dept: VASCULAR SURGERY | Facility: CLINIC | Age: 75
End: 2025-05-02
Payer: MEDICARE

## 2025-05-02 VITALS
BODY MASS INDEX: 25.2 KG/M2 | DIASTOLIC BLOOD PRESSURE: 64 MMHG | WEIGHT: 180 LBS | HEART RATE: 63 BPM | OXYGEN SATURATION: 97 % | HEIGHT: 71 IN | SYSTOLIC BLOOD PRESSURE: 106 MMHG

## 2025-05-02 DIAGNOSIS — I65.22 CAROTID OCCLUSION, LEFT: ICD-10-CM

## 2025-05-02 DIAGNOSIS — I65.23 CAROTID STENOSIS, BILATERAL: ICD-10-CM

## 2025-05-02 DIAGNOSIS — E78.2 MIXED HYPERLIPIDEMIA: ICD-10-CM

## 2025-05-02 DIAGNOSIS — E11.9 TYPE 2 DIABETES MELLITUS WITHOUT COMPLICATION, WITHOUT LONG-TERM CURRENT USE OF INSULIN: ICD-10-CM

## 2025-05-02 DIAGNOSIS — I65.21 STENOSIS OF RIGHT CAROTID ARTERY: Primary | ICD-10-CM

## 2025-05-02 PROCEDURE — 93880 EXTRACRANIAL BILAT STUDY: CPT

## 2025-05-02 NOTE — LETTER
May 2, 2025     EDUARDO Hebert MD  [address] [facility] [address]  [address]  [facility] [address]    Patient: Attila Viramontes   YOB: 1950   Date of Visit: 5/2/2025     Dear EDUARDO Hebert MD:       Thank you for referring Attila Viramontes to me for evaluation. Below are the relevant portions of my assessment and plan of care.    If you have questions, please do not hesitate to call me. I look forward to following Attila along with you.         Sincerely,        DELANEY Keith        CC: No Recipients    Elsie Quintero APRN  05/02/25 1051  Sign when Signing Visit  5/2/2025       EDUARDO Hebert MD  [address] [facility] [address]  [facility] [address]    Attila Viramontes  1950    Chief Complaint   Patient presents with   • Follow-up     6 month follow up w/ testing. Last seen 11/1/24. Patient denies any stroke type symptoms. Patient does not have med list and not sure of medications.        Dear EDUARDO Hebert MD     I had the pleasure of seeing your patient Attila Viramontes in the office today.  As you recall, Attila Viramontes is a 74 y.o.  male you are following for routine health maintenance.  In 2020, he was transferred to [place] after onset of right sided hemiparesis, global aphasia, and right lower facial drooping.  He was found to have a left carotid occlusion.  He did undergo right TCAR on 10/16/2023.  Currently he is doing well and denies any strokelike symptoms.  He did undergo CABG x 3 on 2/15/2025 and is feeling great.  He has also quit smoking which is great news.  He is maintained on aspirin and Lipitor.  He denies any strokelike symptoms.  He did have noninvasive testing performed today, which I did review in office.        Review of Systems   Constitutional: Negative.    HENT: Negative.     Eyes: Negative.    Respiratory: Negative.     Cardiovascular: Negative.    Gastrointestinal: Negative.    Endocrine: Negative.   "  Genitourinary: Negative.    Musculoskeletal: Negative.    Skin: Negative.    Allergic/Immunologic: Negative.    Neurological: Negative.    Hematological: Negative.    Psychiatric/Behavioral: Negative.     All other systems reviewed and are negative.       /64   Pulse 63   Ht 179.1 cm (70.5\")   Wt 81.6 kg (180 lb)   SpO2 97%   BMI 25.46 kg/m²       Physical Exam  Vitals and nursing note reviewed.   Constitutional:       General: He is not in acute distress.     Appearance: Normal appearance. He is not diaphoretic.   HENT:      Head: Normocephalic. No right periorbital erythema or left periorbital erythema.      Nose: Nose normal.   Eyes:      General: No scleral icterus.     Pupils: Pupils are equal.   Cardiovascular:      Rate and Rhythm: Normal rate and regular rhythm.      Pulses: Normal pulses.           Femoral pulses are 2+ on the right side and 2+ on the left side.       Popliteal pulses are 2+ on the right side and 2+ on the left side.        Dorsalis pedis pulses are 2+ on the right side and 2+ on the left side.        Posterior tibial pulses are 2+ on the right side and 2+ on the left side.      Heart sounds: Normal heart sounds. No murmur heard.  Pulmonary:      Effort: Pulmonary effort is normal. No respiratory distress.      Breath sounds: Normal breath sounds.   Abdominal:      General: Bowel sounds are normal. There is no distension.      Palpations: Abdomen is soft.      Tenderness: There is no abdominal tenderness. There is no guarding.      Comments: Colostomy and ileostomy bags in place   Musculoskeletal:         General: No swelling or tenderness. Normal range of motion.      Cervical back: Normal range of motion and neck supple.      Right lower leg: No edema.      Left lower leg: No edema.   Feet:      Right foot:      Skin integrity: Skin integrity normal.      Left foot:      Skin integrity: Skin integrity normal.   Skin:     General: Skin is warm and dry.      Findings: No " erythema or rash.   Neurological:      General: No focal deficit present.      Mental Status: He is alert and oriented to person, place, and time. Mental status is at baseline.      Cranial Nerves: No cranial nerve deficit.      Gait: Gait normal.   Psychiatric:         Attention and Perception: Attention normal.         Mood and Affect: Mood normal.         Behavior: Behavior normal.         Thought Content: Thought content normal.         Judgment: Judgment normal.       Diagnostic data:  Noninvasive testing including a carotid duplex shows less than 50% right carotid stenosis and a known left carotid occlusion.  Right carotid stent is widely patent.  There is antegrade bilateral vertebral flow.            Patient Active Problem List   Diagnosis   • Rectal cancer   • Current every day smoker   • Impaired gait and mobility   • Hypertension   • 2nd degree AV block   • Tobacco abuse   • Normocytic anemia   • Chemotherapy induced neutropenia   • History of urinary diversion procedure   • Cancer related pain   • Bilateral carotid artery stenosis   • Carotid occlusion, left   • Type 2 diabetes mellitus with kidney complication, without long-term current use of insulin   • Iron deficiency anemia   • Ileostomy in place   • Colostomy in place   • Function kidney decreased   • Stage 3a chronic kidney disease   • Preop testing   • Symptomatic stenosis of right carotid artery   • Stenosis of right carotid artery   • Overweight (BMI 25.0-29.9)   • Abnormal stress test   • HENDERSON (dyspnea on exertion)   • Coronary artery disease of native artery of native heart with stable angina pectoris   • Hydronephrosis   • CAD (coronary artery disease)   • Colon cancer   • CKD (chronic kidney disease)   • Personal history of nicotine dependence   • Type 2 diabetes mellitus with hyperglycemia, without long-term current use of insulin        Diagnosis Plan   1. Stenosis of right carotid artery  US Carotid Bilateral      2. Carotid occlusion,  left        3. Type 2 diabetes mellitus without complication, without long-term current use of insulin        4. Mixed hyperlipidemia                    Plan: After thoroughly evaluating Attila Viramontes, I believe the best course of action is to remain conservative from vascular surgery standpoint.  Currently he is doing well and denies any strokelike symptoms.  I did review his testing which shows less than 50% right carotid stenosis with a patent right carotid stent and a known left carotid occlusion.  We will see him back in 6 months with a repeat carotid duplex for continued surveillance.  I did discuss vascular risk factors as it pertains to the progression of vascular disease including controlling his hypertension and hyperlipidemia.  His blood pressure is stable on his current medications.  He should continue his aspirin 81 mg daily and Lipitor 40 mg daily in addition to his other medications.  He did quit smoking which is great news and he is feeling much better between that and his open heart surgery.  Body mass index is 25.46 kg/m².  This was all discussed in full with complete understanding.    Thank you for allowing me to participate in the care of your patient.  Please do not hesitate to call with any questions or concerns.  We will keep you aware of any further encounters with Attila Viramontes.        Sincerely yours,         DELANEY Keith D Ryan, MD

## 2025-05-02 NOTE — PROGRESS NOTES
"5/2/2025       EDUARDO Hebert MD  [address] [facility] [address]  [facility] [address]    Attila Viramontes  1950    Chief Complaint   Patient presents with    Follow-up     6 month follow up w/ testing. Last seen 11/1/24. Patient denies any stroke type symptoms. Patient does not have med list and not sure of medications.        Dear EDUARDO Hebert MD     I had the pleasure of seeing your patient Attila Viramontes in the office today.  As you recall, Attila Viramontes is a 74 y.o.  male you are following for routine health maintenance.  In 2020, he was transferred to [place] after onset of right sided hemiparesis, global aphasia, and right lower facial drooping.  He was found to have a left carotid occlusion.  He did undergo right TCAR on 10/16/2023.  Currently he is doing well and denies any strokelike symptoms.  He did undergo CABG x 3 on 2/15/2025 and is feeling great.  He has also quit smoking which is great news.  He is maintained on aspirin and Lipitor.  He denies any strokelike symptoms.  He did have noninvasive testing performed today, which I did review in office.        Review of Systems   Constitutional: Negative.    HENT: Negative.     Eyes: Negative.    Respiratory: Negative.     Cardiovascular: Negative.    Gastrointestinal: Negative.    Endocrine: Negative.    Genitourinary: Negative.    Musculoskeletal: Negative.    Skin: Negative.    Allergic/Immunologic: Negative.    Neurological: Negative.    Hematological: Negative.    Psychiatric/Behavioral: Negative.     All other systems reviewed and are negative.       /64   Pulse 63   Ht 179.1 cm (70.5\")   Wt 81.6 kg (180 lb)   SpO2 97%   BMI 25.46 kg/m²       Physical Exam  Vitals and nursing note reviewed.   Constitutional:       General: He is not in acute distress.     Appearance: Normal appearance. He is not diaphoretic.   HENT:      Head: Normocephalic. No right periorbital erythema or left periorbital " erythema.      Nose: Nose normal.   Eyes:      General: No scleral icterus.     Pupils: Pupils are equal.   Cardiovascular:      Rate and Rhythm: Normal rate and regular rhythm.      Pulses: Normal pulses.           Femoral pulses are 2+ on the right side and 2+ on the left side.       Popliteal pulses are 2+ on the right side and 2+ on the left side.        Dorsalis pedis pulses are 2+ on the right side and 2+ on the left side.        Posterior tibial pulses are 2+ on the right side and 2+ on the left side.      Heart sounds: Normal heart sounds. No murmur heard.  Pulmonary:      Effort: Pulmonary effort is normal. No respiratory distress.      Breath sounds: Normal breath sounds.   Abdominal:      General: Bowel sounds are normal. There is no distension.      Palpations: Abdomen is soft.      Tenderness: There is no abdominal tenderness. There is no guarding.      Comments: Colostomy and ileostomy bags in place   Musculoskeletal:         General: No swelling or tenderness. Normal range of motion.      Cervical back: Normal range of motion and neck supple.      Right lower leg: No edema.      Left lower leg: No edema.   Feet:      Right foot:      Skin integrity: Skin integrity normal.      Left foot:      Skin integrity: Skin integrity normal.   Skin:     General: Skin is warm and dry.      Findings: No erythema or rash.   Neurological:      General: No focal deficit present.      Mental Status: He is alert and oriented to person, place, and time. Mental status is at baseline.      Cranial Nerves: No cranial nerve deficit.      Gait: Gait normal.   Psychiatric:         Attention and Perception: Attention normal.         Mood and Affect: Mood normal.         Behavior: Behavior normal.         Thought Content: Thought content normal.         Judgment: Judgment normal.       Diagnostic data:  Noninvasive testing including a carotid duplex shows less than 50% right carotid stenosis and a known left carotid occlusion.   Right carotid stent is widely patent.  There is antegrade bilateral vertebral flow.            Patient Active Problem List   Diagnosis    Rectal cancer    Current every day smoker    Impaired gait and mobility    Hypertension    2nd degree AV block    Tobacco abuse    Normocytic anemia    Chemotherapy induced neutropenia    History of urinary diversion procedure    Cancer related pain    Bilateral carotid artery stenosis    Carotid occlusion, left    Type 2 diabetes mellitus with kidney complication, without long-term current use of insulin    Iron deficiency anemia    Ileostomy in place    Colostomy in place    Function kidney decreased    Stage 3a chronic kidney disease    Preop testing    Symptomatic stenosis of right carotid artery    Stenosis of right carotid artery    Overweight (BMI 25.0-29.9)    Abnormal stress test    HENDERSON (dyspnea on exertion)    Coronary artery disease of native artery of native heart with stable angina pectoris    Hydronephrosis    CAD (coronary artery disease)    Colon cancer    CKD (chronic kidney disease)    Personal history of nicotine dependence    Type 2 diabetes mellitus with hyperglycemia, without long-term current use of insulin        Diagnosis Plan   1. Stenosis of right carotid artery  US Carotid Bilateral      2. Carotid occlusion, left        3. Type 2 diabetes mellitus without complication, without long-term current use of insulin        4. Mixed hyperlipidemia                    Plan: After thoroughly evaluating Attila Viramontes, I believe the best course of action is to remain conservative from vascular surgery standpoint.  Currently he is doing well and denies any strokelike symptoms.  I did review his testing which shows less than 50% right carotid stenosis with a patent right carotid stent and a known left carotid occlusion.  We will see him back in 6 months with a repeat carotid duplex for continued surveillance.  I did discuss vascular risk factors as it pertains  to the progression of vascular disease including controlling his hypertension and hyperlipidemia.  His blood pressure is stable on his current medications.  He should continue his aspirin 81 mg daily and Lipitor 40 mg daily in addition to his other medications.  He did quit smoking which is great news and he is feeling much better between that and his open heart surgery.  Body mass index is 25.46 kg/m².  This was all discussed in full with complete understanding.    Thank you for allowing me to participate in the care of your patient.  Please do not hesitate to call with any questions or concerns.  We will keep you aware of any further encounters with Attila Viramontes.        Sincerely yours,         DELANEY Keith D Ryan, MD

## 2025-05-07 ENCOUNTER — OFFICE VISIT (OUTPATIENT)
Dept: CARDIAC SURGERY | Facility: CLINIC | Age: 75
End: 2025-05-07
Payer: MEDICARE

## 2025-05-07 VITALS
BODY MASS INDEX: 25.28 KG/M2 | SYSTOLIC BLOOD PRESSURE: 109 MMHG | OXYGEN SATURATION: 98 % | HEART RATE: 79 BPM | WEIGHT: 180.6 LBS | DIASTOLIC BLOOD PRESSURE: 62 MMHG | HEIGHT: 71 IN

## 2025-05-07 DIAGNOSIS — N18.31 TYPE 2 DIABETES MELLITUS WITH STAGE 3A CHRONIC KIDNEY DISEASE, WITHOUT LONG-TERM CURRENT USE OF INSULIN: ICD-10-CM

## 2025-05-07 DIAGNOSIS — Z87.891 FORMER SMOKER: ICD-10-CM

## 2025-05-07 DIAGNOSIS — Z95.1 S/P CABG (CORONARY ARTERY BYPASS GRAFT): Primary | ICD-10-CM

## 2025-05-07 DIAGNOSIS — E11.22 TYPE 2 DIABETES MELLITUS WITH STAGE 3A CHRONIC KIDNEY DISEASE, WITHOUT LONG-TERM CURRENT USE OF INSULIN: ICD-10-CM

## 2025-05-07 DIAGNOSIS — D64.9 NORMOCYTIC ANEMIA: ICD-10-CM

## 2025-05-07 DIAGNOSIS — C20 RECTAL CANCER: ICD-10-CM

## 2025-05-08 ENCOUNTER — LAB (OUTPATIENT)
Dept: LAB | Facility: HOSPITAL | Age: 75
End: 2025-05-08
Payer: MEDICARE

## 2025-05-08 ENCOUNTER — OFFICE VISIT (OUTPATIENT)
Dept: INTERNAL MEDICINE | Facility: CLINIC | Age: 75
End: 2025-05-08
Payer: MEDICARE

## 2025-05-08 VITALS
HEIGHT: 70 IN | BODY MASS INDEX: 26.05 KG/M2 | DIASTOLIC BLOOD PRESSURE: 78 MMHG | WEIGHT: 182 LBS | HEART RATE: 82 BPM | SYSTOLIC BLOOD PRESSURE: 124 MMHG | OXYGEN SATURATION: 99 %

## 2025-05-08 DIAGNOSIS — Z13.6 HYPERTENSION SCREEN: ICD-10-CM

## 2025-05-08 DIAGNOSIS — Z93.3 COLOSTOMY IN PLACE: ICD-10-CM

## 2025-05-08 DIAGNOSIS — Z00.00 ANNUAL PHYSICAL EXAM: ICD-10-CM

## 2025-05-08 DIAGNOSIS — Z13.31 DEPRESSION SCREEN: ICD-10-CM

## 2025-05-08 DIAGNOSIS — N18.31 TYPE 2 DIABETES MELLITUS WITH STAGE 3A CHRONIC KIDNEY DISEASE, WITHOUT LONG-TERM CURRENT USE OF INSULIN: Primary | ICD-10-CM

## 2025-05-08 DIAGNOSIS — E78.2 MIXED HYPERLIPIDEMIA: ICD-10-CM

## 2025-05-08 DIAGNOSIS — E11.22 TYPE 2 DIABETES MELLITUS WITH STAGE 3A CHRONIC KIDNEY DISEASE, WITHOUT LONG-TERM CURRENT USE OF INSULIN: ICD-10-CM

## 2025-05-08 DIAGNOSIS — N18.31 STAGE 3A CHRONIC KIDNEY DISEASE: ICD-10-CM

## 2025-05-08 DIAGNOSIS — N18.31 TYPE 2 DIABETES MELLITUS WITH STAGE 3A CHRONIC KIDNEY DISEASE, WITHOUT LONG-TERM CURRENT USE OF INSULIN: ICD-10-CM

## 2025-05-08 DIAGNOSIS — Z91.81 AT LOW RISK FOR FALL: ICD-10-CM

## 2025-05-08 DIAGNOSIS — E11.22 TYPE 2 DIABETES MELLITUS WITH STAGE 3A CHRONIC KIDNEY DISEASE, WITHOUT LONG-TERM CURRENT USE OF INSULIN: Primary | ICD-10-CM

## 2025-05-08 DIAGNOSIS — E66.3 OVERWEIGHT (BMI 25.0-29.9): ICD-10-CM

## 2025-05-08 LAB
ALBUMIN UR-MCNC: 3.1 MG/DL
ANION GAP SERPL CALCULATED.3IONS-SCNC: 12 MMOL/L (ref 5–15)
BUN SERPL-MCNC: 25 MG/DL (ref 8–23)
BUN/CREAT SERPL: 18.1 (ref 7–25)
CALCIUM SPEC-SCNC: 9.4 MG/DL (ref 8.6–10.5)
CHLORIDE SERPL-SCNC: 104 MMOL/L (ref 98–107)
CHOLEST SERPL-MCNC: 144 MG/DL (ref 0–200)
CO2 SERPL-SCNC: 21 MMOL/L (ref 22–29)
CREAT SERPL-MCNC: 1.38 MG/DL (ref 0.76–1.27)
CREAT UR-MCNC: 61.9 MG/DL
EGFRCR SERPLBLD CKD-EPI 2021: 54 ML/MIN/1.73
GLUCOSE SERPL-MCNC: 158 MG/DL (ref 65–99)
HBA1C MFR BLD: 7 % (ref 4.8–5.6)
HDLC SERPL-MCNC: 50 MG/DL (ref 40–60)
LDLC SERPL CALC-MCNC: 56 MG/DL (ref 0–100)
LDLC/HDLC SERPL: 0.92 {RATIO}
MICROALBUMIN/CREAT UR: 50.1 MG/G (ref 0–29)
POTASSIUM SERPL-SCNC: 5.5 MMOL/L (ref 3.5–5.2)
SODIUM SERPL-SCNC: 137 MMOL/L (ref 136–145)
TRIGL SERPL-MCNC: 240 MG/DL (ref 0–150)
VLDLC SERPL-MCNC: 38 MG/DL (ref 5–40)

## 2025-05-08 PROCEDURE — 36415 COLL VENOUS BLD VENIPUNCTURE: CPT

## 2025-05-08 PROCEDURE — 80061 LIPID PANEL: CPT

## 2025-05-08 PROCEDURE — 83036 HEMOGLOBIN GLYCOSYLATED A1C: CPT

## 2025-05-08 PROCEDURE — 82570 ASSAY OF URINE CREATININE: CPT

## 2025-05-08 PROCEDURE — 80048 BASIC METABOLIC PNL TOTAL CA: CPT

## 2025-05-08 PROCEDURE — 82043 UR ALBUMIN QUANTITATIVE: CPT

## 2025-05-08 RX ORDER — GLIMEPIRIDE 1 MG/1
1 TABLET ORAL
Qty: 90 TABLET | Refills: 1 | Status: SHIPPED | OUTPATIENT
Start: 2025-05-08

## 2025-05-08 NOTE — PROGRESS NOTES
Subjective   The ABCs of the Annual Wellness Visit  Medicare Wellness Visit      Attila Viramontes is a 74 y.o. patient who presents for a Medicare Wellness Visit.    The following portions of the patient's history were reviewed and   updated as appropriate: allergies, current medications, past family history, past medical history, past social history, past surgical history, and problem list.    Compared to one year ago, the patient's physical   health is better.    Compared to one year ago, the patient's mental   health is better.    Recent Hospitalizations:  This patient has had a [facility] admission record on file within the last 365 days.  Current Medical Providers:  Patient Care Team:  EDUARDO Hebert MD as PCP - General (Internal Medicine)  Ga Hameed MD as Cardiologist (Cardiology)  Joe Back MD as Surgeon (Cardiothoracic Surgery)    Outpatient Medications Prior to Visit   Medication Sig Dispense Refill    aspirin 81 MG EC tablet Take 1 tablet by mouth Daily.      atorvastatin (LIPITOR) 40 MG tablet Take 1 tablet by mouth Daily. 90 tablet 3    diphenoxylate-atropine (LOMOTIL) 2.5-0.025 MG per tablet Take 2 tablets by mouth 4 (Four) Times a Day As Needed. 240 tablet 5    lisinopril (PRINIVIL,ZESTRIL) 2.5 MG tablet Take 1 tablet by mouth Daily. 30 tablet 11    loperamide (IMODIUM) 2 MG capsule Take 1 capsule by mouth 4 (Four) Times a Day As Needed for Diarrhea.      metoprolol tartrate (LOPRESSOR) 25 MG tablet Take 1 tablet by mouth Every 12 (Twelve) Hours. 60 tablet 2    glimepiride (Amaryl) 1 MG tablet Take 1 tablet by mouth Every Morning Before Breakfast. 30 tablet 2     No facility-administered medications prior to visit.     No opioid medication identified on active medication list. I have reviewed chart for other potential  high risk medication/s and harmful drug interactions in the elderly.      Aspirin is on active medication list. Aspirin use is indicated based on review of  "current medical condition/s. Pros and cons of this therapy have been discussed today. Benefits of this medication outweigh potential harm.  Patient has been encouraged to continue taking this medication.  .      Patient Active Problem List   Diagnosis    Rectal cancer    Current every day smoker    Impaired gait and mobility    Hypertension    2nd degree AV block    Tobacco abuse    Normocytic anemia    Chemotherapy induced neutropenia    History of urinary diversion procedure    Cancer related pain    Bilateral carotid artery stenosis    Carotid occlusion, left    Type 2 diabetes mellitus with kidney complication, without long-term current use of insulin    Iron deficiency anemia    Ileostomy in place    Colostomy in place    Function kidney decreased    Stage 3a chronic kidney disease    Preop testing    Symptomatic stenosis of right carotid artery    Stenosis of right carotid artery    Overweight (BMI 25.0-29.9)    Abnormal stress test    HENDERSON (dyspnea on exertion)    Coronary artery disease of native artery of native heart with stable angina pectoris    Hydronephrosis    CAD (coronary artery disease)    Colon cancer    CKD (chronic kidney disease)    Former smoker    Type 2 diabetes mellitus with hyperglycemia, without long-term current use of insulin    S/P CABG (coronary artery bypass graft)     Advance Care Planning Advance Directive is not on file.  ACP discussion was held with the patient during this visit. Patient does not have an advance directive, declines further assistance.            Objective   Vitals:    05/08/25 0945   BP: 124/78   BP Location: Right arm   Patient Position: Sitting   Cuff Size: Adult   Pulse: 82   SpO2: 99%   Weight: 82.6 kg (182 lb)   Height: 177.8 cm (70\")       Estimated body mass index is 26.11 kg/m² as calculated from the following:    Height as of this encounter: 177.8 cm (70\").    Weight as of this encounter: 82.6 kg (182 lb).    BMI is >= 25 and <30. (Overweight) The " following options were offered after discussion;: exercise counseling/recommendations and nutrition counseling/recommendations           Does the patient have evidence of cognitive impairment? No  Lab Results   Component Value Date    HGBA1C 7.80 (H) 2025                                                                                                Health  Risk Assessment    Smoking Status:  Social History     Tobacco Use   Smoking Status Former    Current packs/day: 0.00    Average packs/day: 0.9 packs/day for 59.0 years (56.0 ttl pk-yrs)    Types: Cigarettes    Start date: 1966    Quit date: 2025    Years since quittin.3    Passive exposure: Past   Smokeless Tobacco Never     Alcohol Consumption:  Social History     Substance and Sexual Activity   Alcohol Use Not Currently       Fall Risk Screen  [facility], and patient is at LOW risk for falls.Assessment completed on:2025    Depression Screening   Little interest or pleasure in doing things? Not at all   Feeling down, depressed, or hopeless? Not at all   PHQ-2 Total Score 0      Health Habits and Functional and Cognitive Screenin/6/2025    12:54 PM   Functional & Cognitive Status   Do you have difficulty preparing food and eating? No   Do you have difficulty bathing yourself, getting dressed or grooming yourself? No   Do you have difficulty using the toilet? No   Do you have difficulty moving around from place to place? No   Do you have trouble with steps or getting out of a bed or a chair? No   Current Diet Limited Junk Food   Dental Exam Not up to date   Eye Exam Not up to date   Exercise (times per week) 3 times per week   Current Exercises Include House Cleaning;Light Weights   Do you need help using the phone?  No   Are you deaf or do you have serious difficulty hearing?  No   Do you need help to go to places out of walking distance? No   Do you need help shopping? No   Do you need help preparing  meals?  No   Do you need help with housework?  No   Do you need help with laundry? No   Do you need help taking your medications? No   Do you need help managing money? No   Do you ever drive or ride in a car without wearing a seat belt? No   Have you felt unusual stress, anger or loneliness in the last month? No   Who do you live with? Alone   If you need help, do you have trouble finding someone available to you? No   Have you been bothered in the last four weeks by sexual problems? No   Do you have difficulty concentrating, remembering or making decisions? No           Age-appropriate Screening Schedule:  Refer to the list below for future screening recommendations based on patient's age, sex and/or medical conditions. Orders for these recommended tests are listed in the plan section. The patient has been provided with a written plan.    Health Maintenance List  Health Maintenance   Topic Date Due    DIABETIC FOOT EXAM  Never done    DIABETIC EYE EXAM  Never done    ANNUAL WELLNESS VISIT  05/02/2025    LIPID PANEL  05/02/2025    URINE MICROALBUMIN-CREATININE RATIO (uACR)  05/02/2025    TDAP/TD VACCINES (1 - Tdap) 11/04/2025 (Originally 9/18/1969)    ZOSTER VACCINE (1 of 2) 11/04/2025 (Originally 9/18/2001)    COVID-19 Vaccine (5 - 2024-25 season) 11/08/2025 (Originally 9/1/2024)    INFLUENZA VACCINE  07/01/2025    HEMOGLOBIN A1C  08/11/2025    LUNG CANCER SCREENING  03/27/2026    HEPATITIS C SCREENING  Completed    Pneumococcal Vaccine 50+  Completed    AAA SCREEN ONCE  Completed    COLONOSCOPY  Discontinued                                                                                                                                                CMS Preventative Services Quick Reference  Risk Factors Identified During Encounter  Dental Screening Recommended  Vision Screening Recommended    The above risks/problems have been discussed with the patient.  Pertinent information has been shared with the patient in  "the After Visit Summary.  An After Visit Summary and PPPS were made available to the patient.    Follow Up:   Next Medicare Wellness visit to be scheduled in 1 year.         Additional E&M Note during same encounter follows:  Patient has additional, significant, and separately identifiable condition(s)/problem(s) that require work above and beyond the Medicare Wellness Visit     Chief Complaint  Medicare Wellness-subsequent    Subjective    HPI  Attila is also being seen today for an annual adult preventative physical exam.  and Attila is also being seen today for additional medical problem/s.       The patient is a 74-year-old male who presents for a Medication refills.    He maintains a healthy diet and abstains from alcohol, tobacco, vaping, and drug use.     He quit smoking on 01/01/2025. His last dental examination was conducted 40 years ago. He has undergone LASIK surgery and uses lenses.     He is a cancer patient at [facility] and goes there every 2 weeks for blood work. He gets his blood work done at [facility], which includes his blood count and metabolic panel.     His last hemoglobin A1c was checked in 02/2025, with a result of 7.8. He reports that his diabetes management is perfect, with his blood sugar and A1c levels being well-controlled.    He is requesting a refill of his glimepiride prescription, which he uses to manage his diabetes.    PAST SURGICAL HISTORY:  Open-heart surgery  LASIK surgery    SOCIAL HISTORY  He quit smoking on 01/01/2025. He does not drink alcohol or use drugs.  Review of Systems   Respiratory:  Negative for chest tightness and shortness of breath.    Cardiovascular:  Negative for chest pain and palpitations.          Objective   Vital Signs:  /78 (BP Location: Right arm, Patient Position: Sitting, Cuff Size: Adult)   Pulse 82   Ht 177.8 cm (70\")   Wt 82.6 kg (182 lb)   SpO2 99%   BMI 26.11 kg/m²   Physical Exam  Constitutional:       Appearance: Normal appearance. "   HENT:      Head: Normocephalic.      Right Ear: Tympanic membrane normal.      Left Ear: Tympanic membrane normal.      Nose: Nose normal.      Mouth/Throat:      Mouth: Mucous membranes are moist.   Cardiovascular:      Rate and Rhythm: Normal rate and regular rhythm.      Pulses: Normal pulses.      Heart sounds: Normal heart sounds.   Pulmonary:      Effort: Pulmonary effort is normal.      Breath sounds: Normal breath sounds.   Abdominal:      General: Bowel sounds are normal.      Palpations: Abdomen is soft.      Comments: Colostomy present   Musculoskeletal:         General: Normal range of motion.      Cervical back: Normal range of motion.   Skin:     General: Skin is warm and dry.   Neurological:      Mental Status: He is alert and oriented to person, place, and time.   Psychiatric:         Mood and Affect: Mood normal.         Behavior: Behavior normal.         Thought Content: Thought content normal.         Judgment: Judgment normal.           Ears: External ear canals and tympanic membranes intact  Respiratory: Clear to auscultation, no wheezing, rales or rhonchi          Assessment and Plan          1. Medicare wellness visit.  - Last hemoglobin A1c test in 02/2025 showed a result of 7.8, above the desired range; cholesterol levels not assessed since 05/2024.  - Advised to maintain a balanced diet, regular exercise regimen, and undergo annual eye examinations.  - Comprehensive labs ordered today, including tests for diabetes, cholesterol, and urine microalbumin; medications will be refilled as per current regimen.    2. Diabetes Mellitus.  - Last hemoglobin A1c test in 02/2025 showed a result of 7.8, above the desired range.  - Recheck of A1c will be done today with annual labs to ensure it is within the desired range.  - Labs ordered to include diabetes screening and A1c recheck.    3. Cholesterol management.  - Cholesterol levels not assessed since 05/2024.  - Comprehensive labs ordered today,  including tests for cholesterol.  - Patient advised to get blood drawn on the first floor for cholesterol screening.  - Labs ordered to monitor cholesterol levels and ensure they are within the desired range.    Pretension.  Negative per JNC 8 guidelines less than 140/90, blood pressure today is 124/78.    Depression screen negative, PHQ 2 score of 0.    Follow-up  - Follow-up appointment scheduled for 6 months.    Orders Placed This Encounter   Procedures    Lipid Panel     Standing Status:   Future     Number of Occurrences:   1     Expected Date:   5/8/2025     Expiration Date:   5/8/2026     Release to patient:   Routine Release [[id number]]    Hemoglobin A1c     Standing Status:   Future     Expected Date:   5/8/2025     Expiration Date:   5/8/2026     Release to patient:   Routine Release [[id number]]    Microalbumin / Creatinine Urine Ratio - Urine, Clean Catch     Standing Status:   Future     Expected Date:   5/8/2025     Expiration Date:   5/8/2026     Release to patient:   Routine Release [[id number]]     New Medications Ordered This Visit   Medications    glimepiride (Amaryl) 1 MG tablet     Sig: Take 1 tablet by mouth Every Morning Before Breakfast.     Dispense:  90 tablet     Refill:  1        I spent 30 minutes caring for Attila on this date of service. This time includes time spent by me in the following activities:preparing for the visit, reviewing tests, obtaining and/or reviewing a separately obtained history, performing a medically appropriate examination and/or evaluation , counseling and educating the patient/family/caregiver, ordering medications, tests, or procedures, and documenting information in the medical record  Follow Up   No follow-ups on file.  Patient was given instructions and counseling regarding his condition or for health maintenance advice. Please see specific information pulled into the AVS if appropriate.  Patient or patient representative verbalized consent for the use of  Ambient Listening during the visit with  DELANEY King for chart documentation. 5/8/2025  10:35 CDT

## 2025-05-10 RX ORDER — DIPHENOXYLATE HYDROCHLORIDE AND ATROPINE SULFATE 2.5; .025 MG/1; MG/1
2 TABLET ORAL 4 TIMES DAILY PRN
Qty: 240 TABLET | Refills: 5 | OUTPATIENT
Start: 2025-05-10

## 2025-05-12 ENCOUNTER — LAB (OUTPATIENT)
Dept: LAB | Facility: HOSPITAL | Age: 75
End: 2025-05-12
Payer: MEDICARE

## 2025-05-12 DIAGNOSIS — E87.5 HYPERKALEMIA: ICD-10-CM

## 2025-05-12 LAB
ANION GAP SERPL CALCULATED.3IONS-SCNC: 11 MMOL/L (ref 5–15)
BUN SERPL-MCNC: 21 MG/DL (ref 8–23)
BUN/CREAT SERPL: 15.4 (ref 7–25)
CALCIUM SPEC-SCNC: 8.9 MG/DL (ref 8.6–10.5)
CHLORIDE SERPL-SCNC: 107 MMOL/L (ref 98–107)
CO2 SERPL-SCNC: 19 MMOL/L (ref 22–29)
CREAT SERPL-MCNC: 1.36 MG/DL (ref 0.76–1.27)
EGFRCR SERPLBLD CKD-EPI 2021: 54.9 ML/MIN/1.73
GLUCOSE SERPL-MCNC: 145 MG/DL (ref 65–99)
POTASSIUM SERPL-SCNC: 4 MMOL/L (ref 3.5–5.2)
SODIUM SERPL-SCNC: 137 MMOL/L (ref 136–145)

## 2025-05-12 PROCEDURE — 36415 COLL VENOUS BLD VENIPUNCTURE: CPT

## 2025-05-12 PROCEDURE — 80048 BASIC METABOLIC PNL TOTAL CA: CPT

## 2025-05-19 NOTE — PROGRESS NOTES
Chief Complaint   Patient presents with    Post-op Follow-up     Patient had CABG x 3 on 2/15.          History of Present Illness  The patient returns today for his subsequent postoperative visit following coronary artery bypass grafting, which was performed on 02/15/2025.    He has done quite well with resuming chemotherapy and has had minimal disease progression on restaging after a brief surgical pause in chemotherapy. He reestablished care with Dr. Hameed' team, with Jessica Byers evaluating him on 04/03/2025. At this point, routine ongoing medical therapy was advised. He is seeing Elsie for a known right carotid artery stenosis and a patent right carotid stent, as well as a known left carotid occlusion. They recommended ongoing surveillance.    His A1c is elevated at 7.8, with a note from his primary care physician that an SGLT2 inhibitor is cost-prohibitive. He reports satisfactory progress, having resumed chemotherapy. His oncologist aims to achieve remission and subsequently discontinue chemotherapy in favor of radiation therapy. He acknowledges the potential benefits of radiation, despite his aversion to it, and understands that the treatment will be less intense than previous sessions, requiring fewer than 28 treatments. He has been informed that the proposed treatment plan has been agreed upon by other consulted physicians.    He attributes his rectal cancer to smoking, recognizing its impact on his blood and heart health. He reports significant improvements in his blood work, including a decrease in blood sugar levels to 96 and improved kidney function at 78. His creatinine levels have normalized, which he notes is a first for him. These improvements were observed after two chemotherapy sessions. He continues to juice, a habit he maintained even while smoking. He reports feeling well overall, with the exception of fatigue from chemotherapy. He experiences drowsiness from [employer]otec[employer] on Fridays, Saturdays,  "and Sundays, which prevents him from driving.   He has started chemotherapy.      He has been gradually increasing his physical activity, including playing golf with friends and swinging a club. He reports persistent soreness and numbness in his chest, which he attributes to the removal of an artery from his chest wall during surgery. He also experienced numbness in his feet post-surgery, which has since improved but not completely resolved. He has abstained from smoking for the past 5 months.    PAST SURGICAL HISTORY:  Coronary artery bypass grafting on 02/15/2025.    SOCIAL HISTORY  Exercise: Played golf with friends and swung a club.  Tobacco: Quit smoking 5 months ago.      The following portions of the patient's history were reviewed and updated as appropriate: allergies, current medications, past family history, past medical history, past social history, past surgical history and problem list.        Objective   Visit Vitals  /62   Pulse 79   Ht 179.1 cm (70.5\")   Wt 81.9 kg (180 lb 9.6 oz)   SpO2 98%   BMI 25.55 kg/m²       Physical Exam  Physical Exam  General: No acute distress. Appears appropriate.  Heart: Regular rate and rhythm without murmurs, rubs, or gallops.  Lungs: Clear to auscultation bilaterally without wheezing, rubs, or rales.  Chest: The sternum is stable. No clicks.  The sternotomy incision is well healed.    Abdomen: Soft and nontender. Ostomy bags are intact.  Extremities: No clubbing, cyanosis, or edema. Saphenous incisions are well healed.        US Carotid Bilateral  Result Date: 5/2/2025  Narrative: History: Carotid occlusive disease      Impression: Impression: 1. There is less than 50% stenosis of the right internal carotid artery stent. 2. There is known occlusion of the left internal carotid artery. 3. Antegrade flow is demonstrated in bilateral vertebral arteries.  Comments: Bilateral carotid vertebral arterial duplex scan was performed.  Grayscale imaging shows the right " internal carotid artery stent to be widely patent. The right internal carotid artery peak systolic velocity is 112.1 cm/sec. The end-diastolic velocity is 37.1 cm/sec. The right ICA/CCA ratio is approximately 1.5. These findings correlate with less than 50% stenosis of the right internal carotid artery.  There is a known occlusion of the left internal carotid artery.  Antegrade flow is demonstrated in bilateral vertebral arteries. There is greater than 50% stenosis in bilateral external carotid arteries.  This report was signed and finalized on 5/2/2025 12:32 PM by Dr. Jamie Reardon MD.      Results  Labs   - A1c: 7.8   - INR: 1.6   - Blood sugar: 96   - Kidney function: 78   - Creatinine: Normal    Assessment & Plan       Diagnoses and all orders for this visit:    1. S/P CABG (coronary artery bypass graft) (Primary)    2. Former smoker    3. Type 2 diabetes mellitus with stage 3a chronic kidney disease, without long-term current use of insulin    4. Normocytic anemia    5. Rectal cancer      Assessment & Plan  1. Multivessel coronary artery disease with left main disease.  He returns today having resumed chemotherapy. On examination, his incision is well healed, and his sternum is stable. He has been adherent to his precautions and advancing as tolerated. No further recommendations are needed at this point.       2. Rectal cancer.  Currently on chemotherapy and immunosuppression. Reports that the chemotherapy is working, with minimal disease progression noted on restaging. He has quit smoking for the past 5 months, which is positively impacting his overall health, including his kidney function and blood sugar levels. Advised to continue with the current treatment plan and maintain regular follow-ups with his oncologist.    3. Elevated A1c.  A1c is elevated at 7.8. His primary care physician noted that an SGLT2 inhibitor is cost-prohibitive. Advised to continue monitoring blood sugar levels and maintain a  healthy lifestyle, including diet and exercise.    4. Right carotid artery stenosis with a patent right carotid stent and known left carotid occlusion.  Under the care of Elsie for this condition, and ongoing surveillance has been recommended.    We discussed the importance of ongoing cardiovascular risk factor modification with primary efforts towards improving his glycemic control.  That being said is most important risk factor modification has been recently accomplished which is tobacco cessation.  5. Tobacco use.  Successfully quit smoking for the past 5 months. Continued abstinence from smoking is strongly recommended to support overall health and treatment outcomes.    Although I have not provided a specific follow up appointment, should I be of further assistance, please do not hesitate to contact us.        Transcribed from ambient dictation for Joe Back MD by Joe Back MD.  05/18/25   22:56 CDT    Patient or patient representative verbalized consent for the use of Ambient Listening during the visit with  Joe Back MD for chart documentation. 5/18/2025  23:03 CDT

## 2025-05-28 RX ORDER — METOPROLOL TARTRATE 25 MG/1
25 TABLET, FILM COATED ORAL EVERY 12 HOURS SCHEDULED
Qty: 60 TABLET | Refills: 2 | Status: CANCELLED | OUTPATIENT
Start: 2025-05-28

## 2025-05-30 RX ORDER — METOPROLOL TARTRATE 25 MG/1
25 TABLET, FILM COATED ORAL EVERY 12 HOURS SCHEDULED
Qty: 60 TABLET | Refills: 2 | Status: CANCELLED | OUTPATIENT
Start: 2025-05-29

## 2025-06-03 RX ORDER — METOPROLOL TARTRATE 25 MG/1
25 TABLET, FILM COATED ORAL EVERY 12 HOURS SCHEDULED
Qty: 60 TABLET | Refills: 2 | OUTPATIENT
Start: 2025-06-03

## 2025-06-05 RX ORDER — METOPROLOL TARTRATE 25 MG/1
25 TABLET, FILM COATED ORAL EVERY 12 HOURS SCHEDULED
Qty: 60 TABLET | Refills: 11 | Status: SHIPPED | OUTPATIENT
Start: 2025-06-05

## 2025-06-23 ENCOUNTER — TELEPHONE (OUTPATIENT)
Dept: VASCULAR SURGERY | Facility: CLINIC | Age: 75
End: 2025-06-23
Payer: MEDICARE

## 2025-06-23 NOTE — TELEPHONE ENCOUNTER
Called patient to get testing put on for November's appointment. No answer, left  for return call.

## 2025-07-21 LAB
ARTERIAL PATENCY WRIST A: ABNORMAL
ATMOSPHERIC PRESS: 751 MMHG
BASE EXCESS BLDA CALC-SCNC: -4.8 MMOL/L (ref 0–2)
BDY SITE: ABNORMAL
BODY TEMPERATURE: 37
CA-I BLD-MCNC: 4.69 MG/DL (ref 4.6–5.4)
COHGB MFR BLD: 2.9 % (ref 0–5)
HCO3 BLDA-SCNC: 20.9 MMOL/L (ref 20–26)
HCT VFR BLD CALC: 30.9 % (ref 38–51)
HGB BLDA-MCNC: 10.1 G/DL (ref 14–18)
METHGB BLD QL: 1.2 % (ref 0–3)
MODALITY: ABNORMAL
OXYHGB MFR BLDV: 96 % (ref 94–99)
PCO2 BLDA: 40.2 MM HG (ref 35–45)
PCO2 TEMP ADJ BLD: 40.2 MM HG (ref 35–45)
PH BLDA: 7.33 PH UNITS (ref 7.35–7.45)
PH, TEMP CORRECTED: 7.33 PH UNITS (ref 7.35–7.45)
PO2 BLDA: >547 MM HG (ref 83–108)
PO2 TEMP ADJ BLD: >547 MM HG (ref 83–108)
POTASSIUM BLDA-SCNC: 3.5 MMOL/L (ref 3.5–5.2)
SAO2 % BLDCOA: 100 % (ref 94–99)
SODIUM BLDA-SCNC: 140 MMOL/L (ref 136–145)
VENTILATOR MODE: ABNORMAL

## 2025-07-25 ENCOUNTER — TELEPHONE (OUTPATIENT)
Dept: VASCULAR SURGERY | Facility: CLINIC | Age: 75
End: 2025-07-25
Payer: MEDICARE

## 2025-08-11 ENCOUNTER — TELEPHONE (OUTPATIENT)
Dept: VASCULAR SURGERY | Facility: CLINIC | Age: 75
End: 2025-08-11
Payer: MEDICARE

## (undated) DEVICE — ANTIBACTERIAL UNDYED BRAIDED (POLYGLACTIN 910), SYNTHETIC ABSORBABLE SUTURE: Brand: COATED VICRYL

## (undated) DEVICE — SUT SILK 2/0 SH CR8 18IN CR8 C012D

## (undated) DEVICE — STRIP,CLOSURE,WOUND,MEDI-STRIP,1/2X4: Brand: MEDLINE

## (undated) DEVICE — GLV SURG BIOGEL M LTX PF 7 1/2

## (undated) DEVICE — SUT SILK 2/0 SUTUPAK TIES 24IN SA75H

## (undated) DEVICE — DRAPE,ANGIO,BRACH,STERILE,38X44: Brand: MEDLINE

## (undated) DEVICE — PK TURNOVER RM ADV

## (undated) DEVICE — INFLATION DEVICE: Brand: ENCORE™ 26

## (undated) DEVICE — SUT NONABS PROLENE TPR/HEMOSEAL DBL/ARM HS/7 5/0 60CM BLU

## (undated) DEVICE — 1.5 TO 1 DERMACARRIER: Brand: MESHGRAFTTM  II TISSUE EXPANSION SYSTEM

## (undated) DEVICE — 3M™ STERI-STRIP™ REINFORCED ADHESIVE SKIN CLOSURES, R1546, 1/4 IN X 4 IN (6 MM X 100 MM), 10 STRIPS/ENVELOPE: Brand: 3M™ STERI-STRIP™

## (undated) DEVICE — PAD MINOR UNIVERSAL: Brand: MEDLINE INDUSTRIES, INC.

## (undated) DEVICE — SPNG DISSCT SECTO KTTNER PK/5

## (undated) DEVICE — MASK,OXYGEN,MED CONC,ADLT,7' TUB, UC: Brand: PENDING

## (undated) DEVICE — KT INTRO MIC TROC 4F 21GA 7CM

## (undated) DEVICE — SENSR O2 OXIMAX FNGR A/ 18IN NONSTR

## (undated) DEVICE — CATH DIAG IMPULSE PIG145 5F 110CM

## (undated) DEVICE — PAD, DEFIB, ADULT, RADIOTRANS, PHYSIO: Brand: MEDLINE

## (undated) DEVICE — RESERVOIR,SUCTION,100CC,SILICONE: Brand: MEDLINE

## (undated) DEVICE — DRSNG SURESITE WNDW 4X4.5

## (undated) DEVICE — SOLIDIFIER LIQ LIQUILOC/PLUS W/TREAT 2000CC

## (undated) DEVICE — DRP SPECIAL PROC 4PC W/FC POUCH

## (undated) DEVICE — YANKAUER,BULB TIP WITH VENT: Brand: ARGYLE

## (undated) DEVICE — INTENDED FOR TISSUE SEPARATION, AND OTHER PROCEDURES THAT REQUIRE A SHARP SURGICAL BLADE TO PUNCTURE OR CUT.: Brand: BARD-PARKER ®  SAFETY SCALPED

## (undated) DEVICE — SPNG GZ 2S 2X2 8PLY STRL PK/2

## (undated) DEVICE — PAD MAJOR VASCULAR: Brand: MEDLINE INDUSTRIES, INC.

## (undated) DEVICE — CATH F5 INF JR 5 100CM: Brand: INFINITI

## (undated) DEVICE — SYR LL TP 10ML STRL

## (undated) DEVICE — GAUZE,SPONGE,4"X4",16PLY,XRAY,STRL,LF: Brand: MEDLINE

## (undated) DEVICE — 6MM X 25MM: Brand: ENROUTE ENFLATE TRANSCAROTID RX BALLOON DILATATION CATHETER

## (undated) DEVICE — ADHS LIQ MASTISOL 2/3ML

## (undated) DEVICE — 3M™ IOBAN™ 2 ANTIMICROBIAL INCISE DRAPE 6650EZ: Brand: IOBAN™ 2

## (undated) DEVICE — OASIS DRAIN, SINGLE, INLINE & ATS COMPATIBLE: Brand: OASIS

## (undated) DEVICE — ENDO KIT,LOURDES HOSPITAL: Brand: MEDLINE INDUSTRIES, INC.

## (undated) DEVICE — 3M™ STERI-STRIP™ REINFORCED ADHESIVE SKIN CLOSURES, R1547, 1/2 IN X 4 IN (12 MM X 100 MM), 6 STRIPS/ENVELOPE: Brand: 3M™ STERI-STRIP™

## (undated) DEVICE — Device: Brand: MEDEX

## (undated) DEVICE — SOL IRR NACL 0.9PCT BO 1000ML

## (undated) DEVICE — SUT MNCRYL 4/0 PS2 27IN UD MCP426H

## (undated) DEVICE — INF TL MULIPACK FR6: Brand: INFINITI

## (undated) DEVICE — APPL CHLORAPREP HI/LITE 26ML ORNG

## (undated) DEVICE — CANN NASL ETCO2 LO/FLO A/

## (undated) DEVICE — CATH F6INF TL 3DRC 100CM: Brand: INFINITI

## (undated) DEVICE — KT NDL GUIDE STRL 18GA

## (undated) DEVICE — E-PACK PROCEDURE KIT: Brand: E-PACK

## (undated) DEVICE — SUT SILK 2/0 FS BLK 18IN 685G

## (undated) DEVICE — THE CHANNEL CLEANING BRUSH IS A NYLON FLEXI BRUSH ATTACHED TO A FLEXIBLE PLASTIC SHEATH DESIGNED TO SAFELY REMOVE DEBRIS FROM FLEXIBLE ENDOSCOPES.

## (undated) DEVICE — ANGLED-TIP ARTERIAL SHEATH CONFIGURATION: Brand: ENROUTE TRANSCAROTID NEUROPROTECTION SYSTEM

## (undated) DEVICE — BAPTIST TURNOVER KIT: Brand: MEDLINE INDUSTRIES, INC.

## (undated) DEVICE — CLTH CLENS READYCLEANSE PERI CARE PK/5

## (undated) DEVICE — TOWEL,OR,DSP,ST,BLUE,STD,4/PK,20PK/CS: Brand: MEDLINE

## (undated) DEVICE — SUP ARMBRD ART/LINE BLU

## (undated) DEVICE — STERNUM BLADE, OFFSET (32.0 X 0.8 X 6.3MM)

## (undated) DEVICE — SUT ETHIB 2/0 RB1 DA 30IN WHT MX523

## (undated) DEVICE — 3M™ TEGADERM™ ABSORBENT CLEAR ACRYLIC DRESSING, 90802, OVAL, 5-7/8 IN X 6 IN (14.9 CM X 15.2 CM), 10/CT 4CT/CASE: Brand: 3M™ TEGADERM™

## (undated) DEVICE — Device

## (undated) DEVICE — Device: Brand: ZDRIVE™

## (undated) DEVICE — DRSNG SURESITE123 8X12IN

## (undated) DEVICE — BLAKE SILICONE DRAIN, 19 FR ROUND, HUBLESS WITH 1/4" BENDABLE TROCAR: Brand: BLAKE

## (undated) DEVICE — SYS DISTNTION VEIN BONCHEK 300MMHG

## (undated) DEVICE — SUT ETHLN 3/0 FS1 30IN 669H

## (undated) DEVICE — BLAKE SILICONE DRAINS CARDIO CONNECTOR 2:1: Brand: BLAKE

## (undated) DEVICE — PK SET UP ANES OPN HEART 30

## (undated) DEVICE — SOL IRR NACL 0.9PCT BT 1000ML

## (undated) DEVICE — CATH F5 INF JL 3.5 100CM: Brand: INFINITI

## (undated) DEVICE — STERILE (14X122CM) TELESCOPICALLY-FOLDED COVER: Brand: CIV-CLEAR™ TRANSDUCER COVER

## (undated) DEVICE — Z DUPLICATE USE 2738952 SYSTEM VENT M AD NSL PAP DEV HD STRP 2L HYPRINFL BG MRI

## (undated) DEVICE — SCANLAN® SURG-I-PAW® INSTRUMENT COVERS - RED, 1/10" X 5"/ 3 MMX13 CM (2 - 5" PCS /PKG): Brand: SCANLAN® SURG-I-PAW® INSTRUMENT COVERS

## (undated) DEVICE — TRAP FLD MINIVAC MEGADYNE 100ML

## (undated) DEVICE — ROTATING SURGICAL PUNCHES, 1 PER POUCH: Brand: A&E MEDICAL / ROTATING SURGICAL PUNCHES

## (undated) DEVICE — GW ENROUTE HC .014IN 95CM

## (undated) DEVICE — TB SXN SURG DLP MALL/CARD SFT/TP 6F 6IN

## (undated) DEVICE — Device: Brand: DEFENDO AIR/WATER/SUCTION AND BIOPSY VALVE

## (undated) DEVICE — PRESSURE MONITORING SET: Brand: TRUWAVE

## (undated) DEVICE — GW STARTER FXD/CORE PTFE/COAT J/TP/3MM .035IN 10X260CM

## (undated) DEVICE — PERCLOSE™ PROSTYLE™ SUTURE-MEDIATED CLOSURE AND REPAIR SYSTEM: Brand: PERCLOSE™ PROSTYLE™

## (undated) DEVICE — BLD STERNALOCK XP ZDRIVE

## (undated) DEVICE — PATIENT RETURN ELECTRODE, SINGLE-USE, CONTACT QUALITY MONITORING, ADULT, WITH 9FT CORD, FOR PATIENTS WEIGING OVER 33LBS. (15KG): Brand: MEGADYNE

## (undated) DEVICE — TBG SMPL FLTR LINE NASL 02/C02 A/ BX/100

## (undated) DEVICE — SOLIDIFIER LIQUI LOC PLUS 2000CC

## (undated) DEVICE — PK OPN HEART 30

## (undated) DEVICE — BLD SCLPL BEAVR MINI STR 2BVL 180D LF

## (undated) DEVICE — FRCP BX RADJAW4 NDL 2.8 240 STD OG

## (undated) DEVICE — BG OR ZSUT SADDLE 20IN CLR STRL

## (undated) DEVICE — ST MIC/INTRO ACC SHRP/NDL TUNG/TP NITNL 5F 45CM 7CM

## (undated) DEVICE — TR BAND RADIAL ARTERY COMPRESSION DEVICE: Brand: TR BAND

## (undated) DEVICE — SPNG GZ STRL 2S 4X4 12PLY

## (undated) DEVICE — SPNG GZ WOVN 4X4IN 12PLY 10/BX STRL

## (undated) DEVICE — LIMB HOLDER, WRIST/ANKLE: Brand: DEROYAL

## (undated) DEVICE — BLD TONG INDIV/WRP A/ 6IN STRL

## (undated) DEVICE — PINNACLE INTRODUCER SHEATH: Brand: PINNACLE

## (undated) DEVICE — SUT PROLN 2/0 SH 36IN 8523H

## (undated) DEVICE — KIT,ANTI FOG,W/SPONGE & FLUID,SOFT PACK: Brand: MEDLINE

## (undated) DEVICE — PK CATH CARD 30 CA/4

## (undated) DEVICE — SYR SLP TP 10ML DISP

## (undated) DEVICE — CVR UNIV C/ARM

## (undated) DEVICE — GLIDESHEATH SLENDER STAINLESS STEEL KIT: Brand: GLIDESHEATH SLENDER

## (undated) DEVICE — PROXIMATE RH ROTATING HEAD SKIN STAPLERS (35 WIDE) CONTAINS 35 STAINLESS STEEL STAPLES: Brand: PROXIMATE

## (undated) DEVICE — CVR PROB GEN PURP W/ISOSILK 5X24IN

## (undated) DEVICE — GLV SURG BIOGEL LTX PF 7 1/2

## (undated) DEVICE — 1/2 FORCE SURGICAL SPRING CLIP: Brand: STEALTH® SPRING CLIP

## (undated) DEVICE — GLV SURG SENSICARE W/ALOE PF LF 7.5 STRL

## (undated) DEVICE — SNAP KOVER: Brand: UNBRANDED

## (undated) DEVICE — DERMATOME BLADES: Brand: DERMATOME